# Patient Record
Sex: FEMALE | Race: WHITE | Employment: OTHER | URBAN - METROPOLITAN AREA
[De-identification: names, ages, dates, MRNs, and addresses within clinical notes are randomized per-mention and may not be internally consistent; named-entity substitution may affect disease eponyms.]

---

## 2017-01-03 ENCOUNTER — TELEPHONE (OUTPATIENT)
Dept: FAMILY MEDICINE CLINIC | Age: 57
End: 2017-01-03

## 2017-01-09 RX ORDER — CLONAZEPAM 0.5 MG/1
TABLET ORAL
Qty: 45 TABLET | Refills: 0 | Status: SHIPPED | OUTPATIENT
Start: 2017-01-09 | End: 2017-03-20 | Stop reason: SDUPTHER

## 2017-01-12 ENCOUNTER — TELEPHONE (OUTPATIENT)
Dept: FAMILY MEDICINE CLINIC | Age: 57
End: 2017-01-12

## 2017-01-19 RX ORDER — RANOLAZINE 500 MG/1
500 TABLET, EXTENDED RELEASE ORAL 2 TIMES DAILY
Qty: 180 TABLET | Refills: 0 | Status: SHIPPED | OUTPATIENT
Start: 2017-01-19 | End: 2017-05-16 | Stop reason: SDUPTHER

## 2017-02-02 ENCOUNTER — TELEPHONE (OUTPATIENT)
Dept: PULMONOLOGY | Age: 57
End: 2017-02-02

## 2017-02-23 ENCOUNTER — OFFICE VISIT (OUTPATIENT)
Dept: FAMILY MEDICINE CLINIC | Age: 57
End: 2017-02-23

## 2017-02-23 VITALS
RESPIRATION RATE: 16 BRPM | WEIGHT: 206 LBS | DIASTOLIC BLOOD PRESSURE: 70 MMHG | BODY MASS INDEX: 40.44 KG/M2 | HEART RATE: 63 BPM | SYSTOLIC BLOOD PRESSURE: 122 MMHG | OXYGEN SATURATION: 98 %

## 2017-02-23 DIAGNOSIS — R30.0 DYSURIA: Primary | ICD-10-CM

## 2017-02-23 DIAGNOSIS — F41.9 ANXIETY AND DEPRESSION: ICD-10-CM

## 2017-02-23 DIAGNOSIS — S93.105D: ICD-10-CM

## 2017-02-23 DIAGNOSIS — E03.9 HYPOTHYROIDISM, UNSPECIFIED TYPE: ICD-10-CM

## 2017-02-23 DIAGNOSIS — F32.A ANXIETY AND DEPRESSION: ICD-10-CM

## 2017-02-23 DIAGNOSIS — M51.36 DDD (DEGENERATIVE DISC DISEASE), LUMBAR: ICD-10-CM

## 2017-02-23 LAB
A/G RATIO: 1.4 (ref 1.1–2.2)
ALBUMIN SERPL-MCNC: 4.2 G/DL (ref 3.4–5)
ALP BLD-CCNC: 94 U/L (ref 40–129)
ALT SERPL-CCNC: 8 U/L (ref 10–40)
ANION GAP SERPL CALCULATED.3IONS-SCNC: 24 MMOL/L (ref 3–16)
AST SERPL-CCNC: 10 U/L (ref 15–37)
BILIRUB SERPL-MCNC: <0.2 MG/DL (ref 0–1)
BILIRUBIN, POC: NORMAL
BLOOD URINE, POC: NORMAL
BUN BLDV-MCNC: 40 MG/DL (ref 7–20)
CALCIUM SERPL-MCNC: 9.6 MG/DL (ref 8.3–10.6)
CHLORIDE BLD-SCNC: 98 MMOL/L (ref 99–110)
CLARITY, POC: NORMAL
CO2: 14 MMOL/L (ref 21–32)
COLOR, POC: YELLOW
CREAT SERPL-MCNC: 1.7 MG/DL (ref 0.6–1.1)
GFR AFRICAN AMERICAN: 38
GFR NON-AFRICAN AMERICAN: 31
GLOBULIN: 3.1 G/DL
GLUCOSE BLD-MCNC: 183 MG/DL (ref 70–99)
GLUCOSE URINE, POC: NORMAL
KETONES, POC: NORMAL
LEUKOCYTE EST, POC: NORMAL
NITRITE, POC: NORMAL
PH, POC: 6
POTASSIUM SERPL-SCNC: 5.6 MMOL/L (ref 3.5–5.1)
PROTEIN, POC: 100
SODIUM BLD-SCNC: 136 MMOL/L (ref 136–145)
SPECIFIC GRAVITY, POC: 1.02
TOTAL PROTEIN: 7.3 G/DL (ref 6.4–8.2)
TSH SERPL DL<=0.05 MIU/L-ACNC: 0.72 UIU/ML (ref 0.27–4.2)
UROBILINOGEN, POC: NORMAL

## 2017-02-23 PROCEDURE — 99214 OFFICE O/P EST MOD 30 MIN: CPT | Performed by: FAMILY MEDICINE

## 2017-02-23 PROCEDURE — 81002 URINALYSIS NONAUTO W/O SCOPE: CPT | Performed by: FAMILY MEDICINE

## 2017-02-23 RX ORDER — PHENAZOPYRIDINE HYDROCHLORIDE 100 MG/1
100 TABLET, FILM COATED ORAL 3 TIMES DAILY PRN
Qty: 20 TABLET | Refills: 0 | Status: SHIPPED | OUTPATIENT
Start: 2017-02-23 | End: 2018-03-01

## 2017-02-23 RX ORDER — PREGABALIN 150 MG/1
150 CAPSULE ORAL 2 TIMES DAILY
Qty: 60 CAPSULE | Refills: 3 | Status: SHIPPED | OUTPATIENT
Start: 2017-02-23 | End: 2017-03-23

## 2017-02-24 ENCOUNTER — TELEPHONE (OUTPATIENT)
Dept: FAMILY MEDICINE CLINIC | Age: 57
End: 2017-02-24

## 2017-02-25 LAB
ORGANISM: ABNORMAL
URINE CULTURE, ROUTINE: ABNORMAL

## 2017-03-13 RX ORDER — LINAGLIPTIN 5 MG/1
TABLET, FILM COATED ORAL
Qty: 30 TABLET | Refills: 0 | Status: SHIPPED | OUTPATIENT
Start: 2017-03-13 | End: 2017-03-23 | Stop reason: SDUPTHER

## 2017-03-15 RX ORDER — BUPROPION HYDROCHLORIDE 300 MG/1
TABLET ORAL
Qty: 30 TABLET | Refills: 5 | Status: SHIPPED | OUTPATIENT
Start: 2017-03-15 | End: 2017-08-21 | Stop reason: SDUPTHER

## 2017-03-20 RX ORDER — CLONAZEPAM 0.5 MG/1
TABLET ORAL
Qty: 45 TABLET | Refills: 0 | OUTPATIENT
Start: 2017-03-20 | End: 2017-04-21 | Stop reason: SDUPTHER

## 2017-03-23 ENCOUNTER — OFFICE VISIT (OUTPATIENT)
Dept: FAMILY MEDICINE CLINIC | Age: 57
End: 2017-03-23

## 2017-03-23 VITALS
WEIGHT: 207 LBS | DIASTOLIC BLOOD PRESSURE: 50 MMHG | HEART RATE: 77 BPM | BODY MASS INDEX: 40.64 KG/M2 | OXYGEN SATURATION: 98 % | HEIGHT: 60 IN | SYSTOLIC BLOOD PRESSURE: 120 MMHG

## 2017-03-23 DIAGNOSIS — R35.89 POLYURIA: ICD-10-CM

## 2017-03-23 DIAGNOSIS — R06.2 WHEEZE: ICD-10-CM

## 2017-03-23 DIAGNOSIS — J40 BRONCHITIS: ICD-10-CM

## 2017-03-23 DIAGNOSIS — J43.9 PULMONARY EMPHYSEMA, UNSPECIFIED EMPHYSEMA TYPE (HCC): ICD-10-CM

## 2017-03-23 DIAGNOSIS — R68.2 DRY MOUTH: Primary | ICD-10-CM

## 2017-03-23 DIAGNOSIS — G47.30 SLEEP APNEA, UNSPECIFIED TYPE: ICD-10-CM

## 2017-03-23 DIAGNOSIS — N39.0 URINARY TRACT INFECTION, SITE UNSPECIFIED: ICD-10-CM

## 2017-03-23 PROCEDURE — 99214 OFFICE O/P EST MOD 30 MIN: CPT | Performed by: FAMILY MEDICINE

## 2017-03-23 RX ORDER — PROMETHAZINE HYDROCHLORIDE AND CODEINE PHOSPHATE 6.25; 1 MG/5ML; MG/5ML
5 SYRUP ORAL EVERY 4 HOURS PRN
Qty: 120 ML | Refills: 0 | Status: SHIPPED | OUTPATIENT
Start: 2017-03-23 | End: 2017-03-30

## 2017-03-23 RX ORDER — GUAIFENESIN 600 MG/1
600 TABLET, EXTENDED RELEASE ORAL 2 TIMES DAILY PRN
Qty: 60 TABLET | Refills: 1 | Status: SHIPPED | OUTPATIENT
Start: 2017-03-23 | End: 2018-05-24 | Stop reason: SDUPTHER

## 2017-03-23 RX ORDER — AZITHROMYCIN 250 MG/1
TABLET, FILM COATED ORAL
Qty: 1 PACKET | Refills: 0 | Status: SHIPPED | OUTPATIENT
Start: 2017-03-23 | End: 2017-04-02

## 2017-03-23 RX ORDER — GABAPENTIN 300 MG/1
CAPSULE ORAL
Qty: 270 CAPSULE | Refills: 3 | Status: SHIPPED | OUTPATIENT
Start: 2017-03-23 | End: 2017-09-20 | Stop reason: SDUPTHER

## 2017-03-25 LAB — URINE CULTURE, ROUTINE: NORMAL

## 2017-04-03 ENCOUNTER — OFFICE VISIT (OUTPATIENT)
Dept: ENDOCRINOLOGY | Age: 57
End: 2017-04-03

## 2017-04-03 VITALS
OXYGEN SATURATION: 95 % | WEIGHT: 204.4 LBS | HEIGHT: 60 IN | SYSTOLIC BLOOD PRESSURE: 111 MMHG | BODY MASS INDEX: 40.13 KG/M2 | HEART RATE: 78 BPM | RESPIRATION RATE: 14 BRPM | DIASTOLIC BLOOD PRESSURE: 63 MMHG

## 2017-04-03 DIAGNOSIS — E78.5 HYPERLIPIDEMIA, UNSPECIFIED HYPERLIPIDEMIA TYPE: ICD-10-CM

## 2017-04-03 DIAGNOSIS — E03.9 HYPOTHYROIDISM, UNSPECIFIED TYPE: ICD-10-CM

## 2017-04-03 LAB — HBA1C MFR BLD: 11.2 %

## 2017-04-03 PROCEDURE — 99214 OFFICE O/P EST MOD 30 MIN: CPT | Performed by: INTERNAL MEDICINE

## 2017-04-03 PROCEDURE — 83036 HEMOGLOBIN GLYCOSYLATED A1C: CPT | Performed by: INTERNAL MEDICINE

## 2017-04-04 RX ORDER — PSYLLIUM HUSK 0.4 G
CAPSULE ORAL
Qty: 30 TABLET | Refills: 5 | Status: SHIPPED | OUTPATIENT
Start: 2017-04-04 | End: 2017-09-19

## 2017-04-21 RX ORDER — CLONAZEPAM 0.5 MG/1
TABLET ORAL
Qty: 45 TABLET | Refills: 0 | OUTPATIENT
Start: 2017-04-21 | End: 2017-05-15 | Stop reason: SDUPTHER

## 2017-05-09 ENCOUNTER — TELEPHONE (OUTPATIENT)
Dept: PULMONOLOGY | Age: 57
End: 2017-05-09

## 2017-05-09 RX ORDER — TIOTROPIUM BROMIDE INHALATION SPRAY 3.12 UG/1
SPRAY, METERED RESPIRATORY (INHALATION)
Qty: 1 INHALER | Refills: 4 | Status: SHIPPED | OUTPATIENT
Start: 2017-05-09 | End: 2018-01-24 | Stop reason: SDUPTHER

## 2017-05-16 RX ORDER — CLONAZEPAM 0.5 MG/1
TABLET ORAL
Qty: 45 TABLET | Refills: 0 | OUTPATIENT
Start: 2017-05-16 | End: 2017-06-01 | Stop reason: SDUPTHER

## 2017-05-16 RX ORDER — RANOLAZINE 500 MG/1
TABLET, FILM COATED, EXTENDED RELEASE ORAL
Qty: 60 TABLET | Refills: 5 | Status: SHIPPED | OUTPATIENT
Start: 2017-05-16 | End: 2017-10-21 | Stop reason: SDUPTHER

## 2017-05-24 ENCOUNTER — OFFICE VISIT (OUTPATIENT)
Dept: CARDIOLOGY CLINIC | Age: 57
End: 2017-05-24

## 2017-05-24 ENCOUNTER — HOSPITAL ENCOUNTER (OUTPATIENT)
Dept: PHYSICAL THERAPY | Age: 57
Discharge: HOME | End: 2017-05-31
Attending: PAIN MEDICINE | Admitting: PAIN MEDICINE

## 2017-05-24 VITALS
HEART RATE: 71 BPM | DIASTOLIC BLOOD PRESSURE: 46 MMHG | SYSTOLIC BLOOD PRESSURE: 108 MMHG | HEIGHT: 60 IN | BODY MASS INDEX: 40.44 KG/M2 | WEIGHT: 206 LBS | OXYGEN SATURATION: 96 %

## 2017-05-24 DIAGNOSIS — E78.5 HYPERLIPIDEMIA, UNSPECIFIED HYPERLIPIDEMIA TYPE: ICD-10-CM

## 2017-05-24 DIAGNOSIS — R09.89 CAROTID BRUIT, UNSPECIFIED LATERALITY: ICD-10-CM

## 2017-05-24 DIAGNOSIS — I10 ESSENTIAL HYPERTENSION: ICD-10-CM

## 2017-05-24 DIAGNOSIS — I20.1 CORONARY VASOSPASM (HCC): ICD-10-CM

## 2017-05-24 DIAGNOSIS — R06.02 SOB (SHORTNESS OF BREATH): ICD-10-CM

## 2017-05-24 DIAGNOSIS — I25.10 CORONARY ARTERY DISEASE INVOLVING NATIVE CORONARY ARTERY OF NATIVE HEART WITHOUT ANGINA PECTORIS: Primary | ICD-10-CM

## 2017-05-24 PROCEDURE — 99214 OFFICE O/P EST MOD 30 MIN: CPT | Performed by: INTERNAL MEDICINE

## 2017-06-01 ENCOUNTER — HOSPITAL ENCOUNTER (OUTPATIENT)
Dept: OTHER | Age: 57
Discharge: HOME | End: 2017-06-30
Attending: PAIN MEDICINE | Admitting: PAIN MEDICINE

## 2017-06-01 RX ORDER — CLONAZEPAM 0.5 MG/1
TABLET ORAL
Qty: 45 TABLET | Refills: 0 | Status: SHIPPED | OUTPATIENT
Start: 2017-06-01 | End: 2017-07-11 | Stop reason: SDUPTHER

## 2017-06-07 ENCOUNTER — HOSPITAL ENCOUNTER (OUTPATIENT)
Dept: OTHER | Age: 57
Discharge: HOME | End: 2017-06-07
Attending: PAIN MEDICINE | Admitting: PAIN MEDICINE

## 2017-06-07 DIAGNOSIS — R06.02 SOB (SHORTNESS OF BREATH): ICD-10-CM

## 2017-06-07 LAB
ANION GAP SERPL CALCULATED.3IONS-SCNC: 17 MMOL/L (ref 3–16)
BUN BLDV-MCNC: 19 MG/DL (ref 7–20)
CALCIUM SERPL-MCNC: 9.2 MG/DL (ref 8.3–10.6)
CHLORIDE BLD-SCNC: 95 MMOL/L (ref 99–110)
CHOLESTEROL, TOTAL: 125 MG/DL (ref 0–199)
CO2: 22 MMOL/L (ref 21–32)
CREAT SERPL-MCNC: 1 MG/DL (ref 0.6–1.1)
GFR AFRICAN AMERICAN: >60
GFR NON-AFRICAN AMERICAN: 57
GLUCOSE BLD-MCNC: 352 MG/DL (ref 70–99)
HDLC SERPL-MCNC: 54 MG/DL (ref 40–60)
LDL CHOLESTEROL CALCULATED: 35 MG/DL
POTASSIUM SERPL-SCNC: 4.5 MMOL/L (ref 3.5–5.1)
PRO-BNP: 479 PG/ML (ref 0–124)
SODIUM BLD-SCNC: 134 MMOL/L (ref 136–145)
TRIGL SERPL-MCNC: 179 MG/DL (ref 0–150)
VLDLC SERPL CALC-MCNC: 36 MG/DL

## 2017-06-08 ENCOUNTER — TELEPHONE (OUTPATIENT)
Dept: CARDIOLOGY CLINIC | Age: 57
End: 2017-06-08

## 2017-06-08 ENCOUNTER — TELEPHONE (OUTPATIENT)
Dept: FAMILY MEDICINE CLINIC | Age: 57
End: 2017-06-08

## 2017-06-08 DIAGNOSIS — R05.9 COUGH: Primary | ICD-10-CM

## 2017-06-08 RX ORDER — BENZONATATE 100 MG/1
CAPSULE ORAL
Qty: 60 CAPSULE | Refills: 0 | Status: SHIPPED | OUTPATIENT
Start: 2017-06-08 | End: 2017-11-08

## 2017-06-16 RX ORDER — FUROSEMIDE 20 MG/1
TABLET ORAL
Qty: 60 TABLET | Refills: 5 | Status: SHIPPED | OUTPATIENT
Start: 2017-06-16 | End: 2017-11-16 | Stop reason: SDUPTHER

## 2017-06-19 RX ORDER — CLONAZEPAM 0.5 MG/1
TABLET ORAL
Qty: 45 TABLET | Refills: 0 | OUTPATIENT
Start: 2017-06-19

## 2017-06-27 ENCOUNTER — HOSPITAL ENCOUNTER (OUTPATIENT)
Dept: CARDIOLOGY | Facility: CLINIC | Age: 57
Discharge: HOME | End: 2017-06-27
Attending: INTERNAL MEDICINE | Admitting: INTERNAL MEDICINE

## 2017-06-27 ENCOUNTER — TELEPHONE (OUTPATIENT)
Dept: CARDIOLOGY CLINIC | Age: 57
End: 2017-06-27

## 2017-06-27 DIAGNOSIS — R06.02 SOB (SHORTNESS OF BREATH): ICD-10-CM

## 2017-06-27 DIAGNOSIS — I10 ESSENTIAL HYPERTENSION: ICD-10-CM

## 2017-06-27 DIAGNOSIS — E78.5 HYPERLIPIDEMIA, UNSPECIFIED HYPERLIPIDEMIA TYPE: ICD-10-CM

## 2017-07-10 DIAGNOSIS — S33.6XXA SACROILIAC (LIGAMENT) SPRAIN, INITIAL ENCOUNTER: ICD-10-CM

## 2017-07-11 RX ORDER — ISOSORBIDE MONONITRATE 30 MG/1
TABLET, EXTENDED RELEASE ORAL
Qty: 30 TABLET | Refills: 5 | Status: SHIPPED | OUTPATIENT
Start: 2017-07-11 | End: 2017-12-14 | Stop reason: SDUPTHER

## 2017-07-11 RX ORDER — SERTRALINE HYDROCHLORIDE 100 MG/1
TABLET, FILM COATED ORAL
Qty: 30 TABLET | Refills: 5 | Status: SHIPPED | OUTPATIENT
Start: 2017-07-11 | End: 2017-12-14 | Stop reason: SDUPTHER

## 2017-07-11 RX ORDER — CLONAZEPAM 0.5 MG/1
TABLET ORAL
Qty: 45 TABLET | Refills: 0 | OUTPATIENT
Start: 2017-07-11 | End: 2017-08-30 | Stop reason: SDUPTHER

## 2017-07-11 RX ORDER — BUDESONIDE AND FORMOTEROL FUMARATE DIHYDRATE 160; 4.5 UG/1; UG/1
AEROSOL RESPIRATORY (INHALATION)
Qty: 1 INHALER | Refills: 5 | Status: SHIPPED | OUTPATIENT
Start: 2017-07-11 | End: 2017-12-14 | Stop reason: SDUPTHER

## 2017-07-11 RX ORDER — OXYBUTYNIN CHLORIDE 5 MG/1
TABLET ORAL
Qty: 60 TABLET | Refills: 5 | Status: SHIPPED | OUTPATIENT
Start: 2017-07-11 | End: 2017-12-14 | Stop reason: SDUPTHER

## 2017-07-11 RX ORDER — LISINOPRIL 20 MG/1
TABLET ORAL
Qty: 30 TABLET | Refills: 5 | Status: SHIPPED | OUTPATIENT
Start: 2017-07-11 | End: 2017-11-28 | Stop reason: ALTCHOICE

## 2017-07-11 RX ORDER — TIZANIDINE 4 MG/1
TABLET ORAL
Qty: 60 TABLET | Refills: 5 | Status: ON HOLD | OUTPATIENT
Start: 2017-07-11 | End: 2019-06-06 | Stop reason: HOSPADM

## 2017-07-11 RX ORDER — LEVOTHYROXINE SODIUM 0.05 MG/1
TABLET ORAL
Qty: 30 TABLET | Refills: 5 | Status: SHIPPED | OUTPATIENT
Start: 2017-07-11 | End: 2017-12-14 | Stop reason: SDUPTHER

## 2017-07-11 RX ORDER — CLOPIDOGREL BISULFATE 75 MG/1
TABLET ORAL
Qty: 30 TABLET | Refills: 5 | Status: SHIPPED | OUTPATIENT
Start: 2017-07-11 | End: 2017-12-14 | Stop reason: SDUPTHER

## 2017-07-11 RX ORDER — ATORVASTATIN CALCIUM 40 MG/1
TABLET, FILM COATED ORAL
Qty: 30 TABLET | Refills: 4 | Status: SHIPPED | OUTPATIENT
Start: 2017-07-11 | End: 2017-11-16 | Stop reason: SDUPTHER

## 2017-07-24 ENCOUNTER — OFFICE VISIT (OUTPATIENT)
Dept: ENDOCRINOLOGY | Age: 57
End: 2017-07-24

## 2017-07-24 VITALS
HEIGHT: 60 IN | SYSTOLIC BLOOD PRESSURE: 96 MMHG | BODY MASS INDEX: 39.03 KG/M2 | WEIGHT: 198.8 LBS | DIASTOLIC BLOOD PRESSURE: 47 MMHG | HEART RATE: 66 BPM | RESPIRATION RATE: 12 BRPM | OXYGEN SATURATION: 98 %

## 2017-07-24 DIAGNOSIS — E03.9 HYPOTHYROIDISM, UNSPECIFIED TYPE: ICD-10-CM

## 2017-07-24 DIAGNOSIS — E78.5 HYPERLIPIDEMIA, UNSPECIFIED HYPERLIPIDEMIA TYPE: ICD-10-CM

## 2017-07-24 LAB — HBA1C MFR BLD: 10.3 %

## 2017-07-24 PROCEDURE — 99214 OFFICE O/P EST MOD 30 MIN: CPT | Performed by: INTERNAL MEDICINE

## 2017-07-24 PROCEDURE — 83036 HEMOGLOBIN GLYCOSYLATED A1C: CPT | Performed by: INTERNAL MEDICINE

## 2017-07-25 ENCOUNTER — TELEPHONE (OUTPATIENT)
Dept: PULMONOLOGY | Age: 57
End: 2017-07-25

## 2017-08-01 RX ORDER — LANOLIN ALCOHOL/MO/W.PET/CERES
1000 CREAM (GRAM) TOPICAL DAILY
Qty: 30 TABLET | Refills: 3 | Status: SHIPPED | OUTPATIENT
Start: 2017-08-01 | End: 2019-06-17

## 2017-08-01 RX ORDER — FERROUS SULFATE 325(65) MG
325 TABLET ORAL
Qty: 30 TABLET | Refills: 3 | Status: SHIPPED | OUTPATIENT
Start: 2017-08-01 | End: 2017-11-15 | Stop reason: SDUPTHER

## 2017-08-01 RX ORDER — NITROGLYCERIN 0.4 MG/1
TABLET SUBLINGUAL
Qty: 25 TABLET | Refills: 3 | Status: SHIPPED | OUTPATIENT
Start: 2017-08-01 | End: 2017-11-28 | Stop reason: SDUPTHER

## 2017-08-02 ENCOUNTER — OFFICE VISIT (OUTPATIENT)
Dept: FAMILY MEDICINE CLINIC | Age: 57
End: 2017-08-02

## 2017-08-02 VITALS
DIASTOLIC BLOOD PRESSURE: 80 MMHG | SYSTOLIC BLOOD PRESSURE: 126 MMHG | WEIGHT: 206 LBS | HEART RATE: 80 BPM | RESPIRATION RATE: 16 BRPM | OXYGEN SATURATION: 98 % | BODY MASS INDEX: 40.23 KG/M2

## 2017-08-02 DIAGNOSIS — G47.33 OBSTRUCTIVE SLEEP APNEA SYNDROME: ICD-10-CM

## 2017-08-02 DIAGNOSIS — T78.40XS ALLERGY, SEQUELA: ICD-10-CM

## 2017-08-02 DIAGNOSIS — R05.9 COUGH: Primary | ICD-10-CM

## 2017-08-02 DIAGNOSIS — J43.9 PULMONARY EMPHYSEMA, UNSPECIFIED EMPHYSEMA TYPE (HCC): ICD-10-CM

## 2017-08-02 DIAGNOSIS — R09.82 PND (POST-NASAL DRIP): ICD-10-CM

## 2017-08-02 DIAGNOSIS — K21.9 GASTROESOPHAGEAL REFLUX DISEASE WITHOUT ESOPHAGITIS: ICD-10-CM

## 2017-08-02 PROCEDURE — 99214 OFFICE O/P EST MOD 30 MIN: CPT | Performed by: FAMILY MEDICINE

## 2017-08-02 RX ORDER — MONTELUKAST SODIUM 10 MG/1
10 TABLET ORAL DAILY
Qty: 30 TABLET | Refills: 3 | Status: SHIPPED | OUTPATIENT
Start: 2017-08-02 | End: 2019-01-01 | Stop reason: ALTCHOICE

## 2017-08-02 RX ORDER — ESOMEPRAZOLE MAGNESIUM 40 MG/1
40 CAPSULE, DELAYED RELEASE ORAL DAILY
Qty: 30 CAPSULE | Refills: 3 | Status: SHIPPED | OUTPATIENT
Start: 2017-08-02 | End: 2017-08-03 | Stop reason: CLARIF

## 2017-08-02 RX ORDER — FLUTICASONE PROPIONATE 50 MCG
2 SPRAY, SUSPENSION (ML) NASAL DAILY
Qty: 1 BOTTLE | Refills: 3 | Status: SHIPPED | OUTPATIENT
Start: 2017-08-02 | End: 2018-05-24 | Stop reason: SDUPTHER

## 2017-08-10 ENCOUNTER — HOSPITAL ENCOUNTER (OUTPATIENT)
Dept: CARDIOLOGY | Facility: CLINIC | Age: 57
Discharge: HOME | End: 2017-08-10
Attending: INTERNAL MEDICINE | Admitting: INTERNAL MEDICINE

## 2017-08-10 LAB
LV EF: 55 %
LVEF MODALITY: NORMAL

## 2017-08-11 ENCOUNTER — TELEPHONE (OUTPATIENT)
Dept: CARDIOLOGY CLINIC | Age: 57
End: 2017-08-11

## 2017-08-14 ENCOUNTER — TELEPHONE (OUTPATIENT)
Dept: CARDIOLOGY CLINIC | Age: 57
End: 2017-08-14

## 2017-08-21 ENCOUNTER — TELEPHONE (OUTPATIENT)
Dept: FAMILY MEDICINE CLINIC | Age: 57
End: 2017-08-21

## 2017-08-21 RX ORDER — BUPROPION HYDROCHLORIDE 300 MG/1
TABLET ORAL
Qty: 30 TABLET | Refills: 5 | Status: SHIPPED | OUTPATIENT
Start: 2017-08-21 | End: 2018-02-08 | Stop reason: SDUPTHER

## 2017-08-30 RX ORDER — CLONAZEPAM 0.5 MG/1
TABLET ORAL
Qty: 45 TABLET | Refills: 0 | Status: SHIPPED | OUTPATIENT
Start: 2017-08-30 | End: 2017-10-18 | Stop reason: SDUPTHER

## 2017-08-31 RX ORDER — INSULIN GLARGINE 100 [IU]/ML
INJECTION, SOLUTION SUBCUTANEOUS
Qty: 30 ML | Refills: 3 | Status: SHIPPED | OUTPATIENT
Start: 2017-08-31 | End: 2018-01-23 | Stop reason: SDUPTHER

## 2017-09-13 ENCOUNTER — OFFICE VISIT (OUTPATIENT)
Dept: FAMILY MEDICINE CLINIC | Age: 57
End: 2017-09-13

## 2017-09-13 VITALS
WEIGHT: 206 LBS | RESPIRATION RATE: 18 BRPM | OXYGEN SATURATION: 97 % | BODY MASS INDEX: 40.23 KG/M2 | DIASTOLIC BLOOD PRESSURE: 64 MMHG | TEMPERATURE: 98.3 F | HEART RATE: 75 BPM | SYSTOLIC BLOOD PRESSURE: 114 MMHG

## 2017-09-13 DIAGNOSIS — G47.33 OSA (OBSTRUCTIVE SLEEP APNEA): ICD-10-CM

## 2017-09-13 DIAGNOSIS — Z72.0 TOBACCO ABUSE: ICD-10-CM

## 2017-09-13 DIAGNOSIS — J43.9 PULMONARY EMPHYSEMA, UNSPECIFIED EMPHYSEMA TYPE (HCC): ICD-10-CM

## 2017-09-13 DIAGNOSIS — R05.8 PRODUCTIVE COUGH: Primary | ICD-10-CM

## 2017-09-13 DIAGNOSIS — Z23 NEEDS FLU SHOT: ICD-10-CM

## 2017-09-13 DIAGNOSIS — I10 BENIGN ESSENTIAL HTN: ICD-10-CM

## 2017-09-13 PROCEDURE — 90688 IIV4 VACCINE SPLT 0.5 ML IM: CPT | Performed by: FAMILY MEDICINE

## 2017-09-13 PROCEDURE — 90471 IMMUNIZATION ADMIN: CPT | Performed by: FAMILY MEDICINE

## 2017-09-13 PROCEDURE — 99213 OFFICE O/P EST LOW 20 MIN: CPT | Performed by: FAMILY MEDICINE

## 2017-09-13 RX ORDER — GUAIFENESIN 600 MG/1
1200 TABLET, EXTENDED RELEASE ORAL 2 TIMES DAILY
Qty: 120 TABLET | Refills: 2 | Status: SHIPPED | OUTPATIENT
Start: 2017-09-13 | End: 2017-11-28 | Stop reason: SDUPTHER

## 2017-09-13 RX ORDER — INSULIN GLARGINE 100 [IU]/ML
40 INJECTION, SOLUTION SUBCUTANEOUS
COMMUNITY
End: 2018-03-09 | Stop reason: CLARIF

## 2017-09-13 RX ORDER — GUAIFENESIN 600 MG/1
1200 TABLET, EXTENDED RELEASE ORAL 2 TIMES DAILY
Qty: 1200 TABLET | Refills: 2 | Status: SHIPPED | OUTPATIENT
Start: 2017-09-13 | End: 2017-09-13

## 2017-09-19 RX ORDER — MELATONIN
1 DAILY
Qty: 30 TABLET | Refills: 5 | Status: SHIPPED | OUTPATIENT
Start: 2017-09-19 | End: 2018-03-16 | Stop reason: SDUPTHER

## 2017-09-19 RX ORDER — GLUCOSAMINE HCL 500 MG
1000 TABLET ORAL DAILY
Qty: 30 TABLET | Refills: 0 | OUTPATIENT
Start: 2017-09-19

## 2017-09-20 RX ORDER — FLUOCINONIDE 1 MG/G
CREAM TOPICAL
Qty: 240 G | Refills: 0 | Status: SHIPPED | OUTPATIENT
Start: 2017-09-20 | End: 2019-01-01 | Stop reason: ALTCHOICE

## 2017-09-21 RX ORDER — GABAPENTIN 300 MG/1
CAPSULE ORAL
Qty: 270 CAPSULE | Refills: 0 | Status: SHIPPED | OUTPATIENT
Start: 2017-09-21 | End: 2017-10-30 | Stop reason: SDUPTHER

## 2017-10-10 ENCOUNTER — TELEPHONE (OUTPATIENT)
Dept: FAMILY MEDICINE CLINIC | Age: 57
End: 2017-10-10

## 2017-10-18 ENCOUNTER — TELEPHONE (OUTPATIENT)
Dept: PULMONOLOGY | Age: 57
End: 2017-10-18

## 2017-10-18 RX ORDER — CLONAZEPAM 0.5 MG/1
TABLET ORAL
Qty: 45 TABLET | Refills: 0 | OUTPATIENT
Start: 2017-10-18 | End: 2017-12-07 | Stop reason: SDUPTHER

## 2017-10-19 ENCOUNTER — TELEPHONE (OUTPATIENT)
Dept: FAMILY MEDICINE CLINIC | Age: 57
End: 2017-10-19

## 2017-10-19 NOTE — TELEPHONE ENCOUNTER
Pls tell her rx was called in (once it was), and pls lt her know I'll do a uds at her Nov appt.   Thx.

## 2017-10-23 RX ORDER — RANOLAZINE 500 MG/1
TABLET, FILM COATED, EXTENDED RELEASE ORAL
Qty: 60 TABLET | Refills: 1 | Status: SHIPPED | OUTPATIENT
Start: 2017-10-23 | End: 2017-12-15 | Stop reason: SDUPTHER

## 2017-10-30 RX ORDER — GABAPENTIN 300 MG/1
CAPSULE ORAL
Qty: 270 CAPSULE | Refills: 2 | Status: SHIPPED | OUTPATIENT
Start: 2017-10-30 | End: 2018-01-15 | Stop reason: SDUPTHER

## 2017-11-08 ENCOUNTER — OFFICE VISIT (OUTPATIENT)
Dept: FAMILY MEDICINE CLINIC | Age: 57
End: 2017-11-08

## 2017-11-08 VITALS
HEART RATE: 75 BPM | WEIGHT: 201 LBS | SYSTOLIC BLOOD PRESSURE: 100 MMHG | BODY MASS INDEX: 39.26 KG/M2 | DIASTOLIC BLOOD PRESSURE: 60 MMHG | OXYGEN SATURATION: 97 %

## 2017-11-08 DIAGNOSIS — J44.9 COPD, MODERATE (HCC): ICD-10-CM

## 2017-11-08 DIAGNOSIS — R15.9 ENCOPRESIS: ICD-10-CM

## 2017-11-08 DIAGNOSIS — R05.9 COUGH: ICD-10-CM

## 2017-11-08 DIAGNOSIS — A09 DIARRHEA OF INFECTIOUS ORIGIN: Primary | ICD-10-CM

## 2017-11-08 DIAGNOSIS — Z72.0 TOBACCO ABUSE: ICD-10-CM

## 2017-11-08 PROCEDURE — 3023F SPIROM DOC REV: CPT | Performed by: FAMILY MEDICINE

## 2017-11-08 PROCEDURE — G8926 SPIRO NO PERF OR DOC: HCPCS | Performed by: FAMILY MEDICINE

## 2017-11-08 PROCEDURE — 3017F COLORECTAL CA SCREEN DOC REV: CPT | Performed by: FAMILY MEDICINE

## 2017-11-08 PROCEDURE — 4004F PT TOBACCO SCREEN RCVD TLK: CPT | Performed by: FAMILY MEDICINE

## 2017-11-08 PROCEDURE — 99214 OFFICE O/P EST MOD 30 MIN: CPT | Performed by: FAMILY MEDICINE

## 2017-11-08 PROCEDURE — 3014F SCREEN MAMMO DOC REV: CPT | Performed by: FAMILY MEDICINE

## 2017-11-08 PROCEDURE — G8484 FLU IMMUNIZE NO ADMIN: HCPCS | Performed by: FAMILY MEDICINE

## 2017-11-08 PROCEDURE — G8417 CALC BMI ABV UP PARAM F/U: HCPCS | Performed by: FAMILY MEDICINE

## 2017-11-08 PROCEDURE — G8598 ASA/ANTIPLAT THER USED: HCPCS | Performed by: FAMILY MEDICINE

## 2017-11-08 PROCEDURE — G8427 DOCREV CUR MEDS BY ELIG CLIN: HCPCS | Performed by: FAMILY MEDICINE

## 2017-11-08 RX ORDER — BENZONATATE 100 MG/1
100-200 CAPSULE ORAL 3 TIMES DAILY PRN
Qty: 40 CAPSULE | Refills: 1 | Status: SHIPPED | OUTPATIENT
Start: 2017-11-08 | End: 2018-03-01

## 2017-11-08 RX ORDER — LOPERAMIDE HYDROCHLORIDE 2 MG/1
2 CAPSULE ORAL 4 TIMES DAILY PRN
Qty: 40 CAPSULE | Refills: 0 | Status: SHIPPED | OUTPATIENT
Start: 2017-11-08 | End: 2017-11-18

## 2017-11-11 ASSESSMENT — ENCOUNTER SYMPTOMS
SORE THROAT: 0
SHORTNESS OF BREATH: 1
COLOR CHANGE: 0
ABDOMINAL PAIN: 0
FACIAL SWELLING: 0
NAUSEA: 0
RHINORRHEA: 0
WHEEZING: 1
CHEST TIGHTNESS: 0
VOMITING: 0
DIARRHEA: 1
RECTAL PAIN: 0
COUGH: 1
BLOOD IN STOOL: 0

## 2017-11-13 ENCOUNTER — TELEPHONE (OUTPATIENT)
Dept: FAMILY MEDICINE CLINIC | Age: 57
End: 2017-11-13

## 2017-11-13 ENCOUNTER — HOSPITAL ENCOUNTER (OUTPATIENT)
Dept: OTHER | Age: 57
Discharge: HOME | End: 2017-11-13
Attending: FAMILY MEDICINE | Admitting: FAMILY MEDICINE

## 2017-11-13 DIAGNOSIS — R05.9 COUGH: ICD-10-CM

## 2017-11-13 DIAGNOSIS — R05.9 COUGH: Primary | ICD-10-CM

## 2017-11-13 RX ORDER — DEXTROMETHORPHAN HYDROBROMIDE AND PROMETHAZINE HYDROCHLORIDE 15; 6.25 MG/5ML; MG/5ML
SYRUP ORAL
Qty: 120 ML | Refills: 0 | Status: SHIPPED | OUTPATIENT
Start: 2017-11-13 | End: 2018-03-09 | Stop reason: CLARIF

## 2017-11-13 NOTE — TELEPHONE ENCOUNTER
Patient said the medicine she was put on is not helping . Still coughing and side hurts and goes around left side of back.  cb pt

## 2017-11-28 ENCOUNTER — OFFICE VISIT (OUTPATIENT)
Dept: CARDIOLOGY CLINIC | Age: 57
End: 2017-11-28

## 2017-11-28 VITALS
SYSTOLIC BLOOD PRESSURE: 108 MMHG | HEIGHT: 60 IN | HEART RATE: 75 BPM | OXYGEN SATURATION: 96 % | BODY MASS INDEX: 39.66 KG/M2 | WEIGHT: 202 LBS | DIASTOLIC BLOOD PRESSURE: 74 MMHG

## 2017-11-28 DIAGNOSIS — E78.2 MIXED HYPERLIPIDEMIA: ICD-10-CM

## 2017-11-28 DIAGNOSIS — I20.1 CORONARY VASOSPASM (HCC): ICD-10-CM

## 2017-11-28 DIAGNOSIS — I50.32 CHRONIC DIASTOLIC CONGESTIVE HEART FAILURE (HCC): ICD-10-CM

## 2017-11-28 DIAGNOSIS — I10 ESSENTIAL HYPERTENSION: ICD-10-CM

## 2017-11-28 DIAGNOSIS — I25.5 ISCHEMIC CARDIOMYOPATHY: ICD-10-CM

## 2017-11-28 DIAGNOSIS — I25.10 CORONARY ARTERY DISEASE INVOLVING NATIVE CORONARY ARTERY OF NATIVE HEART WITHOUT ANGINA PECTORIS: Primary | ICD-10-CM

## 2017-11-28 PROCEDURE — G8427 DOCREV CUR MEDS BY ELIG CLIN: HCPCS | Performed by: NURSE PRACTITIONER

## 2017-11-28 PROCEDURE — G8417 CALC BMI ABV UP PARAM F/U: HCPCS | Performed by: NURSE PRACTITIONER

## 2017-11-28 PROCEDURE — G8598 ASA/ANTIPLAT THER USED: HCPCS | Performed by: NURSE PRACTITIONER

## 2017-11-28 PROCEDURE — 99214 OFFICE O/P EST MOD 30 MIN: CPT | Performed by: NURSE PRACTITIONER

## 2017-11-28 PROCEDURE — 3017F COLORECTAL CA SCREEN DOC REV: CPT | Performed by: NURSE PRACTITIONER

## 2017-11-28 PROCEDURE — G8484 FLU IMMUNIZE NO ADMIN: HCPCS | Performed by: NURSE PRACTITIONER

## 2017-11-28 PROCEDURE — 3014F SCREEN MAMMO DOC REV: CPT | Performed by: NURSE PRACTITIONER

## 2017-11-28 PROCEDURE — 4004F PT TOBACCO SCREEN RCVD TLK: CPT | Performed by: NURSE PRACTITIONER

## 2017-11-28 RX ORDER — OXYCODONE HYDROCHLORIDE AND ACETAMINOPHEN 5; 325 MG/1; MG/1
1 TABLET ORAL EVERY 6 HOURS PRN
Status: ON HOLD | COMMUNITY
End: 2019-06-06 | Stop reason: SDUPTHER

## 2017-11-28 RX ORDER — NITROGLYCERIN 0.4 MG/1
TABLET SUBLINGUAL
Qty: 25 TABLET | Refills: 3 | Status: SHIPPED | OUTPATIENT
Start: 2017-11-28 | End: 2018-10-24 | Stop reason: SDUPTHER

## 2017-11-28 RX ORDER — LISINOPRIL 5 MG/1
5 TABLET ORAL DAILY
Qty: 30 TABLET | Refills: 3 | Status: SHIPPED | OUTPATIENT
Start: 2017-11-28 | End: 2018-03-16 | Stop reason: SDUPTHER

## 2017-11-28 NOTE — PATIENT INSTRUCTIONS
1. Restart the lisinopril at lower dose of 5 mg daily (for heart and kidney's)  2. No change in other heart medicines (metoprolol, aspirin, Plavix, atorvastatin)  3. Continue to follow up with Dr. Jennifer Valentino for the cough  4.  Follow up with Dr. Alma Valente in 6 months

## 2017-11-28 NOTE — PROGRESS NOTES
myocardial infarction) ([facility] [address] Thyroid disease; and Vascular complications of other MXAUAGQ(727.95). Surgical History:   has a past surgical history that includes Lithotripsy;  section; vascular surgery; Dilation and curettage of uterus; Appendectomy; Coronary angioplasty with stent (14); Diagnostic Cardiac Cath Lab Procedure; eye surgery (Left, 2015); and Cataract removal with implant (Right, 2015). Social History:   reports that she has been smoking Cigarettes. She has a 10.00 pack-year smoking history. She has quit using smokeless tobacco. She reports that she does not drink alcohol or use drugs. Family History:   Family History   Problem Relation Age of Onset    Other Mother       of unknown lung issue at 61    Diabetes Mother     Diabetes Brother     Heart Disease Maternal Grandfather     Cancer Maternal Grandmother     Asthma Grandchild        Home Medications:  Prior to Admission medications    Medication Sig Start Date End Date Taking? Authorizing Provider   oxyCODONE-acetaminophen (PERCOCET) 5-325 MG per tablet Take 1 tablet by mouth every 8 hours as needed for Pain . Yes Historical Provider, MD   furosemide (LASIX) 20 MG tablet Take 1 tablet by mouth twice daily. 17  Yes Cierra Flores MD   atorvastatin (LIPITOR) 40 MG tablet Take 1 tablet by mouth daily. 17  Yes Cierra Flores MD   ferrous sulfate 325 (65 Fe) MG EC tablet Take 1 tablet by mouth daily (with breakfast) 17  Yes Cierra Flores MD   promethazine-dextromethorphan (PROMETHAZINE-DM) 6.25-15 MG/5ML syrup 1-2 tsp po qid prn cough 17  Yes Cierra Flores MD   benzonatate (TESSALON PERLES) 100 MG capsule Take 1-2 capsules by mouth 3 times daily as needed for Cough 17  Yes Cierra Flores MD   gabapentin (NEURONTIN) 300 MG capsule Take 3 capsules by mouth 3 times daily.  10/30/17  Yes Cierra Flores MD   esomeprazole (NEXIUM) 20 MG delayed release capsule Take 1 capsule by mouth TIMES A DAY BEFORE MEALS 7/24/17  Yes Kathe Shen MD   tiZANidine (ZANAFLEX) 4 MG tablet TAKE ONE-HALF TO ONE THREE TIMES A DAY AS NEEDED FOR BACK PAIN 7/11/17  Yes Nancy Green MD   metoprolol tartrate (LOPRESSOR) 25 MG tablet TAKE ONE TABLET BY MOUTH TWICE A DAY 7/11/17  Yes Nancy Green MD   sertraline (ZOLOFT) 100 MG tablet TAKE ONE TABLET BY MOUTH DAILY 7/11/17  Yes Nancy Green MD   metFORMIN (GLUCOPHAGE) 1000 MG tablet TAKE ONE TABLET BY MOUTH TWICE A DAY WITH MEALS 7/11/17  Yes Nancy Green MD   clopidogrel (PLAVIX) 75 MG tablet TAKE ONE TABLET BY MOUTH DAILY 7/11/17  Yes Nancy Green MD   lisinopril (PRINIVIL;ZESTRIL) 20 MG tablet TAKE ONE TABLET BY MOUTH DAILY 7/11/17  Yes Nancy Green MD   SYMBICORT 160-4.5 MCG/ACT AERO INHALE 2 PUFFS BY MOUTH TWO TIMES A DAY 7/11/17  Yes Nancy Green MD   levothyroxine (SYNTHROID) 50 MCG tablet TAKE ONE TABLET BY MOUTH DAILY 7/11/17  Yes Nancy Green MD   isosorbide mononitrate (IMDUR) 30 MG extended release tablet TAKE ONE TABLET BY MOUTH DAILY 7/11/17  Yes Nancy Green MD   oxybutynin (DITROPAN) 5 MG tablet TAKE ONE TABLET BY MOUTH TWICE A DAY 7/11/17  Yes Nancy Green MD   brompheniramine-pseudoephedrine-DM (BROMFED DM) 30-2-10 MG/5ML syrup Take 5 mLs by mouth every 6 hours as needed for Congestion or Cough 6/15/17  Yes Brittany Albert PA-C   SPIRIVA RESPIMAT 2.5 MCG/ACT AERS inhaler INHALE 2 PUFFS BY MOUTH DAILY 5/9/17  Yes Dony De MD   guaiFENesin (MUCINEX) 600 MG extended release tablet Take 1 tablet by mouth 2 times daily as needed for Congestion 3/23/17  Yes Brenda Landrum MD   Artificial Saliva (BIOTENE MOISTURIZING MOUTH) SOLN USE 1 SPRAY DAILY AS NEEDED FOR DRY MOUTH 2/27/17  Yes Nancy Green MD   phenazopyridine (PYRIDIUM) 100 MG tablet Take 1 tablet by mouth 3 times daily as needed for Pain (bladder pain) 2/23/17  Yes Nancy Green MD   VENTOLIN  (90 BASE) MCG/ACT inhaler INHALE TWO PUFFS BY MOUTH EVERY 02/24/2017    CO2 22 06/07/2017    CO2 23 02/24/2017    CO2 20 02/24/2017    BUN 19 06/07/2017    BUN 32 02/24/2017    BUN 33 02/24/2017    CREATININE 1.0 06/07/2017    CREATININE 1.4 02/24/2017    CREATININE 1.5 02/24/2017     BNP:   Lab Results   Component Value Date    PROBNP 479 06/07/2017    PROBNP 641 07/28/2014        Cardiac Imaging:  Echo Aug 2017:    Normal left ventricle size, wall thickness and systolic function with an   estimated ejection fraction of 55%. No regional wall motion abnormalities   are seen.   Normal left ventricular diastolic filling pressure.   Mild mitral regurgitation. No stenosis. CARDIAC CATH: 06/04/15  LEFT HEART CATH  LM: normal, based on hemodynamics during FFR, + left main spasm  LAD: mild prox/mid disease <20%  Ramus: luminals  LCX: ostial 50% lesion with some ? Spasm. Mid LCx patent    RCA: luminals dominant     LVEDP:16  LVEF: 65%     FFR of ostial LCX-  1-->0.96 nonsignificant  + left main spasm on cathether and ?ostial LCx spasm  PLAN  1. Will start nitrates for spasm  2. Nonobstructive CAD with negative FFr of ostial LCx     ECHO: 2/26/2014  Limited 2-D echocardiogram due to suboptimal imaging and technical  Limitations. Overall left ventricular function appears grossly normal. Ejection   Fraction is visually estimated to be 55-60 %. Moderate concentric left ventricular hypertrophy is present. TDS secondary to habitus and pt supine due to cardiac cath.     02/28/2014  LEFT HEART ANGIOGRAPHY:  1. Left main coronary tapered distally to about 30% or 40%. The left main  trifurcated to an LAD, ramus, and a left circumflex. 2.  The LAD had minor luminal irregularities throughout its course up to  about 20%. The LAD ran in the interventricular groove to about the mid LAD  aspect, and then it became a very small vessel to the apex, but it did not  wrap. There were several small diagonal branches that were seen without  significant disease.   3.  The ramus ran down the daily for life. 3.  She will get Effient 60 mg p.o. x1 and will continue on 10 mg daily for a  minimum of 1 year. We will get an echocardiogram to evaluate any damage to  the lateral wall, as well as serial troponins to peak. She will need  aggressive _____ medical management, and further inpatient stay for her  coronary artery disease and STEMI. Assessment:    1. Coronary artery disease involving native coronary artery of native heart without angina pectoris    2. Coronary vasospasm (HCC)    3. Ischemic cardiomyopathy    4. Chronic diastolic congestive heart failure ([address]    5. Essential hypertension    6. Mixed hyperlipidemia          Plan:   1. Restart the lisinopril at lower dose of 5 mg daily (for heart and kidney's)  2. No change in other heart medicines (metoprolol, aspirin, Plavix, atorvastatin, Ranexa, Imdur)  3. Continue to follow up with Dr. Ellie Bro for the cough  4. Follow up with Dr. Yessy Gray in 6 months      I appreciate the opportunity of cooperating in the care of this individual.    Helen Cage CNP, 11/28/2017, 12:47 PM    QUALITY MEASURES  1. Tobacco Cessation Counseling: Yes  2. Retake of BP if >140/90:   NA  3. Documentation to PCP/referring for new patient:  Sent to PCP at close of office visit  4. CAD patient on anti-platelet: Yes  5. CAD patient on STATIN therapy:  Yes  6.  Patient with CHF and aFib on anticoagulation:  NA

## 2017-12-07 ENCOUNTER — OFFICE VISIT (OUTPATIENT)
Dept: FAMILY MEDICINE CLINIC | Age: 57
End: 2017-12-07

## 2017-12-07 VITALS
OXYGEN SATURATION: 98 % | DIASTOLIC BLOOD PRESSURE: 60 MMHG | TEMPERATURE: 99.3 F | HEART RATE: 75 BPM | BODY MASS INDEX: 39.65 KG/M2 | SYSTOLIC BLOOD PRESSURE: 100 MMHG | WEIGHT: 203 LBS

## 2017-12-07 DIAGNOSIS — K21.9 GASTROESOPHAGEAL REFLUX DISEASE WITHOUT ESOPHAGITIS: ICD-10-CM

## 2017-12-07 DIAGNOSIS — R05.9 COUGH: ICD-10-CM

## 2017-12-07 DIAGNOSIS — E11.39 DIABETIC EYES (HCC): ICD-10-CM

## 2017-12-07 DIAGNOSIS — G47.33 OBSTRUCTIVE SLEEP APNEA SYNDROME: ICD-10-CM

## 2017-12-07 DIAGNOSIS — H10.31 ACUTE BACTERIAL CONJUNCTIVITIS OF RIGHT EYE: Primary | ICD-10-CM

## 2017-12-07 DIAGNOSIS — J02.9 SORE THROAT: ICD-10-CM

## 2017-12-07 DIAGNOSIS — F41.9 ANXIETY: ICD-10-CM

## 2017-12-07 PROCEDURE — G8427 DOCREV CUR MEDS BY ELIG CLIN: HCPCS | Performed by: FAMILY MEDICINE

## 2017-12-07 PROCEDURE — 3014F SCREEN MAMMO DOC REV: CPT | Performed by: FAMILY MEDICINE

## 2017-12-07 PROCEDURE — G8598 ASA/ANTIPLAT THER USED: HCPCS | Performed by: FAMILY MEDICINE

## 2017-12-07 PROCEDURE — G8484 FLU IMMUNIZE NO ADMIN: HCPCS | Performed by: FAMILY MEDICINE

## 2017-12-07 PROCEDURE — G8417 CALC BMI ABV UP PARAM F/U: HCPCS | Performed by: FAMILY MEDICINE

## 2017-12-07 PROCEDURE — 3046F HEMOGLOBIN A1C LEVEL >9.0%: CPT | Performed by: FAMILY MEDICINE

## 2017-12-07 PROCEDURE — 99214 OFFICE O/P EST MOD 30 MIN: CPT | Performed by: FAMILY MEDICINE

## 2017-12-07 PROCEDURE — 4004F PT TOBACCO SCREEN RCVD TLK: CPT | Performed by: FAMILY MEDICINE

## 2017-12-07 PROCEDURE — 3017F COLORECTAL CA SCREEN DOC REV: CPT | Performed by: FAMILY MEDICINE

## 2017-12-07 RX ORDER — CLONAZEPAM 0.5 MG/1
TABLET ORAL
Qty: 45 TABLET | Refills: 0 | Status: SHIPPED | OUTPATIENT
Start: 2017-12-07 | End: 2018-01-12 | Stop reason: SDUPTHER

## 2017-12-07 RX ORDER — SULFACETAMIDE SODIUM 100 MG/ML
1 SOLUTION/ DROPS OPHTHALMIC 4 TIMES DAILY
Qty: 1 BOTTLE | Refills: 0 | Status: SHIPPED | OUTPATIENT
Start: 2017-12-07 | End: 2017-12-10

## 2017-12-07 NOTE — PROGRESS NOTES
Subjective:      Patient ID: Michael Allen is a 65 y.o. female. [facility] no long      Chief Complaint   Patient presents with    Cough     f/u    Has appt 12/13/17 appt with Dr Lacey Moe. Was seen in ED 12/3/17 for sore throat, worse with coughing. Has been coughing x mo's. Mucus pt coughs up is thick, stringy. Nostrils have been dry, crusty. Eyes water at times. Nose runs at times. Has not seen ENT but requests referral    Is on mucinex, elisabeth perles, flonase, singular, symbicort and fexofenadine. Has cpap, last used 2 yrs ago. Is planning to get it out, wash it and run distilled water through it. Has gerd, is on nexium. At times, still has flares and has to take TruQC, helpful. R eye red, runny x few days, awakens with crust around lashes. Granddau was exposed to pink eye. [facility] no longer takes [employer], need referral to other eye dr. Pt had eye exam at local optical office, was told she needs ophthal b/c of DM. Review of Systems   Constitutional: Positive for fatigue. Negative for activity change, appetite change, chills, diaphoresis and fever. HENT: Positive for congestion, postnasal drip, sinus pressure and sore throat. Negative for ear pain, facial swelling, rhinorrhea, sinus pain, trouble swallowing and voice change. Respiratory: Positive for cough and wheezing. Negative for choking and chest tightness. Cardiovascular: Negative for chest pain, palpitations and leg swelling. Gastrointestinal: Negative for abdominal pain. Objective:   Physical Exam  /60   Pulse 75   Temp 99.3 °F (37.4 °C) (Oral)   Wt 203 lb (92.1 kg)   LMP  (LMP Unknown)   SpO2 98%   BMI 39.65 kg/m²   HEENT nl except R sclera injected, PERRL, EOMI, Neck supple, no lad or tmg, no bruit  CV RRR, Lungs CTA  Abd soft, NT, no masses or hsm, BS nl  Ext with strong pedal pulses, no edema    Assessment:            Plan:      Delicia Laureano was seen today for cough.     Diagnoses and all orders for this visit:    Acute bacterial conjunctivitis of right eye  -     sulfacetamide (BLEPH-10) 10 % ophthalmic solution; Place 1 drop into the right eye 4 times daily for 3 days    Diabetic eyes ([address] uncontrolled DM  -     External Referral To Ophthalmology   Pt is encouraged to keep f/u with [facility] Abraham multifactorial  -     External Referral To ENT    Sore throat  -     External Referral To ENT    Gastroesophageal reflux disease without esophagitis  -     External Referral To ENT, possible contributer to cough    Obstructive sleep apnea syndrome   Resume routine cpap. Health benefits reviewed. Anxiety  -     clonazePAM (KLONOPIN) 0.5 MG tablet; TAKE ONE TABLET BY MOUTH THREE TIMES A DAY AS NEEDED FOR ANXIETY. Controlled Substances Monitoring:     Attestation: The Prescription Monitoring Report for this patient was reviewed today. Sara Garcia MD)  Documentation: Possible medication side effects, risk of tolerance and/or dependence, and alternative treatments discussed., No signs of potential drug abuse or diversion identified.  Sara Garcia MD)

## 2017-12-07 NOTE — PROGRESS NOTES
Subjective:      Patient ID: Gracie David is a 65 y.o. female.     HPI    Review of Systems    Objective:   Physical Exam    Assessment:            Plan:

## 2017-12-12 PROBLEM — F41.9 ANXIETY: Status: ACTIVE | Noted: 2017-12-12

## 2017-12-12 ASSESSMENT — ENCOUNTER SYMPTOMS
CHEST TIGHTNESS: 0
TROUBLE SWALLOWING: 0
RHINORRHEA: 0
SORE THROAT: 1
SINUS PRESSURE: 1
FACIAL SWELLING: 0
SINUS PAIN: 0
CHOKING: 0
VOICE CHANGE: 0
ABDOMINAL PAIN: 0
COUGH: 1
WHEEZING: 1

## 2017-12-15 RX ORDER — RANOLAZINE 500 MG/1
TABLET, FILM COATED, EXTENDED RELEASE ORAL
Qty: 180 TABLET | Refills: 3 | Status: SHIPPED | OUTPATIENT
Start: 2017-12-15 | End: 2018-12-12 | Stop reason: SDUPTHER

## 2017-12-15 RX ORDER — LIRAGLUTIDE 6 MG/ML
1.2 INJECTION SUBCUTANEOUS DAILY
Qty: 6 ML | Refills: 2 | Status: SHIPPED | OUTPATIENT
Start: 2017-12-15 | End: 2018-02-14 | Stop reason: SDUPTHER

## 2018-01-08 ENCOUNTER — TELEPHONE (OUTPATIENT)
Dept: FAMILY MEDICINE CLINIC | Age: 58
End: 2018-01-08

## 2018-01-08 DIAGNOSIS — K21.9 GASTROESOPHAGEAL REFLUX DISEASE WITHOUT ESOPHAGITIS: ICD-10-CM

## 2018-01-08 DIAGNOSIS — R09.89 CHRONIC SINUS COMPLAINTS: ICD-10-CM

## 2018-01-08 DIAGNOSIS — R05.3 CHRONIC COUGH: Primary | ICD-10-CM

## 2018-01-12 RX ORDER — CLONAZEPAM 0.5 MG/1
TABLET ORAL
Qty: 45 TABLET | Refills: 0 | OUTPATIENT
Start: 2018-01-12 | End: 2018-02-08 | Stop reason: SDUPTHER

## 2018-01-16 RX ORDER — GABAPENTIN 300 MG/1
CAPSULE ORAL
Qty: 270 CAPSULE | Refills: 4 | Status: SHIPPED | OUTPATIENT
Start: 2018-01-16 | End: 2018-06-12 | Stop reason: SDUPTHER

## 2018-01-23 RX ORDER — INSULIN LISPRO 100 [IU]/ML
INJECTION, SOLUTION INTRAVENOUS; SUBCUTANEOUS
Qty: 15 ML | Refills: 2 | Status: SHIPPED | OUTPATIENT
Start: 2018-01-23 | End: 2018-02-14 | Stop reason: SDUPTHER

## 2018-02-08 RX ORDER — BUPROPION HYDROCHLORIDE 300 MG/1
TABLET ORAL
Qty: 30 TABLET | Refills: 5 | Status: SHIPPED | OUTPATIENT
Start: 2018-02-08 | End: 2019-01-01 | Stop reason: ALTCHOICE

## 2018-02-09 RX ORDER — CLONAZEPAM 0.5 MG/1
TABLET ORAL
Qty: 45 TABLET | Refills: 0 | OUTPATIENT
Start: 2018-02-09 | End: 2018-03-26 | Stop reason: SDUPTHER

## 2018-02-16 RX ORDER — LIRAGLUTIDE 6 MG/ML
1.2 INJECTION SUBCUTANEOUS DAILY
Qty: 6 ML | Refills: 1 | Status: SHIPPED | OUTPATIENT
Start: 2018-02-16 | End: 2018-10-24 | Stop reason: SDUPTHER

## 2018-03-01 ENCOUNTER — OFFICE VISIT (OUTPATIENT)
Dept: FAMILY MEDICINE CLINIC | Age: 58
End: 2018-03-01

## 2018-03-01 VITALS
DIASTOLIC BLOOD PRESSURE: 80 MMHG | WEIGHT: 198 LBS | SYSTOLIC BLOOD PRESSURE: 126 MMHG | BODY MASS INDEX: 38.67 KG/M2 | OXYGEN SATURATION: 97 % | HEART RATE: 77 BPM

## 2018-03-01 DIAGNOSIS — J40 BRONCHITIS: Primary | ICD-10-CM

## 2018-03-01 DIAGNOSIS — J43.9 PULMONARY EMPHYSEMA, UNSPECIFIED EMPHYSEMA TYPE (HCC): ICD-10-CM

## 2018-03-01 PROCEDURE — G8598 ASA/ANTIPLAT THER USED: HCPCS | Performed by: FAMILY MEDICINE

## 2018-03-01 PROCEDURE — G8484 FLU IMMUNIZE NO ADMIN: HCPCS | Performed by: FAMILY MEDICINE

## 2018-03-01 PROCEDURE — 99213 OFFICE O/P EST LOW 20 MIN: CPT | Performed by: FAMILY MEDICINE

## 2018-03-01 PROCEDURE — 3017F COLORECTAL CA SCREEN DOC REV: CPT | Performed by: FAMILY MEDICINE

## 2018-03-01 PROCEDURE — G8926 SPIRO NO PERF OR DOC: HCPCS | Performed by: FAMILY MEDICINE

## 2018-03-01 PROCEDURE — 3023F SPIROM DOC REV: CPT | Performed by: FAMILY MEDICINE

## 2018-03-01 PROCEDURE — 3014F SCREEN MAMMO DOC REV: CPT | Performed by: FAMILY MEDICINE

## 2018-03-01 PROCEDURE — 4004F PT TOBACCO SCREEN RCVD TLK: CPT | Performed by: FAMILY MEDICINE

## 2018-03-01 PROCEDURE — G8427 DOCREV CUR MEDS BY ELIG CLIN: HCPCS | Performed by: FAMILY MEDICINE

## 2018-03-01 PROCEDURE — G8417 CALC BMI ABV UP PARAM F/U: HCPCS | Performed by: FAMILY MEDICINE

## 2018-03-01 RX ORDER — CEFDINIR 300 MG/1
300 CAPSULE ORAL 2 TIMES DAILY
Qty: 20 CAPSULE | Refills: 0 | Status: SHIPPED | OUTPATIENT
Start: 2018-03-01 | End: 2018-03-08

## 2018-03-01 RX ORDER — PROMETHAZINE HYDROCHLORIDE AND CODEINE PHOSPHATE 6.25; 1 MG/5ML; MG/5ML
SYRUP ORAL
Qty: 120 ML | Refills: 0 | Status: SHIPPED | OUTPATIENT
Start: 2018-03-01 | End: 2018-03-15

## 2018-03-01 RX ORDER — BUDESONIDE AND FORMOTEROL FUMARATE DIHYDRATE 160; 4.5 UG/1; UG/1
AEROSOL RESPIRATORY (INHALATION)
Qty: 1 INHALER | Refills: 5 | Status: SHIPPED | OUTPATIENT
Start: 2018-03-01 | End: 2018-05-17

## 2018-03-01 ASSESSMENT — ENCOUNTER SYMPTOMS
WHEEZING: 1
CHEST TIGHTNESS: 1
COUGH: 1
SINUS PRESSURE: 0
SORE THROAT: 0

## 2018-03-01 NOTE — PROGRESS NOTES
Patient: Nikolas Sandra is a 65 y.o. female who presents today with the following Chief Complaint(s):  Chief Complaint   Patient presents with    Cough         HPI: Has 4 day hx of cough, worse at hs. Feels sob durihg coughing attacks. No fever. Feels she may throw up after harsh cough. Has stress incont with hard coughing. Delsym not helpful to reduce cough. Is smoking 4-5 cig per day, attempting to quit. Is adherent with spiriva and prn albuteral mdi, 3-4 times per day when not sick, 6-8 times per day when sick with uri sx's. Has gotten away from symbicort, unsure why. Current Outpatient Prescriptions   Medication Sig Dispense Refill    budesonide-formoterol (SYMBICORT) 160-4.5 MCG/ACT AERO Inhale 2 puffs by mouth twice daily. 1 Inhaler 5    promethazine-codeine (PHENERGAN WITH CODEINE) 6.25-10 MG/5ML syrup 1-2 tsp po 1qid prn cough. 120 mL 0    cefdinir (OMNICEF) 300 MG capsule Take 1 capsule by mouth 2 times daily for 7 days 20 capsule 0    VICTOZA 18 MG/3ML SOPN SC injection Inject 1.2 mg into the skin daily 6 mL 1    insulin lispro (HUMALOG KWIKPEN) 100 UNIT/ML pen Inject 25 Units into the skin 3 times daily (before meals) 30 mL 1    clonazePAM (KLONOPIN) 0.5 MG tablet TAKE ONE TABLET BY MOUTH THREE TIMES A DAY AS NEEDED FOR ANXIETY 45 tablet 0    buPROPion (WELLBUTRIN XL) 300 MG extended release tablet TAKE ONE TABLET BY MOUTH EVERY MORNING 30 tablet 5    tiotropium (SPIRIVA RESPIMAT) 2.5 MCG/ACT AERS inhaler INHALE 2 PUFFS BY MOUTH DAILY 1 Inhaler 4    insulin glargine (BASAGLAR KWIKPEN) 100 UNIT/ML injection pen Inject 40 Units into the skin 2 times daily 30 mL 2    RANEXA 500 MG extended release tablet Take 1 tablet by mouth twice daily. 180 tablet 3    oxybutynin (DITROPAN) 5 MG tablet Take 1 tablet by mouth twice daily. 60 tablet 5    metoprolol tartrate (LOPRESSOR) 25 MG tablet Take 1 tablet by mouth twice daily.  60 tablet 5    isosorbide mononitrate (IMDUR) 30 MG extended release MCG tablet Take 1 tablet by mouth daily 30 tablet 3    linagliptin (TRADJENTA) 5 MG tablet TAKE ONE TABLET BY MOUTH DAILY 30 tablet 5    Insulin Pen Needle (B-D UF III MINI PEN NEEDLES) 31G X 5 MM MISC USE TO INJECT INSULIN and victoza 6 TIMES A  each 12    tiZANidine (ZANAFLEX) 4 MG tablet TAKE ONE-HALF TO ONE THREE TIMES A DAY AS NEEDED FOR BACK PAIN 60 tablet 5    guaiFENesin (MUCINEX) 600 MG extended release tablet Take 1 tablet by mouth 2 times daily as needed for Congestion 60 tablet 1    Artificial Saliva (BIOTENE MOISTURIZING MOUTH) SOLN USE 1 SPRAY DAILY AS NEEDED FOR DRY MOUTH 44.3 mL 4    VENTOLIN  (90 BASE) MCG/ACT inhaler INHALE TWO PUFFS BY MOUTH EVERY 6 HOURS AS NEEDED FOR WHEEZING 3 Inhaler 3    glucose blood VI test strips (FREESTYLE LITE) strip TEST THREE TIMES A  strip 4    Blood Glucose Monitoring Suppl ([employer] BLOOD GLUCOSE KIT) KIT Check glucose up to 3x daily 1 kit 0    FREESTYLE LANCETS MIS USE TO TEST BLOOD SUGAR THREE TIMES DAILY 300 each 3    fexofenadine (WAL-FEX ALLERGY) 180 MG tablet 1 po qd, to replace claritin/loratidine. 30 tablet 5    simethicone (MYLICON) 80 MG chewable tablet Take 1 tablet by mouth 4 times daily as needed for Flatulence 60 tablet 1    Alcohol Swabs PADS UAD to test blood sugar & inject insulin 100 each 5    aspirin (ASPIRIN CHILDRENS) 81 MG chewable tablet Take 1 tablet by mouth daily. 30 tablet 3    gabapentin (NEURONTIN) 300 MG capsule Take 3 capsules by mouth 3 times daily. 270 capsule 4     No current facility-administered medications for this visit. Patient's past medical history, surgical history, family history, medications,  and allergies  were all reviewed and updated as appropriate today. Review of Systems   Constitutional: Positive for fatigue. Negative for fever. HENT: Negative for ear pain, sinus pressure, sneezing and sore throat. Respiratory: Positive for cough, chest tightness and wheezing.

## 2018-03-07 ENCOUNTER — HOSPITAL ENCOUNTER (OUTPATIENT)
Dept: MRI IMAGING | Age: 58
Discharge: HOME | End: 2018-03-07

## 2018-03-07 DIAGNOSIS — M51.26 DISPLACEMENT OF LUMBAR INTERVERTEBRAL DISC WITHOUT MYELOPATHY: ICD-10-CM

## 2018-03-09 ENCOUNTER — OFFICE VISIT (OUTPATIENT)
Dept: ENDOCRINOLOGY | Age: 58
End: 2018-03-09

## 2018-03-09 VITALS
BODY MASS INDEX: 38.99 KG/M2 | SYSTOLIC BLOOD PRESSURE: 108 MMHG | WEIGHT: 198.6 LBS | RESPIRATION RATE: 14 BRPM | HEIGHT: 60 IN | HEART RATE: 78 BPM | DIASTOLIC BLOOD PRESSURE: 61 MMHG | OXYGEN SATURATION: 97 %

## 2018-03-09 DIAGNOSIS — E03.9 HYPOTHYROIDISM, UNSPECIFIED TYPE: ICD-10-CM

## 2018-03-09 DIAGNOSIS — E11.00 UNCONTROLLED TYPE 2 DIABETES MELLITUS WITH HYPEROSMOLARITY WITHOUT COMA, UNSPECIFIED LONG TERM INSULIN USE STATUS: Primary | ICD-10-CM

## 2018-03-09 LAB — HBA1C MFR BLD: 9.4 %

## 2018-03-09 PROCEDURE — 83036 HEMOGLOBIN GLYCOSYLATED A1C: CPT | Performed by: INTERNAL MEDICINE

## 2018-03-09 PROCEDURE — 3017F COLORECTAL CA SCREEN DOC REV: CPT | Performed by: INTERNAL MEDICINE

## 2018-03-09 PROCEDURE — 99214 OFFICE O/P EST MOD 30 MIN: CPT | Performed by: INTERNAL MEDICINE

## 2018-03-09 PROCEDURE — G8417 CALC BMI ABV UP PARAM F/U: HCPCS | Performed by: INTERNAL MEDICINE

## 2018-03-09 PROCEDURE — 4004F PT TOBACCO SCREEN RCVD TLK: CPT | Performed by: INTERNAL MEDICINE

## 2018-03-09 PROCEDURE — 3014F SCREEN MAMMO DOC REV: CPT | Performed by: INTERNAL MEDICINE

## 2018-03-09 PROCEDURE — G8427 DOCREV CUR MEDS BY ELIG CLIN: HCPCS | Performed by: INTERNAL MEDICINE

## 2018-03-09 PROCEDURE — G8598 ASA/ANTIPLAT THER USED: HCPCS | Performed by: INTERNAL MEDICINE

## 2018-03-09 PROCEDURE — 3046F HEMOGLOBIN A1C LEVEL >9.0%: CPT | Performed by: INTERNAL MEDICINE

## 2018-03-09 PROCEDURE — G8482 FLU IMMUNIZE ORDER/ADMIN: HCPCS | Performed by: INTERNAL MEDICINE

## 2018-03-09 NOTE — PROGRESS NOTES
pain, palpitations and orthopnea. Genitourinary: Negative for dysuria, urgency, frequency, hematuria and flank pain. Musculoskeletal: Positive for myalgias. Negative for back pain, joint pain, falls and neck pain. Skin: Negative for itching and rash. Neurological: Positive for headaches. Negative for dizziness, tingling, tremors, sensory change, speech change, focal weakness, seizures and loss of consciousness. Endo/Heme/Allergies: Negative for environmental allergies and polydipsia. Does not bruise/bleed easily. Psychiatric/Behavioral: Positive for depression. Negative for suicidal ideas, hallucinations, memory loss and substance abuse. The patient is not nervous/anxious and does not have insomnia. Physical Exam   Constitutional: She is oriented to person, place, and time. She appears well-developed and well-nourished. No distress. HENT:   Head: Normocephalic. Mouth/Throat: Oropharynx is clear and moist.   Eyes: EOM are normal. Right eye exhibits no discharge. Neck: No thyromegaly present. Cardiovascular: Normal rate and normal heart sounds. Pulmonary/Chest: Effort normal and breath sounds normal. No respiratory distress. She has no wheezes. Abdominal: Soft. Bowel sounds are normal. She exhibits no distension. There is no tenderness. Musculoskeletal: She exhibits no edema or tenderness. No ulcer on foot exam  No calus on foot exam   Neurological: She is alert and oriented to person, place, and time. Normal sensation to monofilament testing. Decreased vibratory sensations in both feet. Skin: Skin is warm and dry. No rash noted. She is not diaphoretic. Psychiatric: Her behavior is normal. Thought content normal.        Lab Results   Component Value Date    LABA1C 10.3 07/24/2017      Lab Results   Component Value Date    TSH 0.72 02/23/2017      Assessment/Plan       1.  Type 2 DM     Risa Mcknight is a 65 y.o. female has Type 2 DM with obesity and insulin

## 2018-03-16 RX ORDER — LISINOPRIL 5 MG/1
5 TABLET ORAL DAILY
Qty: 90 TABLET | Refills: 1 | Status: SHIPPED | OUTPATIENT
Start: 2018-03-16 | End: 2018-08-30 | Stop reason: SDUPTHER

## 2018-03-16 RX ORDER — LINAGLIPTIN 5 MG/1
TABLET, FILM COATED ORAL
Qty: 30 TABLET | Refills: 5 | Status: SHIPPED | OUTPATIENT
Start: 2018-03-16 | End: 2018-08-30 | Stop reason: SDUPTHER

## 2018-03-16 RX ORDER — MELATONIN
1 DAILY
Qty: 30 TABLET | Refills: 5 | Status: SHIPPED | OUTPATIENT
Start: 2018-03-16 | End: 2018-08-30 | Stop reason: SDUPTHER

## 2018-03-16 NOTE — TELEPHONE ENCOUNTER
Lab Results   Component Value Date     06/07/2017    K 4.5 06/07/2017    CL 95 06/07/2017    CO2 22 06/07/2017    BUN 19 06/07/2017    CREATININE 1.0 06/07/2017    GLUCOSE 352 06/07/2017    CALCIUM 9.2 06/07/2017

## 2018-03-26 RX ORDER — CLONAZEPAM 0.5 MG/1
TABLET ORAL
Qty: 45 TABLET | Refills: 0 | Status: SHIPPED | OUTPATIENT
Start: 2018-03-26 | End: 2018-04-23 | Stop reason: SDUPTHER

## 2018-03-27 RX ORDER — INSULIN GLARGINE 100 [IU]/ML
40 INJECTION, SOLUTION SUBCUTANEOUS 2 TIMES DAILY
Qty: 30 ML | Refills: 3 | Status: SHIPPED | OUTPATIENT
Start: 2018-03-27 | End: 2018-10-24 | Stop reason: SDUPTHER

## 2018-04-16 RX ORDER — LEVOTHYROXINE SODIUM 0.05 MG/1
TABLET ORAL
Qty: 30 TABLET | Refills: 5 | Status: SHIPPED | OUTPATIENT
Start: 2018-04-16 | End: 2018-09-29 | Stop reason: SDUPTHER

## 2018-04-24 RX ORDER — CLONAZEPAM 0.5 MG/1
TABLET ORAL
Qty: 45 TABLET | Refills: 0 | OUTPATIENT
Start: 2018-04-24 | End: 2018-05-23 | Stop reason: SDUPTHER

## 2018-05-15 RX ORDER — ATORVASTATIN CALCIUM 40 MG/1
TABLET, FILM COATED ORAL
Qty: 30 TABLET | Refills: 5 | Status: SHIPPED | OUTPATIENT
Start: 2018-05-15 | End: 2018-10-29 | Stop reason: SDUPTHER

## 2018-05-15 RX ORDER — LANOLIN ALCOHOL/MO/W.PET/CERES
325 CREAM (GRAM) TOPICAL
Qty: 30 TABLET | Refills: 5 | Status: SHIPPED | OUTPATIENT
Start: 2018-05-15 | End: 2018-10-29 | Stop reason: SDUPTHER

## 2018-05-15 RX ORDER — FUROSEMIDE 20 MG/1
TABLET ORAL
Qty: 60 TABLET | Refills: 5 | Status: SHIPPED | OUTPATIENT
Start: 2018-05-15 | End: 2018-10-29 | Stop reason: SDUPTHER

## 2018-05-17 RX ORDER — BUDESONIDE AND FORMOTEROL FUMARATE DIHYDRATE 160; 4.5 UG/1; UG/1
AEROSOL RESPIRATORY (INHALATION)
Qty: 1 INHALER | Refills: 5 | Status: SHIPPED | OUTPATIENT
Start: 2018-05-17 | End: 2018-10-29 | Stop reason: SDUPTHER

## 2018-05-23 RX ORDER — CLONAZEPAM 0.5 MG/1
TABLET ORAL
Qty: 45 TABLET | Refills: 0 | Status: SHIPPED | OUTPATIENT
Start: 2018-05-23 | End: 2018-06-20 | Stop reason: SDUPTHER

## 2018-05-24 ENCOUNTER — OFFICE VISIT (OUTPATIENT)
Dept: FAMILY MEDICINE CLINIC | Age: 58
End: 2018-05-24

## 2018-05-24 VITALS
SYSTOLIC BLOOD PRESSURE: 130 MMHG | OXYGEN SATURATION: 95 % | DIASTOLIC BLOOD PRESSURE: 80 MMHG | HEART RATE: 70 BPM | WEIGHT: 205 LBS | BODY MASS INDEX: 40.04 KG/M2

## 2018-05-24 DIAGNOSIS — I73.9 PVD (PERIPHERAL VASCULAR DISEASE) (HCC): ICD-10-CM

## 2018-05-24 DIAGNOSIS — M54.16 LUMBAR RADICULOPATHY: ICD-10-CM

## 2018-05-24 DIAGNOSIS — R09.82 PND (POST-NASAL DRIP): ICD-10-CM

## 2018-05-24 DIAGNOSIS — M79.605 PAIN OF LEFT LOWER EXTREMITY: Primary | ICD-10-CM

## 2018-05-24 DIAGNOSIS — T78.40XS ALLERGIC STATE, SEQUELA: ICD-10-CM

## 2018-05-24 PROCEDURE — 99213 OFFICE O/P EST LOW 20 MIN: CPT | Performed by: FAMILY MEDICINE

## 2018-05-24 PROCEDURE — 3017F COLORECTAL CA SCREEN DOC REV: CPT | Performed by: FAMILY MEDICINE

## 2018-05-24 PROCEDURE — G8598 ASA/ANTIPLAT THER USED: HCPCS | Performed by: FAMILY MEDICINE

## 2018-05-24 PROCEDURE — G8417 CALC BMI ABV UP PARAM F/U: HCPCS | Performed by: FAMILY MEDICINE

## 2018-05-24 PROCEDURE — G8427 DOCREV CUR MEDS BY ELIG CLIN: HCPCS | Performed by: FAMILY MEDICINE

## 2018-05-24 PROCEDURE — 4004F PT TOBACCO SCREEN RCVD TLK: CPT | Performed by: FAMILY MEDICINE

## 2018-05-24 RX ORDER — FLUTICASONE PROPIONATE 50 MCG
2 SPRAY, SUSPENSION (ML) NASAL DAILY
Qty: 1 BOTTLE | Refills: 5 | Status: SHIPPED | OUTPATIENT
Start: 2018-05-24 | End: 2018-12-13 | Stop reason: SDUPTHER

## 2018-05-24 RX ORDER — GUAIFENESIN 600 MG/1
600 TABLET, EXTENDED RELEASE ORAL 2 TIMES DAILY PRN
Qty: 60 TABLET | Refills: 5 | Status: ON HOLD | OUTPATIENT
Start: 2018-05-24 | End: 2019-06-06 | Stop reason: HOSPADM

## 2018-05-25 ASSESSMENT — ENCOUNTER SYMPTOMS
SHORTNESS OF BREATH: 0
ABDOMINAL PAIN: 0
BACK PAIN: 1
WHEEZING: 1
COLOR CHANGE: 0

## 2018-05-30 ENCOUNTER — OFFICE VISIT (OUTPATIENT)
Dept: CARDIOLOGY CLINIC | Age: 58
End: 2018-05-30

## 2018-05-30 VITALS
HEART RATE: 69 BPM | OXYGEN SATURATION: 95 % | HEIGHT: 60 IN | SYSTOLIC BLOOD PRESSURE: 130 MMHG | BODY MASS INDEX: 39.46 KG/M2 | DIASTOLIC BLOOD PRESSURE: 60 MMHG | WEIGHT: 201 LBS

## 2018-05-30 DIAGNOSIS — I73.9 CLAUDICATION (HCC): Primary | ICD-10-CM

## 2018-05-30 DIAGNOSIS — I20.1 CORONARY VASOSPASM (HCC): ICD-10-CM

## 2018-05-30 DIAGNOSIS — I50.32 CHRONIC DIASTOLIC CONGESTIVE HEART FAILURE (HCC): ICD-10-CM

## 2018-05-30 DIAGNOSIS — I10 ESSENTIAL HYPERTENSION: ICD-10-CM

## 2018-05-30 DIAGNOSIS — I25.10 CORONARY ARTERY DISEASE INVOLVING NATIVE CORONARY ARTERY OF NATIVE HEART WITHOUT ANGINA PECTORIS: ICD-10-CM

## 2018-05-30 DIAGNOSIS — E78.5 HYPERLIPIDEMIA, UNSPECIFIED HYPERLIPIDEMIA TYPE: ICD-10-CM

## 2018-05-30 PROCEDURE — G8417 CALC BMI ABV UP PARAM F/U: HCPCS | Performed by: INTERNAL MEDICINE

## 2018-05-30 PROCEDURE — 99214 OFFICE O/P EST MOD 30 MIN: CPT | Performed by: INTERNAL MEDICINE

## 2018-05-30 PROCEDURE — 3017F COLORECTAL CA SCREEN DOC REV: CPT | Performed by: INTERNAL MEDICINE

## 2018-05-30 PROCEDURE — G8427 DOCREV CUR MEDS BY ELIG CLIN: HCPCS | Performed by: INTERNAL MEDICINE

## 2018-05-30 PROCEDURE — 4004F PT TOBACCO SCREEN RCVD TLK: CPT | Performed by: INTERNAL MEDICINE

## 2018-05-30 PROCEDURE — G8598 ASA/ANTIPLAT THER USED: HCPCS | Performed by: INTERNAL MEDICINE

## 2018-06-11 ENCOUNTER — TELEPHONE (OUTPATIENT)
Dept: FAMILY MEDICINE CLINIC | Age: 58
End: 2018-06-11

## 2018-06-12 DIAGNOSIS — I21.3 ST ELEVATION MYOCARDIAL INFARCTION (STEMI), UNSPECIFIED ARTERY (HCC): ICD-10-CM

## 2018-06-12 DIAGNOSIS — I73.9 PVD (PERIPHERAL VASCULAR DISEASE) (HCC): Primary | ICD-10-CM

## 2018-06-12 RX ORDER — OXYBUTYNIN CHLORIDE 5 MG/1
TABLET ORAL
Qty: 60 TABLET | Refills: 5 | Status: SHIPPED | OUTPATIENT
Start: 2018-06-12 | End: 2018-11-28 | Stop reason: SDUPTHER

## 2018-06-12 RX ORDER — SERTRALINE HYDROCHLORIDE 100 MG/1
TABLET, FILM COATED ORAL
Qty: 30 TABLET | Refills: 5 | Status: ON HOLD | OUTPATIENT
Start: 2018-06-12 | End: 2019-06-03

## 2018-06-12 RX ORDER — CLOPIDOGREL BISULFATE 75 MG/1
TABLET ORAL
Qty: 30 TABLET | Refills: 5 | Status: SHIPPED | OUTPATIENT
Start: 2018-06-12 | End: 2018-11-28 | Stop reason: SDUPTHER

## 2018-06-12 RX ORDER — GABAPENTIN 300 MG/1
CAPSULE ORAL
Qty: 270 CAPSULE | Refills: 5 | Status: SHIPPED | OUTPATIENT
Start: 2018-06-12 | End: 2018-11-28 | Stop reason: SDUPTHER

## 2018-06-12 RX ORDER — ISOSORBIDE MONONITRATE 30 MG/1
TABLET, EXTENDED RELEASE ORAL
Qty: 30 TABLET | Refills: 5 | Status: SHIPPED | OUTPATIENT
Start: 2018-06-12 | End: 2018-11-28 | Stop reason: SDUPTHER

## 2018-06-14 RX ORDER — OMEPRAZOLE 40 MG/1
40 CAPSULE, DELAYED RELEASE ORAL DAILY
Qty: 30 CAPSULE | Refills: 5 | Status: SHIPPED | OUTPATIENT
Start: 2018-06-14 | End: 2019-01-01 | Stop reason: ALTCHOICE

## 2018-06-18 RX ORDER — TIOTROPIUM BROMIDE INHALATION SPRAY 3.12 UG/1
SPRAY, METERED RESPIRATORY (INHALATION)
Qty: 1 INHALER | Refills: 5 | Status: SHIPPED | OUTPATIENT
Start: 2018-06-18 | End: 2018-11-28 | Stop reason: SDUPTHER

## 2018-06-28 ENCOUNTER — TELEPHONE (OUTPATIENT)
Dept: CARDIOLOGY CLINIC | Age: 58
End: 2018-06-28

## 2018-06-28 ENCOUNTER — TELEPHONE (OUTPATIENT)
Dept: FAMILY MEDICINE CLINIC | Age: 58
End: 2018-06-28

## 2018-06-28 DIAGNOSIS — R05.9 COUGH: Primary | ICD-10-CM

## 2018-06-28 RX ORDER — DEXTROMETHORPHAN HYDROBROMIDE AND PROMETHAZINE HYDROCHLORIDE 15; 6.25 MG/5ML; MG/5ML
5 SYRUP ORAL 4 TIMES DAILY PRN
Qty: 240 ML | Refills: 0 | Status: SHIPPED | OUTPATIENT
Start: 2018-06-28 | End: 2018-07-05

## 2018-07-09 ENCOUNTER — HOSPITAL ENCOUNTER (OUTPATIENT)
Dept: MAMMOGRAPHY | Age: 58
Discharge: HOME | End: 2018-07-09
Attending: FAMILY MEDICINE | Admitting: FAMILY MEDICINE

## 2018-07-09 ENCOUNTER — HOSPITAL ENCOUNTER (OUTPATIENT)
Dept: VASCULAR LAB | Age: 58
Discharge: HOME | End: 2018-07-09
Attending: FAMILY MEDICINE | Admitting: FAMILY MEDICINE

## 2018-07-09 DIAGNOSIS — M51.26 OTHER INTERVERTEBRAL DISC DISPLACEMENT, LUMBAR REGION: ICD-10-CM

## 2018-07-09 DIAGNOSIS — Z12.31 VISIT FOR SCREENING MAMMOGRAM: ICD-10-CM

## 2018-07-11 ENCOUNTER — TELEPHONE (OUTPATIENT)
Dept: FAMILY MEDICINE CLINIC | Age: 58
End: 2018-07-11

## 2018-07-11 RX ORDER — SUCRALFATE 1 G/1
1 TABLET ORAL
Qty: 30 TABLET | Refills: 2 | Status: SHIPPED | OUTPATIENT
Start: 2018-07-11 | End: 2019-05-24

## 2018-07-11 NOTE — TELEPHONE ENCOUNTER
Pt is calling stating she is having epigastric burning pain x 1 month. Pt states sometimes it hurts so bad its taking her breathe. Pt is unable to eat due to pain. Pt has tried taking nexium no relief. Pt states it feels like a real bad case of indigestion 5 times worse. Pt states she has about a half pack of cigarettes and will be quitting cold turkey.

## 2018-07-24 ENCOUNTER — OFFICE VISIT (OUTPATIENT)
Dept: CARDIOLOGY CLINIC | Age: 58
End: 2018-07-24

## 2018-07-24 VITALS
DIASTOLIC BLOOD PRESSURE: 48 MMHG | WEIGHT: 195.2 LBS | HEIGHT: 60 IN | HEART RATE: 73 BPM | BODY MASS INDEX: 38.32 KG/M2 | SYSTOLIC BLOOD PRESSURE: 90 MMHG | OXYGEN SATURATION: 95 %

## 2018-07-24 DIAGNOSIS — E78.2 MIXED HYPERLIPIDEMIA: ICD-10-CM

## 2018-07-24 DIAGNOSIS — F41.9 ANXIETY: ICD-10-CM

## 2018-07-24 DIAGNOSIS — I50.32 CHRONIC DIASTOLIC CONGESTIVE HEART FAILURE (HCC): ICD-10-CM

## 2018-07-24 DIAGNOSIS — I10 ESSENTIAL HYPERTENSION: ICD-10-CM

## 2018-07-24 DIAGNOSIS — I73.9 PVD (PERIPHERAL VASCULAR DISEASE) (HCC): ICD-10-CM

## 2018-07-24 DIAGNOSIS — I20.1 CORONARY VASOSPASM (HCC): ICD-10-CM

## 2018-07-24 DIAGNOSIS — I25.5 ISCHEMIC CARDIOMYOPATHY: ICD-10-CM

## 2018-07-24 DIAGNOSIS — I25.10 CORONARY ARTERY DISEASE INVOLVING NATIVE CORONARY ARTERY OF NATIVE HEART WITHOUT ANGINA PECTORIS: Primary | ICD-10-CM

## 2018-07-24 PROCEDURE — G8427 DOCREV CUR MEDS BY ELIG CLIN: HCPCS | Performed by: NURSE PRACTITIONER

## 2018-07-24 PROCEDURE — G8417 CALC BMI ABV UP PARAM F/U: HCPCS | Performed by: NURSE PRACTITIONER

## 2018-07-24 PROCEDURE — 3017F COLORECTAL CA SCREEN DOC REV: CPT | Performed by: NURSE PRACTITIONER

## 2018-07-24 PROCEDURE — 4004F PT TOBACCO SCREEN RCVD TLK: CPT | Performed by: NURSE PRACTITIONER

## 2018-07-24 PROCEDURE — G8598 ASA/ANTIPLAT THER USED: HCPCS | Performed by: NURSE PRACTITIONER

## 2018-07-24 PROCEDURE — 99213 OFFICE O/P EST LOW 20 MIN: CPT | Performed by: NURSE PRACTITIONER

## 2018-07-24 RX ORDER — CLONAZEPAM 0.5 MG/1
TABLET ORAL
Qty: 45 TABLET | Refills: 0 | Status: SHIPPED | OUTPATIENT
Start: 2018-07-24 | End: 2018-08-23 | Stop reason: SDUPTHER

## 2018-07-24 NOTE — PROGRESS NOTES
[address]   Cardiac Evaluation    Primary Care Doctor:  Mynor Beavers MD    Chief Complaint   Patient presents with    Follow-Up from [facility]    Leg Pain     left leg        History of Present Illness:   I had the pleasure of seeing Marian Sage in follow up for CAD/ prior PCI to LCX, coronary vasospasm, ICM, dCHF, HTN, HLD as well as DM, JORDI (untreated), COPD, and smoker. She was seen in the ED on 18 with chest pain associated with shortness of breath, occurred at rest.  Cardiac troponin's were negative, ECG showed TWI in anterior leads. Her symptoms improved with nitroglycerin. Her symptoms were felt to be related to vasospasm and encouraged to use the sl ntg prn. Also of note her glucose was > 400, Na 131. She continues to have chest pain. She has needed to use the sl ntg once in past month for chest pain. She also states she has a lot of heart burn and indigestion. She denies lightheadedness. Marian Sage describes symptoms including chest pain, fatigue, left leg pain but denies dyspnea, palpitations, orthopnea, PND, early saiety, edema, syncope. NYHA:   III  ACC/ AHA Stage:    C    Past Medical History:   has a past medical history of Anemia; Arthritis; Asthma; Back pain, chronic; CAD (coronary artery disease); CHF (congestive heart failure) ([address] Chronic bronchitis ([address] COPD (chronic obstructive pulmonary disease) ([address] Depression; Diabetes mellitus ([address] DVT (deep venous thrombosis) ([address] Fibromyalgia; GERD (gastroesophageal reflux disease); Hx of blood clots; Hyperlipidemia; Hypertension; Lung disease; Neuropathy ([address] Other disorders of kidney and ureter; Pneumonia; PVD (peripheral vascular disease) ([address] Sleep apnea; STEMI (ST elevation myocardial infarction) ([address] Thyroid disease; and Vascular complications of other [id number](788.19). Surgical History:   has a past surgical history that includes Lithotripsy;   section; vascular surgery; Dilation mouth daily. 6/12/18  Yes Janette Wood MD   gabapentin (NEURONTIN) 300 MG capsule Take 3 capsules by mouth 3 times daily. 6/12/18 7/24/18 Yes Janette Wood MD   clopidogrel (PLAVIX) 75 MG tablet Take 1 tablet by mouth daily. 6/12/18  Yes Janette Wood MD   fluticasone (FLONASE) 50 MCG/ACT nasal spray 2 sprays by Nasal route daily 5/24/18 5/24/19 Yes Janette Wood MD   SYMBICORT 160-4.5 MCG/ACT AERO Inhale 2 puffs by mouth twice daily. 5/17/18  Yes Janette Wood MD   ferrous sulfate 325 (65 Fe) MG EC tablet Take 1 tablet by mouth daily (with breakfast) 5/15/18  Yes Brenda Landrum MD   atorvastatin (LIPITOR) 40 MG tablet Take 1 tablet by mouth daily. 5/15/18  Yes Janette Wood MD   furosemide (LASIX) 20 MG tablet Take 1 tablet by mouth twice daily. 5/15/18  Yes Janette Wood MD   levothyroxine (SYNTHROID) 50 MCG tablet Take 1 tablet by mouth daily. 4/16/18  Yes Janette Wood MD   glucose blood VI test strips (FREESTYLE LITE) strip TEST THREE TIMES A DAY 3/30/18  Yes Florencio Tello MD   Emil Capuchidyan KWIKPEN 100 UNIT/ML injection pen Inject 40 Units into the skin 2 times daily 3/27/18  Yes Florencio Tello MD   TRADJENTA 5 MG tablet TAKE ONE TABLET BY MOUTH DAILY 3/16/18  Yes Janette Wood MD   Cholecalciferol (VITAMIN D3) 1000 units TABS Take 1 tablet by mouth daily 3/16/18  Yes Janette Wood MD   lisinopril (PRINIVIL;ZESTRIL) 5 MG tablet Take 1 tablet by mouth daily 3/16/18  Yes MITCHELL Anderson - CNP   VICTOZA 18 MG/3ML SOPN SC injection Inject 1.2 mg into the skin daily 2/16/18  Yes Florencio Tello MD   insulin lispro (HUMALOG KWIKPEN) 100 UNIT/ML pen Inject 25 Units into the skin 3 times daily (before meals) 2/16/18  Yes Florencio Tello MD   buPROPion (WELLBUTRIN XL) 300 MG extended release tablet TAKE ONE TABLET BY MOUTH EVERY MORNING 2/8/18  Yes Janette Wood MD   RANEXA 500 MG extended release tablet Take 1 tablet by mouth twice daily.  12/15/17  Yes Mo Palacio MD ibuprofen (ADVIL;MOTRIN) 400 MG tablet Take 1 tablet by mouth every 6 hours as needed for Pain 12/3/17  Yes MITCHELL Randolph CNP   phenol 1.4 % AERS Take 1 spray by mouth every 2 hours as needed (for sore throat) 12/3/17  Yes MITCHELL Randolph CNP   oxyCODONE-acetaminophen (PERCOCET) 5-325 MG per tablet Take 1 tablet by mouth every 8 hours as needed for Pain . Yes Historical Provider, MD   nitroGLYCERIN (NITROSTAT) 0.4 MG SL tablet DISSOLVE ONE TABLET UNDER THE TONGUE AS NEEDED FOR CHEST PAIN EVERY 5 MINUTES UP TO 3 TIMES. IF NO RELIEF CALL 911. 11/28/17  Yes MITCHELL Schneider CNP   esomeprazole (NEXIUM) 20 MG delayed release capsule Take 1 capsule by mouth daily. 10/23/17  Yes Cathryn Matthews MD   Fluocinonide 0.1 % CREA Apply to affected area twice daily as needed for dry skin. 9/20/17  Yes Cathryn Matthews MD   montelukast (SINGULAIR) 10 MG tablet Take 1 tablet by mouth daily 8/2/17  Yes Cathryn Matthews MD   vitamin B-12 (CYANOCOBALAMIN) 1000 MCG tablet Take 1 tablet by mouth daily 8/1/17  Yes Cathryn Matthews MD   tiZANidine (ZANAFLEX) 4 MG tablet TAKE ONE-HALF TO ONE THREE TIMES A DAY AS NEEDED FOR BACK PAIN 7/11/17  Yes Cathryn Matthews MD   Artificial Saliva (BIOTENE MOISTURIZING MOUTH) SOLN USE 1 SPRAY DAILY AS NEEDED FOR DRY MOUTH 2/27/17  Yes Brenda Landrum MD   VENTOLIN  (90 BASE) MCG/ACT inhaler INHALE TWO PUFFS BY MOUTH EVERY 6 HOURS AS NEEDED FOR WHEEZING 11/9/16  Yes Cathryn Matthews MD   Blood Glucose Monitoring Suppl ([employer] BLOOD GLUCOSE KIT) KIT Check glucose up to 3x daily 7/19/16  Yes Brenda Landrum MD   FREESTYLE LANCETS MISC USE TO TEST BLOOD SUGAR THREE TIMES DAILY 7/19/16  Yes Cathryn Matthews MD   fexofenadine (WAL-FEX ALLERGY) 180 MG tablet 1 po qd, to replace claritin/loratidine.  2/8/16  Yes Cathryn Matthews MD   simethicone (MYLICON) 80 MG chewable tablet Take 1 tablet by mouth 4 times daily as needed for Flatulence 2/8/16  Yes Cathryn Matthews MD   Alcohol Swabs PADS without use of accessory muscles  · Resp Auscultation: Normal breath sounds without dullness  Cardiovascular:  · The apical impulses not displaced  · Heart tones are crisp and normal  · JVP 8  cm H2O  · Regular rate and rhythm, normal S1S2, no m/g/r/c  · Peripheral pulses are symmetrical and full  · There is no clubbing, cyanosis of the extremities. · No edema  · Pedal Pulses: 2+ and equal   Abdomen:  · No masses or tenderness  · Liver/Spleen: No Abnormalities Noted  Neurological/Psychiatric:  · Alert and oriented in all spheres  · Moves all extremities well  · Exhibits normal gait balance and coordination  · No abnormalities of mood, affect, memory, mentation, or behavior are noted    Lab Data:    CBC:   Lab Results   Component Value Date    WBC 12.9 06/28/2018    WBC 11.2 02/24/2017    WBC 11.0 10/11/2016    RBC 4.32 06/28/2018    RBC 3.97 02/24/2017    RBC 3.60 10/11/2016    HGB 13.4 06/28/2018    HGB 12.1 02/24/2017    HGB 10.7 10/11/2016    HCT 39.1 06/28/2018    HCT 35.5 02/24/2017    HCT 32.3 10/11/2016    MCV 90.5 06/28/2018    MCV 89.5 02/24/2017    MCV 89.5 10/11/2016    RDW 13.7 06/28/2018    RDW 14.5 02/24/2017    RDW 14.4 10/11/2016     06/28/2018     02/24/2017     10/11/2016     BMP:  Lab Results   Component Value Date     06/28/2018     06/07/2017     02/24/2017    K 4.4 06/28/2018    K 4.5 06/07/2017    K 5.1 02/24/2017    CL 92 06/28/2018    CL 95 06/07/2017     02/24/2017    CO2 24 06/28/2018    CO2 22 06/07/2017    CO2 23 02/24/2017    BUN 18 06/28/2018    BUN 19 06/07/2017    BUN 32 02/24/2017    CREATININE 1.1 06/28/2018    CREATININE 1.0 06/07/2017    CREATININE 1.4 02/24/2017     BNP:   Lab Results   Component Value Date    PROBNP 314 06/28/2018    PROBNP 479 06/07/2017    PROBNP 641 07/28/2014        Cardiac Imaging:  Echo Aug 2017:    Normal left ventricle size, wall thickness and systolic function with an estimated ejection fraction of 55%.  No Coronary vasospasm (HCC)    3. Ischemic cardiomyopathy    4. Chronic diastolic congestive heart failure ([facility]    5. Essential hypertension    6. Mixed hyperlipidemia    7. PVD (peripheral vascular disease) ([facility]          Plan:   1. No change in heart medicines  2. If you feel you have more chest pain that is not indigestion, call us and will increase either the Ranexa or Imdur  3. Follow up with Dr. Nuno Pollard in 3 months      I appreciate the opportunity of cooperating in the care of this individual.    Zahira Walker CNP, 7/24/2018, 2:30 PM    QUALITY MEASURES  1. Tobacco Cessation Counseling: Yes  2. Retake of BP if >140/90:   NA  3. Documentation to PCP/referring for new patient:  Sent to PCP at close of office visit  4. CAD patient on anti-platelet: Yes  5. CAD patient on STATIN therapy:  Yes  6.  Patient with CHF and aFib on anticoagulation:  NA

## 2018-07-24 NOTE — PATIENT INSTRUCTIONS
1. No change in heart medicines  2. If you feel you have more chest pain that is not indigestion, call us and will increase either the Ranexa or Imdur  3.  Follow up with Dr. Antony Naranjo in 3 months

## 2018-08-23 DIAGNOSIS — F41.9 ANXIETY: ICD-10-CM

## 2018-08-25 RX ORDER — CLONAZEPAM 0.5 MG/1
TABLET ORAL
Qty: 45 TABLET | Refills: 0 | Status: SHIPPED | OUTPATIENT
Start: 2018-08-25 | End: 2018-09-19 | Stop reason: SDUPTHER

## 2018-08-30 RX ORDER — LISINOPRIL 5 MG/1
5 TABLET ORAL DAILY
Qty: 90 TABLET | Refills: 3 | Status: SHIPPED | OUTPATIENT
Start: 2018-08-30 | End: 2019-01-01 | Stop reason: SDUPTHER

## 2018-08-30 RX ORDER — MELATONIN
1 DAILY
Qty: 30 TABLET | Refills: 5 | Status: SHIPPED | OUTPATIENT
Start: 2018-08-30

## 2018-08-30 RX ORDER — LINAGLIPTIN 5 MG/1
TABLET, FILM COATED ORAL
Qty: 30 TABLET | Refills: 5 | Status: SHIPPED | OUTPATIENT
Start: 2018-08-30 | End: 2019-03-25 | Stop reason: SDUPTHER

## 2018-09-19 ENCOUNTER — TELEPHONE (OUTPATIENT)
Dept: FAMILY MEDICINE CLINIC | Age: 58
End: 2018-09-19

## 2018-09-19 DIAGNOSIS — F41.9 ANXIETY: ICD-10-CM

## 2018-09-20 RX ORDER — CLONAZEPAM 0.5 MG/1
TABLET ORAL
Qty: 45 TABLET | Refills: 0 | Status: SHIPPED | OUTPATIENT
Start: 2018-09-20 | End: 2018-10-19 | Stop reason: SDUPTHER

## 2018-10-01 RX ORDER — LEVOTHYROXINE SODIUM 0.05 MG/1
TABLET ORAL
Qty: 30 TABLET | Refills: 5 | Status: SHIPPED | OUTPATIENT
Start: 2018-10-01 | End: 2019-03-28 | Stop reason: SDUPTHER

## 2018-10-08 ENCOUNTER — HOSPITAL ENCOUNTER (EMERGENCY)
Age: 58
Discharge: HOME | End: 2018-10-08
Attending: EMERGENCY MEDICINE
Payer: COMMERCIAL

## 2018-10-08 ENCOUNTER — APPOINTMENT (OUTPATIENT)
Dept: CT IMAGING | Age: 58
End: 2018-10-08
Payer: COMMERCIAL

## 2018-10-08 VITALS
HEIGHT: 60 IN | HEART RATE: 58 BPM | WEIGHT: 180 LBS | OXYGEN SATURATION: 95 % | TEMPERATURE: 97 F | RESPIRATION RATE: 16 BRPM | SYSTOLIC BLOOD PRESSURE: 109 MMHG | DIASTOLIC BLOOD PRESSURE: 57 MMHG | BODY MASS INDEX: 35.34 KG/M2

## 2018-10-08 DIAGNOSIS — R10.31 ABDOMINAL PAIN, RIGHT LOWER QUADRANT: ICD-10-CM

## 2018-10-08 DIAGNOSIS — N10 ACUTE PYELONEPHRITIS: Primary | ICD-10-CM

## 2018-10-08 DIAGNOSIS — N39.0 URINARY TRACT INFECTION WITHOUT HEMATURIA, SITE UNSPECIFIED: ICD-10-CM

## 2018-10-08 DIAGNOSIS — R91.1 PULMONARY NODULE: ICD-10-CM

## 2018-10-08 DIAGNOSIS — R10.9 RIGHT FLANK PAIN: ICD-10-CM

## 2018-10-08 LAB
A/G RATIO: 1.4 (ref 1.1–2.2)
ALBUMIN SERPL-MCNC: 4.2 G/DL (ref 3.4–5)
ALP BLD-CCNC: 84 U/L (ref 40–129)
ALT SERPL-CCNC: 8 U/L (ref 10–40)
ANION GAP SERPL CALCULATED.3IONS-SCNC: 13 MMOL/L (ref 3–16)
AST SERPL-CCNC: 9 U/L (ref 15–37)
BACTERIA: ABNORMAL /HPF
BASOPHILS ABSOLUTE: 0.1 K/UL (ref 0–0.2)
BASOPHILS RELATIVE PERCENT: 0.7 %
BILIRUB SERPL-MCNC: <0.2 MG/DL (ref 0–1)
BILIRUBIN URINE: NEGATIVE
BLOOD, URINE: ABNORMAL
BUN BLDV-MCNC: 16 MG/DL (ref 7–20)
CALCIUM SERPL-MCNC: 9.3 MG/DL (ref 8.3–10.6)
CHLORIDE BLD-SCNC: 94 MMOL/L (ref 99–110)
CLARITY: ABNORMAL
CO2: 22 MMOL/L (ref 21–32)
COLOR: ABNORMAL
CREAT SERPL-MCNC: 1 MG/DL (ref 0.6–1.1)
EOSINOPHILS ABSOLUTE: 0.3 K/UL (ref 0–0.6)
EOSINOPHILS RELATIVE PERCENT: 2.9 %
EPITHELIAL CELLS, UA: ABNORMAL /HPF
GFR AFRICAN AMERICAN: >60
GFR NON-AFRICAN AMERICAN: 57
GLOBULIN: 2.9 G/DL
GLUCOSE BLD-MCNC: 434 MG/DL (ref 70–99)
GLUCOSE URINE: >=1000 MG/DL
HCT VFR BLD CALC: 41.3 % (ref 36–48)
HEMOGLOBIN: 14.1 G/DL (ref 12–16)
KETONES, URINE: ABNORMAL MG/DL
LEUKOCYTE ESTERASE, URINE: ABNORMAL
LIPASE: 55 U/L (ref 13–60)
LYMPHOCYTES ABSOLUTE: 2.6 K/UL (ref 1–5.1)
LYMPHOCYTES RELATIVE PERCENT: 27.9 %
MCH RBC QN AUTO: 31.7 PG (ref 26–34)
MCHC RBC AUTO-ENTMCNC: 34 G/DL (ref 31–36)
MCV RBC AUTO: 93 FL (ref 80–100)
MICROSCOPIC EXAMINATION: YES
MONOCYTES ABSOLUTE: 0.5 K/UL (ref 0–1.3)
MONOCYTES RELATIVE PERCENT: 5.7 %
NEUTROPHILS ABSOLUTE: 5.8 K/UL (ref 1.7–7.7)
NEUTROPHILS RELATIVE PERCENT: 62.8 %
NITRITE, URINE: POSITIVE
PDW BLD-RTO: 14 % (ref 12.4–15.4)
PH UA: 5.5
PLATELET # BLD: 298 K/UL (ref 135–450)
PMV BLD AUTO: 11.1 FL (ref 5–10.5)
POTASSIUM REFLEX MAGNESIUM: 4.8 MMOL/L (ref 3.5–5.1)
PROTEIN UA: NEGATIVE MG/DL
RBC # BLD: 4.44 M/UL (ref 4–5.2)
RBC UA: ABNORMAL /HPF (ref 0–2)
SODIUM BLD-SCNC: 129 MMOL/L (ref 136–145)
SPECIFIC GRAVITY UA: 1.01
TOTAL PROTEIN: 7.1 G/DL (ref 6.4–8.2)
URINE REFLEX TO CULTURE: YES
URINE TYPE: ABNORMAL
UROBILINOGEN, URINE: 0.2 E.U./DL
WBC # BLD: 9.3 K/UL (ref 4–11)
WBC UA: ABNORMAL /HPF (ref 0–5)
YEAST: PRESENT /HPF

## 2018-10-08 PROCEDURE — 96375 TX/PRO/DX INJ NEW DRUG ADDON: CPT

## 2018-10-08 PROCEDURE — 96365 THER/PROPH/DIAG IV INF INIT: CPT

## 2018-10-08 PROCEDURE — 83690 ASSAY OF LIPASE: CPT

## 2018-10-08 PROCEDURE — 87077 CULTURE AEROBIC IDENTIFY: CPT

## 2018-10-08 PROCEDURE — 2580000003 HC RX 258: Performed by: EMERGENCY MEDICINE

## 2018-10-08 PROCEDURE — 87086 URINE CULTURE/COLONY COUNT: CPT

## 2018-10-08 PROCEDURE — 81001 URINALYSIS AUTO W/SCOPE: CPT

## 2018-10-08 PROCEDURE — 96361 HYDRATE IV INFUSION ADD-ON: CPT

## 2018-10-08 PROCEDURE — 80053 COMPREHEN METABOLIC PANEL: CPT

## 2018-10-08 PROCEDURE — 99284 EMERGENCY DEPT VISIT MOD MDM: CPT

## 2018-10-08 PROCEDURE — 85025 COMPLETE CBC W/AUTO DIFF WBC: CPT

## 2018-10-08 PROCEDURE — 74176 CT ABD & PELVIS W/O CONTRAST: CPT

## 2018-10-08 PROCEDURE — 6360000002 HC RX W HCPCS: Performed by: EMERGENCY MEDICINE

## 2018-10-08 PROCEDURE — 2500000003 HC RX 250 WO HCPCS: Performed by: EMERGENCY MEDICINE

## 2018-10-08 PROCEDURE — 87186 SC STD MICRODIL/AGAR DIL: CPT

## 2018-10-08 PROCEDURE — S0028 INJECTION, FAMOTIDINE, 20 MG: HCPCS | Performed by: EMERGENCY MEDICINE

## 2018-10-08 RX ORDER — KETOROLAC TROMETHAMINE 30 MG/ML
30 INJECTION, SOLUTION INTRAMUSCULAR; INTRAVENOUS ONCE
Status: COMPLETED | OUTPATIENT
Start: 2018-10-08 | End: 2018-10-08

## 2018-10-08 RX ORDER — 0.9 % SODIUM CHLORIDE 0.9 %
1000 INTRAVENOUS SOLUTION INTRAVENOUS ONCE
Status: COMPLETED | OUTPATIENT
Start: 2018-10-08 | End: 2018-10-08

## 2018-10-08 RX ORDER — ONDANSETRON 2 MG/ML
4 INJECTION INTRAMUSCULAR; INTRAVENOUS EVERY 30 MIN PRN
Status: DISCONTINUED | OUTPATIENT
Start: 2018-10-08 | End: 2018-10-08 | Stop reason: HOSPADM

## 2018-10-08 RX ORDER — CIPROFLOXACIN 500 MG/1
500 TABLET, FILM COATED ORAL 2 TIMES DAILY
Qty: 20 TABLET | Refills: 0 | Status: SHIPPED | OUTPATIENT
Start: 2018-10-08 | End: 2018-10-18

## 2018-10-08 RX ORDER — ONDANSETRON 4 MG/1
4 TABLET, FILM COATED ORAL EVERY 8 HOURS PRN
Qty: 12 TABLET | Refills: 0 | Status: SHIPPED | OUTPATIENT
Start: 2018-10-08 | End: 2019-05-24 | Stop reason: ALTCHOICE

## 2018-10-08 RX ORDER — MORPHINE SULFATE 4 MG/ML
4 INJECTION, SOLUTION INTRAMUSCULAR; INTRAVENOUS
Status: DISCONTINUED | OUTPATIENT
Start: 2018-10-08 | End: 2018-10-08 | Stop reason: HOSPADM

## 2018-10-08 RX ORDER — CIPROFLOXACIN 2 MG/ML
400 INJECTION, SOLUTION INTRAVENOUS ONCE
Status: COMPLETED | OUTPATIENT
Start: 2018-10-08 | End: 2018-10-08

## 2018-10-08 RX ORDER — KETOROLAC TROMETHAMINE 10 MG/1
10 TABLET, FILM COATED ORAL EVERY 6 HOURS PRN
Qty: 20 TABLET | Refills: 0 | Status: SHIPPED | OUTPATIENT
Start: 2018-10-08 | End: 2019-05-24 | Stop reason: ALTCHOICE

## 2018-10-08 RX ADMIN — MORPHINE SULFATE 4 MG: 4 INJECTION INTRAVENOUS at 14:52

## 2018-10-08 RX ADMIN — ONDANSETRON 4 MG: 2 INJECTION INTRAMUSCULAR; INTRAVENOUS at 13:52

## 2018-10-08 RX ADMIN — CIPROFLOXACIN 400 MG: 2 INJECTION, SOLUTION INTRAVENOUS at 16:21

## 2018-10-08 RX ADMIN — SODIUM CHLORIDE 1000 ML: 9 INJECTION, SOLUTION INTRAVENOUS at 13:51

## 2018-10-08 RX ADMIN — FAMOTIDINE 20 MG: 10 INJECTION, SOLUTION INTRAVENOUS at 13:52

## 2018-10-08 RX ADMIN — KETOROLAC TROMETHAMINE 30 MG: 30 INJECTION, SOLUTION INTRAMUSCULAR at 14:23

## 2018-10-08 ASSESSMENT — PAIN SCALES - GENERAL
PAINLEVEL_OUTOF10: 2
PAINLEVEL_OUTOF10: 10

## 2018-10-08 ASSESSMENT — PAIN DESCRIPTION - LOCATION
LOCATION: ABDOMEN
LOCATION: BACK

## 2018-10-08 ASSESSMENT — PAIN DESCRIPTION - PAIN TYPE
TYPE: ACUTE PAIN
TYPE: ACUTE PAIN

## 2018-10-08 NOTE — ED TRIAGE NOTES
Chief Complaint   Patient presents with    Flank Pain     Pt presents with c/o right flank pain that radiates to abdomen. Denies dysuria. +nausea. Denies vomiting.

## 2018-10-08 NOTE — ED PROVIDER NOTES
MG tablet TAKE ONE-HALF TO ONE THREE TIMES A DAY AS NEEDED FOR BACK PAIN 7/11/17   Josué Fernández MD   Artificial Saliva (BIOTENE MOISTURIZING MOUTH) SOLN USE 1 SPRAY DAILY AS NEEDED FOR DRY MOUTH 2/27/17   rBenda Landrum MD   VENTOLIN  (90 BASE) MCG/ACT inhaler INHALE TWO PUFFS BY MOUTH EVERY 6 HOURS AS NEEDED FOR WHEEZING 11/9/16   Josué Fernández MD   Blood Glucose Monitoring Suppl ([employer] BLOOD GLUCOSE KIT) KIT Check glucose up to 3x daily 7/19/16   Josué Fernández MD   FREESTYLE LANCETS MISC USE TO TEST BLOOD SUGAR THREE TIMES DAILY 7/19/16   Josué Fernández MD   fexofenadine (WAL-FEX ALLERGY) 180 MG tablet 1 po qd, to replace claritin/loratidine. 2/8/16   Josué Fernández MD   simethicone (MYLICON) 80 MG chewable tablet Take 1 tablet by mouth 4 times daily as needed for Flatulence 2/8/16   Josué Fernández MD   Alcohol Swabs PADS UAD to test blood sugar & inject insulin 4/13/15   Josué Fernández MD   aspirin (ASPIRIN CHILDRENS) 81 MG chewable tablet Take 1 tablet by mouth daily.  2/28/14   Lajuan Sicard, APRN - CNP       Allergies as of 10/08/2018 - Review Complete 10/08/2018   Allergen Reaction Noted    Januvia [sitagliptin] Other (See Comments) 02/26/2015    Iodine Rash        Past Medical History:   Diagnosis Date    Anemia 6/2/2015    Arthritis     Asthma     Back pain, chronic     DDD    CAD (coronary artery disease)     angiogram 2/26, CONNIE x1 to mid Cirflx    CHF (congestive heart failure) (HCC)     Chronic bronchitis (HCC)     COPD (chronic obstructive pulmonary disease) ([address]     Depression     Diabetes mellitus ([address]     DVT (deep venous thrombosis) (HCC)     Fibromyalgia     GERD (gastroesophageal reflux disease)     Hx of blood clots     Hyperlipidemia     Hypertension     Lung disease     Neuropathy     Other disorders of kidney and ureter     kidney stones    Pneumonia     PVD (peripheral vascular disease) ([address]     Sleep apnea 01/2015    Has CPAP, can't tolerate precautions were discussed and all questions were answered. Final Impression    1. Acute pyelonephritis    2. Right flank pain    3. Abdominal pain, right lower quadrant    4. Urinary tract infection without hematuria, site unspecified    5. Pulmonary nodule        Blood pressure 108/68, pulse 57, temperature 97 °F (36.1 °C), temperature source Oral, resp. rate 16, height 5' (1.524 m), weight 180 lb (81.6 kg), SpO2 95 %, not currently breastfeeding. Patient was given scripts for the following medications. I counseled patient how to take these medications. New Prescriptions    CIPROFLOXACIN (CIPRO) 500 MG TABLET    Take 1 tablet by mouth 2 times daily for 10 days    KETOROLAC (TORADOL) 10 MG TABLET    Take 1 tablet by mouth every 6 hours as needed for Pain    ONDANSETRON (ZOFRAN) 4 MG TABLET    Take 1 tablet by mouth every 8 hours as needed for Nausea or Vomiting       Disposition  Pt is in stable condition upon Discharge to home. The note was completed using Dragon voice recognition transcription. Every effort was made to ensure accuracy; however, inadvertent transcription errors may be present despite my best efforts to edit errors.     Alverto Camarena MD  [address] MD Jackelin  10/08/18 [id number]

## 2018-10-10 LAB
ORGANISM: ABNORMAL
URINE CULTURE, ROUTINE: ABNORMAL
URINE CULTURE, ROUTINE: ABNORMAL

## 2018-10-19 DIAGNOSIS — F41.9 ANXIETY: ICD-10-CM

## 2018-10-22 RX ORDER — CLONAZEPAM 0.5 MG/1
TABLET ORAL
Qty: 45 TABLET | Refills: 0 | Status: SHIPPED | OUTPATIENT
Start: 2018-10-22 | End: 2018-11-01 | Stop reason: SDUPTHER

## 2018-10-23 NOTE — PROGRESS NOTES
[address]  [address] [facility]   Cardiovascular Evaluation    PATIENT: Luzma Caldwell  DATE: 10/24/2018  MRN: [id number]  CSN: [id number]  : 1960      Primary Care Doctor: Bridgette Crowe MD  Reason for evaluation:   3 Month Follow-Up (no cardiac complaints)      Subjective:   History of present illness on initial date of evaluation:   Luzma Caldwell is a 63 y.o. patient who presents with hospital follow up for STEMI. She presented to the hospital with complaints of Cp. Started this am at 5:30 and awoke from sleep. Came in to ED and Cp improved with Morphine. Later Cp returned to 9/10 and repeat ECG obtained. I was consulted for CP and upon reviewing pt ECG and pt in ED, found to have ST elevation in posterior leads from earlier in day and with return of Cp concern for electrically silient STEMI. Brought to cath lab emergently. Hx of stent to left subclavian in . On 18 she is here for follow up. She has pain in her left leg that is worse when she walks. The pain starts in her thigh and goes into her calf. She states she is still smoking. she is tolerating her medications. Denies chest pain, SOB, or syncope. Today she reports feeling ok. She states she has CP, but it is rare. She states it happens when she is really pushing herself. She denies sob, palpitations, dizziness or syncope.              Patient Active Problem List   Diagnosis    HLD (hyperlipidemia)    DM (diabetes mellitus), type 2, uncontrolled ([address]    Chest pain    PVD (peripheral vascular disease) ([address]    HTN (hypertension)    Hypothyroid    GERD (gastroesophageal reflux disease)    STEMI (ST elevation myocardial infarction) ([facility]    Morbid obesity with BMI of 45.0-49.9, adult (HCC)    Localized osteoarthrosis, lower leg    Rotator cuff tendinitis    PFS (patellofemoral syndrome)    Sleep apnea    Disc displacement, lumbar    Neuropathy    Opiate analgesic contract exists    Neck pain    Anemia    TAKE ONE TABLET BY MOUTH THREE TIMES A DAY AS NEEDED FOR ANXIETY 45 tablet 0    ketorolac (TORADOL) 10 MG tablet Take 1 tablet by mouth every 6 hours as needed for Pain 20 tablet 0    ondansetron (ZOFRAN) 4 MG tablet Take 1 tablet by mouth every 8 hours as needed for Nausea or Vomiting 12 tablet 0    levothyroxine (SYNTHROID) 50 MCG tablet Take 1 tablet by mouth daily. 30 tablet 5    TRADJENTA 5 MG tablet TAKE ONE TABLET BY MOUTH DAILY 30 tablet 5    lisinopril (PRINIVIL;ZESTRIL) 5 MG tablet Take 1 tablet by mouth daily 90 tablet 3    Cholecalciferol (VITAMIN D3) 1000 units TABS Take 1 tablet by mouth daily 30 tablet 5    blood glucose test strips (FREESTYLE LITE) strip TEST THREE TIMES A  strip 2    sucralfate (CARAFATE) 1 GM tablet Take 1 tablet by mouth 3 times daily (before meals) 30 tablet 2    Insulin Pen Needle (B-D UF III MINI PEN NEEDLES) 31G X 5 MM MISC USE TO INJECT INSULIN and victoza 6 TIMES A  each 11    SPIRIVA RESPIMAT 2.5 MCG/ACT AERS inhaler INHALE 2 PUFFS BY MOUTH DAILY 1 Inhaler 5    omeprazole (PRILOSEC) 40 MG delayed release capsule Take 1 capsule by mouth daily 30 capsule 5    oxybutynin (DITROPAN) 5 MG tablet Take 1 tablet by mouth twice daily. 60 tablet 5    metoprolol tartrate (LOPRESSOR) 25 MG tablet Take 1 tablet by mouth twice daily. 60 tablet 5    metFORMIN (GLUCOPHAGE) 1000 MG tablet Take 1 tablet by mouth twice daily with meals. 60 tablet 5    isosorbide mononitrate (IMDUR) 30 MG extended release tablet Take 1 tablet by mouth daily. 30 tablet 5    sertraline (ZOLOFT) 100 MG tablet Take 1 tablet by mouth daily. 30 tablet 5    gabapentin (NEURONTIN) 300 MG capsule Take 3 capsules by mouth 3 times daily. 270 capsule 5    clopidogrel (PLAVIX) 75 MG tablet Take 1 tablet by mouth daily. 30 tablet 5    fluticasone (FLONASE) 50 MCG/ACT nasal spray 2 sprays by Nasal route daily 1 Bottle 5    SYMBICORT 160-4.5 MCG/ACT AERO Inhale 2 puffs by mouth twice daily.  1 Inhaler 5    ferrous sulfate 325 (65 Fe) MG EC tablet Take 1 tablet by mouth daily (with breakfast) 30 tablet 5    atorvastatin (LIPITOR) 40 MG tablet Take 1 tablet by mouth daily. 30 tablet 5    furosemide (LASIX) 20 MG tablet Take 1 tablet by mouth twice daily. 60 tablet 5    BASAGLAR KWIKPEN 100 UNIT/ML injection pen Inject 40 Units into the skin 2 times daily 30 mL 3    VICTOZA 18 MG/3ML SOPN SC injection Inject 1.2 mg into the skin daily 6 mL 1    insulin lispro (HUMALOG KWIKPEN) 100 UNIT/ML pen Inject 25 Units into the skin 3 times daily (before meals) 30 mL 1    buPROPion (WELLBUTRIN XL) 300 MG extended release tablet TAKE ONE TABLET BY MOUTH EVERY MORNING 30 tablet 5    RANEXA 500 MG extended release tablet Take 1 tablet by mouth twice daily. 180 tablet 3    ibuprofen (ADVIL;MOTRIN) 400 MG tablet Take 1 tablet by mouth every 6 hours as needed for Pain 20 tablet 0    phenol 1.4 % AERS Take 1 spray by mouth every 2 hours as needed (for sore throat) 1 Bottle 0    oxyCODONE-acetaminophen (PERCOCET) 5-325 MG per tablet Take 1 tablet by mouth every 8 hours as needed for Pain .  nitroGLYCERIN (NITROSTAT) 0.4 MG SL tablet DISSOLVE ONE TABLET UNDER THE TONGUE AS NEEDED FOR CHEST PAIN EVERY 5 MINUTES UP TO 3 TIMES. IF NO RELIEF CALL 911. 25 tablet 3    esomeprazole (NEXIUM) 20 MG delayed release capsule Take 1 capsule by mouth daily. 28 capsule 5    Fluocinonide 0.1 % CREA Apply to affected area twice daily as needed for dry skin.  240 g 0    montelukast (SINGULAIR) 10 MG tablet Take 1 tablet by mouth daily 30 tablet 3    vitamin B-12 (CYANOCOBALAMIN) 1000 MCG tablet Take 1 tablet by mouth daily 30 tablet 3    tiZANidine (ZANAFLEX) 4 MG tablet TAKE ONE-HALF TO ONE THREE TIMES A DAY AS NEEDED FOR BACK PAIN 60 tablet 5    Artificial Saliva (BIOTENE MOISTURIZING MOUTH) SOLN USE 1 SPRAY DAILY AS NEEDED FOR DRY MOUTH 44.3 mL 4    VENTOLIN  (90 BASE) MCG/ACT inhaler INHALE TWO PUFFS BY MOUTH RLE but LLE nonpalpipable. Skin: Skin color, texture, turgor normal, no rashes or lesions   Pysch: Normal mood and affect   Neurologic: Normal gross motor and sensory exam.         LABS   CBC:      Lab Results   Component Value Date    WBC 9.3 10/08/2018    RBC 4.44 10/08/2018    HGB 14.1 10/08/2018    HCT 41.3 10/08/2018    MCV 93.0 10/08/2018    RDW 14.0 10/08/2018     10/08/2018     CMP:  Lab Results   Component Value Date     10/08/2018    K 4.8 10/08/2018    CL 94 10/08/2018    CO2 22 10/08/2018    BUN 16 10/08/2018    CREATININE 1.0 10/08/2018    GFRAA >60 10/08/2018    GFRAA >60 2013    AGRATIO 1.4 10/08/2018    LABGLOM 57 10/08/2018    GLUCOSE 434 10/08/2018    PROT 7.1 10/08/2018    PROT 7.5 2013    CALCIUM 9.3 10/08/2018    BILITOT <0.2 10/08/2018    ALKPHOS 84 10/08/2018    AST 9 10/08/2018    ALT 8 10/08/2018     PT/INR:   No components found for: PTPATIENT,  PTINR  Liver:  No components found for: CHLPL  Lab Results   Component Value Date    ALT 8 (L) 10/08/2018    AST 9 (L) 10/08/2018    ALKPHOS 84 10/08/2018    BILITOT <0.2 10/08/2018     Lab Results   Component Value Date    LABA1C 9.4 2018     Lipids:         Lab Results   Component Value Date    TRIG 179 (H) 2017    TRIG 103 2015    TRIG 247 (H) 2014            Lab Results   Component Value Date    HDL 54 2017    HDL 46 2015    HDL 46 2014            Lab Results   Component Value Date    LDLCALC 35 2017    LDLCALC 42 2015    LDLCALC 42 2014            Lab Results   Component Value Date    LABVLDL 36 2017    LABVLDL 21 2015    LABVLDL 49 2014         CARDIAC DATA   EK2014 Normal sinus rhythmLow voltage QRSBorderline ECG    ECHO/MUGA: 2014  Limited 2-D echocardiogram due to suboptimal imaging and technical  Limitations.  Overall left ventricular function appears grossly normal. Ejection   Fraction is visually estimated to be 55-60 Coronary vasospasm: seen on cath 6/4/2015. Rare CP   - Cont Imdur, NTG sublingual prn breakthru CP    3. Ischemic cardiomyopathy: LVEF recovered to 55%, compensated    4 Chronic diastolic dysfunction:   - euvolemic and compensated     5. HTN- well controlled, LVH on echo    6. HLD: at goal LDL <70      7. Venous insuff: mild, controlled   - elevation and compression     8. Left carotid bruit- left vertebral 100%, R/L ICA <50%        Plan:  1. Due to polypharmacy,  May try to stop taking the Ranexa   - restart if CP returns  2. Follow up in 1 year      This note was scribed in the presence of Dr. Daren Anthony MD by Rebekah Valentin RN. It is a pleasure to assist in the care of [place]ats. Please call with any questions. All questions and concerns were addressed to the patient/family. Alternatives to my treatment were discussed. The note was completed using EMR. Every effort was made to ensure accuracy; however, inadvertent computerized transcription errors may be present.     Jess Hubbard MD, [facility]   [phone] [facility]  [phone] [address] [place] [address]  10/24/2018  12:03 PM

## 2018-10-24 ENCOUNTER — OFFICE VISIT (OUTPATIENT)
Dept: CARDIOLOGY CLINIC | Age: 58
End: 2018-10-24
Payer: COMMERCIAL

## 2018-10-24 ENCOUNTER — OFFICE VISIT (OUTPATIENT)
Dept: ENDOCRINOLOGY | Age: 58
End: 2018-10-24
Payer: COMMERCIAL

## 2018-10-24 VITALS
RESPIRATION RATE: 14 BRPM | OXYGEN SATURATION: 95 % | HEIGHT: 60 IN | WEIGHT: 183 LBS | SYSTOLIC BLOOD PRESSURE: 82 MMHG | DIASTOLIC BLOOD PRESSURE: 51 MMHG | BODY MASS INDEX: 35.93 KG/M2 | HEART RATE: 75 BPM

## 2018-10-24 VITALS
HEIGHT: 60 IN | HEART RATE: 77 BPM | OXYGEN SATURATION: 95 % | DIASTOLIC BLOOD PRESSURE: 60 MMHG | WEIGHT: 178 LBS | SYSTOLIC BLOOD PRESSURE: 94 MMHG | BODY MASS INDEX: 34.95 KG/M2

## 2018-10-24 DIAGNOSIS — E03.9 HYPOTHYROIDISM, UNSPECIFIED TYPE: ICD-10-CM

## 2018-10-24 DIAGNOSIS — E11.65 UNCONTROLLED TYPE 2 DIABETES MELLITUS WITH HYPERGLYCEMIA (HCC): Primary | ICD-10-CM

## 2018-10-24 DIAGNOSIS — I20.1 CORONARY VASOSPASM (HCC): ICD-10-CM

## 2018-10-24 DIAGNOSIS — I25.10 CORONARY ARTERY DISEASE INVOLVING NATIVE CORONARY ARTERY OF NATIVE HEART WITHOUT ANGINA PECTORIS: Primary | ICD-10-CM

## 2018-10-24 DIAGNOSIS — I25.5 ISCHEMIC CARDIOMYOPATHY: ICD-10-CM

## 2018-10-24 DIAGNOSIS — I10 ESSENTIAL HYPERTENSION: ICD-10-CM

## 2018-10-24 DIAGNOSIS — E78.5 HYPERLIPIDEMIA, UNSPECIFIED HYPERLIPIDEMIA TYPE: ICD-10-CM

## 2018-10-24 LAB — HBA1C MFR BLD: 13.6 %

## 2018-10-24 PROCEDURE — G8598 ASA/ANTIPLAT THER USED: HCPCS | Performed by: INTERNAL MEDICINE

## 2018-10-24 PROCEDURE — 99214 OFFICE O/P EST MOD 30 MIN: CPT | Performed by: INTERNAL MEDICINE

## 2018-10-24 PROCEDURE — 2022F DILAT RTA XM EVC RTNOPTHY: CPT | Performed by: INTERNAL MEDICINE

## 2018-10-24 PROCEDURE — G8484 FLU IMMUNIZE NO ADMIN: HCPCS | Performed by: INTERNAL MEDICINE

## 2018-10-24 PROCEDURE — 83036 HEMOGLOBIN GLYCOSYLATED A1C: CPT | Performed by: INTERNAL MEDICINE

## 2018-10-24 PROCEDURE — 99213 OFFICE O/P EST LOW 20 MIN: CPT | Performed by: INTERNAL MEDICINE

## 2018-10-24 PROCEDURE — 3046F HEMOGLOBIN A1C LEVEL >9.0%: CPT | Performed by: INTERNAL MEDICINE

## 2018-10-24 PROCEDURE — G8417 CALC BMI ABV UP PARAM F/U: HCPCS | Performed by: INTERNAL MEDICINE

## 2018-10-24 PROCEDURE — 4004F PT TOBACCO SCREEN RCVD TLK: CPT | Performed by: INTERNAL MEDICINE

## 2018-10-24 PROCEDURE — G8427 DOCREV CUR MEDS BY ELIG CLIN: HCPCS | Performed by: INTERNAL MEDICINE

## 2018-10-24 PROCEDURE — 3017F COLORECTAL CA SCREEN DOC REV: CPT | Performed by: INTERNAL MEDICINE

## 2018-10-24 RX ORDER — NITROGLYCERIN 0.4 MG/1
TABLET SUBLINGUAL
Qty: 25 TABLET | Refills: 3 | Status: SHIPPED | OUTPATIENT
Start: 2018-10-24 | End: 2020-01-01 | Stop reason: SDUPTHER

## 2018-10-24 NOTE — PATIENT INSTRUCTIONS
Lab Results   Component Value Date    LABA1C 13.6 10/24/2018     Lab Results   Component Value Date    .5 12/04/2015

## 2018-10-24 NOTE — LETTER
[address] [place] [address] Cardiology - [address] [place] [address]  Phone: [phone]  Fax: [phone]    Linette Ching MD        October 24, 2018     Rommel Teran Willis    Patient: Darek Lyle  MR Number: [id number]  YOB: 1960  Date of Visit: 10/24/2018    Dear Dr. Skinny Padron:    Thank you for allowing me to participate in the care of [facility]. Below are the relevant portions of my assessment and plan of care. Assessment and Plan   Darek Lyle is a 63 y.o. female who presents today for the following problems:          1. CAD: s/p STEMI (2/28/2014): No cp              - S/p PCi to LCx with [employer] stent.      2. Coronary vasospasm: seen on cath 6/4/2015. Rare CP              - Cont Imdur, NTG sublingual prn breakthru CP     3. Ischemic cardiomyopathy: LVEF recovered to 55%, compensated     4 Chronic diastolic dysfunction:              - euvolemic and compensated                5. HTN- well controlled, LVH on echo     6. HLD: at goal LDL <70                 7. Venous insuff: mild, controlled              - elevation and compression                8. Left carotid bruit- left vertebral 100%, R/L ICA <50%           Plan:  1. Due to polypharmacy,  May try to stop taking the Ranexa              - restart if CP returns  2. Follow up in 1 year        If you have questions, please do not hesitate to call me. I look forward to following Essence Garces along with you.     Sincerely,        Linette Ching MD

## 2018-10-24 NOTE — PROGRESS NOTES
tablet Take 1 tablet by mouth daily 30 tablet 3    tiZANidine (ZANAFLEX) 4 MG tablet TAKE ONE-HALF TO ONE THREE TIMES A DAY AS NEEDED FOR BACK PAIN 60 tablet 5    Artificial Saliva (BIOTENE MOISTURIZING MOUTH) SOLN USE 1 SPRAY DAILY AS NEEDED FOR DRY MOUTH 44.3 mL 4    VENTOLIN  (90 BASE) MCG/ACT inhaler INHALE TWO PUFFS BY MOUTH EVERY 6 HOURS AS NEEDED FOR WHEEZING 3 Inhaler 3    FREESTYLE LANCETS MISC USE TO TEST BLOOD SUGAR THREE TIMES DAILY 300 each 3    fexofenadine (WAL-FEX ALLERGY) 180 MG tablet 1 po qd, to replace claritin/loratidine. 30 tablet 5    simethicone (MYLICON) 80 MG chewable tablet Take 1 tablet by mouth 4 times daily as needed for Flatulence 60 tablet 1    Alcohol Swabs PADS UAD to test blood sugar & inject insulin 100 each 5    aspirin (ASPIRIN CHILDRENS) 81 MG chewable tablet Take 1 tablet by mouth daily. 30 tablet 3         Vitals:    10/24/18 1643   BP: (!) 82/51   Site: Left Upper Arm   Position: Sitting   Cuff Size: Large Adult   Pulse: 75   Resp: 14   SpO2: 95%   Weight: 183 lb (83 kg)   Height: 5' (1.524 m)     Body mass index is 35.74 kg/m².      Wt Readings from Last 3 Encounters:   10/24/18 183 lb (83 kg)   10/24/18 178 lb (80.7 kg)   10/08/18 180 lb (81.6 kg)     BP Readings from Last 3 Encounters:   10/24/18 (!) 82/51   10/24/18 94/60   10/08/18 (!) 109/57        Past Medical History:   Diagnosis Date    Anemia 6/2/2015    Arthritis     Asthma     Back pain, chronic     DDD    CAD (coronary artery disease)     angiogram 2/26, CONNIE x1 to mid Cirflx    CHF (congestive heart failure) (HCC)     Chronic bronchitis (HCC)     COPD (chronic obstructive pulmonary disease) (HCC)     Depression     Diabetes mellitus ([facility]     DVT (deep venous thrombosis) (HCC)     Fibromyalgia     GERD (gastroesophageal reflux disease)     Hx of blood clots     Hyperlipidemia     Hypertension     Lung disease     Neuropathy     Other disorders of kidney and ureter     kidney stones    Pneumonia     PVD [facility]peripheral vascular disease) ([facility]     Sleep apnea 2015    Has CPAP, can't tolerate    STEMI (ST elevation myocardial infarction) ([facility] 14    Dr. Yao Standing Thyroid disease     Vascular complications of other SRGEKKD(838.80)     leg     Past Surgical History:   Procedure Laterality Date    APPENDECTOMY      CATARACT REMOVAL WITH IMPLANT Right 2015     SECTION      CORONARY ANGIOPLASTY WITH STENT PLACEMENT  14    DIAGNOSTIC CARDIAC CATH LAB PROCEDURE      DILATION AND CURETTAGE OF UTERUS      EYE SURGERY Left 2015    Cataract    LITHOTRIPSY      kidney stones    VASCULAR SURGERY      thrombectomy     Family History   Problem Relation Age of Onset    Other Mother          of unknown lung issue at 61    Diabetes Mother     Diabetes Brother     Heart Disease Maternal Grandfather     Cancer Maternal Grandmother     Asthma Grandchild      History   Smoking Status    Current Every Day Smoker    Packs/day: 0.25    Years: 40.00    Types: Cigarettes   Smokeless Tobacco    Former User     Comment: .5 ppd      History   Alcohol Use No       HPI      Brianna Kolb is a 63 y.o. female who is here for a follow-up for management of DM. PCP  Gifty Pearce MD     Patient has a PMH of Type 2 DM, hypertension, hyperlipidemia, obesity , CAD, CHF, COPD, chronic back pain, PVD, DVT, depression, hypothyroidism. Diagnosed with Diabetes Mellitus type 2 > 10 yrs. Course has been variable . Microvascular complications: + non-prolifertive retinopathy (Last eye exam: ), No nephropathy . Has Peripheral neuropathy in both feet and hands. Home regimen:  Currently on metformin 1000 mg BID  Tradjenta 5 mg daily. Glargine 35-40 units BID  Humalog 20-25 units with meals + SSI  victoza 1.2 mg daily. Not testing her sugars. Diet: Eats 2-3 meals/day.  Small breakfast.   Nutrition education:yes  Exercise: No  Limitations to physical activity . Back pain. Hyperlipidemia: Currently is on lipitor 40 mg daily. Hypothyroidism : on levothyroxine 50 mcg daily. Review of Systems   Constitutional: Positive for malaise/fatigue. Negative for fever, chills, weight loss and diaphoresis. Eyes: Negative for blurred vision, double vision and photophobia. Respiratory: Negative for cough and hemoptysis. Cardiovascular: Negative for chest pain, palpitations and orthopnea. Genitourinary: Negative for dysuria, urgency, frequency, hematuria and flank pain. Musculoskeletal: Positive for myalgias. Negative for back pain, joint pain, falls and neck pain. Skin: Negative for itching and rash. Neurological: Positive for headaches. Negative for dizziness, tingling, tremors, sensory change, speech change, focal weakness, seizures and loss of consciousness. Endo/Heme/Allergies: Negative for environmental allergies and polydipsia. Does not bruise/bleed easily. Psychiatric/Behavioral: Positive for depression. Negative for suicidal ideas, hallucinations, memory loss and substance abuse. The patient is not nervous/anxious and does not have insomnia. Physical Exam   Constitutional: She is oriented to person, place, and time. She appears well-developed and well-nourished. No distress. HENT:   Head: Normocephalic. Mouth/Throat: Oropharynx is clear and moist.   Eyes: EOM are normal. Right eye exhibits no discharge. Neck: No thyromegaly present. Cardiovascular: Normal rate and normal heart sounds. Pulmonary/Chest: Effort normal and breath sounds normal. No respiratory distress. She has no wheezes. Abdominal: Soft. Bowel sounds are normal. She exhibits no distension. There is no tenderness. Musculoskeletal: She exhibits no edema or tenderness. No ulcer on foot exam  No calus on foot exam   Neurological: She is alert and oriented to person, place, and time. Normal sensation to monofilament testing.   Decreased vibratory sensations in both feet. Skin: Skin is warm and dry. No rash noted. She is not diaphoretic. Psychiatric: Her behavior is normal. Thought content normal.           Lab Results   Component Value Date    TSH 0.72 02/23/2017      Assessment/Plan       1. Type 2 DM     Darek Lyle is a 63 y.o. female has Type 2 DM with obesity and insulin resistance. Uncontrolled. Complicated by  neuropathy and  non-proliferative retinopathy    A1c 15.2 %----> 8.9 % ---> 10.4 %---> 11.2 % ---> 10.3 % ---> 9.4%---> 13.6 %    She admits to non-compliance with insulin and diet. Reports issues with her living situation as the reason for her non-compliance. She is drinking 2 cans of soda    -Continue metformin and tradjenta. -Increase Galrgine insulin 45 units BID  -Change humalog 25 units with meals + SSI  -Continue Victoza 1.2 mg daily. Advised follow-up with the ophthalmologist once year. Last urine microalbumin/cr ratio normal 2014. Discussed foot care. Pt on ASA. Smoker. Discussed smoking cessation. 2. Hypertension. BP low normal. As per cards    3. Hyperlipidemia. On statins. 4. CAD/CHF/HTN as per cards    5. COPD As per pulmonology. 6. Hypothyroidism. On levothyroxine. RTC 4 weeks.      Will see Liana Oneill CNP every 4 weeks till sugars are under control

## 2018-11-01 ENCOUNTER — OFFICE VISIT (OUTPATIENT)
Dept: FAMILY MEDICINE CLINIC | Age: 58
End: 2018-11-01
Payer: COMMERCIAL

## 2018-11-01 VITALS
WEIGHT: 186 LBS | HEIGHT: 60 IN | SYSTOLIC BLOOD PRESSURE: 136 MMHG | DIASTOLIC BLOOD PRESSURE: 66 MMHG | OXYGEN SATURATION: 98 % | BODY MASS INDEX: 36.52 KG/M2 | HEART RATE: 70 BPM | RESPIRATION RATE: 16 BRPM

## 2018-11-01 DIAGNOSIS — R82.998 DARK URINE: ICD-10-CM

## 2018-11-01 DIAGNOSIS — I25.2 HISTORY OF ST ELEVATION MYOCARDIAL INFARCTION (STEMI): ICD-10-CM

## 2018-11-01 DIAGNOSIS — E11.65 UNCONTROLLED TYPE 2 DIABETES MELLITUS WITH HYPERGLYCEMIA (HCC): ICD-10-CM

## 2018-11-01 DIAGNOSIS — Z23 NEEDS FLU SHOT: ICD-10-CM

## 2018-11-01 DIAGNOSIS — F41.9 ANXIETY: ICD-10-CM

## 2018-11-01 DIAGNOSIS — F32.A ANXIETY AND DEPRESSION: Primary | ICD-10-CM

## 2018-11-01 DIAGNOSIS — Z72.0 TOBACCO USE: ICD-10-CM

## 2018-11-01 DIAGNOSIS — F41.9 ANXIETY AND DEPRESSION: Primary | ICD-10-CM

## 2018-11-01 PROCEDURE — 3017F COLORECTAL CA SCREEN DOC REV: CPT | Performed by: FAMILY MEDICINE

## 2018-11-01 PROCEDURE — G8482 FLU IMMUNIZE ORDER/ADMIN: HCPCS | Performed by: FAMILY MEDICINE

## 2018-11-01 PROCEDURE — 3046F HEMOGLOBIN A1C LEVEL >9.0%: CPT | Performed by: FAMILY MEDICINE

## 2018-11-01 PROCEDURE — 90686 IIV4 VACC NO PRSV 0.5 ML IM: CPT | Performed by: FAMILY MEDICINE

## 2018-11-01 PROCEDURE — 2022F DILAT RTA XM EVC RTNOPTHY: CPT | Performed by: FAMILY MEDICINE

## 2018-11-01 PROCEDURE — G8427 DOCREV CUR MEDS BY ELIG CLIN: HCPCS | Performed by: FAMILY MEDICINE

## 2018-11-01 PROCEDURE — 90471 IMMUNIZATION ADMIN: CPT | Performed by: FAMILY MEDICINE

## 2018-11-01 PROCEDURE — G8598 ASA/ANTIPLAT THER USED: HCPCS | Performed by: FAMILY MEDICINE

## 2018-11-01 PROCEDURE — 99214 OFFICE O/P EST MOD 30 MIN: CPT | Performed by: FAMILY MEDICINE

## 2018-11-01 PROCEDURE — G8417 CALC BMI ABV UP PARAM F/U: HCPCS | Performed by: FAMILY MEDICINE

## 2018-11-01 PROCEDURE — 4004F PT TOBACCO SCREEN RCVD TLK: CPT | Performed by: FAMILY MEDICINE

## 2018-11-01 RX ORDER — SERTRALINE HYDROCHLORIDE 100 MG/1
100 TABLET, FILM COATED ORAL DAILY
Qty: 30 TABLET | Refills: 5 | Status: SHIPPED | OUTPATIENT
Start: 2018-11-01 | End: 2019-05-24 | Stop reason: SDUPTHER

## 2018-11-01 RX ORDER — CLONAZEPAM 0.5 MG/1
TABLET ORAL
Qty: 45 TABLET | Refills: 0 | Status: SHIPPED | OUTPATIENT
Start: 2018-11-01 | End: 2018-12-13 | Stop reason: SDUPTHER

## 2018-11-01 ASSESSMENT — ENCOUNTER SYMPTOMS
COUGH: 1
SHORTNESS OF BREATH: 1

## 2018-11-01 ASSESSMENT — PATIENT HEALTH QUESTIONNAIRE - PHQ9
2. FEELING DOWN, DEPRESSED OR HOPELESS: 0
SUM OF ALL RESPONSES TO PHQ9 QUESTIONS 1 & 2: 0
1. LITTLE INTEREST OR PLEASURE IN DOING THINGS: 0
SUM OF ALL RESPONSES TO PHQ QUESTIONS 1-9: 0
SUM OF ALL RESPONSES TO PHQ QUESTIONS 1-9: 0

## 2018-11-03 LAB — URINE CULTURE, ROUTINE: NORMAL

## 2018-11-28 RX ORDER — OXYBUTYNIN CHLORIDE 5 MG/1
TABLET ORAL
Qty: 60 TABLET | Refills: 4 | Status: SHIPPED | OUTPATIENT
Start: 2018-11-28 | End: 2018-12-13

## 2018-11-28 RX ORDER — GABAPENTIN 300 MG/1
CAPSULE ORAL
Qty: 270 CAPSULE | Refills: 4 | Status: SHIPPED | OUTPATIENT
Start: 2018-11-28 | End: 2019-04-29 | Stop reason: SDUPTHER

## 2018-12-13 ENCOUNTER — OFFICE VISIT (OUTPATIENT)
Dept: FAMILY MEDICINE CLINIC | Age: 58
End: 2018-12-13
Payer: COMMERCIAL

## 2018-12-13 VITALS
BODY MASS INDEX: 35.53 KG/M2 | HEART RATE: 73 BPM | HEIGHT: 60 IN | OXYGEN SATURATION: 97 % | RESPIRATION RATE: 16 BRPM | DIASTOLIC BLOOD PRESSURE: 62 MMHG | SYSTOLIC BLOOD PRESSURE: 128 MMHG | WEIGHT: 181 LBS

## 2018-12-13 DIAGNOSIS — F41.9 ANXIETY AND DEPRESSION: ICD-10-CM

## 2018-12-13 DIAGNOSIS — N89.8 VAGINA ITCHING: ICD-10-CM

## 2018-12-13 DIAGNOSIS — F32.A ANXIETY AND DEPRESSION: ICD-10-CM

## 2018-12-13 DIAGNOSIS — F41.9 ANXIETY: ICD-10-CM

## 2018-12-13 DIAGNOSIS — J44.1 COPD EXACERBATION (HCC): Primary | ICD-10-CM

## 2018-12-13 DIAGNOSIS — N39.3 STRESS INCONTINENCE: ICD-10-CM

## 2018-12-13 DIAGNOSIS — R09.82 PND (POST-NASAL DRIP): ICD-10-CM

## 2018-12-13 PROCEDURE — G8417 CALC BMI ABV UP PARAM F/U: HCPCS | Performed by: FAMILY MEDICINE

## 2018-12-13 PROCEDURE — 3017F COLORECTAL CA SCREEN DOC REV: CPT | Performed by: FAMILY MEDICINE

## 2018-12-13 PROCEDURE — 99214 OFFICE O/P EST MOD 30 MIN: CPT | Performed by: FAMILY MEDICINE

## 2018-12-13 PROCEDURE — G8926 SPIRO NO PERF OR DOC: HCPCS | Performed by: FAMILY MEDICINE

## 2018-12-13 PROCEDURE — G8482 FLU IMMUNIZE ORDER/ADMIN: HCPCS | Performed by: FAMILY MEDICINE

## 2018-12-13 PROCEDURE — 3023F SPIROM DOC REV: CPT | Performed by: FAMILY MEDICINE

## 2018-12-13 PROCEDURE — 96372 THER/PROPH/DIAG INJ SC/IM: CPT | Performed by: FAMILY MEDICINE

## 2018-12-13 PROCEDURE — G8598 ASA/ANTIPLAT THER USED: HCPCS | Performed by: FAMILY MEDICINE

## 2018-12-13 PROCEDURE — 4004F PT TOBACCO SCREEN RCVD TLK: CPT | Performed by: FAMILY MEDICINE

## 2018-12-13 PROCEDURE — G8427 DOCREV CUR MEDS BY ELIG CLIN: HCPCS | Performed by: FAMILY MEDICINE

## 2018-12-13 RX ORDER — CLONAZEPAM 0.5 MG/1
TABLET ORAL
Qty: 45 TABLET | Refills: 0 | Status: SHIPPED | OUTPATIENT
Start: 2018-12-20 | End: 2019-01-18 | Stop reason: SDUPTHER

## 2018-12-13 RX ORDER — METHYLPREDNISOLONE ACETATE 80 MG/ML
80 INJECTION, SUSPENSION INTRA-ARTICULAR; INTRALESIONAL; INTRAMUSCULAR; SOFT TISSUE ONCE
Status: COMPLETED | OUTPATIENT
Start: 2018-12-13 | End: 2018-12-13

## 2018-12-13 RX ORDER — ALBUTEROL SULFATE 2.5 MG/3ML
2.5 SOLUTION RESPIRATORY (INHALATION) EVERY 6 HOURS PRN
Qty: 120 EACH | Refills: 5 | Status: SHIPPED | OUTPATIENT
Start: 2018-12-13 | End: 2020-01-01 | Stop reason: SDUPTHER

## 2018-12-13 RX ORDER — FLUTICASONE PROPIONATE 50 MCG
2 SPRAY, SUSPENSION (ML) NASAL DAILY
Qty: 1 BOTTLE | Refills: 5 | Status: SHIPPED | OUTPATIENT
Start: 2018-12-13 | End: 2019-05-26 | Stop reason: SDUPTHER

## 2018-12-13 RX ORDER — OXYBUTYNIN CHLORIDE 5 MG/1
TABLET ORAL
Qty: 90 TABLET | Refills: 5 | Status: SHIPPED | OUTPATIENT
Start: 2018-12-13 | End: 2019-05-27 | Stop reason: SDUPTHER

## 2018-12-13 RX ORDER — FLUCONAZOLE 150 MG/1
150 TABLET ORAL ONCE
Qty: 1 TABLET | Refills: 0 | Status: SHIPPED | OUTPATIENT
Start: 2018-12-13 | End: 2018-12-13

## 2018-12-13 RX ORDER — RANOLAZINE 500 MG/1
TABLET, EXTENDED RELEASE ORAL
Qty: 180 TABLET | Refills: 3 | Status: SHIPPED | OUTPATIENT
Start: 2018-12-13 | End: 2019-01-01 | Stop reason: SDUPTHER

## 2018-12-13 RX ADMIN — METHYLPREDNISOLONE ACETATE 80 MG: 80 INJECTION, SUSPENSION INTRA-ARTICULAR; INTRALESIONAL; INTRAMUSCULAR; SOFT TISSUE at 16:03

## 2018-12-13 ASSESSMENT — ENCOUNTER SYMPTOMS
WHEEZING: 1
COUGH: 1
CHEST TIGHTNESS: 0
SHORTNESS OF BREATH: 0
RHINORRHEA: 0
SINUS PRESSURE: 0
SINUS PAIN: 0
SORE THROAT: 0

## 2018-12-13 NOTE — PROGRESS NOTES
by mouth daily. 30 tablet 4    metFORMIN (GLUCOPHAGE) 1000 MG tablet Take 1 tablet by mouth twice daily with meals. 60 tablet 4    metoprolol tartrate (LOPRESSOR) 25 MG tablet Take 1 tablet by mouth twice daily. 60 tablet 4    gabapentin (NEURONTIN) 300 MG capsule Take 3 capsules by mouth 3 times daily. 270 capsule 4    SPIRIVA RESPIMAT 2.5 MCG/ACT AERS inhaler INHALE 2 PUFFS BY MOUTH DAILY 1 Inhaler 4    sertraline (ZOLOFT) 100 MG tablet Take 1 tablet by mouth daily 30 tablet 5    furosemide (LASIX) 20 MG tablet Take 1 tablet by mouth twice daily. 60 tablet 4    ferrous sulfate 325 (65 Fe) MG EC tablet Take 1 tablet by mouth daily (with breakfast) 30 tablet 4    SYMBICORT 160-4.5 MCG/ACT AERO Inhale 2 puffs by mouth twice daily. 1 Inhaler 4    atorvastatin (LIPITOR) 40 MG tablet Take 1 tablet by mouth daily. 30 tablet 4    Insulin Pen Needle (B-D UF III MINI PEN NEEDLES) 31G X 5 MM MISC USE TO INJECT INSULIN and victoza 6 TIMES A  each 11    nitroGLYCERIN (NITROSTAT) 0.4 MG SL tablet DISSOLVE ONE TABLET UNDER THE TONGUE AS NEEDED FOR CHEST PAIN EVERY 5 MINUTES UP TO 3 TIMES. IF NO RELIEF CALL 911. 25 tablet 3    insulin glargine (BASAGLAR KWIKPEN) 100 UNIT/ML injection pen Inject 45 Units into the skin 2 times daily 30 mL 3    insulin lispro (HUMALOG KWIKPEN) 100 UNIT/ML pen Inject 25 Units into the skin 3 times daily (before meals) 30 mL 1    Liraglutide (VICTOZA) 18 MG/3ML SOPN SC injection Inject 1.2 mg into the skin daily 6 mL 1    ketorolac (TORADOL) 10 MG tablet Take 1 tablet by mouth every 6 hours as needed for Pain 20 tablet 0    ondansetron (ZOFRAN) 4 MG tablet Take 1 tablet by mouth every 8 hours as needed for Nausea or Vomiting 12 tablet 0    levothyroxine (SYNTHROID) 50 MCG tablet Take 1 tablet by mouth daily.  30 tablet 5    TRADJENTA 5 MG tablet TAKE ONE TABLET BY MOUTH DAILY 30 tablet 5    lisinopril (PRINIVIL;ZESTRIL) 5 MG tablet Take 1 tablet by mouth daily 90 tablet 3    Cholecalciferol (VITAMIN D3) 1000 units TABS Take 1 tablet by mouth daily 30 tablet 5    blood glucose test strips (FREESTYLE LITE) strip TEST THREE TIMES A  strip 2    sucralfate (CARAFATE) 1 GM tablet Take 1 tablet by mouth 3 times daily (before meals) 30 tablet 2    omeprazole (PRILOSEC) 40 MG delayed release capsule Take 1 capsule by mouth daily 30 capsule 5    sertraline (ZOLOFT) 100 MG tablet Take 1 tablet by mouth daily. 30 tablet 5    guaiFENesin (MUCINEX) 600 MG extended release tablet Take 1 tablet by mouth 2 times daily as needed for Congestion 60 tablet 5    buPROPion (WELLBUTRIN XL) 300 MG extended release tablet TAKE ONE TABLET BY MOUTH EVERY MORNING 30 tablet 5    ibuprofen (ADVIL;MOTRIN) 400 MG tablet Take 1 tablet by mouth every 6 hours as needed for Pain 20 tablet 0    phenol 1.4 % AERS Take 1 spray by mouth every 2 hours as needed (for sore throat) 1 Bottle 0    oxyCODONE-acetaminophen (PERCOCET) 5-325 MG per tablet Take 1 tablet by mouth every 8 hours as needed for Pain .  Fluocinonide 0.1 % CREA Apply to affected area twice daily as needed for dry skin. 240 g 0    montelukast (SINGULAIR) 10 MG tablet Take 1 tablet by mouth daily 30 tablet 3    vitamin B-12 (CYANOCOBALAMIN) 1000 MCG tablet Take 1 tablet by mouth daily 30 tablet 3    tiZANidine (ZANAFLEX) 4 MG tablet TAKE ONE-HALF TO ONE THREE TIMES A DAY AS NEEDED FOR BACK PAIN 60 tablet 5    Artificial Saliva (BIOTENE MOISTURIZING MOUTH) SOLN USE 1 SPRAY DAILY AS NEEDED FOR DRY MOUTH 44.3 mL 4    VENTOLIN  (90 BASE) MCG/ACT inhaler INHALE TWO PUFFS BY MOUTH EVERY 6 HOURS AS NEEDED FOR WHEEZING 3 Inhaler 3    FREESTYLE LANCETS MIS USE TO TEST BLOOD SUGAR THREE TIMES DAILY 300 each 3    fexofenadine (WAL-FEX ALLERGY) 180 MG tablet 1 po qd, to replace claritin/loratidine.  30 tablet 5    simethicone (MYLICON) 80 MG chewable tablet Take 1 tablet by mouth 4 times daily as needed for Flatulence 60 tablet 1   

## 2018-12-21 ENCOUNTER — APPOINTMENT (OUTPATIENT)
Dept: GENERAL RADIOLOGY | Age: 58
End: 2018-12-21
Payer: COMMERCIAL

## 2018-12-21 ENCOUNTER — HOSPITAL ENCOUNTER (EMERGENCY)
Age: 58
Discharge: HOME | End: 2018-12-21
Attending: EMERGENCY MEDICINE
Payer: COMMERCIAL

## 2018-12-21 VITALS
DIASTOLIC BLOOD PRESSURE: 63 MMHG | SYSTOLIC BLOOD PRESSURE: 102 MMHG | WEIGHT: 178 LBS | OXYGEN SATURATION: 97 % | BODY MASS INDEX: 34.95 KG/M2 | HEART RATE: 72 BPM | RESPIRATION RATE: 18 BRPM | TEMPERATURE: 98.2 F | HEIGHT: 60 IN

## 2018-12-21 DIAGNOSIS — M54.41 BILATERAL LOW BACK PAIN WITH BILATERAL SCIATICA, UNSPECIFIED CHRONICITY: ICD-10-CM

## 2018-12-21 DIAGNOSIS — M54.42 BILATERAL LOW BACK PAIN WITH BILATERAL SCIATICA, UNSPECIFIED CHRONICITY: ICD-10-CM

## 2018-12-21 DIAGNOSIS — G89.29 ACUTE EXACERBATION OF CHRONIC LOW BACK PAIN: Primary | ICD-10-CM

## 2018-12-21 DIAGNOSIS — M54.50 ACUTE EXACERBATION OF CHRONIC LOW BACK PAIN: Primary | ICD-10-CM

## 2018-12-21 PROCEDURE — 6370000000 HC RX 637 (ALT 250 FOR IP): Performed by: EMERGENCY MEDICINE

## 2018-12-21 PROCEDURE — 99283 EMERGENCY DEPT VISIT LOW MDM: CPT

## 2018-12-21 PROCEDURE — 96372 THER/PROPH/DIAG INJ SC/IM: CPT

## 2018-12-21 PROCEDURE — 72100 X-RAY EXAM L-S SPINE 2/3 VWS: CPT

## 2018-12-21 PROCEDURE — 6360000002 HC RX W HCPCS: Performed by: EMERGENCY MEDICINE

## 2018-12-21 RX ORDER — MORPHINE SULFATE 10 MG/ML
4 INJECTION, SOLUTION INTRAMUSCULAR; INTRAVENOUS ONCE
Status: COMPLETED | OUTPATIENT
Start: 2018-12-21 | End: 2018-12-21

## 2018-12-21 RX ORDER — METHOCARBAMOL 750 MG/1
750 TABLET, FILM COATED ORAL 3 TIMES DAILY
Qty: 15 TABLET | Refills: 0 | Status: SHIPPED | OUTPATIENT
Start: 2018-12-21 | End: 2018-12-26

## 2018-12-21 RX ORDER — METHOCARBAMOL 500 MG/1
750 TABLET, FILM COATED ORAL ONCE
Status: COMPLETED | OUTPATIENT
Start: 2018-12-21 | End: 2018-12-21

## 2018-12-21 RX ADMIN — METHOCARBAMOL TABLETS 750 MG: 500 TABLET, COATED ORAL at 19:06

## 2018-12-21 RX ADMIN — MORPHINE SULFATE 4 MG: 10 INJECTION, SOLUTION INTRAMUSCULAR; INTRAVENOUS at 15:48

## 2018-12-21 ASSESSMENT — PAIN SCALES - GENERAL: PAINLEVEL_OUTOF10: 10

## 2018-12-28 RX ORDER — INSULIN GLARGINE 100 [IU]/ML
45 INJECTION, SOLUTION SUBCUTANEOUS 2 TIMES DAILY
Qty: 30 ML | Refills: 2 | Status: SHIPPED | OUTPATIENT
Start: 2018-12-28 | End: 2019-03-28 | Stop reason: SDUPTHER

## 2019-01-01 ENCOUNTER — HOSPITAL ENCOUNTER (OUTPATIENT)
Dept: HYPERBARIC MEDICINE | Age: 59
Discharge: HOME | End: 2019-07-31
Payer: COMMERCIAL

## 2019-01-01 ENCOUNTER — HOSPITAL ENCOUNTER (OUTPATIENT)
Dept: WOUND CARE | Age: 59
Discharge: HOME | End: 2019-07-29
Payer: COMMERCIAL

## 2019-01-01 ENCOUNTER — HOSPITAL ENCOUNTER (OUTPATIENT)
Dept: HYPERBARIC MEDICINE | Age: 59
Discharge: HOME | End: 2019-07-24
Payer: COMMERCIAL

## 2019-01-01 ENCOUNTER — APPOINTMENT (OUTPATIENT)
Dept: MRI IMAGING | Age: 59
DRG: 305 | End: 2019-01-01
Payer: COMMERCIAL

## 2019-01-01 ENCOUNTER — HOSPITAL ENCOUNTER (EMERGENCY)
Age: 59
Discharge: HOME | End: 2019-08-03
Attending: EMERGENCY MEDICINE
Payer: COMMERCIAL

## 2019-01-01 ENCOUNTER — HOSPITAL ENCOUNTER (INPATIENT)
Age: 59
LOS: 9 days | Discharge: HOME HEALTH | DRG: 305 | End: 2019-08-22
Attending: EMERGENCY MEDICINE | Admitting: INTERNAL MEDICINE
Payer: COMMERCIAL

## 2019-01-01 ENCOUNTER — HOSPITAL ENCOUNTER (OUTPATIENT)
Dept: HYPERBARIC MEDICINE | Age: 59
Discharge: HOME | End: 2019-07-25
Payer: COMMERCIAL

## 2019-01-01 ENCOUNTER — TELEPHONE (OUTPATIENT)
Dept: FAMILY MEDICINE CLINIC | Age: 59
End: 2019-01-01

## 2019-01-01 ENCOUNTER — HOSPITAL ENCOUNTER (OUTPATIENT)
Dept: WOUND CARE | Age: 59
Discharge: HOME | End: 2019-08-12
Payer: COMMERCIAL

## 2019-01-01 ENCOUNTER — HOSPITAL ENCOUNTER (OUTPATIENT)
Dept: HYPERBARIC MEDICINE | Age: 59
Discharge: HOME | End: 2019-07-15
Payer: COMMERCIAL

## 2019-01-01 ENCOUNTER — ANESTHESIA (OUTPATIENT)
Dept: OPERATING ROOM | Age: 59
DRG: 305 | End: 2019-01-01
Payer: COMMERCIAL

## 2019-01-01 ENCOUNTER — HOSPITAL ENCOUNTER (OUTPATIENT)
Dept: WOUND CARE | Age: 59
Discharge: HOME | End: 2019-08-05
Payer: COMMERCIAL

## 2019-01-01 ENCOUNTER — HOSPITAL ENCOUNTER (OUTPATIENT)
Dept: HYPERBARIC MEDICINE | Age: 59
Discharge: HOME | End: 2019-07-29
Payer: COMMERCIAL

## 2019-01-01 ENCOUNTER — HOSPITAL ENCOUNTER (OUTPATIENT)
Dept: HYPERBARIC MEDICINE | Age: 59
Discharge: HOME | End: 2019-07-16
Payer: COMMERCIAL

## 2019-01-01 ENCOUNTER — OFFICE VISIT (OUTPATIENT)
Dept: FAMILY MEDICINE CLINIC | Age: 59
End: 2019-01-01
Payer: COMMERCIAL

## 2019-01-01 ENCOUNTER — ANESTHESIA EVENT (OUTPATIENT)
Dept: OPERATING ROOM | Age: 59
DRG: 305 | End: 2019-01-01
Payer: COMMERCIAL

## 2019-01-01 ENCOUNTER — APPOINTMENT (OUTPATIENT)
Dept: GENERAL RADIOLOGY | Age: 59
End: 2019-01-01
Payer: COMMERCIAL

## 2019-01-01 ENCOUNTER — HOSPITAL ENCOUNTER (OUTPATIENT)
Age: 59
Setting detail: OBSERVATION
Discharge: NURSING, REHAB OR LONG-TERM CARE | End: 2019-08-27
Attending: EMERGENCY MEDICINE | Admitting: INTERNAL MEDICINE
Payer: COMMERCIAL

## 2019-01-01 ENCOUNTER — HOSPITAL ENCOUNTER (OUTPATIENT)
Dept: WOUND CARE | Age: 59
Discharge: HOME | End: 2019-07-22
Payer: COMMERCIAL

## 2019-01-01 ENCOUNTER — APPOINTMENT (OUTPATIENT)
Dept: WOUND CARE | Age: 59
End: 2019-01-01
Payer: COMMERCIAL

## 2019-01-01 ENCOUNTER — HOSPITAL ENCOUNTER (OUTPATIENT)
Dept: HYPERBARIC MEDICINE | Age: 59
Discharge: HOME | End: 2019-08-05
Payer: COMMERCIAL

## 2019-01-01 ENCOUNTER — APPOINTMENT (OUTPATIENT)
Dept: GENERAL RADIOLOGY | Age: 59
DRG: 305 | End: 2019-01-01
Payer: COMMERCIAL

## 2019-01-01 ENCOUNTER — HOSPITAL ENCOUNTER (OUTPATIENT)
Dept: HYPERBARIC MEDICINE | Age: 59
Discharge: HOME | End: 2019-07-22
Payer: COMMERCIAL

## 2019-01-01 ENCOUNTER — HOSPITAL ENCOUNTER (OUTPATIENT)
Dept: WOUND CARE | Age: 59
Discharge: HOME | End: 2019-07-08
Payer: COMMERCIAL

## 2019-01-01 ENCOUNTER — APPOINTMENT (OUTPATIENT)
Dept: CARDIAC CATH/INVASIVE PROCEDURES | Age: 59
DRG: 305 | End: 2019-01-01
Payer: COMMERCIAL

## 2019-01-01 ENCOUNTER — APPOINTMENT (OUTPATIENT)
Dept: HYPERBARIC MEDICINE | Age: 59
End: 2019-01-01
Payer: COMMERCIAL

## 2019-01-01 ENCOUNTER — HOSPITAL ENCOUNTER (OUTPATIENT)
Dept: WOUND CARE | Age: 59
Discharge: HOME | End: 2019-07-15
Payer: COMMERCIAL

## 2019-01-01 ENCOUNTER — HOSPITAL ENCOUNTER (OUTPATIENT)
Dept: HYPERBARIC MEDICINE | Age: 59
Discharge: HOME | End: 2019-07-30
Payer: COMMERCIAL

## 2019-01-01 ENCOUNTER — HOSPITAL ENCOUNTER (OUTPATIENT)
Dept: HYPERBARIC MEDICINE | Age: 59
Discharge: HOME | End: 2019-07-19
Payer: COMMERCIAL

## 2019-01-01 VITALS
HEIGHT: 60 IN | DIASTOLIC BLOOD PRESSURE: 66 MMHG | SYSTOLIC BLOOD PRESSURE: 109 MMHG | TEMPERATURE: 99.4 F | BODY MASS INDEX: 34.16 KG/M2 | WEIGHT: 174 LBS | RESPIRATION RATE: 18 BRPM | HEART RATE: 75 BPM

## 2019-01-01 VITALS
SYSTOLIC BLOOD PRESSURE: 163 MMHG | DIASTOLIC BLOOD PRESSURE: 77 MMHG | RESPIRATION RATE: 18 BRPM | TEMPERATURE: 97.8 F | HEART RATE: 69 BPM

## 2019-01-01 VITALS
TEMPERATURE: 98.2 F | RESPIRATION RATE: 18 BRPM | DIASTOLIC BLOOD PRESSURE: 61 MMHG | HEART RATE: 70 BPM | OXYGEN SATURATION: 95 % | HEIGHT: 60 IN | BODY MASS INDEX: 38.35 KG/M2 | SYSTOLIC BLOOD PRESSURE: 104 MMHG | WEIGHT: 195.33 LBS

## 2019-01-01 VITALS
HEART RATE: 83 BPM | TEMPERATURE: 98.1 F | SYSTOLIC BLOOD PRESSURE: 98 MMHG | BODY MASS INDEX: 34.63 KG/M2 | RESPIRATION RATE: 16 BRPM | WEIGHT: 176.4 LBS | HEIGHT: 60 IN | DIASTOLIC BLOOD PRESSURE: 67 MMHG

## 2019-01-01 VITALS
TEMPERATURE: 98.1 F | SYSTOLIC BLOOD PRESSURE: 105 MMHG | HEART RATE: 65 BPM | RESPIRATION RATE: 16 BRPM | DIASTOLIC BLOOD PRESSURE: 64 MMHG

## 2019-01-01 VITALS
BODY MASS INDEX: 34.75 KG/M2 | WEIGHT: 177 LBS | SYSTOLIC BLOOD PRESSURE: 135 MMHG | TEMPERATURE: 97.5 F | HEART RATE: 55 BPM | HEIGHT: 60 IN | DIASTOLIC BLOOD PRESSURE: 85 MMHG | RESPIRATION RATE: 16 BRPM

## 2019-01-01 VITALS
BODY MASS INDEX: 34.75 KG/M2 | WEIGHT: 177 LBS | DIASTOLIC BLOOD PRESSURE: 80 MMHG | HEART RATE: 77 BPM | SYSTOLIC BLOOD PRESSURE: 128 MMHG | TEMPERATURE: 98.8 F | HEIGHT: 60 IN | RESPIRATION RATE: 16 BRPM

## 2019-01-01 VITALS
DIASTOLIC BLOOD PRESSURE: 80 MMHG | RESPIRATION RATE: 13 BRPM | OXYGEN SATURATION: 100 % | SYSTOLIC BLOOD PRESSURE: 112 MMHG

## 2019-01-01 VITALS
BODY MASS INDEX: 34.36 KG/M2 | HEIGHT: 60 IN | SYSTOLIC BLOOD PRESSURE: 135 MMHG | HEART RATE: 55 BPM | TEMPERATURE: 97.5 F | WEIGHT: 175 LBS | DIASTOLIC BLOOD PRESSURE: 85 MMHG | RESPIRATION RATE: 16 BRPM

## 2019-01-01 VITALS
BODY MASS INDEX: 38.48 KG/M2 | WEIGHT: 196 LBS | TEMPERATURE: 97.8 F | DIASTOLIC BLOOD PRESSURE: 68 MMHG | HEIGHT: 60 IN | RESPIRATION RATE: 16 BRPM | SYSTOLIC BLOOD PRESSURE: 145 MMHG | HEART RATE: 71 BPM | OXYGEN SATURATION: 98 %

## 2019-01-01 VITALS
DIASTOLIC BLOOD PRESSURE: 68 MMHG | TEMPERATURE: 97.9 F | HEART RATE: 62 BPM | RESPIRATION RATE: 16 BRPM | SYSTOLIC BLOOD PRESSURE: 135 MMHG

## 2019-01-01 VITALS
SYSTOLIC BLOOD PRESSURE: 129 MMHG | OXYGEN SATURATION: 97 % | RESPIRATION RATE: 16 BRPM | TEMPERATURE: 98.4 F | BODY MASS INDEX: 34.55 KG/M2 | HEIGHT: 60 IN | WEIGHT: 176 LBS | HEART RATE: 70 BPM | DIASTOLIC BLOOD PRESSURE: 63 MMHG

## 2019-01-01 VITALS
DIASTOLIC BLOOD PRESSURE: 86 MMHG | WEIGHT: 175 LBS | TEMPERATURE: 98.1 F | SYSTOLIC BLOOD PRESSURE: 119 MMHG | RESPIRATION RATE: 16 BRPM | HEART RATE: 67 BPM | BODY MASS INDEX: 34.36 KG/M2 | HEIGHT: 60 IN

## 2019-01-01 VITALS
WEIGHT: 176 LBS | HEIGHT: 60 IN | RESPIRATION RATE: 16 BRPM | TEMPERATURE: 97.7 F | DIASTOLIC BLOOD PRESSURE: 75 MMHG | HEART RATE: 64 BPM | SYSTOLIC BLOOD PRESSURE: 122 MMHG | BODY MASS INDEX: 34.55 KG/M2

## 2019-01-01 VITALS
RESPIRATION RATE: 16 BRPM | TEMPERATURE: 97.6 F | SYSTOLIC BLOOD PRESSURE: 165 MMHG | HEART RATE: 70 BPM | DIASTOLIC BLOOD PRESSURE: 82 MMHG

## 2019-01-01 VITALS
RESPIRATION RATE: 16 BRPM | HEIGHT: 60 IN | BODY MASS INDEX: 32.59 KG/M2 | WEIGHT: 166 LBS | DIASTOLIC BLOOD PRESSURE: 57 MMHG | SYSTOLIC BLOOD PRESSURE: 117 MMHG | HEART RATE: 78 BPM | TEMPERATURE: 98.6 F

## 2019-01-01 VITALS
BODY MASS INDEX: 34.79 KG/M2 | HEART RATE: 74 BPM | RESPIRATION RATE: 16 BRPM | DIASTOLIC BLOOD PRESSURE: 63 MMHG | TEMPERATURE: 98.4 F | SYSTOLIC BLOOD PRESSURE: 141 MMHG | HEIGHT: 60 IN | WEIGHT: 177.2 LBS

## 2019-01-01 VITALS
RESPIRATION RATE: 16 BRPM | BODY MASS INDEX: 33.89 KG/M2 | HEIGHT: 60 IN | TEMPERATURE: 98 F | HEART RATE: 74 BPM | SYSTOLIC BLOOD PRESSURE: 133 MMHG | WEIGHT: 172.6 LBS | DIASTOLIC BLOOD PRESSURE: 78 MMHG

## 2019-01-01 VITALS
DIASTOLIC BLOOD PRESSURE: 84 MMHG | HEART RATE: 60 BPM | SYSTOLIC BLOOD PRESSURE: 135 MMHG | RESPIRATION RATE: 16 BRPM | TEMPERATURE: 98.1 F

## 2019-01-01 VITALS
DIASTOLIC BLOOD PRESSURE: 83 MMHG | HEART RATE: 74 BPM | OXYGEN SATURATION: 95 % | BODY MASS INDEX: 34.75 KG/M2 | HEIGHT: 60 IN | SYSTOLIC BLOOD PRESSURE: 134 MMHG | DIASTOLIC BLOOD PRESSURE: 52 MMHG | SYSTOLIC BLOOD PRESSURE: 182 MMHG | HEART RATE: 74 BPM | TEMPERATURE: 97.9 F | RESPIRATION RATE: 16 BRPM | RESPIRATION RATE: 16 BRPM | WEIGHT: 177 LBS

## 2019-01-01 DIAGNOSIS — F41.9 ANXIETY: ICD-10-CM

## 2019-01-01 DIAGNOSIS — F41.9 ANXIETY AND DEPRESSION: ICD-10-CM

## 2019-01-01 DIAGNOSIS — M86.172 OTHER ACUTE OSTEOMYELITIS OF LEFT FOOT (HCC): Primary | ICD-10-CM

## 2019-01-01 DIAGNOSIS — G54.6 PHANTOM PAIN (HCC): ICD-10-CM

## 2019-01-01 DIAGNOSIS — N32.81 OAB (OVERACTIVE BLADDER): ICD-10-CM

## 2019-01-01 DIAGNOSIS — E11.621 DIABETIC ULCER OF OTHER PART OF LEFT FOOT ASSOCIATED WITH TYPE 2 DIABETES MELLITUS, WITH BONE INVOLVEMENT WITHOUT EVIDENCE OF NECROSIS (HCC): ICD-10-CM

## 2019-01-01 DIAGNOSIS — S98.112A AMPUTATED GREAT TOE OF LEFT FOOT (HCC): ICD-10-CM

## 2019-01-01 DIAGNOSIS — E16.2 HYPOGLYCEMIA: Primary | ICD-10-CM

## 2019-01-01 DIAGNOSIS — R09.82 PND (POST-NASAL DRIP): ICD-10-CM

## 2019-01-01 DIAGNOSIS — M79.672 LEFT FOOT PAIN: Primary | ICD-10-CM

## 2019-01-01 DIAGNOSIS — E11.65 UNCONTROLLED TYPE 2 DIABETES MELLITUS WITH HYPERGLYCEMIA (HCC): ICD-10-CM

## 2019-01-01 DIAGNOSIS — T86.821 FAILED SKIN GRAFT: ICD-10-CM

## 2019-01-01 DIAGNOSIS — E66.09 CLASS 1 OBESITY DUE TO EXCESS CALORIES WITH SERIOUS COMORBIDITY AND BODY MASS INDEX (BMI) OF 34.0 TO 34.9 IN ADULT: ICD-10-CM

## 2019-01-01 DIAGNOSIS — M86.9 OSTEOMYELITIS OF LEFT FOOT, UNSPECIFIED TYPE (HCC): ICD-10-CM

## 2019-01-01 DIAGNOSIS — E11.65 UNCONTROLLED TYPE 2 DIABETES MELLITUS WITH HYPERGLYCEMIA (HCC): Primary | ICD-10-CM

## 2019-01-01 DIAGNOSIS — F32.A ANXIETY AND DEPRESSION: ICD-10-CM

## 2019-01-01 DIAGNOSIS — G89.18 POSTOPERATIVE PAIN: Primary | ICD-10-CM

## 2019-01-01 DIAGNOSIS — Z72.0 TOBACCO USE: ICD-10-CM

## 2019-01-01 DIAGNOSIS — E11.51 TYPE 2 DIABETES MELLITUS WITH DIABETIC PERIPHERAL ANGIOPATHY WITHOUT GANGRENE, UNSPECIFIED WHETHER LONG TERM INSULIN USE (HCC): Primary | ICD-10-CM

## 2019-01-01 DIAGNOSIS — L97.526 DIABETIC ULCER OF OTHER PART OF LEFT FOOT ASSOCIATED WITH TYPE 2 DIABETES MELLITUS, WITH BONE INVOLVEMENT WITHOUT EVIDENCE OF NECROSIS (HCC): ICD-10-CM

## 2019-01-01 DIAGNOSIS — N39.3 STRESS INCONTINENCE: ICD-10-CM

## 2019-01-01 DIAGNOSIS — M79.7 FIBROMYALGIA: ICD-10-CM

## 2019-01-01 LAB
A/G RATIO: 1.1 (ref 1.1–2.2)
A/G RATIO: 1.1 (ref 1.1–2.2)
A/G RATIO: 1.2 (ref 1.1–2.2)
ALBUMIN SERPL-MCNC: 3.1 G/DL (ref 3.4–5)
ALBUMIN SERPL-MCNC: 3.6 G/DL (ref 3.4–5)
ALBUMIN SERPL-MCNC: 3.8 G/DL (ref 3.4–5)
ALBUMIN SERPL-MCNC: 4.1 G/DL (ref 3.4–5)
ALP BLD-CCNC: 65 U/L (ref 40–129)
ALP BLD-CCNC: 77 U/L (ref 40–129)
ALP BLD-CCNC: 88 U/L (ref 40–129)
ALT SERPL-CCNC: 11 U/L (ref 10–40)
ALT SERPL-CCNC: 8 U/L (ref 10–40)
ALT SERPL-CCNC: 8 U/L (ref 10–40)
ANAEROBIC CULTURE: ABNORMAL
ANION GAP SERPL CALCULATED.3IONS-SCNC: 10 MMOL/L (ref 3–16)
ANION GAP SERPL CALCULATED.3IONS-SCNC: 11 MMOL/L (ref 3–16)
ANION GAP SERPL CALCULATED.3IONS-SCNC: 11 MMOL/L (ref 3–16)
ANION GAP SERPL CALCULATED.3IONS-SCNC: 12 MMOL/L (ref 3–16)
ANION GAP SERPL CALCULATED.3IONS-SCNC: 12 MMOL/L (ref 3–16)
ANION GAP SERPL CALCULATED.3IONS-SCNC: 15 MMOL/L (ref 3–16)
ANION GAP SERPL CALCULATED.3IONS-SCNC: 8 MMOL/L (ref 3–16)
AST SERPL-CCNC: 6 U/L (ref 15–37)
AST SERPL-CCNC: 7 U/L (ref 15–37)
AST SERPL-CCNC: 8 U/L (ref 15–37)
BASOPHILS ABSOLUTE: 0.1 K/UL (ref 0–0.2)
BASOPHILS RELATIVE PERCENT: 0.8 %
BASOPHILS RELATIVE PERCENT: 0.9 %
BASOPHILS RELATIVE PERCENT: 1 %
BASOPHILS RELATIVE PERCENT: 1.1 %
BILIRUB SERPL-MCNC: 0.3 MG/DL (ref 0–1)
BLOOD CULTURE, ROUTINE: NORMAL
BLOOD CULTURE, ROUTINE: NORMAL
BUN BLDV-MCNC: 10 MG/DL (ref 7–20)
BUN BLDV-MCNC: 11 MG/DL (ref 7–20)
BUN BLDV-MCNC: 12 MG/DL (ref 7–20)
BUN BLDV-MCNC: 13 MG/DL (ref 7–20)
BUN BLDV-MCNC: 16 MG/DL (ref 7–20)
BUN BLDV-MCNC: 18 MG/DL (ref 7–20)
BUN BLDV-MCNC: 18 MG/DL (ref 7–20)
C-REACTIVE PROTEIN: 20.1 MG/L (ref 0–5.1)
C-REACTIVE PROTEIN: 4 MG/L (ref 0–5.1)
CALCIUM SERPL-MCNC: 8.5 MG/DL (ref 8.3–10.6)
CALCIUM SERPL-MCNC: 8.5 MG/DL (ref 8.3–10.6)
CALCIUM SERPL-MCNC: 9.1 MG/DL (ref 8.3–10.6)
CALCIUM SERPL-MCNC: 9.2 MG/DL (ref 8.3–10.6)
CALCIUM SERPL-MCNC: 9.4 MG/DL (ref 8.3–10.6)
CHLORIDE BLD-SCNC: 100 MMOL/L (ref 99–110)
CHLORIDE BLD-SCNC: 101 MMOL/L (ref 99–110)
CHLORIDE BLD-SCNC: 101 MMOL/L (ref 99–110)
CHLORIDE BLD-SCNC: 102 MMOL/L (ref 99–110)
CHLORIDE BLD-SCNC: 103 MMOL/L (ref 99–110)
CHLORIDE BLD-SCNC: 107 MMOL/L (ref 99–110)
CHLORIDE BLD-SCNC: 92 MMOL/L (ref 99–110)
CHOLESTEROL, TOTAL: 118 MG/DL (ref 0–199)
CO2: 22 MMOL/L (ref 21–32)
CO2: 24 MMOL/L (ref 21–32)
CO2: 25 MMOL/L (ref 21–32)
CO2: 26 MMOL/L (ref 21–32)
CO2: 26 MMOL/L (ref 21–32)
CREAT SERPL-MCNC: 0.8 MG/DL (ref 0.6–1.1)
CREAT SERPL-MCNC: 0.9 MG/DL (ref 0.6–1.1)
CREAT SERPL-MCNC: 1 MG/DL (ref 0.6–1.1)
CREAT SERPL-MCNC: 1 MG/DL (ref 0.6–1.1)
CREAT SERPL-MCNC: 1.1 MG/DL (ref 0.6–1.1)
CULTURE SURGICAL: ABNORMAL
CULTURE, BLOOD 2: NORMAL
CULTURE, BLOOD 2: NORMAL
EOSINOPHILS ABSOLUTE: 0.4 K/UL (ref 0–0.6)
EOSINOPHILS ABSOLUTE: 0.4 K/UL (ref 0–0.6)
EOSINOPHILS ABSOLUTE: 0.5 K/UL (ref 0–0.6)
EOSINOPHILS ABSOLUTE: 0.5 K/UL (ref 0–0.6)
EOSINOPHILS RELATIVE PERCENT: 3.2 %
EOSINOPHILS RELATIVE PERCENT: 3.9 %
EOSINOPHILS RELATIVE PERCENT: 4.6 %
EOSINOPHILS RELATIVE PERCENT: 6.1 %
GFR AFRICAN AMERICAN: >60
GFR NON-AFRICAN AMERICAN: 51
GFR NON-AFRICAN AMERICAN: 57
GFR NON-AFRICAN AMERICAN: 57
GFR NON-AFRICAN AMERICAN: >60
GLOBULIN: 2.8 G/DL
GLOBULIN: 3.1 G/DL
GLOBULIN: 3.6 G/DL
GLUCOSE BLD-MCNC: 101 MG/DL (ref 70–99)
GLUCOSE BLD-MCNC: 102 MG/DL (ref 70–99)
GLUCOSE BLD-MCNC: 105 MG/DL (ref 70–99)
GLUCOSE BLD-MCNC: 106 MG/DL (ref 70–99)
GLUCOSE BLD-MCNC: 108 MG/DL (ref 70–99)
GLUCOSE BLD-MCNC: 110 MG/DL (ref 70–99)
GLUCOSE BLD-MCNC: 114 MG/DL (ref 70–99)
GLUCOSE BLD-MCNC: 118 MG/DL (ref 70–99)
GLUCOSE BLD-MCNC: 123 MG/DL (ref 70–99)
GLUCOSE BLD-MCNC: 124 MG/DL (ref 70–99)
GLUCOSE BLD-MCNC: 124 MG/DL (ref 70–99)
GLUCOSE BLD-MCNC: 128 MG/DL (ref 70–99)
GLUCOSE BLD-MCNC: 129 MG/DL (ref 70–99)
GLUCOSE BLD-MCNC: 129 MG/DL (ref 70–99)
GLUCOSE BLD-MCNC: 130 MG/DL (ref 70–99)
GLUCOSE BLD-MCNC: 133 MG/DL (ref 70–99)
GLUCOSE BLD-MCNC: 135 MG/DL (ref 70–99)
GLUCOSE BLD-MCNC: 136 MG/DL (ref 70–99)
GLUCOSE BLD-MCNC: 137 MG/DL (ref 70–99)
GLUCOSE BLD-MCNC: 138 MG/DL (ref 70–99)
GLUCOSE BLD-MCNC: 138 MG/DL (ref 70–99)
GLUCOSE BLD-MCNC: 139 MG/DL (ref 70–99)
GLUCOSE BLD-MCNC: 140 MG/DL (ref 70–99)
GLUCOSE BLD-MCNC: 141 MG/DL (ref 70–99)
GLUCOSE BLD-MCNC: 144 MG/DL (ref 70–99)
GLUCOSE BLD-MCNC: 145 MG/DL (ref 70–99)
GLUCOSE BLD-MCNC: 150 MG/DL (ref 70–99)
GLUCOSE BLD-MCNC: 151 MG/DL (ref 70–99)
GLUCOSE BLD-MCNC: 167 MG/DL (ref 70–99)
GLUCOSE BLD-MCNC: 168 MG/DL (ref 70–99)
GLUCOSE BLD-MCNC: 171 MG/DL (ref 70–99)
GLUCOSE BLD-MCNC: 176 MG/DL (ref 70–99)
GLUCOSE BLD-MCNC: 179 MG/DL (ref 70–99)
GLUCOSE BLD-MCNC: 180 MG/DL (ref 70–99)
GLUCOSE BLD-MCNC: 182 MG/DL (ref 70–99)
GLUCOSE BLD-MCNC: 184 MG/DL (ref 70–99)
GLUCOSE BLD-MCNC: 187 MG/DL (ref 70–99)
GLUCOSE BLD-MCNC: 190 MG/DL (ref 70–99)
GLUCOSE BLD-MCNC: 194 MG/DL (ref 70–99)
GLUCOSE BLD-MCNC: 194 MG/DL (ref 70–99)
GLUCOSE BLD-MCNC: 197 MG/DL (ref 70–99)
GLUCOSE BLD-MCNC: 204 MG/DL (ref 70–99)
GLUCOSE BLD-MCNC: 205 MG/DL (ref 70–99)
GLUCOSE BLD-MCNC: 209 MG/DL (ref 70–99)
GLUCOSE BLD-MCNC: 210 MG/DL (ref 70–99)
GLUCOSE BLD-MCNC: 212 MG/DL (ref 70–99)
GLUCOSE BLD-MCNC: 217 MG/DL (ref 70–99)
GLUCOSE BLD-MCNC: 226 MG/DL (ref 70–99)
GLUCOSE BLD-MCNC: 233 MG/DL (ref 70–99)
GLUCOSE BLD-MCNC: 235 MG/DL (ref 70–99)
GLUCOSE BLD-MCNC: 252 MG/DL (ref 70–99)
GLUCOSE BLD-MCNC: 258 MG/DL (ref 70–99)
GLUCOSE BLD-MCNC: 264 MG/DL (ref 70–99)
GLUCOSE BLD-MCNC: 269 MG/DL (ref 70–99)
GLUCOSE BLD-MCNC: 274 MG/DL (ref 70–99)
GLUCOSE BLD-MCNC: 283 MG/DL (ref 70–99)
GLUCOSE BLD-MCNC: 287 MG/DL (ref 70–99)
GLUCOSE BLD-MCNC: 309 MG/DL (ref 70–99)
GLUCOSE BLD-MCNC: 317 MG/DL (ref 70–99)
GLUCOSE BLD-MCNC: 338 MG/DL (ref 70–99)
GLUCOSE BLD-MCNC: 345 MG/DL (ref 70–99)
GLUCOSE BLD-MCNC: 404 MG/DL (ref 70–99)
GLUCOSE BLD-MCNC: 42 MG/DL (ref 70–99)
GLUCOSE BLD-MCNC: 46 MG/DL (ref 70–99)
GLUCOSE BLD-MCNC: 48 MG/DL (ref 70–99)
GLUCOSE BLD-MCNC: 49 MG/DL (ref 70–99)
GLUCOSE BLD-MCNC: 49 MG/DL (ref 70–99)
GLUCOSE BLD-MCNC: 51 MG/DL (ref 70–99)
GLUCOSE BLD-MCNC: 56 MG/DL (ref 70–99)
GLUCOSE BLD-MCNC: 57 MG/DL (ref 70–99)
GLUCOSE BLD-MCNC: 59 MG/DL (ref 70–99)
GLUCOSE BLD-MCNC: 60 MG/DL (ref 70–99)
GLUCOSE BLD-MCNC: 62 MG/DL (ref 70–99)
GLUCOSE BLD-MCNC: 64 MG/DL (ref 70–99)
GLUCOSE BLD-MCNC: 65 MG/DL (ref 70–99)
GLUCOSE BLD-MCNC: 66 MG/DL (ref 70–99)
GLUCOSE BLD-MCNC: 67 MG/DL (ref 70–99)
GLUCOSE BLD-MCNC: 69 MG/DL (ref 70–99)
GLUCOSE BLD-MCNC: 71 MG/DL (ref 70–99)
GLUCOSE BLD-MCNC: 72 MG/DL (ref 70–99)
GLUCOSE BLD-MCNC: 75 MG/DL (ref 70–99)
GLUCOSE BLD-MCNC: 76 MG/DL (ref 70–99)
GLUCOSE BLD-MCNC: 77 MG/DL (ref 70–99)
GLUCOSE BLD-MCNC: 77 MG/DL (ref 70–99)
GLUCOSE BLD-MCNC: 79 MG/DL (ref 70–99)
GLUCOSE BLD-MCNC: 80 MG/DL (ref 70–99)
GLUCOSE BLD-MCNC: 82 MG/DL (ref 70–99)
GLUCOSE BLD-MCNC: 82 MG/DL (ref 70–99)
GLUCOSE BLD-MCNC: 83 MG/DL (ref 70–99)
GLUCOSE BLD-MCNC: 83 MG/DL (ref 70–99)
GLUCOSE BLD-MCNC: 85 MG/DL (ref 70–99)
GLUCOSE BLD-MCNC: 85 MG/DL (ref 70–99)
GLUCOSE BLD-MCNC: 86 MG/DL (ref 70–99)
GLUCOSE BLD-MCNC: 91 MG/DL (ref 70–99)
GLUCOSE BLD-MCNC: 92 MG/DL (ref 70–99)
GLUCOSE BLD-MCNC: 94 MG/DL (ref 70–99)
GLUCOSE BLD-MCNC: 95 MG/DL (ref 70–99)
GRAM STAIN RESULT: ABNORMAL
HCT VFR BLD CALC: 29.9 % (ref 36–48)
HCT VFR BLD CALC: 32.9 % (ref 36–48)
HCT VFR BLD CALC: 33.3 % (ref 36–48)
HCT VFR BLD CALC: 33.4 % (ref 36–48)
HCT VFR BLD CALC: 35.3 % (ref 36–48)
HCT VFR BLD CALC: 37.4 % (ref 36–48)
HDLC SERPL-MCNC: 59 MG/DL (ref 40–60)
HEMOGLOBIN: 10 G/DL (ref 12–16)
HEMOGLOBIN: 11 G/DL (ref 12–16)
HEMOGLOBIN: 11.2 G/DL (ref 12–16)
HEMOGLOBIN: 11.2 G/DL (ref 12–16)
HEMOGLOBIN: 11.4 G/DL (ref 12–16)
HEMOGLOBIN: 12.7 G/DL (ref 12–16)
INR BLD: 1.15 (ref 0.86–1.14)
LACTIC ACID: 0.6 MMOL/L (ref 0.4–2)
LDL CHOLESTEROL CALCULATED: 46 MG/DL
LYMPHOCYTES ABSOLUTE: 2.3 K/UL (ref 1–5.1)
LYMPHOCYTES ABSOLUTE: 3.4 K/UL (ref 1–5.1)
LYMPHOCYTES ABSOLUTE: 3.7 K/UL (ref 1–5.1)
LYMPHOCYTES ABSOLUTE: 4.4 K/UL (ref 1–5.1)
LYMPHOCYTES RELATIVE PERCENT: 27.9 %
LYMPHOCYTES RELATIVE PERCENT: 33.7 %
LYMPHOCYTES RELATIVE PERCENT: 34.5 %
LYMPHOCYTES RELATIVE PERCENT: 34.9 %
MCH RBC QN AUTO: 29.9 PG (ref 26–34)
MCH RBC QN AUTO: 30.2 PG (ref 26–34)
MCH RBC QN AUTO: 30.5 PG (ref 26–34)
MCHC RBC AUTO-ENTMCNC: 32.2 G/DL (ref 31–36)
MCHC RBC AUTO-ENTMCNC: 33.3 G/DL (ref 31–36)
MCHC RBC AUTO-ENTMCNC: 33.5 G/DL (ref 31–36)
MCHC RBC AUTO-ENTMCNC: 33.6 G/DL (ref 31–36)
MCHC RBC AUTO-ENTMCNC: 33.9 G/DL (ref 31–36)
MCV RBC AUTO: 90.1 FL (ref 80–100)
MCV RBC AUTO: 90.7 FL (ref 80–100)
MCV RBC AUTO: 90.8 FL (ref 80–100)
MCV RBC AUTO: 91 FL (ref 80–100)
MCV RBC AUTO: 93 FL (ref 80–100)
MONOCYTES ABSOLUTE: 0.5 K/UL (ref 0–1.3)
MONOCYTES ABSOLUTE: 0.6 K/UL (ref 0–1.3)
MONOCYTES ABSOLUTE: 0.7 K/UL (ref 0–1.3)
MONOCYTES ABSOLUTE: 0.9 K/UL (ref 0–1.3)
MONOCYTES RELATIVE PERCENT: 5.6 %
MONOCYTES RELATIVE PERCENT: 6.1 %
MONOCYTES RELATIVE PERCENT: 6.7 %
MONOCYTES RELATIVE PERCENT: 6.8 %
MRSA SCREEN RT-PCR: NORMAL
NEUTROPHILS ABSOLUTE: 4.9 K/UL (ref 1.7–7.7)
NEUTROPHILS ABSOLUTE: 5.2 K/UL (ref 1.7–7.7)
NEUTROPHILS ABSOLUTE: 6 K/UL (ref 1.7–7.7)
NEUTROPHILS ABSOLUTE: 6.8 K/UL (ref 1.7–7.7)
NEUTROPHILS RELATIVE PERCENT: 53.5 %
NEUTROPHILS RELATIVE PERCENT: 54 %
NEUTROPHILS RELATIVE PERCENT: 55.4 %
NEUTROPHILS RELATIVE PERCENT: 59.3 %
ORGANISM: ABNORMAL
PDW BLD-RTO: 14.5 % (ref 12.4–15.4)
PDW BLD-RTO: 14.7 % (ref 12.4–15.4)
PDW BLD-RTO: 15.3 % (ref 12.4–15.4)
PDW BLD-RTO: 15.4 % (ref 12.4–15.4)
PDW BLD-RTO: 15.6 % (ref 12.4–15.4)
PERFORMED ON: ABNORMAL
PERFORMED ON: NORMAL
PHOSPHORUS: 3.6 MG/DL (ref 2.5–4.9)
PLATELET # BLD: 241 K/UL (ref 135–450)
PLATELET # BLD: 266 K/UL (ref 135–450)
PLATELET # BLD: 291 K/UL (ref 135–450)
PLATELET # BLD: 293 K/UL (ref 135–450)
PLATELET # BLD: 386 K/UL (ref 135–450)
PMV BLD AUTO: 10 FL (ref 5–10.5)
PMV BLD AUTO: 10.2 FL (ref 5–10.5)
PMV BLD AUTO: 10.8 FL (ref 5–10.5)
PMV BLD AUTO: 10.9 FL (ref 5–10.5)
PMV BLD AUTO: 11 FL (ref 5–10.5)
POTASSIUM REFLEX MAGNESIUM: 4.1 MMOL/L (ref 3.5–5.1)
POTASSIUM REFLEX MAGNESIUM: 4.4 MMOL/L (ref 3.5–5.1)
POTASSIUM SERPL-SCNC: 3.6 MMOL/L (ref 3.5–5.1)
POTASSIUM SERPL-SCNC: 3.8 MMOL/L (ref 3.5–5.1)
POTASSIUM SERPL-SCNC: 3.9 MMOL/L (ref 3.5–5.1)
POTASSIUM SERPL-SCNC: 4.2 MMOL/L (ref 3.5–5.1)
POTASSIUM SERPL-SCNC: 4.3 MMOL/L (ref 3.5–5.1)
PROTHROMBIN TIME: 13.1 SEC (ref 9.8–13)
RBC # BLD: 3.63 M/UL (ref 4–5.2)
RBC # BLD: 3.67 M/UL (ref 4–5.2)
RBC # BLD: 3.67 M/UL (ref 4–5.2)
RBC # BLD: 3.8 M/UL (ref 4–5.2)
RBC # BLD: 4.15 M/UL (ref 4–5.2)
SEDIMENTATION RATE, ERYTHROCYTE: 44 MM/HR (ref 0–30)
SEDIMENTATION RATE, ERYTHROCYTE: 47 MM/HR (ref 0–30)
SODIUM BLD-SCNC: 132 MMOL/L (ref 136–145)
SODIUM BLD-SCNC: 135 MMOL/L (ref 136–145)
SODIUM BLD-SCNC: 137 MMOL/L (ref 136–145)
SODIUM BLD-SCNC: 137 MMOL/L (ref 136–145)
SODIUM BLD-SCNC: 138 MMOL/L (ref 136–145)
SODIUM BLD-SCNC: 139 MMOL/L (ref 136–145)
SODIUM BLD-SCNC: 140 MMOL/L (ref 136–145)
TOTAL PROTEIN: 5.9 G/DL (ref 6.4–8.2)
TOTAL PROTEIN: 6.9 G/DL (ref 6.4–8.2)
TOTAL PROTEIN: 7.7 G/DL (ref 6.4–8.2)
TRIGL SERPL-MCNC: 67 MG/DL (ref 0–150)
VANCOMYCIN TROUGH: 16.4 UG/ML (ref 10–20)
VLDLC SERPL CALC-MCNC: 13 MG/DL
WBC # BLD: 10.9 K/UL (ref 4–11)
WBC # BLD: 12.6 K/UL (ref 4–11)
WBC # BLD: 12.9 K/UL (ref 4–11)
WBC # BLD: 8.3 K/UL (ref 4–11)
WBC # BLD: 9.7 K/UL (ref 4–11)

## 2019-01-01 PROCEDURE — 96365 THER/PROPH/DIAG IV INF INIT: CPT

## 2019-01-01 PROCEDURE — 6370000000 HC RX 637 (ALT 250 FOR IP): Performed by: INTERNAL MEDICINE

## 2019-01-01 PROCEDURE — 96372 THER/PROPH/DIAG INJ SC/IM: CPT

## 2019-01-01 PROCEDURE — 99183 HYPERBARIC OXYGEN THERAPY: CPT | Performed by: INTERNAL MEDICINE

## 2019-01-01 PROCEDURE — 6360000002 HC RX W HCPCS: Performed by: PODIATRIST

## 2019-01-01 PROCEDURE — 99231 SBSQ HOSP IP/OBS SF/LOW 25: CPT | Performed by: INTERNAL MEDICINE

## 2019-01-01 PROCEDURE — 2500000003 HC RX 250 WO HCPCS: Performed by: SURGERY

## 2019-01-01 PROCEDURE — 2580000003 HC RX 258

## 2019-01-01 PROCEDURE — 87186 SC STD MICRODIL/AGAR DIL: CPT

## 2019-01-01 PROCEDURE — 97167 OT EVAL HIGH COMPLEX 60 MIN: CPT

## 2019-01-01 PROCEDURE — 97110 THERAPEUTIC EXERCISES: CPT

## 2019-01-01 PROCEDURE — 6370000000 HC RX 637 (ALT 250 FOR IP): Performed by: NURSE PRACTITIONER

## 2019-01-01 PROCEDURE — 2709999900 HC NON-CHARGEABLE SUPPLY: Performed by: PODIATRIST

## 2019-01-01 PROCEDURE — 6360000002 HC RX W HCPCS: Performed by: NURSE PRACTITIONER

## 2019-01-01 PROCEDURE — 2580000003 HC RX 258: Performed by: NURSE PRACTITIONER

## 2019-01-01 PROCEDURE — 80053 COMPREHEN METABOLIC PANEL: CPT

## 2019-01-01 PROCEDURE — 99255 IP/OBS CONSLTJ NEW/EST HI 80: CPT | Performed by: SURGERY

## 2019-01-01 PROCEDURE — 3700000000 HC ANESTHESIA ATTENDED CARE: Performed by: PODIATRIST

## 2019-01-01 PROCEDURE — 6360000002 HC RX W HCPCS: Performed by: INTERNAL MEDICINE

## 2019-01-01 PROCEDURE — 6370000000 HC RX 637 (ALT 250 FOR IP): Performed by: PODIATRIST

## 2019-01-01 PROCEDURE — 96376 TX/PRO/DX INJ SAME DRUG ADON: CPT

## 2019-01-01 PROCEDURE — 2580000003 HC RX 258: Performed by: PODIATRIST

## 2019-01-01 PROCEDURE — 99284 EMERGENCY DEPT VISIT MOD MDM: CPT

## 2019-01-01 PROCEDURE — 1200000000 HC SEMI PRIVATE

## 2019-01-01 PROCEDURE — 97535 SELF CARE MNGMENT TRAINING: CPT

## 2019-01-01 PROCEDURE — 99212 OFFICE O/P EST SF 10 MIN: CPT

## 2019-01-01 PROCEDURE — 85652 RBC SED RATE AUTOMATED: CPT

## 2019-01-01 PROCEDURE — 86140 C-REACTIVE PROTEIN: CPT

## 2019-01-01 PROCEDURE — 96375 TX/PRO/DX INJ NEW DRUG ADDON: CPT

## 2019-01-01 PROCEDURE — 80048 BASIC METABOLIC PNL TOTAL CA: CPT

## 2019-01-01 PROCEDURE — 36415 COLL VENOUS BLD VENIPUNCTURE: CPT

## 2019-01-01 PROCEDURE — G0277 HBOT, FULL BODY CHAMBER, 30M: HCPCS

## 2019-01-01 PROCEDURE — G8427 DOCREV CUR MEDS BY ELIG CLIN: HCPCS | Performed by: FAMILY MEDICINE

## 2019-01-01 PROCEDURE — 75625 CONTRAST EXAM ABDOMINL AORTA: CPT

## 2019-01-01 PROCEDURE — 85025 COMPLETE CBC W/AUTO DIFF WBC: CPT

## 2019-01-01 PROCEDURE — 99152 MOD SED SAME PHYS/QHP 5/>YRS: CPT | Performed by: SURGERY

## 2019-01-01 PROCEDURE — 37226 PR REVSC OPN/PRQ FEM/POP W/STNT/ANGIOP SM VSL: CPT | Performed by: SURGERY

## 2019-01-01 PROCEDURE — 94150 VITAL CAPACITY TEST: CPT

## 2019-01-01 PROCEDURE — 85018 HEMOGLOBIN: CPT

## 2019-01-01 PROCEDURE — 73718 MRI LOWER EXTREMITY W/O DYE: CPT

## 2019-01-01 PROCEDURE — 94669 MECHANICAL CHEST WALL OSCILL: CPT

## 2019-01-01 PROCEDURE — 2709999900 HC NON-CHARGEABLE SUPPLY

## 2019-01-01 PROCEDURE — 2700000000 HC OXYGEN THERAPY PER DAY

## 2019-01-01 PROCEDURE — 6360000002 HC RX W HCPCS: Performed by: EMERGENCY MEDICINE

## 2019-01-01 PROCEDURE — 7100000000 HC PACU RECOVERY - FIRST 15 MIN: Performed by: PODIATRIST

## 2019-01-01 PROCEDURE — 4004F PT TOBACCO SCREEN RCVD TLK: CPT | Performed by: FAMILY MEDICINE

## 2019-01-01 PROCEDURE — 97530 THERAPEUTIC ACTIVITIES: CPT

## 2019-01-01 PROCEDURE — 99183 HYPERBARIC OXYGEN THERAPY: CPT | Performed by: EMERGENCY MEDICINE

## 2019-01-01 PROCEDURE — 99244 OFF/OP CNSLTJ NEW/EST MOD 40: CPT | Performed by: INTERNAL MEDICINE

## 2019-01-01 PROCEDURE — 75716 ARTERY X-RAYS ARMS/LEGS: CPT

## 2019-01-01 PROCEDURE — 2580000003 HC RX 258: Performed by: INTERNAL MEDICINE

## 2019-01-01 PROCEDURE — 2580000003 HC RX 258: Performed by: EMERGENCY MEDICINE

## 2019-01-01 PROCEDURE — 2022F DILAT RTA XM EVC RTNOPTHY: CPT | Performed by: FAMILY MEDICINE

## 2019-01-01 PROCEDURE — 7100000001 HC PACU RECOVERY - ADDTL 15 MIN: Performed by: PODIATRIST

## 2019-01-01 PROCEDURE — 80069 RENAL FUNCTION PANEL: CPT

## 2019-01-01 PROCEDURE — 2500000003 HC RX 250 WO HCPCS: Performed by: PODIATRIST

## 2019-01-01 PROCEDURE — G0378 HOSPITAL OBSERVATION PER HR: HCPCS

## 2019-01-01 PROCEDURE — C1894 INTRO/SHEATH, NON-LASER: HCPCS

## 2019-01-01 PROCEDURE — 93925 LOWER EXTREMITY STUDY: CPT

## 2019-01-01 PROCEDURE — 94640 AIRWAY INHALATION TREATMENT: CPT

## 2019-01-01 PROCEDURE — 87070 CULTURE OTHR SPECIMN AEROBIC: CPT

## 2019-01-01 PROCEDURE — 97162 PT EVAL MOD COMPLEX 30 MIN: CPT

## 2019-01-01 PROCEDURE — 75625 CONTRAST EXAM ABDOMINL AORTA: CPT | Performed by: SURGERY

## 2019-01-01 PROCEDURE — 6360000002 HC RX W HCPCS: Performed by: SURGERY

## 2019-01-01 PROCEDURE — 99253 IP/OBS CNSLTJ NEW/EST LOW 45: CPT | Performed by: INTERNAL MEDICINE

## 2019-01-01 PROCEDURE — 85610 PROTHROMBIN TIME: CPT

## 2019-01-01 PROCEDURE — 6370000000 HC RX 637 (ALT 250 FOR IP): Performed by: EMERGENCY MEDICINE

## 2019-01-01 PROCEDURE — 37226 HC FEM POP TERR PLASTY AND STENT: CPT

## 2019-01-01 PROCEDURE — C1760 CLOSURE DEV, VASC: HCPCS

## 2019-01-01 PROCEDURE — 97116 GAIT TRAINING THERAPY: CPT

## 2019-01-01 PROCEDURE — 3046F HEMOGLOBIN A1C LEVEL >9.0%: CPT | Performed by: FAMILY MEDICINE

## 2019-01-01 PROCEDURE — 96374 THER/PROPH/DIAG INJ IV PUSH: CPT

## 2019-01-01 PROCEDURE — 99213 OFFICE O/P EST LOW 20 MIN: CPT

## 2019-01-01 PROCEDURE — 99283 EMERGENCY DEPT VISIT LOW MDM: CPT

## 2019-01-01 PROCEDURE — 2500000003 HC RX 250 WO HCPCS: Performed by: NURSE ANESTHETIST, CERTIFIED REGISTERED

## 2019-01-01 PROCEDURE — C1725 CATH, TRANSLUMIN NON-LASER: HCPCS

## 2019-01-01 PROCEDURE — 3017F COLORECTAL CA SCREEN DOC REV: CPT | Performed by: FAMILY MEDICINE

## 2019-01-01 PROCEDURE — 87075 CULTR BACTERIA EXCEPT BLOOD: CPT

## 2019-01-01 PROCEDURE — 6360000002 HC RX W HCPCS

## 2019-01-01 PROCEDURE — 87205 SMEAR GRAM STAIN: CPT

## 2019-01-01 PROCEDURE — B41G1ZZ FLUOROSCOPY OF LEFT LOWER EXTREMITY ARTERIES USING LOW OSMOLAR CONTRAST: ICD-10-PCS | Performed by: SURGERY

## 2019-01-01 PROCEDURE — 0Y6N0Z9 DETACHMENT AT LEFT FOOT, PARTIAL 1ST RAY, OPEN APPROACH: ICD-10-PCS | Performed by: PODIATRIST

## 2019-01-01 PROCEDURE — 99214 OFFICE O/P EST MOD 30 MIN: CPT | Performed by: FAMILY MEDICINE

## 2019-01-01 PROCEDURE — 75774 ARTERY X-RAY EACH VESSEL: CPT

## 2019-01-01 PROCEDURE — 73630 X-RAY EXAM OF FOOT: CPT

## 2019-01-01 PROCEDURE — 85014 HEMATOCRIT: CPT

## 2019-01-01 PROCEDURE — 2580000003 HC RX 258: Performed by: NURSE ANESTHETIST, CERTIFIED REGISTERED

## 2019-01-01 PROCEDURE — 6360000002 HC RX W HCPCS: Performed by: NURSE ANESTHETIST, CERTIFIED REGISTERED

## 2019-01-01 PROCEDURE — 99183 HYPERBARIC OXYGEN THERAPY: CPT | Performed by: NURSE PRACTITIONER

## 2019-01-01 PROCEDURE — 83605 ASSAY OF LACTIC ACID: CPT

## 2019-01-01 PROCEDURE — 0HXNXZZ TRANSFER LEFT FOOT SKIN, EXTERNAL APPROACH: ICD-10-PCS | Performed by: PODIATRIST

## 2019-01-01 PROCEDURE — G8417 CALC BMI ABV UP PARAM F/U: HCPCS | Performed by: FAMILY MEDICINE

## 2019-01-01 PROCEDURE — 85027 COMPLETE CBC AUTOMATED: CPT

## 2019-01-01 PROCEDURE — B4101ZZ FLUOROSCOPY OF ABDOMINAL AORTA USING LOW OSMOLAR CONTRAST: ICD-10-PCS | Performed by: SURGERY

## 2019-01-01 PROCEDURE — 1111F DSCHRG MED/CURRENT MED MERGE: CPT | Performed by: FAMILY MEDICINE

## 2019-01-01 PROCEDURE — 88311 DECALCIFY TISSUE: CPT

## 2019-01-01 PROCEDURE — G8598 ASA/ANTIPLAT THER USED: HCPCS | Performed by: FAMILY MEDICINE

## 2019-01-01 PROCEDURE — 88305 TISSUE EXAM BY PATHOLOGIST: CPT

## 2019-01-01 PROCEDURE — 96366 THER/PROPH/DIAG IV INF ADDON: CPT

## 2019-01-01 PROCEDURE — C1769 GUIDE WIRE: HCPCS

## 2019-01-01 PROCEDURE — 87077 CULTURE AEROBIC IDENTIFY: CPT

## 2019-01-01 PROCEDURE — 99153 MOD SED SAME PHYS/QHP EA: CPT

## 2019-01-01 PROCEDURE — 75710 ARTERY X-RAYS ARM/LEG: CPT | Performed by: SURGERY

## 2019-01-01 PROCEDURE — 3700000001 HC ADD 15 MINUTES (ANESTHESIA): Performed by: PODIATRIST

## 2019-01-01 PROCEDURE — 94761 N-INVAS EAR/PLS OXIMETRY MLT: CPT

## 2019-01-01 PROCEDURE — C1887 CATHETER, GUIDING: HCPCS

## 2019-01-01 PROCEDURE — 96367 TX/PROPH/DG ADDL SEQ IV INF: CPT

## 2019-01-01 PROCEDURE — 99231 SBSQ HOSP IP/OBS SF/LOW 25: CPT | Performed by: SURGERY

## 2019-01-01 PROCEDURE — 3600000014 HC SURGERY LEVEL 4 ADDTL 15MIN: Performed by: PODIATRIST

## 2019-01-01 PROCEDURE — 87641 MR-STAPH DNA AMP PROBE: CPT

## 2019-01-01 PROCEDURE — 3600000004 HC SURGERY LEVEL 4 BASE: Performed by: PODIATRIST

## 2019-01-01 PROCEDURE — 87040 BLOOD CULTURE FOR BACTERIA: CPT

## 2019-01-01 PROCEDURE — C1876 STENT, NON-COA/NON-COV W/DEL: HCPCS

## 2019-01-01 PROCEDURE — 97166 OT EVAL MOD COMPLEX 45 MIN: CPT

## 2019-01-01 PROCEDURE — 047L3DZ DILATION OF LEFT FEMORAL ARTERY WITH INTRALUMINAL DEVICE, PERCUTANEOUS APPROACH: ICD-10-PCS | Performed by: SURGERY

## 2019-01-01 PROCEDURE — 99152 MOD SED SAME PHYS/QHP 5/>YRS: CPT

## 2019-01-01 PROCEDURE — 80202 ASSAY OF VANCOMYCIN: CPT

## 2019-01-01 PROCEDURE — 80061 LIPID PANEL: CPT

## 2019-01-01 RX ORDER — SODIUM CHLORIDE 0.9 % (FLUSH) 0.9 %
10 SYRINGE (ML) INJECTION PRN
Status: DISCONTINUED | OUTPATIENT
Start: 2019-01-01 | End: 2019-01-01 | Stop reason: HOSPADM

## 2019-01-01 RX ORDER — OXYCODONE AND ACETAMINOPHEN 10; 325 MG/1; MG/1
1 TABLET ORAL EVERY 6 HOURS PRN
Qty: 12 TABLET | Refills: 0 | Status: SHIPPED | OUTPATIENT
Start: 2019-01-01 | End: 2019-01-01

## 2019-01-01 RX ORDER — SODIUM CHLORIDE 9 MG/ML
1000 INJECTION, SOLUTION INTRAVENOUS CONTINUOUS
Status: DISCONTINUED | OUTPATIENT
Start: 2019-01-01 | End: 2019-01-01 | Stop reason: HOSPADM

## 2019-01-01 RX ORDER — 0.9 % SODIUM CHLORIDE 0.9 %
500 INTRAVENOUS SOLUTION INTRAVENOUS ONCE
Status: COMPLETED | OUTPATIENT
Start: 2019-01-01 | End: 2019-01-01

## 2019-01-01 RX ORDER — METHYLPREDNISOLONE 4 MG/1
TABLET ORAL
Qty: 1 KIT | Refills: 0 | Status: SHIPPED | OUTPATIENT
Start: 2019-01-01 | End: 2019-01-01

## 2019-01-01 RX ORDER — OXYCODONE HYDROCHLORIDE AND ACETAMINOPHEN 5; 325 MG/1; MG/1
2 TABLET ORAL PRN
Status: DISCONTINUED | OUTPATIENT
Start: 2019-01-01 | End: 2019-01-01 | Stop reason: HOSPADM

## 2019-01-01 RX ORDER — SODIUM CHLORIDE 9 MG/ML
INJECTION, SOLUTION INTRAVENOUS CONTINUOUS PRN
Status: DISCONTINUED | OUTPATIENT
Start: 2019-01-01 | End: 2019-01-01 | Stop reason: SDUPTHER

## 2019-01-01 RX ORDER — SODIUM CHLORIDE 0.9 % (FLUSH) 0.9 %
10 SYRINGE (ML) INJECTION EVERY 12 HOURS SCHEDULED
Status: DISCONTINUED | OUTPATIENT
Start: 2019-01-01 | End: 2019-01-01 | Stop reason: HOSPADM

## 2019-01-01 RX ORDER — OXYCODONE AND ACETAMINOPHEN 10; 325 MG/1; MG/1
1 TABLET ORAL EVERY 4 HOURS PRN
Qty: 28 TABLET | Refills: 0 | Status: ON HOLD | OUTPATIENT
Start: 2019-01-01 | End: 2019-01-01 | Stop reason: HOSPADM

## 2019-01-01 RX ORDER — LEVOTHYROXINE SODIUM 0.05 MG/1
50 TABLET ORAL DAILY
Status: DISCONTINUED | OUTPATIENT
Start: 2019-01-01 | End: 2019-01-01 | Stop reason: HOSPADM

## 2019-01-01 RX ORDER — OXYBUTYNIN CHLORIDE 5 MG/1
TABLET ORAL
Qty: 90 TABLET | Refills: 3 | Status: SHIPPED | OUTPATIENT
Start: 2019-01-01 | End: 2020-01-01

## 2019-01-01 RX ORDER — SERTRALINE HYDROCHLORIDE 100 MG/1
100 TABLET, FILM COATED ORAL DAILY
Qty: 30 TABLET | Refills: 3 | Status: SHIPPED | OUTPATIENT
Start: 2019-01-01 | End: 2020-01-01

## 2019-01-01 RX ORDER — ATORVASTATIN CALCIUM 40 MG/1
TABLET, FILM COATED ORAL
Qty: 30 TABLET | Refills: 5 | Status: SHIPPED | OUTPATIENT
Start: 2019-01-01 | End: 2020-01-01

## 2019-01-01 RX ORDER — METHYLPREDNISOLONE SODIUM SUCCINATE 125 MG/2ML
125 INJECTION, POWDER, LYOPHILIZED, FOR SOLUTION INTRAMUSCULAR; INTRAVENOUS ONCE
Status: COMPLETED | OUTPATIENT
Start: 2019-01-01 | End: 2019-01-01

## 2019-01-01 RX ORDER — PANTOPRAZOLE SODIUM 40 MG/1
40 TABLET, DELAYED RELEASE ORAL
Status: DISCONTINUED | OUTPATIENT
Start: 2019-01-01 | End: 2019-01-01 | Stop reason: HOSPADM

## 2019-01-01 RX ORDER — DEXTROSE MONOHYDRATE 25 G/50ML
12.5 INJECTION, SOLUTION INTRAVENOUS PRN
Status: DISCONTINUED | OUTPATIENT
Start: 2019-01-01 | End: 2019-01-01 | Stop reason: HOSPADM

## 2019-01-01 RX ORDER — NICOTINE 21 MG/24HR
1 PATCH, TRANSDERMAL 24 HOURS TRANSDERMAL DAILY
Status: DISCONTINUED | OUTPATIENT
Start: 2019-01-01 | End: 2019-01-01 | Stop reason: HOSPADM

## 2019-01-01 RX ORDER — ALBUTEROL SULFATE 2.5 MG/3ML
2.5 SOLUTION RESPIRATORY (INHALATION) EVERY 6 HOURS PRN
Status: DISCONTINUED | OUTPATIENT
Start: 2019-01-01 | End: 2019-01-01

## 2019-01-01 RX ORDER — EPHEDRINE SULFATE 50 MG/ML
INJECTION, SOLUTION INTRAVENOUS PRN
Status: DISCONTINUED | OUTPATIENT
Start: 2019-01-01 | End: 2019-01-01 | Stop reason: SDUPTHER

## 2019-01-01 RX ORDER — MORPHINE SULFATE 4 MG/ML
4 INJECTION, SOLUTION INTRAMUSCULAR; INTRAVENOUS ONCE
Status: COMPLETED | OUTPATIENT
Start: 2019-01-01 | End: 2019-01-01

## 2019-01-01 RX ORDER — HYDROCODONE BITARTRATE AND ACETAMINOPHEN 7.5; 325 MG/1; MG/1
1 TABLET ORAL ONCE
Status: COMPLETED | OUTPATIENT
Start: 2019-01-01 | End: 2019-01-01

## 2019-01-01 RX ORDER — ONDANSETRON 2 MG/ML
4 INJECTION INTRAMUSCULAR; INTRAVENOUS EVERY 6 HOURS PRN
Status: DISCONTINUED | OUTPATIENT
Start: 2019-01-01 | End: 2019-01-01 | Stop reason: HOSPADM

## 2019-01-01 RX ORDER — RANOLAZINE 500 MG/1
TABLET, EXTENDED RELEASE ORAL
Qty: 180 TABLET | Refills: 1 | Status: SHIPPED | OUTPATIENT
Start: 2019-01-01 | End: 2020-01-01

## 2019-01-01 RX ORDER — LIDOCAINE 40 MG/G
CREAM TOPICAL ONCE
Status: DISCONTINUED | OUTPATIENT
Start: 2019-01-01 | End: 2019-01-01 | Stop reason: HOSPADM

## 2019-01-01 RX ORDER — HYDRALAZINE HYDROCHLORIDE 20 MG/ML
5 INJECTION INTRAMUSCULAR; INTRAVENOUS EVERY 10 MIN PRN
Status: DISCONTINUED | OUTPATIENT
Start: 2019-01-01 | End: 2019-01-01 | Stop reason: HOSPADM

## 2019-01-01 RX ORDER — PREGABALIN 150 MG/1
150 CAPSULE ORAL 2 TIMES DAILY
Qty: 60 CAPSULE | Refills: 3 | Status: SHIPPED | OUTPATIENT
Start: 2019-01-01 | End: 2019-01-01 | Stop reason: ALTCHOICE

## 2019-01-01 RX ORDER — OXYBUTYNIN CHLORIDE 5 MG/1
5 TABLET ORAL SEE ADMIN INSTRUCTIONS
Status: DISCONTINUED | OUTPATIENT
Start: 2019-01-01 | End: 2019-01-01 | Stop reason: DRUGHIGH

## 2019-01-01 RX ORDER — ALBUTEROL SULFATE 90 UG/1
2 AEROSOL, METERED RESPIRATORY (INHALATION) 2 TIMES DAILY
Status: DISCONTINUED | OUTPATIENT
Start: 2019-01-01 | End: 2019-01-01 | Stop reason: HOSPADM

## 2019-01-01 RX ORDER — FENTANYL CITRATE 50 UG/ML
INJECTION, SOLUTION INTRAMUSCULAR; INTRAVENOUS PRN
Status: DISCONTINUED | OUTPATIENT
Start: 2019-01-01 | End: 2019-01-01 | Stop reason: SDUPTHER

## 2019-01-01 RX ORDER — FLUTICASONE PROPIONATE 50 MCG
2 SPRAY, SUSPENSION (ML) NASAL DAILY
Qty: 1 BOTTLE | Refills: 3 | Status: SHIPPED | OUTPATIENT
Start: 2019-01-01 | End: 2020-01-01

## 2019-01-01 RX ORDER — SODIUM CHLORIDE 9 MG/ML
1000 INJECTION, SOLUTION INTRAVENOUS CONTINUOUS
Status: DISCONTINUED | OUTPATIENT
Start: 2019-01-01 | End: 2019-01-01

## 2019-01-01 RX ORDER — FUROSEMIDE 20 MG/1
TABLET ORAL
Qty: 60 TABLET | Refills: 5 | Status: SHIPPED | OUTPATIENT
Start: 2019-01-01 | End: 2020-01-01

## 2019-01-01 RX ORDER — OXYCODONE AND ACETAMINOPHEN 10; 325 MG/1; MG/1
1 TABLET ORAL EVERY 6 HOURS PRN
Status: DISCONTINUED | OUTPATIENT
Start: 2019-01-01 | End: 2019-01-01

## 2019-01-01 RX ORDER — LIDOCAINE HYDROCHLORIDE 20 MG/ML
INJECTION, SOLUTION EPIDURAL; INFILTRATION; INTRACAUDAL; PERINEURAL PRN
Status: DISCONTINUED | OUTPATIENT
Start: 2019-01-01 | End: 2019-01-01 | Stop reason: SDUPTHER

## 2019-01-01 RX ORDER — AMOXICILLIN AND CLAVULANATE POTASSIUM 875; 125 MG/1; MG/1
1 TABLET, FILM COATED ORAL EVERY 12 HOURS SCHEDULED
Status: DISCONTINUED | OUTPATIENT
Start: 2019-01-01 | End: 2019-01-01 | Stop reason: HOSPADM

## 2019-01-01 RX ORDER — BUPROPION HYDROCHLORIDE 150 MG/1
300 TABLET ORAL DAILY
Status: DISCONTINUED | OUTPATIENT
Start: 2019-01-01 | End: 2019-01-01 | Stop reason: HOSPADM

## 2019-01-01 RX ORDER — INSULIN GLARGINE 100 [IU]/ML
8 INJECTION, SOLUTION SUBCUTANEOUS NIGHTLY
Status: DISCONTINUED | OUTPATIENT
Start: 2019-01-01 | End: 2019-01-01 | Stop reason: HOSPADM

## 2019-01-01 RX ORDER — BUPROPION HYDROCHLORIDE 150 MG/1
150 TABLET ORAL DAILY
Status: DISCONTINUED | OUTPATIENT
Start: 2019-01-01 | End: 2019-01-01 | Stop reason: HOSPADM

## 2019-01-01 RX ORDER — ASPIRIN 81 MG/1
81 TABLET ORAL DAILY
Status: DISCONTINUED | OUTPATIENT
Start: 2019-01-01 | End: 2019-01-01 | Stop reason: HOSPADM

## 2019-01-01 RX ORDER — OXYBUTYNIN CHLORIDE 5 MG/1
5 TABLET ORAL EVERY MORNING
Status: DISCONTINUED | OUTPATIENT
Start: 2019-01-01 | End: 2019-01-01 | Stop reason: HOSPADM

## 2019-01-01 RX ORDER — INSULIN GLARGINE 100 [IU]/ML
40 INJECTION, SOLUTION SUBCUTANEOUS 2 TIMES DAILY
Status: DISCONTINUED | OUTPATIENT
Start: 2019-01-01 | End: 2019-01-01

## 2019-01-01 RX ORDER — MONTELUKAST SODIUM 10 MG/1
10 TABLET ORAL DAILY
Status: DISCONTINUED | OUTPATIENT
Start: 2019-01-01 | End: 2019-01-01 | Stop reason: HOSPADM

## 2019-01-01 RX ORDER — RANOLAZINE 500 MG/1
500 TABLET, EXTENDED RELEASE ORAL 2 TIMES DAILY
Status: DISCONTINUED | OUTPATIENT
Start: 2019-01-01 | End: 2019-01-01 | Stop reason: HOSPADM

## 2019-01-01 RX ORDER — NICOTINE POLACRILEX 4 MG
15 LOZENGE BUCCAL PRN
Status: DISCONTINUED | OUTPATIENT
Start: 2019-01-01 | End: 2019-01-01 | Stop reason: HOSPADM

## 2019-01-01 RX ORDER — OXYCODONE AND ACETAMINOPHEN 10; 325 MG/1; MG/1
1 TABLET ORAL EVERY 4 HOURS PRN
Status: DISCONTINUED | OUTPATIENT
Start: 2019-01-01 | End: 2019-01-01 | Stop reason: HOSPADM

## 2019-01-01 RX ORDER — OXYMETAZOLINE HYDROCHLORIDE 0.05 G/100ML
1 SPRAY NASAL 2 TIMES DAILY
Status: ON HOLD | COMMUNITY
End: 2019-01-01 | Stop reason: HOSPADM

## 2019-01-01 RX ORDER — PROMETHAZINE HYDROCHLORIDE 25 MG/1
25 TABLET ORAL
Status: COMPLETED | OUTPATIENT
Start: 2019-01-01 | End: 2019-01-01

## 2019-01-01 RX ORDER — DIPHENHYDRAMINE HYDROCHLORIDE 50 MG/ML
50 INJECTION INTRAMUSCULAR; INTRAVENOUS ONCE
Status: COMPLETED | OUTPATIENT
Start: 2019-01-01 | End: 2019-01-01

## 2019-01-01 RX ORDER — LIDOCAINE 40 MG/G
CREAM TOPICAL PRN
Status: DISCONTINUED | OUTPATIENT
Start: 2019-01-01 | End: 2019-01-01 | Stop reason: HOSPADM

## 2019-01-01 RX ORDER — HYDRALAZINE HYDROCHLORIDE 20 MG/ML
10 INJECTION INTRAMUSCULAR; INTRAVENOUS EVERY 8 HOURS PRN
Status: DISCONTINUED | OUTPATIENT
Start: 2019-01-01 | End: 2019-01-01 | Stop reason: HOSPADM

## 2019-01-01 RX ORDER — CHOLECALCIFEROL (VITAMIN D3) 125 MCG
3 CAPSULE ORAL NIGHTLY PRN
Status: COMPLETED | OUTPATIENT
Start: 2019-01-01 | End: 2019-01-01

## 2019-01-01 RX ORDER — CLOPIDOGREL BISULFATE 75 MG/1
75 TABLET ORAL DAILY
Status: DISCONTINUED | OUTPATIENT
Start: 2019-01-01 | End: 2019-01-01 | Stop reason: HOSPADM

## 2019-01-01 RX ORDER — DEXTROSE MONOHYDRATE 50 MG/ML
100 INJECTION, SOLUTION INTRAVENOUS PRN
Status: DISCONTINUED | OUTPATIENT
Start: 2019-01-01 | End: 2019-01-01 | Stop reason: HOSPADM

## 2019-01-01 RX ORDER — ALBUTEROL SULFATE 90 UG/1
2 AEROSOL, METERED RESPIRATORY (INHALATION) EVERY 6 HOURS PRN
Status: DISCONTINUED | OUTPATIENT
Start: 2019-01-01 | End: 2019-01-01

## 2019-01-01 RX ORDER — ONDANSETRON 2 MG/ML
4 INJECTION INTRAMUSCULAR; INTRAVENOUS EVERY 10 MIN PRN
Status: DISCONTINUED | OUTPATIENT
Start: 2019-01-01 | End: 2019-01-01 | Stop reason: HOSPADM

## 2019-01-01 RX ORDER — OXYBUTYNIN CHLORIDE 5 MG/1
10 TABLET ORAL NIGHTLY
Status: DISCONTINUED | OUTPATIENT
Start: 2019-01-01 | End: 2019-01-01 | Stop reason: HOSPADM

## 2019-01-01 RX ORDER — SIMETHICONE 80 MG
80 TABLET,CHEWABLE ORAL 4 TIMES DAILY PRN
Status: DISCONTINUED | OUTPATIENT
Start: 2019-01-01 | End: 2019-01-01 | Stop reason: HOSPADM

## 2019-01-01 RX ORDER — ASPIRIN 81 MG/1
81 TABLET ORAL DAILY
COMMUNITY

## 2019-01-01 RX ORDER — SODIUM CHLORIDE 0.9 % (FLUSH) 0.9 %
SYRINGE (ML) INJECTION
Status: COMPLETED
Start: 2019-01-01 | End: 2019-01-01

## 2019-01-01 RX ORDER — SODIUM CHLORIDE 9 MG/ML
INJECTION, SOLUTION INTRAVENOUS
Status: COMPLETED
Start: 2019-01-01 | End: 2019-01-01

## 2019-01-01 RX ORDER — KETOROLAC TROMETHAMINE 30 MG/ML
30 INJECTION, SOLUTION INTRAMUSCULAR; INTRAVENOUS EVERY 6 HOURS PRN
Status: DISPENSED | OUTPATIENT
Start: 2019-01-01 | End: 2019-01-01

## 2019-01-01 RX ORDER — FERROUS SULFATE 325(65) MG
325 TABLET ORAL
Status: DISCONTINUED | OUTPATIENT
Start: 2019-01-01 | End: 2019-01-01

## 2019-01-01 RX ORDER — ALBUTEROL SULFATE 2.5 MG/3ML
2.5 SOLUTION RESPIRATORY (INHALATION) EVERY 6 HOURS PRN
Status: DISCONTINUED | OUTPATIENT
Start: 2019-01-01 | End: 2019-01-01 | Stop reason: HOSPADM

## 2019-01-01 RX ORDER — GLYCOPYRROLATE 0.2 MG/ML
INJECTION INTRAMUSCULAR; INTRAVENOUS PRN
Status: DISCONTINUED | OUTPATIENT
Start: 2019-01-01 | End: 2019-01-01 | Stop reason: SDUPTHER

## 2019-01-01 RX ORDER — ALBUTEROL SULFATE 2.5 MG/3ML
2.5 SOLUTION RESPIRATORY (INHALATION) 2 TIMES DAILY
Status: DISCONTINUED | OUTPATIENT
Start: 2019-01-01 | End: 2019-01-01

## 2019-01-01 RX ORDER — CLONAZEPAM 0.5 MG/1
TABLET ORAL
Qty: 30 TABLET | Refills: 0 | Status: SHIPPED | OUTPATIENT
Start: 2019-01-01 | End: 2020-01-01

## 2019-01-01 RX ORDER — MEPERIDINE HYDROCHLORIDE 50 MG/ML
12.5 INJECTION INTRAMUSCULAR; INTRAVENOUS; SUBCUTANEOUS EVERY 5 MIN PRN
Status: DISCONTINUED | OUTPATIENT
Start: 2019-01-01 | End: 2019-01-01 | Stop reason: HOSPADM

## 2019-01-01 RX ORDER — KETOROLAC TROMETHAMINE 30 MG/ML
30 INJECTION, SOLUTION INTRAMUSCULAR; INTRAVENOUS ONCE
Status: COMPLETED | OUTPATIENT
Start: 2019-01-01 | End: 2019-01-01

## 2019-01-01 RX ORDER — LABETALOL HYDROCHLORIDE 5 MG/ML
5 INJECTION, SOLUTION INTRAVENOUS EVERY 10 MIN PRN
Status: DISCONTINUED | OUTPATIENT
Start: 2019-01-01 | End: 2019-01-01 | Stop reason: HOSPADM

## 2019-01-01 RX ORDER — CLONAZEPAM 0.5 MG/1
TABLET ORAL
Qty: 30 TABLET | Refills: 0 | Status: SHIPPED | OUTPATIENT
Start: 2019-01-01 | End: 2019-01-01 | Stop reason: ALTCHOICE

## 2019-01-01 RX ORDER — LEVOTHYROXINE SODIUM 0.05 MG/1
TABLET ORAL
Qty: 30 TABLET | Refills: 5 | Status: SHIPPED | OUTPATIENT
Start: 2019-01-01 | End: 2020-01-01

## 2019-01-01 RX ORDER — CLONAZEPAM 0.5 MG/1
0.5 TABLET ORAL 3 TIMES DAILY PRN
Status: DISCONTINUED | OUTPATIENT
Start: 2019-01-01 | End: 2019-01-01 | Stop reason: HOSPADM

## 2019-01-01 RX ORDER — ATORVASTATIN CALCIUM 10 MG/1
20 TABLET, FILM COATED ORAL NIGHTLY
Status: DISCONTINUED | OUTPATIENT
Start: 2019-01-01 | End: 2019-01-01 | Stop reason: HOSPADM

## 2019-01-01 RX ORDER — ISOSORBIDE MONONITRATE 30 MG/1
30 TABLET, EXTENDED RELEASE ORAL DAILY
Status: DISCONTINUED | OUTPATIENT
Start: 2019-01-01 | End: 2019-01-01 | Stop reason: HOSPADM

## 2019-01-01 RX ORDER — INSULIN GLARGINE 100 [IU]/ML
30 INJECTION, SOLUTION SUBCUTANEOUS 2 TIMES DAILY
Status: DISCONTINUED | OUTPATIENT
Start: 2019-01-01 | End: 2019-01-01

## 2019-01-01 RX ORDER — SERTRALINE HYDROCHLORIDE 100 MG/1
TABLET, FILM COATED ORAL
COMMUNITY
Start: 2019-01-01 | End: 2019-01-01

## 2019-01-01 RX ORDER — FLUTICASONE PROPIONATE 50 MCG
2 SPRAY, SUSPENSION (ML) NASAL DAILY
Status: DISCONTINUED | OUTPATIENT
Start: 2019-01-01 | End: 2019-01-01 | Stop reason: HOSPADM

## 2019-01-01 RX ORDER — OXYCODONE HYDROCHLORIDE AND ACETAMINOPHEN 5; 325 MG/1; MG/1
1 TABLET ORAL PRN
Status: DISCONTINUED | OUTPATIENT
Start: 2019-01-01 | End: 2019-01-01 | Stop reason: HOSPADM

## 2019-01-01 RX ORDER — GABAPENTIN 300 MG/1
CAPSULE ORAL
COMMUNITY
Start: 2019-05-28 | End: 2020-01-01 | Stop reason: SDUPTHER

## 2019-01-01 RX ORDER — KETOROLAC TROMETHAMINE 10 MG/1
10 TABLET, FILM COATED ORAL 3 TIMES DAILY
Qty: 12 TABLET | Refills: 0 | Status: ON HOLD | OUTPATIENT
Start: 2019-01-01 | End: 2019-01-01 | Stop reason: ALTCHOICE

## 2019-01-01 RX ORDER — INSULIN GLARGINE 100 [IU]/ML
8 INJECTION, SOLUTION SUBCUTANEOUS NIGHTLY
Qty: 1 VIAL | Refills: 3 | Status: ON HOLD | DISCHARGE
Start: 2019-01-01 | End: 2020-01-01 | Stop reason: HOSPADM

## 2019-01-01 RX ORDER — FUROSEMIDE 20 MG/1
20 TABLET ORAL DAILY
Status: DISCONTINUED | OUTPATIENT
Start: 2019-01-01 | End: 2019-01-01 | Stop reason: HOSPADM

## 2019-01-01 RX ORDER — CYCLOBENZAPRINE HCL 10 MG
10 TABLET ORAL EVERY 8 HOURS
COMMUNITY
End: 2019-01-01 | Stop reason: SINTOL

## 2019-01-01 RX ORDER — MORPHINE SULFATE 4 MG/ML
6 INJECTION, SOLUTION INTRAMUSCULAR; INTRAVENOUS ONCE
Status: COMPLETED | OUTPATIENT
Start: 2019-01-01 | End: 2019-01-01

## 2019-01-01 RX ORDER — CHOLECALCIFEROL (VITAMIN D3) 125 MCG
5 CAPSULE ORAL ONCE
Status: COMPLETED | OUTPATIENT
Start: 2019-01-01 | End: 2019-01-01

## 2019-01-01 RX ORDER — FERROUS SULFATE 325(65) MG
325 TABLET ORAL
Status: DISCONTINUED | OUTPATIENT
Start: 2019-01-01 | End: 2019-01-01 | Stop reason: HOSPADM

## 2019-01-01 RX ORDER — DEXTROSE MONOHYDRATE 25 G/50ML
INJECTION, SOLUTION INTRAVENOUS PRN
Status: DISCONTINUED | OUTPATIENT
Start: 2019-01-01 | End: 2019-01-01 | Stop reason: SDUPTHER

## 2019-01-01 RX ORDER — LISINOPRIL 5 MG/1
5 TABLET ORAL DAILY
Qty: 90 TABLET | Refills: 2 | Status: SHIPPED | OUTPATIENT
Start: 2019-01-01 | End: 2020-01-01

## 2019-01-01 RX ORDER — LISINOPRIL 5 MG/1
5 TABLET ORAL DAILY
Status: DISCONTINUED | OUTPATIENT
Start: 2019-01-01 | End: 2019-01-01 | Stop reason: HOSPADM

## 2019-01-01 RX ORDER — ALBUTEROL SULFATE 90 UG/1
2 AEROSOL, METERED RESPIRATORY (INHALATION) EVERY 4 HOURS PRN
Status: DISCONTINUED | OUTPATIENT
Start: 2019-01-01 | End: 2019-01-01 | Stop reason: HOSPADM

## 2019-01-01 RX ORDER — LANOLIN ALCOHOL/MO/W.PET/CERES
325 CREAM (GRAM) TOPICAL
Status: DISCONTINUED | OUTPATIENT
Start: 2019-01-01 | End: 2019-01-01 | Stop reason: CLARIF

## 2019-01-01 RX ORDER — OXYCODONE HYDROCHLORIDE AND ACETAMINOPHEN 5; 325 MG/1; MG/1
1-2 TABLET ORAL EVERY 4 HOURS PRN
Qty: 25 TABLET | Refills: 0 | Status: SHIPPED | OUTPATIENT
Start: 2019-01-01 | End: 2019-01-01

## 2019-01-01 RX ORDER — MORPHINE SULFATE 2 MG/ML
2 INJECTION, SOLUTION INTRAMUSCULAR; INTRAVENOUS
Status: DISCONTINUED | OUTPATIENT
Start: 2019-01-01 | End: 2019-01-01 | Stop reason: HOSPADM

## 2019-01-01 RX ORDER — INSULIN GLARGINE 100 [IU]/ML
45 INJECTION, SOLUTION SUBCUTANEOUS 2 TIMES DAILY
Status: DISCONTINUED | OUTPATIENT
Start: 2019-01-01 | End: 2019-01-01

## 2019-01-01 RX ORDER — MORPHINE SULFATE 4 MG/ML
4 INJECTION, SOLUTION INTRAMUSCULAR; INTRAVENOUS
Status: DISCONTINUED | OUTPATIENT
Start: 2019-01-01 | End: 2019-01-01 | Stop reason: HOSPADM

## 2019-01-01 RX ORDER — LANOLIN ALCOHOL/MO/W.PET/CERES
325 CREAM (GRAM) TOPICAL
Qty: 30 TABLET | Refills: 5 | Status: SHIPPED | OUTPATIENT
Start: 2019-01-01 | End: 2020-01-01

## 2019-01-01 RX ORDER — PREGABALIN 150 MG/1
150 CAPSULE ORAL 2 TIMES DAILY
Qty: 60 CAPSULE | Refills: 0 | OUTPATIENT
Start: 2019-01-01 | End: 2019-01-01

## 2019-01-01 RX ORDER — FUROSEMIDE 40 MG/1
20 TABLET ORAL 2 TIMES DAILY
Status: DISCONTINUED | OUTPATIENT
Start: 2019-01-01 | End: 2019-01-01 | Stop reason: HOSPADM

## 2019-01-01 RX ORDER — PREGABALIN 75 MG/1
150 CAPSULE ORAL 2 TIMES DAILY
Status: DISCONTINUED | OUTPATIENT
Start: 2019-01-01 | End: 2019-01-01 | Stop reason: HOSPADM

## 2019-01-01 RX ORDER — PROPOFOL 10 MG/ML
INJECTION, EMULSION INTRAVENOUS PRN
Status: DISCONTINUED | OUTPATIENT
Start: 2019-01-01 | End: 2019-01-01 | Stop reason: SDUPTHER

## 2019-01-01 RX ORDER — FUROSEMIDE 20 MG/1
20 TABLET ORAL 2 TIMES DAILY
Status: DISCONTINUED | OUTPATIENT
Start: 2019-01-01 | End: 2019-01-01

## 2019-01-01 RX ORDER — INSULIN GLARGINE 100 [IU]/ML
30 INJECTION, SOLUTION SUBCUTANEOUS 2 TIMES DAILY
Status: DISCONTINUED | OUTPATIENT
Start: 2019-01-01 | End: 2019-01-01 | Stop reason: HOSPADM

## 2019-01-01 RX ORDER — GABAPENTIN 300 MG/1
300 CAPSULE ORAL 3 TIMES DAILY
Status: DISCONTINUED | OUTPATIENT
Start: 2019-01-01 | End: 2019-01-01 | Stop reason: ALTCHOICE

## 2019-01-01 RX ORDER — ACETAMINOPHEN 325 MG/1
650 TABLET ORAL EVERY 4 HOURS PRN
Status: DISCONTINUED | OUTPATIENT
Start: 2019-01-01 | End: 2019-01-01 | Stop reason: HOSPADM

## 2019-01-01 RX ORDER — DEXAMETHASONE SODIUM PHOSPHATE 10 MG/ML
INJECTION INTRAMUSCULAR; INTRAVENOUS PRN
Status: DISCONTINUED | OUTPATIENT
Start: 2019-01-01 | End: 2019-01-01 | Stop reason: SDUPTHER

## 2019-01-01 RX ORDER — ATORVASTATIN CALCIUM 40 MG/1
40 TABLET, FILM COATED ORAL NIGHTLY
Status: DISCONTINUED | OUTPATIENT
Start: 2019-01-01 | End: 2019-01-01 | Stop reason: HOSPADM

## 2019-01-01 RX ORDER — ONDANSETRON 2 MG/ML
INJECTION INTRAMUSCULAR; INTRAVENOUS PRN
Status: DISCONTINUED | OUTPATIENT
Start: 2019-01-01 | End: 2019-01-01 | Stop reason: SDUPTHER

## 2019-01-01 RX ORDER — AMOXICILLIN AND CLAVULANATE POTASSIUM 875; 125 MG/1; MG/1
1 TABLET, FILM COATED ORAL EVERY 12 HOURS SCHEDULED
Qty: 12 TABLET | Refills: 0 | Status: ON HOLD | OUTPATIENT
Start: 2019-01-01 | End: 2019-01-01 | Stop reason: HOSPADM

## 2019-01-01 RX ADMIN — FERROUS SULFATE TAB 325 MG (65 MG ELEMENTAL FE) 325 MG: 325 (65 FE) TAB at 09:45

## 2019-01-01 RX ADMIN — KETOROLAC TROMETHAMINE 30 MG: 30 INJECTION, SOLUTION INTRAMUSCULAR at 23:17

## 2019-01-01 RX ADMIN — AMOXICILLIN AND CLAVULANATE POTASSIUM 1 TABLET: 875; 125 TABLET, FILM COATED ORAL at 08:31

## 2019-01-01 RX ADMIN — METOPROLOL TARTRATE 25 MG: 25 TABLET, FILM COATED ORAL at 21:41

## 2019-01-01 RX ADMIN — INSULIN GLARGINE 30 UNITS: 100 INJECTION, SOLUTION SUBCUTANEOUS at 08:27

## 2019-01-01 RX ADMIN — PREGABALIN 150 MG: 75 CAPSULE ORAL at 10:25

## 2019-01-01 RX ADMIN — ATORVASTATIN CALCIUM 40 MG: 40 TABLET, FILM COATED ORAL at 20:36

## 2019-01-01 RX ADMIN — FUROSEMIDE 20 MG: 40 TABLET ORAL at 09:16

## 2019-01-01 RX ADMIN — Medication 2 PUFF: at 21:06

## 2019-01-01 RX ADMIN — RANOLAZINE 500 MG: 500 TABLET, FILM COATED, EXTENDED RELEASE ORAL at 08:26

## 2019-01-01 RX ADMIN — INSULIN LISPRO 12 UNITS: 100 INJECTION, SOLUTION INTRAVENOUS; SUBCUTANEOUS at 09:50

## 2019-01-01 RX ADMIN — PIPERACILLIN AND TAZOBACTAM 3.38 G: 3; .375 INJECTION, POWDER, FOR SOLUTION INTRAVENOUS at 03:38

## 2019-01-01 RX ADMIN — PIPERACILLIN AND TAZOBACTAM 3.38 G: 3; .375 INJECTION, POWDER, FOR SOLUTION INTRAVENOUS at 17:25

## 2019-01-01 RX ADMIN — DEXTROSE 15 G: 15 GEL ORAL at 06:09

## 2019-01-01 RX ADMIN — PIPERACILLIN AND TAZOBACTAM 3.38 G: 3; .375 INJECTION, POWDER, FOR SOLUTION INTRAVENOUS at 02:09

## 2019-01-01 RX ADMIN — METOPROLOL TARTRATE 25 MG: 25 TABLET, FILM COATED ORAL at 08:40

## 2019-01-01 RX ADMIN — ALBUTEROL SULFATE 2.5 MG: 2.5 SOLUTION RESPIRATORY (INHALATION) at 08:55

## 2019-01-01 RX ADMIN — FERROUS SULFATE TAB 325 MG (65 MG ELEMENTAL FE) 325 MG: 325 (65 FE) TAB at 08:30

## 2019-01-01 RX ADMIN — METFORMIN HYDROCHLORIDE 1000 MG: 500 TABLET ORAL at 17:39

## 2019-01-01 RX ADMIN — PIPERACILLIN AND TAZOBACTAM 3.38 G: 3; .375 INJECTION, POWDER, FOR SOLUTION INTRAVENOUS at 11:29

## 2019-01-01 RX ADMIN — CLOPIDOGREL BISULFATE 75 MG: 75 TABLET ORAL at 09:45

## 2019-01-01 RX ADMIN — PIPERACILLIN AND TAZOBACTAM 3.38 G: 3; .375 INJECTION, POWDER, FOR SOLUTION INTRAVENOUS at 11:52

## 2019-01-01 RX ADMIN — DEXTROSE MONOHYDRATE 10 G: 25 INJECTION, SOLUTION INTRAVENOUS at 19:18

## 2019-01-01 RX ADMIN — ASPIRIN 81 MG: 81 TABLET, COATED ORAL at 09:52

## 2019-01-01 RX ADMIN — MORPHINE SULFATE 2 MG: 2 INJECTION, SOLUTION INTRAMUSCULAR; INTRAVENOUS at 23:29

## 2019-01-01 RX ADMIN — METOPROLOL TARTRATE 25 MG: 25 TABLET ORAL at 10:16

## 2019-01-01 RX ADMIN — LISINOPRIL 5 MG: 5 TABLET ORAL at 08:26

## 2019-01-01 RX ADMIN — CLOPIDOGREL BISULFATE 75 MG: 75 TABLET ORAL at 08:56

## 2019-01-01 RX ADMIN — CLOPIDOGREL BISULFATE 75 MG: 75 TABLET ORAL at 10:27

## 2019-01-01 RX ADMIN — ISOSORBIDE MONONITRATE 30 MG: 30 TABLET, EXTENDED RELEASE ORAL at 08:32

## 2019-01-01 RX ADMIN — MELATONIN TAB 5 MG 2.5 MG: 5 TAB at 02:21

## 2019-01-01 RX ADMIN — RANOLAZINE 500 MG: 500 TABLET, FILM COATED, EXTENDED RELEASE ORAL at 21:28

## 2019-01-01 RX ADMIN — FUROSEMIDE 20 MG: 40 TABLET ORAL at 17:07

## 2019-01-01 RX ADMIN — SODIUM CHLORIDE 1000 ML: 9 INJECTION, SOLUTION INTRAVENOUS at 14:20

## 2019-01-01 RX ADMIN — ISOSORBIDE MONONITRATE 30 MG: 30 TABLET, EXTENDED RELEASE ORAL at 10:16

## 2019-01-01 RX ADMIN — INSULIN LISPRO 2 UNITS: 100 INJECTION, SOLUTION INTRAVENOUS; SUBCUTANEOUS at 21:12

## 2019-01-01 RX ADMIN — OXYBUTYNIN CHLORIDE 5 MG: 5 TABLET ORAL at 08:31

## 2019-01-01 RX ADMIN — SODIUM CHLORIDE 1000 ML: 9 INJECTION, SOLUTION INTRAVENOUS at 00:52

## 2019-01-01 RX ADMIN — INSULIN LISPRO 3 UNITS: 100 INJECTION, SOLUTION INTRAVENOUS; SUBCUTANEOUS at 21:15

## 2019-01-01 RX ADMIN — RANOLAZINE 500 MG: 500 TABLET, FILM COATED, EXTENDED RELEASE ORAL at 10:16

## 2019-01-01 RX ADMIN — OXYCODONE HYDROCHLORIDE AND ACETAMINOPHEN 1 TABLET: 10; 325 TABLET ORAL at 22:14

## 2019-01-01 RX ADMIN — FUROSEMIDE 20 MG: 40 TABLET ORAL at 10:26

## 2019-01-01 RX ADMIN — PIPERACILLIN AND TAZOBACTAM 3.38 G: 3; .375 INJECTION, POWDER, FOR SOLUTION INTRAVENOUS at 14:20

## 2019-01-01 RX ADMIN — MORPHINE SULFATE 2 MG: 2 INJECTION, SOLUTION INTRAMUSCULAR; INTRAVENOUS at 18:11

## 2019-01-01 RX ADMIN — ALBUTEROL SULFATE 2.5 MG: 2.5 SOLUTION RESPIRATORY (INHALATION) at 20:45

## 2019-01-01 RX ADMIN — MORPHINE SULFATE 4 MG: 4 INJECTION, SOLUTION INTRAMUSCULAR; INTRAVENOUS at 23:47

## 2019-01-01 RX ADMIN — RANOLAZINE 500 MG: 500 TABLET, FILM COATED, EXTENDED RELEASE ORAL at 21:14

## 2019-01-01 RX ADMIN — LISINOPRIL 5 MG: 5 TABLET ORAL at 09:48

## 2019-01-01 RX ADMIN — MORPHINE SULFATE 2 MG: 2 INJECTION, SOLUTION INTRAMUSCULAR; INTRAVENOUS at 00:09

## 2019-01-01 RX ADMIN — LISINOPRIL 5 MG: 5 TABLET ORAL at 08:47

## 2019-01-01 RX ADMIN — FERROUS SULFATE TAB 325 MG (65 MG ELEMENTAL FE) 325 MG: 325 (65 FE) TAB at 09:48

## 2019-01-01 RX ADMIN — BUPROPION HYDROCHLORIDE 300 MG: 150 TABLET, FILM COATED, EXTENDED RELEASE ORAL at 09:45

## 2019-01-01 RX ADMIN — RANOLAZINE 500 MG: 500 TABLET, FILM COATED, EXTENDED RELEASE ORAL at 09:18

## 2019-01-01 RX ADMIN — DEXTROSE MONOHYDRATE 12.5 G: 500 INJECTION PARENTERAL at 08:17

## 2019-01-01 RX ADMIN — SODIUM CHLORIDE 250 ML: 9 INJECTION, SOLUTION INTRAVENOUS at 18:48

## 2019-01-01 RX ADMIN — INSULIN LISPRO 6 UNITS: 100 INJECTION, SOLUTION INTRAVENOUS; SUBCUTANEOUS at 16:45

## 2019-01-01 RX ADMIN — AMOXICILLIN AND CLAVULANATE POTASSIUM 1 TABLET: 875; 125 TABLET, FILM COATED ORAL at 21:00

## 2019-01-01 RX ADMIN — RANOLAZINE 500 MG: 500 TABLET, FILM COATED, EXTENDED RELEASE ORAL at 20:59

## 2019-01-01 RX ADMIN — CLOPIDOGREL BISULFATE 75 MG: 75 TABLET ORAL at 09:18

## 2019-01-01 RX ADMIN — Medication 10 ML: at 10:17

## 2019-01-01 RX ADMIN — PREGABALIN 150 MG: 75 CAPSULE ORAL at 21:00

## 2019-01-01 RX ADMIN — MORPHINE SULFATE 2 MG: 2 INJECTION, SOLUTION INTRAMUSCULAR; INTRAVENOUS at 04:34

## 2019-01-01 RX ADMIN — LISINOPRIL 5 MG: 5 TABLET ORAL at 09:45

## 2019-01-01 RX ADMIN — PREGABALIN 150 MG: 75 CAPSULE ORAL at 21:14

## 2019-01-01 RX ADMIN — LISINOPRIL 5 MG: 5 TABLET ORAL at 08:40

## 2019-01-01 RX ADMIN — MORPHINE SULFATE 2 MG: 2 INJECTION, SOLUTION INTRAMUSCULAR; INTRAVENOUS at 12:20

## 2019-01-01 RX ADMIN — RANOLAZINE 500 MG: 500 TABLET, FILM COATED, EXTENDED RELEASE ORAL at 20:36

## 2019-01-01 RX ADMIN — PREGABALIN 150 MG: 75 CAPSULE ORAL at 09:48

## 2019-01-01 RX ADMIN — MORPHINE SULFATE 2 MG: 2 INJECTION, SOLUTION INTRAMUSCULAR; INTRAVENOUS at 02:00

## 2019-01-01 RX ADMIN — PREGABALIN 150 MG: 75 CAPSULE ORAL at 20:08

## 2019-01-01 RX ADMIN — RANOLAZINE 500 MG: 500 TABLET, FILM COATED, EXTENDED RELEASE ORAL at 09:04

## 2019-01-01 RX ADMIN — FUROSEMIDE 20 MG: 20 TABLET ORAL at 08:27

## 2019-01-01 RX ADMIN — PREGABALIN 150 MG: 75 CAPSULE ORAL at 21:41

## 2019-01-01 RX ADMIN — COLLAGENASE SANTYL: 250 OINTMENT TOPICAL at 09:06

## 2019-01-01 RX ADMIN — Medication 10 ML: at 21:18

## 2019-01-01 RX ADMIN — ASPIRIN 81 MG: 81 TABLET, COATED ORAL at 10:15

## 2019-01-01 RX ADMIN — LISINOPRIL 5 MG: 5 TABLET ORAL at 10:26

## 2019-01-01 RX ADMIN — ALBUTEROL SULFATE 2.5 MG: 2.5 SOLUTION RESPIRATORY (INHALATION) at 21:04

## 2019-01-01 RX ADMIN — OXYCODONE HYDROCHLORIDE AND ACETAMINOPHEN 1 TABLET: 10; 325 TABLET ORAL at 13:42

## 2019-01-01 RX ADMIN — ASPIRIN 81 MG: 81 TABLET, COATED ORAL at 09:04

## 2019-01-01 RX ADMIN — FUROSEMIDE 20 MG: 40 TABLET ORAL at 08:26

## 2019-01-01 RX ADMIN — INSULIN LISPRO 9 UNITS: 100 INJECTION, SOLUTION INTRAVENOUS; SUBCUTANEOUS at 09:23

## 2019-01-01 RX ADMIN — BUPROPION HYDROCHLORIDE 300 MG: 150 TABLET, FILM COATED, EXTENDED RELEASE ORAL at 10:26

## 2019-01-01 RX ADMIN — SODIUM CHLORIDE 1000 ML: 9 INJECTION, SOLUTION INTRAVENOUS at 18:50

## 2019-01-01 RX ADMIN — HYDROCODONE BITARTRATE AND ACETAMINOPHEN 1 TABLET: 7.5; 325 TABLET ORAL at 05:05

## 2019-01-01 RX ADMIN — PIPERACILLIN AND TAZOBACTAM 3.38 G: 3; .375 INJECTION, POWDER, FOR SOLUTION INTRAVENOUS at 11:44

## 2019-01-01 RX ADMIN — SODIUM CHLORIDE: 9 INJECTION, SOLUTION INTRAVENOUS at 17:33

## 2019-01-01 RX ADMIN — ASPIRIN 81 MG: 81 TABLET, COATED ORAL at 08:32

## 2019-01-01 RX ADMIN — INSULIN LISPRO 6 UNITS: 100 INJECTION, SOLUTION INTRAVENOUS; SUBCUTANEOUS at 08:41

## 2019-01-01 RX ADMIN — PIPERACILLIN AND TAZOBACTAM 3.38 G: 3; .375 INJECTION, POWDER, FOR SOLUTION INTRAVENOUS at 02:43

## 2019-01-01 RX ADMIN — RANOLAZINE 500 MG: 500 TABLET, FILM COATED, EXTENDED RELEASE ORAL at 08:47

## 2019-01-01 RX ADMIN — METOPROLOL TARTRATE 25 MG: 25 TABLET, FILM COATED ORAL at 08:47

## 2019-01-01 RX ADMIN — MORPHINE SULFATE 2 MG: 2 INJECTION, SOLUTION INTRAMUSCULAR; INTRAVENOUS at 06:39

## 2019-01-01 RX ADMIN — PANTOPRAZOLE SODIUM 40 MG: 40 TABLET, DELAYED RELEASE ORAL at 08:05

## 2019-01-01 RX ADMIN — PIPERACILLIN AND TAZOBACTAM 3.38 G: 3; .375 INJECTION, POWDER, FOR SOLUTION INTRAVENOUS at 03:23

## 2019-01-01 RX ADMIN — Medication 2 PUFF: at 08:21

## 2019-01-01 RX ADMIN — DEXTROSE MONOHYDRATE 12.5 G: 25 INJECTION, SOLUTION INTRAVENOUS at 16:46

## 2019-01-01 RX ADMIN — PREGABALIN 150 MG: 75 CAPSULE ORAL at 20:31

## 2019-01-01 RX ADMIN — INSULIN GLARGINE 45 UNITS: 100 INJECTION, SOLUTION SUBCUTANEOUS at 09:22

## 2019-01-01 RX ADMIN — Medication 2 PUFF: at 07:47

## 2019-01-01 RX ADMIN — PIPERACILLIN AND TAZOBACTAM 3.38 G: 3; .375 INJECTION, POWDER, FOR SOLUTION INTRAVENOUS at 21:14

## 2019-01-01 RX ADMIN — ASPIRIN 81 MG: 81 TABLET, COATED ORAL at 10:26

## 2019-01-01 RX ADMIN — Medication 10 ML: at 09:05

## 2019-01-01 RX ADMIN — MORPHINE SULFATE 2 MG: 2 INJECTION, SOLUTION INTRAMUSCULAR; INTRAVENOUS at 00:16

## 2019-01-01 RX ADMIN — DEXTROSE MONOHYDRATE 12.5 G: 25 INJECTION, SOLUTION INTRAVENOUS at 09:18

## 2019-01-01 RX ADMIN — INSULIN GLARGINE 45 UNITS: 100 INJECTION, SOLUTION SUBCUTANEOUS at 08:48

## 2019-01-01 RX ADMIN — MORPHINE SULFATE 4 MG: 4 INJECTION, SOLUTION INTRAMUSCULAR; INTRAVENOUS at 03:37

## 2019-01-01 RX ADMIN — FUROSEMIDE 20 MG: 20 TABLET ORAL at 17:39

## 2019-01-01 RX ADMIN — SODIUM CHLORIDE 1000 ML: 9 INJECTION, SOLUTION INTRAVENOUS at 04:20

## 2019-01-01 RX ADMIN — Medication 10 ML: at 18:54

## 2019-01-01 RX ADMIN — FERROUS SULFATE TAB 325 MG (65 MG ELEMENTAL FE) 325 MG: 325 (65 FE) TAB at 08:47

## 2019-01-01 RX ADMIN — PREGABALIN 150 MG: 75 CAPSULE ORAL at 08:47

## 2019-01-01 RX ADMIN — METOPROLOL TARTRATE 25 MG: 25 TABLET, FILM COATED ORAL at 21:14

## 2019-01-01 RX ADMIN — LINAGLIPTIN 5 MG: 5 TABLET, FILM COATED ORAL at 10:16

## 2019-01-01 RX ADMIN — PIPERACILLIN AND TAZOBACTAM 3.38 G: 3; .375 INJECTION, POWDER, FOR SOLUTION INTRAVENOUS at 03:31

## 2019-01-01 RX ADMIN — FUROSEMIDE 20 MG: 20 TABLET ORAL at 17:20

## 2019-01-01 RX ADMIN — PREGABALIN 150 MG: 75 CAPSULE ORAL at 21:11

## 2019-01-01 RX ADMIN — MORPHINE SULFATE 2 MG: 2 INJECTION, SOLUTION INTRAMUSCULAR; INTRAVENOUS at 09:25

## 2019-01-01 RX ADMIN — Medication 2 PUFF: at 08:11

## 2019-01-01 RX ADMIN — PREGABALIN 150 MG: 75 CAPSULE ORAL at 08:56

## 2019-01-01 RX ADMIN — ALBUTEROL SULFATE 2.5 MG: 2.5 SOLUTION RESPIRATORY (INHALATION) at 08:34

## 2019-01-01 RX ADMIN — PREGABALIN 150 MG: 75 CAPSULE ORAL at 09:04

## 2019-01-01 RX ADMIN — BUPROPION HYDROCHLORIDE 300 MG: 150 TABLET, FILM COATED, EXTENDED RELEASE ORAL at 08:40

## 2019-01-01 RX ADMIN — ASPIRIN 81 MG: 81 TABLET, COATED ORAL at 21:28

## 2019-01-01 RX ADMIN — ONDANSETRON 4 MG: 2 INJECTION INTRAMUSCULAR; INTRAVENOUS at 21:18

## 2019-01-01 RX ADMIN — FUROSEMIDE 20 MG: 20 TABLET ORAL at 08:31

## 2019-01-01 RX ADMIN — SODIUM CHLORIDE 1000 ML: 9 INJECTION, SOLUTION INTRAVENOUS at 12:19

## 2019-01-01 RX ADMIN — Medication 2 PUFF: at 08:42

## 2019-01-01 RX ADMIN — MORPHINE SULFATE 2 MG: 2 INJECTION, SOLUTION INTRAMUSCULAR; INTRAVENOUS at 20:08

## 2019-01-01 RX ADMIN — MONTELUKAST SODIUM 10 MG: 10 TABLET ORAL at 09:45

## 2019-01-01 RX ADMIN — MONTELUKAST SODIUM 10 MG: 10 TABLET ORAL at 09:48

## 2019-01-01 RX ADMIN — KETOROLAC TROMETHAMINE 30 MG: 30 INJECTION, SOLUTION INTRAMUSCULAR at 09:50

## 2019-01-01 RX ADMIN — CLOPIDOGREL BISULFATE 75 MG: 75 TABLET ORAL at 09:48

## 2019-01-01 RX ADMIN — INSULIN GLARGINE 45 UNITS: 100 INJECTION, SOLUTION SUBCUTANEOUS at 21:12

## 2019-01-01 RX ADMIN — MONTELUKAST SODIUM 10 MG: 10 TABLET ORAL at 21:28

## 2019-01-01 RX ADMIN — Medication 2 PUFF: at 20:45

## 2019-01-01 RX ADMIN — METOPROLOL TARTRATE 25 MG: 25 TABLET, FILM COATED ORAL at 09:17

## 2019-01-01 RX ADMIN — METFORMIN HYDROCHLORIDE 1000 MG: 500 TABLET ORAL at 08:31

## 2019-01-01 RX ADMIN — SODIUM CHLORIDE 500 ML: 9 INJECTION, SOLUTION INTRAVENOUS at 16:56

## 2019-01-01 RX ADMIN — LISINOPRIL 5 MG: 5 TABLET ORAL at 08:31

## 2019-01-01 RX ADMIN — LIDOCAINE HYDROCHLORIDE 50 MG: 20 INJECTION, SOLUTION EPIDURAL; INFILTRATION; INTRACAUDAL; PERINEURAL at 18:38

## 2019-01-01 RX ADMIN — METOPROLOL TARTRATE 25 MG: 25 TABLET, FILM COATED ORAL at 09:04

## 2019-01-01 RX ADMIN — MORPHINE SULFATE 4 MG: 4 INJECTION, SOLUTION INTRAMUSCULAR; INTRAVENOUS at 14:28

## 2019-01-01 RX ADMIN — PROPOFOL 200 MG: 10 INJECTION, EMULSION INTRAVENOUS at 18:38

## 2019-01-01 RX ADMIN — BUPROPION HYDROCHLORIDE 300 MG: 150 TABLET, FILM COATED, EXTENDED RELEASE ORAL at 09:53

## 2019-01-01 RX ADMIN — METOPROLOL TARTRATE 25 MG: 25 TABLET, FILM COATED ORAL at 20:29

## 2019-01-01 RX ADMIN — INSULIN GLARGINE 30 UNITS: 100 INJECTION, SOLUTION SUBCUTANEOUS at 08:41

## 2019-01-01 RX ADMIN — PROMETHAZINE HYDROCHLORIDE 25 MG: 25 TABLET ORAL at 12:06

## 2019-01-01 RX ADMIN — INSULIN LISPRO 3 UNITS: 100 INJECTION, SOLUTION INTRAVENOUS; SUBCUTANEOUS at 17:37

## 2019-01-01 RX ADMIN — INSULIN LISPRO 5 UNITS: 100 INJECTION, SOLUTION INTRAVENOUS; SUBCUTANEOUS at 20:35

## 2019-01-01 RX ADMIN — OXYBUTYNIN CHLORIDE 10 MG: 5 TABLET ORAL at 21:28

## 2019-01-01 RX ADMIN — BUPROPION HYDROCHLORIDE 300 MG: 150 TABLET, FILM COATED, EXTENDED RELEASE ORAL at 09:18

## 2019-01-01 RX ADMIN — MORPHINE SULFATE 2 MG: 2 INJECTION, SOLUTION INTRAMUSCULAR; INTRAVENOUS at 02:25

## 2019-01-01 RX ADMIN — KETOROLAC TROMETHAMINE 30 MG: 30 INJECTION, SOLUTION INTRAMUSCULAR at 20:30

## 2019-01-01 RX ADMIN — MORPHINE SULFATE 4 MG: 4 INJECTION, SOLUTION INTRAMUSCULAR; INTRAVENOUS at 09:30

## 2019-01-01 RX ADMIN — BUPROPION HYDROCHLORIDE 150 MG: 150 TABLET, FILM COATED, EXTENDED RELEASE ORAL at 21:28

## 2019-01-01 RX ADMIN — Medication 10 ML: at 20:36

## 2019-01-01 RX ADMIN — ATORVASTATIN CALCIUM 20 MG: 10 TABLET, FILM COATED ORAL at 20:59

## 2019-01-01 RX ADMIN — MONTELUKAST SODIUM 10 MG: 10 TABLET ORAL at 09:15

## 2019-01-01 RX ADMIN — BUPROPION HYDROCHLORIDE 150 MG: 150 TABLET, FILM COATED, EXTENDED RELEASE ORAL at 08:31

## 2019-01-01 RX ADMIN — FUROSEMIDE 20 MG: 40 TABLET ORAL at 08:47

## 2019-01-01 RX ADMIN — OXYCODONE HYDROCHLORIDE AND ACETAMINOPHEN 1 TABLET: 10; 325 TABLET ORAL at 01:29

## 2019-01-01 RX ADMIN — ASPIRIN 81 MG: 81 TABLET, COATED ORAL at 08:56

## 2019-01-01 RX ADMIN — METOPROLOL TARTRATE 25 MG: 25 TABLET ORAL at 20:36

## 2019-01-01 RX ADMIN — VITAMIN D, TAB 1000IU (100/BT) 1000 UNITS: 25 TAB at 10:16

## 2019-01-01 RX ADMIN — SODIUM CHLORIDE 1000 ML: 9 INJECTION, SOLUTION INTRAVENOUS at 02:44

## 2019-01-01 RX ADMIN — INSULIN LISPRO 3 UNITS: 100 INJECTION, SOLUTION INTRAVENOUS; SUBCUTANEOUS at 11:39

## 2019-01-01 RX ADMIN — ISOSORBIDE MONONITRATE 30 MG: 30 TABLET, EXTENDED RELEASE ORAL at 21:28

## 2019-01-01 RX ADMIN — ATORVASTATIN CALCIUM 20 MG: 10 TABLET, FILM COATED ORAL at 20:07

## 2019-01-01 RX ADMIN — VITAMIN D, TAB 1000IU (100/BT) 1000 UNITS: 25 TAB at 21:28

## 2019-01-01 RX ADMIN — METOPROLOL TARTRATE 25 MG: 25 TABLET, FILM COATED ORAL at 10:26

## 2019-01-01 RX ADMIN — CLOPIDOGREL BISULFATE 75 MG: 75 TABLET ORAL at 10:16

## 2019-01-01 RX ADMIN — INSULIN GLARGINE 45 UNITS: 100 INJECTION, SOLUTION SUBCUTANEOUS at 20:35

## 2019-01-01 RX ADMIN — DEXTROSE MONOHYDRATE 100 ML/HR: 50 INJECTION, SOLUTION INTRAVENOUS at 07:46

## 2019-01-01 RX ADMIN — KETOROLAC TROMETHAMINE 30 MG: 30 INJECTION, SOLUTION INTRAMUSCULAR at 10:27

## 2019-01-01 RX ADMIN — PANTOPRAZOLE SODIUM 40 MG: 40 TABLET, DELAYED RELEASE ORAL at 08:26

## 2019-01-01 RX ADMIN — INSULIN GLARGINE 45 UNITS: 100 INJECTION, SOLUTION SUBCUTANEOUS at 20:31

## 2019-01-01 RX ADMIN — FUROSEMIDE 20 MG: 40 TABLET ORAL at 16:38

## 2019-01-01 RX ADMIN — RANOLAZINE 500 MG: 500 TABLET, FILM COATED, EXTENDED RELEASE ORAL at 08:40

## 2019-01-01 RX ADMIN — PIPERACILLIN AND TAZOBACTAM 3.38 G: 3; .375 INJECTION, POWDER, FOR SOLUTION INTRAVENOUS at 21:30

## 2019-01-01 RX ADMIN — INSULIN LISPRO 2 UNITS: 100 INJECTION, SOLUTION INTRAVENOUS; SUBCUTANEOUS at 17:22

## 2019-01-01 RX ADMIN — VITAMIN D, TAB 1000IU (100/BT) 1000 UNITS: 25 TAB at 08:27

## 2019-01-01 RX ADMIN — INSULIN LISPRO 12 UNITS: 100 INJECTION, SOLUTION INTRAVENOUS; SUBCUTANEOUS at 13:43

## 2019-01-01 RX ADMIN — CLOPIDOGREL BISULFATE 75 MG: 75 TABLET ORAL at 08:47

## 2019-01-01 RX ADMIN — OXYCODONE HYDROCHLORIDE AND ACETAMINOPHEN 1 TABLET: 10; 325 TABLET ORAL at 07:45

## 2019-01-01 RX ADMIN — METOPROLOL TARTRATE 25 MG: 25 TABLET ORAL at 08:26

## 2019-01-01 RX ADMIN — RANOLAZINE 500 MG: 500 TABLET, FILM COATED, EXTENDED RELEASE ORAL at 09:52

## 2019-01-01 RX ADMIN — OXYBUTYNIN CHLORIDE 10 MG: 5 TABLET ORAL at 20:36

## 2019-01-01 RX ADMIN — RANOLAZINE 500 MG: 500 TABLET, FILM COATED, EXTENDED RELEASE ORAL at 20:27

## 2019-01-01 RX ADMIN — LINAGLIPTIN 5 MG: 5 TABLET, FILM COATED ORAL at 08:32

## 2019-01-01 RX ADMIN — RANOLAZINE 500 MG: 500 TABLET, FILM COATED, EXTENDED RELEASE ORAL at 08:27

## 2019-01-01 RX ADMIN — MORPHINE SULFATE 2 MG: 2 INJECTION, SOLUTION INTRAMUSCULAR; INTRAVENOUS at 08:41

## 2019-01-01 RX ADMIN — FAMOTIDINE 40 MG: 10 INJECTION, SOLUTION INTRAVENOUS at 09:45

## 2019-01-01 RX ADMIN — FUROSEMIDE 20 MG: 40 TABLET ORAL at 08:40

## 2019-01-01 RX ADMIN — OXYCODONE HYDROCHLORIDE AND ACETAMINOPHEN 1 TABLET: 10; 325 TABLET ORAL at 03:05

## 2019-01-01 RX ADMIN — INSULIN GLARGINE 45 UNITS: 100 INJECTION, SOLUTION SUBCUTANEOUS at 21:42

## 2019-01-01 RX ADMIN — INSULIN GLARGINE 30 UNITS: 100 INJECTION, SOLUTION SUBCUTANEOUS at 20:58

## 2019-01-01 RX ADMIN — BUPROPION HYDROCHLORIDE 300 MG: 150 TABLET, FILM COATED, EXTENDED RELEASE ORAL at 08:27

## 2019-01-01 RX ADMIN — AMOXICILLIN AND CLAVULANATE POTASSIUM 1 TABLET: 875; 125 TABLET, FILM COATED ORAL at 10:15

## 2019-01-01 RX ADMIN — ASPIRIN 81 MG: 81 TABLET, COATED ORAL at 08:47

## 2019-01-01 RX ADMIN — ISOSORBIDE MONONITRATE 30 MG: 30 TABLET, EXTENDED RELEASE ORAL at 09:45

## 2019-01-01 RX ADMIN — MORPHINE SULFATE 2 MG: 2 INJECTION, SOLUTION INTRAMUSCULAR; INTRAVENOUS at 12:03

## 2019-01-01 RX ADMIN — FERROUS SULFATE TAB 325 MG (65 MG ELEMENTAL FE) 325 MG: 325 (65 FE) TAB at 09:03

## 2019-01-01 RX ADMIN — METOPROLOL TARTRATE 25 MG: 25 TABLET, FILM COATED ORAL at 21:32

## 2019-01-01 RX ADMIN — RANOLAZINE 500 MG: 500 TABLET, FILM COATED, EXTENDED RELEASE ORAL at 20:08

## 2019-01-01 RX ADMIN — PREGABALIN 150 MG: 75 CAPSULE ORAL at 09:53

## 2019-01-01 RX ADMIN — METOPROLOL TARTRATE 25 MG: 25 TABLET, FILM COATED ORAL at 20:16

## 2019-01-01 RX ADMIN — ASPIRIN 81 MG: 81 TABLET, COATED ORAL at 09:16

## 2019-01-01 RX ADMIN — FERROUS SULFATE TAB 325 MG (65 MG ELEMENTAL FE) 325 MG: 325 (65 FE) TAB at 10:27

## 2019-01-01 RX ADMIN — ALBUTEROL SULFATE 2.5 MG: 2.5 SOLUTION RESPIRATORY (INHALATION) at 22:53

## 2019-01-01 RX ADMIN — ATORVASTATIN CALCIUM 20 MG: 10 TABLET, FILM COATED ORAL at 21:41

## 2019-01-01 RX ADMIN — SODIUM CHLORIDE 1000 ML: 9 INJECTION, SOLUTION INTRAVENOUS at 03:21

## 2019-01-01 RX ADMIN — PREGABALIN 150 MG: 75 CAPSULE ORAL at 09:45

## 2019-01-01 RX ADMIN — CLOPIDOGREL BISULFATE 75 MG: 75 TABLET ORAL at 09:52

## 2019-01-01 RX ADMIN — AMOXICILLIN AND CLAVULANATE POTASSIUM 1 TABLET: 875; 125 TABLET, FILM COATED ORAL at 08:27

## 2019-01-01 RX ADMIN — INSULIN LISPRO 3 UNITS: 100 INJECTION, SOLUTION INTRAVENOUS; SUBCUTANEOUS at 16:38

## 2019-01-01 RX ADMIN — FUROSEMIDE 20 MG: 20 TABLET ORAL at 21:28

## 2019-01-01 RX ADMIN — RANOLAZINE 500 MG: 500 TABLET, FILM COATED, EXTENDED RELEASE ORAL at 08:31

## 2019-01-01 RX ADMIN — Medication 10 ML: at 08:03

## 2019-01-01 RX ADMIN — BUPROPION HYDROCHLORIDE 300 MG: 150 TABLET, FILM COATED, EXTENDED RELEASE ORAL at 08:56

## 2019-01-01 RX ADMIN — CLOPIDOGREL BISULFATE 75 MG: 75 TABLET ORAL at 08:31

## 2019-01-01 RX ADMIN — Medication 10 ML: at 08:28

## 2019-01-01 RX ADMIN — Medication 2 PUFF: at 11:08

## 2019-01-01 RX ADMIN — PIPERACILLIN AND TAZOBACTAM 3.38 G: 3; .375 INJECTION, POWDER, FOR SOLUTION INTRAVENOUS at 02:38

## 2019-01-01 RX ADMIN — INSULIN GLARGINE 45 UNITS: 100 INJECTION, SOLUTION SUBCUTANEOUS at 21:14

## 2019-01-01 RX ADMIN — INSULIN GLARGINE 45 UNITS: 100 INJECTION, SOLUTION SUBCUTANEOUS at 11:52

## 2019-01-01 RX ADMIN — OXYCODONE HYDROCHLORIDE AND ACETAMINOPHEN 1 TABLET: 10; 325 TABLET ORAL at 14:51

## 2019-01-01 RX ADMIN — VANCOMYCIN HYDROCHLORIDE 1000 MG: 10 INJECTION, POWDER, LYOPHILIZED, FOR SOLUTION INTRAVENOUS at 18:27

## 2019-01-01 RX ADMIN — ENOXAPARIN SODIUM 40 MG: 40 INJECTION SUBCUTANEOUS at 08:27

## 2019-01-01 RX ADMIN — ATORVASTATIN CALCIUM 20 MG: 10 TABLET, FILM COATED ORAL at 21:31

## 2019-01-01 RX ADMIN — ATORVASTATIN CALCIUM 20 MG: 10 TABLET, FILM COATED ORAL at 21:11

## 2019-01-01 RX ADMIN — BUPROPION HYDROCHLORIDE 150 MG: 150 TABLET, FILM COATED, EXTENDED RELEASE ORAL at 10:15

## 2019-01-01 RX ADMIN — LEVOTHYROXINE SODIUM 50 MCG: 50 TABLET ORAL at 10:16

## 2019-01-01 RX ADMIN — INSULIN GLARGINE 30 UNITS: 100 INJECTION, SOLUTION SUBCUTANEOUS at 10:20

## 2019-01-01 RX ADMIN — FUROSEMIDE 20 MG: 40 TABLET ORAL at 17:37

## 2019-01-01 RX ADMIN — OXYCODONE HYDROCHLORIDE AND ACETAMINOPHEN 1 TABLET: 10; 325 TABLET ORAL at 08:30

## 2019-01-01 RX ADMIN — FERROUS SULFATE TAB 325 MG (65 MG ELEMENTAL FE) 325 MG: 325 (65 FE) TAB at 07:45

## 2019-01-01 RX ADMIN — MORPHINE SULFATE 2 MG: 2 INJECTION, SOLUTION INTRAMUSCULAR; INTRAVENOUS at 23:27

## 2019-01-01 RX ADMIN — AMOXICILLIN AND CLAVULANATE POTASSIUM 1 TABLET: 875; 125 TABLET, FILM COATED ORAL at 08:56

## 2019-01-01 RX ADMIN — Medication 2 PUFF: at 20:25

## 2019-01-01 RX ADMIN — PIPERACILLIN AND TAZOBACTAM 3.38 G: 3; .375 INJECTION, POWDER, FOR SOLUTION INTRAVENOUS at 18:48

## 2019-01-01 RX ADMIN — BUPROPION HYDROCHLORIDE 150 MG: 150 TABLET, FILM COATED, EXTENDED RELEASE ORAL at 08:27

## 2019-01-01 RX ADMIN — METOPROLOL TARTRATE 25 MG: 25 TABLET, FILM COATED ORAL at 09:52

## 2019-01-01 RX ADMIN — INSULIN GLARGINE 45 UNITS: 100 INJECTION, SOLUTION SUBCUTANEOUS at 09:55

## 2019-01-01 RX ADMIN — INSULIN GLARGINE 30 UNITS: 100 INJECTION, SOLUTION SUBCUTANEOUS at 20:44

## 2019-01-01 RX ADMIN — ISOSORBIDE MONONITRATE 30 MG: 30 TABLET, EXTENDED RELEASE ORAL at 09:18

## 2019-01-01 RX ADMIN — Medication 10 ML: at 21:29

## 2019-01-01 RX ADMIN — RANOLAZINE 500 MG: 500 TABLET, FILM COATED, EXTENDED RELEASE ORAL at 21:42

## 2019-01-01 RX ADMIN — INSULIN LISPRO 3 UNITS: 100 INJECTION, SOLUTION INTRAVENOUS; SUBCUTANEOUS at 08:27

## 2019-01-01 RX ADMIN — LEVOTHYROXINE SODIUM 50 MCG: 50 TABLET ORAL at 05:40

## 2019-01-01 RX ADMIN — PREGABALIN 150 MG: 75 CAPSULE ORAL at 09:15

## 2019-01-01 RX ADMIN — COLLAGENASE SANTYL: 250 OINTMENT TOPICAL at 09:50

## 2019-01-01 RX ADMIN — MONTELUKAST SODIUM 10 MG: 10 TABLET ORAL at 09:05

## 2019-01-01 RX ADMIN — MORPHINE SULFATE 6 MG: 4 INJECTION, SOLUTION INTRAMUSCULAR; INTRAVENOUS at 02:25

## 2019-01-01 RX ADMIN — VANCOMYCIN HYDROCHLORIDE 1000 MG: 10 INJECTION, POWDER, LYOPHILIZED, FOR SOLUTION INTRAVENOUS at 04:27

## 2019-01-01 RX ADMIN — ISOSORBIDE MONONITRATE 30 MG: 30 TABLET, EXTENDED RELEASE ORAL at 09:04

## 2019-01-01 RX ADMIN — FERROUS SULFATE TAB 325 MG (65 MG ELEMENTAL FE) 325 MG: 325 (65 FE) TAB at 09:52

## 2019-01-01 RX ADMIN — DEXTROSE MONOHYDRATE 12.5 G: 25 INJECTION, SOLUTION INTRAVENOUS at 07:42

## 2019-01-01 RX ADMIN — MONTELUKAST SODIUM 10 MG: 10 TABLET ORAL at 08:39

## 2019-01-01 RX ADMIN — FUROSEMIDE 20 MG: 40 TABLET ORAL at 17:20

## 2019-01-01 RX ADMIN — OXYBUTYNIN CHLORIDE 5 MG: 5 TABLET ORAL at 08:26

## 2019-01-01 RX ADMIN — PREGABALIN 150 MG: 75 CAPSULE ORAL at 08:39

## 2019-01-01 RX ADMIN — PIPERACILLIN AND TAZOBACTAM 3.38 G: 3; .375 INJECTION, POWDER, FOR SOLUTION INTRAVENOUS at 18:11

## 2019-01-01 RX ADMIN — VANCOMYCIN HYDROCHLORIDE 1000 MG: 10 INJECTION, POWDER, LYOPHILIZED, FOR SOLUTION INTRAVENOUS at 15:49

## 2019-01-01 RX ADMIN — INSULIN LISPRO 3 UNITS: 100 INJECTION, SOLUTION INTRAVENOUS; SUBCUTANEOUS at 20:57

## 2019-01-01 RX ADMIN — OXYCODONE HYDROCHLORIDE AND ACETAMINOPHEN 1 TABLET: 10; 325 TABLET ORAL at 17:39

## 2019-01-01 RX ADMIN — OXYCODONE HYDROCHLORIDE AND ACETAMINOPHEN 1 TABLET: 10; 325 TABLET ORAL at 17:08

## 2019-01-01 RX ADMIN — ASPIRIN 81 MG: 81 TABLET, COATED ORAL at 09:48

## 2019-01-01 RX ADMIN — INSULIN GLARGINE 30 UNITS: 100 INJECTION, SOLUTION SUBCUTANEOUS at 09:03

## 2019-01-01 RX ADMIN — PIPERACILLIN AND TAZOBACTAM 3.38 G: 3; .375 INJECTION, POWDER, FOR SOLUTION INTRAVENOUS at 20:27

## 2019-01-01 RX ADMIN — FERROUS SULFATE TAB 325 MG (65 MG ELEMENTAL FE) 325 MG: 325 (65 FE) TAB at 09:18

## 2019-01-01 RX ADMIN — RANOLAZINE 500 MG: 500 TABLET, FILM COATED, EXTENDED RELEASE ORAL at 09:48

## 2019-01-01 RX ADMIN — CLOPIDOGREL BISULFATE 75 MG: 75 TABLET ORAL at 21:28

## 2019-01-01 RX ADMIN — FERROUS SULFATE TAB 325 MG (65 MG ELEMENTAL FE) 325 MG: 325 (65 FE) TAB at 08:39

## 2019-01-01 RX ADMIN — INSULIN LISPRO 18 UNITS: 100 INJECTION, SOLUTION INTRAVENOUS; SUBCUTANEOUS at 08:48

## 2019-01-01 RX ADMIN — INSULIN LISPRO 3 UNITS: 100 INJECTION, SOLUTION INTRAVENOUS; SUBCUTANEOUS at 12:07

## 2019-01-01 RX ADMIN — MONTELUKAST SODIUM 10 MG: 10 TABLET ORAL at 08:56

## 2019-01-01 RX ADMIN — COLLAGENASE SANTYL: 250 OINTMENT TOPICAL at 07:41

## 2019-01-01 RX ADMIN — ATORVASTATIN CALCIUM 20 MG: 10 TABLET, FILM COATED ORAL at 20:27

## 2019-01-01 RX ADMIN — FERROUS SULFATE TAB 325 MG (65 MG ELEMENTAL FE) 325 MG: 325 (65 FE) TAB at 08:26

## 2019-01-01 RX ADMIN — INSULIN GLARGINE 30 UNITS: 100 INJECTION, SOLUTION SUBCUTANEOUS at 21:09

## 2019-01-01 RX ADMIN — AMOXICILLIN AND CLAVULANATE POTASSIUM 1 TABLET: 875; 125 TABLET, FILM COATED ORAL at 20:36

## 2019-01-01 RX ADMIN — MORPHINE SULFATE 4 MG: 4 INJECTION, SOLUTION INTRAMUSCULAR; INTRAVENOUS at 12:19

## 2019-01-01 RX ADMIN — BUPROPION HYDROCHLORIDE 300 MG: 150 TABLET, FILM COATED, EXTENDED RELEASE ORAL at 09:48

## 2019-01-01 RX ADMIN — Medication 2 PUFF: at 20:42

## 2019-01-01 RX ADMIN — ISOSORBIDE MONONITRATE 30 MG: 30 TABLET, EXTENDED RELEASE ORAL at 08:47

## 2019-01-01 RX ADMIN — FENTANYL CITRATE 50 MCG: 50 INJECTION INTRAMUSCULAR; INTRAVENOUS at 18:38

## 2019-01-01 RX ADMIN — EPHEDRINE SULFATE 5 MG: 50 INJECTION INTRAMUSCULAR; INTRAVENOUS; SUBCUTANEOUS at 19:14

## 2019-01-01 RX ADMIN — MORPHINE SULFATE 2 MG: 2 INJECTION, SOLUTION INTRAMUSCULAR; INTRAVENOUS at 12:54

## 2019-01-01 RX ADMIN — INSULIN LISPRO 3 UNITS: 100 INJECTION, SOLUTION INTRAVENOUS; SUBCUTANEOUS at 09:06

## 2019-01-01 RX ADMIN — PREGABALIN 150 MG: 75 CAPSULE ORAL at 20:27

## 2019-01-01 RX ADMIN — SODIUM CHLORIDE 1000 ML: 9 INJECTION, SOLUTION INTRAVENOUS at 01:56

## 2019-01-01 RX ADMIN — PIPERACILLIN AND TAZOBACTAM 3.38 G: 3; .375 INJECTION, POWDER, FOR SOLUTION INTRAVENOUS at 21:56

## 2019-01-01 RX ADMIN — RANOLAZINE 500 MG: 500 TABLET, FILM COATED, EXTENDED RELEASE ORAL at 20:30

## 2019-01-01 RX ADMIN — METFORMIN HYDROCHLORIDE 1000 MG: 500 TABLET ORAL at 17:20

## 2019-01-01 RX ADMIN — VITAMIN D, TAB 1000IU (100/BT) 1000 UNITS: 25 TAB at 08:31

## 2019-01-01 RX ADMIN — RANOLAZINE 500 MG: 500 TABLET, FILM COATED, EXTENDED RELEASE ORAL at 21:11

## 2019-01-01 RX ADMIN — PIPERACILLIN SODIUM,TAZOBACTAM SODIUM 3.38 G: 3; .375 INJECTION, POWDER, FOR SOLUTION INTRAVENOUS at 03:21

## 2019-01-01 RX ADMIN — COLLAGENASE SANTYL: 250 OINTMENT TOPICAL at 16:34

## 2019-01-01 RX ADMIN — METOPROLOL TARTRATE 25 MG: 25 TABLET, FILM COATED ORAL at 08:27

## 2019-01-01 RX ADMIN — MORPHINE SULFATE 4 MG: 4 INJECTION, SOLUTION INTRAMUSCULAR; INTRAVENOUS at 11:57

## 2019-01-01 RX ADMIN — Medication 2 PUFF: at 08:34

## 2019-01-01 RX ADMIN — PREGABALIN 150 MG: 75 CAPSULE ORAL at 21:31

## 2019-01-01 RX ADMIN — INSULIN LISPRO 2 UNITS: 100 INJECTION, SOLUTION INTRAVENOUS; SUBCUTANEOUS at 08:29

## 2019-01-01 RX ADMIN — RANOLAZINE 500 MG: 500 TABLET, FILM COATED, EXTENDED RELEASE ORAL at 08:56

## 2019-01-01 RX ADMIN — MONTELUKAST SODIUM 10 MG: 10 TABLET ORAL at 10:26

## 2019-01-01 RX ADMIN — VANCOMYCIN HYDROCHLORIDE 1000 MG: 10 INJECTION, POWDER, LYOPHILIZED, FOR SOLUTION INTRAVENOUS at 03:36

## 2019-01-01 RX ADMIN — BUPROPION HYDROCHLORIDE 300 MG: 150 TABLET, FILM COATED, EXTENDED RELEASE ORAL at 09:04

## 2019-01-01 RX ADMIN — METOPROLOL TARTRATE 25 MG: 25 TABLET, FILM COATED ORAL at 21:04

## 2019-01-01 RX ADMIN — VANCOMYCIN HYDROCHLORIDE 1000 MG: 1 INJECTION, POWDER, LYOPHILIZED, FOR SOLUTION INTRAVENOUS at 17:54

## 2019-01-01 RX ADMIN — LISINOPRIL 5 MG: 5 TABLET ORAL at 09:52

## 2019-01-01 RX ADMIN — CLONAZEPAM 0.5 MG: 0.5 TABLET ORAL at 12:20

## 2019-01-01 RX ADMIN — ISOSORBIDE MONONITRATE 30 MG: 30 TABLET, EXTENDED RELEASE ORAL at 08:26

## 2019-01-01 RX ADMIN — LISINOPRIL 5 MG: 5 TABLET ORAL at 21:28

## 2019-01-01 RX ADMIN — LISINOPRIL 5 MG: 5 TABLET ORAL at 09:18

## 2019-01-01 RX ADMIN — ENOXAPARIN SODIUM 40 MG: 40 INJECTION SUBCUTANEOUS at 10:24

## 2019-01-01 RX ADMIN — METOPROLOL TARTRATE 25 MG: 25 TABLET ORAL at 21:28

## 2019-01-01 RX ADMIN — MELATONIN TAB 5 MG 5 MG: 5 TAB at 02:43

## 2019-01-01 RX ADMIN — FUROSEMIDE 20 MG: 40 TABLET ORAL at 07:45

## 2019-01-01 RX ADMIN — ISOSORBIDE MONONITRATE 30 MG: 30 TABLET, EXTENDED RELEASE ORAL at 09:52

## 2019-01-01 RX ADMIN — Medication 10 ML: at 09:15

## 2019-01-01 RX ADMIN — FUROSEMIDE 20 MG: 40 TABLET ORAL at 16:51

## 2019-01-01 RX ADMIN — COLLAGENASE SANTYL: 250 OINTMENT TOPICAL at 10:46

## 2019-01-01 RX ADMIN — PIPERACILLIN AND TAZOBACTAM 3.38 G: 3; .375 INJECTION, POWDER, FOR SOLUTION INTRAVENOUS at 11:39

## 2019-01-01 RX ADMIN — OXYBUTYNIN CHLORIDE 5 MG: 5 TABLET ORAL at 10:15

## 2019-01-01 RX ADMIN — DIPHENHYDRAMINE HYDROCHLORIDE 50 MG: 50 INJECTION, SOLUTION INTRAMUSCULAR; INTRAVENOUS at 09:45

## 2019-01-01 RX ADMIN — PIPERACILLIN AND TAZOBACTAM 3.38 G: 3; .375 INJECTION, POWDER, FOR SOLUTION INTRAVENOUS at 12:21

## 2019-01-01 RX ADMIN — FUROSEMIDE 20 MG: 20 TABLET ORAL at 10:16

## 2019-01-01 RX ADMIN — ASPIRIN 81 MG: 81 TABLET, COATED ORAL at 08:39

## 2019-01-01 RX ADMIN — FUROSEMIDE 20 MG: 40 TABLET ORAL at 09:53

## 2019-01-01 RX ADMIN — OXYCODONE HYDROCHLORIDE AND ACETAMINOPHEN 1 TABLET: 10; 325 TABLET ORAL at 09:51

## 2019-01-01 RX ADMIN — LISINOPRIL 5 MG: 5 TABLET ORAL at 10:16

## 2019-01-01 RX ADMIN — OXYCODONE HYDROCHLORIDE AND ACETAMINOPHEN 1 TABLET: 10; 325 TABLET ORAL at 13:36

## 2019-01-01 RX ADMIN — ATORVASTATIN CALCIUM 20 MG: 10 TABLET, FILM COATED ORAL at 20:29

## 2019-01-01 RX ADMIN — RANOLAZINE 500 MG: 500 TABLET, FILM COATED, EXTENDED RELEASE ORAL at 10:25

## 2019-01-01 RX ADMIN — CLOPIDOGREL BISULFATE 75 MG: 75 TABLET ORAL at 08:40

## 2019-01-01 RX ADMIN — ASPIRIN 81 MG: 81 TABLET, COATED ORAL at 08:27

## 2019-01-01 RX ADMIN — SODIUM CHLORIDE 1000 ML: 9 INJECTION, SOLUTION INTRAVENOUS at 05:06

## 2019-01-01 RX ADMIN — GLYCOPYRROLATE 0.2 MG: 0.2 INJECTION, SOLUTION INTRAMUSCULAR; INTRAVENOUS at 18:49

## 2019-01-01 RX ADMIN — MORPHINE SULFATE 4 MG: 4 INJECTION, SOLUTION INTRAMUSCULAR; INTRAVENOUS at 08:40

## 2019-01-01 RX ADMIN — CLOPIDOGREL BISULFATE 75 MG: 75 TABLET ORAL at 08:27

## 2019-01-01 RX ADMIN — PIPERACILLIN AND TAZOBACTAM 3.38 G: 3; .375 INJECTION, POWDER, FOR SOLUTION INTRAVENOUS at 02:34

## 2019-01-01 RX ADMIN — INSULIN LISPRO 5 UNITS: 100 INJECTION, SOLUTION INTRAVENOUS; SUBCUTANEOUS at 21:10

## 2019-01-01 RX ADMIN — ISOSORBIDE MONONITRATE 30 MG: 30 TABLET, EXTENDED RELEASE ORAL at 10:26

## 2019-01-01 RX ADMIN — PIPERACILLIN AND TAZOBACTAM 3.38 G: 3; .375 INJECTION, POWDER, FOR SOLUTION INTRAVENOUS at 04:44

## 2019-01-01 RX ADMIN — OXYCODONE HYDROCHLORIDE AND ACETAMINOPHEN 1 TABLET: 10; 325 TABLET ORAL at 20:36

## 2019-01-01 RX ADMIN — FUROSEMIDE 20 MG: 40 TABLET ORAL at 18:11

## 2019-01-01 RX ADMIN — Medication 2 PUFF: at 09:12

## 2019-01-01 RX ADMIN — METOPROLOL TARTRATE 25 MG: 25 TABLET, FILM COATED ORAL at 09:48

## 2019-01-01 RX ADMIN — OXYCODONE HYDROCHLORIDE AND ACETAMINOPHEN 1 TABLET: 10; 325 TABLET ORAL at 21:00

## 2019-01-01 RX ADMIN — PIPERACILLIN AND TAZOBACTAM 3.38 G: 3; .375 INJECTION, POWDER, FOR SOLUTION INTRAVENOUS at 11:57

## 2019-01-01 RX ADMIN — ATORVASTATIN CALCIUM 20 MG: 10 TABLET, FILM COATED ORAL at 21:04

## 2019-01-01 RX ADMIN — PREGABALIN 150 MG: 75 CAPSULE ORAL at 08:26

## 2019-01-01 RX ADMIN — SODIUM CHLORIDE 1000 ML: 9 INJECTION, SOLUTION INTRAVENOUS at 10:29

## 2019-01-01 RX ADMIN — KETOROLAC TROMETHAMINE 30 MG: 30 INJECTION, SOLUTION INTRAMUSCULAR at 20:51

## 2019-01-01 RX ADMIN — METOPROLOL TARTRATE 25 MG: 25 TABLET ORAL at 08:30

## 2019-01-01 RX ADMIN — RANOLAZINE 500 MG: 500 TABLET, FILM COATED, EXTENDED RELEASE ORAL at 21:31

## 2019-01-01 RX ADMIN — SODIUM CHLORIDE: 9 INJECTION, SOLUTION INTRAVENOUS at 19:43

## 2019-01-01 RX ADMIN — MONTELUKAST SODIUM 10 MG: 10 TABLET ORAL at 09:52

## 2019-01-01 RX ADMIN — PIPERACILLIN AND TAZOBACTAM 3.38 G: 3; .375 INJECTION, POWDER, FOR SOLUTION INTRAVENOUS at 18:07

## 2019-01-01 RX ADMIN — Medication 10 ML: at 08:17

## 2019-01-01 RX ADMIN — INSULIN GLARGINE 45 UNITS: 100 INJECTION, SOLUTION SUBCUTANEOUS at 10:45

## 2019-01-01 RX ADMIN — ATORVASTATIN CALCIUM 40 MG: 40 TABLET, FILM COATED ORAL at 21:28

## 2019-01-01 RX ADMIN — PANTOPRAZOLE SODIUM 40 MG: 40 TABLET, DELAYED RELEASE ORAL at 08:30

## 2019-01-01 RX ADMIN — FUROSEMIDE 20 MG: 40 TABLET ORAL at 09:48

## 2019-01-01 RX ADMIN — METHYLPREDNISOLONE SODIUM SUCCINATE 125 MG: 125 INJECTION, POWDER, FOR SOLUTION INTRAMUSCULAR; INTRAVENOUS at 09:45

## 2019-01-01 RX ADMIN — INSULIN LISPRO 6 UNITS: 100 INJECTION, SOLUTION INTRAVENOUS; SUBCUTANEOUS at 22:28

## 2019-01-01 RX ADMIN — METFORMIN HYDROCHLORIDE 1000 MG: 500 TABLET ORAL at 10:16

## 2019-01-01 RX ADMIN — ONDANSETRON 4 MG: 2 INJECTION INTRAMUSCULAR; INTRAVENOUS at 08:02

## 2019-01-01 RX ADMIN — ENOXAPARIN SODIUM 40 MG: 40 INJECTION SUBCUTANEOUS at 09:05

## 2019-01-01 RX ADMIN — OXYCODONE HYDROCHLORIDE AND ACETAMINOPHEN 1 TABLET: 10; 325 TABLET ORAL at 03:21

## 2019-01-01 RX ADMIN — ISOSORBIDE MONONITRATE 30 MG: 30 TABLET, EXTENDED RELEASE ORAL at 08:56

## 2019-01-01 RX ADMIN — PREGABALIN 150 MG: 75 CAPSULE ORAL at 21:04

## 2019-01-01 RX ADMIN — ATORVASTATIN CALCIUM 20 MG: 10 TABLET, FILM COATED ORAL at 21:14

## 2019-01-01 RX ADMIN — AMOXICILLIN AND CLAVULANATE POTASSIUM 1 TABLET: 875; 125 TABLET, FILM COATED ORAL at 21:28

## 2019-01-01 RX ADMIN — INSULIN GLARGINE 30 UNITS: 100 INJECTION, SOLUTION SUBCUTANEOUS at 08:38

## 2019-01-01 RX ADMIN — LEVOTHYROXINE SODIUM 50 MCG: 50 TABLET ORAL at 08:27

## 2019-01-01 RX ADMIN — DEXAMETHASONE SODIUM PHOSPHATE 4 MG: 10 INJECTION INTRAMUSCULAR; INTRAVENOUS at 19:06

## 2019-01-01 RX ADMIN — METHYLPREDNISOLONE SODIUM SUCCINATE 125 MG: 125 INJECTION, POWDER, FOR SOLUTION INTRAMUSCULAR; INTRAVENOUS at 20:51

## 2019-01-01 RX ADMIN — SODIUM CHLORIDE 1000 ML: 9 INJECTION, SOLUTION INTRAVENOUS at 21:20

## 2019-01-01 RX ADMIN — Medication 2 PUFF: at 20:11

## 2019-01-01 RX ADMIN — INSULIN LISPRO 6 UNITS: 100 INJECTION, SOLUTION INTRAVENOUS; SUBCUTANEOUS at 18:27

## 2019-01-01 RX ADMIN — INSULIN GLARGINE 45 UNITS: 100 INJECTION, SOLUTION SUBCUTANEOUS at 09:07

## 2019-01-01 RX ADMIN — FUROSEMIDE 20 MG: 40 TABLET ORAL at 18:27

## 2019-01-01 RX ADMIN — MORPHINE SULFATE 4 MG: 4 INJECTION, SOLUTION INTRAMUSCULAR; INTRAVENOUS at 15:48

## 2019-01-01 RX ADMIN — Medication 2 PUFF: at 08:55

## 2019-01-01 RX ADMIN — RANOLAZINE 500 MG: 500 TABLET, FILM COATED, EXTENDED RELEASE ORAL at 09:45

## 2019-01-01 RX ADMIN — INSULIN LISPRO 6 UNITS: 100 INJECTION, SOLUTION INTRAVENOUS; SUBCUTANEOUS at 12:24

## 2019-01-01 RX ADMIN — ONDANSETRON 4 MG: 2 INJECTION INTRAMUSCULAR; INTRAVENOUS at 19:06

## 2019-01-01 RX ADMIN — ISOSORBIDE MONONITRATE 30 MG: 30 TABLET, EXTENDED RELEASE ORAL at 08:40

## 2019-01-01 RX ADMIN — FUROSEMIDE 20 MG: 40 TABLET ORAL at 09:04

## 2019-01-01 RX ADMIN — ASPIRIN 81 MG: 81 TABLET, COATED ORAL at 09:45

## 2019-01-01 RX ADMIN — MONTELUKAST SODIUM 10 MG: 10 TABLET ORAL at 20:36

## 2019-01-01 RX ADMIN — Medication 10 ML: at 22:14

## 2019-01-01 RX ADMIN — RANOLAZINE 500 MG: 500 TABLET, FILM COATED, EXTENDED RELEASE ORAL at 21:04

## 2019-01-01 RX ADMIN — MORPHINE SULFATE 2 MG: 2 INJECTION, SOLUTION INTRAMUSCULAR; INTRAVENOUS at 06:20

## 2019-01-01 RX ADMIN — ONDANSETRON 4 MG: 2 INJECTION INTRAMUSCULAR; INTRAVENOUS at 18:54

## 2019-01-01 RX ADMIN — INSULIN LISPRO 2 UNITS: 100 INJECTION, SOLUTION INTRAVENOUS; SUBCUTANEOUS at 21:44

## 2019-01-01 RX ADMIN — SODIUM CHLORIDE 1000 ML: 9 INJECTION, SOLUTION INTRAVENOUS at 16:56

## 2019-01-01 RX ADMIN — Medication 2 PUFF: at 21:04

## 2019-01-01 RX ADMIN — VANCOMYCIN HYDROCHLORIDE 1000 MG: 1 INJECTION, POWDER, LYOPHILIZED, FOR SOLUTION INTRAVENOUS at 03:59

## 2019-01-01 RX ADMIN — PIPERACILLIN AND TAZOBACTAM 3.38 G: 3; .375 INJECTION, POWDER, FOR SOLUTION INTRAVENOUS at 10:46

## 2019-01-01 RX ADMIN — Medication 10 ML: at 08:33

## 2019-01-01 RX ADMIN — PANTOPRAZOLE SODIUM 40 MG: 40 TABLET, DELAYED RELEASE ORAL at 10:15

## 2019-01-01 RX ADMIN — FERROUS SULFATE TAB 325 MG (65 MG ELEMENTAL FE) 325 MG: 325 (65 FE) TAB at 10:15

## 2019-01-01 RX ADMIN — LISINOPRIL 5 MG: 5 TABLET ORAL at 08:27

## 2019-01-01 RX ADMIN — CLOPIDOGREL BISULFATE 75 MG: 75 TABLET ORAL at 09:03

## 2019-01-01 RX ADMIN — EPHEDRINE SULFATE 10 MG: 50 INJECTION INTRAMUSCULAR; INTRAVENOUS; SUBCUTANEOUS at 19:01

## 2019-01-01 RX ADMIN — MONTELUKAST SODIUM 10 MG: 10 TABLET ORAL at 08:26

## 2019-01-01 RX ADMIN — INSULIN GLARGINE 45 UNITS: 100 INJECTION, SOLUTION SUBCUTANEOUS at 22:28

## 2019-01-01 RX ADMIN — MONTELUKAST SODIUM 10 MG: 10 TABLET ORAL at 08:47

## 2019-01-01 RX ADMIN — ISOSORBIDE MONONITRATE 30 MG: 30 TABLET, EXTENDED RELEASE ORAL at 09:48

## 2019-01-01 RX ADMIN — LISINOPRIL 5 MG: 5 TABLET ORAL at 09:03

## 2019-01-01 RX ADMIN — BUPROPION HYDROCHLORIDE 300 MG: 150 TABLET, FILM COATED, EXTENDED RELEASE ORAL at 08:47

## 2019-01-01 ASSESSMENT — PAIN DESCRIPTION - FREQUENCY
FREQUENCY: CONTINUOUS
FREQUENCY: INTERMITTENT
FREQUENCY: INTERMITTENT
FREQUENCY: CONTINUOUS
FREQUENCY: INTERMITTENT
FREQUENCY: CONTINUOUS
FREQUENCY: INTERMITTENT
FREQUENCY: CONTINUOUS
FREQUENCY: CONTINUOUS
FREQUENCY: INTERMITTENT
FREQUENCY: CONTINUOUS
FREQUENCY: CONTINUOUS

## 2019-01-01 ASSESSMENT — PAIN DESCRIPTION - ORIENTATION
ORIENTATION: LEFT

## 2019-01-01 ASSESSMENT — PAIN DESCRIPTION - DESCRIPTORS
DESCRIPTORS: ACHING;STABBING
DESCRIPTORS: ACHING
DESCRIPTORS: CONSTANT;ACHING
DESCRIPTORS: ACHING
DESCRIPTORS: ACHING;STABBING;THROBBING
DESCRIPTORS: SHOOTING;BURNING;CONSTANT
DESCRIPTORS: ACHING;DISCOMFORT
DESCRIPTORS: ACHING
DESCRIPTORS: ACHING;BURNING;THROBBING;STABBING
DESCRIPTORS: BURNING;ACHING
DESCRIPTORS: ACHING;BURNING
DESCRIPTORS: BURNING;SHOOTING;CONSTANT
DESCRIPTORS: ACHING;STABBING;THROBBING
DESCRIPTORS: ACHING
DESCRIPTORS: BURNING
DESCRIPTORS: ACHING;BURNING
DESCRIPTORS: ACHING
DESCRIPTORS: CONSTANT;ACHING;THROBBING
DESCRIPTORS: BURNING;CONSTANT;THROBBING
DESCRIPTORS: ACHING
DESCRIPTORS: BURNING;THROBBING;STABBING

## 2019-01-01 ASSESSMENT — PAIN SCALES - GENERAL
PAINLEVEL_OUTOF10: 8
PAINLEVEL_OUTOF10: 8
PAINLEVEL_OUTOF10: 10
PAINLEVEL_OUTOF10: 10
PAINLEVEL_OUTOF10: 9
PAINLEVEL_OUTOF10: 0
PAINLEVEL_OUTOF10: 10
PAINLEVEL_OUTOF10: 5
PAINLEVEL_OUTOF10: 10
PAINLEVEL_OUTOF10: 0
PAINLEVEL_OUTOF10: 10
PAINLEVEL_OUTOF10: 0
PAINLEVEL_OUTOF10: 6
PAINLEVEL_OUTOF10: 7
PAINLEVEL_OUTOF10: 6
PAINLEVEL_OUTOF10: 7
PAINLEVEL_OUTOF10: 7
PAINLEVEL_OUTOF10: 9
PAINLEVEL_OUTOF10: 10
PAINLEVEL_OUTOF10: 10
PAINLEVEL_OUTOF10: 8
PAINLEVEL_OUTOF10: 0
PAINLEVEL_OUTOF10: 0
PAINLEVEL_OUTOF10: 9
PAINLEVEL_OUTOF10: 0
PAINLEVEL_OUTOF10: 8
PAINLEVEL_OUTOF10: 10
PAINLEVEL_OUTOF10: 8
PAINLEVEL_OUTOF10: 8
PAINLEVEL_OUTOF10: 10
PAINLEVEL_OUTOF10: 7
PAINLEVEL_OUTOF10: 9
PAINLEVEL_OUTOF10: 9
PAINLEVEL_OUTOF10: 10
PAINLEVEL_OUTOF10: 6
PAINLEVEL_OUTOF10: 9
PAINLEVEL_OUTOF10: 3
PAINLEVEL_OUTOF10: 10
PAINLEVEL_OUTOF10: 0
PAINLEVEL_OUTOF10: 10
PAINLEVEL_OUTOF10: 9
PAINLEVEL_OUTOF10: 10
PAINLEVEL_OUTOF10: 8
PAINLEVEL_OUTOF10: 10
PAINLEVEL_OUTOF10: 10
PAINLEVEL_OUTOF10: 4
PAINLEVEL_OUTOF10: 9
PAINLEVEL_OUTOF10: 10
PAINLEVEL_OUTOF10: 6
PAINLEVEL_OUTOF10: 0
PAINLEVEL_OUTOF10: 8
PAINLEVEL_OUTOF10: 10
PAINLEVEL_OUTOF10: 0
PAINLEVEL_OUTOF10: 7
PAINLEVEL_OUTOF10: 8
PAINLEVEL_OUTOF10: 8
PAINLEVEL_OUTOF10: 0
PAINLEVEL_OUTOF10: 9
PAINLEVEL_OUTOF10: 10
PAINLEVEL_OUTOF10: 10
PAINLEVEL_OUTOF10: 9
PAINLEVEL_OUTOF10: 2
PAINLEVEL_OUTOF10: 10
PAINLEVEL_OUTOF10: 10
PAINLEVEL_OUTOF10: 8
PAINLEVEL_OUTOF10: 5
PAINLEVEL_OUTOF10: 10
PAINLEVEL_OUTOF10: 9
PAINLEVEL_OUTOF10: 10
PAINLEVEL_OUTOF10: 9
PAINLEVEL_OUTOF10: 9
PAINLEVEL_OUTOF10: 10
PAINLEVEL_OUTOF10: 5
PAINLEVEL_OUTOF10: 10
PAINLEVEL_OUTOF10: 9
PAINLEVEL_OUTOF10: 10
PAINLEVEL_OUTOF10: 9
PAINLEVEL_OUTOF10: 7
PAINLEVEL_OUTOF10: 10
PAINLEVEL_OUTOF10: 10
PAINLEVEL_OUTOF10: 6
PAINLEVEL_OUTOF10: 10
PAINLEVEL_OUTOF10: 10
PAINLEVEL_OUTOF10: 8
PAINLEVEL_OUTOF10: 10
PAINLEVEL_OUTOF10: 9
PAINLEVEL_OUTOF10: 9
PAINLEVEL_OUTOF10: 8
PAINLEVEL_OUTOF10: 9
PAINLEVEL_OUTOF10: 8
PAINLEVEL_OUTOF10: 10
PAINLEVEL_OUTOF10: 6
PAINLEVEL_OUTOF10: 0
PAINLEVEL_OUTOF10: 7
PAINLEVEL_OUTOF10: 10
PAINLEVEL_OUTOF10: 8
PAINLEVEL_OUTOF10: 1
PAINLEVEL_OUTOF10: 10
PAINLEVEL_OUTOF10: 10
PAINLEVEL_OUTOF10: 0
PAINLEVEL_OUTOF10: 6
PAINLEVEL_OUTOF10: 7
PAINLEVEL_OUTOF10: 10
PAINLEVEL_OUTOF10: 0

## 2019-01-01 ASSESSMENT — PULMONARY FUNCTION TESTS
PIF_VALUE: 1
PIF_VALUE: 2
PIF_VALUE: 2
PIF_VALUE: 3
PIF_VALUE: 0
PIF_VALUE: 4
PIF_VALUE: 3
PIF_VALUE: 12
PIF_VALUE: 0
PIF_VALUE: 2
PIF_VALUE: 3
PIF_VALUE: 2
PIF_VALUE: 0
PIF_VALUE: 6
PIF_VALUE: 2
PIF_VALUE: 3
PIF_VALUE: 2
PIF_VALUE: 3
PIF_VALUE: 2
PIF_VALUE: 2
PIF_VALUE: 0
PIF_VALUE: 0
PIF_VALUE: 2
PIF_VALUE: 25
PIF_VALUE: 3
PIF_VALUE: 3
PIF_VALUE: 4
PIF_VALUE: 3
PIF_VALUE: 1
PIF_VALUE: 3
PIF_VALUE: 4
PIF_VALUE: 2
PIF_VALUE: 3
PIF_VALUE: 2
PIF_VALUE: 23
PIF_VALUE: 8
PIF_VALUE: 14
PIF_VALUE: 19
PIF_VALUE: 4
PIF_VALUE: 2
PIF_VALUE: 2
PIF_VALUE: 4
PIF_VALUE: 2
PIF_VALUE: 3
PIF_VALUE: 3
PIF_VALUE: 2
PIF_VALUE: 12
PIF_VALUE: 24
PIF_VALUE: 24
PIF_VALUE: 3
PIF_VALUE: 29
PIF_VALUE: 3
PIF_VALUE: 3
PIF_VALUE: 4
PIF_VALUE: 4
PIF_VALUE: 3
PIF_VALUE: 3
PIF_VALUE: 0
PIF_VALUE: 3
PIF_VALUE: 6
PIF_VALUE: 3
PIF_VALUE: 22
PIF_VALUE: 3
PIF_VALUE: 3
PIF_VALUE: 21

## 2019-01-01 ASSESSMENT — PAIN DESCRIPTION - LOCATION
LOCATION: FOOT
LOCATION: BACK;FOOT
LOCATION: FOOT
LOCATION: ANKLE
LOCATION: FOOT
LOCATION: TOE (COMMENT WHICH ONE)
LOCATION: ANKLE;FOOT
LOCATION: FOOT
LOCATION: TOE (COMMENT WHICH ONE)
LOCATION: FOOT
LOCATION: FOOT
LOCATION: FOOT;GENERALIZED
LOCATION: FOOT
LOCATION: TOE (COMMENT WHICH ONE)
LOCATION: FOOT

## 2019-01-01 ASSESSMENT — PAIN SCALES - WONG BAKER
WONGBAKER_NUMERICALRESPONSE: 4
WONGBAKER_NUMERICALRESPONSE: 4
WONGBAKER_NUMERICALRESPONSE: 0

## 2019-01-01 ASSESSMENT — PAIN DESCRIPTION - PAIN TYPE
TYPE: SURGICAL PAIN
TYPE: ACUTE PAIN
TYPE: SURGICAL PAIN
TYPE: SURGICAL PAIN
TYPE: ACUTE PAIN
TYPE: ACUTE PAIN;SURGICAL PAIN
TYPE: ACUTE PAIN
TYPE: ACUTE PAIN;CHRONIC PAIN
TYPE: ACUTE PAIN
TYPE: CHRONIC PAIN
TYPE: SURGICAL PAIN
TYPE: ACUTE PAIN;CHRONIC PAIN;SURGICAL PAIN
TYPE: SURGICAL PAIN;ACUTE PAIN
TYPE: SURGICAL PAIN
TYPE: SURGICAL PAIN
TYPE: CHRONIC PAIN
TYPE: ACUTE PAIN
TYPE: ACUTE PAIN;SURGICAL PAIN
TYPE: ACUTE PAIN
TYPE: SURGICAL PAIN;ACUTE PAIN
TYPE: CHRONIC PAIN
TYPE: ACUTE PAIN
TYPE: SURGICAL PAIN
TYPE: CHRONIC PAIN
TYPE: ACUTE PAIN
TYPE: CHRONIC PAIN
TYPE: SURGICAL PAIN
TYPE: SURGICAL PAIN
TYPE: ACUTE PAIN;SURGICAL PAIN
TYPE: SURGICAL PAIN
TYPE: SURGICAL PAIN;PHANTOM PAIN
TYPE: ACUTE PAIN;CHRONIC PAIN
TYPE: CHRONIC PAIN
TYPE: SURGICAL PAIN;PHANTOM PAIN
TYPE: CHRONIC PAIN
TYPE: ACUTE PAIN
TYPE: CHRONIC PAIN
TYPE: SURGICAL PAIN
TYPE: SURGICAL PAIN
TYPE: SURGICAL PAIN;ACUTE PAIN
TYPE: ACUTE PAIN
TYPE: SURGICAL PAIN

## 2019-01-01 ASSESSMENT — PAIN - FUNCTIONAL ASSESSMENT
PAIN_FUNCTIONAL_ASSESSMENT: PREVENTS OR INTERFERES SOME ACTIVE ACTIVITIES AND ADLS
PAIN_FUNCTIONAL_ASSESSMENT: PREVENTS OR INTERFERES WITH MANY ACTIVE NOT PASSIVE ACTIVITIES
PAIN_FUNCTIONAL_ASSESSMENT: PREVENTS OR INTERFERES SOME ACTIVE ACTIVITIES AND ADLS
PAIN_FUNCTIONAL_ASSESSMENT: PREVENTS OR INTERFERES WITH MANY ACTIVE NOT PASSIVE ACTIVITIES
PAIN_FUNCTIONAL_ASSESSMENT: PREVENTS OR INTERFERES WITH MANY ACTIVE NOT PASSIVE ACTIVITIES
PAIN_FUNCTIONAL_ASSESSMENT: PREVENTS OR INTERFERES SOME ACTIVE ACTIVITIES AND ADLS

## 2019-01-01 ASSESSMENT — PAIN DESCRIPTION - ONSET
ONSET: ON-GOING

## 2019-01-01 ASSESSMENT — PAIN DESCRIPTION - PROGRESSION
CLINICAL_PROGRESSION: NOT CHANGED
CLINICAL_PROGRESSION: GRADUALLY WORSENING
CLINICAL_PROGRESSION: NOT CHANGED
CLINICAL_PROGRESSION: GRADUALLY WORSENING
CLINICAL_PROGRESSION: NOT CHANGED
CLINICAL_PROGRESSION: GRADUALLY WORSENING
CLINICAL_PROGRESSION: NOT CHANGED
CLINICAL_PROGRESSION: GRADUALLY WORSENING
CLINICAL_PROGRESSION: GRADUALLY WORSENING
CLINICAL_PROGRESSION: NOT CHANGED
CLINICAL_PROGRESSION: GRADUALLY WORSENING
CLINICAL_PROGRESSION: GRADUALLY WORSENING
CLINICAL_PROGRESSION: NOT CHANGED
CLINICAL_PROGRESSION: GRADUALLY WORSENING
CLINICAL_PROGRESSION: NOT CHANGED

## 2019-01-01 ASSESSMENT — PAIN DESCRIPTION - DIRECTION
RADIATING_TOWARDS: DENIES

## 2019-01-02 ENCOUNTER — APPOINTMENT (OUTPATIENT)
Dept: CT IMAGING | Age: 59
End: 2019-01-02
Payer: COMMERCIAL

## 2019-01-02 ENCOUNTER — HOSPITAL ENCOUNTER (EMERGENCY)
Age: 59
Discharge: HOME | End: 2019-01-02
Attending: EMERGENCY MEDICINE
Payer: COMMERCIAL

## 2019-01-02 VITALS
BODY MASS INDEX: 34.95 KG/M2 | HEIGHT: 60 IN | OXYGEN SATURATION: 94 % | SYSTOLIC BLOOD PRESSURE: 93 MMHG | DIASTOLIC BLOOD PRESSURE: 54 MMHG | HEART RATE: 88 BPM | TEMPERATURE: 98.1 F | RESPIRATION RATE: 18 BRPM | WEIGHT: 178 LBS

## 2019-01-02 DIAGNOSIS — S32.010A CLOSED WEDGE COMPRESSION FRACTURE OF FIRST LUMBAR VERTEBRA, INITIAL ENCOUNTER: ICD-10-CM

## 2019-01-02 DIAGNOSIS — N39.0 URINARY TRACT INFECTION WITHOUT HEMATURIA, SITE UNSPECIFIED: Primary | ICD-10-CM

## 2019-01-02 LAB
A/G RATIO: 1.2 (ref 1.1–2.2)
ALBUMIN SERPL-MCNC: 4.2 G/DL (ref 3.4–5)
ALP BLD-CCNC: 90 U/L (ref 40–129)
ALT SERPL-CCNC: 9 U/L (ref 10–40)
ANION GAP SERPL CALCULATED.3IONS-SCNC: 13 MMOL/L (ref 3–16)
AST SERPL-CCNC: 9 U/L (ref 15–37)
BACTERIA: ABNORMAL /HPF
BASOPHILS ABSOLUTE: 0.1 K/UL (ref 0–0.2)
BASOPHILS RELATIVE PERCENT: 1 %
BILIRUB SERPL-MCNC: 0.3 MG/DL (ref 0–1)
BILIRUBIN URINE: NEGATIVE
BLOOD, URINE: NEGATIVE
BUN BLDV-MCNC: 25 MG/DL (ref 7–20)
CALCIUM SERPL-MCNC: 9.7 MG/DL (ref 8.3–10.6)
CHLORIDE BLD-SCNC: 92 MMOL/L (ref 99–110)
CLARITY: ABNORMAL
CO2: 22 MMOL/L (ref 21–32)
COLOR: YELLOW
CREAT SERPL-MCNC: 1.1 MG/DL (ref 0.6–1.1)
EOSINOPHILS ABSOLUTE: 0.3 K/UL (ref 0–0.6)
EOSINOPHILS RELATIVE PERCENT: 2.7 %
EPITHELIAL CELLS, UA: ABNORMAL /HPF
GFR AFRICAN AMERICAN: >60
GFR NON-AFRICAN AMERICAN: 51
GLOBULIN: 3.4 G/DL
GLUCOSE BLD-MCNC: 341 MG/DL (ref 70–99)
GLUCOSE URINE: 500 MG/DL
HCT VFR BLD CALC: 42.1 % (ref 36–48)
HEMOGLOBIN: 14.2 G/DL (ref 12–16)
KETONES, URINE: NEGATIVE MG/DL
LEUKOCYTE ESTERASE, URINE: ABNORMAL
LYMPHOCYTES ABSOLUTE: 3 K/UL (ref 1–5.1)
LYMPHOCYTES RELATIVE PERCENT: 29.2 %
MCH RBC QN AUTO: 31.9 PG (ref 26–34)
MCHC RBC AUTO-ENTMCNC: 33.8 G/DL (ref 31–36)
MCV RBC AUTO: 94.4 FL (ref 80–100)
MICROSCOPIC EXAMINATION: YES
MONOCYTES ABSOLUTE: 0.5 K/UL (ref 0–1.3)
MONOCYTES RELATIVE PERCENT: 4.8 %
MUCUS: ABNORMAL /LPF
NEUTROPHILS ABSOLUTE: 6.3 K/UL (ref 1.7–7.7)
NEUTROPHILS RELATIVE PERCENT: 62.3 %
NITRITE, URINE: NEGATIVE
PDW BLD-RTO: 13.7 % (ref 12.4–15.4)
PH UA: 5.5
PLATELET # BLD: 359 K/UL (ref 135–450)
PMV BLD AUTO: 10.6 FL (ref 5–10.5)
POTASSIUM SERPL-SCNC: 5.1 MMOL/L (ref 3.5–5.1)
PROTEIN UA: NEGATIVE MG/DL
RBC # BLD: 4.46 M/UL (ref 4–5.2)
RBC UA: ABNORMAL /HPF (ref 0–2)
SODIUM BLD-SCNC: 127 MMOL/L (ref 136–145)
SPECIFIC GRAVITY UA: 1.02
TOTAL PROTEIN: 7.6 G/DL (ref 6.4–8.2)
URINE REFLEX TO CULTURE: YES
URINE TYPE: ABNORMAL
UROBILINOGEN, URINE: 0.2 E.U./DL
WBC # BLD: 10.1 K/UL (ref 4–11)
WBC UA: ABNORMAL /HPF (ref 0–5)

## 2019-01-02 PROCEDURE — 6370000000 HC RX 637 (ALT 250 FOR IP): Performed by: EMERGENCY MEDICINE

## 2019-01-02 PROCEDURE — 80053 COMPREHEN METABOLIC PANEL: CPT

## 2019-01-02 PROCEDURE — 96374 THER/PROPH/DIAG INJ IV PUSH: CPT

## 2019-01-02 PROCEDURE — 6360000004 HC RX CONTRAST MEDICATION: Performed by: EMERGENCY MEDICINE

## 2019-01-02 PROCEDURE — 74177 CT ABD & PELVIS W/CONTRAST: CPT

## 2019-01-02 PROCEDURE — 87086 URINE CULTURE/COLONY COUNT: CPT

## 2019-01-02 PROCEDURE — 99284 EMERGENCY DEPT VISIT MOD MDM: CPT

## 2019-01-02 PROCEDURE — 6360000002 HC RX W HCPCS: Performed by: EMERGENCY MEDICINE

## 2019-01-02 PROCEDURE — 81001 URINALYSIS AUTO W/SCOPE: CPT

## 2019-01-02 PROCEDURE — 85025 COMPLETE CBC W/AUTO DIFF WBC: CPT

## 2019-01-02 RX ORDER — LEVOFLOXACIN 500 MG/1
500 TABLET, FILM COATED ORAL ONCE
Status: COMPLETED | OUTPATIENT
Start: 2019-01-02 | End: 2019-01-02

## 2019-01-02 RX ORDER — LEVOFLOXACIN 500 MG/1
500 TABLET, FILM COATED ORAL DAILY
Qty: 10 TABLET | Refills: 0 | Status: SHIPPED | OUTPATIENT
Start: 2019-01-02 | End: 2019-01-12

## 2019-01-02 RX ORDER — KETOROLAC TROMETHAMINE 30 MG/ML
30 INJECTION, SOLUTION INTRAMUSCULAR; INTRAVENOUS ONCE
Status: COMPLETED | OUTPATIENT
Start: 2019-01-02 | End: 2019-01-02

## 2019-01-02 RX ORDER — ORPHENADRINE CITRATE 30 MG/ML
60 INJECTION INTRAMUSCULAR; INTRAVENOUS EVERY 12 HOURS
Status: DISCONTINUED | OUTPATIENT
Start: 2019-01-02 | End: 2019-01-02

## 2019-01-02 RX ORDER — PREDNISONE 20 MG/1
60 TABLET ORAL ONCE
Status: COMPLETED | OUTPATIENT
Start: 2019-01-02 | End: 2019-01-02

## 2019-01-02 RX ORDER — CYCLOBENZAPRINE HCL 10 MG
10 TABLET ORAL 3 TIMES DAILY PRN
Qty: 30 TABLET | Refills: 0 | Status: SHIPPED | OUTPATIENT
Start: 2019-01-02 | End: 2019-01-12

## 2019-01-02 RX ORDER — ORPHENADRINE CITRATE 100 MG/1
100 TABLET, EXTENDED RELEASE ORAL ONCE
Status: COMPLETED | OUTPATIENT
Start: 2019-01-02 | End: 2019-01-02

## 2019-01-02 RX ORDER — PREDNISONE 20 MG/1
TABLET ORAL
Qty: 27 TABLET | Refills: 0 | Status: SHIPPED | OUTPATIENT
Start: 2019-01-02 | End: 2019-05-24 | Stop reason: ALTCHOICE

## 2019-01-02 RX ADMIN — ORPHENADRINE CITRATE 100 MG: 100 TABLET, EXTENDED RELEASE ORAL at 17:24

## 2019-01-02 RX ADMIN — PREDNISONE 60 MG: 20 TABLET ORAL at 17:20

## 2019-01-02 RX ADMIN — KETOROLAC TROMETHAMINE 30 MG: 30 INJECTION, SOLUTION INTRAMUSCULAR; INTRAVENOUS at 17:20

## 2019-01-02 RX ADMIN — IOPAMIDOL 75 ML: 755 INJECTION, SOLUTION INTRAVENOUS at 17:34

## 2019-01-02 RX ADMIN — LEVOFLOXACIN 500 MG: 500 TABLET, FILM COATED ORAL at 19:59

## 2019-01-02 ASSESSMENT — PAIN SCALES - GENERAL
PAINLEVEL_OUTOF10: 10

## 2019-01-02 ASSESSMENT — PAIN DESCRIPTION - ORIENTATION: ORIENTATION: LOWER

## 2019-01-02 ASSESSMENT — PAIN DESCRIPTION - FREQUENCY: FREQUENCY: CONTINUOUS

## 2019-01-02 ASSESSMENT — PAIN DESCRIPTION - LOCATION
LOCATION: BACK;ABDOMEN
LOCATION: ABDOMEN;BACK

## 2019-01-02 ASSESSMENT — PAIN DESCRIPTION - PAIN TYPE
TYPE: ACUTE PAIN
TYPE: ACUTE PAIN

## 2019-01-04 LAB — URINE CULTURE, ROUTINE: NORMAL

## 2019-01-18 DIAGNOSIS — F41.9 ANXIETY: ICD-10-CM

## 2019-01-21 RX ORDER — CLONAZEPAM 0.5 MG/1
TABLET ORAL
Qty: 30 TABLET | Refills: 0 | OUTPATIENT
Start: 2019-01-21 | End: 2019-03-06 | Stop reason: SDUPTHER

## 2019-02-07 ENCOUNTER — OFFICE VISIT (OUTPATIENT)
Dept: ORTHOPEDIC SURGERY | Age: 59
End: 2019-02-07
Payer: COMMERCIAL

## 2019-02-07 VITALS
WEIGHT: 177.91 LBS | SYSTOLIC BLOOD PRESSURE: 121 MMHG | HEART RATE: 72 BPM | HEIGHT: 60 IN | DIASTOLIC BLOOD PRESSURE: 62 MMHG | BODY MASS INDEX: 34.93 KG/M2

## 2019-02-07 DIAGNOSIS — M54.5 LOW BACK PAIN, UNSPECIFIED BACK PAIN LATERALITY, UNSPECIFIED CHRONICITY, WITH SCIATICA PRESENCE UNSPECIFIED: Primary | ICD-10-CM

## 2019-02-07 DIAGNOSIS — S32.010A CLOSED COMPRESSION FRACTURE OF FIRST LUMBAR VERTEBRA, INITIAL ENCOUNTER: ICD-10-CM

## 2019-02-07 PROCEDURE — G8482 FLU IMMUNIZE ORDER/ADMIN: HCPCS | Performed by: PHYSICIAN ASSISTANT

## 2019-02-07 PROCEDURE — G8598 ASA/ANTIPLAT THER USED: HCPCS | Performed by: PHYSICIAN ASSISTANT

## 2019-02-07 PROCEDURE — 3017F COLORECTAL CA SCREEN DOC REV: CPT | Performed by: PHYSICIAN ASSISTANT

## 2019-02-07 PROCEDURE — G8417 CALC BMI ABV UP PARAM F/U: HCPCS | Performed by: PHYSICIAN ASSISTANT

## 2019-02-07 PROCEDURE — 4004F PT TOBACCO SCREEN RCVD TLK: CPT | Performed by: PHYSICIAN ASSISTANT

## 2019-02-07 PROCEDURE — 99213 OFFICE O/P EST LOW 20 MIN: CPT | Performed by: PHYSICIAN ASSISTANT

## 2019-02-07 PROCEDURE — G8427 DOCREV CUR MEDS BY ELIG CLIN: HCPCS | Performed by: PHYSICIAN ASSISTANT

## 2019-02-18 ENCOUNTER — TELEPHONE (OUTPATIENT)
Dept: FAMILY MEDICINE CLINIC | Age: 59
End: 2019-02-18

## 2019-02-18 NOTE — TELEPHONE ENCOUNTER
LOV 12/13/2018    Last filled 1/21/19    OARRS 11/28/18    Future Appointments  Date Time Provider Roxanna Martinez   3/6/2019 9:00 AM MD KAHLIL Santos

## 2019-02-18 NOTE — TELEPHONE ENCOUNTER
Patient called and states that she noticed that her last prescription of Clonazepam she was prescribed #30 tablet instead of #45. Please advise.

## 2019-02-19 NOTE — TELEPHONE ENCOUNTER
Pls investigate. Was the request from pharm 1/18/19 preloaded with 30 pills? I didn't intentionally cut her down but I do plan to do so.

## 2019-02-21 NOTE — TELEPHONE ENCOUNTER
I'm showing that when we got the request it was the pharmacy who sent the request. Spoke to pharmacy and they said when pt was given Rx back in December/2018 that we had it written for #45 but for some reason they only gave her #30 and they do not have documentation as to why they only gave her #30.  Please review

## 2019-02-22 RX ORDER — HYDROXYZINE HYDROCHLORIDE 25 MG/1
25 TABLET, FILM COATED ORAL 2 TIMES DAILY
Qty: 60 TABLET | Refills: 5 | Status: SHIPPED | OUTPATIENT
Start: 2019-02-22 | End: 2019-03-24

## 2019-02-22 NOTE — TELEPHONE ENCOUNTER
Patient called to check on the status of her message. Patient was given Dr. Marisol Valverde message answer. Patient states that she takes the medications at least 5 to 6 hours apart. Patient wants to know if there is a different medication that Dr. Diana Boyce could prescribe in place of the Klonopin. Please advise.

## 2019-02-22 NOTE — TELEPHONE ENCOUNTER
Please tell her that mostl likely, although her rx was written for 45 pills, the pharmacy gave only 30 b/c there is data that shows that it is risky and unwise to combine benzodiazepines with pain meds. Both of these types of meds are sedating and both can cause respiratory suppression, which increases the risk of accidental OD. I know she hasn't had any problems in the past, but to keep her safe, I agree with lessening and eventually weaning off her klonopin. I'm willing to rx 30 per mo for 1 more mo, and then I'll decrease to 20 per month. If she needs rf, pls pend for me.  Thx.

## 2019-02-22 NOTE — TELEPHONE ENCOUNTER
I can offer her hydroxyzine 25 mg. This medicine is not controlled and it will not caus any interaction with her pain meds. If she'd like to try it, pls rx it 1 bid prn anxiety, to replace klonopin, 60+5.  Pls d/c klonopin from med list.

## 2019-03-06 ENCOUNTER — OFFICE VISIT (OUTPATIENT)
Dept: FAMILY MEDICINE CLINIC | Age: 59
End: 2019-03-06
Payer: COMMERCIAL

## 2019-03-06 VITALS
BODY MASS INDEX: 34.95 KG/M2 | SYSTOLIC BLOOD PRESSURE: 136 MMHG | DIASTOLIC BLOOD PRESSURE: 64 MMHG | WEIGHT: 178 LBS | RESPIRATION RATE: 16 BRPM | OXYGEN SATURATION: 98 % | HEART RATE: 74 BPM | HEIGHT: 60 IN

## 2019-03-06 DIAGNOSIS — J40 BRONCHITIS: ICD-10-CM

## 2019-03-06 DIAGNOSIS — B96.89 ACUTE BACTERIAL SINUSITIS: ICD-10-CM

## 2019-03-06 DIAGNOSIS — S32.010S CLOSED COMPRESSION FRACTURE OF FIRST LUMBAR VERTEBRA, SEQUELA: ICD-10-CM

## 2019-03-06 DIAGNOSIS — E11.65 UNCONTROLLED TYPE 2 DIABETES MELLITUS WITH HYPERGLYCEMIA (HCC): Primary | ICD-10-CM

## 2019-03-06 DIAGNOSIS — F41.9 ANXIETY: ICD-10-CM

## 2019-03-06 DIAGNOSIS — J01.90 ACUTE BACTERIAL SINUSITIS: ICD-10-CM

## 2019-03-06 PROCEDURE — 99213 OFFICE O/P EST LOW 20 MIN: CPT | Performed by: FAMILY MEDICINE

## 2019-03-06 PROCEDURE — 3046F HEMOGLOBIN A1C LEVEL >9.0%: CPT | Performed by: FAMILY MEDICINE

## 2019-03-06 PROCEDURE — 3017F COLORECTAL CA SCREEN DOC REV: CPT | Performed by: FAMILY MEDICINE

## 2019-03-06 PROCEDURE — 4004F PT TOBACCO SCREEN RCVD TLK: CPT | Performed by: FAMILY MEDICINE

## 2019-03-06 PROCEDURE — G8598 ASA/ANTIPLAT THER USED: HCPCS | Performed by: FAMILY MEDICINE

## 2019-03-06 PROCEDURE — G8427 DOCREV CUR MEDS BY ELIG CLIN: HCPCS | Performed by: FAMILY MEDICINE

## 2019-03-06 PROCEDURE — G8417 CALC BMI ABV UP PARAM F/U: HCPCS | Performed by: FAMILY MEDICINE

## 2019-03-06 PROCEDURE — 2022F DILAT RTA XM EVC RTNOPTHY: CPT | Performed by: FAMILY MEDICINE

## 2019-03-06 PROCEDURE — G8482 FLU IMMUNIZE ORDER/ADMIN: HCPCS | Performed by: FAMILY MEDICINE

## 2019-03-06 RX ORDER — CLONAZEPAM 0.5 MG/1
TABLET ORAL
Qty: 30 TABLET | Refills: 0 | Status: SHIPPED | OUTPATIENT
Start: 2019-03-06 | End: 2019-04-09 | Stop reason: SDUPTHER

## 2019-03-06 RX ORDER — AZITHROMYCIN 250 MG/1
250 TABLET, FILM COATED ORAL SEE ADMIN INSTRUCTIONS
Qty: 6 TABLET | Refills: 0 | Status: SHIPPED | OUTPATIENT
Start: 2019-03-06 | End: 2019-03-11

## 2019-03-06 ASSESSMENT — PATIENT HEALTH QUESTIONNAIRE - PHQ9
2. FEELING DOWN, DEPRESSED OR HOPELESS: 0
1. LITTLE INTEREST OR PLEASURE IN DOING THINGS: 0
SUM OF ALL RESPONSES TO PHQ QUESTIONS 1-9: 0
SUM OF ALL RESPONSES TO PHQ9 QUESTIONS 1 & 2: 0
SUM OF ALL RESPONSES TO PHQ QUESTIONS 1-9: 0

## 2019-03-06 ASSESSMENT — ENCOUNTER SYMPTOMS
SORE THROAT: 0
SINUS PAIN: 1
SHORTNESS OF BREATH: 0
CHEST TIGHTNESS: 0
WHEEZING: 0
COUGH: 1
SINUS PRESSURE: 1
RHINORRHEA: 0
FACIAL SWELLING: 1

## 2019-03-25 ENCOUNTER — TELEPHONE (OUTPATIENT)
Dept: ENDOCRINOLOGY | Age: 59
End: 2019-03-25

## 2019-03-25 ENCOUNTER — TELEPHONE (OUTPATIENT)
Dept: ORTHOPEDIC SURGERY | Age: 59
End: 2019-03-25

## 2019-03-28 RX ORDER — INSULIN GLARGINE 100 [IU]/ML
45 INJECTION, SOLUTION SUBCUTANEOUS 2 TIMES DAILY
Qty: 30 ML | Refills: 0 | Status: SHIPPED | OUTPATIENT
Start: 2019-03-28 | End: 2019-05-13 | Stop reason: SDUPTHER

## 2019-04-16 ENCOUNTER — OFFICE VISIT (OUTPATIENT)
Dept: ORTHOPEDIC SURGERY | Age: 59
End: 2019-04-16
Payer: COMMERCIAL

## 2019-04-16 VITALS
SYSTOLIC BLOOD PRESSURE: 124 MMHG | WEIGHT: 177.91 LBS | BODY MASS INDEX: 34.93 KG/M2 | HEART RATE: 76 BPM | DIASTOLIC BLOOD PRESSURE: 80 MMHG | HEIGHT: 60 IN

## 2019-04-16 DIAGNOSIS — S32.010A CLOSED COMPRESSION FRACTURE OF FIRST LUMBAR VERTEBRA, INITIAL ENCOUNTER: Primary | ICD-10-CM

## 2019-04-16 DIAGNOSIS — M47.816 LUMBAR SPONDYLOSIS: ICD-10-CM

## 2019-04-16 PROCEDURE — G8417 CALC BMI ABV UP PARAM F/U: HCPCS | Performed by: ORTHOPAEDIC SURGERY

## 2019-04-16 PROCEDURE — G8427 DOCREV CUR MEDS BY ELIG CLIN: HCPCS | Performed by: ORTHOPAEDIC SURGERY

## 2019-04-16 PROCEDURE — 4004F PT TOBACCO SCREEN RCVD TLK: CPT | Performed by: ORTHOPAEDIC SURGERY

## 2019-04-16 PROCEDURE — G8598 ASA/ANTIPLAT THER USED: HCPCS | Performed by: ORTHOPAEDIC SURGERY

## 2019-04-16 PROCEDURE — 99213 OFFICE O/P EST LOW 20 MIN: CPT | Performed by: ORTHOPAEDIC SURGERY

## 2019-04-16 PROCEDURE — 3017F COLORECTAL CA SCREEN DOC REV: CPT | Performed by: ORTHOPAEDIC SURGERY

## 2019-04-16 NOTE — PROGRESS NOTES
History of present illness:   Ms. Marisol Sales  is a pleasant 63 y.o. female returning to the office regarding her LBP. She states her pain began suddenly about 3.5 months ago after lifting TV stand. States she felt a \"pop\" in her back. Her pain has slightly decreased since then. She describes the pain as sharp that is worse with standing, walking, and laying down and better with leaning forward. She admits left hip pain but denies radiating leg pain. She admits to chronic numbness and tingling in her feet bilaterally due to her diabetic neuropathy. She denies weakness of her legs and denies bowel or bladder dysfunction. The pain significantly disrupts her sleep. She takes percocet, oral steroids, gabapentin, and NSAIDs. She currently sees Dr. Estee Arteaga for pain management. Current Medication:  Current Outpatient Medications   Medication Sig Dispense Refill    clonazePAM (KLONOPIN) 0.5 MG tablet TAKE ONE TABLET BY MOUTH THREE TIMES A DAY AS NEEDED FOR ANXIETY 30 tablet 0    levothyroxine (SYNTHROID) 50 MCG tablet Take 1 tablet by mouth daily. 30 tablet 3    ferrous sulfate 325 (65 Fe) MG EC tablet Take 1 tablet by mouth daily (with breakfast) 30 tablet 3    atorvastatin (LIPITOR) 40 MG tablet Take 1 tablet by mouth daily. 30 tablet 3    furosemide (LASIX) 20 MG tablet Take 1 tablet by mouth twice daily. 60 tablet 3    SYMBICORT 160-4.5 MCG/ACT AERO Inhale 2 puffs by mouth twice daily. 1 Inhaler 3    BASAGLAR KWIKPEN 100 UNIT/ML injection pen Inject 45 Units into the skin 2 times daily 30 mL 0    metFORMIN (GLUCOPHAGE) 1000 MG tablet Take 1 tablet by mouth twice daily with meals.  60 tablet 1    linagliptin (TRADJENTA) 5 MG tablet TAKE ONE TABLET BY MOUTH DAILY 30 tablet 5    predniSONE (DELTASONE) 20 MG tablet Take 3 tablets daily for 6 days, then 2 tablets daily for 3 days, then 1 tablets daily for 3 days 27 tablet 0    ranolazine (RANEXA) 500 MG extended release tablet Take 1 tablet by mouth twice daily. 180 tablet 3    albuterol (PROVENTIL) (2.5 MG/3ML) 0.083% nebulizer solution Take 3 mLs by nebulization every 6 hours as needed for Wheezing 120 each 5    fluticasone (FLONASE) 50 MCG/ACT nasal spray 2 sprays by Nasal route daily 1 Bottle 5    oxybutynin (DITROPAN) 5 MG tablet 1 po q am and 2 po qhs 90 tablet 5    clopidogrel (PLAVIX) 75 MG tablet Take 1 tablet by mouth daily. 30 tablet 4    isosorbide mononitrate (IMDUR) 30 MG extended release tablet Take 1 tablet by mouth daily. 30 tablet 4    metoprolol tartrate (LOPRESSOR) 25 MG tablet Take 1 tablet by mouth twice daily. 60 tablet 4    gabapentin (NEURONTIN) 300 MG capsule Take 3 capsules by mouth 3 times daily. 270 capsule 4    SPIRIVA RESPIMAT 2.5 MCG/ACT AERS inhaler INHALE 2 PUFFS BY MOUTH DAILY 1 Inhaler 4    sertraline (ZOLOFT) 100 MG tablet Take 1 tablet by mouth daily 30 tablet 5    Insulin Pen Needle (B-D UF III MINI PEN NEEDLES) 31G X 5 MM MISC USE TO INJECT INSULIN and victoza 6 TIMES A  each 11    nitroGLYCERIN (NITROSTAT) 0.4 MG SL tablet DISSOLVE ONE TABLET UNDER THE TONGUE AS NEEDED FOR CHEST PAIN EVERY 5 MINUTES UP TO 3 TIMES.  IF NO RELIEF CALL 911. 25 tablet 3    insulin lispro (HUMALOG KWIKPEN) 100 UNIT/ML pen Inject 25 Units into the skin 3 times daily (before meals) 30 mL 1    Liraglutide (VICTOZA) 18 MG/3ML SOPN SC injection Inject 1.2 mg into the skin daily 6 mL 1    ketorolac (TORADOL) 10 MG tablet Take 1 tablet by mouth every 6 hours as needed for Pain 20 tablet 0    ondansetron (ZOFRAN) 4 MG tablet Take 1 tablet by mouth every 8 hours as needed for Nausea or Vomiting 12 tablet 0    lisinopril (PRINIVIL;ZESTRIL) 5 MG tablet Take 1 tablet by mouth daily 90 tablet 3    Cholecalciferol (VITAMIN D3) 1000 units TABS Take 1 tablet by mouth daily 30 tablet 5    blood glucose test strips (FREESTYLE LITE) strip TEST THREE TIMES A  strip 2    sucralfate (CARAFATE) 1 GM tablet Take 1 tablet by mouth 3 times daily (before meals) 30 tablet 2    omeprazole (PRILOSEC) 40 MG delayed release capsule Take 1 capsule by mouth daily 30 capsule 5    sertraline (ZOLOFT) 100 MG tablet Take 1 tablet by mouth daily. 30 tablet 5    guaiFENesin (MUCINEX) 600 MG extended release tablet Take 1 tablet by mouth 2 times daily as needed for Congestion 60 tablet 5    buPROPion (WELLBUTRIN XL) 300 MG extended release tablet TAKE ONE TABLET BY MOUTH EVERY MORNING 30 tablet 5    ibuprofen (ADVIL;MOTRIN) 400 MG tablet Take 1 tablet by mouth every 6 hours as needed for Pain 20 tablet 0    phenol 1.4 % AERS Take 1 spray by mouth every 2 hours as needed (for sore throat) 1 Bottle 0    oxyCODONE-acetaminophen (PERCOCET) 5-325 MG per tablet Take 1 tablet by mouth every 8 hours as needed for Pain .  Fluocinonide 0.1 % CREA Apply to affected area twice daily as needed for dry skin. 240 g 0    montelukast (SINGULAIR) 10 MG tablet Take 1 tablet by mouth daily 30 tablet 3    vitamin B-12 (CYANOCOBALAMIN) 1000 MCG tablet Take 1 tablet by mouth daily 30 tablet 3    tiZANidine (ZANAFLEX) 4 MG tablet TAKE ONE-HALF TO ONE THREE TIMES A DAY AS NEEDED FOR BACK PAIN 60 tablet 5    Artificial Saliva (BIOTENE MOISTURIZING MOUTH) SOLN USE 1 SPRAY DAILY AS NEEDED FOR DRY MOUTH 44.3 mL 4    VENTOLIN  (90 BASE) MCG/ACT inhaler INHALE TWO PUFFS BY MOUTH EVERY 6 HOURS AS NEEDED FOR WHEEZING 3 Inhaler 3    FREESTYLE LANCETS MISC USE TO TEST BLOOD SUGAR THREE TIMES DAILY 300 each 3    fexofenadine (WAL-FEX ALLERGY) 180 MG tablet 1 po qd, to replace claritin/loratidine. 30 tablet 5    simethicone (MYLICON) 80 MG chewable tablet Take 1 tablet by mouth 4 times daily as needed for Flatulence 60 tablet 1    Alcohol Swabs PADS UAD to test blood sugar & inject insulin 100 each 5    aspirin (ASPIRIN CHILDRENS) 81 MG chewable tablet Take 1 tablet by mouth daily. 30 tablet 3     No current facility-administered medications for this visit.       Physical examination:  Ms. Prince Rizzo most recent vitals:  Vitals  BP: 124/80  Pulse: 76  Height: 5' (152.4 cm)  Weight: 177 lb 14.6 oz (80.7 kg)  Body mass index is 34.75 kg/m². General exam:  She is well-developed and well-nourished, is in obvious pain and alert and oriented to person, place, and time. She demonstrates appropriate mood and affect. Her skin is warm and dry. Her gait is normal and she walks heel to toe without limp or instability. Back:  She stands with slight lumbar flexion. Her lumbar flexion, extension and lateral bending are moderately reduced with pain. She has mild tenderness over her lumbar spine without obvious muscle spasm. The skin over her lumbar spine is normal without a surgical scar. Lower extremities:  She has 5/5 motor strength of bilateral lower extremities. She has a negative straight leg raise, bilaterally. Deep tendon reflexes at knees and achilles are 2+. Sensation is intact to light touch L3 to S1 bilaterally. She has no clonus. Hip range of motion painless. Imaging:  I reviewed AP and lateral xray images of her lumbar spine obtained in the office today. They note stable L1 compression fracture unchanged from prior imaging. Reviewed CT images of her lumbar spine from 1/2/19. They note L1 compression fracture with approximately 30% height loss. Assessment:  L1 compression fracture with 30% height loss  Lumbar spondylosis    Plan:  We discussed treatment options including observation, additional imaging, and physical therapy. She would like to try outpatient physical therapy will continue pain management with Dr. Nate Cruz. She may wean out of joellen orthosis and increase her activity over the next month. She will follow up PRN. Dictated by Juan Manuel Greer PA-C. Patient also seen and examined by Dr. Jannet Boateng.

## 2019-04-29 RX ORDER — GABAPENTIN 300 MG/1
CAPSULE ORAL
Qty: 270 CAPSULE | Refills: 1 | Status: SHIPPED | OUTPATIENT
Start: 2019-04-29 | End: 2019-05-27 | Stop reason: SDUPTHER

## 2019-05-05 ENCOUNTER — HOSPITAL ENCOUNTER (EMERGENCY)
Age: 59
Discharge: HOME | End: 2019-05-06
Payer: COMMERCIAL

## 2019-05-05 VITALS
SYSTOLIC BLOOD PRESSURE: 132 MMHG | OXYGEN SATURATION: 98 % | RESPIRATION RATE: 16 BRPM | HEART RATE: 74 BPM | WEIGHT: 177 LBS | DIASTOLIC BLOOD PRESSURE: 64 MMHG | HEIGHT: 60 IN | TEMPERATURE: 97.3 F | BODY MASS INDEX: 34.75 KG/M2

## 2019-05-05 PROCEDURE — 4500000023 HC ED LEVEL 3 PROCEDURE

## 2019-05-05 PROCEDURE — 99283 EMERGENCY DEPT VISIT LOW MDM: CPT

## 2019-05-05 ASSESSMENT — PAIN DESCRIPTION - ORIENTATION: ORIENTATION: LEFT

## 2019-05-05 ASSESSMENT — PAIN SCALES - GENERAL: PAINLEVEL_OUTOF10: 10

## 2019-05-05 ASSESSMENT — PAIN DESCRIPTION - LOCATION: LOCATION: FOOT

## 2019-05-06 ENCOUNTER — APPOINTMENT (OUTPATIENT)
Dept: GENERAL RADIOLOGY | Age: 59
End: 2019-05-06
Payer: COMMERCIAL

## 2019-05-06 PROCEDURE — 90715 TDAP VACCINE 7 YRS/> IM: CPT | Performed by: PHYSICIAN ASSISTANT

## 2019-05-06 PROCEDURE — 6360000002 HC RX W HCPCS: Performed by: PHYSICIAN ASSISTANT

## 2019-05-06 PROCEDURE — 6370000000 HC RX 637 (ALT 250 FOR IP): Performed by: PHYSICIAN ASSISTANT

## 2019-05-06 PROCEDURE — 73660 X-RAY EXAM OF TOE(S): CPT

## 2019-05-06 PROCEDURE — 90471 IMMUNIZATION ADMIN: CPT | Performed by: PHYSICIAN ASSISTANT

## 2019-05-06 RX ORDER — CEPHALEXIN 500 MG/1
1000 CAPSULE ORAL ONCE
Status: COMPLETED | OUTPATIENT
Start: 2019-05-06 | End: 2019-05-06

## 2019-05-06 RX ORDER — OXYCODONE HYDROCHLORIDE AND ACETAMINOPHEN 5; 325 MG/1; MG/1
2 TABLET ORAL ONCE
Status: COMPLETED | OUTPATIENT
Start: 2019-05-06 | End: 2019-05-06

## 2019-05-06 RX ORDER — CEPHALEXIN 500 MG/1
500 CAPSULE ORAL 4 TIMES DAILY
Qty: 28 CAPSULE | Refills: 0 | Status: SHIPPED | OUTPATIENT
Start: 2019-05-06 | End: 2019-05-13

## 2019-05-06 RX ADMIN — OXYCODONE HYDROCHLORIDE AND ACETAMINOPHEN 2 TABLET: 5; 325 TABLET ORAL at 02:11

## 2019-05-06 RX ADMIN — TETANUS TOXOID, REDUCED DIPHTHERIA TOXOID AND ACELLULAR PERTUSSIS VACCINE, ADSORBED 0.5 ML: 5; 2.5; 8; 8; 2.5 SUSPENSION INTRAMUSCULAR at 02:11

## 2019-05-06 RX ADMIN — CEPHALEXIN 1000 MG: 500 CAPSULE ORAL at 02:11

## 2019-05-06 ASSESSMENT — PAIN SCALES - GENERAL: PAINLEVEL_OUTOF10: 10

## 2019-05-06 NOTE — ED NOTES
Discharge instructions reviewed with Ms. John. She verbalized understanding. Copy of discharge instructions and prescriptions given. She was advised not to drive, drink ETOH, operate machinery, or supervise small children after receiving pain medications here in the ED or while taking pain medications at home. She verbalized understanding. Ms. Velva Shone was discharged to home in good condition per personal vehicle, friend/family driving. She exited the ED without difficulty.       Esther Bucio RN  05/06/19 [id number]

## 2019-05-06 NOTE — ED PROVIDER NOTES
Take 1 tablet by mouth twice daily. , Disp-60 tablet, R-3Normal      SYMBICORT 160-4.5 MCG/ACT AERO Inhale 2 puffs by mouth twice daily. , Disp-1 Inhaler, R-3Normal      BASAGLAR KWIKPEN 100 UNIT/ML injection pen Inject 45 Units into the skin 2 times daily, Disp-30 mL, R-0Normal      linagliptin (TRADJENTA) 5 MG tablet TAKE ONE TABLET BY MOUTH DAILY, Disp-30 tablet, R-5Normal      predniSONE (DELTASONE) 20 MG tablet Take 3 tablets daily for 6 days, then 2 tablets daily for 3 days, then 1 tablets daily for 3 days, Disp-27 tablet, R-0Print      ranolazine (RANEXA) 500 MG extended release tablet Take 1 tablet by mouth twice daily. , Disp-180 tablet, R-3Normal      albuterol (PROVENTIL) (2.5 MG/3ML) 0.083% nebulizer solution Take 3 mLs by nebulization every 6 hours as needed for Wheezing, Disp-120 each, R-5Normal      fluticasone (FLONASE) 50 MCG/ACT nasal spray 2 sprays by Nasal route daily, Disp-1 Bottle, R-5Normal      oxybutynin (DITROPAN) 5 MG tablet 1 po q am and 2 po qhs, Disp-90 tablet, R-5Normal      !! sertraline (ZOLOFT) 100 MG tablet Take 1 tablet by mouth daily, Disp-30 tablet, R-5Normal      Insulin Pen Needle (B-D UF III MINI PEN NEEDLES) 31G X 5 MM MISC Disp-200 each, R-11, NormalUSE TO INJECT INSULIN and victoza 6 TIMES A DAY      nitroGLYCERIN (NITROSTAT) 0.4 MG SL tablet DISSOLVE ONE TABLET UNDER THE TONGUE AS NEEDED FOR CHEST PAIN EVERY 5 MINUTES UP TO 3 TIMES.  IF NO RELIEF CALL 911., Disp-25 tablet, R-3Normal      insulin lispro (HUMALOG KWIKPEN) 100 UNIT/ML pen Inject 25 Units into the skin 3 times daily (before meals), Disp-30 mL, R-1Normal      Liraglutide (VICTOZA) 18 MG/3ML SOPN SC injection Inject 1.2 mg into the skin daily, Disp-6 mL, R-1Normal      ketorolac (TORADOL) 10 MG tablet Take 1 tablet by mouth every 6 hours as needed for Pain, Disp-20 tablet, R-0Print      ondansetron (ZOFRAN) 4 MG tablet Take 1 tablet by mouth every 8 hours as needed for Nausea or Vomiting, Disp-12 tablet, R-0Print lisinopril (PRINIVIL;ZESTRIL) 5 MG tablet Take 1 tablet by mouth daily, Disp-90 tablet, R-3Normal      Cholecalciferol (VITAMIN D3) 1000 units TABS Take 1 tablet by mouth daily, Disp-30 tablet, R-5Normal      blood glucose test strips (FREESTYLE LITE) strip Disp-100 strip, R-2, NormalTEST THREE TIMES A DAY      sucralfate (CARAFATE) 1 GM tablet Take 1 tablet by mouth 3 times daily (before meals), Disp-30 tablet, R-2Normal      omeprazole (PRILOSEC) 40 MG delayed release capsule Take 1 capsule by mouth daily, Disp-30 capsule, R-5Normal      !! sertraline (ZOLOFT) 100 MG tablet Take 1 tablet by mouth daily. , Disp-30 tablet, R-5Normal      guaiFENesin (MUCINEX) 600 MG extended release tablet Take 1 tablet by mouth 2 times daily as needed for Congestion, Disp-60 tablet, R-5Normal      buPROPion (WELLBUTRIN XL) 300 MG extended release tablet TAKE ONE TABLET BY MOUTH EVERY MORNING, Disp-30 tablet, R-5Normal      ibuprofen (ADVIL;MOTRIN) 400 MG tablet Take 1 tablet by mouth every 6 hours as needed for Pain, Disp-20 tablet, R-0Print      phenol 1.4 % AERS Take 1 spray by mouth every 2 hours as needed (for sore throat), Disp-1 Bottle, R-0Print      Fluocinonide 0.1 % CREA Apply to affected area twice daily as needed for dry skin., Disp-240 g, R-0Phone In      montelukast (SINGULAIR) 10 MG tablet Take 1 tablet by mouth daily, Disp-30 tablet, R-3Normal      vitamin B-12 (CYANOCOBALAMIN) 1000 MCG tablet Take 1 tablet by mouth daily, Disp-30 tablet, R-3Normal      tiZANidine (ZANAFLEX) 4 MG tablet TAKE ONE-HALF TO ONE THREE TIMES A DAY AS NEEDED FOR BACK PAIN, Disp-60 tablet, R-5Normal      Artificial Saliva (BIOTENE MOISTURIZING MOUTH) SOLN USE 1 SPRAY DAILY AS NEEDED FOR DRY MOUTH, Disp-44.3 mL, R-4Normal      VENTOLIN  (90 BASE) MCG/ACT inhaler INHALE TWO PUFFS BY MOUTH EVERY 6 HOURS AS NEEDED FOR WHEEZING, Disp-3 Inhaler, R-3      FREESTYLE LANCETS MISC Disp-300 each, R-3, NormalUSE TO TEST BLOOD SUGAR THREE TIMES DAILY      fexofenadine (WAL-FEX ALLERGY) 180 MG tablet 1 po qd, to replace claritin/loratidine. , Disp-30 tablet, R-5      simethicone (MYLICON) 80 MG chewable tablet Take 1 tablet by mouth 4 times daily as needed for Flatulence, Disp-60 tablet, R-1      Alcohol Swabs PADS Disp-100 each, R-5, NormalUAD to test blood sugar & inject insulin      aspirin (ASPIRIN CHILDRENS) 81 MG chewable tablet Take 1 tablet by mouth daily. , Disp-30 tablet, R-3       !! - Potential duplicate medications found. Please discuss with provider. Review of Systems:  Review of Systems  Positives and Pertinent negatives as per HPI. Except as noted above in the ROS, problem specific ROS was completed and is negative. Physical Exam:  Physical Exam   Constitutional: She is oriented to person, place, and time. She appears well-developed and well-nourished. HENT:   Head: Normocephalic and atraumatic. Right Ear: External ear normal.   Left Ear: External ear normal.   Eyes: Conjunctivae are normal. Right eye exhibits no discharge. Left eye exhibits no discharge. No scleral icterus. Neck: Normal range of motion. Cardiovascular: Normal rate, regular rhythm and normal heart sounds. Pulmonary/Chest: Effort normal and breath sounds normal.   Musculoskeletal: Normal range of motion. She exhibits tenderness. Initially patient has what appears to be lacerations on both the medial and lateral aspect of the left great toe. This would suggest potential injury to the eponychial area of the toe. She indicates pain is rather significant. She does have brisk nailbed refill. Neurological: She is alert and oriented to person, place, and time. Skin: Skin is warm and dry. No rash noted. Psychiatric: She has a normal mood and affect. Her behavior is normal. Judgment and thought content normal.   Nursing note and vitals reviewed.       MEDICAL DECISION MAKING    Vitals:    Vitals:    05/05/19 2348   BP: 132/64   Pulse: 74   Resp: 16   Temp: 97.3 °F (36.3 °C)   TempSrc: Oral   SpO2: 98%   Weight: 177 lb (80.3 kg)   Height: 5' (1.524 m)       LABS:Labs Reviewed - No data to display     Remainder of labs reviewed and werenegative at this time or not returned at the time of this note. RADIOLOGY:   Non-plain film images such as CT, Ultrasound and MRI are read by the radiologist. Mercy Tate PA-C have directly visualized the radiologic plain film image(s) with the below findings:    X-ray shows suspected transverse fracture through the base distal phalanx. Interpretation per the Radiologist below, if available at the time of thisnote:    XR TOE LEFT (MIN 2 VIEWS)   Final Result   Suspected transverse fracture through the base of the distal phalanx. [unfilled]     MEDICAL DECISION MAKING / ED COURSE:      PROCEDURES:   Procedures    I did perform digital block using 0.5% plain Marcaine and 1% plain lidocaine. Once anesthesia was noted was draped in some fashion closes with ChloraPrep and rinsed with saline. I did explore the area. She does have the 2 lacerations noted medial and lateral aspect. She does have disruption of the appendix region. I'm able to displace inferiorly the toe structure. I held in position and flushed with significant amounts of saline. I returned at the proper position. No bone fragments appreciated. I placed 2 stitches both on the lateral medial aspect. I did not place any suturing on the central area through the eponychial or nail plate area. Patient was given:  Medications   Tetanus-Diphth-Acell Pertussis (BOOSTRIX) injection 0.5 mL (0.5 mLs Intramuscular Given 5/6/19 0211)   cephALEXin (KEFLEX) capsule 1,000 mg (1,000 mg Oral Given 5/6/19 0211)   oxyCODONE-acetaminophen (PERCOCET) 5-325 MG per tablet 2 tablet (2 tablets Oral Given 5/6/19 0211)       Patient has what appears to be open displaced fracture of the distal phalanx left great toe with subsequent laceration.   I did repair Patricio Gongora PA-C  05/06/19 2011

## 2019-05-16 ENCOUNTER — OFFICE VISIT (OUTPATIENT)
Dept: ORTHOPEDIC SURGERY | Age: 59
End: 2019-05-16
Payer: COMMERCIAL

## 2019-05-16 VITALS — WEIGHT: 177.03 LBS | BODY MASS INDEX: 34.76 KG/M2 | HEIGHT: 60 IN

## 2019-05-16 DIAGNOSIS — S92.402A CLOSED FRACTURE OF PHALANX OF BOTH GREAT TOES, INITIAL ENCOUNTER: ICD-10-CM

## 2019-05-16 DIAGNOSIS — M79.672 FOOT PAIN, LEFT: Primary | ICD-10-CM

## 2019-05-16 DIAGNOSIS — S92.401A CLOSED FRACTURE OF PHALANX OF BOTH GREAT TOES, INITIAL ENCOUNTER: ICD-10-CM

## 2019-05-16 PROCEDURE — 4004F PT TOBACCO SCREEN RCVD TLK: CPT | Performed by: ORTHOPAEDIC SURGERY

## 2019-05-16 PROCEDURE — 99213 OFFICE O/P EST LOW 20 MIN: CPT | Performed by: ORTHOPAEDIC SURGERY

## 2019-05-16 PROCEDURE — G8417 CALC BMI ABV UP PARAM F/U: HCPCS | Performed by: ORTHOPAEDIC SURGERY

## 2019-05-16 PROCEDURE — 28490 TREAT BIG TOE FRACTURE: CPT | Performed by: ORTHOPAEDIC SURGERY

## 2019-05-16 PROCEDURE — G8598 ASA/ANTIPLAT THER USED: HCPCS | Performed by: ORTHOPAEDIC SURGERY

## 2019-05-16 PROCEDURE — G8427 DOCREV CUR MEDS BY ELIG CLIN: HCPCS | Performed by: ORTHOPAEDIC SURGERY

## 2019-05-16 PROCEDURE — 3017F COLORECTAL CA SCREEN DOC REV: CPT | Performed by: ORTHOPAEDIC SURGERY

## 2019-05-16 RX ORDER — CEPHALEXIN 500 MG/1
500 CAPSULE ORAL 4 TIMES DAILY
Qty: 40 CAPSULE | Refills: 0 | Status: ON HOLD | OUTPATIENT
Start: 2019-05-16 | End: 2019-06-06 | Stop reason: HOSPADM

## 2019-05-16 NOTE — PROGRESS NOTES
symmetric    Additional Comments:       Additional Examinations:         Right Lower Extremity: Examination of the right lower extremity does not show any tenderness, deformity or injury. Range of motion is unremarkable. There is no gross instability. There are no rashes, ulcerations or lesions. Strength and tone are normal.    Radiology:     X-rays obtained and reviewed in office:  Views 3  Location left foot  Impression shows question of a transverse fracture through the base of P1 great toe          Assessment : This patient has a left great toe fracture with nail plate injury    Impression:  Encounter Diagnosis   Name Primary?  Foot pain, left Yes       Office Procedures:  No orders of the defined types were placed in this encounter. Treatment Plan:  The etiology of toe fractures and it's appropriate treatment were discussed in great detail.   She is going to do warm soapy water soaks twice a day put Polysporin on use Keflex 500 by mouth 4 times a day and I discussed the side effects of this medicine she'll follow-up in 2 weeks for wound check repeat x-rays of the foot

## 2019-05-22 NOTE — TELEPHONE ENCOUNTER
I don't see that she has used this before. Pls ask her if she requested this nasal spray, and if so, what are her symptoms.  Thx.

## 2019-05-22 NOTE — TELEPHONE ENCOUNTER
LOV 3/6/2019    Future Appointments   Date Time Provider Roxanna Martinez   5/30/2019 10:00 AM Nilsa Perez MD [facility] [place]   6/5/2019 10:00 AM Maritza Miller MD [facility]     Please give rx to me once you've signed it.

## 2019-05-23 RX ORDER — AZELASTINE HYDROCHLORIDE 137 UG/1
2 SPRAY, METERED NASAL 2 TIMES DAILY
Qty: 1 BOTTLE | Refills: 3 | OUTPATIENT
Start: 2019-05-23 | End: 2019-01-01

## 2019-05-23 NOTE — TELEPHONE ENCOUNTER
Pt states she did request this and that she has congestion and a lot of nasal drainage. Please advise.

## 2019-05-23 NOTE — TELEPHONE ENCOUNTER
Pls let her know this rx is an antihistamine, so it helps a runny nose. If she has gotten away from daily flonase, pls have her resume it. OR, if she is using flonase qd, pls have her increase it to bid. Flonase has to be used daily for it to be effective, so if she is missing some days, pls have her use it routinely.

## 2019-05-23 NOTE — TELEPHONE ENCOUNTER
This was for the form you had given back to me yesterday and asked me to request it through her chart.

## 2019-05-24 ENCOUNTER — HOSPITAL ENCOUNTER (EMERGENCY)
Age: 59
Discharge: HOME | End: 2019-05-24
Attending: EMERGENCY MEDICINE
Payer: COMMERCIAL

## 2019-05-24 ENCOUNTER — APPOINTMENT (OUTPATIENT)
Dept: GENERAL RADIOLOGY | Age: 59
End: 2019-05-24
Payer: COMMERCIAL

## 2019-05-24 VITALS
BODY MASS INDEX: 34.75 KG/M2 | SYSTOLIC BLOOD PRESSURE: 102 MMHG | OXYGEN SATURATION: 97 % | DIASTOLIC BLOOD PRESSURE: 49 MMHG | TEMPERATURE: 97.7 F | WEIGHT: 177 LBS | RESPIRATION RATE: 18 BRPM | HEIGHT: 60 IN | HEART RATE: 66 BPM

## 2019-05-24 DIAGNOSIS — S91.112A LACERATION OF LEFT GREAT TOE WITHOUT FOREIGN BODY PRESENT OR DAMAGE TO NAIL, INITIAL ENCOUNTER: ICD-10-CM

## 2019-05-24 DIAGNOSIS — R73.9 ELEVATED BLOOD SUGAR: ICD-10-CM

## 2019-05-24 DIAGNOSIS — S99.922A TOE INJURY, LEFT, INITIAL ENCOUNTER: Primary | ICD-10-CM

## 2019-05-24 LAB
A/G RATIO: 1.4 (ref 1.1–2.2)
ALBUMIN SERPL-MCNC: 4.1 G/DL (ref 3.4–5)
ALP BLD-CCNC: 84 U/L (ref 40–129)
ALT SERPL-CCNC: 7 U/L (ref 10–40)
ANION GAP SERPL CALCULATED.3IONS-SCNC: 15 MMOL/L (ref 3–16)
AST SERPL-CCNC: 8 U/L (ref 15–37)
BASOPHILS ABSOLUTE: 0.1 K/UL (ref 0–0.2)
BASOPHILS RELATIVE PERCENT: 0.8 %
BETA-HYDROXYBUTYRATE: 0.3 MMOL/L (ref 0–0.27)
BILIRUB SERPL-MCNC: 0.3 MG/DL (ref 0–1)
BILIRUBIN URINE: NEGATIVE
BLOOD, URINE: NEGATIVE
BUN BLDV-MCNC: 16 MG/DL (ref 7–20)
CALCIUM SERPL-MCNC: 9.4 MG/DL (ref 8.3–10.6)
CHLORIDE BLD-SCNC: 91 MMOL/L (ref 99–110)
CLARITY: CLEAR
CO2: 20 MMOL/L (ref 21–32)
COLOR: YELLOW
CREAT SERPL-MCNC: 1.1 MG/DL (ref 0.6–1.1)
EOSINOPHILS ABSOLUTE: 0.3 K/UL (ref 0–0.6)
EOSINOPHILS RELATIVE PERCENT: 2.7 %
GFR AFRICAN AMERICAN: >60
GFR NON-AFRICAN AMERICAN: 51
GLOBULIN: 3 G/DL
GLUCOSE BLD-MCNC: 415 MG/DL (ref 70–99)
GLUCOSE BLD-MCNC: 532 MG/DL (ref 70–99)
GLUCOSE BLD-MCNC: 548 MG/DL (ref 70–99)
GLUCOSE URINE: >=1000 MG/DL
HCT VFR BLD CALC: 36.4 % (ref 36–48)
HEMOGLOBIN: 12.4 G/DL (ref 12–16)
KETONES, URINE: NEGATIVE MG/DL
LEUKOCYTE ESTERASE, URINE: NEGATIVE
LYMPHOCYTES ABSOLUTE: 3.3 K/UL (ref 1–5.1)
LYMPHOCYTES RELATIVE PERCENT: 31.2 %
MCH RBC QN AUTO: 31.8 PG (ref 26–34)
MCHC RBC AUTO-ENTMCNC: 34.2 G/DL (ref 31–36)
MCV RBC AUTO: 93 FL (ref 80–100)
MICROSCOPIC EXAMINATION: ABNORMAL
MONOCYTES ABSOLUTE: 0.6 K/UL (ref 0–1.3)
MONOCYTES RELATIVE PERCENT: 5.8 %
NEUTROPHILS ABSOLUTE: 6.2 K/UL (ref 1.7–7.7)
NEUTROPHILS RELATIVE PERCENT: 59.5 %
NITRITE, URINE: NEGATIVE
PDW BLD-RTO: 13.5 % (ref 12.4–15.4)
PERFORMED ON: ABNORMAL
PERFORMED ON: ABNORMAL
PH UA: 5.5 (ref 5–8)
PLATELET # BLD: 319 K/UL (ref 135–450)
PMV BLD AUTO: 9.8 FL (ref 5–10.5)
POTASSIUM SERPL-SCNC: 4.8 MMOL/L (ref 3.5–5.1)
PROTEIN UA: NEGATIVE MG/DL
RBC # BLD: 3.91 M/UL (ref 4–5.2)
SODIUM BLD-SCNC: 126 MMOL/L (ref 136–145)
SPECIFIC GRAVITY UA: <=1.005 (ref 1–1.03)
TOTAL PROTEIN: 7.1 G/DL (ref 6.4–8.2)
URINE REFLEX TO CULTURE: ABNORMAL
URINE TYPE: ABNORMAL
UROBILINOGEN, URINE: 0.2 E.U./DL
WBC # BLD: 10.5 K/UL (ref 4–11)

## 2019-05-24 PROCEDURE — 82010 KETONE BODYS QUAN: CPT

## 2019-05-24 PROCEDURE — 6370000000 HC RX 637 (ALT 250 FOR IP): Performed by: NURSE PRACTITIONER

## 2019-05-24 PROCEDURE — 96372 THER/PROPH/DIAG INJ SC/IM: CPT

## 2019-05-24 PROCEDURE — 80053 COMPREHEN METABOLIC PANEL: CPT

## 2019-05-24 PROCEDURE — 81003 URINALYSIS AUTO W/O SCOPE: CPT

## 2019-05-24 PROCEDURE — 99285 EMERGENCY DEPT VISIT HI MDM: CPT

## 2019-05-24 PROCEDURE — 85025 COMPLETE CBC W/AUTO DIFF WBC: CPT

## 2019-05-24 PROCEDURE — 73660 X-RAY EXAM OF TOE(S): CPT

## 2019-05-24 PROCEDURE — 99284 EMERGENCY DEPT VISIT MOD MDM: CPT

## 2019-05-24 RX ORDER — INSULIN GLARGINE 100 [IU]/ML
45 INJECTION, SOLUTION SUBCUTANEOUS ONCE
Status: COMPLETED | OUTPATIENT
Start: 2019-05-24 | End: 2019-05-24

## 2019-05-24 RX ADMIN — INSULIN LISPRO 25 UNITS: 100 INJECTION, SOLUTION INTRAVENOUS; SUBCUTANEOUS at 21:07

## 2019-05-24 RX ADMIN — INSULIN GLARGINE 45 UNITS: 100 INJECTION, SOLUTION SUBCUTANEOUS at 21:09

## 2019-05-24 ASSESSMENT — PAIN DESCRIPTION - LOCATION: LOCATION: FOOT

## 2019-05-24 ASSESSMENT — PAIN DESCRIPTION - ORIENTATION: ORIENTATION: LEFT

## 2019-05-24 ASSESSMENT — PAIN SCALES - GENERAL: PAINLEVEL_OUTOF10: 10

## 2019-05-24 NOTE — ED NOTES
Pt resting in bed, oxygen level dropped to 88%, oxygen per NC at 2L placed on pt.      Amandeep Anderson RN  05/24/19 1932

## 2019-05-24 NOTE — ED NOTES
Left foot nail bed was pulled away from the skin  Bleeding controlled      Louis Quezada RN  05/24/19 4352

## 2019-05-25 NOTE — ED PROVIDER NOTES
CHIEF COMPLAINT  Chief Complaint   Patient presents with    Toe Injury     Pt states that she was here about 2 weeks ago for a left foot great toe injury, today pt got toe caught up in some clothing and resplit the great toe open again.  Fatigue     While taking vital signs, pt b/p was 89/50, I checked pt b/p from prior visit and it was in 130's so asked pt if she was feeling alright, she stated no, she feels really tired. HISTORY OF PRESENT ILLNESS  Chanel Hoskins is a 63 y.o. female presents to the emergency room by private vehicle accompanied by family for evaluation of a left great toe reinjury just prior to arrival.  The patient for an initial injury 2 weeks ago requiring stitches and orthopedic follow-up. Today she got caught up in some clothing and resplinted great toe. Daughter reports that the toe has been healing slowly. Patient has severe pain post injury described as a throbbing sensation worse with weightbearing and manipulation improving at rest.  Basic wound care prior to arrival and medications were taken. She says she's been tired today and she did not have time to take her medications. She has not checked her sugar but states that it runs pretty high normally 250-300s. Is not feeling ill just states she is a little more tired than usual.  Denies fall, head injury, LOC, inability to ambulate, fever, chills, diaphoresis, syncope, headache, chest pain, difficulty breathing, shortness of breath, wheeze, difficulty handling secretions, nausea, vomiting, diarrhea, abdominal pain, constipation, dysuria, urgency, hematuria, and sick contact. No other complaints, modifying factors or associated symptoms. Nursing notes reviewed.      Past Medical History:   Diagnosis Date    Anemia 6/2/2015    Arthritis     Asthma     Back pain, chronic     DDD    CAD (coronary artery disease)     angiogram 2/26, CONNIE x1 to mid Cirflx    CHF (congestive heart failure) (HCC)     Chronic times daily 40 capsule 0    insulin glargine (BASAGLAR KWIKPEN) 100 UNIT/ML injection pen Inject 45 Units into the skin 2 times daily 30 mL 0    clonazePAM (KLONOPIN) 0.5 MG tablet TAKE ONE TABLET BY MOUTH THREE TIMES A DAY AS NEEDED FOR ANXIETY 30 tablet 0    isosorbide mononitrate (IMDUR) 30 MG extended release tablet Take 1 tablet by mouth daily. 30 tablet 5    SPIRIVA RESPIMAT 2.5 MCG/ACT AERS inhaler INHALE 2 PUFFS BY MOUTH DAILY 1 Inhaler 5    metFORMIN (GLUCOPHAGE) 1000 MG tablet Take 1 tablet by mouth twice daily with meals. 60 tablet 5    clopidogrel (PLAVIX) 75 MG tablet Take 1 tablet by mouth daily. 30 tablet 5    metoprolol tartrate (LOPRESSOR) 25 MG tablet Take 1 tablet by mouth twice daily. 60 tablet 5    gabapentin (NEURONTIN) 300 MG capsule Take 3 capsules by mouth 3 times daily. 270 capsule 1    levothyroxine (SYNTHROID) 50 MCG tablet Take 1 tablet by mouth daily. 30 tablet 3    ferrous sulfate 325 (65 Fe) MG EC tablet Take 1 tablet by mouth daily (with breakfast) 30 tablet 3    atorvastatin (LIPITOR) 40 MG tablet Take 1 tablet by mouth daily. 30 tablet 3    furosemide (LASIX) 20 MG tablet Take 1 tablet by mouth twice daily. 60 tablet 3    SYMBICORT 160-4.5 MCG/ACT AERO Inhale 2 puffs by mouth twice daily. 1 Inhaler 3    linagliptin (TRADJENTA) 5 MG tablet TAKE ONE TABLET BY MOUTH DAILY 30 tablet 5    ranolazine (RANEXA) 500 MG extended release tablet Take 1 tablet by mouth twice daily. 180 tablet 3    albuterol (PROVENTIL) (2.5 MG/3ML) 0.083% nebulizer solution Take 3 mLs by nebulization every 6 hours as needed for Wheezing 120 each 5    fluticasone (FLONASE) 50 MCG/ACT nasal spray 2 sprays by Nasal route daily 1 Bottle 5    oxybutynin (DITROPAN) 5 MG tablet 1 po q am and 2 po qhs 90 tablet 5    nitroGLYCERIN (NITROSTAT) 0.4 MG SL tablet DISSOLVE ONE TABLET UNDER THE TONGUE AS NEEDED FOR CHEST PAIN EVERY 5 MINUTES UP TO 3 TIMES.  IF NO RELIEF CALL 911. 25 tablet 3    insulin lispro (HUMALOG KWIKPEN) 100 UNIT/ML pen Inject 25 Units into the skin 3 times daily (before meals) 30 mL 1    Liraglutide (VICTOZA) 18 MG/3ML SOPN SC injection Inject 1.2 mg into the skin daily 6 mL 1    lisinopril (PRINIVIL;ZESTRIL) 5 MG tablet Take 1 tablet by mouth daily 90 tablet 3    Cholecalciferol (VITAMIN D3) 1000 units TABS Take 1 tablet by mouth daily 30 tablet 5    omeprazole (PRILOSEC) 40 MG delayed release capsule Take 1 capsule by mouth daily 30 capsule 5    sertraline (ZOLOFT) 100 MG tablet Take 1 tablet by mouth daily. 30 tablet 5    guaiFENesin (MUCINEX) 600 MG extended release tablet Take 1 tablet by mouth 2 times daily as needed for Congestion 60 tablet 5    buPROPion (WELLBUTRIN XL) 300 MG extended release tablet TAKE ONE TABLET BY MOUTH EVERY MORNING 30 tablet 5    ibuprofen (ADVIL;MOTRIN) 400 MG tablet Take 1 tablet by mouth every 6 hours as needed for Pain 20 tablet 0    phenol 1.4 % AERS Take 1 spray by mouth every 2 hours as needed (for sore throat) 1 Bottle 0    oxyCODONE-acetaminophen (PERCOCET) 5-325 MG per tablet Take 1 tablet by mouth every 8 hours as needed for Pain .  Fluocinonide 0.1 % CREA Apply to affected area twice daily as needed for dry skin. 240 g 0    montelukast (SINGULAIR) 10 MG tablet Take 1 tablet by mouth daily 30 tablet 3    vitamin B-12 (CYANOCOBALAMIN) 1000 MCG tablet Take 1 tablet by mouth daily 30 tablet 3    tiZANidine (ZANAFLEX) 4 MG tablet TAKE ONE-HALF TO ONE THREE TIMES A DAY AS NEEDED FOR BACK PAIN 60 tablet 5    Artificial Saliva (BIOTENE MOISTURIZING MOUTH) SOLN USE 1 SPRAY DAILY AS NEEDED FOR DRY MOUTH 44.3 mL 4    VENTOLIN  (90 BASE) MCG/ACT inhaler INHALE TWO PUFFS BY MOUTH EVERY 6 HOURS AS NEEDED FOR WHEEZING 3 Inhaler 3    fexofenadine (WAL-FEX ALLERGY) 180 MG tablet 1 po qd, to replace claritin/loratidine.  30 tablet 5    simethicone (MYLICON) 80 MG chewable tablet Take 1 tablet by mouth 4 times daily as needed for Flatulence 60 tablet 1    aspirin (ASPIRIN CHILDRENS) 81 MG chewable tablet Take 1 tablet by mouth daily. 30 tablet 3    Insulin Pen Needle (B-D UF III MINI PEN NEEDLES) 31G X 5 MM MISC USE TO INJECT INSULIN and victoza 6 TIMES A  each 11    blood glucose test strips (FREESTYLE LITE) strip TEST THREE TIMES A  strip 2    FREESTYLE LANCETS MISC USE TO TEST BLOOD SUGAR THREE TIMES DAILY 300 each 3    Alcohol Swabs PADS UAD to test blood sugar & inject insulin 100 each 5     Allergies   Allergen Reactions    Januvia [Sitagliptin] Other (See Comments)     Causes severe back pain and cramps    Iodine Rash       REVIEW OF SYSTEMS  10 systems reviewed, pertinent positives per HPI otherwise noted to be negative    PHYSICAL EXAM  BP (!) 102/49   Pulse 66   Temp 97.7 °F (36.5 °C) (Oral)   Resp 18   Ht 5' (1.524 m)   Wt 177 lb (80.3 kg)   LMP  (LMP Unknown)   SpO2 97%   BMI 34.57 kg/m²     Physical Exam   Constitutional: She is oriented to person, place, and time. She appears well-developed and well-nourished. She is active. Non-toxic appearance. No distress. HENT:   Head: Normocephalic and atraumatic. Eyes: Conjunctivae are normal. Right eye exhibits no discharge. Left eye exhibits no discharge. Neck: Normal range of motion. Neck supple. Cardiovascular: Normal rate, regular rhythm and normal heart sounds. No murmur heard. Pulmonary/Chest: Effort normal and breath sounds normal. No respiratory distress. She has no wheezes. Abdominal: Soft. Bowel sounds are normal.   Musculoskeletal:        Left foot: There is tenderness (generalized), swelling (mild lateral) and laceration (1 cm medial 1 cm lateral ). There is normal range of motion, no bony tenderness and normal capillary refill. Deformity: dehised wound medially without abscess or drainage. no s/s celllulitis         Feet:    Neurological: She is alert and oriented to person, place, and time. She has normal strength.  Coordination normal.   Skin: Skin is warm, dry and intact. No rash noted. She is not diaphoretic. Psychiatric: She has a normal mood and affect. Her speech is normal and behavior is normal. Judgment and thought content normal. Cognition and memory are normal.   Nursing note and vitals reviewed.       Labs:    Labs Reviewed   CBC WITH AUTO DIFFERENTIAL - Abnormal; Notable for the following components:       Result Value    RBC 3.91 (*)     All other components within normal limits    Narrative:     Performed at:  [facility] [address],  [place]   Phone [phone]   COMPREHENSIVE METABOLIC PANEL - Abnormal; Notable for the following components:    Sodium 126 (*)     Chloride 91 (*)     CO2 20 (*)     Glucose 548 (*)     GFR Non- 51 (*)     ALT 7 (*)     AST 8 (*)     All other components within normal limits    Narrative:     Performed at:  [facility] [address],  [place]   Phone [phone]   URINE RT REFLEX TO CULTURE - Abnormal; Notable for the following components:    Glucose, Ur >=1000 (*)     All other components within normal limits    Narrative:     Performed at:  [facility]) - [facility] [address],  [place], [place]   Phone [phone]   BETA-HYDROXYBUTYRATE - Abnormal; Notable for the following components:    Beta-Hydroxybutyrate 0.30 (*)     All other components within normal limits    Narrative:     Performed at:  [facility]) [facility],  [place], [place]   Phone [phone]   POCT GLUCOSE - Abnormal; Notable for the following components:    POC Glucose 532 (*)     All other components within normal limits    Narrative:     Performed at:  [facility],  [place]   Phone [phone]   POCT GLUCOSE - Abnormal; Notable for the following components: POC Glucose 415 (*)     All other components within normal limits    Narrative:     Performed at:  [facility] - [facility]  [address],  [place], [place]   Phone [phone]       All other labs were within normal range or not returned as of this dictation. EKG: All EKG's are interpreted by the Emergency Department Physician who either signs or Co-signs this chart in the absence of a cardiologist.Please see their note for interpretation of EKG. RADIOLOGY:   Non-plain film images such as CT, Ultrasound and MRI are read by the radiologist. Plain radiographic images are visualized and preliminarily interpreted by the  ED Provider with the below findings:    Interpretation per the Radiologistbelow, if available at the time of this note:    XR TOE LEFT (MIN 2 VIEWS)   Final Result   Healing fracture through the 1st distal phalanx. Xr Toe Left (min 2 Views)    Result Date: 5/24/2019  EXAMINATION: 3 XRAY VIEWS OF THE LEFT TOE 5/24/2019 7:09 pm COMPARISON: 05/06/2019 radiograph HISTORY: ORDERING SYSTEM PROVIDED HISTORY: Injury TECHNOLOGIST PROVIDED HISTORY: Reason for exam:->Injury Ordering Physician Provided Reason for Exam: Toe Injury (Pt states that she was here about 2 weeks ago for a left foot great toe injury, today pt got toe caught up in some clothing and resplit the great toe open again.) FINDINGS: Subtle lucency through the base of the 1st distal phalanx. This corresponds to suspected fracture described on the prior exam.  Some callus formation is noted. No evidence of displacement. No involvement of the interphalangeal joint. No other skeletal or soft tissue findings. Healing fracture through the 1st distal phalanx.            ED COURSE/ MDM    Patient was given the following medications:  Medications   insulin glargine (LANTUS) injection vial 45 Units (45 Units Subcutaneous Given 5/24/19 2109)   insulin lispro (HUMALOG) injection vial 25 Units (25 Units Subcutaneous Given 5/24/19 5732)            Patient seen and evaluated. Reviewed and summarized old medical records, labs ordered/reviewed, imaging ordered/reviewed, testing results reviewed with patient and/or family, procedure performed and discussed previous medical records or obtained history from someone other than the patient    After initial evaluation, differential diagnostic considerations included: abrasion/laceration, contusion, fracture, sprain/strain, dislocation, stroke, infection/sepsis, DKA    Laceration Repair Procedure Note    Indication: Laceration    Procedure: The patient was placed in the appropriate position and anesthesia around the laceration was obtained with a full digital block of the left first (great toe) using 1% Lidocaine without epinephrine. The area was then cleansed with Shur-Clens and draped in a sterile fashion. The laceration was closed with 5-0 Prolene using interrupted sutures. There were no additional lacerations requiring repair. The wound area was then dressed with a sterile dressing. Total repaired wound length: 2 cm. Other Items: Suture count: 3    The patient tolerated the procedure well. Complications: None        POCT blood sugar in triage 532. IV, Labs, Imaging, urinalysis ordered. XR of toe with healing fracture. Improving when compared. Review orthopedic office note and plan. Labs significant with baseline depressed GFR of 51, elevated glucose 548, Beta hydroxybutyrate of 0.3. Hyponatremia. However patient had normal ion gap and no ketones. Patient is not in DKA. Laceration repaired and elevated glucose treated. Stressed the importance of closely monitoring blood glucose and taking her home medications as prescribed. Stressed importance of PCP follow-up. Discussed signs and symptoms of infection, wound care, and appropriate follow up. Repeat BS improving 415.   Afebrile, non toxic inappearance, in no acute distress, pulse ox is 97% on room air and is not hypoxic. Shared medical decision was made between the patient myself and we agreed the patient could be discharged home with outpatient follow-up. Will be discharged to home in stable condition with outpatient follow up. Instructed patient and/or family to return to the emergency room for new or worsening symptoms and any concerns. Instructed to call referrals below to discuss ER visit and follow-up plan. I estimate there is LOW risk for COMPARTMENT SYNDROME, TENDON OR NEUROVASCULAR INJURY, OR FOREIGN BODY, thus I consider the discharge disposition reasonable. Also, there is no evidence or peritonitis, sepsis, or toxicity. The patient tolerated their visit well. The patient and / or the family were informed of the results of any tests, a time was given to answer questions, a plan was proposed and they agreed with plan. Patient was given scripts for the following medications. I counseled patient how to take these medications. Discharge Medication List as of 5/24/2019  9:07 PM          CLINICAL IMPRESSION  1. Toe injury, left, initial encounter    2. Elevated blood sugar    3. Laceration of left great toe without foreign body present or damage to nail, initial encounter        Blood pressure (!) 102/49, pulse 66, temperature 97.7 °F (36.5 °C), temperature source Oral, resp. rate 18, height 5' (1.524 m), weight 177 lb (80.3 kg), SpO2 97 %, not currently breastfeeding.     DISPOSITION  DISPOSITION Decision To Discharge 05/24/2019 09:05:26 PM      PATIENT REFERRED TO:  Scott Galvez MD  [address] [facility]  [phone]    Schedule an appointment as soon as possible for a visit   follow up    [facility] [address] [address]  Schedule an appointment as soon as possible for a visit   follow up    [place] [facility] [place] [facility]  [address] [facility]  [phone]  Go to   for concerns, worsening or emergent symptoms      DISCHARGE MEDICATIONS:  Discharge Medication List as of 5/24/2019  9:07 PM          DISCONTINUED MEDICATIONS:  Discharge Medication List as of 5/24/2019  9:07 PM      STOP taking these medications       sucralfate (CARAFATE) 1 GM tablet Comments:   Reason for Stopping:                        MITCHELL Boo CNP (electronically signed)    [address] [facility]    Please note that this chart was generated using Dragon dictation software. Although every effort was made to ensure the accuracy of this automated transcription, some errors intranscription may have occurred      Dr. Kaya Conde spent face to face time with patient and agrees with above dx and treatment.            IMTCHELL Boo CNP  05/31/19 0021

## 2019-05-25 NOTE — ED NOTES
Left big toe cleansed with sterile water and lobo-hex. Bulky dressing applied.       Keshia Reilly  05/24/19 9450

## 2019-05-25 NOTE — ED PROVIDER NOTES
I independently performed a history and physical on [address] [facility]. All diagnostic, treatment, and disposition decisions were made by myself in conjunction with the mid-level provider. Patient presents with bumping her toe today apparently she spent having a healing wound on her right great toe for some time. Then shows some tissues and the lateral medial great toe that somewhat dehisced. There is no sign of a abscess. He was elevated but she is not having any anion gap elevation. She'll be treated for hyperglycemia and discharged to follow-up with podiatry. For further details of [facility] emergency department encounter, please see DONNY documentation.         Manisha Carter MD  05/24/19 2056

## 2019-05-26 DIAGNOSIS — R09.82 PND (POST-NASAL DRIP): ICD-10-CM

## 2019-05-27 DIAGNOSIS — F32.A ANXIETY AND DEPRESSION: ICD-10-CM

## 2019-05-27 DIAGNOSIS — F41.9 ANXIETY AND DEPRESSION: ICD-10-CM

## 2019-05-27 DIAGNOSIS — N39.3 STRESS INCONTINENCE: ICD-10-CM

## 2019-05-28 DIAGNOSIS — E11.65 UNCONTROLLED TYPE 2 DIABETES MELLITUS WITH HYPERGLYCEMIA (HCC): Primary | ICD-10-CM

## 2019-05-28 RX ORDER — OXYBUTYNIN CHLORIDE 5 MG/1
TABLET ORAL
Qty: 90 TABLET | Refills: 4 | Status: SHIPPED | OUTPATIENT
Start: 2019-05-28 | End: 2019-01-01 | Stop reason: SDUPTHER

## 2019-05-28 RX ORDER — GABAPENTIN 300 MG/1
CAPSULE ORAL
Qty: 270 CAPSULE | Refills: 0 | Status: ON HOLD | OUTPATIENT
Start: 2019-05-28 | End: 2019-01-01 | Stop reason: HOSPADM

## 2019-05-28 RX ORDER — FLUTICASONE PROPIONATE 50 MCG
2 SPRAY, SUSPENSION (ML) NASAL DAILY
Qty: 16 G | Refills: 4 | Status: SHIPPED | OUTPATIENT
Start: 2019-05-28 | End: 2019-01-01 | Stop reason: SDUPTHER

## 2019-05-28 RX ORDER — SERTRALINE HYDROCHLORIDE 100 MG/1
100 TABLET, FILM COATED ORAL DAILY
Qty: 30 TABLET | Refills: 4 | Status: ON HOLD | OUTPATIENT
Start: 2019-05-28 | End: 2019-06-03

## 2019-06-02 ENCOUNTER — HOSPITAL ENCOUNTER (INPATIENT)
Age: 59
LOS: 3 days | Discharge: HOME | End: 2019-06-06
Attending: INTERNAL MEDICINE | Admitting: INTERNAL MEDICINE
Payer: COMMERCIAL

## 2019-06-02 DIAGNOSIS — G89.18 POSTOPERATIVE PAIN: ICD-10-CM

## 2019-06-02 DIAGNOSIS — L03.119 CELLULITIS OF FOOT: ICD-10-CM

## 2019-06-02 DIAGNOSIS — L08.9 LOCAL INFECTION OF SKIN AND SUBCUTANEOUS TISSUE: Primary | ICD-10-CM

## 2019-06-02 PROCEDURE — 99285 EMERGENCY DEPT VISIT HI MDM: CPT

## 2019-06-02 ASSESSMENT — PAIN SCALES - GENERAL: PAINLEVEL_OUTOF10: 10

## 2019-06-03 ENCOUNTER — APPOINTMENT (OUTPATIENT)
Dept: CT IMAGING | Age: 59
End: 2019-06-03
Payer: COMMERCIAL

## 2019-06-03 ENCOUNTER — ANESTHESIA EVENT (OUTPATIENT)
Dept: OPERATING ROOM | Age: 59
End: 2019-06-03
Payer: COMMERCIAL

## 2019-06-03 ENCOUNTER — ANESTHESIA (OUTPATIENT)
Dept: OPERATING ROOM | Age: 59
End: 2019-06-03
Payer: COMMERCIAL

## 2019-06-03 ENCOUNTER — APPOINTMENT (OUTPATIENT)
Dept: GENERAL RADIOLOGY | Age: 59
End: 2019-06-03
Payer: COMMERCIAL

## 2019-06-03 VITALS — SYSTOLIC BLOOD PRESSURE: 80 MMHG | OXYGEN SATURATION: 92 % | DIASTOLIC BLOOD PRESSURE: 39 MMHG

## 2019-06-03 PROBLEM — L08.9 TOE INFECTION: Status: ACTIVE | Noted: 2019-06-03

## 2019-06-03 PROBLEM — I20.1 CORONARY VASOSPASM (HCC): Status: RESOLVED | Noted: 2018-07-24 | Resolved: 2019-06-03

## 2019-06-03 LAB
A/G RATIO: 1.2 (ref 1.1–2.2)
ALBUMIN SERPL-MCNC: 3.8 G/DL (ref 3.4–5)
ALP BLD-CCNC: 83 U/L (ref 40–129)
ALT SERPL-CCNC: 6 U/L (ref 10–40)
ANION GAP SERPL CALCULATED.3IONS-SCNC: 14 MMOL/L (ref 3–16)
AST SERPL-CCNC: 7 U/L (ref 15–37)
BACTERIA: ABNORMAL /HPF
BASOPHILS ABSOLUTE: 0.2 K/UL (ref 0–0.2)
BASOPHILS RELATIVE PERCENT: 1.9 %
BILIRUB SERPL-MCNC: <0.2 MG/DL (ref 0–1)
BILIRUBIN URINE: NEGATIVE
BLOOD, URINE: NEGATIVE
BUN BLDV-MCNC: 16 MG/DL (ref 7–20)
CALCIUM SERPL-MCNC: 9.3 MG/DL (ref 8.3–10.6)
CHLORIDE BLD-SCNC: 94 MMOL/L (ref 99–110)
CLARITY: ABNORMAL
CO2: 23 MMOL/L (ref 21–32)
COLOR: YELLOW
CREAT SERPL-MCNC: 1.2 MG/DL (ref 0.6–1.1)
EOSINOPHILS ABSOLUTE: 0.3 K/UL (ref 0–0.6)
EOSINOPHILS RELATIVE PERCENT: 2.5 %
EPITHELIAL CELLS, UA: ABNORMAL /HPF
ESTIMATED AVERAGE GLUCOSE: 349.4 MG/DL
GFR AFRICAN AMERICAN: 56
GFR NON-AFRICAN AMERICAN: 46
GLOBULIN: 3.2 G/DL
GLUCOSE BLD-MCNC: 114 MG/DL (ref 70–99)
GLUCOSE BLD-MCNC: 259 MG/DL (ref 70–99)
GLUCOSE BLD-MCNC: 314 MG/DL (ref 70–99)
GLUCOSE BLD-MCNC: 451 MG/DL (ref 70–99)
GLUCOSE BLD-MCNC: 498 MG/DL (ref 70–99)
GLUCOSE URINE: >=1000 MG/DL
HBA1C MFR BLD: 13.8 %
HCT VFR BLD CALC: 36.9 % (ref 36–48)
HEMOGLOBIN: 12.5 G/DL (ref 12–16)
KETONES, URINE: NEGATIVE MG/DL
LACTIC ACID, SEPSIS: 2.9 MMOL/L (ref 0.4–1.9)
LACTIC ACID, SEPSIS: 3.5 MMOL/L (ref 0.4–1.9)
LACTIC ACID: 0.9 MMOL/L (ref 0.4–2)
LACTIC ACID: 1 MMOL/L (ref 0.4–2)
LACTIC ACID: 1.2 MMOL/L (ref 0.4–2)
LEUKOCYTE ESTERASE, URINE: ABNORMAL
LYMPHOCYTES ABSOLUTE: 3.2 K/UL (ref 1–5.1)
LYMPHOCYTES RELATIVE PERCENT: 30.6 %
MAGNESIUM: 1.7 MG/DL (ref 1.8–2.4)
MCH RBC QN AUTO: 32.2 PG (ref 26–34)
MCHC RBC AUTO-ENTMCNC: 33.9 G/DL (ref 31–36)
MCV RBC AUTO: 94.8 FL (ref 80–100)
MICROSCOPIC EXAMINATION: YES
MONOCYTES ABSOLUTE: 0.5 K/UL (ref 0–1.3)
MONOCYTES RELATIVE PERCENT: 5.2 %
NEUTROPHILS ABSOLUTE: 6.3 K/UL (ref 1.7–7.7)
NEUTROPHILS RELATIVE PERCENT: 59.8 %
NITRITE, URINE: NEGATIVE
PDW BLD-RTO: 13.7 % (ref 12.4–15.4)
PERFORMED ON: ABNORMAL
PH UA: 5.5 (ref 5–8)
PLATELET # BLD: 358 K/UL (ref 135–450)
PMV BLD AUTO: 10.6 FL (ref 5–10.5)
POTASSIUM SERPL-SCNC: 4.3 MMOL/L (ref 3.5–5.1)
PROTEIN UA: NEGATIVE MG/DL
RBC # BLD: 3.89 M/UL (ref 4–5.2)
RBC UA: ABNORMAL /HPF (ref 0–2)
SODIUM BLD-SCNC: 131 MMOL/L (ref 136–145)
SPECIFIC GRAVITY UA: <=1.005 (ref 1–1.03)
TOTAL PROTEIN: 7 G/DL (ref 6.4–8.2)
URINE REFLEX TO CULTURE: YES
URINE TYPE: ABNORMAL
UROBILINOGEN, URINE: 0.2 E.U./DL
WBC # BLD: 10.5 K/UL (ref 4–11)
WBC UA: ABNORMAL /HPF (ref 0–5)
YEAST: PRESENT /HPF

## 2019-06-03 PROCEDURE — 1200000000 HC SEMI PRIVATE

## 2019-06-03 PROCEDURE — 94761 N-INVAS EAR/PLS OXIMETRY MLT: CPT

## 2019-06-03 PROCEDURE — 73620 X-RAY EXAM OF FOOT: CPT

## 2019-06-03 PROCEDURE — 83605 ASSAY OF LACTIC ACID: CPT

## 2019-06-03 PROCEDURE — 2580000003 HC RX 258: Performed by: PODIATRIST

## 2019-06-03 PROCEDURE — 6370000000 HC RX 637 (ALT 250 FOR IP): Performed by: INTERNAL MEDICINE

## 2019-06-03 PROCEDURE — 94150 VITAL CAPACITY TEST: CPT

## 2019-06-03 PROCEDURE — 6360000002 HC RX W HCPCS: Performed by: EMERGENCY MEDICINE

## 2019-06-03 PROCEDURE — 2580000003 HC RX 258: Performed by: PHYSICIAN ASSISTANT

## 2019-06-03 PROCEDURE — 0HRNXK4 REPLACEMENT OF LEFT FOOT SKIN WITH NONAUTOLOGOUS TISSUE SUBSTITUTE, PARTIAL THICKNESS, EXTERNAL APPROACH: ICD-10-PCS | Performed by: PODIATRIST

## 2019-06-03 PROCEDURE — 6370000000 HC RX 637 (ALT 250 FOR IP): Performed by: PODIATRIST

## 2019-06-03 PROCEDURE — 6360000002 HC RX W HCPCS: Performed by: ANESTHESIOLOGY

## 2019-06-03 PROCEDURE — 2500000003 HC RX 250 WO HCPCS: Performed by: ANESTHESIOLOGY

## 2019-06-03 PROCEDURE — 81001 URINALYSIS AUTO W/SCOPE: CPT

## 2019-06-03 PROCEDURE — 87086 URINE CULTURE/COLONY COUNT: CPT

## 2019-06-03 PROCEDURE — 73700 CT LOWER EXTREMITY W/O DYE: CPT

## 2019-06-03 PROCEDURE — 96361 HYDRATE IV INFUSION ADD-ON: CPT

## 2019-06-03 PROCEDURE — 83036 HEMOGLOBIN GLYCOSYLATED A1C: CPT

## 2019-06-03 PROCEDURE — 96374 THER/PROPH/DIAG INJ IV PUSH: CPT

## 2019-06-03 PROCEDURE — 3700000001 HC ADD 15 MINUTES (ANESTHESIA): Performed by: PODIATRIST

## 2019-06-03 PROCEDURE — 6360000002 HC RX W HCPCS: Performed by: INTERNAL MEDICINE

## 2019-06-03 PROCEDURE — 2709999900 HC NON-CHARGEABLE SUPPLY: Performed by: PODIATRIST

## 2019-06-03 PROCEDURE — 99223 1ST HOSP IP/OBS HIGH 75: CPT | Performed by: INTERNAL MEDICINE

## 2019-06-03 PROCEDURE — 0Y6N0Z4 DETACHMENT AT LEFT FOOT, COMPLETE 1ST RAY, OPEN APPROACH: ICD-10-PCS | Performed by: PODIATRIST

## 2019-06-03 PROCEDURE — 6360000002 HC RX W HCPCS: Performed by: PODIATRIST

## 2019-06-03 PROCEDURE — 87040 BLOOD CULTURE FOR BACTERIA: CPT

## 2019-06-03 PROCEDURE — 88305 TISSUE EXAM BY PATHOLOGIST: CPT

## 2019-06-03 PROCEDURE — 7100000011 HC PHASE II RECOVERY - ADDTL 15 MIN: Performed by: PODIATRIST

## 2019-06-03 PROCEDURE — 3600000012 HC SURGERY LEVEL 2 ADDTL 15MIN: Performed by: PODIATRIST

## 2019-06-03 PROCEDURE — 80053 COMPREHEN METABOLIC PANEL: CPT

## 2019-06-03 PROCEDURE — 83735 ASSAY OF MAGNESIUM: CPT

## 2019-06-03 PROCEDURE — 2580000003 HC RX 258: Performed by: INTERNAL MEDICINE

## 2019-06-03 PROCEDURE — 3600000002 HC SURGERY LEVEL 2 BASE: Performed by: PODIATRIST

## 2019-06-03 PROCEDURE — 7100000010 HC PHASE II RECOVERY - FIRST 15 MIN: Performed by: PODIATRIST

## 2019-06-03 PROCEDURE — 2580000003 HC RX 258: Performed by: EMERGENCY MEDICINE

## 2019-06-03 PROCEDURE — 2580000003 HC RX 258: Performed by: ANESTHESIOLOGY

## 2019-06-03 PROCEDURE — 6360000002 HC RX W HCPCS: Performed by: PHYSICIAN ASSISTANT

## 2019-06-03 PROCEDURE — 3700000000 HC ANESTHESIA ATTENDED CARE: Performed by: PODIATRIST

## 2019-06-03 PROCEDURE — 85025 COMPLETE CBC W/AUTO DIFF WBC: CPT

## 2019-06-03 PROCEDURE — 2500000003 HC RX 250 WO HCPCS: Performed by: PODIATRIST

## 2019-06-03 PROCEDURE — 88311 DECALCIFY TISSUE: CPT

## 2019-06-03 PROCEDURE — 36415 COLL VENOUS BLD VENIPUNCTURE: CPT

## 2019-06-03 PROCEDURE — 94640 AIRWAY INHALATION TREATMENT: CPT

## 2019-06-03 DEVICE — PATCH AMNION 2 LAYR PROTCT 4 X 4CM STERISHIELD II: Type: IMPLANTABLE DEVICE | Site: FOOT | Status: FUNCTIONAL

## 2019-06-03 RX ORDER — OXYCODONE HYDROCHLORIDE AND ACETAMINOPHEN 5; 325 MG/1; MG/1
1 TABLET ORAL EVERY 4 HOURS PRN
Status: DISCONTINUED | OUTPATIENT
Start: 2019-06-03 | End: 2019-06-06 | Stop reason: HOSPADM

## 2019-06-03 RX ORDER — 0.9 % SODIUM CHLORIDE 0.9 %
1000 INTRAVENOUS SOLUTION INTRAVENOUS ONCE
Status: COMPLETED | OUTPATIENT
Start: 2019-06-03 | End: 2019-06-03

## 2019-06-03 RX ORDER — DEXTROSE MONOHYDRATE 50 MG/ML
100 INJECTION, SOLUTION INTRAVENOUS PRN
Status: DISCONTINUED | OUTPATIENT
Start: 2019-06-03 | End: 2019-06-03 | Stop reason: SDUPTHER

## 2019-06-03 RX ORDER — HYDROMORPHONE HCL 110MG/55ML
0.25 PATIENT CONTROLLED ANALGESIA SYRINGE INTRAVENOUS EVERY 5 MIN PRN
Status: DISCONTINUED | OUTPATIENT
Start: 2019-06-03 | End: 2019-06-03 | Stop reason: HOSPADM

## 2019-06-03 RX ORDER — ASPIRIN 81 MG/1
81 TABLET, CHEWABLE ORAL DAILY
Status: DISCONTINUED | OUTPATIENT
Start: 2019-06-03 | End: 2019-06-06 | Stop reason: HOSPADM

## 2019-06-03 RX ORDER — OXYCODONE HYDROCHLORIDE AND ACETAMINOPHEN 5; 325 MG/1; MG/1
1 TABLET ORAL PRN
Status: DISCONTINUED | OUTPATIENT
Start: 2019-06-03 | End: 2019-06-03 | Stop reason: HOSPADM

## 2019-06-03 RX ORDER — PROPOFOL 10 MG/ML
INJECTION, EMULSION INTRAVENOUS PRN
Status: DISCONTINUED | OUTPATIENT
Start: 2019-06-03 | End: 2019-06-03 | Stop reason: SDUPTHER

## 2019-06-03 RX ORDER — BUPIVACAINE HYDROCHLORIDE 5 MG/ML
INJECTION, SOLUTION PERINEURAL PRN
Status: DISCONTINUED | OUTPATIENT
Start: 2019-06-03 | End: 2019-06-03 | Stop reason: HOSPADM

## 2019-06-03 RX ORDER — RANOLAZINE 500 MG/1
500 TABLET, EXTENDED RELEASE ORAL 2 TIMES DAILY
Status: DISCONTINUED | OUTPATIENT
Start: 2019-06-03 | End: 2019-06-06 | Stop reason: HOSPADM

## 2019-06-03 RX ORDER — SIMETHICONE 80 MG
80 TABLET,CHEWABLE ORAL 4 TIMES DAILY PRN
Status: DISCONTINUED | OUTPATIENT
Start: 2019-06-03 | End: 2019-06-06 | Stop reason: HOSPADM

## 2019-06-03 RX ORDER — CHOLECALCIFEROL (VITAMIN D3) 125 MCG
1000 CAPSULE ORAL DAILY
Status: DISCONTINUED | OUTPATIENT
Start: 2019-06-03 | End: 2019-06-06 | Stop reason: HOSPADM

## 2019-06-03 RX ORDER — LEVOTHYROXINE SODIUM 0.05 MG/1
50 TABLET ORAL DAILY
Status: DISCONTINUED | OUTPATIENT
Start: 2019-06-03 | End: 2019-06-06 | Stop reason: HOSPADM

## 2019-06-03 RX ORDER — ATORVASTATIN CALCIUM 40 MG/1
40 TABLET, FILM COATED ORAL NIGHTLY
Status: DISCONTINUED | OUTPATIENT
Start: 2019-06-03 | End: 2019-06-06 | Stop reason: HOSPADM

## 2019-06-03 RX ORDER — ONDANSETRON 2 MG/ML
4 INJECTION INTRAMUSCULAR; INTRAVENOUS
Status: DISCONTINUED | OUTPATIENT
Start: 2019-06-03 | End: 2019-06-03 | Stop reason: HOSPADM

## 2019-06-03 RX ORDER — HYDRALAZINE HYDROCHLORIDE 20 MG/ML
5 INJECTION INTRAMUSCULAR; INTRAVENOUS EVERY 10 MIN PRN
Status: DISCONTINUED | OUTPATIENT
Start: 2019-06-03 | End: 2019-06-03 | Stop reason: HOSPADM

## 2019-06-03 RX ORDER — CLONAZEPAM 0.5 MG/1
0.5 TABLET ORAL 2 TIMES DAILY PRN
Status: DISCONTINUED | OUTPATIENT
Start: 2019-06-03 | End: 2019-06-06 | Stop reason: HOSPADM

## 2019-06-03 RX ORDER — FERROUS SULFATE 325(65) MG
325 TABLET ORAL
Status: DISCONTINUED | OUTPATIENT
Start: 2019-06-03 | End: 2019-06-06 | Stop reason: HOSPADM

## 2019-06-03 RX ORDER — MORPHINE SULFATE 4 MG/ML
4 INJECTION, SOLUTION INTRAMUSCULAR; INTRAVENOUS EVERY 30 MIN PRN
Status: DISCONTINUED | OUTPATIENT
Start: 2019-06-03 | End: 2019-06-03

## 2019-06-03 RX ORDER — OXYBUTYNIN CHLORIDE 5 MG/1
5 TABLET ORAL 2 TIMES DAILY
Status: DISCONTINUED | OUTPATIENT
Start: 2019-06-03 | End: 2019-06-06 | Stop reason: HOSPADM

## 2019-06-03 RX ORDER — ALBUTEROL SULFATE 2.5 MG/3ML
2.5 SOLUTION RESPIRATORY (INHALATION) EVERY 6 HOURS PRN
Status: DISCONTINUED | OUTPATIENT
Start: 2019-06-03 | End: 2019-06-06 | Stop reason: HOSPADM

## 2019-06-03 RX ORDER — 0.9 % SODIUM CHLORIDE 0.9 %
1000 INTRAVENOUS SOLUTION INTRAVENOUS ONCE
Status: DISCONTINUED | OUTPATIENT
Start: 2019-06-03 | End: 2019-06-03

## 2019-06-03 RX ORDER — NICOTINE 21 MG/24HR
1 PATCH, TRANSDERMAL 24 HOURS TRANSDERMAL DAILY
Status: DISCONTINUED | OUTPATIENT
Start: 2019-06-03 | End: 2019-06-06 | Stop reason: HOSPADM

## 2019-06-03 RX ORDER — SODIUM CHLORIDE 0.9 % (FLUSH) 0.9 %
10 SYRINGE (ML) INJECTION PRN
Status: DISCONTINUED | OUTPATIENT
Start: 2019-06-03 | End: 2019-06-06 | Stop reason: HOSPADM

## 2019-06-03 RX ORDER — LIDOCAINE HYDROCHLORIDE 20 MG/ML
INJECTION, SOLUTION INFILTRATION; PERINEURAL PRN
Status: DISCONTINUED | OUTPATIENT
Start: 2019-06-03 | End: 2019-06-03 | Stop reason: SDUPTHER

## 2019-06-03 RX ORDER — ALBUTEROL SULFATE 90 UG/1
2 AEROSOL, METERED RESPIRATORY (INHALATION) EVERY 4 HOURS PRN
Status: DISCONTINUED | OUTPATIENT
Start: 2019-06-03 | End: 2019-06-06 | Stop reason: HOSPADM

## 2019-06-03 RX ORDER — PANTOPRAZOLE SODIUM 40 MG/1
40 TABLET, DELAYED RELEASE ORAL
Status: DISCONTINUED | OUTPATIENT
Start: 2019-06-03 | End: 2019-06-06 | Stop reason: HOSPADM

## 2019-06-03 RX ORDER — SERTRALINE HYDROCHLORIDE 100 MG/1
100 TABLET, FILM COATED ORAL DAILY
Status: DISCONTINUED | OUTPATIENT
Start: 2019-06-03 | End: 2019-06-06 | Stop reason: HOSPADM

## 2019-06-03 RX ORDER — ISOSORBIDE MONONITRATE 30 MG/1
30 TABLET, EXTENDED RELEASE ORAL DAILY
Status: DISCONTINUED | OUTPATIENT
Start: 2019-06-04 | End: 2019-06-06 | Stop reason: HOSPADM

## 2019-06-03 RX ORDER — MORPHINE SULFATE 10 MG/ML
1 INJECTION, SOLUTION INTRAMUSCULAR; INTRAVENOUS EVERY 5 MIN PRN
Status: DISCONTINUED | OUTPATIENT
Start: 2019-06-03 | End: 2019-06-03 | Stop reason: HOSPADM

## 2019-06-03 RX ORDER — ONDANSETRON 2 MG/ML
4 INJECTION INTRAMUSCULAR; INTRAVENOUS ONCE
Status: DISCONTINUED | OUTPATIENT
Start: 2019-06-03 | End: 2019-06-03

## 2019-06-03 RX ORDER — LIDOCAINE HYDROCHLORIDE 10 MG/ML
INJECTION, SOLUTION EPIDURAL; INFILTRATION; INTRACAUDAL; PERINEURAL PRN
Status: DISCONTINUED | OUTPATIENT
Start: 2019-06-03 | End: 2019-06-03 | Stop reason: HOSPADM

## 2019-06-03 RX ORDER — DEXTROSE MONOHYDRATE 25 G/50ML
12.5 INJECTION, SOLUTION INTRAVENOUS PRN
Status: DISCONTINUED | OUTPATIENT
Start: 2019-06-03 | End: 2019-06-06 | Stop reason: HOSPADM

## 2019-06-03 RX ORDER — MEPERIDINE HYDROCHLORIDE 50 MG/ML
12.5 INJECTION INTRAMUSCULAR; INTRAVENOUS; SUBCUTANEOUS EVERY 5 MIN PRN
Status: DISCONTINUED | OUTPATIENT
Start: 2019-06-03 | End: 2019-06-03 | Stop reason: HOSPADM

## 2019-06-03 RX ORDER — DEXTROSE MONOHYDRATE 25 G/50ML
12.5 INJECTION, SOLUTION INTRAVENOUS PRN
Status: DISCONTINUED | OUTPATIENT
Start: 2019-06-03 | End: 2019-06-03 | Stop reason: SDUPTHER

## 2019-06-03 RX ORDER — SODIUM CHLORIDE 9 MG/ML
INJECTION, SOLUTION INTRAVENOUS CONTINUOUS
Status: DISCONTINUED | OUTPATIENT
Start: 2019-06-03 | End: 2019-06-05

## 2019-06-03 RX ORDER — DEXTROSE MONOHYDRATE 50 MG/ML
100 INJECTION, SOLUTION INTRAVENOUS PRN
Status: DISCONTINUED | OUTPATIENT
Start: 2019-06-03 | End: 2019-06-06 | Stop reason: HOSPADM

## 2019-06-03 RX ORDER — INSULIN GLARGINE 100 [IU]/ML
20 INJECTION, SOLUTION SUBCUTANEOUS 2 TIMES DAILY
Status: DISCONTINUED | OUTPATIENT
Start: 2019-06-03 | End: 2019-06-04

## 2019-06-03 RX ORDER — DIPHENHYDRAMINE HYDROCHLORIDE 50 MG/ML
12.5 INJECTION INTRAMUSCULAR; INTRAVENOUS
Status: DISCONTINUED | OUTPATIENT
Start: 2019-06-03 | End: 2019-06-03 | Stop reason: HOSPADM

## 2019-06-03 RX ORDER — LABETALOL HYDROCHLORIDE 5 MG/ML
5 INJECTION, SOLUTION INTRAVENOUS EVERY 10 MIN PRN
Status: DISCONTINUED | OUTPATIENT
Start: 2019-06-03 | End: 2019-06-03 | Stop reason: HOSPADM

## 2019-06-03 RX ORDER — CLONAZEPAM 0.5 MG/1
0.5 TABLET ORAL 3 TIMES DAILY
Status: DISCONTINUED | OUTPATIENT
Start: 2019-06-03 | End: 2019-06-03

## 2019-06-03 RX ORDER — NITROGLYCERIN 0.4 MG/1
0.4 TABLET SUBLINGUAL EVERY 5 MIN PRN
Status: DISCONTINUED | OUTPATIENT
Start: 2019-06-03 | End: 2019-06-06 | Stop reason: HOSPADM

## 2019-06-03 RX ORDER — HYDROMORPHONE HCL 110MG/55ML
1 PATIENT CONTROLLED ANALGESIA SYRINGE INTRAVENOUS
Status: DISCONTINUED | OUTPATIENT
Start: 2019-06-03 | End: 2019-06-05

## 2019-06-03 RX ORDER — SODIUM CHLORIDE 0.9 % (FLUSH) 0.9 %
10 SYRINGE (ML) INJECTION EVERY 12 HOURS SCHEDULED
Status: DISCONTINUED | OUTPATIENT
Start: 2019-06-03 | End: 2019-06-06 | Stop reason: HOSPADM

## 2019-06-03 RX ORDER — OXYCODONE HYDROCHLORIDE AND ACETAMINOPHEN 5; 325 MG/1; MG/1
2 TABLET ORAL PRN
Status: DISCONTINUED | OUTPATIENT
Start: 2019-06-03 | End: 2019-06-03 | Stop reason: HOSPADM

## 2019-06-03 RX ORDER — ISOSORBIDE MONONITRATE 30 MG/1
30 TABLET, EXTENDED RELEASE ORAL DAILY
Status: DISCONTINUED | OUTPATIENT
Start: 2019-06-03 | End: 2019-06-03

## 2019-06-03 RX ORDER — CLOPIDOGREL BISULFATE 75 MG/1
75 TABLET ORAL DAILY
Status: DISCONTINUED | OUTPATIENT
Start: 2019-06-03 | End: 2019-06-06 | Stop reason: HOSPADM

## 2019-06-03 RX ORDER — BUPROPION HYDROCHLORIDE 300 MG/1
300 TABLET ORAL DAILY
Status: DISCONTINUED | OUTPATIENT
Start: 2019-06-03 | End: 2019-06-06 | Stop reason: HOSPADM

## 2019-06-03 RX ORDER — MAGNESIUM HYDROXIDE 1200 MG/15ML
LIQUID ORAL CONTINUOUS PRN
Status: COMPLETED | OUTPATIENT
Start: 2019-06-03 | End: 2019-06-03

## 2019-06-03 RX ORDER — ONDANSETRON 2 MG/ML
4 INJECTION INTRAMUSCULAR; INTRAVENOUS EVERY 6 HOURS PRN
Status: DISCONTINUED | OUTPATIENT
Start: 2019-06-03 | End: 2019-06-06 | Stop reason: HOSPADM

## 2019-06-03 RX ORDER — OXYCODONE HYDROCHLORIDE AND ACETAMINOPHEN 5; 325 MG/1; MG/1
2 TABLET ORAL EVERY 4 HOURS PRN
Status: DISCONTINUED | OUTPATIENT
Start: 2019-06-03 | End: 2019-06-05

## 2019-06-03 RX ORDER — HYDROMORPHONE HCL 110MG/55ML
0.5 PATIENT CONTROLLED ANALGESIA SYRINGE INTRAVENOUS
Status: DISCONTINUED | OUTPATIENT
Start: 2019-06-03 | End: 2019-06-05

## 2019-06-03 RX ORDER — NICOTINE POLACRILEX 4 MG
15 LOZENGE BUCCAL PRN
Status: DISCONTINUED | OUTPATIENT
Start: 2019-06-03 | End: 2019-06-03 | Stop reason: SDUPTHER

## 2019-06-03 RX ORDER — HYDROMORPHONE HCL 110MG/55ML
0.5 PATIENT CONTROLLED ANALGESIA SYRINGE INTRAVENOUS EVERY 5 MIN PRN
Status: DISCONTINUED | OUTPATIENT
Start: 2019-06-03 | End: 2019-06-03 | Stop reason: HOSPADM

## 2019-06-03 RX ORDER — TIZANIDINE 4 MG/1
2 TABLET ORAL EVERY 6 HOURS PRN
Status: DISCONTINUED | OUTPATIENT
Start: 2019-06-03 | End: 2019-06-06 | Stop reason: HOSPADM

## 2019-06-03 RX ORDER — GABAPENTIN 300 MG/1
300 CAPSULE ORAL 3 TIMES DAILY
Status: DISCONTINUED | OUTPATIENT
Start: 2019-06-03 | End: 2019-06-06 | Stop reason: HOSPADM

## 2019-06-03 RX ORDER — MORPHINE SULFATE 10 MG/ML
2 INJECTION, SOLUTION INTRAMUSCULAR; INTRAVENOUS EVERY 5 MIN PRN
Status: DISCONTINUED | OUTPATIENT
Start: 2019-06-03 | End: 2019-06-03 | Stop reason: HOSPADM

## 2019-06-03 RX ORDER — PROMETHAZINE HYDROCHLORIDE 25 MG/ML
6.25 INJECTION, SOLUTION INTRAMUSCULAR; INTRAVENOUS
Status: DISCONTINUED | OUTPATIENT
Start: 2019-06-03 | End: 2019-06-03 | Stop reason: HOSPADM

## 2019-06-03 RX ORDER — NICOTINE POLACRILEX 4 MG
15 LOZENGE BUCCAL PRN
Status: DISCONTINUED | OUTPATIENT
Start: 2019-06-03 | End: 2019-06-06 | Stop reason: HOSPADM

## 2019-06-03 RX ORDER — SODIUM CHLORIDE 9 MG/ML
INJECTION, SOLUTION INTRAVENOUS CONTINUOUS PRN
Status: DISCONTINUED | OUTPATIENT
Start: 2019-06-03 | End: 2019-06-03 | Stop reason: SDUPTHER

## 2019-06-03 RX ORDER — ACETAMINOPHEN AND CODEINE PHOSPHATE 300; 30 MG/1; MG/1
1 TABLET ORAL EVERY 4 HOURS PRN
Status: DISCONTINUED | OUTPATIENT
Start: 2019-06-03 | End: 2019-06-05

## 2019-06-03 RX ADMIN — SODIUM CHLORIDE 1000 ML: 9 INJECTION, SOLUTION INTRAVENOUS at 01:39

## 2019-06-03 RX ADMIN — OXYBUTYNIN CHLORIDE 5 MG: 5 TABLET ORAL at 20:45

## 2019-06-03 RX ADMIN — GABAPENTIN 300 MG: 300 CAPSULE ORAL at 20:46

## 2019-06-03 RX ADMIN — PHENYLEPHRINE HYDROCHLORIDE 100 MCG: 10 INJECTION INTRAVENOUS at 17:42

## 2019-06-03 RX ADMIN — TIOTROPIUM BROMIDE 18 MCG: 18 CAPSULE ORAL; RESPIRATORY (INHALATION) at 06:54

## 2019-06-03 RX ADMIN — Medication 2 PUFF: at 06:54

## 2019-06-03 RX ADMIN — TIZANIDINE 2 MG: 4 TABLET ORAL at 20:40

## 2019-06-03 RX ADMIN — PIPERACILLIN SODIUM AND TAZOBACTAM SODIUM 3.38 G: 3; .375 INJECTION, POWDER, LYOPHILIZED, FOR SOLUTION INTRAVENOUS at 16:37

## 2019-06-03 RX ADMIN — METOPROLOL TARTRATE 25 MG: 25 TABLET ORAL at 07:52

## 2019-06-03 RX ADMIN — PHENYLEPHRINE HYDROCHLORIDE 100 MCG: 10 INJECTION INTRAVENOUS at 17:39

## 2019-06-03 RX ADMIN — Medication 2 PUFF: at 18:47

## 2019-06-03 RX ADMIN — VANCOMYCIN HYDROCHLORIDE 1250 MG: 5 INJECTION, POWDER, LYOPHILIZED, FOR SOLUTION INTRAVENOUS at 07:51

## 2019-06-03 RX ADMIN — HYDROMORPHONE HYDROCHLORIDE 1 MG: 2 INJECTION, SOLUTION INTRAMUSCULAR; INTRAVENOUS; SUBCUTANEOUS at 13:53

## 2019-06-03 RX ADMIN — HYDROMORPHONE HYDROCHLORIDE 1 MG: 2 INJECTION, SOLUTION INTRAMUSCULAR; INTRAVENOUS; SUBCUTANEOUS at 22:15

## 2019-06-03 RX ADMIN — HYDROMORPHONE HYDROCHLORIDE 1 MG: 2 INJECTION, SOLUTION INTRAMUSCULAR; INTRAVENOUS; SUBCUTANEOUS at 10:22

## 2019-06-03 RX ADMIN — SODIUM CHLORIDE: 900 INJECTION INTRAVENOUS at 17:27

## 2019-06-03 RX ADMIN — INSULIN LISPRO 6 UNITS: 100 INJECTION, SOLUTION INTRAVENOUS; SUBCUTANEOUS at 12:14

## 2019-06-03 RX ADMIN — MORPHINE SULFATE 4 MG: 4 INJECTION INTRAVENOUS at 02:10

## 2019-06-03 RX ADMIN — INSULIN LISPRO 4 UNITS: 100 INJECTION, SOLUTION INTRAVENOUS; SUBCUTANEOUS at 20:54

## 2019-06-03 RX ADMIN — SODIUM CHLORIDE: 9 INJECTION, SOLUTION INTRAVENOUS at 06:27

## 2019-06-03 RX ADMIN — SODIUM CHLORIDE: 9 INJECTION, SOLUTION INTRAVENOUS at 20:46

## 2019-06-03 RX ADMIN — PHENYLEPHRINE HYDROCHLORIDE 100 MCG: 10 INJECTION INTRAVENOUS at 17:44

## 2019-06-03 RX ADMIN — OXYCODONE HYDROCHLORIDE AND ACETAMINOPHEN 2 TABLET: 5; 325 TABLET ORAL at 23:31

## 2019-06-03 RX ADMIN — INSULIN GLARGINE 20 UNITS: 100 INJECTION, SOLUTION SUBCUTANEOUS at 07:52

## 2019-06-03 RX ADMIN — PROPOFOL 70 MG: 10 INJECTION, EMULSION INTRAVENOUS at 17:30

## 2019-06-03 RX ADMIN — LIDOCAINE HYDROCHLORIDE 3 ML: 20 INJECTION, SOLUTION INFILTRATION; PERINEURAL at 17:30

## 2019-06-03 RX ADMIN — DEXTROSE MONOHYDRATE 1 G: 5 INJECTION INTRAVENOUS at 02:59

## 2019-06-03 RX ADMIN — INSULIN GLARGINE 20 UNITS: 100 INJECTION, SOLUTION SUBCUTANEOUS at 20:54

## 2019-06-03 RX ADMIN — INSULIN LISPRO 12 UNITS: 100 INJECTION, SOLUTION INTRAVENOUS; SUBCUTANEOUS at 07:53

## 2019-06-03 RX ADMIN — PIPERACILLIN SODIUM AND TAZOBACTAM SODIUM 3.38 G: 3; .375 INJECTION, POWDER, LYOPHILIZED, FOR SOLUTION INTRAVENOUS at 07:52

## 2019-06-03 ASSESSMENT — PULMONARY FUNCTION TESTS
PIF_VALUE: 0
PIF_VALUE: 2
PIF_VALUE: 0

## 2019-06-03 ASSESSMENT — PAIN SCALES - GENERAL
PAINLEVEL_OUTOF10: 10
PAINLEVEL_OUTOF10: 0
PAINLEVEL_OUTOF10: 10
PAINLEVEL_OUTOF10: 10
PAINLEVEL_OUTOF10: 0
PAINLEVEL_OUTOF10: 10
PAINLEVEL_OUTOF10: 7

## 2019-06-03 ASSESSMENT — PAIN - FUNCTIONAL ASSESSMENT: PAIN_FUNCTIONAL_ASSESSMENT: ACTIVITIES ARE NOT PREVENTED

## 2019-06-03 ASSESSMENT — PAIN DESCRIPTION - ORIENTATION: ORIENTATION: LEFT

## 2019-06-03 ASSESSMENT — PAIN DESCRIPTION - PAIN TYPE: TYPE: ACUTE PAIN

## 2019-06-03 ASSESSMENT — PAIN DESCRIPTION - ONSET: ONSET: ON-GOING

## 2019-06-03 ASSESSMENT — PAIN DESCRIPTION - LOCATION
LOCATION: FOOT
LOCATION: FOOT
LOCATION: TOE (COMMENT WHICH ONE)
LOCATION: FOOT

## 2019-06-03 ASSESSMENT — PAIN DESCRIPTION - FREQUENCY: FREQUENCY: CONTINUOUS

## 2019-06-03 ASSESSMENT — PAIN DESCRIPTION - PROGRESSION: CLINICAL_PROGRESSION: NOT CHANGED

## 2019-06-03 NOTE — CONSULTS
Progress Note    Admit Date:  2019    Subjective:  63 y.o. female who is seen for evaluation of cellulitis and wound on the left foot. A few weeks ago sustained injury to the toe and nail became somewhat loose. Then reinjured the toe and had additional sutures applied. Was doing well until noticed increased redness and swelling in the toe. Developed pain in the leg as well. States sugar levels normally ran high.        Past Medical History:        Diagnosis Date    Anemia 2015    Arthritis     Asthma     Back pain, chronic     DDD    CAD (coronary artery disease)     angiogram , CONNIE x1 to mid Cirflx    CHF (congestive heart failure) (HCC)     Chronic bronchitis (HCC)     COPD (chronic obstructive pulmonary disease) (HCC)     Depression     Diabetes mellitus (HCC)     DVT (deep venous thrombosis) (HCC)     Fibromyalgia     GERD (gastroesophageal reflux disease)     Hx of blood clots     Hyperlipidemia     Hypertension     Lung disease     Neuropathy     Other disorders of kidney and ureter     kidney stones    Pneumonia     PVD [facility]peripheral vascular disease) ([facility] [address]     Sleep apnea 2015    Has CPAP, can't tolerate    STEMI (ST elevation myocardial infarction) ([facility] [address] 14    Dr. Roland Cheadle Thyroid disease     Urinary incontinence     Vascular complications of other UIFLBYN(363.11)     leg       Past Surgical History:        Procedure Laterality Date    APPENDECTOMY      CATARACT REMOVAL WITH IMPLANT Right 2015     SECTION      CORONARY ANGIOPLASTY WITH STENT PLACEMENT  14    DIAGNOSTIC CARDIAC CATH LAB PROCEDURE      DILATION AND CURETTAGE OF UTERUS      EYE SURGERY Left 2015    Cataract    LITHOTRIPSY      kidney stones    VASCULAR SURGERY      thrombectomy       Current Medications:    Current Facility-Administered Medications: albuterol (PROVENTIL) nebulizer solution 2.5 mg, 2.5 mg, Nebulization, Q6H PRN  BIOTENE MOISTURIZING MOUTH (MOUTHKOTE) Daily  nicotine (NICODERM CQ) 14 MG/24HR 1 patch, 1 patch, Transdermal, Daily  piperacillin-tazobactam (ZOSYN) 3.375 g in sodium chloride 0.9 % 100 mL IVPB extended infusion (mini-bag), 3.375 g, Intravenous, Q8H  vancomycin (VANCOCIN) 1,250 mg in dextrose 5 % 250 mL IVPB, 1,250 mg, Intravenous, Q24H  tiotropium (SPIRIVA) inhalation capsule 18 mcg, 18 mcg, Inhalation, Daily  glucose (GLUTOSE) 40 % oral gel 15 g, 15 g, Oral, PRN  dextrose 50 % solution 12.5 g, 12.5 g, Intravenous, PRN  glucagon (rDNA) injection 1 mg, 1 mg, Intramuscular, PRN  dextrose 5 % solution, 100 mL/hr, Intravenous, PRN  insulin lispro (HUMALOG) injection vial 0-12 Units, 0-12 Units, Subcutaneous, TID WC  insulin lispro (HUMALOG) injection vial 0-6 Units, 0-6 Units, Subcutaneous, Nightly  clonazePAM (KLONOPIN) tablet 0.5 mg, 0.5 mg, Oral, BID PRN  [START ON 2019] isosorbide mononitrate (IMDUR) extended release tablet 30 mg, 30 mg, Oral, Daily    Allergies:  Januvia [sitagliptin] and Iodine    Social History:    Social History     Tobacco Use    Smoking status: Current Every Day Smoker     Packs/day: 0.25     Years: 40.00     Pack years: 10.00     Types: Cigarettes    Smokeless tobacco: Former User    Tobacco comment: .5 ppd   Substance Use Topics    Alcohol use: No     Alcohol/week: 0.0 oz    Drug use: No       Family History:       Problem Relation Age of Onset    Other Mother          of unknown lung issue at 61    Diabetes Mother     Diabetes Brother     Heart Disease Maternal Grandfather     Cancer Maternal Grandmother     Asthma Grandchild        Review of Systems    CONSTITUTIONAL:  negative  EYES:  negative  HEENT:  negative  RESPIRATORY:  negative  CARDIOVASCULAR:  negative  GASTROINTESTINAL:  negative  GENITOURINARY:  negative  INTEGUMENT/BREAST:  positive for ulcer  MUSCULOSKELETAL:  positive for  pain  NEUROLOGICAL:  positive for numbness      Objective:   BP (!) 112/58   Pulse 75   Temp 97.9 °F (36.6 °C) (Oral) Resp 16   Ht 5' (1.524 m)   Wt 178 lb 9 oz (81 kg)   LMP  (LMP Unknown)   SpO2 91%   BMI 34.87 kg/m²     Data:  CBC:   Recent Labs     06/03/19  0035   WBC 10.5   HGB 12.5   HCT 36.9   MCV 94.8        BMP:   Recent Labs     06/03/19  0130   *   K 4.3   CL 94*   CO2 23   BUN 16   CREATININE 1.2*     LIVER PROFILE:   Recent Labs     06/03/19  0130   AST 7*   ALT 6*   BILITOT <0.2   ALKPHOS 83     PT/INR:   Recent Labs     06/03/19  0130   PROT 7.0     HgBA1c:  Lab Results   Component Value Date    LABA1C 13.8 06/03/2019       Cultures:     Imaging: CT left foot -   FINDINGS:  Left foot: An ulcer is present on the dorsal aspect of the 1st digit at the  nail bed with gas tracking into a fracture in the base of the distal phalanx  1st digit. No acute periostitis or bony destruction is present. No soft tissue abscess on the noncontrast CT. Remaining bones and joint  spaces in the foot are maintained. No additional fracture. Incidental  calcaneal spurs. Dorsal superficial soft tissue edema in the foot. No soft tissue gas. Muscle atrophy in the abductor digiti quinti muscle. Ring muscle ball  grossly maintained. No tendon tear by CT criteria. Left leg: The tibia and fibula are intact. No acute fracture. Motion  artifact is present at the level of the distal tibial plafond. The distal  tibia and fibula are intact. The remainder of the tibia and fibula are  intact. Muscle bulk of the leg is normal.  Circumferential soft tissue edema is  present especially in the distal leg. No discrete fluid collection. No  intramuscular edema. Impression:   1. Dorsal ulcer at the nail bed in the 1st digit tracking to a transverse  fracture in the base of the distal phalanx 1st digit. Intraosseous gas at  the site of the fracture. 2. Dorsal superficial soft tissue edema in the foot extending into the ankle  and distal leg. No fluid collection or abscess on the noncontrast CT.   3. No other acute abnormality in the left leg or left foot. Physical Exam:    General Appearance: alert and oriented to person, place and time, well developed and well- nourished, in no acute distress  Skin: warm and dry, no rash or erythema  Head: normocephalic and atraumatic  Eyes: pupils equal, round, and reactive to light, extraocular eye movements intact, conjunctivae normal  ENT: tympanic membrane, external ear and ear canal normal bilaterally, nose without deformity, nasal mucosa and turbinates normal without polyps  Neck: supple and non-tender without mass, no thyromegaly or thyroid nodules, no cervical lymphadenopathy  Pulmonary/Chest: clear to auscultation bilaterally- no wheezes, rales or rhonchi, normal air movement, no respiratory distress  Cardiovascular: normal rate, regular rhythm, normal S1 and S2, no murmurs, rubs, clicks, or gallops, distal pulses intact, no carotid bruits  Abdomen: soft, non-tender, non-distended, normal bowel sounds, no masses or organomegaly    DP/PT palpable bilateral   Left foot - Erythema and edema of the forefoot noted especially medial aspect. No increase in skin temperature noted. Suture noted on the medial aspect of the hallux nail plate towards the base. Nail plate loose and manipulation of the digit reveals gas bubbles from beneath the nail plate. Excessive motion noted just distal to the HIPJ consistent with the fracture site seen on CT. No crepitus noted with palpation. Edema of the digit noted to be more prominent dorsal aspect of the hallux just proximal to the nail fold. No purulence noted. Eschar and tissue necrosis noted on the lateral aspect of the digit. Purple discoloration of the digit noted. Muscle strength 5/5 bilateral LE.         Assessment:  Patient Active Problem List   Diagnosis Code    HLD (hyperlipidemia) E78.5    DM (diabetes mellitus), type 2, uncontrolled ([facility] [address] E11.65    PVD (peripheral vascular disease) (HCC) I73.9    HTN (hypertension) I10    Hypothyroid E03.9    GERD (gastroesophageal reflux disease) K21.9    STEMI (ST elevation myocardial infarction) ([facility]) I21.3    Morbid obesity with BMI of 45.0-49.9, adult ([facility]) E66.01, Z68.42    Localized osteoarthrosis, lower leg M17.10    Rotator cuff tendinitis M75.80    PFS (patellofemoral syndrome) M22.2X9    Sleep apnea G47.30    Disc displacement, lumbar M51.26    Neuropathy G62.9    Opiate analgesic contract exists Z02.89    Neck pain M54.2    Anemia D64.9    Insomnia G47.00    Pulmonary emphysema ([facility]) J43.9    Transient synovitis, left ankle and foot M67.372    Insomnia with sleep apnea G47.00, G47.30    JORDI on CPAP G47.33, Z99.89    Chronic low back pain M54.5, G89.29    COPD, moderate ([facility]) J44.9    Unstable angina ([facility]) I20.0    Anxiety F41.9    Chronic diastolic congestive heart failure ([facility]) I50.32    Coronary artery disease involving native coronary artery of native heart without angina pectoris I25.10    Ischemic cardiomyopathy I25.5    History of ST elevation myocardial infarction (STEMI) I25.2    Tobacco use Z72.0    Uncontrolled type 2 diabetes mellitus with hyperglycemia ([facility]) E11.65    Toe infection L08.9    Local infection of skin and subcutaneous tissue L08.9    Cellulitis of foot L03.119     Cellulitis left foot  diabetic foot ulcer left foot secondary to peripheral neuropathy   Osteomyelitis left foot  Gas gangrene left foot. Plan  Patient examined. Reviewed labs and imaging. Gas as seen on xray in location of the fracture plane which will result in marrow being exposed to bacteria creating osteomyelitis. Necrotic tissue noted on the lateral aspect of the digit. Has been NPO. Discussed xray and clinical findings with patient. Given clinical appearance and gas on CT I recommended surgical intervention at this time.  Will need minimum of hallux amputation and possible partial 1st metatarsal. Understands may be even may significant amputation depending on OR appearance. Discussed risks, complications, alternatives and benefits with patient. Understands chance of nonhealing wound, infection, need for further surgery, loss of limb or life. Plan for surgery later today. Needs to control glucose levels to prevent future complications. Continue IV antibiotics. Needs to stop smoking. Will follow. Thank you for allowing me to participate in the care of your patient.            Electronically signed by Viviana Hernandez DPM on 6/3/2019 at 12:57 PM.

## 2019-06-03 NOTE — ANESTHESIA PRE PROCEDURE
3/28/19  Yes Sara Fraga MD   atorvastatin (LIPITOR) 40 MG tablet Take 1 tablet by mouth daily. 3/28/19  Yes Sara Fraga MD   furosemide (LASIX) 20 MG tablet Take 1 tablet by mouth twice daily. 3/28/19  Yes Sara Fraga MD   SYMBICORT 160-4.5 MCG/ACT AERO Inhale 2 puffs by mouth twice daily. 3/28/19  Yes Sara Fraga MD   linagliptin (TRADJENTA) 5 MG tablet TAKE ONE TABLET BY MOUTH DAILY 3/25/19  Yes Leobardo Caceres MD   ranolazine (RANEXA) 500 MG extended release tablet Take 1 tablet by mouth twice daily.  12/13/18  Yes Nik Reis MD   albuterol (PROVENTIL) (2.5 MG/3ML) 0.083% nebulizer solution Take 3 mLs by nebulization every 6 hours as needed for Wheezing 12/13/18  Yes Sara Fraga MD   Insulin Pen Needle (B-D UF III MINI PEN NEEDLES) 31G X 5 MM MISC USE TO INJECT INSULIN and victoza 6 TIMES A DAY 10/29/18  Yes Leobardo Caceres MD   insulin lispro (HUMALOG KWIKPEN) 100 UNIT/ML pen Inject 25 Units into the skin 3 times daily (before meals) 10/24/18  Yes Leobardo Caceres MD   Liraglutide (VICTOZA) 18 MG/3ML SOPN SC injection Inject 1.2 mg into the skin daily 10/24/18  Yes Leobardo Caceres MD   lisinopril (PRINIVIL;ZESTRIL) 5 MG tablet Take 1 tablet by mouth daily 8/30/18  Yes MITCHELL Gonzalez - CNP   Cholecalciferol (VITAMIN D3) 1000 units TABS Take 1 tablet by mouth daily 8/30/18  Yes Sara Fraga MD   blood glucose test strips (FREESTYLE LITE) strip TEST THREE TIMES A DAY 8/30/18  Yes Leobardo Caceres MD   omeprazole (PRILOSEC) 40 MG delayed release capsule Take 1 capsule by mouth daily 6/14/18  Yes Sara Fraga MD   guaiFENesin (MUCINEX) 600 MG extended release tablet Take 1 tablet by mouth 2 times daily as needed for Congestion 5/24/18  Yes Sara Fraga MD   buPROPion (WELLBUTRIN XL) 300 MG extended release tablet TAKE ONE TABLET BY MOUTH EVERY MORNING 2/8/18  Yes Sara Fraga MD   oxyCODONE-acetaminophen (PERCOCET) 5-325 MG per tablet Take 1 tablet by mouth every 6 hours as needed for Pain. Yes Historical Provider, MD   montelukast (SINGULAIR) 10 MG tablet Take 1 tablet by mouth daily 8/2/17  Yes Meli Brown MD   vitamin B-12 (CYANOCOBALAMIN) 1000 MCG tablet Take 1 tablet by mouth daily 8/1/17  Yes Meli Brown MD   tiZANidine (ZANAFLEX) 4 MG tablet TAKE ONE-HALF TO ONE THREE TIMES A DAY AS NEEDED FOR BACK PAIN 7/11/17  Yes Meli Brown MD   VENTOLIN  (90 BASE) MCG/ACT inhaler INHALE TWO PUFFS BY MOUTH EVERY 6 HOURS AS NEEDED FOR WHEEZING 11/9/16  Yes Brenda Landrum MD   FREESTYLE LANCETS MISC USE TO TEST BLOOD SUGAR THREE TIMES DAILY 7/19/16  Yes Meli Brown MD   fexofenadine (WAL-FEX ALLERGY) 180 MG tablet 1 po qd, to replace claritin/loratidine. 2/8/16  Yes Meli Brown MD   Alcohol Swabs PADS UAD to test blood sugar & inject insulin 4/13/15  Yes Brenda Landrum MD   aspirin (ASPIRIN CHILDRENS) 81 MG chewable tablet Take 1 tablet by mouth daily. 2/28/14  Yes MITCHELL Mckeon CNP   nitroGLYCERIN (NITROSTAT) 0.4 MG SL tablet DISSOLVE ONE TABLET UNDER THE TONGUE AS NEEDED FOR CHEST PAIN EVERY 5 MINUTES UP TO 3 TIMES. IF NO RELIEF CALL 911. 10/24/18   Dante Rose MD   phenol 1.4 % AERS Take 1 spray by mouth every 2 hours as needed (for sore throat) 12/3/17   MITCHELL Ortiz CNP   Fluocinonide 0.1 % CREA Apply to affected area twice daily as needed for dry skin.  9/20/17   Meli Brown MD   Artificial Saliva (BIOTENE MOISTURIZING MOUTH) SOLN USE 1 SPRAY DAILY AS NEEDED FOR DRY MOUTH 2/27/17   Meli Brown MD   simethicone (MYLICON) 80 MG chewable tablet Take 1 tablet by mouth 4 times daily as needed for Flatulence 2/8/16   Meli Brown MD       Current medications:    Current Facility-Administered Medications   Medication Dose Route Frequency Provider Last Rate Last Dose    albuterol (PROVENTIL) nebulizer solution 2.5 mg  2.5 mg Nebulization Q6H PRN Armond Cristhian, DPM        BIOTENE MOISTURIZING MOUTH (MOUTHKOTE) SOLN   Oral Q8H PRN [facility] Quitter, DPM        aspirin chewable tablet 81 mg  81 mg Oral Daily Dulce Quitter, DPM        atorvastatin (LIPITOR) tablet 40 mg  40 mg Oral Nightly [facility] Quitter, DPM        buPROPion (WELLBUTRIN XL) extended release tablet 300 mg  300 mg Oral Daily [facility] Q[facility]ter, DPM        vitamin D (CHOLECALCIFEROL) tablet 1,000 Units  1,000 Units Oral Daily Dulcejo Quitter, DPM        clopidogrel (PLAVIX) tablet 75 mg  75 mg Oral Daily Dulcejo Quitter, DPM        ferrous sulfate tablet 325 mg  325 mg Oral Daily with breakfast [facility] Quit[facility], DPM        gabapentin (NEURONTIN) capsule 300 mg  300 mg Oral TID [facility] Q[facility], DPM        insulin glargine (LANTUS) injection vial 20 Units  20 Units Subcutaneous BID [facility] Quitter, DPM   20 Units at 06/03/19 0752    levothyroxine (SYNTHROID) tablet 50 mcg  50 mcg Oral Daily Dulcejo Quitter, DPM        metoprolol tartrate (LOPRESSOR) tablet 25 mg  25 mg Oral BID [facility] Q[facility], DPM   25 mg at 06/03/19 0752    nitroGLYCERIN (NITROSTAT) SL tablet 0.4 mg  0.4 mg Sublingual Q5 Min PRN [facility] Quitter, DPM        pantoprazole (PROTONIX) tablet 40 mg  40 mg Oral QAM AC [facility] Quitter, DPM        oxybutynin (DITROPAN) tablet 5 mg  5 mg Oral BID [facility] Q[facility], DPM        ranolazine (RANEXA) extended release tablet 500 mg  500 mg Oral BID [facility], DPM        sertraline (ZOLOFT) tablet 100 mg  100 mg Oral Daily [facility] Q[facility], DPM        simethicone (MYLICON) chewable tablet 80 mg  80 mg Oral 4x Daily PRN [facility] Q[facility], DPM        mometasone-formoterol (DULERA) 200-5 MCG/ACT inhaler 2 puff  2 puff Inhalation BID [facility] Quitter, DPM   2 puff at 06/03/19 0654    tiZANidine (ZANAFLEX) tablet 2 mg  2 mg Oral Q6H PRN [facility] Quitter, DPM        albuterol sulfate  (90 Base) MCG/ACT inhaler 2 puff  2 puff Inhalation Q4H PRN Dulce Dickey DPM        vitamin B-12 (CYANOCOBALAMIN) tablet 1,000 mcg  1,000 mcg Oral Daily Dulce Dickey DPM        HYDROmorphone (DILAUDID) injection 0.5 mg  0.5 mg Intravenous Q3H PRN Albino Harsh, DPM        Or    HYDROmorphone (DILAUDID) injection 1 mg  1 mg Intravenous Q3H PRN Albino Harsh, DPM   1 mg at 06/03/19 1353    0.9 % sodium chloride infusion   Intravenous Continuous Albino Harsh, DPM 75 mL/hr at 06/03/19 3731      sodium chloride flush 0.9 % injection 10 mL  10 mL Intravenous 2 times per day Albino Harsh, DPM        sodium chloride flush 0.9 % injection 10 mL  10 mL Intravenous PRN Albino Harsh, DPM        magnesium hydroxide (MILK OF MAGNESIA) 400 MG/5ML suspension 30 mL  30 mL Oral Daily PRN Albino Harsh, DPM        ondansetron (ZOFRAN) injection 4 mg  4 mg Intravenous Q6H PRN Albino Harsh, DPM        enoxaparin (LOVENOX) injection 40 mg  40 mg Subcutaneous Daily Albino Harsh, DPM        nicotine (NICODERM CQ) 14 MG/24HR 1 patch  1 patch Transdermal Daily Albino Harsh, DPM        piperacillin-tazobactam (ZOSYN) 3.375 g in sodium chloride 0.9 % 100 mL IVPB extended infusion (mini-bag)  3.375 g Intravenous Q8H Albino Harsh, DPM 25 mL/hr at 06/03/19 1637 3.375 g at 06/03/19 1637    vancomycin (VANCOCIN) 1,250 mg in dextrose 5 % 250 mL IVPB  1,250 mg Intravenous Q24H Albino Harsh, DPM   Stopped at 06/03/19 1357    tiotropium (SPIRIVA) inhalation capsule 18 mcg  18 mcg Inhalation Daily Albino Harsh, DPM   18 mcg at 06/03/19 0654    glucose (GLUTOSE) 40 % oral gel 15 g  15 g Oral PRN Albino Harsh, DPM        dextrose 50 % solution 12.5 g  12.5 g Intravenous PRN Labino Harsh, DPM        glucagon (rDNA) injection 1 mg  1 mg Intramuscular PRN Albino Harsh, DPM        dextrose 5 % solution  100 mL/hr Intravenous PRN Albino Harsh, DPM        insulin lispro (HUMALOG) injection vial 0-12 Units  0-12 Units Subcutaneous TID WC Albino Harsh, DPM   6 Units at 06/03/19 1214    insulin lispro (HUMALOG) injection vial 0-6 Units  0-6 Units Subcutaneous Nightly Albinoneel London DPM        clonazePAM (KLONOPIN) tablet 0.5 mg  0.5 mg Oral BID PRN Albino London DPM        [START ON 6/4/2019] isosorbide mononitrate (IMDUR) extended release tablet 30 mg  30 mg Oral Daily Terryl Snuffer, DPM        bupivacaine (MARCAINE) 0.5 % injection    PRN Terryl Snuffer, DPM   5 mL at 06/03/19 1737    lidocaine PF 1 % injection    PRN Terryl Snuffer, DPM   5 mL at 06/03/19 1738    sodium chloride 0.9 % irrigation    Continuous PRN Terryl Snuffer, DPM   3,000 mL at 06/03/19 1738    oxyCODONE-acetaminophen (PERCOCET) 5-325 MG per tablet 1 tablet  1 tablet Oral Q4H PRN Terryl Snuffer, DPM        Or    oxyCODONE-acetaminophen (PERCOCET) 5-325 MG per tablet 2 tablet  2 tablet Oral Q4H PRN Terryl Snuffer, DPM           Allergies:     Allergies   Allergen Reactions    Januvia [Sitagliptin] Other (See Comments)     Causes severe back pain and cramps    Iodine Rash       Problem List:    Patient Active Problem List   Diagnosis Code    HLD (hyperlipidemia) E78.5    DM (diabetes mellitus), type 2, uncontrolled ([facility] [address] E11.65    [facility] I73.9    HTN (hypertension) I10    Hypothyroid E03.9    GERD (gastroesophageal reflux disease) K21.9    STEMI (ST elevation myocardial infarction) ([facility] I21.3    Morbid obesity with BMI of 45.0-49.9, adult ([facility]) E66.01, Z68.42    Localized osteoarthrosis, lower leg M17.10    Rotator cuff tendinitis M75.80    PFS (patellofemoral syndrome) M22.2X9    Sleep apnea G47.30    Disc displacement, lumbar M51.26    Neuropathy G62.9    Opiate analgesic contract exists Z02.89    Neck pain M54.2    Anemia D64.9    Insomnia G47.00    Pulmonary emphysema ([facility]) J43.9    Transient synovitis, left ankle and foot M67.372    Insomnia with sleep apnea G47.00, G47.30    JORDI on CPAP G47.33, Z99.89    Chronic low back pain M54.5, G89.29    COPD, moderate ([facility]) J44.9    Unstable angina ([facility]) I20.0    Anxiety F41.9    Chronic diastolic congestive heart failure ([facility]) I50.32    Coronary artery disease involving native coronary artery of native heart without angina pectoris I25.10    Ischemic cardiomyopathy I25.5    History of ST elevation myocardial infarction (STEMI) I25.2    Tobacco use Z72.0    Uncontrolled type 2 diabetes mellitus with hyperglycemia (HCC) E11.65    Toe infection L08.9    Local infection of skin and subcutaneous tissue L08.9    Cellulitis of foot L03.119       Past Medical History:        Diagnosis Date    Anemia 2015    Arthritis     Asthma     Back pain, chronic     DDD    CAD (coronary artery disease)     angiogram , CONNIE x1 to mid Cirflx    CHF (congestive heart failure) (HCC)     Chronic bronchitis (HCC)     COPD (chronic obstructive pulmonary disease) (HCC)     Depression     Diabetes mellitus (HCC)     DVT (deep venous thrombosis) (HCC)     Fibromyalgia     GERD (gastroesophageal reflux disease)     Hx of blood clots     Hyperlipidemia     Hypertension     Lung disease     Neuropathy     Other disorders of kidney and ureter     kidney stones    Pneumonia     PVD (peripheral vascular disease) ([address]     Sleep apnea 2015    Has CPAP, can't tolerate    STEMI (ST elevation myocardial infarction) ([facility] [address] 14    Dr. Mane Loss Thyroid disease     Urinary incontinence     Vascular complications of other CGNOFSK(576.60)     leg       Past Surgical History:        Procedure Laterality Date    APPENDECTOMY      CATARACT REMOVAL WITH IMPLANT Right 2015     SECTION      CORONARY ANGIOPLASTY WITH STENT PLACEMENT  14    DIAGNOSTIC CARDIAC CATH LAB PROCEDURE      DILATION AND CURETTAGE OF UTERUS      EYE SURGERY Left 2015    Cataract    LITHOTRIPSY      kidney stones    OTHER SURGICAL HISTORY Left 2019    PARTIAL FOOT AMPUTATION WITH GRAFT APPLICATION (Left foot)    VASCULAR SURGERY      thrombectomy       Social History:    Social History     Tobacco Use    Smoking status: Current Every Day Smoker     Packs/day: 0.25     Years: 40.00     Pack years: 10.00     Types: Cigarettes    Smokeless tobacco: Former User    Tobacco comment: .5 ppd   Substance Use Topics    Alcohol use: No     Alcohol/week: 0.0 oz                                Ready to quit: Not Answered  Counseling given: Not Answered  Comment: .5 ppd      Vital Signs (Current):   Vitals:    06/03/19 0730 06/03/19 0750 06/03/19 1447 06/03/19 1754   BP: (!) 112/58  108/63 (!) 94/49   Pulse: 75  84 72   Resp: 16  17 14   Temp: 97.9 °F (36.6 °C)  97.9 °F (36.6 °C) 96.9 °F (36.1 °C)   TempSrc: Oral  Oral Temporal   SpO2: (!) 88% 91% 93% 92%   Weight:       Height:                                                  BP Readings from Last 3 Encounters:   06/03/19 (!) 94/49   06/03/19 (!) 80/39   05/24/19 (!) 102/49       NPO Status:                                                                                 BMI:   Wt Readings from Last 3 Encounters:   06/03/19 178 lb 9 oz (81 kg)   05/24/19 177 lb (80.3 kg)   05/16/19 177 lb 0.5 oz (80.3 kg)     Body mass index is 34.87 kg/m².     CBC:   Lab Results   Component Value Date    WBC 10.5 06/03/2019    RBC 3.89 06/03/2019    HGB 12.5 06/03/2019    HCT 36.9 06/03/2019    MCV 94.8 06/03/2019    RDW 13.7 06/03/2019     06/03/2019       CMP:   Lab Results   Component Value Date     06/03/2019    K 4.3 06/03/2019    K 4.8 10/08/2018    CL 94 06/03/2019    CO2 23 06/03/2019    BUN 16 06/03/2019    CREATININE 1.2 06/03/2019    GFRAA 56 06/03/2019    GFRAA >60 02/17/2013    AGRATIO 1.2 06/03/2019    LABGLOM 46 06/03/2019    GLUCOSE 498 06/03/2019    PROT 7.0 06/03/2019    PROT 7.5 02/17/2013    CALCIUM 9.3 06/03/2019    BILITOT <0.2 06/03/2019    ALKPHOS 83 06/03/2019    AST 7 06/03/2019    ALT 6 06/03/2019       POC Tests:   Recent Labs     06/03/19  1608   POCGLU 114*       Coags:   Lab Results   Component Value Date    PROTIME 10.7 10/11/2016    INR 0.94 10/11/2016    APTT 31.7 02/26/2014       HCG (If Applicable):   Lab Results   Component Value Date    PREGTESTUR Negative 06/03/2015        ABGs: No results found for: PHART, PO2ART, BTI7XHC, BKN5LEV, BEART, G7ORLAFM     Type & Screen (If Applicable):  No results found for: LABABO, LABRH    Anesthesia Evaluation   no history of anesthetic complications:   Airway: Mallampati: II  TM distance: <3 FB   Neck ROM: limited  Mouth opening: > = 3 FB Dental:    (+) edentulous      Pulmonary:   (+) pneumonia: resolved,  COPD:  sleep apnea:  asthma:                            Cardiovascular:    (+) hypertension:, past MI: > 6 months, CAD:, CHF (last echo with LVEF of 92%): diastolic,     (-)  angina                Neuro/Psych:   (+) neuromuscular disease (chronic pain):, psychiatric history:depression/anxiety             GI/Hepatic/Renal:   (+) GERD: well controlled,           Endo/Other:    (+) DiabetesType II DM, , hypothyroidism: arthritis: OA., .                 Abdominal:           Vascular:   + PVD, aortic or cerebral, . Pre-Operative Diagnosis: Toe infection [L08.9]    63 y.o.   BMI:  Body mass index is 34.87 kg/m².      Vitals:    06/03/19 0730 06/03/19 0750 06/03/19 1447 06/03/19 1754   BP: (!) 112/58  108/63 (!) 94/49   Pulse: 75  84 72   Resp: 16  17 14   Temp: 97.9 °F (36.6 °C)  97.9 °F (36.6 °C) 96.9 °F (36.1 °C)   TempSrc: Oral  Oral Temporal   SpO2: (!) 88% 91% 93% 92%   Weight:       Height:           Allergies   Allergen Reactions    Januvia [Sitagliptin] Other (See Comments)     Causes severe back pain and cramps    Iodine Rash       Social History     Tobacco Use    Smoking status: Current Every Day Smoker     Packs/day: 0.25     Years: 40.00     Pack years: 10.00     Types: Cigarettes    Smokeless tobacco: Former User    Tobacco comment: .5 ppd   Substance Use Topics    Alcohol use: No     Alcohol/week: 0.0 oz       LABS:    CBC  Lab Results   Component Value Date/Time    WBC 10.5 06/03/2019 12:35 AM    HGB 12.5 06/03/2019 12:35 AM    HCT 36.9 06/03/2019 12:35 AM     06/03/2019 12:35 AM     RENAL  Lab Results Component Value Date/Time     (L) 06/03/2019 01:30 AM    K 4.3 06/03/2019 01:30 AM    K 4.8 10/08/2018 01:10 PM    CL 94 (L) 06/03/2019 01:30 AM    CO2 23 06/03/2019 01:30 AM    BUN 16 06/03/2019 01:30 AM    CREATININE 1.2 (H) 06/03/2019 01:30 AM    GLUCOSE 498 (H) 06/03/2019 01:30 AM     COAGS  Lab Results   Component Value Date/Time    PROTIME 10.7 10/11/2016 07:11 AM    INR 0.94 10/11/2016 07:11 AM    APTT 31.7 02/26/2014 06:35 AM        Anesthesia Plan      MAC     ASA 3     (Risks, benefits and alternatives of MAC anesthesia discussed with pt. Questions answered. Willing to proceed.)  Induction: intravenous. Anesthetic plan and risks discussed with patient.                       Dionte Holder MD   6/3/2019

## 2019-06-03 NOTE — ED PROVIDER NOTES
black.  She notes pain that extends up to mid shaft of the leg. She denies any fevers, nausea, vomiting. She's not been able to put weight on it. Nursing Notes were all reviewed and agreed with or any disagreements were addressed  in the HPI. REVIEW OF SYSTEMS    (2-9 systems for level 4, 10 or more for level 5)     Review of Systems    Positives and Pertinent negatives as per HPI. Except as noted abovein the ROS, all other systems were reviewed and negative.        PAST MEDICAL HISTORY     Past Medical History:   Diagnosis Date    Anemia 2015    Arthritis     Asthma     Back pain, chronic     DDD    CAD (coronary artery disease)     angiogram , CONNIE x1 to mid Cirflx    CHF (congestive heart failure) (HCC)     Chronic bronchitis (HCC)     COPD (chronic obstructive pulmonary disease) ([address]     Depression     Diabetes mellitus ([address]     DVT (deep venous thrombosis) (HCC)     Fibromyalgia     GERD (gastroesophageal reflux disease)     Hx of blood clots     Hyperlipidemia     Hypertension     Lung disease     Neuropathy     Other disorders of kidney and ureter     kidney stones    Pneumonia     [facility]     Sleep apnea 2015    Has CPAP, can't tolerate    STEMI (ST elevation myocardial infarction) ([address] 14    Dr. Brenda Silveira Thyroid disease     Urinary incontinence     Vascular complications of other PCKIQFO(180.04)     leg         SURGICAL HISTORY     Past Surgical History:   Procedure Laterality Date    APPENDECTOMY      CATARACT REMOVAL WITH IMPLANT Right 2015     SECTION      CORONARY ANGIOPLASTY WITH STENT PLACEMENT  14    DIAGNOSTIC CARDIAC CATH LAB PROCEDURE      DILATION AND CURETTAGE OF UTERUS      EYE SURGERY Left 2015    Cataract    LITHOTRIPSY      kidney stones    VASCULAR SURGERY      thrombectomy         CURRENTMEDICATIONS       Previous Medications    ALBUTEROL (PROVENTIL) (2.5 MG/3ML) 0.083% NEBULIZER SOLUTION    Take 3 mLs by nebulization every 6 hours as needed for Wheezing    ALCOHOL SWABS PADS    UAD to test blood sugar & inject insulin    ARTIFICIAL SALIVA (BIOTENE MOISTURIZING MOUTH) SOLN    USE 1 SPRAY DAILY AS NEEDED FOR DRY MOUTH    ASPIRIN (ASPIRIN CHILDRENS) 81 MG CHEWABLE TABLET    Take 1 tablet by mouth daily. ATORVASTATIN (LIPITOR) 40 MG TABLET    Take 1 tablet by mouth daily. BLOOD GLUCOSE TEST STRIPS (FREESTYLE LITE) STRIP    TEST THREE TIMES A DAY    BUPROPION (WELLBUTRIN XL) 300 MG EXTENDED RELEASE TABLET    TAKE ONE TABLET BY MOUTH EVERY MORNING    CEPHALEXIN (KEFLEX) 500 MG CAPSULE    Take 1 capsule by mouth 4 times daily    CHOLECALCIFEROL (VITAMIN D3) 1000 UNITS TABS    Take 1 tablet by mouth daily    CLONAZEPAM (KLONOPIN) 0.5 MG TABLET    TAKE ONE TABLET BY MOUTH THREE TIMES A DAY AS NEEDED FOR ANXIETY    CLOPIDOGREL (PLAVIX) 75 MG TABLET    Take 1 tablet by mouth daily. FERROUS SULFATE 325 (65 FE) MG EC TABLET    Take 1 tablet by mouth daily (with breakfast)    FEXOFENADINE (WAL-FEX ALLERGY) 180 MG TABLET    1 po qd, to replace claritin/loratidine. FLUOCINONIDE 0.1 % CREA    Apply to affected area twice daily as needed for dry skin. FLUTICASONE (FLONASE) 50 MCG/ACT NASAL SPRAY    2 sprays by Nasal route daily    FREESTYLE LANCETS MISC    USE TO TEST BLOOD SUGAR THREE TIMES DAILY    FUROSEMIDE (LASIX) 20 MG TABLET    Take 1 tablet by mouth twice daily. GABAPENTIN (NEURONTIN) 300 MG CAPSULE    Take 3 capsules by mouth 3 times daily.     GUAIFENESIN (MUCINEX) 600 MG EXTENDED RELEASE TABLET    Take 1 tablet by mouth 2 times daily as needed for Congestion    IBUPROFEN (ADVIL;MOTRIN) 400 MG TABLET    Take 1 tablet by mouth every 6 hours as needed for Pain    INSULIN GLARGINE (BASAGLAR KWIKPEN) 100 UNIT/ML INJECTION PEN    Inject 45 Units into the skin 2 times daily    INSULIN LISPRO (HUMALOG KWIKPEN) 100 UNIT/ML PEN    Inject 25 Units into the skin 3 times daily (before meals)    INSULIN PEN NEEDLE (B-D UF III MINI PEN NEEDLES) 31G X 5 MM MISC    USE TO INJECT INSULIN and victoza 6 TIMES A DAY    ISOSORBIDE MONONITRATE (IMDUR) 30 MG EXTENDED RELEASE TABLET    Take 1 tablet by mouth daily. LEVOTHYROXINE (SYNTHROID) 50 MCG TABLET    Take 1 tablet by mouth daily. LINAGLIPTIN (TRADJENTA) 5 MG TABLET    TAKE ONE TABLET BY MOUTH DAILY    LIRAGLUTIDE (VICTOZA) 18 MG/3ML SOPN SC INJECTION    Inject 1.2 mg into the skin daily    LISINOPRIL (PRINIVIL;ZESTRIL) 5 MG TABLET    Take 1 tablet by mouth daily    METFORMIN (GLUCOPHAGE) 1000 MG TABLET    Take 1 tablet by mouth twice daily with meals. METOPROLOL TARTRATE (LOPRESSOR) 25 MG TABLET    Take 1 tablet by mouth twice daily. MONTELUKAST (SINGULAIR) 10 MG TABLET    Take 1 tablet by mouth daily    NITROGLYCERIN (NITROSTAT) 0.4 MG SL TABLET    DISSOLVE ONE TABLET UNDER THE TONGUE AS NEEDED FOR CHEST PAIN EVERY 5 MINUTES UP TO 3 TIMES. IF NO RELIEF CALL 911. OMEPRAZOLE (PRILOSEC) 40 MG DELAYED RELEASE CAPSULE    Take 1 capsule by mouth daily    OXYBUTYNIN (DITROPAN) 5 MG TABLET    1 po q am and 2 po qhs    OXYCODONE-ACETAMINOPHEN (PERCOCET) 5-325 MG PER TABLET    Take 1 tablet by mouth every 8 hours as needed for Pain . PHENOL 1.4 % AERS    Take 1 spray by mouth every 2 hours as needed (for sore throat)    RANOLAZINE (RANEXA) 500 MG EXTENDED RELEASE TABLET    Take 1 tablet by mouth twice daily. SERTRALINE (ZOLOFT) 100 MG TABLET    Take 1 tablet by mouth daily. SERTRALINE (ZOLOFT) 100 MG TABLET    Take 1 tablet by mouth daily    SIMETHICONE (MYLICON) 80 MG CHEWABLE TABLET    Take 1 tablet by mouth 4 times daily as needed for Flatulence    SPIRIVA RESPIMAT 2.5 MCG/ACT AERS INHALER    INHALE 2 PUFFS BY MOUTH DAILY    SYMBICORT 160-4.5 MCG/ACT AERO    Inhale 2 puffs by mouth twice daily.     TIZANIDINE (ZANAFLEX) 4 MG TABLET    TAKE ONE-HALF TO ONE THREE TIMES A DAY AS NEEDED FOR BACK PAIN    VENTOLIN  (90 BASE) MCG/ACT INHALER    INHALE TWO PUFFS BY MOUTH EVERY 6 HOURS AS NEEDED FOR WHEEZING    VITAMIN B-12 (CYANOCOBALAMIN) 1000 MCG TABLET    Take 1 tablet by mouth daily         ALLERGIES     Januvia [sitagliptin] and Iodine    FAMILYHISTORY       Family History   Problem Relation Age of Onset    Other Mother          of unknown lung issue at 61    Diabetes Mother     Diabetes Brother     Heart Disease Maternal Grandfather     Cancer Maternal Grandmother     Asthma Grandchild           SOCIAL HISTORY       Social History     Socioeconomic History    Marital status:      Spouse name: None    Number of children: None    Years of education: None    Highest education level: None   Occupational History    None   Social Needs    Financial resource strain: None    Food insecurity:     Worry: None     Inability: None    Transportation needs:     Medical: None     Non-medical: None   Tobacco Use    Smoking status: Current Every Day Smoker     Packs/day: 0.25     Years: 40.00     Pack years: 10.00     Types: Cigarettes    Smokeless tobacco: Former User    Tobacco comment: .5 ppd   Substance and Sexual Activity    Alcohol use: No     Alcohol/week: 0.0 oz    Drug use: No    Sexual activity: Not Currently   Lifestyle    Physical activity:     Days per week: None     Minutes per session: None    Stress: None   Relationships    Social connections:     Talks on phone: None     Gets together: None     Attends [employer] service: None     Active member of club or organization: None     Attends meetings of clubs or organizations: None     Relationship status: None    Intimate partner violence:     Fear of current or ex partner: None     Emotionally abused: None     Physically abused: None     Forced sexual activity: None   Other Topics Concern    None   Social History Narrative    None       SCREENINGS             PHYSICAL EXAM    (up to 7 for level 4, 8 or more for level 5)     ED Triage Vitals [19 6915]   BP Temp Temp Source Pulse Resp SpO2 Height Weight   (!) 141/69 97.2 °F (36.2 °C) Oral 69 18 98 % 5' (1.524 m) 177 lb (80.3 kg)       Physical Exam   Constitutional: She is oriented to person, place, and time. She appears well-developed and well-nourished. HENT:   Head: Normocephalic and atraumatic. Right Ear: External ear normal.   Left Ear: External ear normal.   Nose: Nose normal.   Eyes: Right eye exhibits no discharge. Left eye exhibits no discharge. Neck: Normal range of motion. Neck supple. Pulmonary/Chest: Effort normal. No respiratory distress. Musculoskeletal:   Left great toe with Prolene sutures in place to medial nailbed. There is obvious infection with erythema, purulence and detachment of the nail from the proximal nail fold. There is black necrosis to the lateral aspect of the toe with ulceration and skin breakdown. There is overlying redness and edema of the entire foot that extends to the ankle. Positive tenderness to palpation to tib-fib that extends to mid shaft. Patient able to dorsiflex and plantarflex however strength not tested secondary to pain. Neurological: She is alert and oriented to person, place, and time. Skin: Skin is warm and dry. She is not diaphoretic. Psychiatric: She has a normal mood and affect. Her behavior is normal.   Nursing note and vitals reviewed.       DIAGNOSTIC RESULTS   LABS:    Labs Reviewed   LACTATE, SEPSIS - Abnormal; Notable for the following components:       Result Value    Lactic Acid, Sepsis 3.5 (*)     All other components within normal limits    Narrative:     Performed at:  [facility] [address],  [place], [facility]   Phone [phone]   CBC WITH AUTO DIFFERENTIAL - Abnormal; Notable for the following components:    RBC 3.89 (*)     MPV 10.6 (*)     All other components within normal limits    Narrative:     Performed at:  [facility] [address] [facility],  [place], [facility]   Phone [phone]   CULTURE BLOOD #1   CULTURE BLOOD #2   LACTATE, SEPSIS   COMPREHENSIVE METABOLIC PANEL W/ REFLEX TO MG FOR LOW K   URINE RT REFLEX TO CULTURE   COMPREHENSIVE METABOLIC PANEL       All other labs were within normal range or not returned as of this dictation. EKG: All EKG's are interpreted by the Emergency Department Physician who either signs orCo-signs this chart in the absence of a cardiologist.  Please see their note for interpretation of EKG. RADIOLOGY:   Non-plain film images such as CT, Ultrasound and MRI are read by the radiologist. Plain radiographic images are visualized andpreliminarily interpreted by the  ED Provider with the below findings:        Interpretation perthe Radiologist below, if available at the time of this note:    CT TIBIA FIBULA LEFT WO CONTRAST   Preliminary Result   1. Dorsal ulcer at the nail bed in the 1st digit tracking to a transverse   fracture in the base of the distal phalanx 1st digit. Intraosseous gas at   the site of the fracture. 2. Dorsal superficial soft tissue edema in the foot extending into the ankle   and distal leg. No fluid collection or abscess on the noncontrast CT. 3. No other acute abnormality in the left leg or left foot. CT FOOT LEFT WO CONTRAST   Preliminary Result   1. Dorsal ulcer at the nail bed in the 1st digit tracking to a transverse   fracture in the base of the distal phalanx 1st digit. Intraosseous gas at   the site of the fracture. 2. Dorsal superficial soft tissue edema in the foot extending into the ankle   and distal leg. No fluid collection or abscess on the noncontrast CT. 3. No other acute abnormality in the left leg or left foot. No results found.        PROCEDURES   Unless otherwise noted below, none     Procedures    CRITICAL CARE TIME   N/A    CONSULTS:  None      EMERGENCY DEPARTMENT COURSE and DIFFERENTIALDIAGNOSIS/MDM:   Vitals:    Vitals:    06/02/19 2313 06/03/19 0136   BP: (!) 141/69 136/67   Pulse: 69 76   Resp: 18 16   Temp: 97.2 °F (36.2 °C)    TempSrc: Oral    SpO2: 98% 97%   Weight: 177 lb (80.3 kg)    Height: 5' (1.524 m)        Patient was given thefollowing medications:  Medications   0.9 % sodium chloride bolus (1,000 mLs Intravenous New Bag 6/3/19 0139)   morphine sulfate (PF) injection 4 mg (has no administration in time range)       28-year-old female, diabetic presents for evaluation of left toe injury and infection. On exam, there is high concern for necrotic wound and infection. Workup initiated, and morphine administered for analgesia. Patient signed out to attending who will continue workup and care for this patient. Patient in stable condition at sign out. Differential diagnoses include but are not limited to osteomyelitis, osteonecrosis, cellulitis, gangrenous wound, necrotizing fasciitis. FINAL IMPRESSION      1. Local infection of skin and subcutaneous tissue          DISPOSITION/PLAN   DISPOSITION        PATIENT REFERREDTO:  No follow-up provider specified.     DISCHARGE MEDICATIONS:  New Prescriptions    No medications on file       DISCONTINUED MEDICATIONS:  Discontinued Medications    No medications on file              (Please note that portions ofthis note were completed with a voice recognition program.  Efforts were made to edit the dictations but occasionally words are mis-transcribed.)    Charmaine Gates (electronically signed)           Charmaine Gates  06/03/19 3207

## 2019-06-03 NOTE — PLAN OF CARE
Problem: OXYGENATION/RESPIRATORY FUNCTION  Goal: Patient will maintain patent airway  Outcome: Ongoing     Problem: Infection:  Goal: Will remain free from infection  Description  Will remain free from infection  Outcome: Ongoing     Problem: Safety:  Goal: Free from accidental physical injury  Description  Free from accidental physical injury  Outcome: Ongoing     Problem: Pain:  Goal: Patient's pain/discomfort is manageable  Description  Patient's pain/discomfort is manageable  Outcome: Ongoing

## 2019-06-03 NOTE — FLOWSHEET NOTE
06/03/19 0730   Vital Signs   Temp 97.9 °F (36.6 °C)   Temp Source Oral   Pulse 75   Heart Rate Source Monitor   Resp 16   BP (!) 112/58   BP Location Right upper arm   BP Upper/Lower Upper   Patient Position Supine   Level of Consciousness 0   MEWS Score 1   Pain Assessment   Pain Assessment 0-10   Pain Level 10   Pain Location Toe (Comment which one)  (left great )   Pain Orientation Left   Pain Type Acute pain   Pain Frequency Continuous   Pain Onset On-going   Clinical Progression Not changed   Functional Pain Assessment Activities are not prevented   Non-Pharmaceutical Pain Intervention(s) Rest;Repositioned   Oxygen Therapy   SpO2 (!) 88 %   O2 Device None (Room air)   Patient Observation   Observations placed on 2L    AM assessment completed, see flow sheet. Pt is alert and oriented. Vital signs are WNL. Respirations are even & easy but did place pt on 2L of 02 since sp02 88% on RA. Pt with c/o pain t left great toe, extending up to mid shin. Leg painful to palpation with slightest touch. PRN pain med not due at this time, will administer once time. Pt denies needs at this time. SR up x 2, and bed in low position. Call light is within reach.

## 2019-06-03 NOTE — BRIEF OP NOTE
Brief Postoperative Note  ______________________________________________________________    Patient: Linnea Guidry  YOB: 1960  MRN: [id number]  Date of Procedure: 6/3/2019    Pre-Op Diagnosis: DIABETIC FOOT ULCER, OSTEOMYELITIS, GAS GANGRENE    Post-Op Diagnosis: Same       Procedure(s):  PARTIAL FOOT AMPUTATION WITH GRAFT APPLICATION    Anesthesia: Anesthesia type not filed in the log. Surgeon(s):  Valeria Hampton DPM    Assistant:     Estimated Blood Loss (mL): less than 50     Complications: None    Specimens:   ID Type Source Tests Collected by Time Destination   A :  Specimen Foot SURGICAL PATHOLOGY Valeria Hampton DPM 6/3/2019 1737        Implants:  Implant Name Type Inv.  Item Serial No.  Lot No. LRB No. Used   PATCH STERI SHIELD II DU LAY AMN 4X4CM Bone/Graft/Tissue/Human/Synth PATCH STERI SHIELD II DU LAY AMN 4X4CM  BONE BANK ALLOGRAFTS [de-identified] Left 1         Drains: * No LDAs found *    Findings: ulcer left hallux    Valeria Hampton DPM  Date: 6/3/2019  Time: 5:54 PM

## 2019-06-03 NOTE — PLAN OF CARE
62 yo F w/ hx of DM, chronic pain, CHF, tobacco abuse p/w toe wound/infection and cellulitis.   Wound has been managed by Dr. Helen Strange (ortho)    Toe wound w/ cellulites  Lactic acidosis  IDDM

## 2019-06-03 NOTE — PROGRESS NOTES
Will cancel ortho consult for left toe infection, this would be better addressed by podiatry as I do not treat diabetic foot ulcers.      Thank you,     Cathleen Virgen,   Sports Medicine & Arthroscopic Surgery  [address] [facility] partner of [facility] [place] ([facility])

## 2019-06-03 NOTE — ANESTHESIA POSTPROCEDURE EVALUATION
Department of Anesthesiology  Postprocedure Note    Patient: Timur Chen  MRN: [id number]  YOB: 1960  Date of evaluation: 6/3/2019  Time:  7:05 PM     Procedure Summary     Date:  06/03/19 Room / Location:  [address] [place] [address] / [facility]    Anesthesia Start:  4124 Anesthesia Stop:  5405    Procedure:  PARTIAL FOOT AMPUTATION WITH GRAFT APPLICATION (Left Foot) Diagnosis:  (DIABETIC FOOT ULCER, OSTEOMYELITIS, GAS GANGRENE)    Surgeon:  Jonathan Ray DPM Responsible Provider:  Christelle Jacinto MD    Anesthesia Type:  MAC ASA Status:  3          Anesthesia Type: No value filed. Lio Phase I:      Lio Phase II: Lio Score: 10    Last vitals: Reviewed and per EMR flowsheets. Anesthesia Post Evaluation    Patient location during evaluation: PACU  Patient participation: complete - patient participated  Level of consciousness: awake and alert  Airway patency: patent  Nausea & Vomiting: no nausea and no vomiting  Complications: no  Cardiovascular status: blood pressure returned to baseline  Respiratory status: acceptable  Hydration status: euvolemic  Comments: BP better after fluid bolus. No problems. VSS on transfer to floor.

## 2019-06-03 NOTE — H&P
Hospital Medicine History & Physical      PCP: Jamarcus Caceres MD    Date of Admission: 6/2/2019    Date of Service: Pt seen/examined on 6/3/2019     Chief Complaint:    Chief Complaint   Patient presents with    Toe Injury     pt. had laceration to L great toe x1 month ago. states she had sutures placed and had to follow up a 2nd time for repeat sutures. pt. thinks those sutures have been in place for 2 weeks. Pt. states she has had increasing drainage and redness extending up her L foot. Pt. was instructed to f/u w/podiatry and wound care and missed both appointments          History Of Present Illness: The patient is a 63 y.o. female with asthma, CAD, CHF, COPD, DM, GERD, HTN, HLD, JORDI, hypothyroidism who presented to [facility] ED with complaint of left great toe infection. Patient states that she initially injured her left great toe at the beginning of May. She got her toe caught on something and had a laceration over the top of the toe, under the toenail. She states she came to the ER and had stitches placed, and was sent home on Keflex. She reports taking the full course of the Abx and doing daily wound care, but on 5/24 she re-injured her toe. She reports that when she re-injured it, she had not followed up yet to have the initial sutures removed. When she came to the ER, she had the medial part of the initial wound open up and had 3 more sutures placed in the ER. She reports that she was discharged home and has since been noticing increased pain, swelling, redness and drainage from the wound. She states that she has not otherwise had any fevers, chills, CP, SOB, N/V.      Past Medical History:        Diagnosis Date    Anemia 6/2/2015    Arthritis     Asthma     Back pain, chronic     DDD    CAD (coronary artery disease)     angiogram 2/26, CONNIE x1 to mid Cirflx    CHF (congestive heart failure) (HCC)     Chronic bronchitis (HCC)     COPD (chronic obstructive pulmonary disease) (HCC)     Depression by mouth daily. 4/29/19  Yes Yadi Hernandez MD   SPIRIVA RESPIMAT 2.5 MCG/ACT AERS inhaler INHALE 2 PUFFS BY MOUTH DAILY 4/29/19  Yes Yadi Hernandez MD   metFORMIN (GLUCOPHAGE) 1000 MG tablet Take 1 tablet by mouth twice daily with meals. 4/29/19  Yes Yadi Hernandez MD   clopidogrel (PLAVIX) 75 MG tablet Take 1 tablet by mouth daily. 4/29/19  Yes Yadi Hernandez MD   metoprolol tartrate (LOPRESSOR) 25 MG tablet Take 1 tablet by mouth twice daily. 4/29/19  Yes Yadi Hernandez MD   levothyroxine (SYNTHROID) 50 MCG tablet Take 1 tablet by mouth daily. 3/28/19  Yes Yadi Hernandez MD   ferrous sulfate 325 (65 Fe) MG EC tablet Take 1 tablet by mouth daily (with breakfast) 3/28/19  Yes Brenda Landrum MD   atorvastatin (LIPITOR) 40 MG tablet Take 1 tablet by mouth daily. 3/28/19  Yes Yadi Hernandez MD   furosemide (LASIX) 20 MG tablet Take 1 tablet by mouth twice daily. 3/28/19  Yes Yadi Hernandez MD   SYMBICORT 160-4.5 MCG/ACT AERO Inhale 2 puffs by mouth twice daily. 3/28/19  Yes Yadi Hernandez MD   linagliptin (TRADJENTA) 5 MG tablet TAKE ONE TABLET BY MOUTH DAILY 3/25/19  Yes Charley Garcia MD   ranolazine (RANEXA) 500 MG extended release tablet Take 1 tablet by mouth twice daily.  12/13/18  Yes Dolores Bull MD   albuterol (PROVENTIL) (2.5 MG/3ML) 0.083% nebulizer solution Take 3 mLs by nebulization every 6 hours as needed for Wheezing 12/13/18  Yes Brenda Landrum MD   Insulin Pen Needle (B-D UF III MINI PEN NEEDLES) 31G X 5 MM MISC USE TO INJECT INSULIN and victoza 6 TIMES A DAY 10/29/18  Yes Charley Garcia MD   insulin lispro (HUMALOG KWIKPEN) 100 UNIT/ML pen Inject 25 Units into the skin 3 times daily (before meals) 10/24/18  Yes Charley Garcia MD   Liraglutide (VICTOZA) 18 MG/3ML SOPN SC injection Inject 1.2 mg into the skin daily 10/24/18  Yes Charley Garcia MD   lisinopril (PRINIVIL;ZESTRIL) 5 MG tablet Take 1 tablet by mouth daily 8/30/18  Yes MITCHELL Phillips - RHETT   Cholecalciferol (VITAMIN D3) 1000 units TABS Take 1 tablet by mouth daily 8/30/18  Yes Alexia Guerrero MD   blood glucose test strips (FREESTYLE LITE) strip TEST THREE TIMES A DAY 8/30/18  Yes Rhys Reyes MD   omeprazole (PRILOSEC) 40 MG delayed release capsule Take 1 capsule by mouth daily 6/14/18  Yes Alexia Guerrero MD   guaiFENesin (MUCINEX) 600 MG extended release tablet Take 1 tablet by mouth 2 times daily as needed for Congestion 5/24/18  Yes Alexia Guerrero MD   buPROPion (WELLBUTRIN XL) 300 MG extended release tablet TAKE ONE TABLET BY MOUTH EVERY MORNING 2/8/18  Yes Alexia Guerrero MD   oxyCODONE-acetaminophen (PERCOCET) 5-325 MG per tablet Take 1 tablet by mouth every 6 hours as needed for Pain. Yes Historical Provider, MD   montelukast (SINGULAIR) 10 MG tablet Take 1 tablet by mouth daily 8/2/17  Yes Alexia Guerrero MD   vitamin B-12 (CYANOCOBALAMIN) 1000 MCG tablet Take 1 tablet by mouth daily 8/1/17  Yes Alexia Guerrero MD   tiZANidine (ZANAFLEX) 4 MG tablet TAKE ONE-HALF TO ONE THREE TIMES A DAY AS NEEDED FOR BACK PAIN 7/11/17  Yes Alexia Guerrero MD   VENTOLIN  (90 BASE) MCG/ACT inhaler INHALE TWO PUFFS BY MOUTH EVERY 6 HOURS AS NEEDED FOR WHEEZING 11/9/16  Yes Brenda Landrum MD   FREESTYLE LANCETS MISC USE TO TEST BLOOD SUGAR THREE TIMES DAILY 7/19/16  Yes Alexia Guerrero MD   fexofenadine (WAL-FEX ALLERGY) 180 MG tablet 1 po qd, to replace claritin/loratidine. 2/8/16  Yes Alexia Guerrero MD   Alcohol Swabs PADS UAD to test blood sugar & inject insulin 4/13/15  Yes Brenda Landrum MD   aspirin (ASPIRIN CHILDRENS) 81 MG chewable tablet Take 1 tablet by mouth daily. 2/28/14  Yes Jasen Frias APRN - CNP   nitroGLYCERIN (NITROSTAT) 0.4 MG SL tablet DISSOLVE ONE TABLET UNDER THE TONGUE AS NEEDED FOR CHEST PAIN EVERY 5 MINUTES UP TO 3 TIMES.  IF NO RELIEF CALL 911. 10/24/18   Haily Padgett MD   phenol 1.4 % AERS Take 1 spray by mouth every 2 hours as needed (for sore throat) 12/3/17   Renita Jaylen Oquendo, APRN - CNP   Fluocinonide 0.1 % CREA Apply to affected area twice daily as needed for dry skin. 17   Sara Fraga MD   Artificial Saliva (BIOTENE MOISTURIZING MOUTH) SOLN USE 1 SPRAY DAILY AS NEEDED FOR DRY MOUTH 17   Sara Fraga MD   simethicone (MYLICON) 80 MG chewable tablet Take 1 tablet by mouth 4 times daily as needed for Flatulence 16   Sara Fraga MD       Allergies:  Januvia [sitagliptin] and Iodine    Social History:  The patient currently lives at home    TOBACCO:   reports that she has been smoking cigarettes. She has a 10.00 pack-year smoking history. She has quit using smokeless tobacco.  ETOH:   reports that she does not drink alcohol. Family History:   Positive as follows:        Problem Relation Age of Onset    Other Mother          of unknown lung issue at 61    Diabetes Mother     Diabetes Brother     Heart Disease Maternal Grandfather     Cancer Maternal Grandmother     Asthma Grandchild        REVIEW OF SYSTEMS:     Constitutional: Negative for fever   HENT: Negative for sore throat   Eyes: Negative for redness   Respiratory: Negative  for dyspnea, cough   Cardiovascular: Negative for chest pain   Gastrointestinal: Negative for vomiting, diarrhea   Genitourinary: Negative for hematuria   Musculoskeletal: + arthralgias   Skin: + laceration, + redness, +wound    Neurological: Negative for syncope   Hematological: Negative for adenopathy   Psychiatric/Behavorial: Negative for anxiety    PHYSICAL EXAM:    BP (!) 112/58   Pulse 75   Temp 97.9 °F (36.6 °C) (Oral)   Resp 16   Ht 5' (1.524 m)   Wt 178 lb 9 oz (81 kg)   LMP  (LMP Unknown)   SpO2 91%   BMI 34.87 kg/m²   Gen: No distress. Alert. Eyes: PERRL. No conjunctival injection. ENT: No discharge. Pharynx clear. Neck: No JVD. No Carotid Bruit. Trachea midline. Resp: No accessory muscle use. No crackles. No wheezes. No rhonchi. CV: Regular rate. Regular rhythm. No murmur. No rub.  No edema. Capillary Refill: Brisk,< 3 seconds   Peripheral Pulses: +1 palpable, equal bilaterally   GI: Non-tender. Non-distended. No masses. No organomegaly. Normal bowel sounds. No hernia. Skin: ulcer with necrotic tissue to the left great toe with edema and skin discoloration to the distal tip of the toe, erythema extending circumferentially to the left great toe and to the dorsal L foot with lymphangitic streaking up the medial L shin  M/S: TTP to the Left foot and L great toe with decreased ROM   Neuro: Awake. Grossly nonfocal    Psych: Oriented x 3. No anxiety or agitation. I Mario Paci have reviewed the chart on [facility]ora[facility] Long and personally interviewed and examined patient, reviewed the data (labs and imaging) and after discussion with my PA formulated the plan. Agree with note with the following edits. HPI:     I reviewed the patient's Past Medical History, Past Surgical History, Medications, and Allergies. 63 y.o. female with hx of HTN, IDDM, CAD presents with left great toe cellulitis since last 4 weeks , worsening despite oral abx and local wound care        General:  Middle aged female, Awake, alert and oriented.  Appears to be not in any distress  Mucous Membranes:  Pink , anicteric  Neck: No JVD, no carotid bruit, no thyromegaly  Chest:  Clear to auscultation bilaterally, no added sounds  Cardiovascular:  RRR S1S2 heard, no murmurs or gallops  Abdomen:  Soft, undistended, non tender, no organomegaly, BS present  Extremities: left great toe with severe cellulitis edema spreading along the dorsal aspect of left foot  Blackish discoloration along medial aspect of toe   Distal pulses well felt  Neurological : grossly normal                  CBC:   Recent Labs     06/03/19  0035   WBC 10.5   HGB 12.5   HCT 36.9   MCV 94.8        BMP:   Recent Labs     06/03/19  0130   *   K 4.3   CL 94*   CO2 23   BUN 16   CREATININE 1.2*     LIVER PROFILE:   Recent Labs 06/03/19  0130   AST 7*   ALT 6*   BILITOT <0.2   ALKPHOS 83     UA:  Recent Labs     06/03/19  0210   COLORU Yellow   PHUR 5.5   WBCUA 20-50*   RBCUA 0-2   YEAST Present*   BACTERIA Rare*   CLARITYU SL CLOUDY*   SPECGRAV <=1.005   LEUKOCYTESUR SMALL*   UROBILINOGEN 0.2   BILIRUBINUR Negative   BLOODU Negative   GLUCOSEU >=1000*     Lactic Acid, Sepsis 3. 5High       Lactic Acid, Sepsis 2.9High       Lactic Acid 0.9      Magnesium 1.70Low         CULTURES  Urine Cx: pending   Blood Cx: pending     EKG:    No EKG from this admission for review    RADIOLOGY  CT TIBIA FIBULA LEFT WO CONTRAST   Preliminary Result   1. Dorsal ulcer at the nail bed in the 1st digit tracking to a transverse   fracture in the base of the distal phalanx 1st digit. Intraosseous gas at   the site of the fracture. 2. Dorsal superficial soft tissue edema in the foot extending into the ankle   and distal leg. No fluid collection or abscess on the noncontrast CT. 3. No other acute abnormality in the left leg or left foot. CT FOOT LEFT WO CONTRAST   Preliminary Result   1. Dorsal ulcer at the nail bed in the 1st digit tracking to a transverse   fracture in the base of the distal phalanx 1st digit. Intraosseous gas at   the site of the fracture. 2. Dorsal superficial soft tissue edema in the foot extending into the ankle   and distal leg. No fluid collection or abscess on the noncontrast CT. 3. No other acute abnormality in the left leg or left foot.              Pertinent previous results reviewed   None    ASSESSMENT/PLAN:    L great toe infection/Left foot cellulitis   - Diabetic foot infection, Vanc/Zosyn   - ulceration with necrotic skin the left great toe with what appears to be a paronychia and felon with skin changes to the distal tip of the L great toe   - Podiatry and Ortho consult-- intraosseous gas with fracture on CT   - Continue to elevate the extremity   - PRN pain control   - blood glucose uncontrolled, continue to

## 2019-06-03 NOTE — PROGRESS NOTES
Admission assessment completed, see flow sheet. Pt is alert and oriented. Respirations are even & easy at rest, light bilateral expiratory wheezes noted. Left great toe noted to be red and swollen radiating back foot, black area on inner part of toe, sutures noted around nail bed area. Area appears fragile. No complaints voiced at this time. Pt denies needs at this time. Assisted pt up to bathroom, pt tolerated very well. SR up x 2, and bed in low position. Call light is within reach.

## 2019-06-03 NOTE — CONSULTS
Pharmacy Note  Vancomycin Consult    Cliff Escobar is a 63 y.o. female started on Vancomycin for gram positive coverage for cellulitis/toe infection; consult received from Dr. Ortiz Escobedo to manage therapy. Also receiving the following antibiotics: Zosyn.     Patient Active Problem List   Diagnosis    HLD (hyperlipidemia)    DM (diabetes mellitus), type 2, uncontrolled ([facility]    Chest pain    PVD (peripheral vascular disease) ([facility]    HTN (hypertension)    Hypothyroid    GERD (gastroesophageal reflux disease)    STEMI (ST elevation myocardial infarction) ([facility]    Morbid obesity with BMI of 45.0-49.9, adult (HCC)    Localized osteoarthrosis, lower leg    Rotator cuff tendinitis    PFS (patellofemoral syndrome)    Sleep apnea    Disc displacement, lumbar    Neuropathy    Opiate analgesic contract exists    Neck pain    Anemia    Insomnia    Pulmonary emphysema (HCC)    Transient synovitis, left ankle and foot    Insomnia with sleep apnea    JORDI on CPAP    Chronic low back pain    COPD, moderate (HCC)    Unstable angina ([facility])    Anxiety    SOB (shortness of breath)    Chronic diastolic congestive heart failure (HCC)    Coronary artery disease involving native coronary artery of native heart without angina pectoris    Coronary vasospasm (HCC)    Ischemic cardiomyopathy    History of ST elevation myocardial infarction (STEMI)    Tobacco use    Uncontrolled type 2 diabetes mellitus with hyperglycemia (HCC)    Toe infection       Allergies:  Januvia [sitagliptin] and Iodine     Temp max: 97.8    Recent Labs     06/03/19  0130   BUN 16       Recent Labs     06/03/19  0130   CREATININE 1.2*       Recent Labs     06/03/19  0035   WBC 10.5       No intake or output data in the 24 hours ending 06/03/19 0658    Culture Date      Source                       Results      Ht Readings from Last 1 Encounters:   06/03/19 5' (1.524 m)        Wt Readings from Last 1 Encounters:   06/03/19 178 lb 9 oz (81 kg)         Body mass index is 34.87 kg/m². Estimated Creatinine Clearance: 48 mL/min (A) (based on SCr of 1.2 mg/dL (H)). Goal Trough Level: 13-18 mcg/mL    Assessment/Plan:  Will initiate vancomycin 1250 mg IV every 24 hours. First dose to be given 6/3 @ 0800. A vancomycin trough has been ordered for 6/6 @ 0730 prior to the 4th dose. Thank you for the consult. Will continue to follow.

## 2019-06-03 NOTE — PROGRESS NOTES
Pt resting in bed at this time. Order for informed consent but pt still with some questions regarding surgery. Will wait to have pt sign consent until after speaking with Dr. Steffanie Hale again.

## 2019-06-03 NOTE — PROGRESS NOTES
RESPIRATORY THERAPY ASSESSMENT    Name:  Keyon Benjamin Record Number:  [id number]  Age: 63 y.o. Gender: female  : 1960  Today's Date:  6/3/2019  Room:  [facility]0202-01    Assessment     Is the patient being admitted for a COPD or Asthma exacerbation? No   (If yes the patient will be seen every 4 hours for the first 24 hours and then reassessed)    Patient Admission Diagnosis      Allergies  Allergies   Allergen Reactions    Januvia [Sitagliptin] Other (See Comments)     Causes severe back pain and cramps    Iodine Rash       Minimum Predicted Vital Capacity:     680          Actual Vital Capacity:      920              Pulmonary History:COPD and Asthma  Home Oxygen Therapy:  room air  Home Respiratory Therapy:Symbicort, Spiriva, and Alb prn   Current Respiratory Therapy:  Alb, Dulera, Spiriva  Treatment Type: MDI  Medications: Mometasone/Formoterol    Respiratory Severity Index(RSI)   Patients with orders for inhalation medications, oxygen, or any therapeutic treatment modality will be placed on Respiratory Protocol. They will be assessed with the first treatment and at least every 72 hours thereafter. The following severity scale will be used to determine frequency of treatment intervention.     Smoking History: Smoking History Less than 1ppd or less than 15 pack year = 1    Social History  Social History     Tobacco Use    Smoking status: Current Every Day Smoker     Packs/day: 0.25     Years: 40.00     Pack years: 10.00     Types: Cigarettes    Smokeless tobacco: Former User    Tobacco comment: .5 ppd   Substance Use Topics    Alcohol use: No     Alcohol/week: 0.0 oz    Drug use: No       Recent Surgical History: None = 0  Past Surgical History  Past Surgical History:   Procedure Laterality Date    APPENDECTOMY      CATARACT REMOVAL WITH IMPLANT Right 2015     SECTION      CORONARY ANGIOPLASTY WITH STENT PLACEMENT  14    DIAGNOSTIC CARDIAC CATH LAB PROCEDURE  DILATION AND CURETTAGE OF UTERUS      EYE SURGERY Left 5/12/2015    Cataract    LITHOTRIPSY      kidney stones    VASCULAR SURGERY      thrombectomy       Level of Consciousness: Alert, Oriented, and Cooperative = 0    Level of Activity: Walking unassisted = 0    Respiratory Pattern: Regular Pattern; RR 8-20 = 0    Breath Sounds: Clear = 0    Sputum   ,  , Sputum How Obtained: Spontaneous cough  Cough: Strong, spontaneous, non-productive = 0    Vital Signs   /63   Pulse 84   Temp 97.9 °F (36.6 °C) (Oral)   Resp 17   Ht 5' (1.524 m)   Wt 178 lb 9 oz (81 kg)   LMP  (LMP Unknown)   SpO2 93%   BMI 34.87 kg/m²   SPO2 (COPD values may differ): Greater than or equal to 92% on room air = 0    Peak Flow (asthma only): not applicable = 0    RSI: 5-6 = Q4hr PRN (every four hours as needed) for dyspnea        Plan       Goals: medication delivery    Patient/caregiver was educated on the proper method of use for Respiratory Care Devices:  Yes      Level of patient/caregiver understanding able to:   ? Verbalize understanding   ? Demonstrate understanding       ? Teach back        ? Needs reinforcement       ? No available caregiver               ? Other:     Response to education:  Excellent     Is patient being placed on Home Treatment Regimen? Yes     Does the patient have everything they need prior to discharge? NA     Comments: Chart and home meds reviewed    Plan of Care: Cont the patient on the current therapy    Electronically signed by Radha Jain RCP on 6/3/2019 at 4:00 PM    Respiratory Protocol Guidelines     1. Assessment and treatment by Respiratory Therapy will be initiated for medication and therapeutic interventions upon initiation of aerosolized medication. 2. Physician will be contacted for respiratory rate (RR) greater than 35 breaths per minute. Therapy will be held for heart rate (HR) greater than 140 beats per minute, pending direction from physician.   3. Bronchodilators will be of lung disease, is not using inhaled anti-inflammatory medication at home, and lacks wheezing by examination or by history for at least 24 hours, contact physician for possible discontinuation.

## 2019-06-03 NOTE — ED NOTES
Patient asked for IV to be moved from left hand. Attempted another IV site x3, unsuccessful. Patient informed that we are not going to be able to move IV at this time.  Expressed understanding     Sirisha Matthews RN  06/03/19 9160

## 2019-06-03 NOTE — ED PROVIDER NOTES
[address] ED  eMERGENCY dEPARTMENT eNCOUnter      Pt Name: Julio Augustine  MRN: [id number]  [place] 1960  Date of evaluation: 6/2/2019  Provider: Annmarie Pabon MD  PCP: MD Chary Cunningham       Chief Complaint   Patient presents with    Toe Injury     pt. had laceration to L great toe x1 month ago. states she had sutures placed and had to follow up a 2nd time for repeat sutures. pt. thinks those sutures have been in place for 2 weeks. Pt. states she has had increasing drainage and redness extending up her L foot. Pt. was instructed to f/u w/podiatry and wound care and missed both appointments        HISTORY OFPRESENT ILLNESS   (Location/Symptom, Timing/Onset, Context/Setting, Quality, Duration, Modifying Factors,Severity)  Note limiting factors. Julio Augustine is a 63 y.o. female  Percent with complaints of toe pain she had a laceration to the left great toe about one month ago states she had sutures placed and had follow-up for second time for repeat sutures sutures of been in for several weeks the patient has had increasing drainage and redness extending up her left foot she has missed several podiatry and wound care  Appointments she denies any fever at this time  She is a diabetic    Nursing Notes were all reviewed and agreed with or any disagreements were addressed  in the HPI. REVIEW OF SYSTEMS    (2-9 systems for level 4, 10 or more for level 5)     Review of Systems    Positives and Pertinent negatives as per HPI. Except as noted above in the ROS, all other systems were reviewed andnegative.        PASTMEDICAL HISTORY     Past Medical History:   Diagnosis Date    Anemia 6/2/2015    Arthritis     Asthma     Back pain, chronic     DDD    CAD (coronary artery disease)     angiogram 2/26, CONNIE x1 to mid Cirflx    CHF (congestive heart failure) (HCC)     Chronic bronchitis (HCC)     [facility] obstructive [facility] disease) [facility]     Depression     [facility]     DVT (deep venous thrombosis) ([facility]     Fibromyalgia     GERD (gastroesophageal reflux disease)     Hx of blood clots     Hyperlipidemia     Hypertension     Lung disease     Neuropathy     Other disorders of kidney and ureter     kidney stones    Pneumonia     PVD [facility]peripheral vascular disease) ([facility]     Sleep apnea 2015    Has CPAP, can't tolerate    STEMI (ST elevation myocardial infarction) ([facility] [address] [facility] 14    Dr. Bustamante Grifton Thyroid disease     Urinary incontinence     [facility]     [facility]         SURGICAL HISTORY       Past Surgical History:   Procedure Laterality Date    APPENDECTOMY      CATARACT REMOVAL WITH IMPLANT Right 2015     SECTION      CORONARY ANGIOPLASTY WITH STENT PLACEMENT  14    DIAGNOSTIC CARDIAC CATH LAB PROCEDURE      DILATION AND CURETTAGE OF UTERUS      EYE SURGERY Left 2015    Cataract    LITHOTRIPSY      kidney stones    VASCULAR SURGERY      thrombectomy         CURRENT MEDICATIONS       Previous Medications    ALBUTEROL (PROVENTIL) (2.5 MG/3ML) 0.083% NEBULIZER SOLUTION    Take 3 mLs by nebulization every 6 hours as needed for Wheezing    ALCOHOL SWABS PADS    UAD to test blood sugar & inject insulin    ARTIFICIAL SALIVA (BIOTENE MOISTURIZING MOUTH) SOLN    USE 1 SPRAY DAILY AS NEEDED FOR DRY MOUTH    ASPIRIN (ASPIRIN CHILDRENS) 81 MG CHEWABLE TABLET    Take 1 tablet by mouth daily. ATORVASTATIN (LIPITOR) 40 MG TABLET    Take 1 tablet by mouth daily.     BLOOD GLUCOSE TEST STRIPS (FREESTYLE LITE) STRIP    TEST THREE TIMES A DAY    BUPROPION (WELLBUTRIN XL) 300 MG EXTENDED RELEASE TABLET    TAKE ONE TABLET BY MOUTH EVERY MORNING    CEPHALEXIN (KEFLEX) 500 MG CAPSULE    Take 1 capsule by mouth 4 times daily    CHOLECALCIFEROL (VITAMIN D3) 1000 UNITS TABS    Take 1 tablet by mouth daily    CLONAZEPAM (KLONOPIN) 0.5 MG TABLET    TAKE ONE TABLET BY MOUTH THREE TIMES A DAY AS NEEDED FOR ANXIETY    CLOPIDOGREL (PLAVIX) 75 MG TABLET    Take 1 tablet by mouth daily. FERROUS SULFATE 325 (65 FE) MG EC TABLET    Take 1 tablet by mouth daily (with breakfast)    FEXOFENADINE (WAL-FEX ALLERGY) 180 MG TABLET    1 po qd, to replace claritin/loratidine. FLUOCINONIDE 0.1 % CREA    Apply to affected area twice daily as needed for dry skin. FLUTICASONE (FLONASE) 50 MCG/ACT NASAL SPRAY    2 sprays by Nasal route daily    FREESTYLE LANCETS MISC    USE TO TEST BLOOD SUGAR THREE TIMES DAILY    FUROSEMIDE (LASIX) 20 MG TABLET    Take 1 tablet by mouth twice daily. GABAPENTIN (NEURONTIN) 300 MG CAPSULE    Take 3 capsules by mouth 3 times daily. GUAIFENESIN (MUCINEX) 600 MG EXTENDED RELEASE TABLET    Take 1 tablet by mouth 2 times daily as needed for Congestion    IBUPROFEN (ADVIL;MOTRIN) 400 MG TABLET    Take 1 tablet by mouth every 6 hours as needed for Pain    INSULIN GLARGINE (BASAGLAR KWIKPEN) 100 UNIT/ML INJECTION PEN    Inject 45 Units into the skin 2 times daily    INSULIN LISPRO (HUMALOG KWIKPEN) 100 UNIT/ML PEN    Inject 25 Units into the skin 3 times daily (before meals)    INSULIN PEN NEEDLE (B-D UF III MINI PEN NEEDLES) 31G X 5 MM MISC    USE TO INJECT INSULIN and victoza 6 TIMES A DAY    ISOSORBIDE MONONITRATE (IMDUR) 30 MG EXTENDED RELEASE TABLET    Take 1 tablet by mouth daily. LEVOTHYROXINE (SYNTHROID) 50 MCG TABLET    Take 1 tablet by mouth daily. LINAGLIPTIN (TRADJENTA) 5 MG TABLET    TAKE ONE TABLET BY MOUTH DAILY    LIRAGLUTIDE (VICTOZA) 18 MG/3ML SOPN SC INJECTION    Inject 1.2 mg into the skin daily    LISINOPRIL (PRINIVIL;ZESTRIL) 5 MG TABLET    Take 1 tablet by mouth daily    METFORMIN (GLUCOPHAGE) 1000 MG TABLET    Take 1 tablet by mouth twice daily with meals. METOPROLOL TARTRATE (LOPRESSOR) 25 MG TABLET    Take 1 tablet by mouth twice daily.     MONTELUKAST (SINGULAIR) 10 MG TABLET    Take 1 tablet by mouth daily    NITROGLYCERIN (NITROSTAT) 0.4 MG SL TABLET    DISSOLVE ONE TABLET UNDER THE TONGUE AS NEEDED FOR CHEST PAIN EVERY 5 MINUTES UP TO 3 TIMES. IF NO RELIEF CALL 911. OMEPRAZOLE (PRILOSEC) 40 MG DELAYED RELEASE CAPSULE    Take 1 capsule by mouth daily    OXYBUTYNIN (DITROPAN) 5 MG TABLET    1 po q am and 2 po qhs    OXYCODONE-ACETAMINOPHEN (PERCOCET) 5-325 MG PER TABLET    Take 1 tablet by mouth every 8 hours as needed for Pain . PHENOL 1.4 % AERS    Take 1 spray by mouth every 2 hours as needed (for sore throat)    RANOLAZINE (RANEXA) 500 MG EXTENDED RELEASE TABLET    Take 1 tablet by mouth twice daily. SERTRALINE (ZOLOFT) 100 MG TABLET    Take 1 tablet by mouth daily. SERTRALINE (ZOLOFT) 100 MG TABLET    Take 1 tablet by mouth daily    SIMETHICONE (MYLICON) 80 MG CHEWABLE TABLET    Take 1 tablet by mouth 4 times daily as needed for Flatulence    SPIRIVA RESPIMAT 2.5 MCG/ACT AERS INHALER    INHALE 2 PUFFS BY MOUTH DAILY    SYMBICORT 160-4.5 MCG/ACT AERO    Inhale 2 puffs by mouth twice daily.     TIZANIDINE (ZANAFLEX) 4 MG TABLET    TAKE ONE-HALF TO ONE THREE TIMES A DAY AS NEEDED FOR BACK PAIN    VENTOLIN  (90 BASE) MCG/ACT INHALER    INHALE TWO PUFFS BY MOUTH EVERY 6 HOURS AS NEEDED FOR WHEEZING    VITAMIN B-12 (CYANOCOBALAMIN) 1000 MCG TABLET    Take 1 tablet by mouth daily       ALLERGIES     Januvia [sitagliptin] and Iodine    FAMILY HISTORY       Family History   Problem Relation Age of Onset    Other Mother          of unknown lung issue at 61    Diabetes Mother     Diabetes Brother     Heart Disease Maternal Grandfather     Cancer Maternal Grandmother     Asthma Grandchild           SOCIAL HISTORY       Social History     Socioeconomic History    Marital status:      Spouse name: None    Number of children: None    Years of education: None    Highest education level: None   Occupational History    None   Social Needs    Financial resource strain: None    Food insecurity:     Worry: None     Inability: None    Transportation needs:     Medical: None     Non-medical: None   Tobacco Use    Smoking status: Current Every Day Smoker     Packs/day: 0.25     Years: 40.00     Pack years: 10.00     Types: Cigarettes    Smokeless tobacco: Former User    Tobacco comment: .5 ppd   Substance and Sexual Activity    Alcohol use: No     Alcohol/week: 0.0 oz    Drug use: No    Sexual activity: Not Currently   Lifestyle    Physical activity:     Days per week: None     Minutes per session: None    Stress: None   Relationships    Social connections:     Talks on phone: None     Gets together: None     Attends Baptism service: None     Active member of club or organization: None     Attends meetings of clubs or organizations: None     Relationship status: None    Intimate partner violence:     Fear of current or ex partner: None     Emotionally abused: None     Physically abused: None     Forced sexual activity: None   Other Topics Concern    None   Social History Narrative    None       SCREENINGS      @FLOW([id number])@      PHYSICAL EXAM    (up to 7 for level 4, 8 or more for level 5)     ED Triage Vitals [06/02/19 2313]   BP Temp Temp Source Pulse Resp SpO2 Height Weight   (!) 141/69 97.2 °F (36.2 °C) Oral 69 18 98 % 5' (1.524 m) 177 lb (80.3 kg)       Physical Exam      General Appearance:  Alert, cooperative, no distress, appears stated age. Head:  Normocephalic, without obviousabnormality, atraumatic. Eyes:  conjunctiva/corneas clear, EOM's intact. Sclera anicteric. ENT: Mucous membranes moist.   Neck: Supple, symmetrical, trachea midline, no adenopathy. No jugular venous distention. Lungs:   Clear to auscultation bilaterally, respirationsunlabored. No rales, rhonchi or wheezes. Chest Wall:  No tenderness. Heart:  Regular rate and rhythm, S1 and S2 normal, no murmur, rub or gallop. Abdomen:   Soft, non-tender, bowel sounds active,   no masses, no organomegaly. Extremities:  The ggreat toe is erythematous discolored a small ulceration anteriorly there is no purulent material expressed the foot appears to be erythematous as well. Pulses: 2+ and symmetric   Skin: Turgor is normal, no rashes or lesions. Neurologic: Alert and oriented X 3. No focal findings.   Motor grossly normal.  Speech clear, no drift, CN III-XII grossly intact,        DIAGNOSTIC RESULTS   LABS:    Labs Reviewed   LACTATE, SEPSIS - Abnormal; Notable for the following components:       Result Value    Lactic Acid, Sepsis 3.5 (*)     All other components within normal limits    Narrative:     Performed at:  [facility] [employer],  [employer]   Phone [phone]   LACTATE, SEPSIS - Abnormal; Notable for the following components:    Lactic Acid, Sepsis 2.9 (*)     All other components within normal limits    Narrative:     Performed at:  [facility] [employer],  [facility]   Phone [phone]   CBC WITH AUTO DIFFERENTIAL - Abnormal; Notable for the following components:    RBC 3.89 (*)     MPV 10.6 (*)     All other components within normal limits    Narrative:     Performed at:  [facility] [employer],  [employer]   Phone [phone]   URINE RT REFLEX TO CULTURE - Abnormal; Notable for the following components:    Clarity, UA SL CLOUDY (*)     Glucose, Ur >=1000 (*)     Leukocyte Esterase, Urine SMALL (*)     All other components within normal limits    Narrative:     Performed at:  [facility] [employer],  [employer]   Phone [phone]   COMPREHENSIVE METABOLIC PANEL - Abnormal; Notable for the following components:    Sodium 131 (*)     Chloride 94 (*)     Glucose 498 (*)     CREATININE 1.2 (*)     GFR Non- 46 (*)     GFR  56 (*)     ALT 6 (*)     AST 7 (*)     All other components within normal limits    Narrative:     Performed at:  [facility] - [facility],  [place], [facility]   Phone [phone]   MAGNESIUM - Abnormal; Notable for the following components:    Magnesium 1.70 (*)     All other components within normal limits    Narrative:     Performed at:  [facility],  [place], [facility]   Phone [phone]   MICROSCOPIC URINALYSIS - Abnormal; Notable for the following components:    WBC, UA 20-50 (*)     Bacteria, UA Rare (*)     Yeast, UA Present (*)     All other components within normal limits    Narrative:     Performed at:  [facility]) - [facility],  [place], [facility]   Phone [phone]   CULTURE BLOOD #1   CULTURE BLOOD #2   URINE CULTURE       All other labs were within normal range or not returned as of this dictation. EKG: All EKG's are interpreted by the Emergency Department Physician who eithersigns or Co-signs this chart in the absence of a cardiologist.        RADIOLOGY:   Non-plain film images such as CT, Ultrasound and MRI are read by the radiologist. Plain radiographic images are visualized by myself. *    Interpretation per the Radiologist below, if available at the time of this note:    CT TIBIA FIBULA LEFT WO CONTRAST   Preliminary Result   1. Dorsal ulcer at the nail bed in the 1st digit tracking to a transverse   fracture in the base of the distal phalanx 1st digit. Intraosseous gas at   the site of the fracture. 2. Dorsal superficial soft tissue edema in the foot extending into the ankle   and distal leg. No fluid collection or abscess on the noncontrast CT. 3. No other acute abnormality in the left leg or left foot. CT FOOT LEFT WO CONTRAST   Preliminary Result   1. Dorsal ulcer at the nail bed in the 1st digit tracking to a transverse   fracture in the base of the distal phalanx 1st digit.   Intraosseous gas at   the

## 2019-06-03 NOTE — ED NOTES
Patient reports initial left great toe injury may5, reinjury approx 2 weeks later.       Keke Chiang RN  06/03/19 5830

## 2019-06-03 NOTE — CARE COORDINATION
Case Management Assessment  Initial Evaluation    Date/Time of Evaluation: 6/3/2019 1:57 PM  Assessment Completed by: Jameson Howard    Patient Name: Jillyn Goodell  YOB: 1960  Diagnosis: Toe infection [L08.9]  Date / Time: 6/2/2019 11:14 PM  Admission status/Date: Inpatient 6/3/2019  Chart Reviewed: Yes      Patient Interviewed: Yes   Family Interviewed:  No      Hospitalization in the last 30 days:  No    Contacts  :   Norah Carrillo  Relationship to Patient:  daughter  Phone Number:   [phone]  Alternate Contact:   Bernardo Love  Relationship to Patient:   daughter  Phone Number:    [phone]    Met with: patient    Current PCP: Christie Barrios MD    Financial  [employer]  Precert required for SNF : Y, N        3 night stay required - Y, N    ADLS  Support Systems: Children  Transportation: family/[employer]    Meal Preparation: self, dtr    Housing  Home Environment: lives with dtr in trailer  Steps: 3, no HR  Plans to Return to Present Housing: Yes  Other Identified Issues: daughter is starting a new job and is not available for 24/7 supervision or for assistance with transportation to medical follow up appointments. Home Care Information  Currently active with [address] : No     Passport/Waiver : No  :                      Phone Number:    Passport/Waiver Services: NA          Durable Medical Equipment   DME Provider: CASSIE  Equipment: NONE  Walker__Cane__RTS__ BSC__Shower Chair__  02__ HHN__ CPAP__  BiPap__  Hospital Bed__ W/C___ Other__________      Has Home O2 in place on admit:  No  Informed of need to bring portable home O2 tank on day of discharge for nursing to connect prior to leaving:   No  Verbalized agreement/Understanding:   No    Community Service Affiliation  Dialysis:  No    · Name:  · Location  · Dialysis Schedule:  · Phone:   · Fax:     Outpatient PT/OT: No    Cancer Center: No     CHF Clinic: No     Pulmonary Rehab: No  Pain Clinic: No  [facility]: No    Wound Clinic: No     Other:     DISCHARGE PLAN: Chart reviewed. Met with pt at bedside and explained the role of the CM. Plan: IPTA w/ADLS, daughter assists when she can with driving. She is not available to provide 24/7 supervision due to new work schedule. +[employer]. +CM following for possible home IVABTX/HC and DME needs. Explained Case Management role/services.

## 2019-06-04 LAB
ANION GAP SERPL CALCULATED.3IONS-SCNC: 11 MMOL/L (ref 3–16)
BASOPHILS ABSOLUTE: 0.1 K/UL (ref 0–0.2)
BASOPHILS RELATIVE PERCENT: 0.9 %
BUN BLDV-MCNC: 14 MG/DL (ref 7–20)
CALCIUM SERPL-MCNC: 9 MG/DL (ref 8.3–10.6)
CHLORIDE BLD-SCNC: 102 MMOL/L (ref 99–110)
CO2: 21 MMOL/L (ref 21–32)
CREAT SERPL-MCNC: 0.9 MG/DL (ref 0.6–1.1)
EOSINOPHILS ABSOLUTE: 0.3 K/UL (ref 0–0.6)
EOSINOPHILS RELATIVE PERCENT: 3.2 %
GFR AFRICAN AMERICAN: >60
GFR NON-AFRICAN AMERICAN: >60
GLUCOSE BLD-MCNC: 193 MG/DL (ref 70–99)
GLUCOSE BLD-MCNC: 204 MG/DL (ref 70–99)
GLUCOSE BLD-MCNC: 257 MG/DL (ref 70–99)
GLUCOSE BLD-MCNC: 294 MG/DL (ref 70–99)
GLUCOSE BLD-MCNC: 316 MG/DL (ref 70–99)
GLUCOSE BLD-MCNC: 321 MG/DL (ref 70–99)
HCT VFR BLD CALC: 32.5 % (ref 36–48)
HEMOGLOBIN: 11 G/DL (ref 12–16)
LYMPHOCYTES ABSOLUTE: 3.4 K/UL (ref 1–5.1)
LYMPHOCYTES RELATIVE PERCENT: 41.1 %
MCH RBC QN AUTO: 32.3 PG (ref 26–34)
MCHC RBC AUTO-ENTMCNC: 33.8 G/DL (ref 31–36)
MCV RBC AUTO: 95.7 FL (ref 80–100)
MONOCYTES ABSOLUTE: 0.5 K/UL (ref 0–1.3)
MONOCYTES RELATIVE PERCENT: 6.1 %
NEUTROPHILS ABSOLUTE: 4 K/UL (ref 1.7–7.7)
NEUTROPHILS RELATIVE PERCENT: 48.7 %
PDW BLD-RTO: 13.4 % (ref 12.4–15.4)
PERFORMED ON: ABNORMAL
PLATELET # BLD: 313 K/UL (ref 135–450)
PMV BLD AUTO: 9.9 FL (ref 5–10.5)
POTASSIUM SERPL-SCNC: 4.5 MMOL/L (ref 3.5–5.1)
RBC # BLD: 3.4 M/UL (ref 4–5.2)
SODIUM BLD-SCNC: 134 MMOL/L (ref 136–145)
URINE CULTURE, ROUTINE: NORMAL
WBC # BLD: 8.2 K/UL (ref 4–11)

## 2019-06-04 PROCEDURE — 99232 SBSQ HOSP IP/OBS MODERATE 35: CPT | Performed by: INTERNAL MEDICINE

## 2019-06-04 PROCEDURE — 6370000000 HC RX 637 (ALT 250 FOR IP): Performed by: PODIATRIST

## 2019-06-04 PROCEDURE — 2580000003 HC RX 258: Performed by: PODIATRIST

## 2019-06-04 PROCEDURE — 80048 BASIC METABOLIC PNL TOTAL CA: CPT

## 2019-06-04 PROCEDURE — 94640 AIRWAY INHALATION TREATMENT: CPT

## 2019-06-04 PROCEDURE — 36415 COLL VENOUS BLD VENIPUNCTURE: CPT

## 2019-06-04 PROCEDURE — 6360000002 HC RX W HCPCS: Performed by: PODIATRIST

## 2019-06-04 PROCEDURE — 6370000000 HC RX 637 (ALT 250 FOR IP): Performed by: HOSPITALIST

## 2019-06-04 PROCEDURE — 85025 COMPLETE CBC W/AUTO DIFF WBC: CPT

## 2019-06-04 PROCEDURE — 2700000000 HC OXYGEN THERAPY PER DAY

## 2019-06-04 PROCEDURE — 1200000000 HC SEMI PRIVATE

## 2019-06-04 PROCEDURE — 94761 N-INVAS EAR/PLS OXIMETRY MLT: CPT

## 2019-06-04 RX ORDER — INSULIN GLARGINE 100 [IU]/ML
28 INJECTION, SOLUTION SUBCUTANEOUS 2 TIMES DAILY
Status: DISCONTINUED | OUTPATIENT
Start: 2019-06-04 | End: 2019-06-06 | Stop reason: HOSPADM

## 2019-06-04 RX ORDER — FUROSEMIDE 20 MG/1
20 TABLET ORAL DAILY
Status: DISCONTINUED | OUTPATIENT
Start: 2019-06-05 | End: 2019-06-06 | Stop reason: HOSPADM

## 2019-06-04 RX ADMIN — METOPROLOL TARTRATE 25 MG: 25 TABLET ORAL at 08:09

## 2019-06-04 RX ADMIN — PANTOPRAZOLE SODIUM 40 MG: 40 TABLET, DELAYED RELEASE ORAL at 06:20

## 2019-06-04 RX ADMIN — TIZANIDINE 2 MG: 4 TABLET ORAL at 16:59

## 2019-06-04 RX ADMIN — PIPERACILLIN SODIUM AND TAZOBACTAM SODIUM 3.38 G: 3; .375 INJECTION, POWDER, LYOPHILIZED, FOR SOLUTION INTRAVENOUS at 16:36

## 2019-06-04 RX ADMIN — GABAPENTIN 300 MG: 300 CAPSULE ORAL at 13:47

## 2019-06-04 RX ADMIN — CLOPIDOGREL BISULFATE 75 MG: 75 TABLET, FILM COATED ORAL at 08:09

## 2019-06-04 RX ADMIN — PIPERACILLIN SODIUM AND TAZOBACTAM SODIUM 3.38 G: 3; .375 INJECTION, POWDER, LYOPHILIZED, FOR SOLUTION INTRAVENOUS at 08:09

## 2019-06-04 RX ADMIN — INSULIN GLARGINE 20 UNITS: 100 INJECTION, SOLUTION SUBCUTANEOUS at 10:30

## 2019-06-04 RX ADMIN — BUPROPION HYDROCHLORIDE 300 MG: 300 TABLET, FILM COATED, EXTENDED RELEASE ORAL at 08:08

## 2019-06-04 RX ADMIN — ONDANSETRON 4 MG: 2 INJECTION INTRAMUSCULAR; INTRAVENOUS at 20:42

## 2019-06-04 RX ADMIN — GABAPENTIN 300 MG: 300 CAPSULE ORAL at 20:20

## 2019-06-04 RX ADMIN — PIPERACILLIN SODIUM AND TAZOBACTAM SODIUM 3.38 G: 3; .375 INJECTION, POWDER, LYOPHILIZED, FOR SOLUTION INTRAVENOUS at 23:43

## 2019-06-04 RX ADMIN — ATORVASTATIN CALCIUM 40 MG: 40 TABLET, FILM COATED ORAL at 20:20

## 2019-06-04 RX ADMIN — OXYCODONE HYDROCHLORIDE AND ACETAMINOPHEN 2 TABLET: 5; 325 TABLET ORAL at 06:21

## 2019-06-04 RX ADMIN — LEVOTHYROXINE SODIUM 50 MCG: 50 TABLET ORAL at 06:20

## 2019-06-04 RX ADMIN — INSULIN LISPRO 6 UNITS: 100 INJECTION, SOLUTION INTRAVENOUS; SUBCUTANEOUS at 08:08

## 2019-06-04 RX ADMIN — INSULIN LISPRO 4 UNITS: 100 INJECTION, SOLUTION INTRAVENOUS; SUBCUTANEOUS at 16:59

## 2019-06-04 RX ADMIN — INSULIN GLARGINE 28 UNITS: 100 INJECTION, SOLUTION SUBCUTANEOUS at 20:21

## 2019-06-04 RX ADMIN — GABAPENTIN 300 MG: 300 CAPSULE ORAL at 08:08

## 2019-06-04 RX ADMIN — HYDROMORPHONE HYDROCHLORIDE 1 MG: 2 INJECTION, SOLUTION INTRAMUSCULAR; INTRAVENOUS; SUBCUTANEOUS at 08:19

## 2019-06-04 RX ADMIN — TIOTROPIUM BROMIDE 18 MCG: 18 CAPSULE ORAL; RESPIRATORY (INHALATION) at 06:42

## 2019-06-04 RX ADMIN — INSULIN LISPRO 1 UNITS: 100 INJECTION, SOLUTION INTRAVENOUS; SUBCUTANEOUS at 20:21

## 2019-06-04 RX ADMIN — HYDROMORPHONE HYDROCHLORIDE 1 MG: 2 INJECTION, SOLUTION INTRAMUSCULAR; INTRAVENOUS; SUBCUTANEOUS at 19:47

## 2019-06-04 RX ADMIN — VANCOMYCIN HYDROCHLORIDE 1250 MG: 5 INJECTION, POWDER, LYOPHILIZED, FOR SOLUTION INTRAVENOUS at 09:34

## 2019-06-04 RX ADMIN — SODIUM CHLORIDE: 9 INJECTION, SOLUTION INTRAVENOUS at 20:20

## 2019-06-04 RX ADMIN — Medication 2 PUFF: at 19:00

## 2019-06-04 RX ADMIN — FERROUS SULFATE TAB 325 MG (65 MG ELEMENTAL FE) 325 MG: 325 (65 FE) TAB at 08:08

## 2019-06-04 RX ADMIN — Medication 2 PUFF: at 06:42

## 2019-06-04 RX ADMIN — Medication 1000 MCG: at 08:09

## 2019-06-04 RX ADMIN — OXYBUTYNIN CHLORIDE 5 MG: 5 TABLET ORAL at 08:09

## 2019-06-04 RX ADMIN — TIZANIDINE 2 MG: 4 TABLET ORAL at 10:50

## 2019-06-04 RX ADMIN — OXYBUTYNIN CHLORIDE 5 MG: 5 TABLET ORAL at 20:20

## 2019-06-04 RX ADMIN — SERTRALINE 100 MG: 100 TABLET, FILM COATED ORAL at 08:08

## 2019-06-04 RX ADMIN — VITAMIN D, TAB 1000IU (100/BT) 1000 UNITS: 25 TAB at 08:09

## 2019-06-04 RX ADMIN — PIPERACILLIN SODIUM AND TAZOBACTAM SODIUM 3.38 G: 3; .375 INJECTION, POWDER, LYOPHILIZED, FOR SOLUTION INTRAVENOUS at 00:42

## 2019-06-04 RX ADMIN — HYDROMORPHONE HYDROCHLORIDE 1 MG: 2 INJECTION, SOLUTION INTRAMUSCULAR; INTRAVENOUS; SUBCUTANEOUS at 02:15

## 2019-06-04 RX ADMIN — OXYCODONE HYDROCHLORIDE AND ACETAMINOPHEN 2 TABLET: 5; 325 TABLET ORAL at 16:13

## 2019-06-04 RX ADMIN — ONDANSETRON 4 MG: 2 INJECTION INTRAMUSCULAR; INTRAVENOUS at 08:55

## 2019-06-04 RX ADMIN — OXYCODONE HYDROCHLORIDE AND ACETAMINOPHEN 2 TABLET: 5; 325 TABLET ORAL at 20:20

## 2019-06-04 RX ADMIN — ISOSORBIDE MONONITRATE 30 MG: 30 TABLET ORAL at 08:09

## 2019-06-04 RX ADMIN — TIZANIDINE 2 MG: 4 TABLET ORAL at 22:18

## 2019-06-04 RX ADMIN — RANOLAZINE 500 MG: 500 TABLET, FILM COATED, EXTENDED RELEASE ORAL at 20:20

## 2019-06-04 RX ADMIN — CLONAZEPAM 0.5 MG: 0.5 TABLET ORAL at 22:18

## 2019-06-04 RX ADMIN — HYDROMORPHONE HYDROCHLORIDE 1 MG: 2 INJECTION, SOLUTION INTRAMUSCULAR; INTRAVENOUS; SUBCUTANEOUS at 23:39

## 2019-06-04 RX ADMIN — ASPIRIN 81 MG 81 MG: 81 TABLET ORAL at 08:09

## 2019-06-04 RX ADMIN — METOPROLOL TARTRATE 25 MG: 25 TABLET ORAL at 20:20

## 2019-06-04 RX ADMIN — ACETAMINOPHEN AND CODEINE PHOSPHATE 1 TABLET: 300; 30 TABLET ORAL at 00:43

## 2019-06-04 RX ADMIN — OXYCODONE HYDROCHLORIDE AND ACETAMINOPHEN 2 TABLET: 5; 325 TABLET ORAL at 11:49

## 2019-06-04 RX ADMIN — RANOLAZINE 500 MG: 500 TABLET, FILM COATED, EXTENDED RELEASE ORAL at 08:08

## 2019-06-04 RX ADMIN — INSULIN LISPRO 6 UNITS: 100 INJECTION, SOLUTION INTRAVENOUS; SUBCUTANEOUS at 11:49

## 2019-06-04 RX ADMIN — Medication 10 ML: at 23:43

## 2019-06-04 ASSESSMENT — PAIN DESCRIPTION - FREQUENCY
FREQUENCY: CONTINUOUS
FREQUENCY: CONTINUOUS

## 2019-06-04 ASSESSMENT — PAIN - FUNCTIONAL ASSESSMENT
PAIN_FUNCTIONAL_ASSESSMENT: ACTIVITIES ARE NOT PREVENTED

## 2019-06-04 ASSESSMENT — PAIN DESCRIPTION - PAIN TYPE
TYPE: ACUTE PAIN;SURGICAL PAIN

## 2019-06-04 ASSESSMENT — PAIN DESCRIPTION - LOCATION
LOCATION: FOOT

## 2019-06-04 ASSESSMENT — PAIN SCALES - GENERAL
PAINLEVEL_OUTOF10: 9
PAINLEVEL_OUTOF10: 10
PAINLEVEL_OUTOF10: 9
PAINLEVEL_OUTOF10: 7
PAINLEVEL_OUTOF10: 10
PAINLEVEL_OUTOF10: 7
PAINLEVEL_OUTOF10: 10

## 2019-06-04 ASSESSMENT — PAIN DESCRIPTION - ORIENTATION
ORIENTATION: LEFT

## 2019-06-04 NOTE — PROGRESS NOTES
Pt in bed resting at this time, eyes closed, respirations easy even and unlabored. No signs of distress noted. Call light within reach. Will continue to monitor.

## 2019-06-04 NOTE — PROGRESS NOTES
IM Progress Note    Admit Date:  6/2/2019  1    Interval history:  S.p left hallux amputation      Subjective:  Ms. Dante Wilcox seen up in bed, feels very nauseated today . Could be from breakfast this am    Objective:   /60   Pulse 86   Temp 98.6 °F (37 °C) (Oral)   Resp 18   Ht 5' (1.524 m)   Wt 178 lb 9 oz (81 kg)   LMP  (LMP Unknown)   SpO2 93%   BMI 34.87 kg/m²       Intake/Output Summary (Last 24 hours) at 6/4/2019 0752  Last data filed at 6/3/2019 1830  Gross per 24 hour   Intake 355 ml   Output 10 ml   Net 345 ml       Physical Exam:      General: middle aged female,   Awake, alert and oriented. Appears to be not in any distress  Mucous Membranes:  Pink , anicteric  Neck: No JVD, no carotid bruit, no thyromegaly  Chest:  Clear to auscultation bilaterally, no added sounds  Cardiovascular:  RRR S1S2 heard, no murmurs or gallops  Abdomen:  Soft, undistended, non tender, no organomegaly, BS present  Extremities: left foot dressing dry   No edema or cyanosis.  Distal pulses well felt  Neurological : grossly normal        Medications:   Scheduled Medications:    aspirin  81 mg Oral Daily    atorvastatin  40 mg Oral Nightly    buPROPion  300 mg Oral Daily    vitamin D  1,000 Units Oral Daily    clopidogrel  75 mg Oral Daily    ferrous sulfate  325 mg Oral Daily with breakfast    gabapentin  300 mg Oral TID    insulin glargine  20 Units Subcutaneous BID    levothyroxine  50 mcg Oral Daily    metoprolol tartrate  25 mg Oral BID    pantoprazole  40 mg Oral QAM AC    oxybutynin  5 mg Oral BID    ranolazine  500 mg Oral BID    sertraline  100 mg Oral Daily    mometasone-formoterol  2 puff Inhalation BID    vitamin B-12  1,000 mcg Oral Daily    sodium chloride flush  10 mL Intravenous 2 times per day    enoxaparin  40 mg Subcutaneous Daily    nicotine  1 patch Transdermal Daily    piperacillin-tazobactam  3.375 g Intravenous Q8H    vancomycin  1,250 mg Intravenous Q24H    tiotropium  18 mcg Inhalation Daily    insulin lispro  0-12 Units Subcutaneous TID WC    insulin lispro  0-6 Units Subcutaneous Nightly    isosorbide mononitrate  30 mg Oral Daily     I   sodium chloride 75 mL/hr at 06/03/19 2046    dextrose       albuterol, BIOTENE MOISTURIZING MOUTH, nitroGLYCERIN, simethicone, tiZANidine, albuterol sulfate HFA, HYDROmorphone **OR** HYDROmorphone, sodium chloride flush, magnesium hydroxide, ondansetron, glucose, dextrose, glucagon (rDNA), dextrose, clonazePAM, oxyCODONE-acetaminophen **OR** oxyCODONE-acetaminophen, acetaminophen-codeine    Lab Data:  Recent Labs     06/03/19  0035 06/04/19  0621   WBC 10.5 8.2   HGB 12.5 11.0*   HCT 36.9 32.5*   MCV 94.8 95.7    313     Recent Labs     06/03/19  0130 06/04/19  0621   * 134*   K 4.3 4.5   CL 94* 102   CO2 23 21   BUN 16 14   CREATININE 1.2* 0.9     No results for input(s): CKTOTAL, CKMB, CKMBINDEX, TROPONINI in the last 72 hours. Coagulation:   Lab Results   Component Value Date    INR 0.94 10/11/2016    APTT 31.7 02/26/2014     Cardiac markers:   Lab Results   Component Value Date    TROPONINI <0.01 06/28/2018         Lab Results   Component Value Date    ALT 6 (L) 06/03/2019    AST 7 (L) 06/03/2019    ALKPHOS 83 06/03/2019    BILITOT <0.2 06/03/2019       Lab Results   Component Value Date    INR 0.94 10/11/2016    INR 1.04 06/01/2015    INR 0.91 05/26/2012    PROTIME 10.7 10/11/2016    PROTIME 11.3 06/01/2015    PROTIME 10.4 05/26/2012       Radiology    CULTURES  Urine Cx: pending   Blood Cx: pending      EKG:    No EKG from this admission for review     RADIOLOGY  CT TIBIA FIBULA LEFT WO CONTRAST   Preliminary Result   1. Dorsal ulcer at the nail bed in the 1st digit tracking to a transverse   fracture in the base of the distal phalanx 1st digit. Intraosseous gas at   the site of the fracture. 2. Dorsal superficial soft tissue edema in the foot extending into the ankle   and distal leg.   No fluid collection or abscess on the noncontrast CT. 3. No other acute abnormality in the left leg or left foot.           CT FOOT LEFT WO CONTRAST   Preliminary Result   1. Dorsal ulcer at the nail bed in the 1st digit tracking to a transverse   fracture in the base of the distal phalanx 1st digit. Intraosseous gas at   the site of the fracture. 2. Dorsal superficial soft tissue edema in the foot extending into the ankle   and distal leg. No fluid collection or abscess on the noncontrast CT. 3. No other acute abnormality in the left leg or left foot.                 Pertinent previous results reviewed   None     ASSESSMENT/PLAN:     L great toe infection/Left foot cellulitis / gas gangrene    - Diabetic foot infection, Vanc/Zosyn   - ulceration with necrotic skin the left great toe - intraosseous gas with fracture on CT   - s/p left hallux amputation by podiatry  - PRN pain control   - blood glucose uncontrolled, continue to try to manage hyperglycemia        Lactic acidosis   - likely 2/2 toe infection and high sugars   - IVF and trend LA--resolved      DM2  - uncontrolled, BG >400 on arrival- no DKA   - Continue Lantus and SSI - improving sugars     CAD  CHF  - hx of STEMI, 2/p PCI tp Lcx with CONNIE   - Continue home medications   - Ischemic cardiomyopathy with normal (recovered) EF, appears well compensated   - no acute ACS sx   - Continue Imdur and Ranexa   - Lasix held      COPD  - no AE   - Continue IBD      HTN  - Lisinopril held   - Continue BB  -   HLD   - Continue Statin      JORDI  - has CPAP at home but does not wear it      Hypothyroidism  - Continue synthroid  Hyponatremia      UTI?   Yeast   - Patient reports no Sx  - Urine Cx pending   - Already on Abx for toe infection   - Will d/c home with dose of diflucan to take after she has completed her Abx course      DVT Prophylaxis: Lovenox   Diet: diabetic  Code Status: Full Code           Shi Siddiqui MD 6/4/2019 7:52 AM

## 2019-06-04 NOTE — PROGRESS NOTES
Pt moaning in pain at this time. Pt given PRN dilaudid at this time for pain. Pt asked what she was getting this time, pt was told dilaudid. Pt rolled her eyes and stated \"Are you serious? \", laughing as if she were upset. Pt asked what she meant and pt declined to answer. Before leaving pt room pt stated angrily \"I was suppose to get a coffee hours ago with a power creamer and never got it\". Pt has stopped moaning right after getting her pain medication. Pt was given her coffee at this time and when back in room pt asked \"how do you get the damn captions off this tv\". Pt helped with her tv by Anastacia OLSEN at this time.

## 2019-06-04 NOTE — PROGRESS NOTES
Pt given tylenol 3 at this time, pt laughed at this writer and stated \"this is pointless\". Pt foot propped up on another pillow and ice applied. Will monitor.

## 2019-06-04 NOTE — PLAN OF CARE
Problem: Falls - Risk of:  Goal: Will remain free from falls  Description  Will remain free from falls  Outcome: Ongoing  Goal: Absence of physical injury  Description  Absence of physical injury  Outcome: Ongoing     Problem: OXYGENATION/RESPIRATORY FUNCTION  Goal: Patient will maintain patent airway  Outcome: Ongoing  Goal: Patient will achieve/maintain normal respiratory rate/effort  Description  Respiratory rate and effort will be within normal limits for the patient  Outcome: Ongoing     Problem: HEMODYNAMIC STATUS  Goal: Patient has stable vital signs and fluid balance  Outcome: Ongoing  Note:     Patient's EF (Ejection Fraction) is greater than 40%    Patient's weights and intake/output reviewed: Intake/Output Summary (Last 24 hours) at 6/4/2019 1356  Last data filed at 6/4/2019 0908  Gross per 24 hour   Intake 475 ml   Output 10 ml   Net 465 ml       Patient's current functional capacity:  Slight limitation of physical activity. Comfortable at rest. Ordinary physical activity results in fatigue, palpitation, dyspnea. Pt resting in bed at this time on 2 L O2. Pt denies shortness of breath. Pt with nonpitting lower extremity edema.      Patient and family's stated goal of care: reduce shortness of breath, increase activity tolerance, better understand heart failure and disease management, be more comfortable and reduce lower extremity edema prior to discharge        Patient has a past medical history of Anemia, Arthritis, Asthma, Back pain, chronic, CAD (coronary artery disease), CHF (congestive heart failure) ([address] Chronic bronchitis ([address] COPD (chronic obstructive pulmonary disease) ([address] Depression, Diabetes mellitus ([address] DVT (deep venous thrombosis) ([address] Fibromyalgia, GERD (gastroesophageal reflux disease), Hx of blood clots, Hyperlipidemia, Hypertension, Lung disease, Neuropathy, Other disorders of kidney and ureter, Pneumonia, PVD (peripheral vascular disease) ([address] Sleep apnea, STEMI (ST elevation myocardial infarction) ([facility] Thyroid disease, Urinary incontinence, and Vascular complications of other XABIQIT(624.94). Comorbidities reviewed and education provided.     >>For CHF and Comorbidity documentation on Education Time and Topics, please see Education Tab           Problem: FLUID AND ELECTROLYTE IMBALANCE  Goal: Fluid and electrolyte balance are achieved/maintained  Outcome: Ongoing     Problem: ACTIVITY INTOLERANCE/IMPAIRED MOBILITY  Goal: Mobility/activity is maintained at optimum level for patient  Outcome: Ongoing     Problem: Infection:  Goal: Will remain free from infection  Description  Will remain free from infection  Outcome: Ongoing     Problem: Safety:  Goal: Free from accidental physical injury  Description  Free from accidental physical injury  Outcome: Ongoing  Goal: Free from intentional harm  Description  Free from intentional harm  Outcome: Ongoing     Problem: Daily Care:  Goal: Daily care needs are met  Description  Daily care needs are met  Outcome: Ongoing     Problem: Pain:  Goal: Patient's pain/discomfort is manageable  Description  Patient's pain/discomfort is manageable  Outcome: Ongoing     Problem: Skin Integrity:  Goal: Skin integrity will stabilize  Description  Skin integrity will stabilize  Outcome: Ongoing     Problem: Discharge Planning:  Goal: Patients continuum of care needs are met  Description  Patients continuum of care needs are met  Outcome: Ongoing

## 2019-06-04 NOTE — PROGRESS NOTES
Spoke with MD regarding pt pain level. Pt has been given 1mg dilaudid and 10mg oxycodone given since 2215. Pt states her pain has not decreased at all, still 10/10. Pt still yelling out in pain. MD added tylenol #3. Updated pt, pt rolled her eye. Attempted to educate pt on pain receptors, no success. Pt updated when her pt can have her next dose of pain mediation.

## 2019-06-04 NOTE — PROGRESS NOTES
In to check on pt, pt stated she feels better. Pt also apologized for her behavior earlier. Pt told she was okay and this writer is only trying to assist her with her pain. Pt stated thank you, pt falling asleep at this time. Will monitor.

## 2019-06-04 NOTE — PROGRESS NOTES
Shift assessment completed. See flow sheet. Respiration easy, even and non labored. Vital signs WNL. Pt alert and oriented x 4. Side rails up x 2. IVF infusing without complications. Pt rates pain 9/10 in back and 10/10 in her foot. PRN flexeril given. This writer stepped out of room to get pt water for her pills, before back in room pt back to sleep. Pt easily aroused to take pills. Pt surgical drsg in place and CDI. Call light within reach. Will continue to monitor.

## 2019-06-05 LAB
ANION GAP SERPL CALCULATED.3IONS-SCNC: 11 MMOL/L (ref 3–16)
BUN BLDV-MCNC: 10 MG/DL (ref 7–20)
CALCIUM SERPL-MCNC: 8.7 MG/DL (ref 8.3–10.6)
CHLORIDE BLD-SCNC: 103 MMOL/L (ref 99–110)
CO2: 20 MMOL/L (ref 21–32)
CREAT SERPL-MCNC: 0.7 MG/DL (ref 0.6–1.1)
GFR AFRICAN AMERICAN: >60
GFR NON-AFRICAN AMERICAN: >60
GLUCOSE BLD-MCNC: 157 MG/DL (ref 70–99)
GLUCOSE BLD-MCNC: 244 MG/DL (ref 70–99)
GLUCOSE BLD-MCNC: 244 MG/DL (ref 70–99)
GLUCOSE BLD-MCNC: 257 MG/DL (ref 70–99)
GLUCOSE BLD-MCNC: 258 MG/DL (ref 70–99)
GLUCOSE BLD-MCNC: 291 MG/DL (ref 70–99)
PERFORMED ON: ABNORMAL
POTASSIUM SERPL-SCNC: 4.3 MMOL/L (ref 3.5–5.1)
SODIUM BLD-SCNC: 134 MMOL/L (ref 136–145)

## 2019-06-05 PROCEDURE — 6370000000 HC RX 637 (ALT 250 FOR IP): Performed by: PODIATRIST

## 2019-06-05 PROCEDURE — 6360000002 HC RX W HCPCS: Performed by: PODIATRIST

## 2019-06-05 PROCEDURE — 94761 N-INVAS EAR/PLS OXIMETRY MLT: CPT

## 2019-06-05 PROCEDURE — 6370000000 HC RX 637 (ALT 250 FOR IP): Performed by: INTERNAL MEDICINE

## 2019-06-05 PROCEDURE — 36415 COLL VENOUS BLD VENIPUNCTURE: CPT

## 2019-06-05 PROCEDURE — 2580000003 HC RX 258: Performed by: PODIATRIST

## 2019-06-05 PROCEDURE — 94640 AIRWAY INHALATION TREATMENT: CPT

## 2019-06-05 PROCEDURE — 99232 SBSQ HOSP IP/OBS MODERATE 35: CPT | Performed by: INTERNAL MEDICINE

## 2019-06-05 PROCEDURE — 1200000000 HC SEMI PRIVATE

## 2019-06-05 PROCEDURE — 80048 BASIC METABOLIC PNL TOTAL CA: CPT

## 2019-06-05 RX ORDER — POLYETHYLENE GLYCOL 3350 17 G/17G
17 POWDER, FOR SOLUTION ORAL DAILY
Status: DISCONTINUED | OUTPATIENT
Start: 2019-06-05 | End: 2019-06-06 | Stop reason: HOSPADM

## 2019-06-05 RX ORDER — SODIUM CHLORIDE 9 MG/ML
INJECTION, SOLUTION INTRAVENOUS
Status: DISPENSED
Start: 2019-06-05 | End: 2019-06-06

## 2019-06-05 RX ADMIN — OXYBUTYNIN CHLORIDE 5 MG: 5 TABLET ORAL at 21:22

## 2019-06-05 RX ADMIN — FERROUS SULFATE TAB 325 MG (65 MG ELEMENTAL FE) 325 MG: 325 (65 FE) TAB at 07:06

## 2019-06-05 RX ADMIN — LEVOTHYROXINE SODIUM 50 MCG: 50 TABLET ORAL at 05:32

## 2019-06-05 RX ADMIN — GABAPENTIN 300 MG: 300 CAPSULE ORAL at 21:22

## 2019-06-05 RX ADMIN — PIPERACILLIN SODIUM AND TAZOBACTAM SODIUM 3.38 G: 3; .375 INJECTION, POWDER, LYOPHILIZED, FOR SOLUTION INTRAVENOUS at 07:33

## 2019-06-05 RX ADMIN — OXYBUTYNIN CHLORIDE 5 MG: 5 TABLET ORAL at 07:05

## 2019-06-05 RX ADMIN — Medication 1000 MCG: at 07:05

## 2019-06-05 RX ADMIN — INSULIN LISPRO 3 UNITS: 100 INJECTION, SOLUTION INTRAVENOUS; SUBCUTANEOUS at 21:23

## 2019-06-05 RX ADMIN — METOPROLOL TARTRATE 25 MG: 25 TABLET ORAL at 21:22

## 2019-06-05 RX ADMIN — BUPROPION HYDROCHLORIDE 300 MG: 300 TABLET, FILM COATED, EXTENDED RELEASE ORAL at 07:24

## 2019-06-05 RX ADMIN — PANTOPRAZOLE SODIUM 40 MG: 40 TABLET, DELAYED RELEASE ORAL at 05:32

## 2019-06-05 RX ADMIN — RANOLAZINE 500 MG: 500 TABLET, FILM COATED, EXTENDED RELEASE ORAL at 07:05

## 2019-06-05 RX ADMIN — VANCOMYCIN HYDROCHLORIDE 1250 MG: 5 INJECTION, POWDER, LYOPHILIZED, FOR SOLUTION INTRAVENOUS at 07:30

## 2019-06-05 RX ADMIN — INSULIN LISPRO 4 UNITS: 100 INJECTION, SOLUTION INTRAVENOUS; SUBCUTANEOUS at 12:18

## 2019-06-05 RX ADMIN — Medication 2 PUFF: at 18:45

## 2019-06-05 RX ADMIN — ISOSORBIDE MONONITRATE 30 MG: 30 TABLET ORAL at 07:05

## 2019-06-05 RX ADMIN — SODIUM CHLORIDE: 9 INJECTION, SOLUTION INTRAVENOUS at 07:03

## 2019-06-05 RX ADMIN — Medication 2 PUFF: at 07:55

## 2019-06-05 RX ADMIN — METOPROLOL TARTRATE 25 MG: 25 TABLET ORAL at 07:05

## 2019-06-05 RX ADMIN — INSULIN LISPRO 4 UNITS: 100 INJECTION, SOLUTION INTRAVENOUS; SUBCUTANEOUS at 07:25

## 2019-06-05 RX ADMIN — GABAPENTIN 300 MG: 300 CAPSULE ORAL at 07:05

## 2019-06-05 RX ADMIN — OXYCODONE HYDROCHLORIDE AND ACETAMINOPHEN 2 TABLET: 5; 325 TABLET ORAL at 02:08

## 2019-06-05 RX ADMIN — GABAPENTIN 300 MG: 300 CAPSULE ORAL at 13:15

## 2019-06-05 RX ADMIN — PIPERACILLIN SODIUM AND TAZOBACTAM SODIUM 3.38 G: 3; .375 INJECTION, POWDER, LYOPHILIZED, FOR SOLUTION INTRAVENOUS at 16:00

## 2019-06-05 RX ADMIN — INSULIN GLARGINE 28 UNITS: 100 INJECTION, SOLUTION SUBCUTANEOUS at 07:25

## 2019-06-05 RX ADMIN — FUROSEMIDE 20 MG: 20 TABLET ORAL at 07:24

## 2019-06-05 RX ADMIN — CLOPIDOGREL BISULFATE 75 MG: 75 TABLET, FILM COATED ORAL at 07:24

## 2019-06-05 RX ADMIN — ASPIRIN 81 MG 81 MG: 81 TABLET ORAL at 07:05

## 2019-06-05 RX ADMIN — VITAMIN D, TAB 1000IU (100/BT) 1000 UNITS: 25 TAB at 07:05

## 2019-06-05 RX ADMIN — RANOLAZINE 500 MG: 500 TABLET, FILM COATED, EXTENDED RELEASE ORAL at 21:21

## 2019-06-05 RX ADMIN — Medication 10 ML: at 16:00

## 2019-06-05 RX ADMIN — ATORVASTATIN CALCIUM 40 MG: 40 TABLET, FILM COATED ORAL at 21:21

## 2019-06-05 RX ADMIN — ONDANSETRON 4 MG: 2 INJECTION INTRAMUSCULAR; INTRAVENOUS at 21:23

## 2019-06-05 RX ADMIN — INSULIN LISPRO 2 UNITS: 100 INJECTION, SOLUTION INTRAVENOUS; SUBCUTANEOUS at 16:49

## 2019-06-05 RX ADMIN — OXYCODONE HYDROCHLORIDE AND ACETAMINOPHEN 1 TABLET: 5; 325 TABLET ORAL at 16:04

## 2019-06-05 RX ADMIN — SERTRALINE 100 MG: 100 TABLET, FILM COATED ORAL at 07:05

## 2019-06-05 RX ADMIN — TIOTROPIUM BROMIDE 18 MCG: 18 CAPSULE ORAL; RESPIRATORY (INHALATION) at 07:55

## 2019-06-05 RX ADMIN — INSULIN GLARGINE 28 UNITS: 100 INJECTION, SOLUTION SUBCUTANEOUS at 21:22

## 2019-06-05 RX ADMIN — Medication 10 ML: at 07:04

## 2019-06-05 RX ADMIN — OXYCODONE HYDROCHLORIDE AND ACETAMINOPHEN 2 TABLET: 5; 325 TABLET ORAL at 07:03

## 2019-06-05 RX ADMIN — OXYCODONE HYDROCHLORIDE AND ACETAMINOPHEN 1 TABLET: 5; 325 TABLET ORAL at 21:26

## 2019-06-05 ASSESSMENT — PAIN DESCRIPTION - ONSET: ONSET: GRADUAL

## 2019-06-05 ASSESSMENT — PAIN DESCRIPTION - LOCATION
LOCATION: FOOT

## 2019-06-05 ASSESSMENT — PAIN DESCRIPTION - PAIN TYPE
TYPE: ACUTE PAIN

## 2019-06-05 ASSESSMENT — PAIN SCALES - GENERAL
PAINLEVEL_OUTOF10: 7
PAINLEVEL_OUTOF10: 9
PAINLEVEL_OUTOF10: 10
PAINLEVEL_OUTOF10: 9
PAINLEVEL_OUTOF10: 10
PAINLEVEL_OUTOF10: 6

## 2019-06-05 ASSESSMENT — PAIN - FUNCTIONAL ASSESSMENT: PAIN_FUNCTIONAL_ASSESSMENT: ACTIVITIES ARE NOT PREVENTED

## 2019-06-05 ASSESSMENT — PAIN DESCRIPTION - ORIENTATION
ORIENTATION: LEFT

## 2019-06-05 ASSESSMENT — PAIN DESCRIPTION - DESCRIPTORS
DESCRIPTORS: ACHING;SHARP
DESCRIPTORS: ACHING;SHARP

## 2019-06-05 ASSESSMENT — PAIN DESCRIPTION - FREQUENCY: FREQUENCY: INTERMITTENT

## 2019-06-05 NOTE — PROGRESS NOTES
Progress Note    Admit Date:  2019    Subjective:  63 y.o. female who is seen for evaluation of cellulitis left foot. S/P amputation left hallux 6/3/19. States feeling OK. Pain is somewhat controlled.        Past Medical History:        Diagnosis Date    Anemia 2015    Arthritis     Asthma     Back pain, chronic     DDD    CAD (coronary artery disease)     angiogram , CONNIE x1 to mid Cirflx    CHF (congestive heart failure) (HCC)     Chronic bronchitis (HCC)     COPD (chronic obstructive pulmonary disease) (HCC)     Depression     Diabetes mellitus (HCC)     DVT (deep venous thrombosis) (HCC)     Fibromyalgia     GERD (gastroesophageal reflux disease)     Hx of blood clots     Hyperlipidemia     Hypertension     Lung disease     Neuropathy     Other disorders of kidney and ureter     kidney stones    Pneumonia     PVD [facility]) [facility]     Sleep apnea 2015    Has CPAP, can't tolerate    STEMI (ST elevation myocardial infarction) ([facility] 14    Dr. Simone Hooper Thyroid disease     Urinary incontinence     Vascular complications of other ROJQDYD(260.78)     leg       Past Surgical History:        Procedure Laterality Date    APPENDECTOMY      CATARACT REMOVAL WITH IMPLANT Right 2015     SECTION      CORONARY ANGIOPLASTY WITH STENT PLACEMENT  14    DIAGNOSTIC CARDIAC CATH LAB PROCEDURE      DILATION AND CURETTAGE OF UTERUS      EYE SURGERY Left 2015    Cataract    FOOT AMPUTATION Left 6/3/2019    PARTIAL FOOT AMPUTATION WITH GRAFT APPLICATION performed by Vesta Nesbitt DPM at [facility] LITHOTRIPSY      kidney stones    OTHER SURGICAL HISTORY Left 2019    PARTIAL FOOT AMPUTATION WITH GRAFT APPLICATION (Left foot)    VASCULAR SURGERY      thrombectomy       Current Medications:    Current Facility-Administered Medications: furosemide (LASIX) tablet 20 mg, 20 mg, Oral, Daily  insulin glargine (LANTUS) injection vial 28 Units, 28 Units, Subcutaneous, BID  albuterol (PROVENTIL) nebulizer solution 2.5 mg, 2.5 mg, Nebulization, Q6H PRN  BIOTENE MOISTURIZING MOUTH (MOUTHKOTE) SOLN, , Oral, Q8H PRN  aspirin chewable tablet 81 mg, 81 mg, Oral, Daily  atorvastatin (LIPITOR) tablet 40 mg, 40 mg, Oral, Nightly  buPROPion (WELLBUTRIN XL) extended release tablet 300 mg, 300 mg, Oral, Daily  vitamin D (CHOLECALCIFEROL) tablet 1,000 Units, 1,000 Units, Oral, Daily  clopidogrel (PLAVIX) tablet 75 mg, 75 mg, Oral, Daily  ferrous sulfate tablet 325 mg, 325 mg, Oral, Daily with breakfast  gabapentin (NEURONTIN) capsule 300 mg, 300 mg, Oral, TID  levothyroxine (SYNTHROID) tablet 50 mcg, 50 mcg, Oral, Daily  metoprolol tartrate (LOPRESSOR) tablet 25 mg, 25 mg, Oral, BID  nitroGLYCERIN (NITROSTAT) SL tablet 0.4 mg, 0.4 mg, Sublingual, Q5 Min PRN  pantoprazole (PROTONIX) tablet 40 mg, 40 mg, Oral, QAM AC  oxybutynin (DITROPAN) tablet 5 mg, 5 mg, Oral, BID  ranolazine (RANEXA) extended release tablet 500 mg, 500 mg, Oral, BID  sertraline (ZOLOFT) tablet 100 mg, 100 mg, Oral, Daily  simethicone (MYLICON) chewable tablet 80 mg, 80 mg, Oral, 4x Daily PRN  mometasone-formoterol (DULERA) 200-5 MCG/ACT inhaler 2 puff, 2 puff, Inhalation, BID  tiZANidine (ZANAFLEX) tablet 2 mg, 2 mg, Oral, Q6H PRN  albuterol sulfate  (90 Base) MCG/ACT inhaler 2 puff, 2 puff, Inhalation, Q4H PRN  vitamin B-12 (CYANOCOBALAMIN) tablet 1,000 mcg, 1,000 mcg, Oral, Daily  HYDROmorphone (DILAUDID) injection 0.5 mg, 0.5 mg, Intravenous, Q3H PRN **OR** HYDROmorphone (DILAUDID) injection 1 mg, 1 mg, Intravenous, Q3H PRN  0.9 % sodium chloride infusion, , Intravenous, Continuous  sodium chloride flush 0.9 % injection 10 mL, 10 mL, Intravenous, 2 times per day  sodium chloride flush 0.9 % injection 10 mL, 10 mL, Intravenous, PRN  magnesium hydroxide (MILK OF MAGNESIA) 400 MG/5ML suspension 30 mL, 30 mL, Oral, Daily PRN  ondansetron (ZOFRAN) injection 4 mg, 4 mg, Intravenous, Q6H PRN  enoxaparin (LOVENOX) injection 40 mg, 40 mg, Subcutaneous, Daily  nicotine (NICODERM CQ) 14 MG/24HR 1 patch, 1 patch, Transdermal, Daily  piperacillin-tazobactam (ZOSYN) 3.375 g in sodium chloride 0.9 % 100 mL IVPB extended infusion (mini-bag), 3.375 g, Intravenous, Q8H  vancomycin (VANCOCIN) 1,250 mg in dextrose 5 % 250 mL IVPB, 1,250 mg, Intravenous, Q24H  tiotropium (SPIRIVA) inhalation capsule 18 mcg, 18 mcg, Inhalation, Daily  glucose (GLUTOSE) 40 % oral gel 15 g, 15 g, Oral, PRN  dextrose 50 % solution 12.5 g, 12.5 g, Intravenous, PRN  glucagon (rDNA) injection 1 mg, 1 mg, Intramuscular, PRN  dextrose 5 % solution, 100 mL/hr, Intravenous, PRN  insulin lispro (HUMALOG) injection vial 0-12 Units, 0-12 Units, Subcutaneous, TID WC  insulin lispro (HUMALOG) injection vial 0-6 Units, 0-6 Units, Subcutaneous, Nightly  clonazePAM (KLONOPIN) tablet 0.5 mg, 0.5 mg, Oral, BID PRN  isosorbide mononitrate (IMDUR) extended release tablet 30 mg, 30 mg, Oral, Daily  oxyCODONE-acetaminophen (PERCOCET) 5-325 MG per tablet 1 tablet, 1 tablet, Oral, Q4H PRN **OR** oxyCODONE-acetaminophen (PERCOCET) 5-325 MG per tablet 2 tablet, 2 tablet, Oral, Q4H PRN  acetaminophen-codeine (TYLENOL #3) 300-30 MG per tablet 1 tablet, 1 tablet, Oral, Q4H PRN    Allergies:  Januvia [sitagliptin] and Iodine    Social History:    Social History     Tobacco Use    Smoking status: Current Every Day Smoker     Packs/day: 0.25     Years: 40.00     Pack years: 10.00     Types: Cigarettes    Smokeless tobacco: Former User    Tobacco comment: .5 ppd   Substance Use Topics    Alcohol use: No     Alcohol/week: 0.0 oz    Drug use: No       Family History:       Problem Relation Age of Onset    Other Mother          of unknown lung issue at 61    Diabetes Mother     Diabetes Brother     Heart Disease Maternal Grandfather     Cancer Maternal Grandmother     Asthma Grandchild        Review of Systems    CONSTITUTIONAL:  negative  EYES: negative  HEENT:  negative  RESPIRATORY:  negative  CARDIOVASCULAR:  negative  GASTROINTESTINAL:  negative  GENITOURINARY:  negative  INTEGUMENT/BREAST:  positive for ulcer  MUSCULOSKELETAL:  positive for  pain  NEUROLOGICAL:  positive for numbness      Objective:   /68   Pulse 61   Temp 97.6 °F (36.4 °C) (Oral)   Resp 18   Ht 5' (1.524 m)   Wt 180 lb 5 oz (81.8 kg)   LMP  (LMP Unknown)   SpO2 94%   BMI 35.21 kg/m²     Data:  CBC:   Recent Labs     06/03/19  0035 06/04/19 0621   WBC 10.5 8.2   HGB 12.5 11.0*   HCT 36.9 32.5*   MCV 94.8 95.7    313     BMP:   Recent Labs     06/03/19  0130 06/04/19  0621 06/05/19  0525   * 134* 134*   K 4.3 4.5 4.3   CL 94* 102 103   CO2 23 21 20*   BUN 16 14 10   CREATININE 1.2* 0.9 0.7     LIVER PROFILE:   Recent Labs     06/03/19  0130   AST 7*   ALT 6*   BILITOT <0.2   ALKPHOS 83     PT/INR:   Recent Labs     06/03/19  0130   PROT 7.0     HgBA1c:  Lab Results   Component Value Date    LABA1C 13.8 06/03/2019       Cultures: Blood x2 - NGSF    Imaging: xray left foot - postop changes noted.        Physical Exam:    General Appearance: alert and oriented to person, place and time, well developed and well- nourished, in no acute distress  Skin: warm and dry, no rash or erythema  Head: normocephalic and atraumatic  Eyes: pupils equal, round, and reactive to light, extraocular eye movements intact, conjunctivae normal  ENT: tympanic membrane, external ear and ear canal normal bilaterally, nose without deformity, nasal mucosa and turbinates normal without polyps  Neck: supple and non-tender without mass, no thyromegaly or thyroid nodules, no cervical lymphadenopathy  Pulmonary/Chest: clear to auscultation bilaterally- no wheezes, rales or rhonchi, normal air movement, no respiratory distress  Cardiovascular: normal rate, regular rhythm, normal S1 and S2, no murmurs, rubs, clicks, or gallops, distal pulses intact, no carotid bruits  Abdomen: soft, non-tender, non-distended, normal bowel sounds, no masses or organomegaly    Dressing left foot - Clean, dry and intact. Left 1st ray - Mild erythema and edema noted. No increase in skin temperature noted. No increase in skin temperature noted. No malodor noted. No abscess noted. Sutures intact with minimal drainage noted. No purulence or malodor noted. Left hallux amputation noted.          Assessment:  Patient Active Problem List   Diagnosis Code    HLD (hyperlipidemia) E78.5    DM (diabetes mellitus), type 2, uncontrolled ([facility] E11.65    PVD (peripheral vascular disease) ([facility] I73.9    HTN (hypertension) I10    Hypothyroid E03.9    GERD (gastroesophageal reflux disease) K21.9    STEMI (ST elevation myocardial infarction) ([facility] I21.3    Morbid obesity with BMI of 45.0-49.9, adult ([facility]) E66.01, Z68.42    Localized osteoarthrosis, lower leg M17.10    Rotator cuff tendinitis M75.80    PFS (patellofemoral syndrome) M22.2X9    Sleep apnea G47.30    Disc displacement, lumbar M51.26    Neuropathy G62.9    Opiate analgesic contract exists Z02.89    Neck pain M54.2    Anemia D64.9    Insomnia G47.00    Pulmonary emphysema ([facility]) J43.9    Transient synovitis, left ankle and foot M67.372    Insomnia with sleep apnea G47.00, G47.30    JORDI on CPAP G47.33, Z99.89    Chronic low back pain M54.5, G89.29    COPD, moderate ([facility]) J44.9    Unstable angina ([facility]) I20.0    Anxiety F41.9    Chronic diastolic congestive heart failure ([facility]) I50.32    Coronary artery disease involving native coronary artery of native heart without angina pectoris I25.10    Ischemic cardiomyopathy I25.5    History of ST elevation myocardial infarction (STEMI) I25.2    Tobacco use Z72.0    Uncontrolled type 2 diabetes mellitus with hyperglycemia ([facility]) E11.65    Toe infection L08.9    Local infection of skin and subcutaneous tissue L08.9    Cellulitis of foot L03.119     Cellulitis left foot - improving  diabetic foot ulcer left foot

## 2019-06-05 NOTE — FLOWSHEET NOTE
Met with Pt per referral from volunteer. Listened and offered emotional support. Pt stated she is coping okay. \"There's no point in getting depressed. \" Hoping to get home soon and to remain healthy.  acknowledged and affirmed Pt's emotions. Prayed with Pt for continued recovery and peace, per her request.    [id number] [facility] Associate       06/05/19 1520   Encounter Summary   Services provided to: Patient   Referral/Consult From:   (volunteer)   Continue Visiting   (6/5 initial, sppt and prayer)   Complexity of Encounter Moderate   Length of Encounter 15 minutes   Spiritual/Evangelical   Type Spiritual support   Assessment Calm; Approachable;Coping   Intervention Active listening;Explored feelings, thoughts, concerns;Prayer;Discussed illness/injury and it's impact   Outcome Comfort;Expressed gratitude;Engaged in conversation;Expressed feelings/needs/concerns

## 2019-06-05 NOTE — PROGRESS NOTES
Bedside report and pt care transferred to [facility]. Polo Colon RN

## 2019-06-05 NOTE — PROGRESS NOTES
Patient alert and oriented. Side rails up x 2, bed in lowest position, wheels locked, bed alarm on, call light and bed side table in reach. Patient complained of pain in left foot. PRN pain medication given per order. No swelling or drainage noted to left foot.

## 2019-06-05 NOTE — PROGRESS NOTES
IM Progress Note    Admit Date:  6/2/2019  2    Interval history:  S.p left hallux amputation      Subjective:  Ms. Jonel Goldmann seen up in bed, nausea resolved  Pain controlled  Sugars improving    Objective:   /68   Pulse 61   Temp 97.6 °F (36.4 °C) (Oral)   Resp 18   Ht 5' (1.524 m)   Wt 180 lb 5 oz (81.8 kg)   LMP  (LMP Unknown)   SpO2 94%   BMI 35.21 kg/m²       Intake/Output Summary (Last 24 hours) at 6/5/2019 0739  Last data filed at 6/5/2019 0241  Gross per 24 hour   Intake 3520 ml   Output 50 ml   Net 3470 ml       Physical Exam:      General: middle aged female,   Awake, alert and oriented. Appears to be not in any distress  Mucous Membranes:  Pink , anicteric  Neck: No JVD, no carotid bruit, no thyromegaly  Chest:  Clear to auscultation bilaterally, no added sounds  Cardiovascular:  RRR S1S2 heard, no murmurs or gallops  Abdomen:  Soft, undistended, non tender, no organomegaly, BS present  Extremities: left foot dressing dry   No edema or cyanosis.  Distal pulses well felt  Neurological : grossly normal        Medications:   Scheduled Medications:    furosemide  20 mg Oral Daily    insulin glargine  28 Units Subcutaneous BID    aspirin  81 mg Oral Daily    atorvastatin  40 mg Oral Nightly    buPROPion  300 mg Oral Daily    vitamin D  1,000 Units Oral Daily    clopidogrel  75 mg Oral Daily    ferrous sulfate  325 mg Oral Daily with breakfast    gabapentin  300 mg Oral TID    levothyroxine  50 mcg Oral Daily    metoprolol tartrate  25 mg Oral BID    pantoprazole  40 mg Oral QAM AC    oxybutynin  5 mg Oral BID    ranolazine  500 mg Oral BID    sertraline  100 mg Oral Daily    mometasone-formoterol  2 puff Inhalation BID    vitamin B-12  1,000 mcg Oral Daily    sodium chloride flush  10 mL Intravenous 2 times per day    enoxaparin  40 mg Subcutaneous Daily    nicotine  1 patch Transdermal Daily    piperacillin-tazobactam  3.375 g Intravenous Q8H    vancomycin  1,250 mg Intravenous Q24H    tiotropium  18 mcg Inhalation Daily    insulin lispro  0-12 Units Subcutaneous TID WC    insulin lispro  0-6 Units Subcutaneous Nightly    isosorbide mononitrate  30 mg Oral Daily     I   sodium chloride 75 mL/hr at 06/05/19 0703    dextrose       albuterol, BIOTENE MOISTURIZING MOUTH, nitroGLYCERIN, simethicone, tiZANidine, albuterol sulfate HFA, HYDROmorphone **OR** HYDROmorphone, sodium chloride flush, magnesium hydroxide, ondansetron, glucose, dextrose, glucagon (rDNA), dextrose, clonazePAM, oxyCODONE-acetaminophen **OR** oxyCODONE-acetaminophen, acetaminophen-codeine    Lab Data:  Recent Labs     06/03/19  0035 06/04/19  0621   WBC 10.5 8.2   HGB 12.5 11.0*   HCT 36.9 32.5*   MCV 94.8 95.7    313     Recent Labs     06/03/19  0130 06/04/19  0621 06/05/19  0525   * 134* 134*   K 4.3 4.5 4.3   CL 94* 102 103   CO2 23 21 20*   BUN 16 14 10   CREATININE 1.2* 0.9 0.7     No results for input(s): CKTOTAL, CKMB, CKMBINDEX, TROPONINI in the last 72 hours. Coagulation:   Lab Results   Component Value Date    INR 0.94 10/11/2016    APTT 31.7 02/26/2014     Cardiac markers:   Lab Results   Component Value Date    TROPONINI <0.01 06/28/2018         Lab Results   Component Value Date    ALT 6 (L) 06/03/2019    AST 7 (L) 06/03/2019    ALKPHOS 83 06/03/2019    BILITOT <0.2 06/03/2019       Lab Results   Component Value Date    INR 0.94 10/11/2016    INR 1.04 06/01/2015    INR 0.91 05/26/2012    PROTIME 10.7 10/11/2016    PROTIME 11.3 06/01/2015    PROTIME 10.4 05/26/2012       Radiology    CULTURES  Urine Cx: pending   Blood Cx: pending      EKG:    No EKG from this admission for review     RADIOLOGY  CT TIBIA FIBULA LEFT WO CONTRAST   Preliminary Result   1. Dorsal ulcer at the nail bed in the 1st digit tracking to a transverse   fracture in the base of the distal phalanx 1st digit. Intraosseous gas at   the site of the fracture.    2. Dorsal superficial soft tissue edema in the foot home in am           Brooklyn Falk MD 6/5/2019 7:39 AM

## 2019-06-05 NOTE — CARE COORDINATION
INTERDISCIPLINARY PLAN OF CARE CONFERENCE    Date/Time: 6/5/2019 1:34 PM  Completed by: Jason Hancock Case Management      Patient Name:  Robyn Hernandez  YOB: 1960  Admitting Diagnosis: Toe infection [L08.9]     Admit Date/Time:  6/2/2019 11:14 PM    Chart reviewed. Interdisciplinary team met to discuss patient progress and discharge plans. Disciplines included Case Management, Nursing, and Dietitian. Current Status: Inpatient 6/3/2019    PT/OT recommendation:    Anticipated Discharge Date: TBD  Expected D/C Disposition:  Home  Confirmed plan with patient and/or family Yes  Discharge Plan Comments: Chart reviewed. Met with pt at bedside and explained the role of the CM. CM following for possible home IV abtx. Will likely DC on orals. IPTA. CM will continue to follow.      Home O2 in place on admit: No  Pt informed of need to bring portable home O2 tank on day of discharge for nursing to connect prior to leaving:  No  Verbalized agreement/Understanding:  No

## 2019-06-05 NOTE — PROGRESS NOTES
Pt lying in bed finishing supper, pt denies needs at this time, bed alarm on for safety, call light within reach. Manda Prince RN\

## 2019-06-05 NOTE — PROGRESS NOTES
Bedside report and pt care transferred to [facility]. Nguyen Cadet RN

## 2019-06-05 NOTE — OP NOTE
[address]                 [address] [facility]                                OPERATIVE REPORT    PATIENT NAME: Deena Andujar                    :        1960  MED REC NO:   [id number]                          ROOM:       0202  ACCOUNT NO:   [de-identified]                           ADMIT DATE: 2019  PROVIDER:     Melvin Fritz DPM    DATE OF PROCEDURE:  2019    PREOPERATIVE DIAGNOSES:  1. Diabetic foot ulceration, left foot. 2.  Osteomyelitis, left foot. 3.  Gas gangrene, left foot. 4.  Diabetes mellitus, uncontrolled. POSTOPERATIVE DIAGNOSES:  1. Diabetic foot ulceration, left foot. 2.  Osteomyelitis, left foot. 3.  Gas gangrene, left foot. 4.  Diabetes mellitus, uncontrolled. NAME OF PROCEDURE:  Partial left foot amputation. SURGEON:  Melvin Fritz DPM    ANESTHESIA:  Local with MAC. HEMOSTASIS:  Ankle tourniquet 250 mmHg. ESTIMATED BLOOD LOSS:  Less than 50 mL. REPORT OF OPERATION:  The patient was brought in the operating room and  placed on the operating table in a supine position. Under mild IV  sedation, the left first ray was anesthetized with 10 mL of 1:1 mixture  of 1% lidocaine plain with 0.5% Marcaine plain. The foot was then  scrubbed, prepped, and draped in the usual sterile manner. Esmarch was  then utilized to exsanguinate the left foot. Ankle tourniquet was then  inflated to 250 mmHg. Attention was then directed to the left hallux where the lysing portion  of the toenail was noted at the base of the nail plate itself. Upon a  slight pressure to plantar flex this area, it was noted that the  majority of the entire nail plate was loose. This also exposed the  obvious fracture across the entire base of the proximal phalanx. Plantar flexion of the digit did reveal this entire area as well as a  portion of the hallux interphalangeal joint.   There was extensive amount  of necrotic tissue noted surrounding this area as well. Given this  clinical finding, I would like to proceed with a complete hallux  amputation at this time. At this time, elliptical incision was then  made just distal to the base of the digit. Dissection was carried down  in order to expose the extensor and flexor tendons. These were then  transected. A small incision was then made overlying the proximal  aspect of the digit to expose the first metatarsophalangeal joint. Soft  tissue was then dissected free of this area. The hallux was then passed  off the operative field in toto. There was no further necrotic tissue  noted in the surgical site itself. Surgical site was then copiously  irrigated with sterile saline via the pulse lavage. All bleeders were  cauterized as necessary. At this time, a Steri-Shield 4 x 4 graft was  then trimmed and inserted into the wound bed in toto. None was wasted. This was done in order to stimulate wound healing in a high-risk  diabetic patient. Skin edges were then reapproximated with 3-0 nylon in  a simple and horizontal mattress suture technique. Surgical site was  then covered with Xeroform gauze, fluffs, Kerlix, ABD, and Coban. Tourniquet was then deflated. The patient tolerated the procedure and anesthesia well and transferred  to the recovery room with vital signs stable and vascular status intact  as evidenced by prompt hyperemic response to the remaining digits of the  left foot. Following a brief period of postoperative monitoring, the  patient will be transferred back to the floor under care of medical  service.         BEKA WRIGHT DPM    D: 06/04/2019 16:03:29       T: 06/04/2019 21:15:03     [email or web address]  Job#: [id number]     Doc#: [id number]    CC:

## 2019-06-05 NOTE — PLAN OF CARE
Problem: Falls - Risk of:  Goal: Will remain free from falls  Description  Will remain free from falls  6/4/2019 2326 by Nigel Ramsay RN  Outcome: Ongoing  6/4/2019 1355 by Nigel Ramsay RN  Outcome: Ongoing  Goal: Absence of physical injury  Description  Absence of physical injury  6/4/2019 2326 by Nigel Ramsay RN  Outcome: Ongoing  6/4/2019 1355 by Nigel Ramsay RN  Outcome: Ongoing     Problem: OXYGENATION/RESPIRATORY FUNCTION  Goal: Patient will maintain patent airway  6/4/2019 2326 by Nigel Ramsay RN  Outcome: Ongoing  6/4/2019 1355 by Nigel Ramsay RN  Outcome: Ongoing  Goal: Patient will achieve/maintain normal respiratory rate/effort  Description  Respiratory rate and effort will be within normal limits for the patient  6/4/2019 2326 by Nigel Ramsay RN  Outcome: Ongoing  6/4/2019 1355 by Nigel Ramsay RN  Outcome: Ongoing     Problem: HEMODYNAMIC STATUS  Goal: Patient has stable vital signs and fluid balance  6/4/2019 2326 by Nigel Ramsay RN  Outcome: Ongoing  Note:     Patient's EF (Ejection Fraction) is greater than 40%    Patient's weights and intake/output reviewed: Intake/Output Summary (Last 24 hours) at 6/4/2019 2326  Last data filed at 6/4/2019 2020  Gross per 24 hour   Intake 3520 ml   Output --   Net 3520 ml       Patient's current functional capacity:  Slight limitation of physical activity. Comfortable at rest. Ordinary physical activity results in fatigue, palpitation, dyspnea. Pt resting in bed at this time on room air. Pt denies shortness of breath. Pt with nonpitting lower extremity edema.      Patient and family's stated goal of care: reduce shortness of breath, increase activity tolerance, better understand heart failure and disease management, be more comfortable and reduce lower extremity edema prior to discharge        Patient has a past medical history of Anemia, Arthritis, Asthma, Back pain, chronic, CAD (coronary artery disease), CHF (congestive heart failure) ([address] Chronic bronchitis ([address] COPD (chronic obstructive pulmonary disease) ([address] Depression, Diabetes mellitus ([address] DVT (deep venous thrombosis) ([address] Fibromyalgia, GERD (gastroesophageal reflux disease), Hx of blood clots, Hyperlipidemia, Hypertension, Lung disease, Neuropathy, Other disorders of kidney and ureter, Pneumonia, PVD (peripheral vascular disease) ([address] Sleep apnea, STEMI (ST elevation myocardial infarction) ([address] Thyroid disease, Urinary incontinence, and Vascular complications of other ZJIPJGO(359.22). Comorbidities reviewed and education provided. >>For CHF and Comorbidity documentation on Education Time and Topics, please see Education Tab        6/4/2019 1355 by Krista Lyles RN  Outcome: Ongoing  Note:     Patient's EF (Ejection Fraction) is greater than 40%    Patient's weights and intake/output reviewed: Intake/Output Summary (Last 24 hours) at 6/4/2019 1356  Last data filed at 6/4/2019 0908  Gross per 24 hour   Intake 475 ml   Output 10 ml   Net 465 ml       Patient's current functional capacity:  Slight limitation of physical activity. Comfortable at rest. Ordinary physical activity results in fatigue, palpitation, dyspnea. Pt resting in bed at this time on 2 L O2. Pt denies shortness of breath. Pt with nonpitting lower extremity edema.      Patient and family's stated goal of care: reduce shortness of breath, increase activity tolerance, better understand heart failure and disease management, be more comfortable and reduce lower extremity edema prior to discharge        Patient has a past medical history of Anemia, Arthritis, Asthma, Back pain, chronic, CAD (coronary artery disease), CHF (congestive heart failure) ([address] Chronic bronchitis ([address] COPD (chronic obstructive pulmonary disease) ([address] Depression, Diabetes mellitus ([address] DVT (deep venous thrombosis) ([address] Fibromyalgia, GERD (gastroesophageal reflux disease), Hx of blood clots, Hyperlipidemia, Hypertension, Lung disease, Neuropathy, Other disorders of kidney and ureter, Pneumonia, PVD (peripheral vascular disease) ([facility] Sleep apnea, STEMI (ST elevation myocardial infarction) ([facility] Thyroid disease, Urinary incontinence, and Vascular complications of other QSVXHFB(130.39). Comorbidities reviewed and education provided.     >>For CHF and Comorbidity documentation on Education Time and Topics, please see Education Tab         Problem: FLUID AND ELECTROLYTE IMBALANCE  Goal: Fluid and electrolyte balance are achieved/maintained  6/4/2019 2326 by Forest Cantrell RN  Outcome: Ongoing  6/4/2019 1355 by Forest Cantrell RN  Outcome: Ongoing     Problem: ACTIVITY INTOLERANCE/IMPAIRED MOBILITY  Goal: Mobility/activity is maintained at optimum level for patient  6/4/2019 2326 by Forest Cantrell RN  Outcome: Ongoing  6/4/2019 1355 by Forest Cantrell RN  Outcome: Ongoing     Problem: Infection:  Goal: Will remain free from infection  Description  Will remain free from infection  6/4/2019 2326 by Forest Cantrell RN  Outcome: Ongoing  6/4/2019 1355 by Forest Cantrell RN  Outcome: Ongoing     Problem: Safety:  Goal: Free from accidental physical injury  Description  Free from accidental physical injury  6/4/2019 2326 by Forest Cantrell RN  Outcome: Ongoing  6/4/2019 1355 by Forest Cantrell RN  Outcome: Ongoing  Goal: Free from intentional harm  Description  Free from intentional harm  6/4/2019 2326 by Forest Cantrell RN  Outcome: Ongoing  6/4/2019 1355 by Forest Cantrell RN  Outcome: Ongoing     Problem: Daily Care:  Goal: Daily care needs are met  Description  Daily care needs are met  6/4/2019 2326 by Forest Cantrell RN  Outcome: Ongoing  6/4/2019 1355 by Forest Cantrell RN  Outcome: Ongoing     Problem: Pain:  Goal: Patient's pain/discomfort is manageable  Description  Patient's pain/discomfort is manageable  6/4/2019 2326 by Forest Cantrell RN  Outcome: Ongoing  6/4/2019 1355 by Forest Cantrell RN  Outcome: Ongoing Problem: Skin Integrity:  Goal: Skin integrity will stabilize  Description  Skin integrity will stabilize  6/4/2019 2326 by Forest Cantrell RN  Outcome: Ongoing  6/4/2019 1355 by Forest Cantrell RN  Outcome: Ongoing     Problem: Discharge Planning:  Goal: Patients continuum of care needs are met  Description  Patients continuum of care needs are met  6/4/2019 2326 by Forest Cantrell RN  Outcome: Ongoing  6/4/2019 1355 by Forest Cantrell RN  Outcome: Ongoing

## 2019-06-05 NOTE — PLAN OF CARE
Problem: Falls - Risk of:  Goal: Will remain free from falls  Description  Will remain free from falls  6/5/2019 1013 by Winifred Montgomery RN  Outcome: Ongoing   Patient will use call light in advance of needs and wait for staff prior to getting out of bed. Problem: OXYGENATION/RESPIRATORY FUNCTION  Goal: Patient will maintain patent airway  6/5/2019 1013 by Winifred Montgomery RN  Outcome: Ongoing     Problem: Infection:  Goal: Will remain free from infection  Description  Will remain free from infection  6/5/2019 1013 by Winifred Montgomery RN  Outcome: Ongoing     Problem: Safety:  Goal: Free from accidental physical injury  Description  Free from accidental physical injury  6/5/2019 1013 by Winifred Montgomery RN  Outcome: Ongoing   Patient will use call light in advance of needs and wait for staff prior to getting out of bed. Problem: Pain:  Goal: Pain level will decrease  Description  Pain level will decrease  Outcome: Ongoing   Patient will be monitored for pain each shift. Problem: Skin Integrity:  Goal: Skin integrity will stabilize  Description  Skin integrity will stabilize  6/5/2019 1013 by Winifred Montgomery RN  Outcome: Ongoing   Patient will be encouraged to change positions every 2 hours and assisted when needed. Problem: Risk for Impaired Skin Integrity  Goal: Tissue integrity - skin and mucous membranes  Description  Structural intactness and normal physiological function of skin and  mucous membranes. Outcome: Ongoing   Patient will be encouraged to change positions every 2 hours and assisted when needed.

## 2019-06-05 NOTE — PROGRESS NOTES
Pt awakened with pain rated 10/10. Pt medicated with Dilaudid 1 mg IVP for sharp pain in her left foot. Pt states pain relief was attained with pain down to a 7/10.

## 2019-06-06 VITALS
OXYGEN SATURATION: 96 % | BODY MASS INDEX: 35.76 KG/M2 | SYSTOLIC BLOOD PRESSURE: 156 MMHG | DIASTOLIC BLOOD PRESSURE: 69 MMHG | RESPIRATION RATE: 16 BRPM | TEMPERATURE: 97.6 F | WEIGHT: 182.13 LBS | HEART RATE: 60 BPM | HEIGHT: 60 IN

## 2019-06-06 LAB
ANION GAP SERPL CALCULATED.3IONS-SCNC: 11 MMOL/L (ref 3–16)
BUN BLDV-MCNC: 11 MG/DL (ref 7–20)
CALCIUM SERPL-MCNC: 8.9 MG/DL (ref 8.3–10.6)
CHLORIDE BLD-SCNC: 101 MMOL/L (ref 99–110)
CO2: 21 MMOL/L (ref 21–32)
CREAT SERPL-MCNC: 0.8 MG/DL (ref 0.6–1.1)
GFR AFRICAN AMERICAN: >60
GFR NON-AFRICAN AMERICAN: >60
GLUCOSE BLD-MCNC: 123 MG/DL (ref 70–99)
GLUCOSE BLD-MCNC: 228 MG/DL (ref 70–99)
GLUCOSE BLD-MCNC: 231 MG/DL (ref 70–99)
GLUCOSE BLD-MCNC: 254 MG/DL (ref 70–99)
GLUCOSE BLD-MCNC: 259 MG/DL (ref 70–99)
PERFORMED ON: ABNORMAL
POTASSIUM SERPL-SCNC: 4.2 MMOL/L (ref 3.5–5.1)
SODIUM BLD-SCNC: 133 MMOL/L (ref 136–145)
VANCOMYCIN TROUGH: 7.1 UG/ML (ref 10–20)

## 2019-06-06 PROCEDURE — 97161 PT EVAL LOW COMPLEX 20 MIN: CPT

## 2019-06-06 PROCEDURE — 97116 GAIT TRAINING THERAPY: CPT

## 2019-06-06 PROCEDURE — 94640 AIRWAY INHALATION TREATMENT: CPT

## 2019-06-06 PROCEDURE — 36415 COLL VENOUS BLD VENIPUNCTURE: CPT

## 2019-06-06 PROCEDURE — 6370000000 HC RX 637 (ALT 250 FOR IP): Performed by: INTERNAL MEDICINE

## 2019-06-06 PROCEDURE — 6360000002 HC RX W HCPCS: Performed by: PODIATRIST

## 2019-06-06 PROCEDURE — 80202 ASSAY OF VANCOMYCIN: CPT

## 2019-06-06 PROCEDURE — 94761 N-INVAS EAR/PLS OXIMETRY MLT: CPT

## 2019-06-06 PROCEDURE — 2580000003 HC RX 258: Performed by: PODIATRIST

## 2019-06-06 PROCEDURE — 99238 HOSP IP/OBS DSCHRG MGMT 30/<: CPT | Performed by: INTERNAL MEDICINE

## 2019-06-06 PROCEDURE — 6370000000 HC RX 637 (ALT 250 FOR IP): Performed by: PODIATRIST

## 2019-06-06 PROCEDURE — 80048 BASIC METABOLIC PNL TOTAL CA: CPT

## 2019-06-06 RX ORDER — FLUCONAZOLE 150 MG/1
150 TABLET ORAL ONCE
Qty: 1 TABLET | Refills: 0 | Status: SHIPPED | OUTPATIENT
Start: 2019-06-06 | End: 2019-06-06

## 2019-06-06 RX ORDER — OXYCODONE HYDROCHLORIDE AND ACETAMINOPHEN 5; 325 MG/1; MG/1
1 TABLET ORAL EVERY 6 HOURS PRN
Qty: 10 TABLET | Refills: 0 | Status: SHIPPED | OUTPATIENT
Start: 2019-06-06 | End: 2019-06-09

## 2019-06-06 RX ORDER — SODIUM CHLORIDE 9 MG/ML
INJECTION, SOLUTION INTRAVENOUS
Status: DISCONTINUED
Start: 2019-06-06 | End: 2019-06-06 | Stop reason: HOSPADM

## 2019-06-06 RX ORDER — AMOXICILLIN AND CLAVULANATE POTASSIUM 875; 125 MG/1; MG/1
1 TABLET, FILM COATED ORAL 2 TIMES DAILY
Qty: 20 TABLET | Refills: 0 | Status: SHIPPED | OUTPATIENT
Start: 2019-06-06 | End: 2019-06-17

## 2019-06-06 RX ADMIN — OXYCODONE HYDROCHLORIDE AND ACETAMINOPHEN 1 TABLET: 5; 325 TABLET ORAL at 08:27

## 2019-06-06 RX ADMIN — PIPERACILLIN SODIUM AND TAZOBACTAM SODIUM 3.38 G: 3; .375 INJECTION, POWDER, LYOPHILIZED, FOR SOLUTION INTRAVENOUS at 08:29

## 2019-06-06 RX ADMIN — TIOTROPIUM BROMIDE 18 MCG: 18 CAPSULE ORAL; RESPIRATORY (INHALATION) at 07:31

## 2019-06-06 RX ADMIN — OXYBUTYNIN CHLORIDE 5 MG: 5 TABLET ORAL at 08:27

## 2019-06-06 RX ADMIN — METOPROLOL TARTRATE 25 MG: 25 TABLET ORAL at 08:27

## 2019-06-06 RX ADMIN — GABAPENTIN 300 MG: 300 CAPSULE ORAL at 08:28

## 2019-06-06 RX ADMIN — Medication 2 PUFF: at 07:31

## 2019-06-06 RX ADMIN — VANCOMYCIN HYDROCHLORIDE 1250 MG: 5 INJECTION, POWDER, LYOPHILIZED, FOR SOLUTION INTRAVENOUS at 08:28

## 2019-06-06 RX ADMIN — PIPERACILLIN SODIUM AND TAZOBACTAM SODIUM 3.38 G: 3; .375 INJECTION, POWDER, LYOPHILIZED, FOR SOLUTION INTRAVENOUS at 01:50

## 2019-06-06 RX ADMIN — INSULIN GLARGINE 28 UNITS: 100 INJECTION, SOLUTION SUBCUTANEOUS at 08:29

## 2019-06-06 RX ADMIN — LEVOTHYROXINE SODIUM 50 MCG: 50 TABLET ORAL at 06:35

## 2019-06-06 RX ADMIN — OXYCODONE HYDROCHLORIDE AND ACETAMINOPHEN 1 TABLET: 5; 325 TABLET ORAL at 01:47

## 2019-06-06 RX ADMIN — PANTOPRAZOLE SODIUM 40 MG: 40 TABLET, DELAYED RELEASE ORAL at 06:35

## 2019-06-06 RX ADMIN — ONDANSETRON 4 MG: 2 INJECTION INTRAMUSCULAR; INTRAVENOUS at 08:47

## 2019-06-06 RX ADMIN — ASPIRIN 81 MG 81 MG: 81 TABLET ORAL at 08:28

## 2019-06-06 RX ADMIN — FERROUS SULFATE TAB 325 MG (65 MG ELEMENTAL FE) 325 MG: 325 (65 FE) TAB at 08:28

## 2019-06-06 RX ADMIN — INSULIN LISPRO 4 UNITS: 100 INJECTION, SOLUTION INTRAVENOUS; SUBCUTANEOUS at 11:31

## 2019-06-06 RX ADMIN — Medication 2 PUFF: at 07:34

## 2019-06-06 RX ADMIN — Medication 1000 MCG: at 08:27

## 2019-06-06 RX ADMIN — SERTRALINE 100 MG: 100 TABLET, FILM COATED ORAL at 08:28

## 2019-06-06 RX ADMIN — ISOSORBIDE MONONITRATE 30 MG: 30 TABLET ORAL at 08:28

## 2019-06-06 RX ADMIN — VITAMIN D, TAB 1000IU (100/BT) 1000 UNITS: 25 TAB at 08:27

## 2019-06-06 RX ADMIN — FUROSEMIDE 20 MG: 20 TABLET ORAL at 08:28

## 2019-06-06 RX ADMIN — INSULIN LISPRO 6 UNITS: 100 INJECTION, SOLUTION INTRAVENOUS; SUBCUTANEOUS at 08:29

## 2019-06-06 RX ADMIN — BUPROPION HYDROCHLORIDE 300 MG: 300 TABLET, FILM COATED, EXTENDED RELEASE ORAL at 11:30

## 2019-06-06 RX ADMIN — GABAPENTIN 300 MG: 300 CAPSULE ORAL at 15:50

## 2019-06-06 RX ADMIN — Medication 10 ML: at 08:29

## 2019-06-06 RX ADMIN — RANOLAZINE 500 MG: 500 TABLET, FILM COATED, EXTENDED RELEASE ORAL at 08:28

## 2019-06-06 RX ADMIN — CLOPIDOGREL BISULFATE 75 MG: 75 TABLET, FILM COATED ORAL at 08:27

## 2019-06-06 ASSESSMENT — PAIN SCALES - GENERAL
PAINLEVEL_OUTOF10: 7
PAINLEVEL_OUTOF10: 6
PAINLEVEL_OUTOF10: 8

## 2019-06-06 ASSESSMENT — PAIN DESCRIPTION - ORIENTATION: ORIENTATION: LEFT

## 2019-06-06 ASSESSMENT — PAIN DESCRIPTION - LOCATION: LOCATION: FOOT

## 2019-06-06 ASSESSMENT — PAIN DESCRIPTION - DESCRIPTORS: DESCRIPTORS: ACHING

## 2019-06-06 ASSESSMENT — PAIN DESCRIPTION - PAIN TYPE: TYPE: ACUTE PAIN

## 2019-06-06 ASSESSMENT — PAIN - FUNCTIONAL ASSESSMENT: PAIN_FUNCTIONAL_ASSESSMENT: ACTIVITIES ARE NOT PREVENTED

## 2019-06-06 NOTE — CARE COORDINATION
DISCHARGE ORDER  Date/Time 2019 2:31 PM  Completed by: Regina Tracey Case Management    Patient Name: Issa Bernal    : 1960  Admitting Diagnosis: Toe infection [L08.9]  Admit Date/Time: 2019 11:14 PM    Noted discharge order. Confirmed discharge plan with patient / family (pt): Yes   Discharge Plan: Reviewed chart. Role of discharge planner explained and patient verbalized understanding. Discharge order is noted. Pt is being d/c'd to home. RX for [employer] walker provided. Pt will purchase or rent it from [facility]. Denies HC needs today. Pt's O2 sats are 96% on RA. [facility] All discharge needs and concerns addressed.

## 2019-06-06 NOTE — PROGRESS NOTES
Reviewed d/c instructions with pt, verbalized understanding and questions answered. Removed IV tolerated well. Script given to pt for knee scooter. Pt will call out when family arrives to  .

## 2019-06-06 NOTE — DISCHARGE SUMMARY
the Abx and doing daily wound care, but on 5/24 she re-injured her toe. She reports that when she re-injured it, she had not followed up yet to have the initial sutures removed. When she came to the ER, she had the medial part of the initial wound open up and had 3 more sutures placed in the ER. She reports that she was discharged home and has since been noticing increased pain, swelling, redness and drainage from the wound. She states that she has not otherwise had any fevers, chills, CP, SOB, N/V. Physical Exam at Discharge: .  General: middle aged female,   Awake, alert and oriented. Appears to be not in any distress  Mucous Membranes:  Pink , anicteric  Neck: No JVD, no carotid bruit, no thyromegaly  Chest:  Clear to auscultation bilaterally, no added sounds  Cardiovascular:  RRR S1S2 heard, no murmurs or gallops  Abdomen:  Soft, undistended, non tender, no organomegaly, BS present  Extremities: left foot dressing dry   No edema or cyanosis. Distal pulses well felt  Neurological : grossly normal        Radiology (Please Review Full Report for Details)       RADIOLOGY  CT TIBIA FIBULA LEFT WO CONTRAST   Preliminary Result   1. Dorsal ulcer at the nail bed in the 1st digit tracking to a transverse   fracture in the base of the distal phalanx 1st digit.  Intraosseous gas at   the site of the fracture. 2. Dorsal superficial soft tissue edema in the foot extending into the ankle   and distal leg.  No fluid collection or abscess on the noncontrast CT. 3. No other acute abnormality in the left leg or left foot.           CT FOOT LEFT WO CONTRAST   Preliminary Result   1. Dorsal ulcer at the nail bed in the 1st digit tracking to a transverse   fracture in the base of the distal phalanx 1st digit.  Intraosseous gas at   the site of the fracture. 2. Dorsal superficial soft tissue edema in the foot extending into the ankle   and distal leg.  No fluid collection or abscess on the noncontrast CT.    3. No other acute abnormality in the left leg or left foot.                     Consults:    1. podiatry      Recent Labs     06/04/19  0621   WBC 8.2   HGB 11.0*   HCT 32.5*   MCV 95.7          Recent Labs     06/04/19  0621 06/05/19  0525 06/06/19  0739   * 134* 133*   K 4.5 4.3 4.2    103 101   CO2 21 20* 21   BUN 14 10 11   CREATININE 0.9 0.7 0.8       CBC:  Lab Results   Component Value Date    WBC 8.2 06/04/2019    HGB 11.0 06/04/2019    HCT 32.5 06/04/2019    MCV 95.7 06/04/2019     06/04/2019    NEUTOPHILPCT 48.7 06/04/2019    LYMPHOPCT 41.1 06/04/2019    MONOPCT 6.1 06/04/2019    EOSPCT 1.6 04/25/2011    BASOPCT 0.9 06/04/2019    NEUTROABS 4.0 06/04/2019    LYMPHSABS 3.4 06/04/2019    MONOSABS 0.5 06/04/2019    EOSABS 0.3 06/04/2019    BASOSABS 0.1 06/04/2019     BNP:   Lab Results   Component Value Date     06/06/2019    K 4.2 06/06/2019    K 4.8 10/08/2018    CO2 21 06/06/2019    BUN 11 06/06/2019    CREATININE 0.8 06/06/2019    CALCIUM 8.9 06/06/2019                Medication List      START taking these medications    amoxicillin-clavulanate 875-125 MG per tablet  Commonly known as:  AUGMENTIN  Take 1 tablet by mouth 2 times daily for 10 days  Notes to patient:  Augmentin  Use: treat bacterial infections  Side effects:rash; hives; itching; shortness of breath; wheezing; cough        CONTINUE taking these medications    Alcohol Swabs Pads  UAD to test blood sugar & inject insulin     aspirin 81 MG chewable tablet  Commonly known as:  ASPIRIN CHILDRENS     atorvastatin 40 MG tablet  Commonly known as:  LIPITOR  Take 1 tablet by mouth daily.      blood glucose test strips strip  Commonly known as:  FREESTYLE LITE  TEST THREE TIMES A DAY     buPROPion 300 MG extended release tablet  Commonly known as:  WELLBUTRIN XL  TAKE ONE TABLET BY MOUTH EVERY MORNING     clonazePAM 0.5 MG tablet  Commonly known as:  KLONOPIN  TAKE ONE TABLET BY MOUTH THREE TIMES A DAY AS NEEDED FOR ANXIETY clopidogrel 75 MG tablet  Commonly known as:  PLAVIX  Take 1 tablet by mouth daily. ferrous sulfate 325 (65 Fe) MG EC tablet  Take 1 tablet by mouth daily (with breakfast)     fexofenadine 180 MG tablet  Commonly known as:  WAL-FEX ALLERGY  1 po qd, to replace claritin/loratidine. Fluocinonide 0.1 % Crea  Apply to affected area twice daily as needed for dry skin. fluticasone 50 MCG/ACT nasal spray  Commonly known as:  FLONASE  2 sprays by Nasal route daily     FREESTYLE LANCETS Mis  USE TO TEST BLOOD SUGAR THREE TIMES DAILY     furosemide 20 MG tablet  Commonly known as:  LASIX  Take 1 tablet by mouth twice daily. gabapentin 300 MG capsule  Commonly known as:  NEURONTIN  Take 3 capsules by mouth 3 times daily. insulin glargine 100 UNIT/ML injection pen  Commonly known as:  BASAGLAR KWIKPEN  Inject 45 Units into the skin 2 times daily     insulin lispro 100 UNIT/ML pen  Commonly known as:  HUMALOG KWIKPEN  Inject 25 Units into the skin 3 times daily (before meals)     Insulin Pen Needle 31G X 5 MM Misc  Commonly known as:  B-D UF III MINI PEN NEEDLES  USE TO INJECT INSULIN and victoza 6 TIMES A DAY     isosorbide mononitrate 30 MG extended release tablet  Commonly known as:  IMDUR  Take 1 tablet by mouth daily. levothyroxine 50 MCG tablet  Commonly known as:  SYNTHROID  Take 1 tablet by mouth daily. linagliptin 5 MG tablet  Commonly known as:  TRADJENTA  TAKE ONE TABLET BY MOUTH DAILY     lisinopril 5 MG tablet  Commonly known as:  PRINIVIL;ZESTRIL  Take 1 tablet by mouth daily     metFORMIN 1000 MG tablet  Commonly known as:  GLUCOPHAGE  Take 1 tablet by mouth twice daily with meals. metoprolol tartrate 25 MG tablet  Commonly known as:  LOPRESSOR  Take 1 tablet by mouth twice daily.      montelukast 10 MG tablet  Commonly known as:  SINGULAIR  Take 1 tablet by mouth daily     nitroGLYCERIN 0.4 MG SL tablet  Commonly known as:  NITROSTAT  DISSOLVE ONE TABLET UNDER THE TONGUE AS NEEDED FOR CHEST PAIN EVERY 5 MINUTES UP TO 3 TIMES. IF NO RELIEF CALL 911.     omeprazole 40 MG delayed release capsule  Commonly known as:  PRILOSEC  Take 1 capsule by mouth daily     oxybutynin 5 MG tablet  Commonly known as:  DITROPAN  1 po q am and 2 po qhs     oxyCODONE-acetaminophen 5-325 MG per tablet  Commonly known as:  PERCOCET  Take 1 tablet by mouth every 6 hours as needed for Pain for up to 3 days. ranolazine 500 MG extended release tablet  Commonly known as:  RANEXA  Take 1 tablet by mouth twice daily. simethicone 80 MG chewable tablet  Commonly known as:  MYLICON  Take 1 tablet by mouth 4 times daily as needed for Flatulence     SPIRIVA RESPIMAT 2.5 MCG/ACT Aers inhaler  Generic drug:  tiotropium  INHALE 2 PUFFS BY MOUTH DAILY     SYMBICORT 160-4.5 MCG/ACT Aero  Generic drug:  budesonide-formoterol  Inhale 2 puffs by mouth twice daily. * VENTOLIN  (90 Base) MCG/ACT inhaler  Generic drug:  albuterol sulfate HFA  INHALE TWO PUFFS BY MOUTH EVERY 6 HOURS AS NEEDED FOR WHEEZING     * albuterol (2.5 MG/3ML) 0.083% nebulizer solution  Commonly known as:  PROVENTIL  Take 3 mLs by nebulization every 6 hours as needed for Wheezing     vitamin B-12 1000 MCG tablet  Commonly known as:  CYANOCOBALAMIN  Take 1 tablet by mouth daily     vitamin D3 1000 units Tabs  Take 1 tablet by mouth daily         * This list has 2 medication(s) that are the same as other medications prescribed for you. Read the directions carefully, and ask your doctor or other care provider to review them with you.             STOP taking these medications    BIOTENE MOISTURIZING MOUTH Soln     cephALEXin 500 MG capsule  Commonly known as:  KEFLEX     guaiFENesin 600 MG extended release tablet  Commonly known as:  MUCINEX     Liraglutide 18 MG/3ML Sopn SC injection  Commonly known as:  VICTOZA     phenol 1.4 % Aers     sertraline 100 MG tablet  Commonly known as:  ZOLOFT     tiZANidine 4 MG tablet  Commonly known as: Romulo De Los Santos           Where to Get Your Medications      You can get these medications from any pharmacy    Bring a paper prescription for each of these medications  · amoxicillin-clavulanate 875-125 MG per tablet  · oxyCODONE-acetaminophen 5-325 MG per tablet           Discharge Condition/Location: stable/home     Follow Up:    PCP in 1 weeks      Time Spent on discharge is more than 28 minutes in the examination, evaluation, counseling and review of medications and discharge plan.       Ambika De Los Santos MD 6/6/2019 3:11 PM

## 2019-06-06 NOTE — PROGRESS NOTES
Progress Note    Admit Date:  2019    Subjective:  63 y.o. female who is seen for evaluation of cellulitis left foot. S/P amputation left hallux 6/3/19. States feeling OK. Pain is better.        Past Medical History:        Diagnosis Date    Anemia 2015    Arthritis     Asthma     Back pain, chronic     DDD    CAD (coronary artery disease)     angiogram , CONNIE x1 to mid Cirflx    CHF (congestive heart failure) (HCC)     Chronic bronchitis (HCC)     COPD (chronic obstructive pulmonary disease) (HCC)     Depression     Diabetes mellitus (HCC)     DVT (deep venous thrombosis) (HCC)     Fibromyalgia     GERD (gastroesophageal reflux disease)     Hx of blood clots     Hyperlipidemia     Hypertension     Lung disease     Neuropathy     Other disorders of kidney and ureter     kidney stones    Pneumonia     [facility]) [facility]     Sleep apnea 2015    Has CPAP, can't tolerate    STEMI (ST elevation myocardial infarction) ([facility] 14    Dr. Harvey Sic Thyroid disease     Urinary incontinence     Vascular complications of other [facility](452.23)     leg       Past Surgical History:        Procedure Laterality Date    APPENDECTOMY      CATARACT REMOVAL WITH IMPLANT Right 2015     SECTION      CORONARY ANGIOPLASTY WITH STENT PLACEMENT  14    DIAGNOSTIC CARDIAC CATH LAB PROCEDURE      DILATION AND CURETTAGE OF UTERUS      EYE SURGERY Left 2015    Cataract    FOOT AMPUTATION Left 6/3/2019    PARTIAL FOOT AMPUTATION WITH GRAFT APPLICATION performed by Leticia Drew DPM at [address] LITHOTRIPSY      kidney stones    OTHER SURGICAL HISTORY Left 2019    PARTIAL FOOT AMPUTATION WITH GRAFT APPLICATION (Left foot)    VASCULAR SURGERY      thrombectomy       Current Medications:    Current Facility-Administered Medications: sodium chloride 0.9 % infusion, , ,   vancomycin (VANCOCIN) 1,250 mg in dextrose 5 % 250 mL IVPB, 1,250 mg, Intravenous, Q12H  polyethylene glycol (GLYCOLAX) packet 17 g, 17 g, Oral, Daily  furosemide (LASIX) tablet 20 mg, 20 mg, Oral, Daily  insulin glargine (LANTUS) injection vial 28 Units, 28 Units, Subcutaneous, BID  albuterol (PROVENTIL) nebulizer solution 2.5 mg, 2.5 mg, Nebulization, Q6H PRN  BIOTENE MOISTURIZING MOUTH (MOUTHKOTE) SOLN, , Oral, Q8H PRN  aspirin chewable tablet 81 mg, 81 mg, Oral, Daily  atorvastatin (LIPITOR) tablet 40 mg, 40 mg, Oral, Nightly  buPROPion (WELLBUTRIN XL) extended release tablet 300 mg, 300 mg, Oral, Daily  vitamin D (CHOLECALCIFEROL) tablet 1,000 Units, 1,000 Units, Oral, Daily  clopidogrel (PLAVIX) tablet 75 mg, 75 mg, Oral, Daily  ferrous sulfate tablet 325 mg, 325 mg, Oral, Daily with breakfast  gabapentin (NEURONTIN) capsule 300 mg, 300 mg, Oral, TID  levothyroxine (SYNTHROID) tablet 50 mcg, 50 mcg, Oral, Daily  metoprolol tartrate (LOPRESSOR) tablet 25 mg, 25 mg, Oral, BID  nitroGLYCERIN (NITROSTAT) SL tablet 0.4 mg, 0.4 mg, Sublingual, Q5 Min PRN  pantoprazole (PROTONIX) tablet 40 mg, 40 mg, Oral, QAM AC  oxybutynin (DITROPAN) tablet 5 mg, 5 mg, Oral, BID  ranolazine (RANEXA) extended release tablet 500 mg, 500 mg, Oral, BID  sertraline (ZOLOFT) tablet 100 mg, 100 mg, Oral, Daily  simethicone (MYLICON) chewable tablet 80 mg, 80 mg, Oral, 4x Daily PRN  mometasone-formoterol (DULERA) 200-5 MCG/ACT inhaler 2 puff, 2 puff, Inhalation, BID  tiZANidine (ZANAFLEX) tablet 2 mg, 2 mg, Oral, Q6H PRN  albuterol sulfate  (90 Base) MCG/ACT inhaler 2 puff, 2 puff, Inhalation, Q4H PRN  vitamin B-12 (CYANOCOBALAMIN) tablet 1,000 mcg, 1,000 mcg, Oral, Daily  sodium chloride flush 0.9 % injection 10 mL, 10 mL, Intravenous, 2 times per day  sodium chloride flush 0.9 % injection 10 mL, 10 mL, Intravenous, PRN  magnesium hydroxide (MILK OF MAGNESIA) 400 MG/5ML suspension 30 mL, 30 mL, Oral, Daily PRN  ondansetron (ZOFRAN) injection 4 mg, 4 mg, Intravenous, Q6H PRN  enoxaparin (LOVENOX) injection 40 mg, 40 mg, Subcutaneous, Daily  nicotine (NICODERM CQ) 14 MG/24HR 1 patch, 1 patch, Transdermal, Daily  piperacillin-tazobactam (ZOSYN) 3.375 g in sodium chloride 0.9 % 100 mL IVPB extended infusion (mini-bag), 3.375 g, Intravenous, Q8H  tiotropium (SPIRIVA) inhalation capsule 18 mcg, 18 mcg, Inhalation, Daily  glucose (GLUTOSE) 40 % oral gel 15 g, 15 g, Oral, PRN  dextrose 50 % solution 12.5 g, 12.5 g, Intravenous, PRN  glucagon (rDNA) injection 1 mg, 1 mg, Intramuscular, PRN  dextrose 5 % solution, 100 mL/hr, Intravenous, PRN  insulin lispro (HUMALOG) injection vial 0-12 Units, 0-12 Units, Subcutaneous, TID WC  insulin lispro (HUMALOG) injection vial 0-6 Units, 0-6 Units, Subcutaneous, Nightly  clonazePAM (KLONOPIN) tablet 0.5 mg, 0.5 mg, Oral, BID PRN  isosorbide mononitrate (IMDUR) extended release tablet 30 mg, 30 mg, Oral, Daily  oxyCODONE-acetaminophen (PERCOCET) 5-325 MG per tablet 1 tablet, 1 tablet, Oral, Q4H PRN **OR** [DISCONTINUED] oxyCODONE-acetaminophen (PERCOCET) 5-325 MG per tablet 2 tablet, 2 tablet, Oral, Q4H PRN    Allergies:  Januvia [sitagliptin] and Iodine    Social History:    Social History     Tobacco Use    Smoking status: Current Every Day Smoker     Packs/day: 0.25     Years: 40.00     Pack years: 10.00     Types: Cigarettes    Smokeless tobacco: Former User    Tobacco comment: .5 ppd   Substance Use Topics    Alcohol use: No     Alcohol/week: 0.0 oz    Drug use: No       Family History:       Problem Relation Age of Onset    Other Mother          of unknown lung issue at 61    Diabetes Mother     Diabetes Brother     Heart Disease Maternal Grandfather     Cancer Maternal Grandmother     Asthma Grandchild        Review of Systems    CONSTITUTIONAL:  negative  EYES:  negative  HEENT:  negative  RESPIRATORY:  negative  CARDIOVASCULAR:  negative  GASTROINTESTINAL:  negative  GENITOURINARY:  negative  INTEGUMENT/BREAST:  positive for ulcer  MUSCULOSKELETAL: positive for  pain  NEUROLOGICAL:  positive for numbness      Objective:   BP (!) 156/69   Pulse 60   Temp 97.6 °F (36.4 °C) (Oral)   Resp 16   Ht 5' (1.524 m)   Wt 182 lb 2 oz (82.6 kg)   LMP  (LMP Unknown)   SpO2 96%   BMI 35.57 kg/m²     Data:  CBC:   Recent Labs     06/04/19  0621   WBC 8.2   HGB 11.0*   HCT 32.5*   MCV 95.7        BMP:   Recent Labs     06/04/19  0621 06/05/19  0525 06/06/19  0739   * 134* 133*   K 4.5 4.3 4.2    103 101   CO2 21 20* 21   BUN 14 10 11   CREATININE 0.9 0.7 0.8     LIVER PROFILE:   No results for input(s): AST, ALT, LIPASE, BILIDIR, BILITOT, ALKPHOS in the last 72 hours. Invalid input(s): AMYLASE,  ALB  PT/INR:   No results for input(s): PROT, INR in the last 72 hours. HgBA1c:  Lab Results   Component Value Date    LABA1C 13.8 06/03/2019       Cultures: Blood x2 - NGSF    Imaging: xray left foot - postop changes noted. Physical Exam:    General Appearance: alert and oriented to person, place and time, well developed and well- nourished, in no acute distress  Skin: warm and dry, no rash or erythema  Head: normocephalic and atraumatic  Eyes: pupils equal, round, and reactive to light, extraocular eye movements intact, conjunctivae normal  ENT: tympanic membrane, external ear and ear canal normal bilaterally, nose without deformity, nasal mucosa and turbinates normal without polyps  Neck: supple and non-tender without mass, no thyromegaly or thyroid nodules, no cervical lymphadenopathy  Pulmonary/Chest: clear to auscultation bilaterally- no wheezes, rales or rhonchi, normal air movement, no respiratory distress  Cardiovascular: normal rate, regular rhythm, normal S1 and S2, no murmurs, rubs, clicks, or gallops, distal pulses intact, no carotid bruits  Abdomen: soft, non-tender, non-distended, normal bowel sounds, no masses or organomegaly    Dressing left foot - Clean, dry and intact. Decreased erythema at amputation site noted.    No proximal erythema or edema noted. No malodor noted. Assessment:  Patient Active Problem List   Diagnosis Code    HLD (hyperlipidemia) E78.5    DM (diabetes mellitus), type 2, uncontrolled ([facility] [address] E11.65    [facility]VD [facility]peripheral [facility]) [facility] I73.9    HTN (hypertension) I10    Hypothyroid E03.9    GERD (gastroesophageal reflux disease) K21.9    STEMI (ST elevation myocardial infarction) ([facility] I21.3    Morbid obesity with BMI of 45.0-49.9, adult ([facility]) E66.01, Z68.42    Localized osteoarthrosis, lower leg M17.10    Rotator cuff tendinitis M75.80    PFS (patellofemoral syndrome) M22.2X9    Sleep apnea G47.30    Disc displacement, lumbar M51.26    Neuropathy G62.9    Opiate analgesic contract exists Z02.89    Neck pain M54.2    Anemia D64.9    Insomnia G47.00    Pulmonary emphysema ([facility]) J43.9    Transient synovitis, left ankle and foot M67.372    Insomnia with sleep apnea G47.00, G47.30    JORDI on CPAP G47.33, Z99.89    Chronic low back pain M54.5, G89.29    COPD, moderate ([facility]) J44.9    Unstable angina ([facility]) I20.0    Anxiety F41.9    Chronic diastolic congestive heart failure ([facility]) I50.32    Coronary artery disease involving native coronary artery of native heart without angina pectoris I25.10    Ischemic cardiomyopathy I25.5    History of ST elevation myocardial infarction (STEMI) I25.2    Tobacco use Z72.0    Uncontrolled type 2 diabetes mellitus with hyperglycemia ([facility]) E11.65    Toe infection L08.9    Local infection of skin and subcutaneous tissue L08.9    Cellulitis of foot L03.119     Cellulitis left foot - improving  diabetic foot ulcer left foot secondary to peripheral neuropathy   Osteomyelitis / Gas gangrene left foot - S/P hallux amputation 6/3/19    Plan  Patient examined. Reviewed labs and imaging. OK to D/C home on oral antibiotics from Podiatry standpoint. Elevation of legs to decrease edema and pain.    Patient requested rolling knee walker to keep pressure of the foot while ambulating. Rx written. Follow up in my office next week.          Electronically signed by Armond Morrow DPM on 6/6/2019 at 1:41 PM.

## 2019-06-06 NOTE — PROGRESS NOTES
Inpatient Physical Therapy Evaluation and Treatment    Unit: [address]  Date:  2019  Patient Name:    Norlin Apgar  Admitting diagnosis:  Toe infection [L08.9]  Admit Date:  2019  Precautions/Restrictions/WB Status/ Lines/ Wounds/ Oxygen: IV, Weight bearing to heel only (LLE)    Treatment Time:  4962-9072  Treatment Number:  1   Timed Code Treatment Minutes: 16 minutes  Total Treatment Minutes:  26  minutes    Patient Goals for Therapy: \" to go home \"          Discharge Recommendations: Home with PRN assist  DME needs for discharge: RW       Therapy recommendations for staff:   Assist of 1 to manage IV pole with use of rolling walker (RW) for all ambulation within halls. WBing to heel only on L. Home Health S4 Level Recommendation:  NA  AM-PAC Mobility Score    AM-PAC Inpatient Mobility Raw Score : 23       Preadmission Environment    Pt. Lives With Family and  Assist Available  (daughter & granddaughter)   Home environment:  mobile home/trailer  Steps to enter first floor: 3 Steps to enter (1 step + ramp + 2 more steps)   Steps to second floor: N/A  Bathroom: Bath Tub Shower and Standard height toilet  Equipment owned: [employer] and Rollator     Preadmission Status:  Pt. Able to drive: No  Pt Fully independent with ADLs: Yes, does her own laundry   Pt. Required assistance from family for: Cleaning and Cooking  Pt. Fully independent for transfers and gait and walked with Rollator PRN (when she has to walk a long distance)   History of falls No    Pain   Yes  Location: L foot  Ratin /10  Pain Medicine Status: Received pain med prior to tx    Cognition    A&O x4   Able to follow 2 step commands    Subjective  Patient lying supine in bed with no family present  Pt agreeable to this PT eval & tx. Upper Extremity ROM/Strength  [facility], pt able to perform all bed mobility, transfers, and gait without ROM/strength limitation.      Lower Extremity ROM / Strength    AROM WFL: Yes (L foot/ankle not assessed) Strength Assessment (measured on a 0-5 scale):  R LE   Quad   5/5   Ant Tib  5/5   Hamstring 5/5   Iliopsoas 5/5  L LE  Quad   5/5   Ant Tib  Not tested   Hamstring 5/5   Iliopsoas 5/5    Lower Extremity Sensation    WNL    Lower Extremity Proprioception:   WNL    Coordination and Tone  WNL    Balance  Static Sitting:  Good    Tolerance: WNL  Dynamic Sitting:  Good   Static Standing: Good    Tolerance: WFL  Dynamic Standing: Good     Bed Mobility   Supine to Sit:   Independent  Sit to Supine:  Not Tested  Rolling:   Independent  Scooting at EOB: Independent  Scooting to [facility]:  Not Tested    Transfer Training     Sit to stand:   Independent  Stand to sit: Independent  Bed to Chair:  Not Tested with use of N/A    Gait gait completed as indicated below  Distance:  45 ft  Deviations (firm surface/linoleum): decreased marlene, step-to pattern and decreased stance time on LLE   Assistive Device Used:  rolling walker (RW)  Level of Assist: Independent  Comment: PT managing IV pole only; pt maintaining heel WBing on L without issue, post-op shoe donned    Stair Training stairs completed as indicated below  Pt ascended/descended 1 stair with SBA with No Rail, and use of rolling walker (RW). Pattern: non-reciprocal pattern    Activity Tolerance   Pt completed therapy session with Pain  SpO2:   HR:  BP:     Positioning Needs   Pt reclined in chair, call light and needs in reach, LLE elevated with pillow. Exercises Initiated    N/A    Other  None. Patient/Family Education   Pt educated on role of inpatient PT, POC, importance of continued activity, DC recommendations, safety awareness, transfer techniques and calling for assist with mobility. Assessment  Pt seen for Physical Therapy evaluation in acute care setting. Pt presents functioning near her baseline, but now requiring a RW to assist with un-weighting L forefoot. Pt managed this well, completing most mobility independently.  Pt did required up to SBA to navigate 1 stair. At this time, pt has no additional acute PT needs and is recommended to have PRN assist at home and use of a RW. Plan    DC from acute PT.      Conor Durham, PT, DPT #[id number]

## 2019-06-06 NOTE — FLOWSHEET NOTE
06/05/19 1915   Vital Signs   Temp 98.5 °F (36.9 °C)   Temp Source Oral   Pulse 73   Heart Rate Source Monitor   Resp 16   /60   BP Location Right lower arm   BP Upper/Lower Lower   Patient Position Semi fowlers   Level of Consciousness 0   MEWS Score 1   Oxygen Therapy   SpO2 91 %   O2 Device None (Room air)     Shift assessment complete. See flow sheet. Pt alert and oriented x 4. C/O  L foot pain 10/10. Medicated with percocet 1 tab po. All other scheduled medication given. L foot drsg D/I. All lines patent and intact. No other concerns voiced. No signs of distress noted. Call light within reach. Bed in lowest position. Will continue to monitor.

## 2019-06-06 NOTE — PROGRESS NOTES
Pharmacy Vancomycin Consult     Vancomycin Day: 4  Current Dosinmg q24h    Temp max:      Recent Labs     19  0525 19  0739   BUN 10 11       Recent Labs     19  0525 19  0739   CREATININE 0.7 0.8       Recent Labs     19  0621   WBC 8.2         Intake/Output Summary (Last 24 hours) at 2019 7453  Last data filed at 2019 1728  Gross per 24 hour   Intake 720 ml   Output --   Net 720 ml       Culture Date      Source                       Results  NGTD    Ht Readings from Last 1 Encounters:   19 5' (1.524 m)        Wt Readings from Last 1 Encounters:   19 182 lb 2 oz (82.6 kg)         Body mass index is 35.57 kg/m². Estimated Creatinine Clearance: 73 mL/min (based on SCr of 0.8 mg/dL).     Trough: 7.1 mcg/ml    Assessment/Plan:  Will increase vanc interval to q12h, recheck trough   Jorge Hinton Pharm D 20198:40 AM  .

## 2019-06-06 NOTE — PLAN OF CARE
Problem: Falls - Risk of:  Goal: Will remain free from falls  Description  Will remain free from falls  Outcome: Ongoing  Goal: Absence of physical injury  Description  Absence of physical injury  Outcome: Ongoing     Problem: OXYGENATION/RESPIRATORY FUNCTION  Goal: Patient will maintain patent airway  Outcome: Ongoing  Goal: Patient will achieve/maintain normal respiratory rate/effort  Description  Respiratory rate and effort will be within normal limits for the patient  Outcome: Ongoing     Problem: HEMODYNAMIC STATUS  Goal: Patient has stable vital signs and fluid balance  Outcome: Ongoing     Problem: FLUID AND ELECTROLYTE IMBALANCE  Goal: Fluid and electrolyte balance are achieved/maintained  Outcome: Ongoing     Problem: ACTIVITY INTOLERANCE/IMPAIRED MOBILITY  Goal: Mobility/activity is maintained at optimum level for patient  Outcome: Ongoing     Problem: Infection:  Goal: Will remain free from infection  Description  Will remain free from infection  Outcome: Ongoing     Problem: Safety:  Goal: Free from accidental physical injury  Description  Free from accidental physical injury  Outcome: Ongoing  Goal: Free from intentional harm  Description  Free from intentional harm  Outcome: Ongoing     Problem: Daily Care:  Goal: Daily care needs are met  Description  Daily care needs are met  Outcome: Ongoing     Problem: Pain:  Description  Pain management should include both nonpharmacologic and pharmacologic interventions.   Goal: Patient's pain/discomfort is manageable  Description  Patient's pain/discomfort is manageable  Outcome: Ongoing  Goal: Pain level will decrease  Description  Pain level will decrease  Outcome: Ongoing  Goal: Control of acute pain  Description  Control of acute pain  Outcome: Ongoing  Goal: Control of chronic pain  Description  Control of chronic pain  Outcome: Ongoing     Problem: Skin Integrity:  Goal: Skin integrity will stabilize  Description  Skin integrity will stabilize  Outcome: Ongoing     Problem: Discharge Planning:  Goal: Patients continuum of care needs are met  Description  Patients continuum of care needs are met  Outcome: Ongoing     Problem: Risk for Impaired Skin Integrity  Goal: Tissue integrity - skin and mucous membranes  Description  Structural intactness and normal physiological function of skin and  mucous membranes. Outcome: Ongoing     Patient's EF (Ejection Fraction) is greater than 40%    Patient has a past medical history of Anemia, Arthritis, Asthma, Back pain, chronic, CAD (coronary artery disease), CHF (congestive heart failure) ([address] Chronic bronchitis ([address] COPD (chronic obstructive pulmonary disease) ([address] Depression, Diabetes mellitus ([address] DVT (deep venous thrombosis) ([address] Fibromyalgia, GERD (gastroesophageal reflux disease), Hx of blood clots, Hyperlipidemia, Hypertension, Lung disease, Neuropathy, Other disorders of kidney and ureter, Pneumonia, PVD (peripheral vascular disease) ([address] Sleep apnea, STEMI (ST elevation myocardial infarction) ([facility] Thyroid disease, Urinary incontinence, and Vascular complications of other DBSWGQX(935.91). Comorbidities reviewed and education provided. Patient and family's stated goal of care: reduce shortness of breath prior to discharge    Patient's current functional capacity:  Slight limitation of physical activity. Comfortable at rest. Ordinary physical activity results in fatigue, palpitation, dyspnea. Pt resting in bed at this time on room air. Pt denies shortness of breath. Pt without lower extremity edema.  Patient's weights and intake/output reviewed:    Patient Vitals for the past 96 hrs (Last 3 readings):   Weight   06/06/19 0401 182 lb 2 oz (82.6 kg)   06/05/19 0241 180 lb 5 oz (81.8 kg)   06/03/19 0325 178 lb 9 oz (81 kg)       Intake/Output Summary (Last 24 hours) at 6/6/2019 0442  Last data filed at 6/5/2019 1728  Gross per 24 hour   Intake 720 ml   Output --   Net 720 ml       Patient provided with education on CHF signs/symptoms, causes, discharge medications, daily weights, low sodium diet, activity, and follow-up. Notified patient to call the doctor post discharge if patient experiences shortness of breath, chest pain, swelling, cough, or weight gain of three pounds in a day/five pounds in a week. Also notified patient to call the doctor with dizziness, increased fatigue, decreased or difficulty urinating. Pt verbalized understanding. No additional questions at this time.     Education Time: 30 Minutes

## 2019-06-07 ENCOUNTER — TELEPHONE (OUTPATIENT)
Dept: FAMILY MEDICINE CLINIC | Age: 59
End: 2019-06-07

## 2019-06-08 LAB
BLOOD CULTURE, ROUTINE: NORMAL
CULTURE, BLOOD 2: NORMAL

## 2019-06-12 NOTE — TELEPHONE ENCOUNTER
[facility] Transitions Initial Follow Up Call    Outreach made within 2 business days of discharge: Yes    Patient: Jaylon Mckay Patient : 1960   MRN: [id number]  Reason for Admission: There are no discharge diagnoses documented for the most recent discharge. Discharge Date: 19       Spoke with: 2nd attempt.  LM on VM for patient to return my call    Discharge department/facility: [facility]        Scheduled appointment with PCP within 7-14 days    Follow Up  Future Appointments   Date Time Provider Roxanna Martinez   2019 12:30 PM SCHEDULE, [address] [facility]

## 2019-06-13 DIAGNOSIS — F41.9 ANXIETY: ICD-10-CM

## 2019-06-14 RX ORDER — CLONAZEPAM 0.5 MG/1
TABLET ORAL
Qty: 30 TABLET | Refills: 0 | Status: SHIPPED | OUTPATIENT
Start: 2019-06-13 | End: 2019-01-01 | Stop reason: SDUPTHER

## 2019-06-17 ENCOUNTER — HOSPITAL ENCOUNTER (OUTPATIENT)
Dept: WOUND CARE | Age: 59
Discharge: HOME | End: 2019-06-17
Payer: COMMERCIAL

## 2019-06-17 VITALS
DIASTOLIC BLOOD PRESSURE: 69 MMHG | HEART RATE: 76 BPM | TEMPERATURE: 99.1 F | RESPIRATION RATE: 16 BRPM | SYSTOLIC BLOOD PRESSURE: 141 MMHG | HEIGHT: 60 IN | BODY MASS INDEX: 34.28 KG/M2 | WEIGHT: 174.6 LBS

## 2019-06-17 PROCEDURE — 99212 OFFICE O/P EST SF 10 MIN: CPT

## 2019-06-17 RX ORDER — LIDOCAINE 40 MG/G
CREAM TOPICAL ONCE
Status: DISCONTINUED | OUTPATIENT
Start: 2019-06-17 | End: 2019-06-18 | Stop reason: HOSPADM

## 2019-06-17 ASSESSMENT — PAIN DESCRIPTION - ONSET: ONSET: GRADUAL

## 2019-06-17 ASSESSMENT — PAIN DESCRIPTION - DESCRIPTORS: DESCRIPTORS: ACHING

## 2019-06-17 ASSESSMENT — PAIN DESCRIPTION - PAIN TYPE: TYPE: SURGICAL PAIN

## 2019-06-17 ASSESSMENT — PAIN DESCRIPTION - FREQUENCY: FREQUENCY: INTERMITTENT

## 2019-06-17 ASSESSMENT — PAIN SCALES - GENERAL: PAINLEVEL_OUTOF10: 9

## 2019-06-17 ASSESSMENT — PAIN DESCRIPTION - LOCATION: LOCATION: FOOT

## 2019-06-17 ASSESSMENT — PAIN DESCRIPTION - ORIENTATION: ORIENTATION: LEFT

## 2019-06-17 NOTE — PROGRESS NOTES
[facility] Progress Note      Reji Jones     : 1960    DATE OF VISIT:  2019    Subjective:     Reji Jones is a 63 y.o. female who has a chief complaint of a diabetic ulcer located on the left foot. States doing well since surgery. Ms. Holger Moreno has a past medical history of Anemia, Arthritis, Asthma, Back pain, chronic, CAD (coronary artery disease), CHF (congestive heart failure) ([address] Chronic bronchitis ([address] COPD (chronic obstructive pulmonary disease) ([address] Depression, Diabetes mellitus ([address] DVT (deep venous thrombosis) ([address] Fibromyalgia, GERD (gastroesophageal reflux disease), Hx of blood clots, Hyperlipidemia, Hypertension, Lung disease, Neuropathy, Other disorders of kidney and ureter, Pneumonia, PVD (peripheral vascular disease) ([address] Sleep apnea, STEMI (ST elevation myocardial infarction) ([address] Thyroid disease, Urinary incontinence, and Vascular complications of other [id number](842.99). She has a past surgical history that includes Lithotripsy;  section; vascular surgery; Dilation and curettage of uterus; Appendectomy; Coronary angioplasty with stent (14); Diagnostic Cardiac Cath Lab Procedure; eye surgery (Left, 2015); Cataract removal with implant (Right, 2015); other surgical history (Left, 2019); and Foot Amputation (Left, 6/3/2019). Her family history includes Asthma in her grandchild; Cancer in her maternal grandmother; Diabetes in her brother and mother; Heart Disease in her maternal grandfather; Other in her mother. Ms. Holger Moreno reports that she has been smoking cigarettes. She has a 10.00 pack-year smoking history. She has quit using smokeless tobacco. She reports that she does not drink alcohol or use drugs.     Her current medication list consists of Alcohol Swabs, FREESTYLE LANCETS, Fluocinonide, Insulin Pen Needle, albuterol, albuterol sulfate HFA, amoxicillin-clavulanate, aspirin, atorvastatin, blood glucose test strips, buPROPion, budesonide-formoterol, clonazePAM, clopidogrel, ferrous sulfate, fexofenadine, fluticasone, furosemide, gabapentin, insulin glargine, insulin lispro, isosorbide mononitrate, levothyroxine, linagliptin, lisinopril, metFORMIN, metoprolol tartrate, montelukast, nitroGLYCERIN, omeprazole, oxybutynin, ranolazine, simethicone, tiotropium, and vitamin D3. Allergies: Januvia [sitagliptin] and Iodine    Pertinent items from the review of systems are discussed in the HPI; the remainder of the ROS was reviewed and is negative. Objective:     BP (!) 141/69   Pulse 76   Temp 99.1 °F (37.3 °C) (Oral)   Resp 16   Ht 5' (1.524 m)   Wt 174 lb 9.6 oz (79.2 kg)   LMP  (LMP Unknown)   BMI 34.10 kg/m²   General Appearance: alert and oriented to person, place and time, well developed and well- nourished, in no acute distress  Skin: warm and dry, no rash or erythema  Head: normocephalic and atraumatic  Eyes: pupils equal, round, and reactive to light, extraocular eye movements intact, conjunctivae normal  ENT: tympanic membrane, external ear and ear canal normal bilaterally, nose without deformity, nasal mucosa and turbinates normal without polyps  Neck: supple and non-tender without mass, no thyromegaly or thyroid nodules, no cervical lymphadenopathy  Pulmonary/Chest: clear to auscultation bilaterally- no wheezes, rales or rhonchi, normal air movement, no respiratory distress  Cardiovascular: normal rate, regular rhythm, normal S1 and S2, no murmurs, rubs, clicks, or gallops, distal pulses intact, no carotid bruits  Abdomen: soft, non-tender, non-distended, normal bowel sounds, no masses or organomegaly. Dorsalis pedis pulse left palpable  Posterior tibial pulse left palpable  Dorsalis pedis pulse right palpable  Posterior tibial pulse right palpable  Protective sensation absent bilateral LE.        Ulcer on the left hallux amputation site with no fibrotic tissue,

## 2019-06-17 NOTE — PLAN OF CARE
[facility] Summary    1. General --    Active wound etiologies:  Diabetic foot ulcer     PCP and pertinent consultants: Shaila Merlos MD            Other plans for overall health:      [address]: [address]: N/A    Most recent [facility] lab results:    Lab Results   Component Value Date    LABA1C 13.8 06/03/2019     Lab Results   Component Value Date    LABALBU 3.8 06/03/2019      Lab Results   Component Value Date    CREATININE 0.8 06/06/2019     Lab Results   Component Value Date    HGB 11.0 (L) 06/04/2019        2. Circulatory status, if lower extremity ulcer --    Most recent ANG    6/17/18  Right ANG: 1.01 mmHg    Left ANG: 0.71 mmHg    Most recent arterial imaging:  N/a    Most recent arterial Rx:  N/a    Current management of edema: N/a    Most recent venous imaging:  N/a    3. Debridement --    Debridement methods, past or present:     Pertinent surgical history for wound(s):  Amputation 6/3/19    4. Infection --     Recent culture results:   6/3/19  Surgical pathology    Recent imaging: Xray 6/3/19    Recent antibiotic Rx: Augmentin 6/6/19     5. Topical therapies --    Previous dressings on current wound(s):      Current dressings being used:  Betadine and dry dressing    6. Offloading --    Pressure ulcer offloading:     Neuropathic ulcer offloading: Post op shoe    7. Adjunctive therapies --     Date       Wnd # Location  CTP Rx HBOT  NPWT    6/3/19   Marilia marquez                       Pt to the [facility] for initial appointment. Sutures intact. Pt to continue oral antibiotics and weekly dressing. Pt to follow up in the [facility] in 1 week with Dr Owen Fontanez. Discharge instructions reviewed with patient, all questions answered, copy given to patient. Dressings were applied to all wounds per M.D. Instructions at this visit.

## 2019-06-20 ENCOUNTER — TELEPHONE (OUTPATIENT)
Dept: CARDIOLOGY CLINIC | Age: 59
End: 2019-06-20

## 2019-06-20 NOTE — TELEPHONE ENCOUNTER
Called patient. She states that both lower legs and feet are swollen. States since beginning of June. (of note:  Had big toe on Left foot amputated 6/3/19). States she is more SOB, especially when laying down. States the swelling will go down over night, but comes right back when she gets up. Sometimes they swell to twice the size. States they feel \"tight\". No chest pain, just feels congested. She states her weight has not fluctuated or changed.

## 2019-06-20 NOTE — TELEPHONE ENCOUNTER
Let pt know that does sound like venous disease. I would keep legs elevated as much as possible and also try to wear compression stockings as possible.

## 2019-06-29 ENCOUNTER — HOSPITAL ENCOUNTER (EMERGENCY)
Age: 59
Discharge: HOME | End: 2019-06-29
Payer: COMMERCIAL

## 2019-06-29 ENCOUNTER — APPOINTMENT (OUTPATIENT)
Dept: GENERAL RADIOLOGY | Age: 59
End: 2019-06-29
Payer: COMMERCIAL

## 2019-06-29 VITALS
DIASTOLIC BLOOD PRESSURE: 61 MMHG | HEIGHT: 60 IN | WEIGHT: 174 LBS | OXYGEN SATURATION: 98 % | TEMPERATURE: 99.2 F | HEART RATE: 81 BPM | RESPIRATION RATE: 16 BRPM | BODY MASS INDEX: 34.16 KG/M2 | SYSTOLIC BLOOD PRESSURE: 115 MMHG

## 2019-06-29 DIAGNOSIS — R07.81 RIB PAIN ON RIGHT SIDE: Primary | ICD-10-CM

## 2019-06-29 DIAGNOSIS — T87.89 DELAYED SURGICAL WOUND HEALING OF TOE AMPUTATION STUMP (HCC): ICD-10-CM

## 2019-06-29 LAB
A/G RATIO: 1.3 (ref 1.1–2.2)
ALBUMIN SERPL-MCNC: 3.9 G/DL (ref 3.4–5)
ALP BLD-CCNC: 90 U/L (ref 40–129)
ALT SERPL-CCNC: 7 U/L (ref 10–40)
ANION GAP SERPL CALCULATED.3IONS-SCNC: 13 MMOL/L (ref 3–16)
AST SERPL-CCNC: 8 U/L (ref 15–37)
BASOPHILS ABSOLUTE: 0.1 K/UL (ref 0–0.2)
BASOPHILS RELATIVE PERCENT: 1.3 %
BILIRUB SERPL-MCNC: 0.4 MG/DL (ref 0–1)
BUN BLDV-MCNC: 7 MG/DL (ref 7–20)
CALCIUM SERPL-MCNC: 9.2 MG/DL (ref 8.3–10.6)
CHLORIDE BLD-SCNC: 94 MMOL/L (ref 99–110)
CO2: 22 MMOL/L (ref 21–32)
CREAT SERPL-MCNC: 0.7 MG/DL (ref 0.6–1.1)
EOSINOPHILS ABSOLUTE: 0.3 K/UL (ref 0–0.6)
EOSINOPHILS RELATIVE PERCENT: 3.8 %
GFR AFRICAN AMERICAN: >60
GFR NON-AFRICAN AMERICAN: >60
GLOBULIN: 3 G/DL
GLUCOSE BLD-MCNC: 389 MG/DL (ref 70–99)
HCT VFR BLD CALC: 35.7 % (ref 36–48)
HEMOGLOBIN: 11.8 G/DL (ref 12–16)
LYMPHOCYTES ABSOLUTE: 2.6 K/UL (ref 1–5.1)
LYMPHOCYTES RELATIVE PERCENT: 29.6 %
MCH RBC QN AUTO: 30.8 PG (ref 26–34)
MCHC RBC AUTO-ENTMCNC: 33 G/DL (ref 31–36)
MCV RBC AUTO: 93.4 FL (ref 80–100)
MONOCYTES ABSOLUTE: 0.6 K/UL (ref 0–1.3)
MONOCYTES RELATIVE PERCENT: 7.1 %
NEUTROPHILS ABSOLUTE: 5.1 K/UL (ref 1.7–7.7)
NEUTROPHILS RELATIVE PERCENT: 58.2 %
PDW BLD-RTO: 14.1 % (ref 12.4–15.4)
PLATELET # BLD: 386 K/UL (ref 135–450)
PMV BLD AUTO: 10.4 FL (ref 5–10.5)
POTASSIUM REFLEX MAGNESIUM: 4.3 MMOL/L (ref 3.5–5.1)
RBC # BLD: 3.82 M/UL (ref 4–5.2)
SODIUM BLD-SCNC: 129 MMOL/L (ref 136–145)
TOTAL PROTEIN: 6.9 G/DL (ref 6.4–8.2)
WBC # BLD: 8.8 K/UL (ref 4–11)

## 2019-06-29 PROCEDURE — 80053 COMPREHEN METABOLIC PANEL: CPT

## 2019-06-29 PROCEDURE — 73630 X-RAY EXAM OF FOOT: CPT

## 2019-06-29 PROCEDURE — 71046 X-RAY EXAM CHEST 2 VIEWS: CPT

## 2019-06-29 PROCEDURE — 99283 EMERGENCY DEPT VISIT LOW MDM: CPT

## 2019-06-29 PROCEDURE — 85025 COMPLETE CBC W/AUTO DIFF WBC: CPT

## 2019-06-29 PROCEDURE — 87040 BLOOD CULTURE FOR BACTERIA: CPT

## 2019-06-29 ASSESSMENT — PAIN DESCRIPTION - ORIENTATION: ORIENTATION: LEFT

## 2019-06-29 ASSESSMENT — PAIN DESCRIPTION - LOCATION: LOCATION: TOE (COMMENT WHICH ONE)

## 2019-06-29 ASSESSMENT — PAIN SCALES - GENERAL: PAINLEVEL_OUTOF10: 10

## 2019-06-29 NOTE — ED NOTES
Discharge instructions reviewed with patient. Patient verbalized understanding of all.       Betty Beth RN  06/29/19 5817

## 2019-06-29 NOTE — ED PROVIDER NOTES
dictation. EKG: All EKG's are interpreted by the Emergency Department Physician in the absence of a cardiologist.  Please see their note for interpretation of EKG. RADIOLOGY:   Non-plain film images such as CT, Ultrasound and MRI are read by the radiologist. Plain radiographic images are visualized andpreliminarily interpreted by the  ED Provider with the below findings:        Interpretation perthe Radiologist below, if available at the time of this note:    XR FOOT LEFT (MIN 3 VIEWS)   Final Result   No radiographic evidence of osteomyelitis. XR CHEST STANDARD (2 VW)   Final Result   No acute process. Xr Chest Standard (2 Vw)    Result Date: 6/29/2019  EXAMINATION: TWO XRAY VIEWS OF THE CHEST 6/29/2019 11:44 am COMPARISON: 06/28/2018. HISTORY: ORDERING SYSTEM PROVIDED HISTORY: Chest Discomfort TECHNOLOGIST PROVIDED HISTORY: Reason for exam:->Chest Discomfort Ordering Physician Provided Reason for Exam: Chest Discomfort on lower rt flank Acuity: Unknown Type of Exam: Unknown FINDINGS: The lungs are without acute focal process. There is no effusion or pneumothorax. The cardiomediastinal silhouette is stable. The osseous structures are stable. No acute process. PROCEDURES   Unless otherwise noted below, none     Procedures    CRITICAL CARE TIME   N/A    CONSULTS:  IP CONSULT TO PODIATRY      EMERGENCY DEPARTMENT COURSE and DIFFERENTIAL DIAGNOSIS/MDM:   Vitals:    Vitals:    06/29/19 1113 06/29/19 1335 06/29/19 1336   BP: 138/66  115/61   Pulse: 81     Resp: 16     Temp: 99.2 °F (37.3 °C)     SpO2: 96% 98%    Weight: 174 lb (78.9 kg)     Height: 5' (1.524 m)         Patient was given thefollowing medications:  Medications - No data to display    No evidence of rib fracture, pneumothorax or hemothorax. Reevaluation is reassuring, patient did state that she felt a little better knowing that her rib was not broken.   Also there is no evidence of significant infection in her toe, I

## 2019-07-01 ENCOUNTER — HOSPITAL ENCOUNTER (OUTPATIENT)
Dept: WOUND CARE | Age: 59
Discharge: HOME | End: 2019-07-01
Payer: COMMERCIAL

## 2019-07-01 VITALS
TEMPERATURE: 98.7 F | HEART RATE: 81 BPM | DIASTOLIC BLOOD PRESSURE: 80 MMHG | WEIGHT: 174 LBS | HEIGHT: 60 IN | SYSTOLIC BLOOD PRESSURE: 152 MMHG | BODY MASS INDEX: 34.16 KG/M2 | RESPIRATION RATE: 16 BRPM

## 2019-07-01 PROCEDURE — 11042 DBRDMT SUBQ TIS 1ST 20SQCM/<: CPT

## 2019-07-01 ASSESSMENT — PAIN DESCRIPTION - PAIN TYPE: TYPE: SURGICAL PAIN

## 2019-07-01 ASSESSMENT — PAIN DESCRIPTION - DESCRIPTORS: DESCRIPTORS: ACHING

## 2019-07-01 ASSESSMENT — PAIN DESCRIPTION - FREQUENCY: FREQUENCY: INTERMITTENT

## 2019-07-01 ASSESSMENT — PAIN - FUNCTIONAL ASSESSMENT: PAIN_FUNCTIONAL_ASSESSMENT: PREVENTS OR INTERFERES SOME ACTIVE ACTIVITIES AND ADLS

## 2019-07-01 ASSESSMENT — PAIN SCALES - GENERAL
PAINLEVEL_OUTOF10: 10
PAINLEVEL_OUTOF10: 10

## 2019-07-01 ASSESSMENT — PAIN DESCRIPTION - LOCATION: LOCATION: ANKLE

## 2019-07-01 ASSESSMENT — PAIN DESCRIPTION - ORIENTATION: ORIENTATION: LEFT

## 2019-07-01 ASSESSMENT — PAIN DESCRIPTION - ONSET: ONSET: ON-GOING

## 2019-07-01 ASSESSMENT — PAIN DESCRIPTION - PROGRESSION: CLINICAL_PROGRESSION: GRADUALLY WORSENING

## 2019-07-01 NOTE — PROGRESS NOTES
Amputation (Left, 6/3/2019). Her family history includes Asthma in her grandchild; Cancer in her maternal grandmother; Diabetes in her brother and mother; Heart Disease in her maternal grandfather; Other in her mother. Ms. Scotty Camara reports that she has been smoking cigarettes. She has a 10.00 pack-year smoking history. She has quit using smokeless tobacco. She reports that she does not drink alcohol or use drugs. Her current medication list consists of Alcohol Swabs, FREESTYLE LANCETS, Fluocinonide, Insulin Pen Needle, Liraglutide, albuterol, albuterol sulfate HFA, aspirin, atorvastatin, blood glucose test strips, buPROPion, budesonide-formoterol, clonazePAM, clopidogrel, collagenase, ferrous sulfate, fexofenadine, fluticasone, furosemide, gabapentin, insulin glargine, insulin lispro, isosorbide mononitrate, levothyroxine, linagliptin, lisinopril, metFORMIN, metoprolol tartrate, montelukast, nitroGLYCERIN, omeprazole, oxybutynin, ranolazine, simethicone, and vitamin D3. Allergies: Januvia [sitagliptin] and Iodine    Pertinent items from the review of systems are discussed in the HPI; the remainder of the ROS was reviewed and is negative.        Objective:     BP (!) 152/80   Pulse 81   Temp 98.7 °F (37.1 °C) (Oral)   Resp 16   Ht 5' (1.524 m)   Wt 174 lb (78.9 kg)   LMP  (LMP Unknown)   BMI 33.98 kg/m²   General Appearance: alert and oriented to person, place and time, well developed and well- nourished, in no acute distress  Skin: warm and dry, no rash or erythema  Head: normocephalic and atraumatic  Eyes: pupils equal, round, and reactive to light, extraocular eye movements intact, conjunctivae normal  ENT: tympanic membrane, external ear and ear canal normal bilaterally, nose without deformity, nasal mucosa and turbinates normal without polyps  Neck: supple and non-tender without mass, no thyromegaly or thyroid nodules, no cervical lymphadenopathy  Pulmonary/Chest: clear to auscultation Sleep apnea G47.30    Disc displacement, lumbar M51.26    Neuropathy G62.9    Opiate analgesic contract exists Z02.89    Neck pain M54.2    Anemia D64.9    Insomnia G47.00    Pulmonary emphysema ([facility]) J43.9    Transient synovitis, left ankle and foot M67.372    Insomnia with sleep apnea G47.00, G47.30    JORDI on CPAP G47.33, Z99.89    Chronic low back pain M54.5, G89.29    COPD, moderate ([facility]) J44.9    Unstable angina ([facility]) I20.0    Anxiety F41.9    Chronic diastolic congestive heart failure ([facility]) I50.32    Coronary artery disease involving native coronary artery of native heart without angina pectoris I25.10    Ischemic cardiomyopathy I25.5    History of ST elevation myocardial infarction (STEMI) I25.2    Tobacco use Z72.0    Uncontrolled type 2 diabetes mellitus with hyperglycemia ([facility]) E11.65    Toe infection L08.9    Local infection of skin and subcutaneous tissue L08.9    Cellulitis of foot L03.119       Assessment of today's active condition(s): diabetic foot ulcer left - S/P left hallux amputation, diabetes mellitus uncontrolled. Factors contributing to occurrence and/or persistence of the chronic ulcer include diabetes, poor glucose control and smoking. Sharp debridement is indicated today, based upon the exam findings in the ulcer(s) above. Procedure note:     Consent obtained. Time out taken. Anesthetic  Anesthetic:  (post debridement)     After application of topical 4% lidocaine plain to the ulcer, the ulcer was debrided of all fibrotic, necrotic, hyperkeratotic tissue, nonviable and viable tissue through the subcutaneous tissue. This excised skin and subcutaneous tissue with a scalpel. Total Surface Area Debrided:  4 sq cm. The ulcer(s) were then irrigated with normal saline solution. The procedure was completed with a small amount of bleeding, and hemostasis was by pressure. The patient tolerated the procedure well, with no significant complications.  The patient's level of pain during and after the procedure was monitored, and is noted in the wound documentation flowsheet. Discharge plan:     Treatment in the wound care center today: Wound 06/17/19 #1, left great toe amp site, DFU crystal 3, onset 6/6/2019-Dressing/Treatment: Other (comment)(Triad, 4x4's, Kerlix, Spandagrip). Home treatment: good handwashing before and after any dressing changes. Cleanse ulcer with saline or soap & water before dressing change. May use Vaseline (petrolatum), Aquaphor, Aveeno, CeraVe, Cetaphil, Eucerin, Lubriderm, etc for dry skin. Dressing type for home: Santyl and dry dressing daily. Written discharge instructions given to patient. Offload ulcer(s) as directed. Elevate leg(s) as directed. Follow up in 1 week. Needs to control glucose levels to prevent future complications. Needs to stop smoking to prevent future complications. Continue postop shoe. Patient upset that the ER did not admit her. Upset that the ER did not fix her leg swelling. Advised patient that her labs and foot did not reveal anything that required an admission. Advised patient again that the ER and I have recommended she see her PCP to discuss fluid retention in more detail. She was upset that I did not get her admitted when she was in ER. Again advised her that she did not meet admission qualifications. Advised her that all the treatments we would recommend for her at that time could be performed at home. Advised her that elevation of the legs and compression of the legs (Spandigrip) will aid in controlling her swelling. She asked what is venous disease. I explained the etiology and treatment options. Explained that there is no easy fix for venous disease. Again advised her that from our standpoint would recommend elevation of the legs and compression treatment. Advised that in the future she could pursue this in more detail however at this time needs to fix her foot.    During the

## 2019-07-01 NOTE — PROGRESS NOTES
28w6d    Component      Latest Ref Rng & Units 9/25/2018   Glucose 3 Hour OB 3HR GTT      65 - 139 mg/dL 129   Glucose 2 Hour OB 3HR GTT      65 - 154 mg/dL 154   Glucose 1 Hour OB 3HR GTT      65 - 179 mg/dL 173   Glucose Fasting OB 3HR GTT      65 - 99 mg/dL 77     Today's visit was performed with the assistance of  Services.   Name of : April (ID [id number])   Service used: Phone : Third Party     PC with Cara, informed that she did pass the 3 hr gtt, however was very close to failing so recommended avoiding any high sugar or high carbohydrate foods.      She states understanding and no questions at this time.                     Light compression spandagrip applied to RLE

## 2019-07-09 PROBLEM — T86.821 FAILED SKIN GRAFT: Status: ACTIVE | Noted: 2019-01-01

## 2019-07-09 PROBLEM — N32.81 OAB (OVERACTIVE BLADDER): Status: ACTIVE | Noted: 2019-01-01

## 2019-07-09 PROBLEM — E11.621 DIABETIC ULCER OF LEFT FOOT ASSOCIATED WITH TYPE 2 DIABETES MELLITUS, WITH BONE INVOLVEMENT WITHOUT EVIDENCE OF NECROSIS (HCC): Status: ACTIVE | Noted: 2019-01-01

## 2019-07-09 PROBLEM — I25.2 HISTORY OF ST ELEVATION MYOCARDIAL INFARCTION (STEMI): Status: RESOLVED | Noted: 2018-11-01 | Resolved: 2019-01-01

## 2019-07-09 PROBLEM — F41.9 ANXIETY: Status: RESOLVED | Noted: 2017-12-12 | Resolved: 2019-01-01

## 2019-07-09 PROBLEM — L08.9 TOE INFECTION: Status: RESOLVED | Noted: 2019-06-03 | Resolved: 2019-01-01

## 2019-07-09 PROBLEM — J30.9 ALLERGIC RHINITIS: Status: ACTIVE | Noted: 2019-01-01

## 2019-07-09 PROBLEM — E66.09 CLASS 1 OBESITY DUE TO EXCESS CALORIES WITH SERIOUS COMORBIDITY AND BODY MASS INDEX (BMI) OF 34.0 TO 34.9 IN ADULT: Status: ACTIVE | Noted: 2019-01-01

## 2019-07-09 PROBLEM — L97.526 DIABETIC ULCER OF LEFT FOOT ASSOCIATED WITH TYPE 2 DIABETES MELLITUS, WITH BONE INVOLVEMENT WITHOUT EVIDENCE OF NECROSIS (HCC): Status: ACTIVE | Noted: 2019-01-01

## 2019-07-09 PROBLEM — I25.5 ISCHEMIC CARDIOMYOPATHY: Status: RESOLVED | Noted: 2018-07-24 | Resolved: 2019-01-01

## 2019-07-09 PROBLEM — E11.65 UNCONTROLLED TYPE 2 DIABETES MELLITUS WITH HYPERGLYCEMIA (HCC): Status: RESOLVED | Noted: 2018-11-01 | Resolved: 2019-01-01

## 2019-07-09 NOTE — CONSULTS
prescribed for home, and Dr. Freya Lazo asked if I could see Mrs. Lopez to discuss HBO therapy. No current F/C/D, no pus or malodor, mild redness of the foot. No side effects from recent IV-to-oral Abx. She tells me that she's working more on her blood glucoses (in terms of medication adherence and dietary changes), and glucoses are down to the mid-200s. She also tells me that she's working more on trying to quit smoking (down to 1/4 pack of cigarettes per day now). Additional ulcer(s) noted? no.      Ms. Sharon Helton has a past medical history of Acute osteomyelitis of left hallux ([address] Anemia, Asthma, Cellulitis of foot, CHF (congestive heart failure) ([address] Claudication of left upper extremity due to atherosclerosis [facility]), Diabetic ulcer of left hallux, with necrosis of bone ([address] DVT (deep venous thrombosis) ([address] Kidney stones, Morbid obesity with BMI of 45.0-49.9, adult ([address] Neuroma of hand, Open fracture of left hallux, Pneumonia, Rotator cuff tendinitis, STEMI (ST elevation myocardial infarction) ([address] and Urinary incontinence. She has a past surgical history that includes Lithotripsy;  section; vascular surgery (Left, 2005); Dilation and curettage of uterus; Appendectomy; Coronary angioplasty with stent (14); Cataract removal with implant (Bilateral, 2015); Foot Amputation (Left, 6/3/2019); and vascular surgery (Left, 10/2007). Her family history includes Asthma in her grandchild; Cancer in her maternal grandmother; Diabetes in her brother and mother; Heart Disease in her maternal grandfather; Other in her mother. Ms. Sharon Helton reports that she has been smoking cigarettes. She has a 10.00 pack-year smoking history. She has quit using smokeless tobacco. She reports that she does not drink alcohol or use drugs.     Her current medication list consists of Fluocinonide, Liraglutide, albuterol, albuterol sulfate HFA, aspirin, atorvastatin, buPROPion, budesonide-formoterol, clonazePAM, clopidogrel, collagenase, ferrous sulfate, fexofenadine, fluticasone, furosemide, gabapentin, insulin glargine, insulin lispro, isosorbide mononitrate, levothyroxine, linagliptin, lisinopril, metFORMIN, metoprolol tartrate, montelukast, nitroGLYCERIN, omeprazole, oxybutynin, ranolazine, simethicone, and vitamin D3. Allergies: Januvia [sitagliptin] and Iodine    Pertinent items from the review of systems are discussed in the HPI; the remainder of the ROS was reviewed and is negative. Objective:     Vitals:    07/08/19 1547   BP: (!) 141/63   Pulse: 74   Resp: 16   Temp: 98.4 °F (36.9 °C)   TempSrc: Oral   Weight: 177 lb 3.2 oz (80.4 kg)   Height: 5' (1.524 m)     ANG from 3 weeks ago -- right 1.01, left 1.00 (arm systolics 206 & 919, right ankle 138 & 140, left ankle 68 & 98). Constitutional:  well-developed, well-nourished, overweight, chronically ill appearing, fatigued, NAD  Psychiatric:  oriented to person, place and time; mood and affect appropriate for the situation   Eyes:  pupils equal, round and reactive to light; sclerae anicteric, conjunctivae not pale  ENT: no thrush or oral ulcers; TMs visible, no effusion or erythema  Lungs: clear to auscultation B/L  Cardiovascular:  Left pedal pulses Dopplerable, foot warm, sluggish cap refill; mild left lower extremity edema  Lymphatic:  no inguinal or popliteal adenopathy, no angitis or current cellulitis  Neuro: no allodynia; loss of pedal protective sensation  Musculoskeletal:  no clubbing, cyanosis or petechiae; RLE and LLE with no gross effusions, joint misalignment or acute arthritis  Mary-ulcer skin: indurated, pink, gurmeet, warm and hyperkeratotic. Ulcer(s): centrally exposed metatarsal head, but still smooth, shiny, slightly off-white; periphery of the wound nearly all pale yellow fibronecrosis, some fibrin, biofilm, serous exudate. Photos also saved in electronic chart.     Today's Wound Measurements:  Wound 06/17/19 #1, left great toe

## 2019-07-09 NOTE — PROGRESS NOTES
albuterol, albuterol sulfate HFA, aspirin, atorvastatin, blood glucose test strips, buPROPion, budesonide-formoterol, clonazePAM, clopidogrel, collagenase, ferrous sulfate, fexofenadine, fluticasone, furosemide, gabapentin, insulin glargine, insulin lispro, isosorbide mononitrate, levothyroxine, linagliptin, lisinopril, metFORMIN, metoprolol tartrate, montelukast, nitroGLYCERIN, omeprazole, oxybutynin, ranolazine, simethicone, and vitamin D3. Allergies: Januvia [sitagliptin] and Iodine    Pertinent items from the review of systems are discussed in the HPI; the remainder of the ROS was reviewed and is negative. Objective:     BP (!) 141/63   Pulse 74   Temp 98.4 °F (36.9 °C) (Oral)   Resp 16   Ht 5' (1.524 m)   Wt 177 lb 3.2 oz (80.4 kg)   LMP  (LMP Unknown)   BMI 34.61 kg/m²   General Appearance: alert and oriented to person, place and time, well developed and well- nourished, in no acute distress  Skin: warm and dry, no rash or erythema  Head: normocephalic and atraumatic  Eyes: pupils equal, round, and reactive to light, extraocular eye movements intact, conjunctivae normal  ENT: tympanic membrane, external ear and ear canal normal bilaterally, nose without deformity, nasal mucosa and turbinates normal without polyps  Neck: supple and non-tender without mass, no thyromegaly or thyroid nodules, no cervical lymphadenopathy  Pulmonary/Chest: clear to auscultation bilaterally- no wheezes, rales or rhonchi, normal air movement, no respiratory distress  Cardiovascular: normal rate, regular rhythm, normal S1 and S2, no murmurs, rubs, clicks, or gallops, distal pulses intact, no carotid bruits  Abdomen: soft, non-tender, non-distended, normal bowel sounds, no masses or organomegaly. Dorsalis pedis pulse left palpable  Posterior tibial pulse left palpable  Dorsalis pedis pulse right palpable  Posterior tibial pulse right palpable  Protective sensation absent bilateral LE.        Ulcer on the left hallux amputation site with mild to moderate fibrotic tissue, minimal granulation tissue, mild serous drainage, no hyperkeratotic rim, no undermining, no tunneling, no purulence, no malodor, no eschar, minimal periwound maceration, mild periwound erythema, mild edema, no crepitus, no increase in skin temperature, ulcer probes to bone of the 1st MH which is easily visible in the wound bed. Bone appears healthy at this time. Today's ulcer measurements are in the wound documentation flowsheet.      Wound measurements:  Wound 06/17/19 #1, left great toe amp site, DFU crystal 3, onset 6/6/2019-Wound Length (cm): 3.2 cm    Wound 06/17/19 #1, left great toe amp site, DFU crystal 3, onset 6/6/2019-Wound Width (cm): 1.7 cm    Wound 06/17/19 #1, left great toe amp site, DFU crystal 3, onset 6/6/2019-Wound Depth (cm): 0.7 cm    LABS  Lab Results   Component Value Date    LABA1C 13.8 06/03/2019         Assessment:     Patient Active Problem List   Diagnosis Code    HLD (hyperlipidemia) E78.5    DM (diabetes mellitus), type 2, uncontrolled ([facility] E11.65    PVD [facility]peripheral vascular disease) [facility] I73.9    HTN (hypertension) I10    Hypothyroid E03.9    GERD (gastroesophageal reflux disease) K21.9    STEMI (ST elevation myocardial infarction) ([facility] I21.3    Morbid obesity with BMI of 45.0-49.9, adult ([facility]) E66.01, Z68.42    Localized osteoarthrosis, lower leg M17.10    Rotator cuff tendinitis M75.80    PFS (patellofemoral syndrome) M22.2X9    Sleep apnea G47.30    Disc displacement, lumbar M51.26    Neuropathy G62.9    Opiate analgesic contract exists Z02.89    Neck pain M54.2    Anemia D64.9    Insomnia G47.00    Pulmonary emphysema (HCC) J43.9    Transient synovitis, left ankle and foot M67.372    Insomnia with sleep apnea G47.00, G47.30    JORDI on CPAP G47.33, Z99.89    Chronic low back pain M54.5, G89.29    COPD, moderate ([facility]) J44.9    Unstable angina ([facility]) I20.0    Anxiety F41.9    Chronic diastolic congestive heart failure (HCC) I50.32    Coronary artery disease involving native coronary artery of native heart without angina pectoris I25.10    Ischemic cardiomyopathy I25.5    History of ST elevation myocardial infarction (STEMI) I25.2    Tobacco use Z72.0    Uncontrolled type 2 diabetes mellitus with hyperglycemia (HCC) E11.65    Toe infection L08.9    Local infection of skin and subcutaneous tissue L08.9    Cellulitis of foot L03.119       Assessment of today's active condition(s): diabetic foot ulcer left - S/P left hallux amputation Michelle Kins III now with flap failure), diabetes mellitus uncontrolled. Factors contributing to occurrence and/or persistence of the chronic ulcer include diabetes, poor glucose control and smoking. Sharp debridement is not indicated today, based upon the exam findings in the ulcer(s) above. Discharge plan:     Treatment in the wound care center today: Wound 06/17/19 #1, left great toe amp site, DFU crystal 3, onset 6/6/2019-Dressing/Treatment: Other (comment)(triad,dry dressing ). Home treatment: good handwashing before and after any dressing changes. Cleanse ulcer with saline or soap & water before dressing change. May use Vaseline (petrolatum), Aquaphor, Aveeno, CeraVe, Cetaphil, Eucerin, Lubriderm, etc for dry skin. Dressing type for home: Santyl and dry dressing daily. Written discharge instructions given to patient. Offload ulcer(s) as directed. Elevate leg(s) as directed. Follow up in 1 week. Patient to follow up with Dr. Adair Severin for reevaluation. Has not seen him in years. ANG 0.71 on the left. Needs to control glucose levels to prevent future complications. Needs to stop smoking to prevent future complications. Continue postop shoe. Dr. Sid Fernandes discussed HBO with patient. Await approval from [place].              Electronically signed by Sena Lira DPM on 7/8/2019 at 10:51 PM.

## 2019-07-15 NOTE — PLAN OF CARE
[facility] Summary     1. General --     Active wound etiologies:         Diabetic foot ulcer     PCP and pertinent consultants:          Pedro Montague MD         Other plans for overall health:            Heal surgical incision     [address]:            N/A     Wound Care Supplies:  Curt     Most recent [facility] lab results:               Lab Results   Component Value Date     LABA1C 13.8 2019                Lab Results   Component Value Date     LABALBU 3.9 2019                Lab Results   Component Value Date     CREATININE 0.7 2019                Lab Results   Component Value Date     HGB 11.8 (L) 2019         2. Circulatory status, if lower extremity ulcer --     Most recent ANG   18       Right ANG: 1.01 mmHg     Left ANG: 0.71 mmHg     Most recent arterial imaging:  N/a     Most recent arterial Rx:          N/a     Current management of edema:        Spandagrips       Most recent venous imaging:    N/a     3.         Debridement --     Debridement methods, past or present:  in [facility] 19     Pertinent surgical history for wound(s):  Amputation 6/3/19     4. Infection --      Recent culture results:            Blood Cultures 19     Recent imaging:          Xray of left foot 19     Recent antibiotic Rx:               Augmentin 19     5. Topical therapies --     Previous dressings on current wound(s):       Betadine and dry dressing     Current dressings being used:           Santyl     6. Offloading --     Pressure ulcer offloading:            Neuropathic ulcer offloadin. Adjunctive therapies --      Date           Wnd #        Location                      CTP Rx           HBOT              NPWT     6/3/19     Marilia marquez                                      Pt to the [facility] for follow up appointment. Pt oriented for HBOT. Pt waiting on call from Dr JULIEN [employer] office.

## 2019-07-15 NOTE — PROGRESS NOTES
 OAB (overactive bladder) N32.81    Allergic rhinitis J30.9    Chronic bronchitis (HCC) J42    Anxiety and depression F41.9, F32.9    Fibromyalgia M79.7       Assessment of today's active condition(s): diabetic foot ulcer left - S/P left hallux amputation Michelle Kins III now with flap failure), diabetes mellitus uncontrolled. Factors contributing to occurrence and/or persistence of the chronic ulcer include diabetes, poor glucose control and smoking. Sharp debridement is not indicated today, based upon the exam findings in the ulcer(s) above. Discharge plan:     Treatment in the wound care center today: Wound 06/17/19 #1, left great toe amp site, DFU crystal 3, onset 6/6/2019-Dressing/Treatment: Other (comment)(Triad, 4x4's, Rolled Osito). Home treatment: good handwashing before and after any dressing changes. Cleanse ulcer with saline or soap & water before dressing change. May use Vaseline (petrolatum), Aquaphor, Aveeno, CeraVe, Cetaphil, Eucerin, Lubriderm, etc for dry skin. Dressing type for home: Santyl and dry dressing daily. Written discharge instructions given to patient. Offload ulcer(s) as directed. Elevate leg(s) as directed. Follow up in 1 week. Patient to follow up with Dr. Adair Severin for reevaluation. Has not seen him in years. ANG 0.71 on the left. Awaiting appointment time for them. Understands potential need for future surgical intervention for the foot. Needs to start HBO. Needs to control glucose levels to prevent future complications. Needs to stop smoking to prevent future complications. Continue postop shoe. Did her orientation for HBO today but refused to dive due to pain in her foot and leg. Explained to her that the pain can be related to multiple factors including but not limited to diabetic peripheral neuropathy, PAD, surgery. I did give her Rx for percocet 5mg #28 one to two tabs PO q4-6 hours PRN pain.  Patient to notify her pain management in

## 2019-07-15 NOTE — PROGRESS NOTES
mouth twice daily. 1 Inhaler 3    linagliptin (TRADJENTA) 5 MG tablet TAKE ONE TABLET BY MOUTH DAILY 30 tablet 5    ranolazine (RANEXA) 500 MG extended release tablet Take 1 tablet by mouth twice daily. 180 tablet 3    albuterol (PROVENTIL) (2.5 MG/3ML) 0.083% nebulizer solution Take 3 mLs by nebulization every 6 hours as needed for Wheezing 120 each 5    nitroGLYCERIN (NITROSTAT) 0.4 MG SL tablet DISSOLVE ONE TABLET UNDER THE TONGUE AS NEEDED FOR CHEST PAIN EVERY 5 MINUTES UP TO 3 TIMES. IF NO RELIEF CALL 911. 25 tablet 3    insulin lispro (HUMALOG KWIKPEN) 100 UNIT/ML pen Inject 25 Units into the skin 3 times daily (before meals) (Patient taking differently: Inject into the skin 3 times daily (before meals) Indications: sliding scale ) 30 mL 1    lisinopril (PRINIVIL;ZESTRIL) 5 MG tablet Take 1 tablet by mouth daily 90 tablet 3    Cholecalciferol (VITAMIN D3) 1000 units TABS Take 1 tablet by mouth daily 30 tablet 5    omeprazole (PRILOSEC) 40 MG delayed release capsule Take 1 capsule by mouth daily 30 capsule 5    buPROPion (WELLBUTRIN XL) 300 MG extended release tablet TAKE ONE TABLET BY MOUTH EVERY MORNING 30 tablet 5    Fluocinonide 0.1 % CREA Apply to affected area twice daily as needed for dry skin. 240 g 0    montelukast (SINGULAIR) 10 MG tablet Take 1 tablet by mouth daily 30 tablet 3    VENTOLIN  (90 BASE) MCG/ACT inhaler INHALE TWO PUFFS BY MOUTH EVERY 6 HOURS AS NEEDED FOR WHEEZING 3 Inhaler 3    fexofenadine (WAL-FEX ALLERGY) 180 MG tablet 1 po qd, to replace claritin/loratidine. 30 tablet 5    simethicone (MYLICON) 80 MG chewable tablet Take 1 tablet by mouth 4 times daily as needed for Flatulence 60 tablet 1    aspirin (ASPIRIN CHILDRENS) 81 MG chewable tablet Take 1 tablet by mouth daily. 30 tablet 3     No current facility-administered medications on file prior to encounter.         LABS  HgBA1c:    Lab Results   Component Value Date    LABA1C 13.8 06/03/2019            Please

## 2019-07-15 NOTE — PLAN OF CARE
has a negative effect on treatment and may diminish the benefits you may receive. Smoking within 2 hours prior to your treatment poses additional risk and should be avoided completely. Drinking alcohol or using illicit drugs may also have a negative effect on your treatment and should be avoided. Drinking carbonated beverages such as soda pop within 1 hour of your treatment could cause pains in the stomach during the treatment. Caffeinated beverages or foods containing caffeine should be avoided before your treatment. Please let us know if we can assist you with seeking help to stop smoking, drinking or using recreational drugs. PROHIBITED ITEMS INFORMATION REVIEWED: yes   No wigs, hair spray, hair oils, hair ornaments, creams, lotions, make-up, after shave, perfumes, colognes, chap stick, lip balms, mustache waxes, petroleum products (e.g. Vaseline), baby oil, deodorant or ointments  No nail polish    No synthetic clothing  No nylon hose, underwear, panty hose or bra  No food, gum or candy  No jewelry  No smoking materials such as lighter, cigarettes or matches  No reading material  No hearing aids, non-fixed dentures  No Hard (non-gas permeable) contact lenses  Most dressings are approved to wear into the hyperbaric chamber. Please advise the hyperbaric staff of any new dressings applied to your wound to ensure the new dressings are compatible with hyperbaric oxygen treatments. No alcohol preps  No heat packs  No street clothes or shoes  No cell phones or other electronics  If it was not a part of you when you were born into this world, if it was not provided by the chamber , then it cannot go into the chamber with you.         Electronically signed by Romy Woodward RN on 7/15/2019 at 9:59 AM

## 2019-07-17 NOTE — PROGRESS NOTES
Adjunctive Rx, objective weekly progress and goals of Rx will periodically be updated, on Mondays. Plan     1. Hyperbaric Oxygen - Frantz Lopez tolerated the treatment well today without complications. Continue HBO treatment as outlined above. 2. Other - plan an in-chamber TCOM once she has her vascular evaluation and presumably angiography (she meets with Dr. Arlys Goodpasture next week). I was present on these premises and immediately available to furnish assistance & direction throughout the procedure.      -- Electronically signed by Lucinda Multani MD on 7/16/2019 at 9:19 PM

## 2019-07-17 NOTE — PROGRESS NOTES
1.2 mg into the skin daily             lisinopril (PRINIVIL;ZESTRIL) 5 MG tablet  Take 1 tablet by mouth daily             metFORMIN (GLUCOPHAGE) 1000 MG tablet  Take 1 tablet by mouth twice daily with meals. metoprolol tartrate (LOPRESSOR) 25 MG tablet  Take 1 tablet by mouth twice daily. montelukast (SINGULAIR) 10 MG tablet  Take 1 tablet by mouth daily             nitroGLYCERIN (NITROSTAT) 0.4 MG SL tablet  DISSOLVE ONE TABLET UNDER THE TONGUE AS NEEDED FOR CHEST PAIN EVERY 5 MINUTES UP TO 3 TIMES. IF NO RELIEF CALL 911.             omeprazole (PRILOSEC) 40 MG delayed release capsule  Take 1 capsule by mouth daily             oxybutynin (DITROPAN) 5 MG tablet  1 po q am and 2 po qhs             oxyCODONE-acetaminophen (PERCOCET) 5-325 MG per tablet  Take 1-2 tablets by mouth every 4 hours as needed for Pain for up to 7 days. oxymetazoline (AFRIN) 0.05 % nasal spray  1 spray by Nasal route 2 times daily             pregabalin (LYRICA) 150 MG capsule  Take 1 capsule by mouth 2 times daily for 30 days. . To replace gabapentin             ranolazine (RANEXA) 500 MG extended release tablet  Take 1 tablet by mouth twice daily. simethicone (MYLICON) 80 MG chewable tablet  Take 1 tablet by mouth 4 times daily as needed for Flatulence             SYMBICORT 160-4.5 MCG/ACT AERO  Inhale 2 puffs by mouth twice daily. VENTOLIN  (90 BASE) MCG/ACT inhaler  INHALE TWO PUFFS BY MOUTH EVERY 6 HOURS AS NEEDED FOR WHEEZING                   Medications marked \"taking\" at this time  Outpatient Medications Marked as Taking for the 7/17/19 encounter (Office Visit) with Hattie Dong MD   Medication Sig Dispense Refill    pregabalin (LYRICA) 150 MG capsule Take 1 capsule by mouth 2 times daily for 30 days.  . To replace gabapentin 60 capsule 3    aspirin 81 MG EC tablet Take 81 mg by mouth daily      oxymetazoline (AFRIN) 0.05 % nasal spray 1 spray by Nasal route 2 BP Readings from Last 3 Encounters:   07/19/19 (!) 143/85   07/17/19 (!) 134/52   07/16/19 105/64            Assessment/Plan:  1. Uncontrolled type 2 diabetes mellitus with hyperglycemia (HCC)    - pregabalin (LYRICA) 150 MG capsule; Take 1 capsule by mouth 2 times daily for 30 days. . To replace gabapentin  Dispense: 60 capsule; Refill: 3  - Appt with Dr Brigette Tinoco is a must. Pt agrees to resume victoza qd and basaglar 35 u qhs until appt. 2. Amputated great toe of left foot (HCC)    - pregabalin (LYRICA) 150 MG capsule; Take 1 capsule by mouth 2 times daily for 30 days. . To replace gabapentin  Dispense: 60 capsule; Refill: 3  - [place] DISCHARGE MEDS RECONCILED W/ CURRENT OUTPATIENT MED LIST    3. Phantom pain (HCC)    - pregabalin (LYRICA) 150 MG capsule; Take 1 capsule by mouth 2 times daily for 30 days. . To replace gabapentin  Dispense: 60 capsule; Refill: 3    4.  Tobacco use     - reviewed PVD, poor healing of surgical wound, cessation encouraged        Medical Decision Making: moderate complexity

## 2019-07-19 NOTE — PLAN OF CARE
Patient seen for HBOT treatment  2 / 30  today. Patient assessment WNL - AVSS, TEED 1(R) and 0(L)-Lungs CTA last cigarette @ 800 -strongly encouraged pt to quit - reports smoking about 6 cigarettes daily. FSBS 190. Increased pain 10/10- pt given spandigrip F to wear on BLE after discussing with Dr. Tyesha Pickard - pt reports pain decreased with legs elevated . Cleared for HBOT. Patient was compressed in HBO chamber to 2.5 CARLOS at 1.0 psi/min. X 90 min with 2 five min air breaks @30 min intervals. Patient is tolerating treatment well and is currently resting comfortably and watching television. HBO RN at chamber side, will continue to monitor.

## 2019-07-19 NOTE — PLAN OF CARE
Pt tolerated HBO well - decreased pain post treatment- slept very soundly in chamber during treatment - somewhat difficult to arouse at times . Pt reported catching up on sleep. Post HBO FSBS 62- asymptomatic - OJ and p-nut buttter crackers given to pt - repeat FSBS 65- still asymptomatic- notified Dr. Deisi Henson - pt OK to be dc'd today-pt has arranged transportation - pt given another OJ prior to leaving [address] [place] [address],[address].

## 2019-07-20 PROBLEM — G54.6 PHANTOM PAIN (HCC): Status: ACTIVE | Noted: 2019-01-01

## 2019-07-21 NOTE — PROGRESS NOTES
Ulcer: Not examined today in HBO, if applicable. Recent Labs     07/19/19  1038 07/19/19  1323 07/19/19  1351   POCGLU 190* 58* 72*       Assessment     Sumeet Serrano is a 63 y.o. female who presented to the [facility] today for hyperbaric oxygen treatment #2 of 30 for the diagnosis as stated above. Treatment given at 2.5 CARLOS for 90 minutes, with two 5-minute air breaks. Patient Active Problem List   Diagnosis Code    HLD (hyperlipidemia) E78.5    Uncontrolled type 2 diabetes mellitus with diabetic polyneuropathy, with long-term current use of insulin ([facility]) E11.42, Z79.4, E11.65    Type 2 diabetes mellitus with diabetic peripheral angiopathy without gangrene [facility] E11.51    HTN (hypertension) I10    Hypothyroid E03.9    Localized osteoarthrosis, lower leg M17.10    PFS (patellofemoral syndrome) M22.2X9    Disc displacement, lumbar M51.26    Pulmonary emphysema (HCC) J43.9    JORDI on CPAP G47.33, Z99.89    Chronic low back pain M54.5, G89.29    Coronary artery disease involving native coronary artery of native heart without angina pectoris I25.10    Tobacco use Z72.0    Astigmatism H52.209    Mild nonproliferative diabetic retinopathy (HCC) E11.3299    Reflux esophagitis K21.0    Class 1 obesity due to excess calories with serious comorbidity and body mass index (BMI) of 34.0 to 34.9 in adult E66.09, Z68.34    Diabetic ulcer of left foot associated with type 2 diabetes mellitus, with bone involvement without evidence of necrosis (HCC) E11.621, L97.526    Failed soft tissue flap after left hallux amputation T86.821    OAB (overactive bladder) N32.81    Allergic rhinitis J30.9    Chronic bronchitis (HCC) J42    Anxiety and depression F41.9, F32.9    Fibromyalgia M79.7    Phantom pain (HCC) G54.6       In my clinical judgement, ongoing HBO therapy is necessary at this time, given a threat to patient function, limb or life from the current condition.

## 2019-07-22 NOTE — PLAN OF CARE
[facility] Summary     1. General --     Active wound etiologies:         Diabetic foot ulcer     PCP and pertinent consultants:          Nabila Bob MD         Other plans for overall health:            Heal surgical incision     [address]:            N/A     Wound Care Supplies:  Curt     Most recent [facility] lab results:               Lab Results   Component Value Date     LABA1C 13.8 2019                Lab Results   Component Value Date     LABALBU 3.9 2019                Lab Results   Component Value Date     CREATININE 0.7 2019                Lab Results   Component Value Date     HGB 11.8 (L) 2019         2. Circulatory status, if lower extremity ulcer --     Most recent ANG   18       Right ANG: 1.01 mmHg     Left ANG: 0.71 mmHg     Most recent arterial imaging:  N/a     Most recent arterial Rx:          N/a     Current management of edema:        Spandagrips       Most recent venous imaging:    N/a     3.         Debridement --     Debridement methods, past or present:  in [facility] 19     Pertinent surgical history for wound(s):  Amputation 6/3/19     4. Infection --      Recent culture results:            Blood Cultures 19     Recent imaging:          Xray of left foot 19     Recent antibiotic Rx:               Augmentin 19     5. Topical therapies --     Previous dressings on current wound(s):       Betadine and dry dressing     Current dressings being used:           Santyl     6. Offloading --     Pressure ulcer offloading:            Neuropathic ulcer offloadin. Adjunctive therapies --      Date           Wnd #        Location                      CTP Rx           HBOT              NPWT     6/3/19     Marilia marquez                                      Pt to the [facility] for follow up appointment. Pt to continue with HBOT.   Pt has appointment with Dr Damir Sheehan office

## 2019-07-24 NOTE — PROGRESS NOTES
Code    HLD (hyperlipidemia) E78.5    Uncontrolled type 2 diabetes mellitus with diabetic polyneuropathy, with long-term current use of insulin (HCC) E11.42, Z79.4, E11.65    Type 2 diabetes mellitus with diabetic peripheral angiopathy without gangrene ([facility] E11.51    HTN (hypertension) I10    Hypothyroid E03.9    Localized osteoarthrosis, lower leg M17.10    PFS (patellofemoral syndrome) M22.2X9    Disc displacement, lumbar M51.26    Pulmonary emphysema (HCC) J43.9    JORDI on CPAP G47.33, Z99.89    Chronic low back pain M54.5, G89.29    Coronary artery disease involving native coronary artery of native heart without angina pectoris I25.10    Tobacco use Z72.0    Astigmatism H52.209    Mild nonproliferative diabetic retinopathy (HCC) E11.3299    Reflux esophagitis K21.0    Class 1 obesity due to excess calories with serious comorbidity and body mass index (BMI) of 34.0 to 34.9 in adult E66.09, Z68.34    Diabetic ulcer of left foot associated with type 2 diabetes mellitus, with bone involvement without evidence of necrosis (HCC) E11.621, L97.526    Failed soft tissue flap after left hallux amputation T86.821    OAB (overactive bladder) N32.81    Allergic rhinitis J30.9    Chronic bronchitis (HCC) J42    Anxiety and depression F41.9, F32.9    Fibromyalgia M79.7    Phantom pain (HCC) G54.6       In my clinical judgement, ongoing HBO therapy is necessary at this time, given a threat to patient function, limb or life from the current condition. Adjunctive Rx, objective weekly progress and goals of Rx will periodically be updated, on Mondays. Plan     1. Hyperbaric Oxygen - Rocio Lopez tolerated the treatment well today without complications. Continue HBO treatment as outlined above. 2. Other -     I was present on these premises and immediately available to furnish assistance & direction throughout the procedure.      -- Electronically signed by Jhonatan Ellsworth MD on 7/24/2019 at 12:54 PM

## 2019-07-24 NOTE — PLAN OF CARE
Patient seen for HBOT treatment 4 / 30    today. Patient was compressed in HBO chamber to 2.5 CARLOS at 1.5 psi/min. Patient is tolerating treatment well and is currently resting comfortably and watching television. HBO RN at chamber side, will continue to monitor.

## 2019-07-29 NOTE — PLAN OF CARE
[facility] Summary     1. General --     Active wound etiologies:         Diabetic foot ulcer     PCP and pertinent consultants:          Myron Medley MD         Other plans for overall health:            Heal surgical incision     [address]:            N/A     Wound Care Supplies:  Curt     Most recent [facility] lab results:               Lab Results   Component Value Date     LABA1C 13.8 2019                Lab Results   Component Value Date     LABALBU 3.9 2019                Lab Results   Component Value Date     CREATININE 0.7 2019                Lab Results   Component Value Date     HGB 11.8 (L) 2019         2. Circulatory status, if lower extremity ulcer --     Most recent ANG   18       Right ANG: 1.01 mmHg     Left ANG: 0.71 mmHg     Most recent arterial imaging:  N/a     Most recent arterial Rx:          N/a     Current management of edema:        Spandagrips       Most recent venous imaging:    N/a     3.         Debridement --     Debridement methods, past or present:  in [facility] 19     Pertinent surgical history for wound(s):  Amputation 6/3/19     4. Infection --      Recent culture results:            Blood Cultures 19     Recent imaging:          Xray of left foot 19     Recent antibiotic Rx:               Augmentin 19     5. Topical therapies --     Previous dressings on current wound(s):       Betadine and dry dressing     Current dressings being used:           Santyl     6. Offloading --     Pressure ulcer offloading:            Neuropathic ulcer offloadin. Adjunctive therapies --      Date           Wnd #        Location                      CTP Rx           HBOT              NPWT     6/3/19     Marilia marquez                                      Pt to the [facility] for follow up appointment. Pt to continue with HBOT. Pt missed appointment at Dr [employer] office.

## 2019-07-30 NOTE — PROGRESS NOTES
Center today for hyperbaric oxygen treatment #6 of 30 for the diagnosis as stated above. Treatment given at 2.5 CARLOS for 90 minutes, with two 5-minute air breaks. Patient Active Problem List   Diagnosis Code    HLD (hyperlipidemia) E78.5    Uncontrolled type 2 diabetes mellitus with diabetic polyneuropathy, with long-term current use of insulin (HCC) E11.42, Z79.4, E11.65    Type 2 diabetes mellitus with diabetic peripheral angiopathy without gangrene ([facility] E11.51    HTN (hypertension) I10    Hypothyroid E03.9    Localized osteoarthrosis, lower leg M17.10    PFS (patellofemoral syndrome) M22.2X9    Disc displacement, lumbar M51.26    Pulmonary emphysema ([facility]) J43.9    JORDI on CPAP G47.33, Z99.89    Chronic low back pain M54.5, G89.29    Coronary artery disease involving native coronary artery of native heart without angina pectoris I25.10    Tobacco use Z72.0    Astigmatism H52.209    Mild nonproliferative diabetic retinopathy (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) J42    Anxiety and depression F41.9, F32.9    Fibromyalgia M79.7    Phantom pain ([facility]) G54.6       In my clinical judgement, ongoing HBO therapy is necessary at this time, given a threat to patient function, limb or life from the current condition.     Unfortunately, her progress with some basic elements of care, and her wound healing progress, have not been good at this time:    - She missed her vascular surgery appointment last Tuesday, can not recall if she didn't feel well or simply woke up late,

## 2019-07-31 NOTE — PROGRESS NOTES
[address]  Hyperbaric Oxygen    Moncho Fitch     : 1960    DATE OF VISIT:  2019    Subjective     Moncho Fitch is a 63 y.o. female who  has a past medical history of Acute osteomyelitis of left hallux ([facility] (2019), Anemia (2015), Asthma, Cellulitis of foot (2019), CHF (congestive heart failure) ([facility] Claudication of left upper extremity due to atherosclerosis ([facility] (10/2007), Diabetic ulcer of left hallux, with necrosis of bone [facility] (2019), DVT (deep venous thrombosis) ([facility] Kidney stones, Morbid obesity with BMI of 45.0-49.9, adult ([facility] (2014), Neuroma of hand (2013), Open fracture of left hallux (2019), Pneumonia, Rotator cuff tendinitis (2014), STEMI (ST elevation myocardial infarction) ([facility] (14), and Urinary incontinence. She presents to the [facility] today for hyperbaric oxygen treatment of her Thuy Mar 3 DFU and failed soft tissue flap, the former of which is refractory to standard therapy for 30 days. Patient denies fever. Patient denies nausea, vomiting or diarrhea; no ear troubles and no other new complaints; no fears or anxiety regarding treatment today. Objective     Vitals:    19 1030 19 1245   BP: (!) 143/71 (!) 182/83  Comment: Pt asymptomatic,  notified. Pt did not take prescribed BP medication today. Pulse: 81 74   Resp: 16 16   Temp: 98.4 °F (36.9 °C) 97.9 °F (36.6 °C)   TempSrc: Oral Oral       General:  Alert, cooperative, no distress. Ears: External otic canals are within acceptable limits. Teed grade 0 on the right and 0 on the left pre-treatment. Teed grade 0 on the right and 0 on the left post-treatment. Lungs:  Clear to auscultation bilaterally. Ulcer: Not examined today in HBO, if applicable.       Recent Labs     19  1106 19  1326 19  1009 19  1246   POCGLU 145* 106* 258* 180*       Assessment     Ron Wagoner

## 2019-07-31 NOTE — PROGRESS NOTES
[address]  Hyperbaric Oxygen    Usha Candelario     : 1960    DATE OF VISIT:  2019    Lizeth Candelario is a 63 y.o. female who  has a past medical history of Acute osteomyelitis of left hallux ([facility] (2019), Anemia (2015), Asthma, Cellulitis of foot (2019), CHF (congestive heart failure) ([facility] Claudication of left upper extremity due to atherosclerosis ([facility] (10/2007), Diabetic ulcer of left hallux, with [facility] bone [facility] (2019), DVT (deep venous thrombosis) [facility] Kidney stones, Morbid obesity with BMI of 45.0-49.9, adult ([facility] (2014), Neuroma of hand (2013), Open fracture of left hallux (2019), Pneumonia, Rotator cuff tendinitis (2014), STEMI (ST elevation myocardial infarction) ([facility] (14), and Urinary incontinence. She presents to the [facility] today for hyperbaric oxygen treatment of her diabetic foot ulcer  , which is refractory to standard therapy for 30 days. Patient denies fever. Patient denies nausea, vomiting or diarrhea; no ear troubles and no other new complaints; no fears or anxiety regarding treatment today. Objective     There were no vitals filed for this visit. General:  Alert, cooperative, no distress. Ears: External otic canals are within acceptable limits. Teed grade 0 on the right and 0 on the left pre-treatment. Teed grade 0 on the right and 0 on the left post-treatment. Lungs:  Clear to auscultation bilaterally. Ulcer: Not examined today in HBO, if applicable. Recent Labs     19  1106 19  1326 19  1009 19  1246   POCGLU 145* 106* 258* 180*       Assessment     Radha Sommers is a 63 y.o. female who presented to the [facility] today for hyperbaric oxygen treatment #8 of 30 for the diagnosis as stated above.  Treatment given at 2.5 CARLOS for 90 minutes, with two 5-minute air

## 2019-08-03 NOTE — ED NOTES
Patient reports left great toe amputation Janna 3 2019. Reports 'unbearable' pain since, 10/10. Patient also reports rash present.       Antonella Miguel RN  08/03/19 1925

## 2019-08-04 NOTE — ED PROVIDER NOTES
Forced sexual activity: Not on file   Other Topics Concern    Not on file   Social History Narrative    Not on file     No current facility-administered medications for this encounter. Current Outpatient Medications   Medication Sig Dispense Refill    ketorolac (TORADOL) 10 MG tablet Take 1 tablet by mouth 3 times daily 12 tablet 0    methylPREDNISolone (MEDROL, DENTON,) 4 MG tablet By mouth. 1 kit 0    blood glucose test strips (FREESTYLE LITE) strip TEST THREE TIMES A  strip 2    lisinopril (PRINIVIL;ZESTRIL) 5 MG tablet Take 1 tablet by mouth daily 90 tablet 2    SYMBICORT 160-4.5 MCG/ACT AERO Inhale 2 puffs by mouth twice daily. 1 Inhaler 3    atorvastatin (LIPITOR) 40 MG tablet Take 1 tablet by mouth daily. 30 tablet 5    furosemide (LASIX) 20 MG tablet Take 1 tablet by mouth twice daily. 60 tablet 5    ferrous sulfate 325 (65 Fe) MG EC tablet Take 1 tablet by mouth daily (with breakfast) 30 tablet 5    levothyroxine (SYNTHROID) 50 MCG tablet Take 1 tablet by mouth daily. 30 tablet 5    cyclobenzaprine (FLEXERIL) 10 MG tablet Take 10 mg by mouth every 8 hours      clonazePAM (KLONOPIN) 0.5 MG tablet 1 po tid prn anxiety 30 tablet 0    pregabalin (LYRICA) 150 MG capsule Take 1 capsule by mouth 2 times daily for 30 days. . To replace gabapentin 60 capsule 3    aspirin 81 MG EC tablet Take 81 mg by mouth daily      oxymetazoline (AFRIN) 0.05 % nasal spray 1 spray by Nasal route 2 times daily      Liraglutide (VICTOZA) 18 MG/3ML SOPN SC injection Inject 1.2 mg into the skin daily      fluticasone (FLONASE) 50 MCG/ACT nasal spray 2 sprays by Nasal route daily 16 g 4    oxybutynin (DITROPAN) 5 MG tablet 1 po q am and 2 po qhs 90 tablet 4    gabapentin (NEURONTIN) 300 MG capsule Take 3 capsules by mouth 3 times daily. (Patient taking differently: 300 mg 3 times daily.  Take 3 capsules by mouth 3 times daily.) 270 capsule 0    insulin glargine (BASAGLAR KWIKPEN) 100 UNIT/ML injection pen

## 2019-08-05 PROBLEM — T86.821 FAILED SKIN GRAFT: Status: RESOLVED | Noted: 2019-01-01 | Resolved: 2019-01-01

## 2019-08-05 NOTE — PLAN OF CARE
[address] Summary     1. General --     Active wound etiologies:         Diabetic foot ulcer     PCP and pertinent consultants:          Francois Puente MD         Other plans for overall health:            Heal surgical incision     [address]:            N/A     Wound Care Supplies:  Curt     Most recent [facility] lab results:               Lab Results   Component Value Date     LABA1C 13.8 2019                Lab Results   Component Value Date     LABALBU 3.9 2019                Lab Results   Component Value Date     CREATININE 0.7 2019                Lab Results   Component Value Date     HGB 11.8 (L) 2019         2. Circulatory status, if lower extremity ulcer --     Most recent ANG   18       Right ANG: 1.01 mmHg     Left ANG: 0.71 mmHg     Most recent arterial imaging:  N/a     Most recent arterial Rx:          N/a     Current management of edema:        Spandagrips       Most recent venous imaging:    N/a     3.         Debridement --     Debridement methods, past or present:  in [facility] 19     Pertinent surgical history for wound(s):  Amputation 6/3/19     4. Infection --      Recent culture results:            Blood Cultures 19     Recent imaging:          Xray of left foot 19     Recent antibiotic Rx:               Augmentin 19     5. Topical therapies --     Previous dressings on current wound(s):       Betadine and dry dressing     Current dressings being used:           Santyl     6. Offloading --     Pressure ulcer offloading:            Neuropathic ulcer offloadin. Adjunctive therapies --      Date           Wnd #        Location                      CTP Rx           HBOT              NPWT     6/3/19     Marilia marquez                                      Pt to the [facility] for follow up appointment.   Pt talked with patient about HBOT and pt is going to stop HBOT at this

## 2019-08-11 NOTE — PROGRESS NOTES
maceration, mild periwound erythema, mild edema, no crepitus, no increase in skin temperature, ulcer probes to bone of the 1st MH which is easily visible in the wound bed. Bone appears healthy at this time. Today's ulcer measurements are in the wound documentation flowsheet.      Wound measurements:  Wound 06/17/19 #1, left great toe amp site, DFU crystal 3, onset 6/6/2019-Wound Length (cm): 3.3 cm    Wound 06/17/19 #1, left great toe amp site, DFU crystal 3, onset 6/6/2019-Wound Width (cm): 1.9 cm    Wound 06/17/19 #1, left great toe amp site, DFU crystal 3, onset 6/6/2019-Wound Depth (cm): 0.5 cm    LABS  Lab Results   Component Value Date    LABA1C 13.8 06/03/2019         Assessment:     Patient Active Problem List   Diagnosis Code    HLD (hyperlipidemia) E78.5    Uncontrolled type 2 diabetes mellitus with diabetic polyneuropathy, with long-term current use of insulin ([facility]) E11.42, Z79.4, E11.65    Type 2 diabetes mellitus with diabetic peripheral angiopathy without gangrene ([facility] E11.51    HTN (hypertension) I10    Hypothyroid E03.9    Localized osteoarthrosis, lower leg M17.10    PFS (patellofemoral syndrome) M22.2X9    Disc displacement, lumbar M51.26    Pulmonary emphysema ([facility]) J43.9    JORDI on CPAP G47.33, Z99.89    Chronic low back pain M54.5, G89.29    Coronary artery disease involving native coronary artery of native heart without angina pectoris I25.10    Tobacco use Z72.0    Astigmatism H52.209    Mild nonproliferative diabetic retinopathy ([facility] E11.3299    Reflux esophagitis K21.0    Class 1 obesity due to excess calories with serious comorbidity and body mass index (BMI) of 34.0 to 34.9 in adult E66.09, Z68.34    Diabetic ulcer of left foot associated with type 2 diabetes mellitus, with bone involvement without evidence of necrosis ([facility]) E11.621, L97.526    OAB (overactive bladder) N32.81    Allergic rhinitis J30.9    Chronic bronchitis ([facility]) J42    Anxiety and depression F41.9, F32.9    Fibromyalgia M79.7    Phantom pain (HCC) G54.6       Assessment of today's active condition(s): diabetic foot ulcer left - S/P left hallux amputation Brynda Friday III now with flap failure), diabetes mellitus uncontrolled. Factors contributing to occurrence and/or persistence of the chronic ulcer include diabetes, poor glucose control and smoking. Sharp debridement is not indicated today, based upon the exam findings in the ulcer(s) above. Discharge plan:     Treatment in the wound care center today: Wound 06/17/19 #1, left great toe amp site, DFU crystal 3, onset 6/6/2019-Dressing/Treatment: Other (comment)(triad,dry dressing ). Home treatment: good handwashing before and after any dressing changes. Cleanse ulcer with saline or soap & water before dressing change. May use Vaseline (petrolatum), Aquaphor, Aveeno, CeraVe, Cetaphil, Eucerin, Lubriderm, etc for dry skin. Dressing type for home: Santyl and dry dressing daily. Written discharge instructions given to patient. Offload ulcer(s) as directed. Elevate leg(s) as directed. Follow up in 1 week. Follow up with Dr. Adrianne Sood. Needs to control glucose levels to prevent future complications. Needs to stop smoking to prevent future complications. Continue postop shoe. Dr. Deisi Henson stopped her HBO. Discussed potential need for further surgical intervention if wound does not continue to improve or signs of osteomyelitis are found. Seen in conjunction with Dr. Deisi Henson.            Electronically signed by Saulo Tsang DPM on 8/11/2019 at 1:29 PM.

## 2019-08-12 NOTE — PLAN OF CARE
[facility] Summary     1. General --     Active wound etiologies:         Diabetic foot ulcer     PCP and pertinent consultants:          Ryan Gonzalez MD         Other plans for overall health:            Heal surgical incision     [address]:            N/A     Wound Care Supplies:  Curt     Most recent [address] lab results:               Lab Results   Component Value Date     LABA1C 13.8 2019                Lab Results   Component Value Date     LABALBU 3.9 2019                Lab Results   Component Value Date     CREATININE 0.7 2019                Lab Results   Component Value Date     HGB 11.8 (L) 2019         2. Circulatory status, if lower extremity ulcer --     Most recent ANG   18       Right ANG: 1.01 mmHg     Left ANG: 0.71 mmHg     Most recent arterial imaging:  N/a     Most recent arterial Rx:          N/a     Current management of edema:        Spandagrips       Most recent venous imaging:    N/a     3.         Debridement --     Debridement methods, past or present:  in [facility] 19     Pertinent surgical history for wound(s):  Amputation 6/3/19     4. Infection --      Recent culture results:            Blood Cultures 19     Recent imaging:          Xray of left foot 19     Recent antibiotic Rx:               Augmentin 19     5. Topical therapies --     Previous dressings on current wound(s):       Betadine and dry dressing     Current dressings being used:           Santyl     6. Offloading --     Pressure ulcer offloading:            Neuropathic ulcer offloadin. Adjunctive therapies --      Date           Wnd #        Location                      CTP Rx           HBOT              NPWT     6/3/19     Marilia marquez                                      Pt to the [facility] for follow up appointment. Wound measuring smaller.    Pt has appointment in Dr [facility] office on  8/15/19 @ 9:00 am.  Pt aware of date and time of surgery. Patient to apply santyl and dry dressing to wound. Pt c/o increased pain in ankle and lower leg. Dr Jairo Marks discussed with patient the importance of seeing vascular. Pt to follow up in the [facility] in 1 week. Discharge instructions reviewed with patient, all questions answered, copy given to patient. Dressings were applied to all wounds per M.D. Instructions at this visit.

## 2019-08-12 NOTE — TELEPHONE ENCOUNTER
Patient is requesting a refill on her Freestyle test strips. Please send to [facility] [place]. [employer].

## 2019-08-13 PROBLEM — M86.9 OSTEOMYELITIS (HCC): Status: ACTIVE | Noted: 2019-01-01

## 2019-08-13 NOTE — PROGRESS NOTES
Nutrition Assessment (Low Risk)    Type and Reason for Visit: Positive Nutrition Screen, Initial(wounds )    Nutrition Recommendations: Carb controlled diet. Encourage compliance      Nutrition Assessment:  Pt admitted with DM ulcer to great toe. Hx of uncontrolled DM2. Last Hgb A1c 13.8%. Discussed carb controlled diet. Pt reports good knowledge of this. RD emphasized benefits of dietary adherence and glucose control to promote wound healing. Pt verbalized understanding. Declined any further diet education. She is currently ordered on carb controlled diet and reporting no concerns. Malnutrition Assessment:  · Malnutrition Status: No malnutrition    Nutrition Risk Level   Risk Level: Low    Nutrition Diagnosis:   · Problem: Altered nutrition-related lab values  · Etiology: Endocrine dysfunction    Signs and symptoms: Lab values(A1c 13.8% )    Nutrition Intervention:  Food and/or Delivery: Continue current diet  Nutrition Education/Counseling/Coordination of Care:  Education Initiated      Electronically signed by Haseeb Corona.  Zander Samano RD, LD on 8/13/19 at 1:23 PM    Contact Number: [phone]

## 2019-08-13 NOTE — H&P
Hospital Medicine History & Physical      PCP: Myron Medley MD    Date of Admission: 8/13/2019    Date of Service: Pt seen/examined on 8/13/2019 and Admitted to Inpatient with expected LOS greater than two midnights due to medical therapy. Chief Complaint:  Left toe pain    History Of Present Illness:      63 y.o. female, with PMH of diabetic ulcers, DM, obesity, HTN, HLD, tobacco use and COPD, who presented to [facility] with left toe pain. History obtained from the patient and review of EMR. The patient stated she is a diabetic and has been dealing with diabetic foot ulcers for \"quite some time\". She stated on June 3rd she had her left great toe amputated by Dr. MARIO Adame from podiatry. The patient stated she has been following up with wound care, but over the last few weeks she has had \"excruciating\" pain in her left toe/foot. The patient stated today the pain was to the point it was unbearable so she went to the ED for further evaluation. In the ED an xray was done of her left foot that revealed possible osteomyelitis of left toe. She was started on vancomycin and piperacillin and will be admitted for further evaluation. The patient denied any other associated symptoms as well as any aggravating and/or alleviating factors. At the time of this assessment, the patient was resting comfortably in bed. He currently denies any chest pain, back pain, abdominal pain, shortness of breath, numbness, tingling, N/V/C/D, fever and/or chills.      Past Medical History:          Diagnosis Date    Acute osteomyelitis of left hallux ([facility] 06/03/2019    Anemia 6/2/2015    Asthma     Cellulitis of foot 06/2019    CHF (congestive heart failure) ([facility])     Claudication of left upper extremity due to atherosclerosis ([facility] 10/2007    Diabetic ulcer of left hallux, with necrosis of bone ([facility] 06/03/2019    DVT (deep venous thrombosis) ([facility])     Kidney stones     Morbid obesity with BMI of 45.0-49.9, adult ([facility] noted in the HPI. All other systems reviewed and negative. PHYSICAL EXAM PERFORMED:    BP (!) 141/68   Pulse 77   Temp 98.7 °F (37.1 °C) (Oral)   Resp 18   Ht 5' (1.524 m)   Wt 166 lb (75.3 kg)   LMP  (LMP Unknown)   SpO2 93%   BMI 32.42 kg/m²     General appearance:  Pleasant, obese female in no apparent distress, appears stated age and cooperative. HEENT: . Pupils equal, round, and reactive to light. Extra ocular muscles intact. Conjunctivae/corneas clear. Neck: Supple, with full range of motion. No jugular venous distention. Trachea midline. Respiratory:  Normal respiratory effort. Clear to auscultation, bilaterally without Rales/Wheezes/Rhonchi. Cardiovascular:  Regular rate and rhythm with normal S1/S2 without murmurs, rubs or gallops. Abdomen: Soft, obese, round, non-tender, non-distended with normal bowel sounds. Musculoskeletal:  No clubbing, cyanosis or edema bilaterally. Full range of motion without deformity. Skin: Skin color, texture, turgor normal.  Left great toe with bandage  Neurologic:  Neurovascularly intact.  Cranial nerves: II-XII intact, grossly non-focal.  Psychiatric:  Alert and oriented, thought content appropriate, normal insight  Capillary Refill: Brisk,< 3 seconds   Peripheral Pulses: +2 palpable, equal bilaterally     Labs:     Recent Labs     08/13/19 0213   WBC 12.6*   HGB 12.7   HCT 37.4        Recent Labs     08/13/19 0213   *   K 4.4   CL 92*   CO2 25   BUN 18   CREATININE 0.9   CALCIUM 9.4     Recent Labs     08/13/19 0213   AST 6*   ALT 8*   BILITOT 0.3   ALKPHOS 88     Urinalysis:      Lab Results   Component Value Date    NITRU Negative 06/03/2019    WBCUA 20-50 06/03/2019    BACTERIA Rare 06/03/2019    RBCUA 0-2 06/03/2019    BLOODU Negative 06/03/2019    SPECGRAV <=1.005 06/03/2019    GLUCOSEU >=1000 06/03/2019    GLUCOSEU 100 05/26/2012     Radiology:     CXR: I have reviewed the CXR with the following interpretation: N/A  EKG:  I have

## 2019-08-13 NOTE — DISCHARGE INSTR - COC
IM (Fluzone 3 yrs and older or Afluria 5 yrs and older) 09/13/2017    Influenza, Lena Hurst, 3 yrs and older, IM, PF (Fluzone 3 yrs and older or Afluria 5 yrs and older) 11/01/2018    Influenza, Triv, 3 Years and older, IM (Afluria (5 yrs and older) 11/20/2012    Pneumococcal Polysaccharide (Vjtrvopha30) 11/11/2006, 11/28/2009, 11/10/2014    Rabies 12/04/2015, 12/08/2015, 12/11/2015, 12/18/2015, 01/04/2016    Rabies Immune Globulin 12/04/2015    Tdap (Boostrix, Adacel) 07/24/2012, 05/06/2019    Zoster Live (Zostavax) 06/20/2014       Active Problems:  Patient Active Problem List   Diagnosis Code    HLD (hyperlipidemia) E78.5    Uncontrolled type 2 diabetes mellitus with diabetic polyneuropathy, with long-term current use of insulin ([facility]) E11.42, Z79.4, E11.65    Type 2 diabetes mellitus with diabetic peripheral angiopathy without gangrene ([facility] E11.51    HTN (hypertension) I10    Hypothyroid E03.9    Localized osteoarthrosis, lower leg M17.10    PFS (patellofemoral syndrome) M22.2X9    Disc displacement, lumbar M51.26    Pulmonary emphysema (HCC) J43.9    JORDI on CPAP G47.33, Z99.89    Chronic low back pain M54.5, G89.29    Coronary artery disease involving native coronary artery of native heart without angina pectoris I25.10    Tobacco use Z72.0    Astigmatism H52.209    Mild nonproliferative diabetic retinopathy (HCC) E11.3299    Reflux esophagitis K21.0    Class 1 obesity due to excess calories with serious comorbidity and body mass index (BMI) of 34.0 to 34.9 in adult E66.09, Z68.34    Diabetic ulcer of left foot associated with type 2 diabetes mellitus, with bone involvement without evidence of necrosis ([facility]) E11.621, L97.526    OAB (overactive bladder) N32.81    Allergic rhinitis J30.9    Chronic bronchitis (HCC) J42    Anxiety and depression F41.9, F32.9    Fibromyalgia M79.7    Phantom pain ([facility]) G54.6    Osteomyelitis (HCC) M86.9       Isolation/Infection:   Isolation          No 3:10 PM   Odor None 2019  3:10 PM   Margins Attached edges 7/15/2019 10:50 AM   Mary-wound Assessment JYOTSNA 2019  8:45 AM   Number of days: 56        Elimination:  Continence:   · Bowel:  {YES / ZN:30897}  · Bladder: {YES / NS:78008}  Urinary Catheter: {Urinary Catheter:255303697}   Colostomy/Ileostomy/Ileal Conduit: {YES / GS:34361}       Date of Last BM: unknown    No intake or output data in the 24 hours ending 19 1027  No intake/output data recorded.     Safety Concerns:     [employer] Safety Concerns:629862506}    Impairments/Disabilities:      [employer] Impairments/Disabilities:666147928}    Nutrition Therapy:  Current Nutrition Therapy:   [employer] Diet List:840747055}    Routes of Feeding: {CHP DME Other Feedings:278299199}  Liquids: {Slp liquid thickness:14110}  Daily Fluid Restriction: {CHP DME Yes amt example:902775473}  Last Modified Barium Swallow with Video (Video Swallowing Test): {Done Not Done ZMNC:854846700}    Treatments at the Time of Hospital Discharge:   Respiratory Treatments: ***  Oxygen Therapy:  {Therapy; copd oxygen:43906}  Ventilator:    {MH CC Vent RKCM:222247850}    Rehab Therapies: {THERAPEUTIC INTERVENTION:3232592633}  Weight Bearing Status/Restrictions: [employer]  Weight Bearin}  Other Medical Equipment (for information only, NOT a DME order):  {EQUIPMENT:498528739}  Other Treatments: ***    Patient's personal belongings (please select all that are sent with patient):  {CHP DME Belongings:000874514}    RN SIGNATURE:  Electronically signed by Ash Flores RN on 19 at 3:45 PM    CASE MANAGEMENT/SOCIAL WORK SECTION    Inpatient Status Date: ***    Readmission Risk Assessment Score:  Readmission Risk              Risk of Unplanned Readmission:        23           Discharging to Facility/ Agency   · Name: [facility]   · Address:  · Phone: [phone]  · Fax: [phone]    Dialysis Facility (if applicable)   · Name:  · Address:  · Dialysis Schedule:  · Phone:  · Fax:    / signature: Electronically signed by Khang Overton RN on 8/23/19 at 10:32 AM    PHYSICIAN SECTION    Prognosis: Good    Condition at Discharge: Stable    Rehab Potential (if transferring to Rehab): Good    Recommended Follow-up, Labs or Other Treatments After Discharge:    Home Care  PT/OT  Wound Care  Follow-up with Podiatry         Physician Certification: I certify the above information and transfer of Rian Lara  is necessary for the continuing treatment of the diagnosis listed and that she requires [place] for greater 30 days.      Update Admission H&P: No change in H&P    PHYSICIAN SIGNATURE:  Electronically signed by Evelyn Moreno MD on 8/22/19 at 12:42 PM

## 2019-08-13 NOTE — ED PROVIDER NOTES
WITH IMPLANT Bilateral 2015     SECTION      CORONARY ANGIOPLASTY WITH STENT PLACEMENT  14    DILATION AND CURETTAGE OF UTERUS      FOOT AMPUTATION Left 6/3/2019    LEFT HALLUX AMPUTATION WITH GRAFT APPLICATION performed by Katy Renteria DPM at [address] LITHOTRIPSY      VASCULAR SURGERY Left 2005    atherectomy of SFA and popliteal artery    VASCULAR SURGERY Left 10/2007    subclavian angiopalsty and stent     Family History   Problem Relation Age of Onset    Other Mother          of unknown lung issue at 61    Diabetes Mother     Diabetes Brother     Heart Disease Maternal Grandfather     Cancer Maternal Grandmother     Asthma Grandchild      Social History     Socioeconomic History    Marital status:      Spouse name: Not on file    Number of children: Not on file    Years of education: Not on file    Highest education level: Not on file   Occupational History    Not on file   Social Needs    Financial resource strain: Not on file    Food insecurity:     Worry: Not on file     Inability: Not on file    Transportation needs:     Medical: Not on file     Non-medical: Not on file   Tobacco Use    Smoking status: Current Every Day Smoker     Packs/day: 0.25     Years: 40.00     Pack years: 10.00     Types: Cigarettes    Smokeless tobacco: Former User    Tobacco comment: .5 ppd   Substance and Sexual Activity    Alcohol use: No     Alcohol/week: 0.0 standard drinks    Drug use: No    Sexual activity: Not Currently   Lifestyle    Physical activity:     Days per week: Not on file     Minutes per session: Not on file    Stress: Not on file   Relationships    Social connections:     Talks on phone: Not on file     Gets together: Not on file     Attends Scientologist service: Not on file     Active member of club or organization: Not on file     Attends meetings of clubs or organizations: Not on file     Relationship status: Not on file    Intimate partner spray 2 sprays by Nasal route daily 16 g 4    oxybutynin (DITROPAN) 5 MG tablet 1 po q am and 2 po qhs 90 tablet 4    gabapentin (NEURONTIN) 300 MG capsule Take 3 capsules by mouth 3 times daily. (Patient taking differently: 300 mg 3 times daily. Take 3 capsules by mouth 3 times daily.) 270 capsule 0    insulin glargine (BASAGLAR KWIKPEN) 100 UNIT/ML injection pen Inject 45 Units into the skin 2 times daily (Patient taking differently: Inject into the skin 2 times daily Indications: sliding scale ) 30 mL 0    isosorbide mononitrate (IMDUR) 30 MG extended release tablet Take 1 tablet by mouth daily. 30 tablet 5    metFORMIN (GLUCOPHAGE) 1000 MG tablet Take 1 tablet by mouth twice daily with meals. 60 tablet 5    clopidogrel (PLAVIX) 75 MG tablet Take 1 tablet by mouth daily. 30 tablet 5    metoprolol tartrate (LOPRESSOR) 25 MG tablet Take 1 tablet by mouth twice daily. 60 tablet 5    linagliptin (TRADJENTA) 5 MG tablet TAKE ONE TABLET BY MOUTH DAILY 30 tablet 5    ranolazine (RANEXA) 500 MG extended release tablet Take 1 tablet by mouth twice daily. 180 tablet 3    albuterol (PROVENTIL) (2.5 MG/3ML) 0.083% nebulizer solution Take 3 mLs by nebulization every 6 hours as needed for Wheezing 120 each 5    nitroGLYCERIN (NITROSTAT) 0.4 MG SL tablet DISSOLVE ONE TABLET UNDER THE TONGUE AS NEEDED FOR CHEST PAIN EVERY 5 MINUTES UP TO 3 TIMES.  IF NO RELIEF CALL 911. 25 tablet 3    insulin lispro (HUMALOG KWIKPEN) 100 UNIT/ML pen Inject 25 Units into the skin 3 times daily (before meals) (Patient taking differently: Inject into the skin 3 times daily (before meals) Indications: sliding scale ) 30 mL 1    Cholecalciferol (VITAMIN D3) 1000 units TABS Take 1 tablet by mouth daily 30 tablet 5    omeprazole (PRILOSEC) 40 MG delayed release capsule Take 1 capsule by mouth daily 30 capsule 5    buPROPion (WELLBUTRIN XL) 300 MG extended release tablet TAKE ONE TABLET BY MOUTH EVERY MORNING 30 tablet 5    Fluocinonide 0.1 % CREA Apply to affected area twice daily as needed for dry skin. 240 g 0    montelukast (SINGULAIR) 10 MG tablet Take 1 tablet by mouth daily 30 tablet 3    VENTOLIN  (90 BASE) MCG/ACT inhaler INHALE TWO PUFFS BY MOUTH EVERY 6 HOURS AS NEEDED FOR WHEEZING 3 Inhaler 3    fexofenadine (WAL-FEX ALLERGY) 180 MG tablet 1 po qd, to replace claritin/loratidine. 30 tablet 5    simethicone (MYLICON) 80 MG chewable tablet Take 1 tablet by mouth 4 times daily as needed for Flatulence 60 tablet 1     Allergies   Allergen Reactions    Flexeril [Cyclobenzaprine] Itching    Januvia [Sitagliptin] Other (See Comments)     Causes severe back pain and cramps    Iodine Rash       REVIEW OF SYSTEMS  10 systems reviewed, pertinent positives per HPI otherwise noted to be negative. PHYSICAL EXAM  BP (!) 141/68   Pulse 77   Temp 98.7 °F (37.1 °C) (Oral)   Resp 18   Ht 5' (1.524 m)   Wt 166 lb (75.3 kg)   LMP  (LMP Unknown)   SpO2 93%   BMI 32.42 kg/m²   GENERAL APPEARANCE: Awake and alert. Cooperative. Mild distress secondary to pain, nontoxic. HEAD: Normocephalic. Atraumatic. EYES: PERRL. EOM's grossly intact. ENT: Mucous membranes are moist.   NECK: Supple. HEART: RRR. CHEST/LUNGS: Chest atraumatic, nontender, respirations unlabored. CTAB. Good air exchange. Speaking comfortably in full sentences. BACK: No midline spinal tenderness or step-off. ABDOMEN: Soft. Non-distended. Non-tender. No guarding or rebound. Normal bowel sounds. EXTREMITIES: No peripheral edema. Left hallux amputation with noted surrounding erythema without any active bleeding or drainage at the amputation site. There does appear to be some wound care cream over the amputation site. RECTAL/: Deferred  SKIN: Warm and dry. No acute rashes. NEUROLOGICAL: Alert and oriented. CN 2-12 intact, No gross facial drooping. Strength 5/5, sensation intact. LABS  I have reviewed all labs for this visit.    Results for orders placed

## 2019-08-13 NOTE — ED NOTES
Bedside report given to MALGORZATA Polk. Pt transferred to the floor via wheelchair by RN in stable condition. Pt transferred with all belongings.       Luisa Sellers RN  08/13/19 0198

## 2019-08-13 NOTE — CONSULTS
Podiatry Consult Note    History of Present Illness: The patient is pleasant 63year old woman with uncontrolled DM2, neuropathy, PVD and recent history of non-healing left hallux amputation site. She had a left hallux amputation on 2019 with Dr. Mikael Ortega. Since then, her surgical site has opened and there is now bone exposed. She reports she was supposed to see Dr. Lola Schlatter with Vascular Surgery tomorrow. However, her pain became so severe, she came to ED and was admitted. At the time of encounter, she denied n/v/f/c and SOB.      Past Medical History:        Diagnosis Date    Acute osteomyelitis of left hallux ([address] 2019    Anemia 2015    Asthma     Cellulitis of foot 2019    [facility]est[facility])     Claudication of left upper extremity due to atherosclerosis ([address] 10/2007    Diabetic ulcer of left hallux, with necrosis of bone ([address] 2019    DVT (deep venous thrombosis) ([address]     Kidney stones     Morbid obesity with BMI of 45.0-49.9, adult ([address] 2014    Neuroma of hand 2013    Open fracture of left hallux 2019    Pneumonia     Rotator cuff tendinitis 2014    STEMI (ST elevation myocardial infarction) ([facility] 14    Dr. Danna Jean Urinary incontinence        Past Surgical History:        Procedure Laterality Date    APPENDECTOMY      CATARACT REMOVAL WITH IMPLANT Bilateral 2015     SECTION      CORONARY ANGIOPLASTY WITH STENT PLACEMENT  14    DILATION AND CURETTAGE OF UTERUS      FOOT AMPUTATION Left 6/3/2019    LEFT HALLUX AMPUTATION WITH GRAFT APPLICATION performed by Mikael Ortega DPM at [address] [place] [address] VASCULAR SURGERY Left 2005    atherectomy of SFA and popliteal artery    VASCULAR SURGERY Left 10/2007    subclavian angiopalsty and stent       Current Medications:    Current Facility-Administered Medications: HYDROcodone-acetaminophen (NORCO) 7.5-325 MG per tablet 1 tablet, 1 Flexeril [cyclobenzaprine]; Januvia [sitagliptin]; and Iodine    Social History:    Social History     Socioeconomic History    Marital status:      Spouse name: Not on file    Number of children: Not on file    Years of education: Not on file    Highest education level: Not on file   Occupational History    Not on file   Social Needs    Financial resource strain: Not on file    Food insecurity:     Worry: Not on file     Inability: Not on file    Transportation needs:     Medical: Not on file     Non-medical: Not on file   Tobacco Use    Smoking status: Current Every Day Smoker     Packs/day: 0.25     Years: 40.00     Pack years: 10.00     Types: Cigarettes    Smokeless tobacco: Former User    Tobacco comment: .5 ppd   Substance and Sexual Activity    Alcohol use: No     Alcohol/week: 0.0 standard drinks    Drug use: No    Sexual activity: Not Currently   Lifestyle    Physical activity:     Days per week: Not on file     Minutes per session: Not on file    Stress: Not on file   Relationships    Social connections:     Talks on phone: Not on file     Gets together: Not on file     Attends Yazdanism service: Not on file     Active member of club or organization: Not on file     Attends meetings of clubs or organizations: Not on file     Relationship status: Not on file    Intimate partner violence:     Fear of current or ex partner: Not on file     Emotionally abused: Not on file     Physically abused: Not on file     Forced sexual activity: Not on file   Other Topics Concern    Not on file   Social History Narrative    Not on file       Family History:   Breast Cancer-related family history is not on file.     Review of Systems:    CONSTITUTIONAL:  negative  HEENT:  negative  RESPIRATORY:  negative  CARDIOVASCULAR:  negative  GASTROINTESTINAL:  negative  GENITOURINARY:  negative  MUSCULOSKELETAL:  negative except for HPI    Physical Exam:    Vitals:    BP (!) 94/51   Pulse 72   Temp 97.4

## 2019-08-14 NOTE — PROGRESS NOTES
heads.  This could represent reactive marrow edema, however early   osteomyelitis is not excluded. XR FOOT LEFT (MIN 3 VIEWS)   Final Result   Possible osteomyelitis of the 1st metatarsal.      MRI advised for further evaluation. VL DUP LOWER EXTREMITY ARTERIES BILATERAL    (Results Pending)           Assessment/Plan:    Active Hospital Problems    Diagnosis    Osteomyelitis ([facility] [M86.9]    Diabetic ulcer of left foot associated with type 2 diabetes mellitus, with bone involvement without evidence of necrosis ([facility] [S17.960, L97.526]    Coronary artery disease involving native coronary artery of native heart without angina pectoris [I25.10]     PLAN:    Left foot osteomyelitis  Diagnosed with MRI  Continue IV antibiotics  Infectious diseases will be consulted  Vascular surgery also consulted  Patient is likely to need amputation. Level to be determined.     Dyslipidemia  Continue statin     Hypertension  Controlled BP, continue home management     Diabetes mellitus type 2 with hyperglycemia  Continue Lantus and sliding scale insulin  We will increase Lantus dose because of poor control. Coronary artery disease  Continue aspirin, statin, nitrate and beta-blocker  No chest pain noted. Asymptomatic      Discussed with patient, questions answered      DVT Prophylaxis: Lovenox  Diet: DIET CARB CONTROL; Carb Control: 4 carb choices (60 gms)/meal  Code Status: Full Code    PT/OT Eval Status:  Will be requested after surgery    Dispo -inpatient    Jona Medina MD

## 2019-08-14 NOTE — PROGRESS NOTES
Podiatry Progress Note    Subjective:   Patient seen at bedside this PM.  Reports continued rest pain at the left lower extremity. She does not have pain at the left foot wound itself. She has no other pedal complaints. She denies n/v/f/c and SOB. Objective:   Vitals:  BP (!) 130/58   Pulse 76   Temp 97.9 °F (36.6 °C) (Oral)   Resp 16   Ht 5' (1.524 m)   Wt 168 lb 3.2 oz (76.3 kg)   LMP  (LMP Unknown)   SpO2 95%   BMI 32.85 kg/m²   Integument:  Full thickness ulcer with exposed first metatarsal head at the left foot. There is slight leslee-wound erythema. Slight purulence. No proximal red streaking. Appearance of wound is similar to yesterday. Neurologic:  Protective sensation is grossly diminished to light touch at the level of the toes, bilateral.  Vascular:  DP and PT pulses are non-palpable, bilateral.  DP and PT pulses are audible on doppler. CFT is brisk to all toes. No significant edema appreciated. Musculoskeletal:   She has a left hallux amputation.      Labs:   CBC with Differential:    Lab Results   Component Value Date    WBC 12.6 08/13/2019    RBC 4.15 08/13/2019    HGB 12.7 08/13/2019    HCT 37.4 08/13/2019     08/13/2019    MCV 90.1 08/13/2019    MCH 30.5 08/13/2019    MCHC 33.9 08/13/2019    RDW 14.5 08/13/2019    SEGSPCT 60.9 02/17/2013    BANDSPCT 2.0 05/24/2012    LYMPHOPCT 34.5 08/13/2019    MONOPCT 6.8 08/13/2019    EOSPCT 1.6 04/25/2011    BASOPCT 0.8 08/13/2019    MONOSABS 0.9 08/13/2019    LYMPHSABS 4.4 08/13/2019    EOSABS 0.5 08/13/2019    BASOSABS 0.1 08/13/2019    DIFFTYPE Auto 02/17/2013     CMP:    Lab Results   Component Value Date     08/13/2019    K 4.4 08/13/2019    CL 92 08/13/2019    CO2 25 08/13/2019    BUN 18 08/13/2019    CREATININE 0.9 08/13/2019    GFRAA >60 08/13/2019    GFRAA >60 02/17/2013    AGRATIO 1.1 08/13/2019    LABGLOM >60 08/13/2019    GLUCOSE 345 08/13/2019    PROT 7.7 08/13/2019    PROT 7.5 02/17/2013    LABALBU 4.1 08/13/2019

## 2019-08-15 NOTE — PROGRESS NOTES
Patient is awake, alert and oriented x4. Assessment is complete, see flow sheet. Bed in lowest position, wheels locked, call light is within reach.  Patient denies any further needs at the moment, continuing to monitor, Carlos Alberto Garcia RN

## 2019-08-15 NOTE — CONSULTS
release tablet 30 mg, 30 mg, Oral, Daily  lisinopril (PRINIVIL;ZESTRIL) tablet 5 mg, 5 mg, Oral, Daily  metoprolol tartrate (LOPRESSOR) tablet 25 mg, 25 mg, Oral, BID  pregabalin (LYRICA) capsule 150 mg, 150 mg, Oral, BID  ranolazine (RANEXA) extended release tablet 500 mg, 500 mg, Oral, BID  mometasone-formoterol (DULERA) 200-5 MCG/ACT inhaler 2 puff, 2 puff, Inhalation, BID  albuterol sulfate  (90 Base) MCG/ACT inhaler 2 puff, 2 puff, Inhalation, Q6H PRN  montelukast (SINGULAIR) tablet 10 mg, 10 mg, Oral, Daily  piperacillin-tazobactam (ZOSYN) 3.375 g in dextrose 5 % 50 mL IVPB extended infusion (mini-bag), 3.375 g, Intravenous, Q8H  vancomycin 1000 mg IVPB in 250 mL D5W addavial, 1,000 mg, Intravenous, Q12H  albuterol (PROVENTIL) nebulizer solution 2.5 mg, 2.5 mg, Nebulization, BID  morphine (PF) injection 2 mg, 2 mg, Intravenous, Q2H PRN **OR** morphine injection 4 mg, 4 mg, Intravenous, Q2H PRN      Allergies   Allergen Reactions    Flexeril [Cyclobenzaprine] Itching    Januvia [Sitagliptin] Other (See Comments)     Causes severe back pain and cramps    Iodine Rash        REVIEW OF SYSTEMS:    CONSTITUTIONAL:  She denies recent F/C/NS  EYES:  negative for blurred vision, eye discharge, acute visual disturbance and icterus  HEENT:  negative for acute hearing loss, tinnitus, ear drainage, sinus pressure, nasal congestion, epistaxis and snoring  RESPIRATORY:  No cough, shortness of breath, hemoptysis  CARDIOVASCULAR:  negative for chest pain, palpitations, exertional chest pressure/discomfort, edema, syncope  GASTROINTESTINAL:  negative for nausea, vomiting, diarrhea, constipation, blood in stool and abdominal pain  GENITOURINARY:  negative for frequency, dysuria, urinary incontinence, decreased urine volume, and hematuria  HEMATOLOGIC/LYMPHATIC:  negative for easy bruising, bleeding and lymphadenopathy  ALLERGIC/IMMUNOLOGIC:  negative for recurrent infections, angioedema, anaphylaxis and drug (hypertension)    Hypothyroid    Localized osteoarthrosis, lower leg    PFS (patellofemoral syndrome)    Disc displacement, lumbar    Pulmonary emphysema (HCC)    JORDI on CPAP    Chronic low back pain    Coronary artery disease involving native coronary artery of native heart without angina pectoris    Tobacco use    Astigmatism    Mild nonproliferative diabetic retinopathy (HCC)    Reflux esophagitis    Class 1 obesity due to excess calories with serious comorbidity and body mass index (BMI) of 34.0 to 34.9 in adult    Diabetic ulcer of left foot associated with type 2 diabetes mellitus, with bone involvement without evidence of necrosis (HCC)    OAB (overactive bladder)    Allergic rhinitis    Chronic bronchitis ([address]    Anxiety and depression    Fibromyalgia    Phantom pain ([address]    Osteomyelitis ([address]       Otilia Deutsch is a 57yoF who is evaluated for the following:    Multiple medical problems, including uncontrolled DM, smoking, PAD    Recent admission with L hallux infection and OM  S/p hallux amp 6/3/19 thought to be definitive. No cultures obtained  Non-healing wound   Admitted with worsening foot pain without signs of systemic toxicity   Xray with evidence of OM in the L 1st MT     No abx allergies      Recs:  Continue Zosyn  I think we can stop the vanc. The foot is not cellulitic and the combo vanc-Zosyn seems to be particularly nephrotoxic. Check MRSA screen. If that is positive, will consider resuming MRSA coverage  Vascular evaluation underway  Encouraged improved glycemic control and smoking cessation     We will follow       José Manuel Sung M.D. Thank you for the opportunity to participate in the care of your patient.     Please do not hesitate to contact me:   [phone] office  [phone] mobile

## 2019-08-15 NOTE — CONSULTS
as needed for dry skin. 9/20/17   Ema Burris MD   montelukast (SINGULAIR) 10 MG tablet Take 1 tablet by mouth daily 8/2/17   Ema Burris MD   VENTOLIN  (90 BASE) MCG/ACT inhaler INHALE TWO PUFFS BY MOUTH EVERY 6 HOURS AS NEEDED FOR WHEEZING 11/9/16   Ema Burris MD   fexofenadine (WAL-FEX ALLERGY) 180 MG tablet 1 po qd, to replace claritin/loratidine.  2/8/16   Ema Burris MD   simethicone (MYLICON) 80 MG chewable tablet Take 1 tablet by mouth 4 times daily as needed for Flatulence 2/8/16   Ema Burris MD        Facility Administered Medications:    enoxaparin  40 mg Subcutaneous Daily    HYDROcodone-acetaminophen  1 tablet Oral Once    insulin lispro  0-18 Units Subcutaneous TID WC    insulin lispro  0-9 Units Subcutaneous Nightly    aspirin  81 mg Oral Daily    atorvastatin  20 mg Oral Nightly    buPROPion  300 mg Oral Daily    clopidogrel  75 mg Oral Daily    ferrous sulfate  325 mg Oral Daily with breakfast    furosemide  20 mg Oral BID    insulin glargine  45 Units Subcutaneous BID    isosorbide mononitrate  30 mg Oral Daily    lisinopril  5 mg Oral Daily    metoprolol tartrate  25 mg Oral BID    pregabalin  150 mg Oral BID    ranolazine  500 mg Oral BID    mometasone-formoterol  2 puff Inhalation BID    montelukast  10 mg Oral Daily    piperacillin-tazobactam  3.375 g Intravenous Q8H    vancomycin  1,000 mg Intravenous Q12H    albuterol  2.5 mg Nebulization BID       Allergies:  Flexeril [cyclobenzaprine]; Januvia [sitagliptin]; and Iodine     Social History:      Social History     Socioeconomic History    Marital status:      Spouse name: Not on file    Number of children: Not on file    Years of education: Not on file    Highest education level: Not on file   Occupational History    Not on file   Social Needs    Financial resource strain: Not on file    Food insecurity:     Worry: Not on file     Inability: Not on file    Transportation needs:

## 2019-08-16 NOTE — PROGRESS NOTES
Vascular Surgery Progress Note      No new issues overnight. Reviewed arterial duplex. Will need LLE arteriogram, likely for Monday with Dr. Cammy Oviedo. Dr. Carl Henderson covering over weekend for any urgent issues.

## 2019-08-16 NOTE — PROGRESS NOTES
Monday followed by 1st MT resection       Discussed with patient/family, all questions answered    I'll see her again Monday.   Please call with questions       Ramsey Jean Baptiste MD  Phone: [phone]   Fax : [phone]

## 2019-08-16 NOTE — PROGRESS NOTES
metatarsal head, neck and sesamoids  - Hx of left hallux amputation   - Reviewed patient's imaging and arterial studies. She will need partial first metatarsal amputation once vascular intervention completed. - Continue Zosyn   2) Ulcer to bone, left foot - continue santly to wound daily and dsd. 3) DM2 with ulceration, uncontrolled  4) PVD   Will defer any surgical amputation until after vascular intervention that looks like it will be performed Monday - will need partial 1st ray amputation in future.     5) Tobacco use - reviewed need for smoking cessation.

## 2019-08-16 NOTE — PROGRESS NOTES
152/95   Pulse 70   Temp 97.5 °F (36.4 °C) (Oral)   Resp 16   Ht 5' (1.524 m)   Wt 168 lb 3.2 oz (76.3 kg)   LMP  (LMP Unknown)   SpO2 95%   BMI 32.85 kg/m²     General appearance: No apparent distress, appears stated age and cooperative. HEENT: Pupils equal, round, and reactive to light. Conjunctivae/corneas clear. Neck: Supple, with full range of motion. No jugular venous distention. Trachea midline. Respiratory:  Normal respiratory effort. Clear to auscultation, bilaterally without Rales/Wheezes/Rhonchi. Cardiovascular: Regular rate and rhythm with normal S1/S2 without murmurs, rubs or gallops. Abdomen: Soft, non-tender, non-distended with normal bowel sounds. Musculoskeletal: No clubbing, cyanosis or edema bilaterally. Left foot with dressing  Skin: Skin color, texture, turgor normal.  No rashes or lesions. Neurologic:  Neurovascularly intact without any focal sensory/motor deficits. Cranial nerves: II-XII intact, grossly non-focal.  Psychiatric: Alert and oriented, thought content appropriate, normal insight    I examined the patient today (08/16/19). Physical exam is same as yesterday (8/15)      Labs:   Recent Labs     08/15/19  0702   WBC 9.7   HGB 11.0*   HCT 32.9*        Recent Labs     08/15/19  0702   *   K 4.3      CO2 24   BUN 18   CREATININE 1.0   CALCIUM 8.5     No results for input(s): AST, ALT, BILIDIR, BILITOT, ALKPHOS in the last 72 hours. No results for input(s): INR in the last 72 hours. No results for input(s): Cristhian Levee in the last 72 hours.     Urinalysis:      Lab Results   Component Value Date    NITRU Negative 06/03/2019    WBCUA 20-50 06/03/2019    BACTERIA Rare 06/03/2019    RBCUA 0-2 06/03/2019    BLOODU Negative 06/03/2019    SPECGRAV <=1.005 06/03/2019    GLUCOSEU >=1000 06/03/2019    GLUCOSEU 100 05/26/2012       Radiology:  VL DUP LOWER EXTREMITY ARTERIES BILATERAL   Final Result      MRI FOOT LEFT WO CONTRAST   Final Result   Findings

## 2019-08-17 NOTE — PROGRESS NOTES
Patient's EF (Ejection Fraction) is greater than 40%    Patient's intake/output reviewed and daily weights and strict I&O implemented. Intake/Output Summary (Last 24 hours) at 8/17/2019 1106  Last data filed at 8/17/2019 0930  Gross per 24 hour   Intake 7134.45 ml   Output 0 ml   Net 7134.45 ml     Pt sitting in bed at this time on room air. Pt with complaints of mild shortness of breath with exertion which is \"her normal\". Pt trace lower extremity edema. Patient and/or Family's stated Goal of Care this Admission: reduce shortness of breath, increase activity tolerance, better understand heart failure and disease management, be more comfortable and reduce lower extremity edema prior to discharge      Comorbidities Reviewed Yes  Patient has a past medical history of Acute osteomyelitis of left hallux ([address] Anemia, Asthma, Cellulitis of foot, CHF (congestive heart failure) ([address] Claudication of left upper extremity due to atherosclerosis [facility]), Diabetic ulcer of left hallux, with necrosis of bone ([address] DVT (deep venous thrombosis) ([address] Kidney stones, Morbid obesity with BMI of 45.0-49.9, adult ([address] Neuroma of hand, Open fracture of left hallux, Pneumonia, Rotator cuff tendinitis, STEMI (ST elevation myocardial infarction) ([address] and Urinary incontinence.          >>For CHF and Comorbidity documentation on Education Time and Topics, please see Education Tab

## 2019-08-17 NOTE — PROGRESS NOTES
Hospitalist Progress Note      PCP: Faith Malave MD    Date of Admission: 8/13/2019    Chief Complaint: Foot pain and drainage    Hospital Course: Admitted with left foot pain following recent treatment for diabetic foot ulcer. IV antibiotics are controlling the infection. MRI of the foot shows osteomyelitis. Podiatry has evaluated. Patient is believed to need at least a partial amputation. Vascular surgery consulted. Planning lower extremity angiogram for 8/19/2019. No new issues. Subjective: Foot pain controlled. No chest pain, no shortness of breath, no nausea, no vomiting.   Similar past couple days       Medications:  Reviewed    Infusion Medications    sodium chloride Stopped (08/17/19 0845)    dextrose       Scheduled Medications    nicotine  1 patch Transdermal Daily    collagenase   Topical Daily    albuterol sulfate HFA  2 puff Inhalation BID    enoxaparin  40 mg Subcutaneous Daily    HYDROcodone-acetaminophen  1 tablet Oral Once    insulin lispro  0-18 Units Subcutaneous TID WC    insulin lispro  0-9 Units Subcutaneous Nightly    aspirin  81 mg Oral Daily    atorvastatin  20 mg Oral Nightly    buPROPion  300 mg Oral Daily    clopidogrel  75 mg Oral Daily    ferrous sulfate  325 mg Oral Daily with breakfast    furosemide  20 mg Oral BID    insulin glargine  45 Units Subcutaneous BID    isosorbide mononitrate  30 mg Oral Daily    lisinopril  5 mg Oral Daily    metoprolol tartrate  25 mg Oral BID    pregabalin  150 mg Oral BID    ranolazine  500 mg Oral BID    mometasone-formoterol  2 puff Inhalation BID    montelukast  10 mg Oral Daily    piperacillin-tazobactam  3.375 g Intravenous Q8H     PRN Meds: albuterol, glucose, dextrose, glucagon (rDNA), dextrose, clonazePAM, morphine **OR** morphine      Intake/Output Summary (Last 24 hours) at 8/17/2019 1221  Last data filed at 8/17/2019 0930  Gross per 24 hour   Intake 7134.45 ml   Output 0 ml   Net 7134.45 ml BILATERAL   Final Result      MRI FOOT LEFT WO CONTRAST   Final Result   Findings compatible with acute osteomyelitis of the 1st metatarsal head and   neck as well as the medial and lateral sesamoids. No well-organized fluid   collection is seen. Nonspecific mild marrow edema at the base of the 2nd and 3rd metatarsal   heads. This could represent reactive marrow edema, however early   osteomyelitis is not excluded. XR FOOT LEFT (MIN 3 VIEWS)   Final Result   Possible osteomyelitis of the 1st metatarsal.      MRI advised for further evaluation. Assessment/Plan:    Active Hospital Problems    Diagnosis    Osteomyelitis ([facility] [M86.9]    Diabetic ulcer of left foot associated with type 2 diabetes mellitus, with bone involvement without evidence of necrosis ([address] [E62.873, L97.526]    Coronary artery disease involving native coronary artery of native heart without angina pectoris [I25.10]     PLAN:    Left foot osteomyelitis  Diagnosed with MRI  Continue IV antibiotics  Infectious diseases consulted  LLE angio on 8/19/2019  Agrees with the plan as above     Dyslipidemia  Continue statin at home dose     Hypertension  Controlled BP, continue home management     Diabetes mellitus type 2 with hyperglycemia  Continue Lantus and sliding scale insulin  Blood sugar has stabilized at acceptable levels    Coronary artery disease  Continue aspirin, statin, nitrate and beta-blocker  No chest pain noted. Asymptomatic      Discussed with patient, questions answered      DVT Prophylaxis: Lovenox  Diet: DIET CARB CONTROL; Carb Control: 4 carb choices (60 gms)/meal  Diet NPO, After Midnight  Code Status: Full Code    PT/OT Eval Status: Will be requested after surgery    Dispo -inpatient, unclear length of stay, will depend on surgical plans.  In-house at least until 8/19, likely 8/20    Jaelyn Bridges MD

## 2019-08-18 NOTE — PROGRESS NOTES
Department of Podiatric Surgery  Dr. Mary Kate Robles Progress Note      SUBJECTIVE  Pt seen bedside and states her pain is 9/10 but the entire visit she has not stopped playing on her phone. Patient denies any fever chills nausea or vomiting. Patient states \"her vascular test better be in morning\". OBJECTIVE    Physical    VITALS:  BP (!) 157/86   Pulse 71   Temp 98 °F (36.7 °C)   Resp 16   Ht 5' (1.524 m)   Wt 191 lb 1.6 oz (86.7 kg)   LMP  (LMP Unknown)   SpO2 96%   BMI 37.32 kg/m²   There is thick yellow purulent drainage noted from previous hallux amputation site. Exposed 1st met head centrally. Minimal erythema surrounding wound. No extending lymphangitis or adenitis noted.    Data    CBC with Differential:    Lab Results   Component Value Date    WBC 9.7 08/15/2019    RBC 3.63 08/15/2019    HGB 11.0 08/15/2019    HCT 32.9 08/15/2019     08/15/2019    MCV 90.8 08/15/2019    MCH 30.2 08/15/2019    MCHC 33.3 08/15/2019    RDW 14.7 08/15/2019    SEGSPCT 60.9 02/17/2013    BANDSPCT 2.0 05/24/2012    LYMPHOPCT 34.9 08/15/2019    MONOPCT 6.1 08/15/2019    EOSPCT 1.6 04/25/2011    BASOPCT 0.9 08/15/2019    MONOSABS 0.6 08/15/2019    LYMPHSABS 3.4 08/15/2019    EOSABS 0.4 08/15/2019    BASOSABS 0.1 08/15/2019    DIFFTYPE Auto 02/17/2013     CMP:    Lab Results   Component Value Date     08/17/2019    K 4.2 08/17/2019    K 4.4 08/13/2019     08/17/2019    CO2 22 08/17/2019    BUN 16 08/17/2019    CREATININE 1.1 08/17/2019    GFRAA >60 08/17/2019    GFRAA >60 02/17/2013    AGRATIO 1.1 08/13/2019    LABGLOM 51 08/17/2019    GLUCOSE 129 08/17/2019    PROT 7.7 08/13/2019    PROT 7.5 02/17/2013    LABALBU 4.1 08/13/2019    CALCIUM 9.1 08/17/2019    BILITOT 0.3 08/13/2019    ALKPHOS 88 08/13/2019    AST 6 08/13/2019    ALT 8 08/13/2019       Radiology Review:  Xray Possible osteomyelitis of the 1st metatarsal.  Findings compatible with acute osteomyelitis of the 1st metatarsal head and   neck as MCG/ACT inhaler 2 puff, 2 puff, Inhalation, BID  montelukast (SINGULAIR) tablet 10 mg, 10 mg, Oral, Daily  piperacillin-tazobactam (ZOSYN) 3.375 g in dextrose 5 % 50 mL IVPB extended infusion (mini-bag), 3.375 g, Intravenous, Q8H  morphine (PF) injection 2 mg, 2 mg, Intravenous, Q2H PRN **OR** morphine injection 4 mg, 4 mg, Intravenous, Q2H PRN    ASSESSMENT AND PLAN    The patient is a pleasant 62 year old woman with:   1) Osteomyelitis, left 1st metatarsal head, neck and sesamoids  - Hx of left hallux amputation   - Reviewed patient's imaging and arterial studies.   She will need partial first metatarsal amputation n future. - Continue Zosyn   2) Ulcer to bone, left foot - continue santly to wound daily and dsd. 3) DM2 with ulceration, uncontrolled  4) PVD- having angio tomorrow with Maulik Morlaes.  Pending results will complete 1st ray amp in 1-2 days  following   5) Tobacco use - reviewed need for smoking cessation.

## 2019-08-19 NOTE — PROGRESS NOTES
08/17/2019       Hepatic Function Panel:   Lab Results   Component Value Date    ALKPHOS 88 08/13/2019    ALT 8 08/13/2019    AST 6 08/13/2019    PROT 7.7 08/13/2019    PROT 7.5 02/17/2013    BILITOT 0.3 08/13/2019    LABALBU 4.1 08/13/2019       Cultures:   6/3       BC x2 neg               UC neg   6/29     BC x2 neg  8/15 MRSA screen neg        Radiology Review:  All pertinent images / reports were reviewed as a part of this visit. Three Views, Left Foot 8/13/2019:    Possible osteomyelitis of the 1st metatarsal.       MRI advised for further evaluation.          MRI, Left Foot 8/13/2019:   Findings compatible with acute osteomyelitis of the 1st metatarsal head and   neck as well as the medial and lateral sesamoids.  No well-organized fluid   collection is seen.       Nonspecific mild marrow edema at the base of the 2nd and 3rd metatarsal   heads.  This could represent reactive marrow edema, however early   osteomyelitis is not excluded.      Vascular 7/9/2018: Impressions   Right Impression   1. The right ankle/brachial index is 1.22.   2. Pulse volume recording at the ankle is within normal limits. 3. Continuous wave Doppler of the posterior tibial artery demonstrates normal   multiphasic flow and the dorsalis pedis artery demonstrates abnormal   monophasic flow. 4. PPG's of the toes indicate decreased amplitude involving the fifth digit. The rest of the PPG's of the toes 1-4 indicate good pulsatility and amplitude. 5. The toe/brachial index was performed and is normal measuring 0.86 (normal   value >0.64). Left Impression   1. The left ankle/brachial index is 1.17.   2. Pulse volume recording at the ankle is abnormal.   3. Continuous wave Doppler of both the dorsalis pedis and posterior tibial   arteries demonstrate abnormal monophasic flow. 4. PPG's of the toes indicate decreased amplitude involving all the digits of   the left foot.    5. The toe/brachial index was performed and is abnormal measuring 0.33 (normal   range >0.64).      Assessment:     Patient Active Problem List    Diagnosis Date Noted    PVD (peripheral vascular disease) ([address]     Osteomyelitis ([address] 08/13/2019    Phantom pain ([address] 07/20/2019    Class 1 obesity due to excess calories with serious comorbidity and body mass index (BMI) of 34.0 to 34.9 in adult 07/09/2019    Diabetic ulcer of left foot associated with type 2 diabetes mellitus, with bone involvement without evidence of necrosis ([address] 07/09/2019    OAB (overactive bladder) 07/09/2019    Allergic rhinitis 07/09/2019    Chronic bronchitis ([address]     Anxiety and depression     Fibromyalgia     Tobacco use 11/01/2018    Coronary artery disease involving native coronary artery of native heart without angina pectoris 07/24/2018    Chronic low back pain 02/04/2016    JORDI on CPAP 10/29/2015    Disc displacement, lumbar 01/29/2015    Localized osteoarthrosis, lower leg 11/12/2014    PFS (patellofemoral syndrome) 11/12/2014    Type 2 diabetes mellitus with diabetic peripheral angiopathy without gangrene ([address] 02/26/2014    HTN (hypertension) 02/26/2014    Hypothyroid 02/26/2014    HLD (hyperlipidemia) 02/20/2014    Uncontrolled type 2 diabetes mellitus with diabetic polyneuropathy, with long-term current use of insulin ([address] 02/20/2014    Pulmonary emphysema ([address] 09/19/2013    Reflux esophagitis 08/18/2011    Mild nonproliferative diabetic retinopathy ([address] 12/14/2009    Astigmatism 12/08/2008     Multiple medical problems, including uncontrolled DM, smoking, PAD     Recent admission with L hallux infection and OM  S/p hallux amp 6/3/19 thought to be definitive. No cultures obtained    Non-healing wound in the context of PAD   Admitted with worsening foot pain without signs of systemic toxicity   Xray with evidence of OM in the L 1st MT   S/p revasc procedure LLE 8/19/19.   Awaiting Podiatry plan      No abx allergies      Plan:   Continue

## 2019-08-19 NOTE — PROGRESS NOTES
Blood glucose level 42 this am, given D50 push & hung D5 drip per MAR, glucose increased to 79, rechecked & glucose level 51, gave D50 push again, level increased to 95. MD aware. Will continue to monitor.

## 2019-08-19 NOTE — PROGRESS NOTES
color  flow. There is moderate to large plaque seen throughout the rest of lower  extremity.     MRSA screen - negative   Current Inpatient Medications    Current Facility-Administered Medications: insulin glargine (LANTUS) injection vial 30 Units, 30 Units, Subcutaneous, BID  0.9 % sodium chloride infusion, 1,000 mL, Intravenous, Continuous  nicotine (NICODERM CQ) 14 MG/24HR 1 patch, 1 patch, Transdermal, Daily  collagenase ointment, , Topical, Daily  albuterol (PROVENTIL) nebulizer solution 2.5 mg, 2.5 mg, Nebulization, Q6H PRN  albuterol sulfate  (90 Base) MCG/ACT inhaler 2 puff, 2 puff, Inhalation, BID  enoxaparin (LOVENOX) injection 40 mg, 40 mg, Subcutaneous, Daily  insulin lispro (HUMALOG) injection vial 0-18 Units, 0-18 Units, Subcutaneous, TID WC  insulin lispro (HUMALOG) injection vial 0-9 Units, 0-9 Units, Subcutaneous, Nightly  glucose (GLUTOSE) 40 % oral gel 15 g, 15 g, Oral, PRN  dextrose 50 % IV solution, 12.5 g, Intravenous, PRN  glucagon (rDNA) injection 1 mg, 1 mg, Intramuscular, PRN  dextrose 5 % solution, 100 mL/hr, Intravenous, PRN  aspirin EC tablet 81 mg, 81 mg, Oral, Daily  atorvastatin (LIPITOR) tablet 20 mg, 20 mg, Oral, Nightly  buPROPion (WELLBUTRIN XL) extended release tablet 300 mg, 300 mg, Oral, Daily  clonazePAM (KLONOPIN) tablet 0.5 mg, 0.5 mg, Oral, TID PRN  clopidogrel (PLAVIX) tablet 75 mg, 75 mg, Oral, Daily  ferrous sulfate tablet 325 mg, 325 mg, Oral, Daily with breakfast  furosemide (LASIX) tablet 20 mg, 20 mg, Oral, BID  isosorbide mononitrate (IMDUR) extended release tablet 30 mg, 30 mg, Oral, Daily  lisinopril (PRINIVIL;ZESTRIL) tablet 5 mg, 5 mg, Oral, Daily  metoprolol tartrate (LOPRESSOR) tablet 25 mg, 25 mg, Oral, BID  pregabalin (LYRICA) capsule 150 mg, 150 mg, Oral, BID  ranolazine (RANEXA) extended release tablet 500 mg, 500 mg, Oral, BID  mometasone-formoterol (DULERA) 200-5 MCG/ACT inhaler 2 puff, 2 puff, Inhalation, BID  montelukast (SINGULAIR) tablet 10 mg,

## 2019-08-19 NOTE — PROGRESS NOTES
Hospitalist Progress Note      PCP: Avani Poe MD    Date of Admission: 8/13/2019    Chief Complaint: Foot pain and drainage    Hospital Course: Admitted with left foot pain following recent treatment for diabetic foot ulcer. IV antibiotics are controlling the infection. MRI of the foot shows osteomyelitis. Podiatry has evaluated. Patient is believed to need at least a partial amputation. Vascular surgery consulted. Planning lower extremity angiogram for 8/19/2019. No new issues from yesterday. Vitals are stable. Blood sugar fluctuates between low 100 and mid 200. Subjective: Hypoglycemia this AM to 42. s/p Angiogram with PCI to left SFA. Foot pain controlled. Good appetite.       Medications:  Reviewed    Infusion Medications    sodium chloride 1,000 mL (08/18/19 7113)    dextrose 100 mL/hr (08/19/19 5958)     Scheduled Medications    insulin glargine  40 Units Subcutaneous BID    nicotine  1 patch Transdermal Daily    collagenase   Topical Daily    albuterol sulfate HFA  2 puff Inhalation BID    enoxaparin  40 mg Subcutaneous Daily    insulin lispro  0-18 Units Subcutaneous TID WC    insulin lispro  0-9 Units Subcutaneous Nightly    aspirin  81 mg Oral Daily    atorvastatin  20 mg Oral Nightly    buPROPion  300 mg Oral Daily    clopidogrel  75 mg Oral Daily    ferrous sulfate  325 mg Oral Daily with breakfast    furosemide  20 mg Oral BID    isosorbide mononitrate  30 mg Oral Daily    lisinopril  5 mg Oral Daily    metoprolol tartrate  25 mg Oral BID    pregabalin  150 mg Oral BID    ranolazine  500 mg Oral BID    mometasone-formoterol  2 puff Inhalation BID    montelukast  10 mg Oral Daily    piperacillin-tazobactam  3.375 g Intravenous Q8H     PRN Meds: albuterol, glucose, dextrose, glucagon (rDNA), dextrose, clonazePAM, morphine **OR** morphine      Intake/Output Summary (Last 24 hours) at 8/19/2019 1705  Last data filed at 8/19/2019 1541  Gross per 24 hour   Intake 1522 ml   Output --   Net 1522 ml       Physical Exam Performed:    BP (!) 147/77   Pulse 65   Temp 98.4 °F (36.9 °C) (Oral)   Resp 18   Ht 5' (1.524 m)   Wt 194 lb 0.1 oz (88 kg)   LMP  (LMP Unknown)   SpO2 95%   BMI 37.89 kg/m²     General appearance: No apparent distress, appears stated age and cooperative. HEENT: Pupils equal, round, and reactive to light. Conjunctivae/corneas clear. Neck: Supple, with full range of motion. No jugular venous distention. Trachea midline. Respiratory:  Normal respiratory effort. Clear to auscultation, bilaterally without Rales/Wheezes/Rhonchi. Cardiovascular: Regular rate and rhythm with normal S1/S2 without murmurs, rubs or gallops. Abdomen: Soft, non-tender, non-distended with normal bowel sounds. Musculoskeletal: No clubbing, cyanosis or edema bilaterally. Left foot with dressing  Skin: Skin color, texture, turgor normal.  No rashes or lesions. Neurologic:  Neurovascularly intact without any focal sensory/motor deficits. Cranial nerves: II-XII intact, grossly non-focal.  Psychiatric: Alert and oriented, thought content appropriate, normal insight    Labs:   No results for input(s): WBC, HGB, HCT, PLT in the last 72 hours. Recent Labs     08/17/19  1010      K 4.2      CO2 22   BUN 16   CREATININE 1.1   CALCIUM 9.1     No results for input(s): AST, ALT, BILIDIR, BILITOT, ALKPHOS in the last 72 hours. No results for input(s): INR in the last 72 hours. No results for input(s): Geni Daria in the last 72 hours.     Urinalysis:      Lab Results   Component Value Date    NITRU Negative 06/03/2019    WBCUA 20-50 06/03/2019    BACTERIA Rare 06/03/2019    RBCUA 0-2 06/03/2019    BLOODU Negative 06/03/2019    SPECGRAV <=1.005 06/03/2019    GLUCOSEU >=1000 06/03/2019    GLUCOSEU 100 05/26/2012       Radiology:  VL DUP LOWER EXTREMITY ARTERIES BILATERAL   Final Result      MRI FOOT LEFT WO CONTRAST   Final Result   Findings compatible with acute osteomyelitis of the 1st metatarsal head and   neck as well as the medial and lateral sesamoids. No well-organized fluid   collection is seen. Nonspecific mild marrow edema at the base of the 2nd and 3rd metatarsal   heads. This could represent reactive marrow edema, however early   osteomyelitis is not excluded. XR FOOT LEFT (MIN 3 VIEWS)   Final Result   Possible osteomyelitis of the 1st metatarsal.      MRI advised for further evaluation. Assessment/Plan:    Active Hospital Problems    Diagnosis    PVD (peripheral vascular disease) ([address] [I73.9]    Chronic osteomyelitis of left foot ([address] [M86.672]    Osteomyelitis ([address] [M86.9]    Diabetic ulcer of left foot associated with type 2 diabetes mellitus, with bone involvement without evidence of necrosis ([address] [V97.517, L97.526]    Coronary artery disease involving native coronary artery of native heart without angina pectoris [I25.10]     PLAN:    Left foot osteomyelitis  Diagnosed with MRI  Continue IV antibiotics  Infectious diseases consulted  LLE angio with PCI to SFA  Plan for partial amputation on Tuesday PM per Podiatry. Dyslipidemia  Continue statin at home dose     Hypertension  Controlled BP, continue home management. No changes indicated     Diabetes mellitus type 2 with hyperglycemia  Episode of hypoglycemia this AM; reduce Insulin regimen and monitor. Coronary artery disease  Continue aspirin, statin, nitrate and beta-blocker  No chest pain noted. Asymptomatic      DVT Prophylaxis: Lovenox  Diet: DIET CARB CONTROL; Carb Control: 4 carb choices (60 gms)/meal  Code Status: Full Code    PT/OT Eval Status: Will be requested after surgery    Dispo -inpatient, unclear length of stay, will depend on surgical course.      Ernesto Ponce MD

## 2019-08-20 NOTE — CARE COORDINATION
[facility]    Received referral for homecare services. Will follow patient for homecare services at discharge. Should Pt DC over weekend, fax face sheet, order, GISSEL, and H&P to [facility].    Electronically signed by Chestine Cogan, LPN on 5/87/07 at 1:10 PM          [address]  [place] [address] [address]au [address] Transition Nurse  [phone]

## 2019-08-20 NOTE — PROGRESS NOTES
Writer called Dr. Hero Buitrago regarding blood sugar, repeat blood sugar 82. Per Dr. Hero Buitrago ok, will check another blood sugar when pt is down for procedure.   Haseeb Pace  8/20/2019

## 2019-08-20 NOTE — H&P
I have reviewed the patient's H&P. I agree with the findings. Will move forward with planned procedure.     Haseeb Garsia DPM  Office: [phone]  Mobile: [phone]

## 2019-08-20 NOTE — PROGRESS NOTES
Hospitalist Progress Note      PCP: Poli Tuttle MD    Date of Admission: 8/13/2019    Chief Complaint: Foot pain and drainage    Hospital Course: Admitted with left foot pain following recent treatment for diabetic foot ulcer. IV antibiotics are controlling the infection. MRI of the foot shows osteomyelitis. Podiatry has evaluated. Patient is believed to need at least a partial amputation. Vascular surgery consulted. Planning lower extremity angiogram for 8/19/2019. No new issues from yesterday. Vitals are stable. Blood sugar fluctuates between low 100 and mid 200. Subjective: BS stable today. Remains NPO for surgery this evening. Foot pain is controlled.         Medications:  Reviewed    Infusion Medications    sodium chloride 1,000 mL (08/20/19 0244)    dextrose 100 mL/hr (08/19/19 5827)     Scheduled Medications    insulin glargine  30 Units Subcutaneous BID    nicotine  1 patch Transdermal Daily    collagenase   Topical Daily    albuterol sulfate HFA  2 puff Inhalation BID    enoxaparin  40 mg Subcutaneous Daily    insulin lispro  0-18 Units Subcutaneous TID WC    insulin lispro  0-9 Units Subcutaneous Nightly    aspirin  81 mg Oral Daily    atorvastatin  20 mg Oral Nightly    buPROPion  300 mg Oral Daily    clopidogrel  75 mg Oral Daily    ferrous sulfate  325 mg Oral Daily with breakfast    furosemide  20 mg Oral BID    isosorbide mononitrate  30 mg Oral Daily    lisinopril  5 mg Oral Daily    metoprolol tartrate  25 mg Oral BID    pregabalin  150 mg Oral BID    ranolazine  500 mg Oral BID    mometasone-formoterol  2 puff Inhalation BID    montelukast  10 mg Oral Daily    piperacillin-tazobactam  3.375 g Intravenous Q8H     PRN Meds: albuterol, glucose, dextrose, glucagon (rDNA), dextrose, clonazePAM, morphine **OR** morphine      Intake/Output Summary (Last 24 hours) at 8/20/2019 1431  Last data filed at 8/20/2019 1007  Gross per 24 hour   Intake 2213 ml   Output 2900 ml   Net -687 ml       Physical Exam Performed:    BP (!) 141/84   Pulse 64   Temp 98.1 °F (36.7 °C) (Oral)   Resp 18   Ht 5' (1.524 m)   Wt 191 lb 1.6 oz (86.7 kg)   LMP  (LMP Unknown)   SpO2 97%   BMI 37.32 kg/m²     General appearance: No apparent distress, appears stated age and cooperative. HEENT: Pupils equal, round, and reactive to light. Conjunctivae/corneas clear. Neck: Supple, with full range of motion. No jugular venous distention. Trachea midline. Respiratory:  Normal respiratory effort. Clear to auscultation, bilaterally without Rales/Wheezes/Rhonchi. Cardiovascular: Regular rate and rhythm with normal S1/S2 without murmurs, rubs or gallops. Abdomen: Soft, non-tender, non-distended with normal bowel sounds. Musculoskeletal: No clubbing, cyanosis or edema bilaterally. Left foot with dressing c/d/i. Skin: Skin color, texture, turgor normal.  No rashes or lesions. Neurologic:  Neurovascularly intact without any focal sensory/motor deficits. Cranial nerves: II-XII intact, grossly non-focal.  Psychiatric: Alert and oriented, thought content appropriate, normal insight    Labs:   Recent Labs     08/20/19  1402   WBC 12.9*   HGB 11.4*   HCT 35.3*        No results for input(s): NA, K, CL, CO2, BUN, CREATININE, CALCIUM, PHOS in the last 72 hours. Invalid input(s): MAGNES  No results for input(s): AST, ALT, BILIDIR, BILITOT, ALKPHOS in the last 72 hours. No results for input(s): INR in the last 72 hours. No results for input(s): Fatemeh Callander in the last 72 hours.     Urinalysis:      Lab Results   Component Value Date    NITRU Negative 06/03/2019    WBCUA 20-50 06/03/2019    BACTERIA Rare 06/03/2019    RBCUA 0-2 06/03/2019    BLOODU Negative 06/03/2019    SPECGRAV <=1.005 06/03/2019    GLUCOSEU >=1000 06/03/2019    GLUCOSEU 100 05/26/2012       Radiology:  VL DUP LOWER EXTREMITY ARTERIES BILATERAL   Final Result      MRI FOOT LEFT WO CONTRAST   Final Result   Findings

## 2019-08-20 NOTE — OP NOTE
introducer sheath was placed. The Bentson wire was advanced  through the sheath into the abdominal aorta and a modified hook catheter  was advanced over the wire. Abdominal aortogram was then performed. The catheter was pulled on just above the aortic bifurcation and  bilateral iliofemoral angiograms were performed. Catheter was then used  to access the left common iliac artery. The wire was advanced onto the  iliac bifurcation. I could not advance the wire into the external iliac  artery using this catheter. Therefore, it was exchanged for a rim  catheter and then this was used to access the external iliac artery and  the wire and catheter were advanced onto the common femoral level. Left  lower extremity angiograms were then performed. The catheter was  removed over the wire, and the 5-Cambodian sheath was removed from the  right femoral artery and replaced with a 45-cm long 6-Cambodian destination  sheath. The patient was given 5000 units of intravenous heparin. Using  an angled glide wire and angled glide catheter, I was able to advance  these past the focal stenosis and occlusions in the left superficial  femoral artery. With the catheter in the distal superficial femoral  artery, repeat angiogram was performed confirming intraluminal  placement. The catheter was then removed over the wire. Angioplasty  was then performed in segmental area of focal occlusions and stenosis  using a 5 mm x 60 mm balloon. A balloon expandable stent measuring 5 mm  in diameter x 80 mm in length was then deployed across this region. Repeat angiograms were performed showing brisk flow across the stented  region with preservation of all outflow vessels. The catheter and wire  were removed. The long sheath was removed over a Bentson wire and  replaced with a short 6-Cambodian introducer sheath.   Retrograde femoral  angiogram was then performed, and a 6-Cambodian Mynx closure device was  then placed, deployed in standard fashion achieving excellent  hemostasis. The patient was transferred to the recovery area following  this procedure. ANGIOGRAPHIC FINDINGS:  The abdominal aorta demonstrates patent celiac  superior mesenteric and bilateral renal arteries. The left bilateral  common internal and external iliac arteries are patent although the left  common iliac artery is slightly diseased and is without focal stenosis  albeit somewhat diffusely smaller than the right. The left common  femoral and profunda are patent. The superficial femoral artery is  diseased, but patent until the mid superficial femoral area where there  are several sequential high-grade stenosis and focal occlusions. The  distal superficial femoral and popliteal vessel are patent. There is  three-vessel runoff below the knee. Post angioplasty and stenting, the  stenosis and occlusions are resolved with a widely patent stent with  brisk flow across this region in preserved three-vessel runoff.         Kadeem Arnold MD    D: 08/19/2019 15:54:38       T: 08/19/2019 15:57:51     [email or web address]  Job#: [id number]     Doc#: [id number]    CC:

## 2019-08-21 NOTE — OP NOTE
[facility]                 [facility]                                 OPERATIVE REPORT    PATIENT NAME: Jules Anna                    :        1960  MED REC NO:   [id number]                          ROOM:       [id number]  ACCOUNT NO:   [de-identified]                           ADMIT DATE: 2019  PROVIDER:     Michael Lennon DPM    DATE OF PROCEDURE:  2019    This is an inpatient operative note at [facility].    SURGEON:  Michael Lennon DPM    ASSISTANT:  .    PREOPERATIVE DIAGNOSES:  1.  Osteomyelitis, left foot. 2.  Ulceration to bone, left foot. 3.  Diabetes type 2 with ulceration. 4.  Diabetes type 2 with peripheral vascular disease. POSTOPERATIVE DIAGNOSES:  1.  Osteomyelitis, left foot. 2.  Ulceration to bone, left foot. 3.  Diabetes type 2 with ulceration. 4.  Diabetes type 2 with peripheral vascular disease. OPERATION PERFORMED:  1. Partial first ray amputation of left foot. 2.  Advancement flap closure, left foot. ANESTHESIA:  MAC and local, local was 10 mL of 1:1, 0.5% Marcaine plain  and 2.0% lidocaine plain. HEMOSTASIS:  Surgical dissection. ESTIMATED BLOOD LOSS:  Less than 50 mL. MATERIALS:  3-0 Vicryl and 3-0 nylon. PATHOLOGY:  1. Bone from the first metatarsal head was sent for culture. 2.  The amputated first metatarsal and sesamoids were sent as a  permanent specimen. 3.  A clearance fragment from the first metatarsal was sent permanent as  well. INJECTABLES:  None. COMPLICATIONS:  None.     INDICATIONS FOR THE PROCEDURE:  The patient had signs and symptoms both  clinically and radiographically consistent with the above-mentioned  preoperative diagnoses after vascular workup in the hospital including  angiogram.  Decision was made to bring the patient to the operating room  for surgical intervention because she had a failed left hallux  amputation site with the exposed first metatarsal head and open wound. Prior to the scheduling of surgery, potential risks, benefits, and  complications were discussed with the patient. All of her questions  were answered and a written consent form was signed now for the  procedures. OPERATIVE PROCEDURE:  Procedure #1, partial first ray amputation of left  foot. The patient was brought from the preoperative area and placed on  the operating room table in the supine position. A local anesthetic  block consisting of 10 mL of 1:1 0.5% Marcaine plain and 2.0% lidocaine  plain was injected proximal to the surgical site. The left lower  extremity was scrubbed, prepped, and draped in the usual sterile  fashion. A #15 blade was used to make an incision extending from the  patient's full-thickness ulceration. The patient's full-thickness  ulceration was approximately 2.5 cm x 3.5 cm with exposed first  metatarsal head. Again, using that #15 blade, I extended the ulceration  proximally along the first metatarsal.  The incision was full-thickness  in nature down to bone. I then used a #15 blade and pickup to reflect  the soft tissue attachments to the first metatarsal.  I carried the  incision almost to the most proximal aspect of that first metatarsal.   Once I exposed the first metatarsal, it was clear that the first  metatarsal head was unhealthy in appearance. It was soft. It was grey. I used a bone saw to perform an osteotomy near the proximal first  metatarsal.  I then removed the distal first metatarsal.  Some of the  first metatarsal head, I sent for culture. The remaining part of the  first metatarsal, I sent to Pathology. I then used my bone saw to make  another cut in the remaining first metatarsal to basically produce a  clearance fragment, which was sent to Pathology. I used a #15 blade and  pickup to remove the patient's sesamoids.   Once all that was removed, I  used the blade and pickup to remove any other necrotic type tissue. I  believe all infected bone was removed. The remaining first metatarsal  was hard, white and healthy in appearance. I irrigated the site with copious amounts of sterile saline and directed  the attention to procedure #2, which was advancement flap closure, left  foot. There was redundant skin due to the removal of the first  metatarsal as well as a large soft tissue deficit from the ulceration  previously noted. I used a #15 blade and curved [place] scissor to fashion  a dorsal and plantar flap that will allow for wound closure. There were  dog ears that I needed to repair and I did. I irrigated the site with  copious amounts of sterile saline. I closed the deep tissue with 3-0  Vicryl and then I closed the skin with 3-0 nylon. I placed a sterile  bandage of Xeroform, 4 x 4s, ABD, Kerlix, and Ace wrap. The patient  tolerated the procedure and the anesthesia well. She was transported  from the operating room to the PACU with vital signs stable and vascular  status intact to all remaining aspects of the left foot. Following a  period of postoperative monitoring, she will be discharged back to her  room on the floor with written and oral instructions from myself. I  will see her on the floor tomorrow. After she works with PT and OT, she  will be okay for discharge from my standpoint.         Tamika Lara DPM    D: 08/20/2019 19:33:01       T: 08/21/2019 0:07:05     [email or web address]  Job#: [id number]     Doc#: [id number]    CC:

## 2019-08-21 NOTE — PROGRESS NOTES
Infectious Disease Follow up Notes    CC :  DFI L foot      Antibiotics:   Zosyn 3.375 q8    Admit Date:   8/13/2019  Hospital Day: 9    Subjective:   She remains afebrile. She says that Percpcet 10/325 did not impact her pain. She is getting frequent doses of morphine    Objective:     Patient Vitals for the past 8 hrs:   BP Temp Temp src Pulse Resp SpO2   08/21/19 1215 114/68 97.6 °F (36.4 °C) Oral 72 16 94 %   08/21/19 0837 (!) 173/85 97.8 °F (36.6 °C) Oral 69 16 95 %       EXAM:  General:  Sleeping soundly on my entry. Easily roused. Appears comfortable. Morbidly obese  MMM, no thrush      ABD:  Soft, +BS, NT  EXT:  Trace LE edema  L foot surgically dressed   Trace LLE edema.    LINE:  IV site ok          Scheduled Meds:   insulin glargine  30 Units Subcutaneous BID    nicotine  1 patch Transdermal Daily    albuterol sulfate HFA  2 puff Inhalation BID    enoxaparin  40 mg Subcutaneous Daily    insulin lispro  0-18 Units Subcutaneous TID WC    insulin lispro  0-9 Units Subcutaneous Nightly    aspirin  81 mg Oral Daily    atorvastatin  20 mg Oral Nightly    buPROPion  300 mg Oral Daily    clopidogrel  75 mg Oral Daily    ferrous sulfate  325 mg Oral Daily with breakfast    furosemide  20 mg Oral BID    isosorbide mononitrate  30 mg Oral Daily    lisinopril  5 mg Oral Daily    metoprolol tartrate  25 mg Oral BID    pregabalin  150 mg Oral BID    ranolazine  500 mg Oral BID    mometasone-formoterol  2 puff Inhalation BID    montelukast  10 mg Oral Daily    piperacillin-tazobactam  3.375 g Intravenous Q8H       Continuous Infusions:   sodium chloride 1,000 mL (08/21/19 0156)    dextrose 100 mL/hr (08/19/19 0746)          Data Review:    Lab Results   Component Value Date    WBC 12.9 (H) 08/20/2019    HGB 11.4 (L) 08/20/2019    HCT 35.3 (L) 08/20/2019    MCV 93.0 08/20/2019     08/20/2019     Lab Results the toes indicate decreased amplitude involving all the digits of   the left foot. 5. The toe/brachial index was performed and is abnormal measuring 0.33 (normal   range >0.64).      Assessment:     Patient Active Problem List    Diagnosis Date Noted    PVD (peripheral vascular disease) ([address]     Chronic osteomyelitis of left foot ([address]     Osteomyelitis ([address] 08/13/2019    Phantom pain ([address] 07/20/2019    Class 1 obesity due to excess calories with serious comorbidity and body mass index (BMI) of 34.0 to 34.9 in adult 07/09/2019    Diabetic ulcer of left foot associated with type 2 diabetes mellitus, with bone involvement without evidence of necrosis ([address] 07/09/2019    OAB (overactive bladder) 07/09/2019    Allergic rhinitis 07/09/2019    Chronic bronchitis ([address]     Anxiety and depression     Fibromyalgia     Tobacco use 11/01/2018    Coronary artery disease involving native coronary artery of native heart without angina pectoris 07/24/2018    Chronic low back pain 02/04/2016    JORDI on CPAP 10/29/2015    Disc displacement, lumbar 01/29/2015    Localized osteoarthrosis, lower leg 11/12/2014    PFS (patellofemoral syndrome) 11/12/2014    Type 2 diabetes mellitus with diabetic peripheral angiopathy without gangrene ([address] 02/26/2014    HTN (hypertension) 02/26/2014    Hypothyroid 02/26/2014    HLD (hyperlipidemia) 02/20/2014    Uncontrolled type 2 diabetes mellitus with diabetic polyneuropathy, with long-term current use of insulin ([address] 02/20/2014    Pulmonary emphysema ([address] 09/19/2013    Reflux esophagitis 08/18/2011    Mild nonproliferative diabetic retinopathy ([address] 12/14/2009    Astigmatism 12/08/2008     Multiple medical problems, including uncontrolled DM, smoking, PAD     Recent admission with L hallux infection and OM  S/p hallux amp 6/3/19 thought to be definitive.   No cultures obtained    Non-healing wound in the context of PAD   Admitted with worsening foot pain without signs of systemic toxicity   Xray with evidence of OM in the L 1st MT   S/p revasc procedure LLE 8/19/19. S/p partial 1st ray amp 8/20/19 thought to be definitive. Surgical culture pending, GPC on GS     No abx allergies      Plan:   Clearance bone fragment sent for path, I will follow-up on that. Surgery thought to be definitive.   Stop Zosyn, give 7 days more Augmentin   OK for DC from ID standpoint       Yolie Saba MD  Phone: [phone]   Fax : [phone]

## 2019-08-21 NOTE — PLAN OF CARE
Increase patient's level of function to baseline.
Increase patients ADLs/functional status to baseline.
Problem: Falls - Risk of:  Goal: Absence of physical injury  Description  Absence of physical injury  8/14/2019 1936 by Eder Balbuena RN  Outcome: Met This Shift  8/14/2019 1932 by Eder Balbuena RN  Outcome: Met This Shift     Problem: Falls - Risk of:  Goal: Will remain free from falls  Description  Will remain free from falls  8/15/2019 0525 by Fariba Ponce RN  Outcome: Ongoing  8/14/2019 1936 by Eder Balbuena RN  Outcome: Met This Shift  8/14/2019 1932 by Eder Balbuena RN  Outcome: Met This Shift     Problem: Pain:  Goal: Pain level will decrease  Description  Pain level will decrease  8/15/2019 0525 by Fariba Ponce RN  Outcome: Ongoing  8/14/2019 1936 by Eder Balbuena RN  Outcome: Met This Shift  8/14/2019 1932 by Eder Balbuena RN  Outcome: Met This Shift     Problem: Serum Glucose Level - Abnormal:  Goal: Ability to maintain appropriate glucose levels will improve  Description  Ability to maintain appropriate glucose levels will improve  Outcome: Ongoing     Problem: HEMODYNAMIC STATUS  Goal: Patient has stable vital signs and fluid balance  Outcome: Ongoing     Problem: FLUID AND ELECTROLYTE IMBALANCE  Goal: Fluid and electrolyte balance are achieved/maintained  Outcome: Ongoing     Problem: ACTIVITY INTOLERANCE/IMPAIRED MOBILITY  Goal: Mobility/activity is maintained at optimum level for patient  Outcome: Ongoing
Problem: Falls - Risk of:  Goal: Will remain free from falls  Description  Will remain free from falls  Outcome: Ongoing  Goal: Absence of physical injury  Description  Absence of physical injury  Outcome: Ongoing
Problem: Pain:  Goal: Control of acute pain  Description  Control of acute pain  Outcome: Ongoing     Problem: ACTIVITY INTOLERANCE/IMPAIRED MOBILITY  Goal: Mobility/activity is maintained at optimum level for patient  Outcome: Ongoing
Pt remains free from fall and injury. Non-skid slippers on. Fall risk band on wrist. Pt refusing bed alarm but states \"she will call out\". Instructed to call for assistance. Call light in reach, will monitor. Pt satisfied with current pain medication regimen. Patient's EF (Ejection Fraction) is greater than 40% as of 2017. Patient's weights and intake/output reviewed:    Patient's Last Weight: 88.6 kg obtained by bed scale. Patient's weight was 86.7 kg on 8/20/19; however, she was weighed on a standing scale. Increased weight may be due to different scales. Intake/Output Summary (Last 24 hours) at 8/21/2019 1042  Last data filed at 8/21/2019 1014  Gross per 24 hour   Intake 3220 ml   Output 1475 ml   Net 1745 ml         Patient stated Daily Functional Goal: Breath easier    Ongoing Functional Capacity Assessment:  Patient unable to ambulate d/t amputation on left foot. Pt resting in bed at this time on room air. Pt with complaints of shortness of breath. Pt with pitting lower extremity edema. Patient and/or Family's stated Goal of Care this Admission: increase activity tolerance and be more comfortable prior to discharge      Comorbidities Reviewed No  Patient has a past medical history of Acute osteomyelitis of left hallux ([address] Anemia, Asthma, Cellulitis of foot, CHF (congestive heart failure) ([address] Claudication of left upper extremity due to atherosclerosis [facility]), Diabetic ulcer of left hallux, with necrosis of bone ([address] DVT (deep venous thrombosis) ([address] Kidney stones, Morbid obesity with BMI of 45.0-49.9, adult ([address] Neuroma of hand, Open fracture of left hallux, Pneumonia, Rotator cuff tendinitis, STEMI (ST elevation myocardial infarction) ([address] and Urinary incontinence.          >>For CHF and Comorbidity documentation on Education Time and Topics, please see Education Tab
Taught upon pain scale 1-10, taught upon pain management regimen, and pain medication side effects.
hallux, with necrosis of bone ([address] DVT (deep venous thrombosis) ([address] Kidney stones, Morbid obesity with BMI of 45.0-49.9, adult ([address] Neuroma of hand, Open fracture of left hallux, Pneumonia, Rotator cuff tendinitis, STEMI (ST elevation myocardial infarction) ([address] and Urinary incontinence.          >>For CHF and Comorbidity documentation on Education Time and Topics, please see Education Tab

## 2019-08-21 NOTE — PROGRESS NOTES
Patient assessment complete and charted. VSS. AOx4. Patient currently in pain. PRN pain medication given. Bed locked and in lowest position. Non-skid socks in place. Call light within reach. Patient states no further needs at this time. Will continue to monitor.

## 2019-08-21 NOTE — FLOWSHEET NOTE
hematoma   Hemostasis Intervention Closure device  (minx)   Dressing Applied Transparent occlusive dressing   Skin Color/Condition   Skin Color/Condition (WDL) WDL   Skin Integrity   Skin Integrity (WDL) X   Skin Integrity Bruising  (scattered )   Musculoskeletal   Musculoskeletal (WDL) X   LL Extremity Limited movement;Surgery   Musculoskeletal Details   L Foot Limited movement;Surgery   L Toes Limited movement   Genitourinary   Genitourinary (WDL) WDL   Flank Tenderness No   Suprapubic Tenderness No   Dysuria No   Urine Assessment   Incontinence No   Urine Color JYOTSNA   Urine Appearance JYOTSNA   Urine Odor JYOTSNA   Anus/Rectum   Anus/Rectum (WDL) WDL   Wound 06/17/19 #1, left great toe amp site, DFU crystal 3, onset 6/6/2019   Date First Assessed/Time First Assessed: 06/17/19 1325   Primary Wound Type: Diabetic Ulcer  Wound Description (Comments): #1, left great toe amp site, DFU crystal 3, onset 6/6/2019   Wound Assessment JYOTSNA   Mary-wound Assessment JYOTSNA   Wound 08/15/19 Foot Left;Dorsal   Date First Assessed/Time First Assessed: 08/15/19 1346   Location: Foot  Wound Location Orientation: Left;Dorsal   Wound Assessment JYOTSNA   Mary-wound Assessment JYOTSNA   Incision 08/20/19 Foot Left   Date First Assessed/Time First Assessed: 08/20/19 1928   Location: Foot  Wound Location Orientation: Left   Dressing Status Clean;Dry; Intact  (circ checks good,drsg dry)   Psychosocial   Psychosocial (WDL) WDL   Patient Behaviors Appropriate for age

## 2019-08-21 NOTE — PROGRESS NOTES
Post Cath Lab follow up completed by Cath Lab staff. Access site right Femoral Artery site assessed and WNL. Post Cath restrictions reviewed, no further questions.  Anticipate surgery later today (8/20/2019)

## 2019-08-21 NOTE — PROGRESS NOTES
further \"I am not going to walk anymore until I can put weight on my foot. \"  Stairs/Curb  Stairs?: No(patient declined to attempt)     Balance  Posture: Fair  Sitting - Static: Good  Sitting - Dynamic: Good  Standing - Static: Poor;+  Standing - Dynamic: Poor  Exercises  Hip Flexion: 1 x 5  Knee Long Arc Quad: 1 x 5  Ankle Pumps: 1 x 5     Plan   Plan  Times per week: 3-5 times a week  Times per day: Daily  Plan weeks: 1 week  Specific instructions for Next Treatment: progress mobility as tolerated  Current Treatment Recommendations: Strengthening, Transfer Training, Endurance Training, Balance Training, Manual Therapy - Joint Manipulation, Stair training, Functional Mobility Training, Positioning, Safety Education & Training, Home Exercise Program, Gait Training, Pain Management, Equipment Evaluation, Education, & procurement, ROM, ADL/Self-care Training, Patient/Caregiver Education & Training  Safety Devices  Type of devices: All fall risk precautions in place, Gait belt, Chair alarm in place, Left in chair, Call light within reach, Nurse notified  [address] without Stair Climbing Raw Score : 15 (08/21/19 0933)  AM-PAC Inpatient without Stair Climbing T-Scale Score : 43.03 (08/21/19 0933)  Mobility Inpatient CMS 0-100% Score: 47.43 (08/21/19 0933)  Mobility Inpatient without Stair CMS G-Code Modifier : CK (08/21/19 0933)       Goals  Short term goals  Time Frame for Short term goals: 1 week 8/28/18  Short term goal 1: Sit <> stand with independence   Short term goal 2: Bed <> chair transfer with independence  Short term goal 3: Ascend 3 steps with scooting technique and NWB LLE with independence. Short term goal 4: Patient will perform her exercise program with independence. Short term goal 5: Ambulate 50 feet with RW and modified independence. Patient Goals   Patient goals : \"I am going home. \"        Therapy Time   Individual Concurrent Group Co-treatment   Time In [id number]

## 2019-08-22 NOTE — CARE COORDINATION
CASE MANAGEMENT DISCHARGE SUMMARY      Discharge to: home with [facility], patient continues to refuse SNF      New Durable Medical Equipment ordered/agency: Patient desires a knee roller. [facility] does not cover knee rollers. Placed call to [employer] in [place] and they have knee rollers that she can rent for 65 dollars a month. Patient notified. She states she will have a family member go pick one up at discharge. Transportation: private     Notified: patient aware of discharge plan    Facility/Agency: GISSEL/AVS faxed to [facility]    RN: Tanner Leroy aware of discharge plan    Phone number for report to facility: Pipo Ventura with [facility] aware of discharge order. Note: Discharging nurse to complete GISSEL, reconcile AVS, and place final copy with patient's discharge packet. l.

## 2019-08-22 NOTE — FLOWSHEET NOTE
08/22/19 0750   Assessment   Charting Type Shift assessment   Neurological   Neuro (WDL) WDL   Level of Consciousness 0   Zalma Coma Scale   Eye Opening 4   Best Verbal Response 5   Best Motor Response 6   Lety Coma Scale Score 15   HEENT   HEENT (WDL) X   Teeth Edentulous   Respiratory   Respiratory (WDL) WDL   Respiratory Pattern Regular   Respiratory Depth Normal   Respiratory Quality/Effort Unlabored   Chest Assessment Chest expansion symmetrical   L Breath Sounds Clear   R Breath Sounds Clear   Breath Sounds   Right Upper Lobe Clear   Right Middle Lobe Clear   Right Lower Lobe Clear   Left Upper Lobe Clear   Left Lower Lobe Clear   Cough/Sputum   Sputum How Obtained Spontaneous cough   Cough Non-productive   Sputum Amount None   Sputum Color None   Tenacity None   Cardiac   Cardiac (WDL) WDL   Cardiac Monitor   Telemetry Monitor On No   Gastrointestinal   Abdominal (WDL) X   RUQ Bowel Sounds Active   LUQ Bowel Sounds Active   RLQ Bowel Sounds Active   LLQ Bowel Sounds Active   Abdomen Inspection Distended   Tenderness No guarding   Peripheral Vascular   Peripheral Vascular (WDL) WDL   Edema None   RLE Edema Trace   LLE Edema +1   Sensation RLE Decreased   Sensation LLE Decreased;Pain   RLE Neurovascular Assessment   Capillary Refill Less than/equal to 3 seconds   Color Appropriate for ethnicity   Temperature Warm   R Pedal Pulse +1   LLE Neurovascular Assessment   Capillary Refill Less than/equal to 3 seconds   Color Appropriate for ethnicity   Temperature Warm   L Post Tibial Pulse JYOTSNA   L Pedal Pulse JYOTSNA   Puncture Site Assessment 1   Location Femoral - left   Site Assessment No redness, drainage, swelling or hematoma   Dressing Applied Transparent occlusive dressing   Skin Color/Condition   Skin Color/Condition (WDL) WDL   Skin Integrity   Skin Integrity (WDL) X   Skin Integrity Bruising   Location scattered   Musculoskeletal   Musculoskeletal (WDL) X   LL Extremity Limited movement;Surgery Musculoskeletal Details   L Foot Limited movement;Surgery   L Toes Limited movement   Genitourinary   Genitourinary (WDL) WDL   Flank Tenderness No   Suprapubic Tenderness No   Dysuria No   Urine Assessment   Incontinence No   Urine Color JYOTSNA   Urine Appearance JYOTSNA   Urine Odor JYOTSNA   Anus/Rectum   Anus/Rectum (WDL) WDL   Wound 06/17/19 #1, left great toe amp site, DFU crystal 3, onset 6/6/2019   Date First Assessed/Time First Assessed: 06/17/19 1325   Primary Wound Type: Diabetic Ulcer  Wound Description (Comments): #1, left great toe amp site, DFU crystal 3, onset 6/6/2019   Dressing Status Clean;Dry; Intact   Dressing/Treatment Other (comment)  (unknown - podiatry dressed wound)   Wound Assessment JYOTSNA   Mary-wound Assessment JYOTSNA   Wound 08/15/19 Foot Left;Dorsal   Date First Assessed/Time First Assessed: 08/15/19 1346   Location: Foot  Wound Location Orientation: Left;Dorsal   Dressing Status Clean;Dry; Intact   Dressing/Treatment Other (comment)  (unknown - podiatry dressed wound)   Wound Assessment JYOTSNA   Mary-wound Assessment JYOTSNA   Incision 08/20/19 Foot Left   Date First Assessed/Time First Assessed: 08/20/19 1928   Location: Foot  Wound Location Orientation: Left   Wound Assessment JYOTSNA   Mary-wound Assessment JYOTSNA   Drainage Amount JYOTSNA   Dressing Status Clean;Dry; Intact  (circ checks good,drsg dry)   Psychosocial   Psychosocial (WDL) WDL   Patient Behaviors Appropriate for age

## 2019-08-22 NOTE — PROGRESS NOTES
Pt called out and stated that she feels her blood sugar is low. BG was 52. Gave pt OJ and pt ate dinner tray. Rechecked and BG was 66. Pt now drinking OJ and eating crackers with peanut butter. Will recheck in 15 minutes.

## 2019-08-22 NOTE — DISCHARGE SUMMARY
Hospital Medicine Discharge Summary    Patient: Tyrell Ash     Age: 63 y.o. Gender: female  : 1960   MRN: [id number]  Code Status: Full     Admit Date: 2019   Discharge Date: 2019    Disposition:  Home     Condition at Discharge: Stable    Primary Care Provider: Faith Malave MD    Admitting Physician: Olamide Miller MD  Discharge Physician: Sandra Sage MD       Discharge Diagnoses: Active Hospital Problems    Diagnosis    PVD (peripheral vascular disease) ([facility] [I73.9]    Chronic osteomyelitis of left foot ([facility] [M86.672]    Osteomyelitis ([facility] [M86.9]    Diabetic ulcer of left foot associated with type 2 diabetes mellitus, with bone involvement without evidence of necrosis ([facility] [Z46.038, L97.526]    Coronary artery disease involving native coronary artery of native heart without angina pectoris [I25.10]       Hospital Course:   Admitted with left foot pain following recent treatment for diabetic foot ulcer. MRI of the foot showed osteomyelitis. Assessment/Plan:    Left foot osteomyelitis  Diagnosed with MRI  Continue Abx  LLE angio with PCI to SFA  s/p partial amputation  per Podiatry. PT/OT     Dyslipidemia  Continue statin at home dose     Hypertension  Controlled BP, continue home management. No changes indicated     Diabetes mellitus type 2 with hyperglycemia  Episode of hypoglycemia on , resolved. Continue reduced Insulin regimen. Coronary artery disease  Continue aspirin, statin, nitrate and beta-blocker  No chest pain noted. Asymptomatic    Dispo: Patient with limited mobility due to NWB LLE. Discussed home with [facility] vs SNF. She was adamant about DC home with [facility] and refused SNF.       Exam:   /61   Pulse 70   Temp 98.2 °F (36.8 °C) (Oral)   Resp 18   Ht 5' (1.524 m)   Wt 195 lb 5.2 oz (88.6 kg)   LMP  (LMP Unknown)   SpO2 95%   BMI 38.15 kg/m²   General appearance: No apparent distress, appears stated age and Recently diagnosed osteomyelitis Reason for Exam: Foot Pain ( left big toe amputated in june, admitted to hospital for infection, released yesterday, continues to have pain) Acuity: Acute Type of Exam: Initial Osteomyelitis FINDINGS: The bones appear demineralized and show degenerative changes. Plantar calcaneal spur is noted. There has been interval resection of the 1st metatarsal at its base. Arterial vascular calcifications are seen within the soft tissues. Postsurgical changes of the left foot. Xr Foot Left (min 3 Views)    Result Date: 8/13/2019  EXAMINATION: THREE XRAY VIEWS OF THE LEFT FOOT 8/13/2019 2:14 am COMPARISON: None. HISTORY: ORDERING SYSTEM PROVIDED HISTORY: Left hallux amputation, pain, concern osteo TECHNOLOGIST PROVIDED HISTORY: Reason for exam:->Left hallux amputation, pain, concern osteo Reason for Exam: Left toe amputation in june, pain has suddenly increased over the last couple weeks and it radiates up into the leg; No injury Acuity: Acute Type of Exam: Initial FINDINGS: The patient is status post a transarticular amputation of the 1st digit. There are possible erosions of the metatarsal head, best appreciated on the oblique view, worrisome for osteomyelitis. A plantar calcaneal spur is noted. There is no fracture or dislocation. Possible osteomyelitis of the 1st metatarsal. MRI advised for further evaluation. Mri Foot Left Wo Contrast    Result Date: 8/13/2019  EXAMINATION: MRI OF THE LEFT FOOT WITHOUT CONTRAST, 8/13/2019 7:56 am TECHNIQUE: Multiplanar multisequence MRI of the left foot was performed without the administration of intravenous contrast. COMPARISON: Radiographs 08/13/2019 HISTORY: ORDERING SYSTEM PROVIDED HISTORY: osteomyelitis FINDINGS: Prior amputation of the 1st digit phalanges. There is a soft tissue defect at the dorsal aspect of the foot at the level of the 1st metatarsal head.  There is T1 bone marrow replacement in the 1st metatarsal head and neck as

## 2019-08-23 PROBLEM — M86.179 ACUTE OSTEOMYELITIS OF FOOT (HCC): Status: ACTIVE | Noted: 2019-01-01

## 2019-08-23 NOTE — ED PROVIDER NOTES
on file    Stress: Not on file   Relationships    Social connections:     Talks on phone: Not on file     Gets together: Not on file     Attends [employer] service: Not on file     Active member of club or organization: Not on file     Attends meetings of clubs or organizations: Not on file     Relationship status: Not on file    Intimate partner violence:     Fear of current or ex partner: Not on file     Emotionally abused: Not on file     Physically abused: Not on file     Forced sexual activity: Not on file   Other Topics Concern    Not on file   Social History Narrative    Not on file     Current Facility-Administered Medications   Medication Dose Route Frequency Provider Last Rate Last Dose    vancomycin 1000 mg IVPB in 250 mL D5W addavial  1,000 mg Intravenous Once Windell Yoo,  mL/hr at 08/23/19 1754 1,000 mg at 08/23/19 1754     Allergies   Allergen Reactions    Flexeril [Cyclobenzaprine] Itching    Januvia [Sitagliptin] Other (See Comments)     Causes severe back pain and cramps    Iodine Rash       REVIEW OF SYSTEMS  10 systems reviewed, pertinent positives per HPI otherwise noted to be negative. PHYSICAL EXAM  BP (!) 131/52   Pulse 77   Temp 98.2 °F (36.8 °C) (Oral)   Resp 22   Wt 196 lb (88.9 kg)   LMP  (LMP Unknown)   SpO2 98%   BMI 38.28 kg/m²   GENERAL APPEARANCE: Awake and alert. Cooperative  HEAD: Normocephalic. Atraumatic. EYES: PERRL. EOM's grossly intact. ENT: Mucous membranes are moist.   NECK: Supple. Non-tender  HEART: RRR. LUNGS: Respirations unlabored. CTAB. Good air exchange  ABDOMEN: Soft. Non-distended. Non-tender. No masses. No organomegaly. No guarding or rebound. BACK:  No midline Tenderness. EXTREMITIES: Moves all extremities equally. All extremities neurovascularly intact. Mild left foot tenderness to palpation. Foot is wrapped with a dressing  NEUROLOGICAL: Alert and oriented. CN's 2-12 intact. No gross facial drooping.  Strength 5/5, sensation intact. PSYCHIATRIC: Normal mood and affect. LABS  I have reviewed all labs for this visit.    Results for orders placed or performed during the hospital encounter of 08/23/19   CBC Auto Differential   Result Value Ref Range    WBC 10.9 4.0 - 11.0 K/uL    RBC 3.67 (L) 4.00 - 5.20 M/uL    Hemoglobin 11.2 (L) 12.0 - 16.0 g/dL    Hematocrit 33.4 (L) 36.0 - 48.0 %    MCV 91.0 80.0 - 100.0 fL    MCH 30.5 26.0 - 34.0 pg    MCHC 33.5 31.0 - 36.0 g/dL    RDW 15.3 12.4 - 15.4 %    Platelets 679 252 - 199 K/uL    MPV 10.9 (H) 5.0 - 10.5 fL    Neutrophils % 55.4 %    Lymphocytes % 33.7 %    Monocytes % 6.7 %    Eosinophils % 3.2 %    Basophils % 1.0 %    Neutrophils Absolute 6.0 1.7 - 7.7 K/uL    Lymphocytes Absolute 3.7 1.0 - 5.1 K/uL    Monocytes Absolute 0.7 0.0 - 1.3 K/uL    Eosinophils Absolute 0.4 0.0 - 0.6 K/uL    Basophils Absolute 0.1 0.0 - 0.2 K/uL   Comprehensive Metabolic Panel   Result Value Ref Range    Sodium 139 136 - 145 mmol/L    Potassium 3.9 3.5 - 5.1 mmol/L    Chloride 102 99 - 110 mmol/L    CO2 26 21 - 32 mmol/L    Anion Gap 11 3 - 16    Glucose 49 (LL) 70 - 99 mg/dL    BUN 11 7 - 20 mg/dL    CREATININE 0.9 0.6 - 1.1 mg/dL    GFR Non-African American >60 >60    GFR African American >60 >60    Calcium 9.2 8.3 - 10.6 mg/dL    Total Protein 6.9 6.4 - 8.2 g/dL    Alb 3.8 3.4 - 5.0 g/dL    Albumin/Globulin Ratio 1.2 1.1 - 2.2    Total Bilirubin 0.3 0.0 - 1.0 mg/dL    Alkaline Phosphatase 77 40 - 129 U/L    ALT 11 10 - 40 U/L    AST 8 (L) 15 - 37 U/L    Globulin 3.1 g/dL   Protime-INR   Result Value Ref Range    Protime 13.1 (H) 9.8 - 13.0 sec    INR 1.15 (H) 0.86 - 1.14   Lactic Acid, Plasma   Result Value Ref Range    Lactic Acid 0.6 0.4 - 2.0 mmol/L   Sedimentation Rate   Result Value Ref Range    Sed Rate 47 (H) 0 - 30 mm/Hr   POCT Glucose   Result Value Ref Range    POC Glucose 64 (L) 70 - 99 mg/dl    Performed on ACCU-CHEK    POCT Glucose   Result Value Ref Range    POC Glucose 137 (H) 70 - 99 mg/dl Performed on [facility]            RADIOLOGY    Xr Foot Left (min 3 Views)    Result Date: 8/23/2019  EXAMINATION: THREE XRAY VIEWS OF THE LEFT FOOT 8/23/2019 5:15 pm COMPARISON: 08/13/2019 HISTORY: ORDERING SYSTEM PROVIDED HISTORY: Worsening pain. Recently diagnosed osteomyelitis TECHNOLOGIST PROVIDED HISTORY: Reason for exam:->Worsening pain. Recently diagnosed osteomyelitis Reason for Exam: Foot Pain ( left big toe amputated in june, admitted to hospital for infection, released yesterday, continues to have pain) Acuity: Acute Type of Exam: Initial Osteomyelitis FINDINGS: The bones appear demineralized and show degenerative changes. Plantar calcaneal spur is noted. There has been interval resection of the 1st metatarsal at its base. Arterial vascular calcifications are seen within the soft tissues. Postsurgical changes of the left foot. Xr Foot Left (min 3 Views)    Result Date: 8/13/2019  EXAMINATION: THREE XRAY VIEWS OF THE LEFT FOOT 8/13/2019 2:14 am COMPARISON: None. HISTORY: ORDERING SYSTEM PROVIDED HISTORY: Left hallux amputation, pain, concern osteo TECHNOLOGIST PROVIDED HISTORY: Reason for exam:->Left hallux amputation, pain, concern osteo Reason for Exam: Left toe amputation in june, pain has suddenly increased over the last couple weeks and it radiates up into the leg; No injury Acuity: Acute Type of Exam: Initial FINDINGS: The patient is status post a transarticular amputation of the 1st digit. There are possible erosions of the metatarsal head, best appreciated on the oblique view, worrisome for osteomyelitis. A plantar calcaneal spur is noted. There is no fracture or dislocation. Possible osteomyelitis of the 1st metatarsal. MRI advised for further evaluation.      Mri Foot Left Wo Contrast    Result Date: 8/13/2019  EXAMINATION: MRI OF THE LEFT FOOT WITHOUT CONTRAST, 8/13/2019 7:56 am TECHNIQUE: Multiplanar multisequence MRI of the left foot was performed without the administration VL LOWER EXTREMITY  ARTERIES DUPLEX BILATERAL. Vascular Sonographer Report  Indications for Study:Ulcer/gangrene. Impressions Right Impression 1. The right ankle/brachial index was not available due to noncompressible tibial vessels. 2. There are elevated velocities seen involving the proximal common femoral and proximal superficial femoral arteries. 3. There is multiphasic flow throughout the lower extremity. 4. There is moderate calcific plaque seen throughout the lower extremity. Left Impression 1. The left ankle/brachial index is 0.72. 2. The mid superficial femoral artery demonstrated Doppler silence. Flow is reconstituted at the distal superficial femoral artery via collateral flow. 3. There is abnormal monophasic flow throughout the lower extremity. 4. The mid superficial femoral artery demonstrated large plaque with no color flow. There is moderate to large plaque seen throughout the rest of lower extremity. Conclusions   Summary   1. The right ankle/brachial index was non-diagnostic due to noncompressible  tibial vessels. 2. There is evidence of a 30-49% stenosis involving the right proximal  common femoral artery and the proximal superficial femoral artery. 3. The left ankle/brachial index indicates moderately-severe arterial  insufficiency at rest involving the left lower extremity. There is evidence  of a 30-49% stenosis involving the common femoral and deep femoral arteries. There is occlusive arterial disease involving the mid superficial femoral  artery with flow returning distally via collaterals. Signature   ------------------------------------------------------------------  Electronically signed by Oscar Beltre MD (Interpreting  physician) on 08/15/2019 at 07:10 PM  ------------------------------------------------------------------  Allergies   - Other allergy. Patient Status:Routine. Study Joan Murray 46 - Vascular Lab. Technical Quality:Adequate visualization.  Risk

## 2019-08-23 NOTE — TELEPHONE ENCOUNTER
Call from [address] [place], [place] [address]Jean Baptiste that patient was admitted to [facility]. [employer] did not do a start of care with patient.     FYI
within normal limits

## 2019-08-23 NOTE — H&P
other systems reviewed and negative. PHYSICAL EXAM PERFORMED:    BP (!) 131/52   Pulse 77   Temp 98.2 °F (36.8 °C) (Oral)   Resp 22   Wt 196 lb (88.9 kg)   LMP  (LMP Unknown)   SpO2 98%   BMI 38.28 kg/m²     General appearance:  No apparent distress, appears stated age and cooperative. HEENT:  Normal cephalic, atraumatic without obvious deformity. Pupils equal, round, and reactive to light. Extra ocular muscles intact. Conjunctivae/corneas clear. Neck: Supple, with full range of motion. No jugular venous distention. Trachea midline. Respiratory:  Normal respiratory effort. Clear to auscultation, bilaterally without Rales/Wheezes/Rhonchi. Cardiovascular:  Regular rate and rhythm with normal S1/S2 without murmurs, rubs or gallops. Abdomen: Soft, non-tender, non-distended with normal bowel sounds. Musculoskeletal:  No clubbing, cyanosis or edema bilaterally. Postop left foot. Skin: Skin color, texture, turgor normal.  No rashes or lesions. Neurologic:  Neurovascularly intact without any focal sensory/motor deficits. Cranial nerves: II-XII intact, grossly non-focal.  Psychiatric:  Alert and oriented, thought content appropriate, normal insight  Capillary Refill: Brisk,< 3 seconds   Peripheral Pulses: +2 palpable, equal bilaterally       Labs:     Recent Labs     08/23/19  1646   WBC 10.9   HGB 11.2*   HCT 33.4*        Recent Labs     08/21/19  1048 08/23/19  1646    139   K 3.8 3.9    102   CO2 26 26   BUN 13 11   CREATININE 0.9 0.9   CALCIUM 9.1 9.2   PHOS 3.6  --      Recent Labs     08/23/19  1646   AST 8*   ALT 11   BILITOT 0.3   ALKPHOS 77     Recent Labs     08/23/19  1646   INR 1.15*     No results for input(s): [place] in the last 72 hours.     Urinalysis:      Lab Results   Component Value Date    NITRU Negative 06/03/2019    WBCUA 20-50 06/03/2019    BACTERIA Rare 06/03/2019    RBCUA 0-2 06/03/2019    BLOODU Negative 06/03/2019    SPECGRAV <=1.005 06/03/2019

## 2019-08-24 NOTE — CARE COORDINATION
CASE MANAGEMENT INITIAL ASSESSMENT      Reviewed chart and met with patient today, re: triggered by SNF recs, recent admission   Explained Case Management role/services. yes    Family present: no  Primary contact information: Edgar Raza [phone]    Admit date/status: obs  Diagnosis: Acute osteomyelitis     Insurance: Vikas 28 required for SNF - Y       3 night stay required -  N    Living arrangements, Adls, care needs, prior to admission: lives with daughter in a trailer that has 2 steps. PTA was independent but could only stand limited amount of time. Transportation: [facility]    [address] [employer] [place] at home: Walker_x_Cane__RTS__ BSC__Shower Chair__  02__ HHN__ CPAP__  BiPap__  Hospital Bed__ W/C___ Other__________    Services in the home and/or outpatient, prior to admission: op wound care clinic at [facility]. [facility] was going to start seeing her but she came back to hospital before they came to assess her. Dialysis Facility (if applicable)   · Name:  · Address:  · Dialysis Schedule:  · Phone:  · Fax:    PT/OT recs: SNF    Hospital Exemption Notification (HEN): needed for SNF    Barriers to discharge: none    Plan/comments: patient found she was unable to care for herself alone at home and feels she would benifit from SNF stay. Patient would like referral to [facility] where her daughter works. Referral made to [place] via [employer] and spoke with HERIBERTO who said they will start cert on Monday morning.      ECOC on chart for MD signature yes

## 2019-08-24 NOTE — PROGRESS NOTES
flush  10 mL Intravenous 2 times per day    enoxaparin  40 mg Subcutaneous Daily    nicotine  1 patch Transdermal Daily    insulin lispro  0-12 Units Subcutaneous TID WC    insulin lispro  0-6 Units Subcutaneous Nightly    ferrous sulfate  325 mg Oral Daily with breakfast    oxybutynin  5 mg Oral QAM    oxybutynin  10 mg Oral Nightly     PRN Meds: albuterol, oxyCODONE-acetaminophen, simethicone, albuterol sulfate HFA, sodium chloride flush, magnesium hydroxide, ondansetron, glucose, dextrose, glucagon (rDNA), dextrose, acetaminophen    No intake or output data in the 24 hours ending 08/24/19 0908    Physical Exam Performed:    /72   Pulse 87   Temp 98 °F (36.7 °C) (Oral)   Resp 20   Ht 5' (1.524 m)   Wt 196 lb (88.9 kg)   LMP  (LMP Unknown)   SpO2 92%   BMI 38.28 kg/m²     General appearance: No apparent distress, appears stated age and cooperative. HEENT: Pupils equal, round, and reactive to light. Conjunctivae/corneas clear. Neck: Supple, with full range of motion. No jugular venous distention. Trachea midline. Respiratory:  Normal respiratory effort. Clear to auscultation, bilaterally without Rales/Wheezes/Rhonchi. Cardiovascular: Regular rate and rhythm with normal S1/S2 without murmurs, rubs or gallops. Abdomen: Soft, non-tender, non-distended with normal bowel sounds. Musculoskeletal: No clubbing, cyanosis or edema bilaterally. Skin: Skin color, texture, turgor normal.  No rashes or lesions. Surgical incision over the left foot that is dressed  Neurologic:  Neurovascularly intact without any focal sensory/motor deficits.  Cranial nerves: II-XII intact, grossly non-focal.  Psychiatric: Alert and oriented, thought content appropriate, normal insight  Capillary Refill: Brisk,< 3 seconds   Peripheral Pulses: +2 palpable, equal bilaterally       Labs:   Recent Labs     08/23/19  1646 08/24/19  0701   WBC 10.9  --    HGB 11.2* 10.0*   HCT 33.4* 29.9*     --      Recent Labs 08/21/19  1048 08/23/19  1646 08/24/19  0701    139 140   K 3.8 3.9 4.1    102 107   CO2 26 26 25   BUN 13 11 12   CREATININE 0.9 0.9 1.0   CALCIUM 9.1 9.2 8.5   PHOS 3.6  --   --      Recent Labs     08/23/19  1646 08/24/19  0701   AST 8* 7*   ALT 11 8*   BILITOT 0.3 0.3   ALKPHOS 77 65     Recent Labs     08/23/19  1646   INR 1.15*     No results for input(s): [place] in the last 72 hours. Urinalysis:      Lab Results   Component Value Date    NITRU Negative 06/03/2019    WBCUA 20-50 06/03/2019    BACTERIA Rare 06/03/2019    RBCUA 0-2 06/03/2019    BLOODU Negative 06/03/2019    SPECGRAV <=1.005 06/03/2019    GLUCOSEU >=1000 06/03/2019    GLUCOSEU 100 05/26/2012       Radiology:  XR FOOT LEFT (MIN 3 VIEWS)   Final Result   Postsurgical changes of the left foot. Assessment/Plan:    Active Hospital Problems    Diagnosis    Acute osteomyelitis of foot ([facility] [address] [M86.179]     Left foot wound, postop-status post left hallux amputation, partial first ray amputation ,angioplasty left lower extremity  There are no signs of worsening of the wound. Plain x-ray only shows postsurgical changes as expected. Continue with antibiotics that were ordered prior to admission. No need for IV antibiotics at this time. Wound care as previously ordered, notify podiatry        Foot pain  Patient requests for increase in narcotic doses were noted from previous admission. .        Diabetes mellitus type II  Continue long-acting insulin as well as sliding scale insulin. Came in hypoglycemic, may be an indication that she is not capable of taking care of herself at home. Currently blood sugar stable        Tobacco abuse  Smoking cessation advice was given, once more     Dyslipidemia  Continue home dose of Lipitor     Hypertension  Blood pressure is controlled. Monitor vitals.   No changes in management        Hypothyroidism  Continue home dose of Synthroid with no changes.        Discussed with patient, questions answered       DVT Prophylaxis: Lovenox  Diet: DIET CARB CONTROL; Carb Control: 4 carb choices (60 gms)/meal  Code Status: Full Code    PT/OT Eval Status: Ordered    Dispo -awaiting for skilled nursing facility    Bruna Pérez MD

## 2019-08-24 NOTE — PROGRESS NOTES
pain  Activity Tolerance: Patient had difficulty to perform ambulation with standard walker and could not hop to clear the RLE. Patient attempted to weight bear on the LLE but was eductaed on the precautions again and ambulation was stopped. Patient was able to tolerate therapeutic exercises and transfer training without any adverse effects. Patient Diagnosis(es): The primary encounter diagnosis was Hypoglycemia. A diagnosis of Osteomyelitis of left foot, unspecified type [facility]) was also pertinent to this visit. has a past medical history of Acute osteomyelitis of left hallux ([facility] [address] Anemia, Asthma, Cellulitis of foot, CHF (congestive heart failure) ([facility] [address] Claudication of left upper extremity due to atherosclerosis [facility]), Diabetic ulcer of left hallux, with necrosis of bone ([facility] [address] DVT (deep venous thrombosis) ([facility] [address] Kidney stones, Morbid obesity with BMI of 45.0-49.9, adult ([facility] [address] Neuroma of hand, Open fracture of left hallux, Pneumonia, Rotator cuff tendinitis, STEMI (ST elevation myocardial infarction) ([facility] [address] and Urinary incontinence. has a past surgical history that includes Lithotripsy;  section; vascular surgery (Left, 2005); Dilation and curettage of uterus; Appendectomy; Coronary angioplasty with stent (14); Cataract removal with implant (Bilateral, 2015); Foot Amputation (Left, 6/3/2019); vascular surgery (Left, 10/2007); and Foot Amputation (Left, 2019).     Restrictions  Restrictions/Precautions  Restrictions/Precautions: General Precautions, Fall Risk, Weight Bearing  Lower Extremity Weight Bearing Restrictions  Left Lower Extremity Weight Bearing: Non Weight Bearing  Position Activity Restriction  Other position/activity restrictions: High fall risk per nursing assessment, up with assist     Vision/Hearing  Vision: Impaired  Vision Exceptions: Wears glasses at all times  Hearing: Within functional limits       Subjective  General  Chart Reviewed: Yes  Patient assessed for rehabilitation services?: Yes  Response To Previous Treatment: Not applicable  Family / Caregiver Present: No  Referring Practitioner: Kady Dixon MD  Referral Date : 08/23/19  Diagnosis: Acute osteomylitis of the the L foot (L Hallus amputation)  Follows Commands: Within Functional Limits  Pain Screening  Patient Currently in Pain: Yes  Pain Assessment  Pain Assessment: 0-10  Pain Level: 8  Patient's Stated Pain Goal: No pain  Pain Type: Surgical pain;Acute pain  Pain Location: Foot  Pain Orientation: Left  Pain Descriptors: Aching;Burning  Pain Frequency: Continuous  Pain Onset: On-going  Clinical Progression: Not changed  Functional Pain Assessment: Prevents or interferes with many active not passive activities  Non-Pharmaceutical Pain Intervention(s): Therapeutic presence;Repositioned  Vital Signs  Pulse: 66  Heart Rate Source: Monitor  BP: 121/71  BP Location: Right upper arm  BP Upper/Lower: Upper  Patient Position: Up in chair  Patient Currently in Pain: Yes  Oxygen Therapy  SpO2: 97 %  Pulse Oximeter Device Mode: Intermittent  Pulse Oximeter Device Location: Right  O2 Device: None (Room air)     Orientation  Orientation  Overall Orientation Status: Within Normal Limits     Social/Functional History  Social/Functional History  Lives With: Family(daughter & grandkids)  Type of Home: Mobile home  Home Layout: One level  Home Access: Stairs to enter without rails  Entrance Stairs - Number of Steps: 2  Bathroom Shower/Tub: Tub/Shower unit  Home Equipment: 4 wheeled walker, [employer] Help From: (older grandkind, and daughter.)  ADL Assistance: Independent  Transfer Assistance: Needs assistance  Occupation: On disability  Additional Comments: pt reports unable to access bathroom at home; per chart unable to manage at home     Cognition   Cognition  Overall Cognitive Status: Exceptions  Arousal/Alertness: Appropriate responses to stimuli  Following Commands:  Follows one step commands with increased time  Memory: Decreased recall of precautions;Decreased short term memory;Decreased recall of recent events  Safety Judgement: Decreased awareness of need for assistance;Decreased awareness of need for safety  Insights: Decreased awareness of deficits  Initiation: Requires cues for some    Objective   Strength RLE  Strength RLE: WFL  Comment: Grossly RLE 4/5  Strength LLE  Strength LLE: Exception  Comment: Grossly 3+/5  Tone RLE  RLE Tone: Normotonic  Motor Control  Gross Motor?: WNL  Sensation  Overall Sensation Status: WFL  Bed mobility  Bridging: Modified independent   Rolling to Left: Modified independent  Rolling to Right: Modified independent  Supine to Sit: Modified independent  Sit to Supine: Modified independent  Scooting: Modified independent  Transfers  Sit to Stand: Stand by assistance  Stand to sit: Stand by assistance  Bed to Chair: Contact guard assistance(with standard walker)  Stand Pivot Transfers: Stand by assistance  Ambulation  Ambulation?: Yes  Ambulation 1  Surface: level tile  Device: Standard Walker  Assistance: Minimal assistance  Quality of Gait: Patient was unable to hop on the RLE and needed Min A to perform step to pattern with NWB precaution on the LLE. Gait Deviations: Slow Milli; Increased MYA; Decreased step length;Decreased step height;Shuffles;Staggers  Distance: 1 feet  Stairs/Curb  Stairs?: No(Patient is unsafe to attempt the stair assessment at this time.)     Balance  Sitting - Static: Good  Sitting - Dynamic: Good  Standing - Static: Fair;-  Standing - Dynamic: Fair;-  Exercises  Straight Leg Raise: BLE 10 reps  Gluteal Sets: BLE 10 reps  Hip Flexion: BLE 10 reps  Knee Long Arc Quad: BLE 10 reps  Ankle Pumps: BLE 30 reps  Comments: Triceps push ups from chair using arm rests and also while standing with standard walker for 10 reps x 2 sets.      Plan   Plan  Times per week: 5-7x/week  Current Treatment Recommendations: Strengthening, Functional Mobility Training,

## 2019-08-24 NOTE — DISCHARGE INSTR - COC
PM   Drainage Amount Scant 8/15/2019  1:42 PM   Drainage Description Serosanguinous 8/15/2019  1:42 PM   Odor None 8/15/2019  1:42 PM   Margins Attached edges; Defined edges 8/15/2019  1:42 PM   Exposed structure Bone 8/15/2019  1:42 PM   Mary-wound Assessment JYOTSNA 8/22/2019  4:40 PM   Red%Wound Bed 20 8/15/2019  1:42 PM   Yellow%Wound Bed 80 8/15/2019  1:42 PM   Culture Taken No 8/15/2019  1:42 PM   Number of days: 67       Wound 08/15/19 Foot Left;Dorsal (Active)   Wound Image   8/15/2019  1:42 PM   Wound Other 8/23/2019 11:31 PM   Dressing Status Clean;Dry; Intact 8/24/2019  8:15 AM   Dressing Changed Changed/New 8/18/2019  9:47 AM   Dressing/Treatment Ace Wrap 8/24/2019  8:15 AM   Wound Cleansed Rinsed/Irrigated with saline 8/18/2019  9:47 AM   Dressing Change Due 08/17/19 8/16/2019  6:15 PM   Wound Length (cm) 0.5 cm 8/15/2019  1:42 PM   Wound Width (cm) 0.4 cm 8/15/2019  1:42 PM   Wound Depth (cm) 0.1 cm 8/15/2019  1:42 PM   Wound Surface Area (cm^2) 0.2 cm^2 8/15/2019  1:42 PM   Wound Volume (cm^3) 0.02 cm^3 8/15/2019  1:42 PM   Distance Tunneling (cm) 0 cm 8/15/2019  1:42 PM   Tunneling Position ___ O'Clock 0 8/15/2019  1:42 PM   Undermining Starts ___ O'Clock 0 8/15/2019  1:42 PM   Undermining Ends___ O'Clock 0 8/15/2019  1:42 PM   Undermining Maxium Distance (cm) 0 8/15/2019  1:42 PM   Wound Assessment JYOTSNA 8/23/2019 11:31 PM   Drainage Amount None 8/23/2019 11:31 PM   Drainage Description Serosanguinous 8/16/2019  6:15 PM   Odor None 8/23/2019 11:31 PM   Margins Attached edges; Defined edges 8/16/2019  6:15 PM   Mary-wound Assessment JYOTSNA 8/23/2019 11:31 PM   Yellow%Wound Bed 100 8/15/2019  1:42 PM   Culture Taken No 8/16/2019  6:15 PM   Number of days: 8        Elimination:  Continence:   · Bowel: Yes  · Bladder: Yes  Urinary Catheter: None   Colostomy/Ileostomy/Ileal Conduit: No       Date of Last BM: ***  No intake or output data in the 24 hours ending 08/24/19 1007  No intake/output data recorded.     Safety

## 2019-08-25 NOTE — PROGRESS NOTES
Hospitalist Progress Note      PCP: Garret Ontiveros MD    Date of Admission: 8/23/2019    Chief Complaint: Foot pain    Hospital Course:   63 y.o. female who presented to [facility] with recurrent left foot pain. Previously admitted with left hallux infection and osteomyelitis. Had hallux amputation. Postoperatively, developed first metatarsal osteomyelitis. Was seen by vascular surgery. Had revascularization procedure on the left lower extremity on 8/19/19, subsequently had partial first ray amputation, on 8/20/2019. Clearance cultures of the surgical borders were sent, and are pending at the time of this report. Surgery is thought to be definitive. Patient was discharged with 7 days of oral Augmentin. Skilled nursing facility was recommended but patient declined. Comes back today with worsening foot pain and concerned about worsening infection.   Evaluated in the emergency room and being admitted for therapy evaluation and an eventual skilled nursing facility transfer    Subjective:   Pain is controlled, no chest pain, fever, shortness of breath no change in mental status            Medications:  Reviewed    Infusion Medications    dextrose       Scheduled Medications    amoxicillin-clavulanate  1 tablet Oral 2 times per day    aspirin  81 mg Oral Daily    atorvastatin  40 mg Oral Nightly    buPROPion  150 mg Oral Daily    vitamin D  1,000 Units Oral Daily    clopidogrel  75 mg Oral Daily    fluticasone  2 spray Nasal Daily    furosemide  20 mg Oral BID    insulin glargine  30 Units Subcutaneous BID    isosorbide mononitrate  30 mg Oral Daily    levothyroxine  50 mcg Oral Daily    linagliptin  5 mg Oral Daily    lisinopril  5 mg Oral Daily    metFORMIN  1,000 mg Oral BID WC    metoprolol tartrate  25 mg Oral BID    montelukast  10 mg Oral Daily    pantoprazole  40 mg Oral QAM AC    ranolazine  500 mg Oral BID    mometasone-formoterol  2 puff Inhalation BID    sodium chloride flush  10 mL Intravenous 2 times per day    enoxaparin  40 mg Subcutaneous Daily    nicotine  1 patch Transdermal Daily    insulin lispro  0-12 Units Subcutaneous TID WC    insulin lispro  0-6 Units Subcutaneous Nightly    ferrous sulfate  325 mg Oral Daily with breakfast    oxybutynin  5 mg Oral QAM    oxybutynin  10 mg Oral Nightly     PRN Meds: albuterol, oxyCODONE-acetaminophen, simethicone, albuterol sulfate HFA, sodium chloride flush, magnesium hydroxide, ondansetron, glucose, dextrose, glucagon (rDNA), dextrose, acetaminophen      Intake/Output Summary (Last 24 hours) at 8/25/2019 0740  Last data filed at 8/25/2019 0305  Gross per 24 hour   Intake 960 ml   Output --   Net 960 ml       Physical Exam Performed:    BP (!) 158/74   Pulse 76   Temp 98.2 °F (36.8 °C) (Oral)   Resp 16   Ht 5' (1.524 m)   Wt 196 lb (88.9 kg)   LMP  (LMP Unknown)   SpO2 96%   BMI 38.28 kg/m²     General appearance: No apparent distress, appears stated age and cooperative. HEENT: Pupils equal, round, and reactive to light. Conjunctivae/corneas clear. Neck: Supple, with full range of motion. No jugular venous distention. Trachea midline. Respiratory:  Normal respiratory effort. Clear to auscultation, bilaterally without Rales/Wheezes/Rhonchi. Cardiovascular: Regular rate and rhythm with normal S1/S2 without murmurs, rubs or gallops. Abdomen: Soft, non-tender, non-distended with normal bowel sounds. Musculoskeletal: No clubbing, cyanosis or edema bilaterally. Skin: Skin color, texture, turgor normal.  No rashes or lesions. Surgical incision over the left foot that is dressed  Neurologic:  Neurovascularly intact without any focal sensory/motor deficits.  Cranial nerves: II-XII intact, grossly non-focal.  Psychiatric: Alert and oriented, thought content appropriate, normal insight  Capillary Refill: Brisk,< 3 seconds   Peripheral Pulses: +2 palpable, equal bilaterally       Labs:   Recent Labs     08/23/19  7626 08/24/19  0701   WBC 10.9  --    HGB 11.2* 10.0*   HCT 33.4* 29.9*     --      Recent Labs     08/23/19  1646 08/24/19  0701    140   K 3.9 4.1    107   CO2 26 25   BUN 11 12   CREATININE 0.9 1.0   CALCIUM 9.2 8.5     Recent Labs     08/23/19  1646 08/24/19  0701   AST 8* 7*   ALT 11 8*   BILITOT 0.3 0.3   ALKPHOS 77 65     Recent Labs     08/23/19  1646   INR 1.15*     No results for input(s): Eddie Limber in the last 72 hours. Urinalysis:      Lab Results   Component Value Date    NITRU Negative 06/03/2019    WBCUA 20-50 06/03/2019    BACTERIA Rare 06/03/2019    RBCUA 0-2 06/03/2019    BLOODU Negative 06/03/2019    SPECGRAV <=1.005 06/03/2019    GLUCOSEU >=1000 06/03/2019    GLUCOSEU 100 05/26/2012       Radiology:  XR FOOT LEFT (MIN 3 VIEWS)   Final Result   Postsurgical changes of the left foot. Assessment/Plan:    Active Hospital Problems    Diagnosis    [facility] [M86.179]     Left foot wound, postop-status post left hallux amputation, partial first ray amputation ,angioplasty left lower extremity  There are no signs of worsening of the wound. Plain x-ray only shows postsurgical changes as expected. Continue with antibiotics that were ordered prior to admission. No need for IV antibiotics at this time. Wound care as previously ordered, notify podiatry        Foot pain  Patient requests for increase in narcotic doses were noted from previous admission. .        Diabetes mellitus type II  Continue long-acting insulin as well as sliding scale insulin. Came in hypoglycemic, may be an indication that she is not capable of taking care of herself at home. Currently blood sugar stable        Tobacco abuse  Smoking cessation advice was given, once more     Dyslipidemia  Continue home dose of Lipitor     Hypertension  Blood pressure is controlled. Monitor vitals.   No changes in management        Hypothyroidism  Continue home dose of Synthroid

## 2019-08-25 NOTE — CONSULTS
PRN  amoxicillin-clavulanate (AUGMENTIN) 875-125 MG per tablet 1 tablet, 1 tablet, Oral, 2 times per day  aspirin EC tablet 81 mg, 81 mg, Oral, Daily  atorvastatin (LIPITOR) tablet 40 mg, 40 mg, Oral, Nightly  buPROPion (WELLBUTRIN XL) extended release tablet 150 mg, 150 mg, Oral, Daily  vitamin D (CHOLECALCIFEROL) tablet 1,000 Units, 1,000 Units, Oral, Daily  clopidogrel (PLAVIX) tablet 75 mg, 75 mg, Oral, Daily  fluticasone (FLONASE) 50 MCG/ACT nasal spray 2 spray, 2 spray, Nasal, Daily  furosemide (LASIX) tablet 20 mg, 20 mg, Oral, BID  insulin glargine (LANTUS) injection vial 30 Units, 30 Units, Subcutaneous, BID  isosorbide mononitrate (IMDUR) extended release tablet 30 mg, 30 mg, Oral, Daily  levothyroxine (SYNTHROID) tablet 50 mcg, 50 mcg, Oral, Daily  linagliptin (TRADJENTA) tablet 5 mg, 5 mg, Oral, Daily  lisinopril (PRINIVIL;ZESTRIL) tablet 5 mg, 5 mg, Oral, Daily  metFORMIN (GLUCOPHAGE) tablet 1,000 mg, 1,000 mg, Oral, BID WC  metoprolol tartrate (LOPRESSOR) tablet 25 mg, 25 mg, Oral, BID  montelukast (SINGULAIR) tablet 10 mg, 10 mg, Oral, Daily  pantoprazole (PROTONIX) tablet 40 mg, 40 mg, Oral, QAM AC  oxyCODONE-acetaminophen (PERCOCET)  MG per tablet 1 tablet, 1 tablet, Oral, Q4H PRN  ranolazine (RANEXA) extended release tablet 500 mg, 500 mg, Oral, BID  simethicone (MYLICON) chewable tablet 80 mg, 80 mg, Oral, 4x Daily PRN  mometasone-formoterol (DULERA) 200-5 MCG/ACT inhaler 2 puff, 2 puff, Inhalation, BID  albuterol sulfate  (90 Base) MCG/ACT inhaler 2 puff, 2 puff, Inhalation, Q4H PRN  sodium chloride flush 0.9 % injection 10 mL, 10 mL, Intravenous, 2 times per day  sodium chloride flush 0.9 % injection 10 mL, 10 mL, Intravenous, PRN  magnesium hydroxide (MILK OF MAGNESIA) 400 MG/5ML suspension 30 mL, 30 mL, Oral, Daily PRN  ondansetron (ZOFRAN) injection 4 mg, 4 mg, Intravenous, Q6H PRN  enoxaparin (LOVENOX) injection 40 mg, 40 mg, Subcutaneous, Daily  glucose (GLUTOSE) 40 % oral gel

## 2019-08-26 NOTE — PROGRESS NOTES
Physical Therapy    Physical therapy attempted to treat this patient three times today. However the patient refused to participate each time. Patient said that she was nauseous and fatigued. Patient said that she would be willing to participate tomorrow. PT will re-attempt to treat this patient tomorrow.      Barbaraann Mortimer PT DPT

## 2019-08-26 NOTE — PROGRESS NOTES
Nutrition Assessment    Type and Reason for Visit: Initial, Positive Nutrition Screen    Nutrition Recommendations:   1. Continue CC 4 carb svg/meal diet  2. RD encouraged adequate sources of protein at meal time  3. Will continue to monitor pt nutritional status, intake, weights, bowel habits, and need for further nutritional intervention    Nutrition Assessment: Patient is nutritionally compromised AEB acute ostemyelitis, s/p partial first ray amputation requiring increased nutrient needs and poor PO intake r/t N/V x1 day. RD attempted to discuss with patient BG control in relation to wound prevention and healing but patient started to have emesis during nutrition visit. Will follow-up regarding diabeitc diet education and wound prevention pending appropriateness for diet education. Pt reports being nauseous x2-3 days since admission. PMHx significant for DM, neuropathy, and PVD. A1c is 13.8 as of this past June. Pt denies ONS at this time, RD encouraged adequate PO intake at meals. Will continue to monitor nutrition status and need for further nutrition intervention. Malnutrition Assessment:  · Malnutrition Status: No malnutrition  · Context: Acute illness or injury  · Findings of the 6 clinical characteristics of malnutrition (Minimum of 2 out of 6 clinical characteristics is required to make the diagnosis of moderate or severe Protein Calorie Malnutrition based on AND/ASPEN Guidelines):  1. Energy Intake-Unable to assess(intake declined today, 50%< prior to today), Unable to assess    2. Weight Loss-No significant weight loss, in 6 months  3. Fat Loss-No significant subcutaneous fat loss,    4. Muscle Loss-No significant muscle mass loss,    5. Fluid Accumulation-Mild fluid accumulation, Extremities  6.  Strength-Not measured    Nutrition Risk Level:  Moderate    Nutrient Needs:  · Estimated Daily Total Kcal: 0586-4215(40-04)  · Estimated Daily Protein (g): 60-69(1.3-1.5)  · Estimated Daily Total

## 2019-08-27 NOTE — DISCHARGE SUMMARY
Hospital Medicine Discharge Summary    Patient ID: Ryann Nixon      Patient's PCP: Papi Bland MD    Admit Date: 8/23/2019     Discharge Date: 8/27/2019      Admitting Physician: Amauri Preciado MD     Discharge Physician: Chadwick Swenson MD     Discharge Diagnoses: Active Hospital Problems    Diagnosis    Acute osteomyelitis of foot ([facility] [M86.179]       The patient was seen and examined on day of discharge and this discharge summary is in conjunction with any daily progress note from day of discharge. Hospital Course:   Left foot wound, postop-status post left hallux amputation, partial first ray amputation ,angioplasty left lower extremity  There are no signs of worsening of the wound. Plain x-ray only shows postsurgical changes as expected. Podiatry input appreciated. No need for  antibiotics DC. Wound care per podiatry        Nausea vomiting and not feeling well-resolved       .   Diabetes mellitus type II  Started on lower dose of Lantus because of episode of hypoglycemia  Monitor with sliding scale insulin        Tobacco abuse  Smoking cessation advice was given, once more     Dyslipidemia  Continue home dose of Lipitor     Hypertension  Blood pressure is controlled.  Monitor vitals.  No changes in management        Hypothyroidism  Continue home dose of Synthroid with no changes.        On Lasix? Echocardiogram done in 2017 no evidence of heart failure ejection fraction preserved and no diastolic dysfunction-possibly secondary edema currently no obvious edema and patient does not enough overly-we change the Lasix to 20 daily and monitor closely                Physical Exam Performed:     BP (!) 145/68   Pulse 71   Temp 97.8 °F (36.6 °C)   Resp 16   Ht 5' (1.524 m)   Wt 196 lb (88.9 kg)   LMP  (LMP Unknown)   SpO2 98%   BMI 38.28 kg/m²       General appearance:  Not comfortable . C/o nausea , appears stated age and cooperative.   HEENT: Pupils equal, round, and reactive

## 2019-08-27 NOTE — PROGRESS NOTES
1614)  Mobility Inpatient CMS 0-100% Score: 46.58 (08/27/19 1614)  Mobility Inpatient CMS G-Code Modifier : CK (08/27/19 1614)          Goals  Short term goals  Time Frame for Short term goals: 08/27/2019  Short term goal 1: Patient will be able to perform bed to chair transfers using LRAD with LLE precautions with supervision. Short term goal 2: Patient will be able to walk for 10 feet with NWB on the LLE using LRAD on level surface with SBA. Short term goal 3: Patient will be able to negotiate 1 step with LRAD with CGA using no hand rails. Short term goal 4: Patient will be able to tolerate 15 reps of LEs strengthening exercises as part of HEP by 8/27. Long term goals  Time Frame for Long term goals : 08/30/2019  Long term goal 1: Patient will be able to perform bed to chair transfers using LRAD with LLE precautions with modified independence. Long term goal 2: Patient will be able to walk for 40 feet with NWB on the LLE using LRAD on level surface with modified independence. Long term goal 3: Patient will be able to negotiate 3 step with LRAD with SBA using no hand rails. Patient Goals   Patient goals : \"I want to walk again\"    Plan    Plan  Times per week: 5-7x/week  Current Treatment Recommendations: Strengthening, Functional Mobility Training, Neuromuscular Re-education, Home Exercise Program, Equipment Evaluation, Education, & procurement, Transfer Training, Gait Training, Safety Education & Training, Balance Training, Endurance Training, Stair training, Patient/Caregiver Education & Training, Positioning  Safety Devices  Type of devices:  All fall risk precautions in place, Call light within reach, Patient at risk for falls, Chair alarm in place, Nurse notified, Left in chair  Restraints  Initially in place: No     Therapy Time   Individual Concurrent Group Co-treatment   Time In 1542         Time Out 1605         Minutes 23         Timed Code Treatment Minutes: [address]

## 2019-08-27 NOTE — PROGRESS NOTES
patch Transdermal Daily    insulin lispro  0-12 Units Subcutaneous TID WC    insulin lispro  0-6 Units Subcutaneous Nightly    oxybutynin  5 mg Oral QAM    oxybutynin  10 mg Oral Nightly     PRN Meds: hydrALAZINE, mometasone-formoterol, albuterol, oxyCODONE-acetaminophen, simethicone, albuterol sulfate HFA, sodium chloride flush, magnesium hydroxide, ondansetron, glucose, dextrose, glucagon (rDNA), dextrose, acetaminophen      Intake/Output Summary (Last 24 hours) at 8/27/2019 0758  Last data filed at 8/26/2019 2056  Gross per 24 hour   Intake 180 ml   Output 700 ml   Net -520 ml       Physical Exam Performed:    BP (!) 178/82   Pulse 89   Temp 97.6 °F (36.4 °C) (Oral)   Resp 20   Ht 5' (1.524 m)   Wt 196 lb (88.9 kg)   LMP  (LMP Unknown)   SpO2 97%   BMI 38.28 kg/m²     General appearance:  Not comfortable . C/o nausea , appears stated age and cooperative. HEENT: Pupils equal, round, and reactive to light. Conjunctivae/corneas clear. Neck: Supple, with full range of motion. No jugular venous distention. Trachea midline. Respiratory:  Normal respiratory effort. Clear to auscultation, bilaterally without Rales/Wheezes/Rhonchi. Cardiovascular: Regular rate and rhythm with normal S1/S2 without murmurs, rubs or gallops. Abdomen: Soft, non-tender, non-distended with normal bowel sounds. Musculoskeletal: No clubbing, cyanosis or edema bilaterally. Skin: Skin color, texture, turgor normal.  No rashes or lesions. Surgical incision over the left foot that is dressed   Neurologic:  Neurovascularly intact without any focal sensory/motor deficits.  Cranial nerves: II-XII intact, grossly non-focal.  Psychiatric: Alert and oriented, thought content appropriate, normal insight  Capillary Refill: +   Peripheral Pulses: +       Labs:   Recent Labs     08/26/19  1031   WBC 8.3   HGB 11.2*   HCT 33.3*        Recent Labs     08/26/19  1031      K 3.6      CO2 25   BUN 10   CREATININE 0.8   CALCIUM 9.1     No results for input(s): AST, ALT, BILIDIR, BILITOT, ALKPHOS in the last 72 hours. No results for input(s): INR in the last 72 hours. No results for input(s): [place]  in the last 72 hours. Urinalysis:      Lab Results   Component Value Date    NITRU Negative 06/03/2019    WBCUA 20-50 06/03/2019    BACTERIA Rare 06/03/2019    RBCUA 0-2 06/03/2019    BLOODU Negative 06/03/2019    SPECGRAV <=1.005 06/03/2019    GLUCOSEU >=1000 06/03/2019    GLUCOSEU 100 05/26/2012       Radiology:  XR FOOT LEFT (MIN 3 VIEWS)   Final Result   Postsurgical changes of the left foot. Assessment/Plan:    Active Hospital Problems    Diagnosis    Acute osteomyelitis of foot ([facility] [M86.179]     Left foot wound, postop-status post left hallux amputation, partial first ray amputation ,angioplasty left lower extremity  There are no signs of worsening of the wound. Plain x-ray only shows postsurgical changes as expected. Continue with antibiotics that were ordered prior to admission. No need for IV antibiotics at this time. Wound care as previously ordered, notify podiatry        Nausea vomiting and not feeling well-change Protonix to IV, IV fluids, Zofran and Phenergan as needed, labs to rule out renal failure as well as electrolyte imbalance      .        Diabetes mellitus type II  Continue long-acting insulin as well as sliding scale insulin. Came in hypoglycemic, may be an indication that she is not capable of taking care of herself at home. Glycemia again this morning most probably secondary to poor intake  -DC long acting insulin and monitor blood sugar with sliding scale insulin  -IV dextrose given and currently blood sugar is in mid 100s        Tobacco abuse  Smoking cessation advice was given, once more     Dyslipidemia  Continue home dose of Lipitor     Hypertension  Blood pressure is controlled. Monitor vitals.   No changes in management        Hypothyroidism  Continue home dose of

## 2019-08-27 NOTE — PLAN OF CARE
Pt educated about using 0-10 scale for pain assessment and encouraged to call staff for any unrelieved pain following interventions. Pt also educated about non-pharmaceutical pain interventions, such as ice and repositioning. Pt responded to education well and verbalized understanding. Will continue to monitor.

## 2019-10-01 NOTE — TELEPHONE ENCOUNTER
Call from Ayaka Turner from [employer], caller is requesting verbal orders for home care. Plan of care is PT 2 x per week for four weeks. Patient declined OT. Med rec's showed some in accuracies. Please call for order and to go over the meds.   Call Ayaka Turner @ [phone]

## 2019-10-02 NOTE — TELEPHONE ENCOUNTER
OK for hhc. Our med list is from 8/27 when pt left hospital, but meds could have changed in ECF. Pls let me know what the inaccuracies are.

## 2020-01-01 ENCOUNTER — TELEPHONE (OUTPATIENT)
Dept: FAMILY MEDICINE CLINIC | Age: 60
End: 2020-01-01

## 2020-01-01 ENCOUNTER — APPOINTMENT (OUTPATIENT)
Dept: GENERAL RADIOLOGY | Age: 60
DRG: 192 | End: 2020-01-01
Payer: COMMERCIAL

## 2020-01-01 ENCOUNTER — APPOINTMENT (OUTPATIENT)
Dept: GENERAL RADIOLOGY | Age: 60
DRG: 194 | End: 2020-01-01
Payer: COMMERCIAL

## 2020-01-01 ENCOUNTER — APPOINTMENT (OUTPATIENT)
Dept: ULTRASOUND IMAGING | Age: 60
DRG: 192 | End: 2020-01-01
Payer: COMMERCIAL

## 2020-01-01 ENCOUNTER — TELEPHONE (OUTPATIENT)
Dept: PULMONOLOGY | Age: 60
End: 2020-01-01

## 2020-01-01 ENCOUNTER — CARE COORDINATION (OUTPATIENT)
Dept: CASE MANAGEMENT | Age: 60
End: 2020-01-01

## 2020-01-01 ENCOUNTER — APPOINTMENT (OUTPATIENT)
Dept: INTERVENTIONAL RADIOLOGY/VASCULAR | Age: 60
DRG: 194 | End: 2020-01-01
Payer: COMMERCIAL

## 2020-01-01 ENCOUNTER — CARE COORDINATION (OUTPATIENT)
Dept: FAMILY MEDICINE CLINIC | Age: 60
End: 2020-01-01

## 2020-01-01 ENCOUNTER — APPOINTMENT (OUTPATIENT)
Dept: CT IMAGING | Age: 60
DRG: 192 | End: 2020-01-01
Payer: COMMERCIAL

## 2020-01-01 ENCOUNTER — APPOINTMENT (OUTPATIENT)
Dept: CT IMAGING | Age: 60
DRG: 194 | End: 2020-01-01
Payer: COMMERCIAL

## 2020-01-01 ENCOUNTER — HOSPITAL ENCOUNTER (INPATIENT)
Age: 60
LOS: 3 days | Discharge: HOME | DRG: 194 | End: 2020-02-15
Attending: EMERGENCY MEDICINE | Admitting: INTERNAL MEDICINE
Payer: COMMERCIAL

## 2020-01-01 ENCOUNTER — TELEPHONE (OUTPATIENT)
Dept: CARDIOLOGY CLINIC | Age: 60
End: 2020-01-01

## 2020-01-01 ENCOUNTER — APPOINTMENT (OUTPATIENT)
Dept: MRI IMAGING | Age: 60
DRG: 194 | End: 2020-01-01
Payer: COMMERCIAL

## 2020-01-01 ENCOUNTER — APPOINTMENT (OUTPATIENT)
Dept: NUCLEAR MEDICINE | Age: 60
DRG: 194 | End: 2020-01-01
Payer: COMMERCIAL

## 2020-01-01 ENCOUNTER — OFFICE VISIT (OUTPATIENT)
Dept: CARDIOLOGY CLINIC | Age: 60
End: 2020-01-01
Payer: COMMERCIAL

## 2020-01-01 ENCOUNTER — TELEPHONE (OUTPATIENT)
Dept: ADMINISTRATIVE | Age: 60
End: 2020-01-01

## 2020-01-01 ENCOUNTER — HOSPITAL ENCOUNTER (INPATIENT)
Age: 60
LOS: 18 days | Discharge: HOME | DRG: 192 | End: 2020-06-17
Attending: EMERGENCY MEDICINE | Admitting: INTERNAL MEDICINE
Payer: COMMERCIAL

## 2020-01-01 ENCOUNTER — TELEPHONE (OUTPATIENT)
Dept: CARDIOLOGY | Age: 60
End: 2020-01-01

## 2020-01-01 ENCOUNTER — HOSPITAL ENCOUNTER (INPATIENT)
Age: 60
LOS: 14 days | DRG: 194 | End: 2020-07-03
Attending: STUDENT IN AN ORGANIZED HEALTH CARE EDUCATION/TRAINING PROGRAM | Admitting: INTERNAL MEDICINE
Payer: COMMERCIAL

## 2020-01-01 ENCOUNTER — OFFICE VISIT (OUTPATIENT)
Dept: FAMILY MEDICINE CLINIC | Age: 60
End: 2020-01-01
Payer: COMMERCIAL

## 2020-01-01 ENCOUNTER — APPOINTMENT (OUTPATIENT)
Dept: NUCLEAR MEDICINE | Age: 60
DRG: 192 | End: 2020-01-01
Payer: COMMERCIAL

## 2020-01-01 ENCOUNTER — APPOINTMENT (OUTPATIENT)
Dept: ULTRASOUND IMAGING | Age: 60
DRG: 194 | End: 2020-01-01
Payer: COMMERCIAL

## 2020-01-01 ENCOUNTER — FOLLOWUP TELEPHONE ENCOUNTER (OUTPATIENT)
Dept: TELEMETRY | Age: 60
End: 2020-01-01

## 2020-01-01 ENCOUNTER — APPOINTMENT (OUTPATIENT)
Dept: CARDIAC CATH/INVASIVE PROCEDURES | Age: 60
DRG: 192 | End: 2020-01-01
Payer: COMMERCIAL

## 2020-01-01 ENCOUNTER — HOSPITAL ENCOUNTER (INPATIENT)
Age: 60
LOS: 5 days | Discharge: HOME HEALTH | DRG: 139 | End: 2020-05-18
Attending: EMERGENCY MEDICINE | Admitting: INTERNAL MEDICINE
Payer: COMMERCIAL

## 2020-01-01 ENCOUNTER — APPOINTMENT (OUTPATIENT)
Dept: GENERAL RADIOLOGY | Age: 60
DRG: 139 | End: 2020-01-01
Payer: COMMERCIAL

## 2020-01-01 VITALS
HEART RATE: 60 BPM | HEIGHT: 60 IN | TEMPERATURE: 97.9 F | DIASTOLIC BLOOD PRESSURE: 60 MMHG | SYSTOLIC BLOOD PRESSURE: 123 MMHG | WEIGHT: 187.1 LBS | BODY MASS INDEX: 36.73 KG/M2 | RESPIRATION RATE: 18 BRPM | OXYGEN SATURATION: 95 %

## 2020-01-01 VITALS
OXYGEN SATURATION: 96 % | WEIGHT: 183.9 LBS | HEART RATE: 78 BPM | DIASTOLIC BLOOD PRESSURE: 78 MMHG | RESPIRATION RATE: 16 BRPM | SYSTOLIC BLOOD PRESSURE: 116 MMHG | HEIGHT: 62 IN | TEMPERATURE: 98.4 F | BODY MASS INDEX: 33.84 KG/M2

## 2020-01-01 VITALS
SYSTOLIC BLOOD PRESSURE: 134 MMHG | BODY MASS INDEX: 36.22 KG/M2 | HEIGHT: 60 IN | WEIGHT: 184.5 LBS | DIASTOLIC BLOOD PRESSURE: 68 MMHG | HEART RATE: 62 BPM | OXYGEN SATURATION: 98 %

## 2020-01-01 VITALS
BODY MASS INDEX: 36.61 KG/M2 | RESPIRATION RATE: 18 BRPM | HEIGHT: 60 IN | HEART RATE: 79 BPM | TEMPERATURE: 98.2 F | OXYGEN SATURATION: 94 % | WEIGHT: 186.5 LBS | SYSTOLIC BLOOD PRESSURE: 109 MMHG | DIASTOLIC BLOOD PRESSURE: 71 MMHG

## 2020-01-01 VITALS
SYSTOLIC BLOOD PRESSURE: 167 MMHG | WEIGHT: 176.8 LBS | HEIGHT: 60 IN | DIASTOLIC BLOOD PRESSURE: 102 MMHG | RESPIRATION RATE: 26 BRPM | BODY MASS INDEX: 34.71 KG/M2 | HEART RATE: 78 BPM | TEMPERATURE: 100.9 F | OXYGEN SATURATION: 68 %

## 2020-01-01 VITALS
BODY MASS INDEX: 35.47 KG/M2 | SYSTOLIC BLOOD PRESSURE: 98 MMHG | WEIGHT: 181.6 LBS | OXYGEN SATURATION: 93 % | HEART RATE: 94 BPM | DIASTOLIC BLOOD PRESSURE: 50 MMHG

## 2020-01-01 DIAGNOSIS — J44.9 CHRONIC OBSTRUCTIVE PULMONARY DISEASE, UNSPECIFIED COPD TYPE (HCC): Primary | ICD-10-CM

## 2020-01-01 LAB
A/G RATIO: 1.3 (ref 1.1–2.2)
A/G RATIO: 1.3 (ref 1.1–2.2)
A/G RATIO: 1.4 (ref 1.1–2.2)
A/G RATIO: 1.4 (ref 1.1–2.2)
ALBUMIN SERPL-MCNC: 3 G/DL (ref 3.4–5)
ALBUMIN SERPL-MCNC: 3.2 G/DL (ref 3.4–5)
ALBUMIN SERPL-MCNC: 3.9 G/DL (ref 3.4–5)
ALBUMIN SERPL-MCNC: 4 G/DL (ref 3.4–5)
ALBUMIN SERPL-MCNC: 4 G/DL (ref 3.4–5)
ALBUMIN SERPL-MCNC: 4.2 G/DL (ref 3.4–5)
ALP BLD-CCNC: 138 U/L (ref 40–129)
ALP BLD-CCNC: 178 U/L (ref 40–129)
ALP BLD-CCNC: 68 U/L (ref 40–129)
ALP BLD-CCNC: 87 U/L (ref 40–129)
ALP BLD-CCNC: 93 U/L (ref 40–129)
ALT SERPL-CCNC: 12 U/L (ref 10–40)
ALT SERPL-CCNC: 15 U/L (ref 10–40)
ALT SERPL-CCNC: 16 U/L (ref 10–40)
ALT SERPL-CCNC: 30 U/L (ref 10–40)
ALT SERPL-CCNC: 644 U/L (ref 10–40)
ANION GAP SERPL CALCULATED.3IONS-SCNC: 10 MMOL/L (ref 3–16)
ANION GAP SERPL CALCULATED.3IONS-SCNC: 11 MMOL/L (ref 3–16)
ANION GAP SERPL CALCULATED.3IONS-SCNC: 12 MMOL/L (ref 3–16)
ANION GAP SERPL CALCULATED.3IONS-SCNC: 13 MMOL/L (ref 3–16)
ANION GAP SERPL CALCULATED.3IONS-SCNC: 14 MMOL/L (ref 3–16)
ANION GAP SERPL CALCULATED.3IONS-SCNC: 15 MMOL/L (ref 3–16)
ANION GAP SERPL CALCULATED.3IONS-SCNC: 16 MMOL/L (ref 3–16)
ANION GAP SERPL CALCULATED.3IONS-SCNC: 17 MMOL/L (ref 3–16)
ANION GAP SERPL CALCULATED.3IONS-SCNC: 18 MMOL/L (ref 3–16)
ANION GAP SERPL CALCULATED.3IONS-SCNC: 18 MMOL/L (ref 3–16)
ANION GAP SERPL CALCULATED.3IONS-SCNC: 19 MMOL/L (ref 3–16)
ANION GAP SERPL CALCULATED.3IONS-SCNC: 8 MMOL/L (ref 3–16)
ANION GAP SERPL CALCULATED.3IONS-SCNC: 9 MMOL/L (ref 3–16)
ANION GAP SERPL CALCULATED.3IONS-SCNC: 9 MMOL/L (ref 3–16)
ANISOCYTOSIS: ABNORMAL
ANTI-NUCLEAR ANTIBODY (ANA): NEGATIVE
APPEARANCE BAL (LAVAGE): ABNORMAL
APPEARANCE FLUID: CLEAR
APPEARANCE FLUID: NORMAL
APTT: 38.8 SEC (ref 24.2–36.2)
AST SERPL-CCNC: 12 U/L (ref 15–37)
AST SERPL-CCNC: 15 U/L (ref 15–37)
AST SERPL-CCNC: 1500 U/L (ref 15–37)
AST SERPL-CCNC: 22 U/L (ref 15–37)
AST SERPL-CCNC: 27 U/L (ref 15–37)
ATYPICAL LYMPHOCYTE RELATIVE PERCENT: 2 % (ref 0–6)
BACTERIA: ABNORMAL /HPF
BANDED NEUTROPHILS RELATIVE PERCENT: 2 % (ref 0–7)
BANDED NEUTROPHILS RELATIVE PERCENT: 20 % (ref 0–7)
BASE EXCESS ARTERIAL: -3.1 MMOL/L (ref -3–3)
BASE EXCESS ARTERIAL: -5.9 MMOL/L (ref -3–3)
BASE EXCESS ARTERIAL: 0.9 MMOL/L (ref -3–3)
BASE EXCESS ARTERIAL: 3.1 MMOL/L (ref -3–3)
BASE EXCESS VENOUS: -1.3 MMOL/L (ref -3–3)
BASE EXCESS VENOUS: -5.3 MMOL/L (ref -3–3)
BASE EXCESS VENOUS: -7 MMOL/L (ref -3–3)
BASOPHILS ABSOLUTE: 0 K/UL (ref 0–0.2)
BASOPHILS ABSOLUTE: 0 K/UL (ref 0–0.2)
BASOPHILS ABSOLUTE: 0.1 K/UL (ref 0–0.2)
BASOPHILS ABSOLUTE: 0.2 K/UL (ref 0–0.2)
BASOPHILS ABSOLUTE: 0.3 K/UL (ref 0–0.2)
BASOPHILS ABSOLUTE: 0.4 K/UL (ref 0–0.2)
BASOPHILS RELATIVE PERCENT: 0 %
BASOPHILS RELATIVE PERCENT: 0.8 %
BASOPHILS RELATIVE PERCENT: 0.9 %
BASOPHILS RELATIVE PERCENT: 1 %
BASOPHILS RELATIVE PERCENT: 1.1 %
BASOPHILS RELATIVE PERCENT: 1.2 %
BASOPHILS RELATIVE PERCENT: 1.3 %
BASOPHILS RELATIVE PERCENT: 1.3 %
BASOPHILS RELATIVE PERCENT: 1.4 %
BASOPHILS RELATIVE PERCENT: 1.5 %
BASOPHILS RELATIVE PERCENT: 1.9 %
BASOPHILS RELATIVE PERCENT: 2.5 %
BASOPHILS RELATIVE PERCENT: 2.9 %
BETA-HYDROXYBUTYRATE: 0.2 MMOL/L (ref 0–0.27)
BILIRUB SERPL-MCNC: 0.3 MG/DL (ref 0–1)
BILIRUB SERPL-MCNC: 0.4 MG/DL (ref 0–1)
BILIRUB SERPL-MCNC: 0.5 MG/DL (ref 0–1)
BILIRUB SERPL-MCNC: <0.2 MG/DL (ref 0–1)
BILIRUB SERPL-MCNC: <0.2 MG/DL (ref 0–1)
BILIRUBIN DIRECT: 0.3 MG/DL (ref 0–0.3)
BILIRUBIN URINE: NEGATIVE
BILIRUBIN, INDIRECT: 0.2 MG/DL (ref 0–1)
BLOOD CULTURE, ROUTINE: NORMAL
BLOOD, URINE: NEGATIVE
BODY FLUID CULTURE, STERILE: NORMAL
BODY FLUID CULTURE, STERILE: NORMAL
BUN BLDV-MCNC: 17 MG/DL (ref 7–20)
BUN BLDV-MCNC: 18 MG/DL (ref 7–20)
BUN BLDV-MCNC: 19 MG/DL (ref 7–20)
BUN BLDV-MCNC: 22 MG/DL (ref 7–20)
BUN BLDV-MCNC: 23 MG/DL (ref 7–20)
BUN BLDV-MCNC: 23 MG/DL (ref 7–20)
BUN BLDV-MCNC: 24 MG/DL (ref 7–20)
BUN BLDV-MCNC: 25 MG/DL (ref 7–20)
BUN BLDV-MCNC: 25 MG/DL (ref 7–20)
BUN BLDV-MCNC: 26 MG/DL (ref 7–20)
BUN BLDV-MCNC: 27 MG/DL (ref 7–20)
BUN BLDV-MCNC: 28 MG/DL (ref 7–20)
BUN BLDV-MCNC: 28 MG/DL (ref 7–20)
BUN BLDV-MCNC: 29 MG/DL (ref 7–20)
BUN BLDV-MCNC: 30 MG/DL (ref 7–20)
BUN BLDV-MCNC: 31 MG/DL (ref 7–20)
BUN BLDV-MCNC: 32 MG/DL (ref 7–20)
BUN BLDV-MCNC: 33 MG/DL (ref 7–20)
BUN BLDV-MCNC: 36 MG/DL (ref 7–20)
BUN BLDV-MCNC: 37 MG/DL (ref 7–20)
BUN BLDV-MCNC: 37 MG/DL (ref 7–20)
BUN BLDV-MCNC: 38 MG/DL (ref 7–20)
BUN BLDV-MCNC: 38 MG/DL (ref 7–20)
BUN BLDV-MCNC: 40 MG/DL (ref 7–20)
BUN BLDV-MCNC: 41 MG/DL (ref 7–20)
BUN BLDV-MCNC: 41 MG/DL (ref 7–20)
BUN BLDV-MCNC: 46 MG/DL (ref 7–20)
BUN BLDV-MCNC: 47 MG/DL (ref 7–20)
C-REACTIVE PROTEIN: 3.6 MG/L (ref 0–5.1)
CALCIUM SERPL-MCNC: 8.5 MG/DL (ref 8.3–10.6)
CALCIUM SERPL-MCNC: 8.8 MG/DL (ref 8.3–10.6)
CALCIUM SERPL-MCNC: 8.8 MG/DL (ref 8.3–10.6)
CALCIUM SERPL-MCNC: 8.9 MG/DL (ref 8.3–10.6)
CALCIUM SERPL-MCNC: 9 MG/DL (ref 8.3–10.6)
CALCIUM SERPL-MCNC: 9.1 MG/DL (ref 8.3–10.6)
CALCIUM SERPL-MCNC: 9.2 MG/DL (ref 8.3–10.6)
CALCIUM SERPL-MCNC: 9.3 MG/DL (ref 8.3–10.6)
CALCIUM SERPL-MCNC: 9.4 MG/DL (ref 8.3–10.6)
CALCIUM SERPL-MCNC: 9.5 MG/DL (ref 8.3–10.6)
CALCIUM SERPL-MCNC: 9.6 MG/DL (ref 8.3–10.6)
CALCIUM SERPL-MCNC: 9.7 MG/DL (ref 8.3–10.6)
CALCIUM SERPL-MCNC: 9.7 MG/DL (ref 8.3–10.6)
CALCIUM SERPL-MCNC: 9.8 MG/DL (ref 8.3–10.6)
CARBOXYHEMOGLOBIN ARTERIAL: 0.3 % (ref 0–1.5)
CARBOXYHEMOGLOBIN ARTERIAL: 0.3 % (ref 0–1.5)
CARBOXYHEMOGLOBIN ARTERIAL: 0.4 % (ref 0–1.5)
CARBOXYHEMOGLOBIN ARTERIAL: 0.4 % (ref 0–1.5)
CARBOXYHEMOGLOBIN: 0 % (ref 0–1.5)
CARBOXYHEMOGLOBIN: 0.2 % (ref 0–1.5)
CARBOXYHEMOGLOBIN: 1.4 % (ref 0–1.5)
CELL COUNT FLUID TYPE: NORMAL
CELL COUNT FLUID TYPE: NORMAL
CHLORIDE BLD-SCNC: 100 MMOL/L (ref 99–110)
CHLORIDE BLD-SCNC: 101 MMOL/L (ref 99–110)
CHLORIDE BLD-SCNC: 102 MMOL/L (ref 99–110)
CHLORIDE BLD-SCNC: 103 MMOL/L (ref 99–110)
CHLORIDE BLD-SCNC: 93 MMOL/L (ref 99–110)
CHLORIDE BLD-SCNC: 94 MMOL/L (ref 99–110)
CHLORIDE BLD-SCNC: 95 MMOL/L (ref 99–110)
CHLORIDE BLD-SCNC: 96 MMOL/L (ref 99–110)
CHLORIDE BLD-SCNC: 97 MMOL/L (ref 99–110)
CHLORIDE BLD-SCNC: 98 MMOL/L (ref 99–110)
CHLORIDE BLD-SCNC: 99 MMOL/L (ref 99–110)
CLARITY: CLEAR
CLOT EVALUATION BAL: ABNORMAL
CLOT EVALUATION: NORMAL
CLOT EVALUATION: NORMAL
CO2: 19 MMOL/L (ref 21–32)
CO2: 19 MMOL/L (ref 21–32)
CO2: 20 MMOL/L (ref 21–32)
CO2: 21 MMOL/L (ref 21–32)
CO2: 22 MMOL/L (ref 21–32)
CO2: 22 MMOL/L (ref 21–32)
CO2: 23 MMOL/L (ref 21–32)
CO2: 24 MMOL/L (ref 21–32)
CO2: 25 MMOL/L (ref 21–32)
CO2: 26 MMOL/L (ref 21–32)
CO2: 27 MMOL/L (ref 21–32)
CO2: 28 MMOL/L (ref 21–32)
CO2: 29 MMOL/L (ref 21–32)
CO2: 30 MMOL/L (ref 21–32)
CO2: 30 MMOL/L (ref 21–32)
CO2: 31 MMOL/L (ref 21–32)
COLOR FLUID: YELLOW
COLOR FLUID: YELLOW
COLOR LAVAGE: ABNORMAL
COLOR: YELLOW
CREAT SERPL-MCNC: 0.8 MG/DL (ref 0.6–1.1)
CREAT SERPL-MCNC: 0.8 MG/DL (ref 0.6–1.1)
CREAT SERPL-MCNC: 0.9 MG/DL (ref 0.6–1.1)
CREAT SERPL-MCNC: 1 MG/DL (ref 0.6–1.1)
CREAT SERPL-MCNC: 1.1 MG/DL (ref 0.6–1.1)
CREAT SERPL-MCNC: 1.2 MG/DL (ref 0.6–1.1)
CREAT SERPL-MCNC: 1.3 MG/DL (ref 0.6–1.1)
CREAT SERPL-MCNC: 1.4 MG/DL (ref 0.6–1.1)
CREAT SERPL-MCNC: 1.4 MG/DL (ref 0.6–1.1)
CREAT SERPL-MCNC: 1.7 MG/DL (ref 0.6–1.1)
CULTURE, BLOOD 2: NORMAL
CULTURE, RESPIRATORY: NORMAL
CULTURE, RESPIRATORY: NORMAL
DOHLE BODIES: PRESENT
EKG ATRIAL RATE: 60 BPM
EKG ATRIAL RATE: 73 BPM
EKG ATRIAL RATE: 77 BPM
EKG ATRIAL RATE: 89 BPM
EKG DIAGNOSIS: NORMAL
EKG P AXIS: 34 DEGREES
EKG P AXIS: 38 DEGREES
EKG P AXIS: 45 DEGREES
EKG P AXIS: 70 DEGREES
EKG P-R INTERVAL: 108 MS
EKG P-R INTERVAL: 126 MS
EKG P-R INTERVAL: 138 MS
EKG P-R INTERVAL: 152 MS
EKG Q-T INTERVAL: 356 MS
EKG Q-T INTERVAL: 396 MS
EKG Q-T INTERVAL: 404 MS
EKG Q-T INTERVAL: 454 MS
EKG QRS DURATION: 72 MS
EKG QRS DURATION: 74 MS
EKG QRS DURATION: 76 MS
EKG QRS DURATION: 76 MS
EKG QTC CALCULATION (BAZETT): 433 MS
EKG QTC CALCULATION (BAZETT): 436 MS
EKG QTC CALCULATION (BAZETT): 454 MS
EKG QTC CALCULATION (BAZETT): 457 MS
EKG R AXIS: 11 DEGREES
EKG R AXIS: 30 DEGREES
EKG R AXIS: 34 DEGREES
EKG R AXIS: 44 DEGREES
EKG T AXIS: 34 DEGREES
EKG T AXIS: 47 DEGREES
EKG T AXIS: 67 DEGREES
EKG T AXIS: 77 DEGREES
EKG VENTRICULAR RATE: 60 BPM
EKG VENTRICULAR RATE: 73 BPM
EKG VENTRICULAR RATE: 77 BPM
EKG VENTRICULAR RATE: 89 BPM
EOSINOPHILS ABSOLUTE: 0 K/UL (ref 0–0.6)
EOSINOPHILS ABSOLUTE: 0 K/UL (ref 0–0.6)
EOSINOPHILS ABSOLUTE: 0.1 K/UL (ref 0–0.6)
EOSINOPHILS ABSOLUTE: 0.2 K/UL (ref 0–0.6)
EOSINOPHILS ABSOLUTE: 0.3 K/UL (ref 0–0.6)
EOSINOPHILS ABSOLUTE: 0.4 K/UL (ref 0–0.6)
EOSINOPHILS RELATIVE PERCENT: 0.4 %
EOSINOPHILS RELATIVE PERCENT: 0.4 %
EOSINOPHILS RELATIVE PERCENT: 0.6 %
EOSINOPHILS RELATIVE PERCENT: 0.9 %
EOSINOPHILS RELATIVE PERCENT: 1 %
EOSINOPHILS RELATIVE PERCENT: 1 %
EOSINOPHILS RELATIVE PERCENT: 1.3 %
EOSINOPHILS RELATIVE PERCENT: 1.9 %
EOSINOPHILS RELATIVE PERCENT: 2 %
EOSINOPHILS RELATIVE PERCENT: 2.1 %
EOSINOPHILS RELATIVE PERCENT: 2.1 %
EOSINOPHILS RELATIVE PERCENT: 2.5 %
EOSINOPHILS RELATIVE PERCENT: 2.5 %
EOSINOPHILS RELATIVE PERCENT: 3.2 %
EOSINOPHILS RELATIVE PERCENT: 3.3 %
EOSINOPHILS RELATIVE PERCENT: 3.4 %
EOSINOPHILS RELATIVE PERCENT: 3.9 %
EPITHELIAL CELLS, UA: ABNORMAL /HPF (ref 0–5)
ESTIMATED AVERAGE GLUCOSE: 208.7 MG/DL
ESTIMATED AVERAGE GLUCOSE: 248.9 MG/DL
FLUID PATH CONSULT: YES
FLUID PATH CONSULT: YES
FLUID TYPE: NORMAL
FLUID TYPE: NORMAL
GFR AFRICAN AMERICAN: 37
GFR AFRICAN AMERICAN: 47
GFR AFRICAN AMERICAN: 47
GFR AFRICAN AMERICAN: 51
GFR AFRICAN AMERICAN: 56
GFR AFRICAN AMERICAN: >60
GFR NON-AFRICAN AMERICAN: 31
GFR NON-AFRICAN AMERICAN: 38
GFR NON-AFRICAN AMERICAN: 38
GFR NON-AFRICAN AMERICAN: 42
GFR NON-AFRICAN AMERICAN: 46
GFR NON-AFRICAN AMERICAN: 51
GFR NON-AFRICAN AMERICAN: 57
GFR NON-AFRICAN AMERICAN: >60
GLOBULIN: 2.8 G/DL
GLOBULIN: 3 G/DL
GLOBULIN: 3 G/DL
GLOBULIN: 3.1 G/DL
GLUCOSE BLD-MCNC: 100 MG/DL (ref 70–99)
GLUCOSE BLD-MCNC: 102 MG/DL (ref 70–99)
GLUCOSE BLD-MCNC: 103 MG/DL (ref 70–99)
GLUCOSE BLD-MCNC: 105 MG/DL (ref 70–99)
GLUCOSE BLD-MCNC: 106 MG/DL (ref 70–99)
GLUCOSE BLD-MCNC: 106 MG/DL (ref 70–99)
GLUCOSE BLD-MCNC: 107 MG/DL (ref 70–99)
GLUCOSE BLD-MCNC: 111 MG/DL (ref 70–99)
GLUCOSE BLD-MCNC: 111 MG/DL (ref 70–99)
GLUCOSE BLD-MCNC: 112 MG/DL (ref 70–99)
GLUCOSE BLD-MCNC: 113 MG/DL (ref 70–99)
GLUCOSE BLD-MCNC: 114 MG/DL (ref 70–99)
GLUCOSE BLD-MCNC: 114 MG/DL (ref 70–99)
GLUCOSE BLD-MCNC: 115 MG/DL (ref 70–99)
GLUCOSE BLD-MCNC: 117 MG/DL (ref 70–99)
GLUCOSE BLD-MCNC: 117 MG/DL (ref 70–99)
GLUCOSE BLD-MCNC: 120 MG/DL (ref 70–99)
GLUCOSE BLD-MCNC: 120 MG/DL (ref 70–99)
GLUCOSE BLD-MCNC: 121 MG/DL (ref 70–99)
GLUCOSE BLD-MCNC: 122 MG/DL (ref 70–99)
GLUCOSE BLD-MCNC: 122 MG/DL (ref 70–99)
GLUCOSE BLD-MCNC: 123 MG/DL (ref 70–99)
GLUCOSE BLD-MCNC: 123 MG/DL (ref 70–99)
GLUCOSE BLD-MCNC: 124 MG/DL (ref 70–99)
GLUCOSE BLD-MCNC: 126 MG/DL (ref 70–99)
GLUCOSE BLD-MCNC: 127 MG/DL (ref 70–99)
GLUCOSE BLD-MCNC: 127 MG/DL (ref 70–99)
GLUCOSE BLD-MCNC: 129 MG/DL (ref 70–99)
GLUCOSE BLD-MCNC: 132 MG/DL (ref 70–99)
GLUCOSE BLD-MCNC: 132 MG/DL (ref 70–99)
GLUCOSE BLD-MCNC: 133 MG/DL (ref 70–99)
GLUCOSE BLD-MCNC: 133 MG/DL (ref 70–99)
GLUCOSE BLD-MCNC: 137 MG/DL (ref 70–99)
GLUCOSE BLD-MCNC: 137 MG/DL (ref 70–99)
GLUCOSE BLD-MCNC: 138 MG/DL (ref 70–99)
GLUCOSE BLD-MCNC: 140 MG/DL (ref 70–99)
GLUCOSE BLD-MCNC: 141 MG/DL (ref 70–99)
GLUCOSE BLD-MCNC: 141 MG/DL (ref 70–99)
GLUCOSE BLD-MCNC: 142 MG/DL (ref 70–99)
GLUCOSE BLD-MCNC: 142 MG/DL (ref 70–99)
GLUCOSE BLD-MCNC: 143 MG/DL (ref 70–99)
GLUCOSE BLD-MCNC: 143 MG/DL (ref 70–99)
GLUCOSE BLD-MCNC: 144 MG/DL (ref 70–99)
GLUCOSE BLD-MCNC: 147 MG/DL (ref 70–99)
GLUCOSE BLD-MCNC: 148 MG/DL (ref 70–99)
GLUCOSE BLD-MCNC: 149 MG/DL (ref 70–99)
GLUCOSE BLD-MCNC: 149 MG/DL (ref 70–99)
GLUCOSE BLD-MCNC: 154 MG/DL (ref 70–99)
GLUCOSE BLD-MCNC: 154 MG/DL (ref 70–99)
GLUCOSE BLD-MCNC: 157 MG/DL (ref 70–99)
GLUCOSE BLD-MCNC: 159 MG/DL (ref 70–99)
GLUCOSE BLD-MCNC: 160 MG/DL (ref 70–99)
GLUCOSE BLD-MCNC: 162 MG/DL (ref 70–99)
GLUCOSE BLD-MCNC: 164 MG/DL (ref 70–99)
GLUCOSE BLD-MCNC: 165 MG/DL (ref 70–99)
GLUCOSE BLD-MCNC: 165 MG/DL (ref 70–99)
GLUCOSE BLD-MCNC: 167 MG/DL (ref 70–99)
GLUCOSE BLD-MCNC: 167 MG/DL (ref 70–99)
GLUCOSE BLD-MCNC: 168 MG/DL (ref 70–99)
GLUCOSE BLD-MCNC: 170 MG/DL (ref 70–99)
GLUCOSE BLD-MCNC: 171 MG/DL (ref 70–99)
GLUCOSE BLD-MCNC: 172 MG/DL (ref 70–99)
GLUCOSE BLD-MCNC: 172 MG/DL (ref 70–99)
GLUCOSE BLD-MCNC: 173 MG/DL (ref 70–99)
GLUCOSE BLD-MCNC: 176 MG/DL (ref 70–99)
GLUCOSE BLD-MCNC: 179 MG/DL (ref 70–99)
GLUCOSE BLD-MCNC: 181 MG/DL (ref 70–99)
GLUCOSE BLD-MCNC: 186 MG/DL (ref 70–99)
GLUCOSE BLD-MCNC: 188 MG/DL (ref 70–99)
GLUCOSE BLD-MCNC: 189 MG/DL (ref 70–99)
GLUCOSE BLD-MCNC: 190 MG/DL (ref 70–99)
GLUCOSE BLD-MCNC: 190 MG/DL (ref 70–99)
GLUCOSE BLD-MCNC: 192 MG/DL (ref 70–99)
GLUCOSE BLD-MCNC: 193 MG/DL (ref 70–99)
GLUCOSE BLD-MCNC: 193 MG/DL (ref 70–99)
GLUCOSE BLD-MCNC: 194 MG/DL (ref 70–99)
GLUCOSE BLD-MCNC: 197 MG/DL (ref 70–99)
GLUCOSE BLD-MCNC: 198 MG/DL (ref 70–99)
GLUCOSE BLD-MCNC: 199 MG/DL (ref 70–99)
GLUCOSE BLD-MCNC: 200 MG/DL (ref 70–99)
GLUCOSE BLD-MCNC: 204 MG/DL (ref 70–99)
GLUCOSE BLD-MCNC: 205 MG/DL (ref 70–99)
GLUCOSE BLD-MCNC: 208 MG/DL (ref 70–99)
GLUCOSE BLD-MCNC: 209 MG/DL (ref 70–99)
GLUCOSE BLD-MCNC: 210 MG/DL (ref 70–99)
GLUCOSE BLD-MCNC: 211 MG/DL (ref 70–99)
GLUCOSE BLD-MCNC: 212 MG/DL (ref 70–99)
GLUCOSE BLD-MCNC: 214 MG/DL (ref 70–99)
GLUCOSE BLD-MCNC: 217 MG/DL (ref 70–99)
GLUCOSE BLD-MCNC: 218 MG/DL (ref 70–99)
GLUCOSE BLD-MCNC: 219 MG/DL (ref 70–99)
GLUCOSE BLD-MCNC: 220 MG/DL (ref 70–99)
GLUCOSE BLD-MCNC: 222 MG/DL (ref 70–99)
GLUCOSE BLD-MCNC: 226 MG/DL (ref 70–99)
GLUCOSE BLD-MCNC: 226 MG/DL (ref 70–99)
GLUCOSE BLD-MCNC: 229 MG/DL (ref 70–99)
GLUCOSE BLD-MCNC: 229 MG/DL (ref 70–99)
GLUCOSE BLD-MCNC: 232 MG/DL (ref 70–99)
GLUCOSE BLD-MCNC: 234 MG/DL (ref 70–99)
GLUCOSE BLD-MCNC: 234 MG/DL (ref 70–99)
GLUCOSE BLD-MCNC: 238 MG/DL (ref 70–99)
GLUCOSE BLD-MCNC: 239 MG/DL (ref 70–99)
GLUCOSE BLD-MCNC: 239 MG/DL (ref 70–99)
GLUCOSE BLD-MCNC: 240 MG/DL (ref 70–99)
GLUCOSE BLD-MCNC: 241 MG/DL (ref 70–99)
GLUCOSE BLD-MCNC: 241 MG/DL (ref 70–99)
GLUCOSE BLD-MCNC: 242 MG/DL (ref 70–99)
GLUCOSE BLD-MCNC: 243 MG/DL (ref 70–99)
GLUCOSE BLD-MCNC: 243 MG/DL (ref 70–99)
GLUCOSE BLD-MCNC: 245 MG/DL (ref 70–99)
GLUCOSE BLD-MCNC: 246 MG/DL (ref 70–99)
GLUCOSE BLD-MCNC: 247 MG/DL (ref 70–99)
GLUCOSE BLD-MCNC: 251 MG/DL (ref 70–99)
GLUCOSE BLD-MCNC: 253 MG/DL (ref 70–99)
GLUCOSE BLD-MCNC: 253 MG/DL (ref 70–99)
GLUCOSE BLD-MCNC: 256 MG/DL (ref 70–99)
GLUCOSE BLD-MCNC: 256 MG/DL (ref 70–99)
GLUCOSE BLD-MCNC: 258 MG/DL (ref 70–99)
GLUCOSE BLD-MCNC: 262 MG/DL (ref 70–99)
GLUCOSE BLD-MCNC: 264 MG/DL (ref 70–99)
GLUCOSE BLD-MCNC: 264 MG/DL (ref 70–99)
GLUCOSE BLD-MCNC: 265 MG/DL (ref 70–99)
GLUCOSE BLD-MCNC: 271 MG/DL (ref 70–99)
GLUCOSE BLD-MCNC: 272 MG/DL (ref 70–99)
GLUCOSE BLD-MCNC: 272 MG/DL (ref 70–99)
GLUCOSE BLD-MCNC: 277 MG/DL (ref 70–99)
GLUCOSE BLD-MCNC: 280 MG/DL (ref 70–99)
GLUCOSE BLD-MCNC: 292 MG/DL (ref 70–99)
GLUCOSE BLD-MCNC: 292 MG/DL (ref 70–99)
GLUCOSE BLD-MCNC: 293 MG/DL (ref 70–99)
GLUCOSE BLD-MCNC: 296 MG/DL (ref 70–99)
GLUCOSE BLD-MCNC: 303 MG/DL (ref 70–99)
GLUCOSE BLD-MCNC: 303 MG/DL (ref 70–99)
GLUCOSE BLD-MCNC: 309 MG/DL (ref 70–99)
GLUCOSE BLD-MCNC: 313 MG/DL (ref 70–99)
GLUCOSE BLD-MCNC: 316 MG/DL (ref 70–99)
GLUCOSE BLD-MCNC: 318 MG/DL (ref 70–99)
GLUCOSE BLD-MCNC: 322 MG/DL (ref 70–99)
GLUCOSE BLD-MCNC: 323 MG/DL (ref 70–99)
GLUCOSE BLD-MCNC: 327 MG/DL (ref 70–99)
GLUCOSE BLD-MCNC: 328 MG/DL (ref 70–99)
GLUCOSE BLD-MCNC: 330 MG/DL (ref 70–99)
GLUCOSE BLD-MCNC: 335 MG/DL (ref 70–99)
GLUCOSE BLD-MCNC: 342 MG/DL (ref 70–99)
GLUCOSE BLD-MCNC: 343 MG/DL (ref 70–99)
GLUCOSE BLD-MCNC: 344 MG/DL (ref 70–99)
GLUCOSE BLD-MCNC: 345 MG/DL (ref 70–99)
GLUCOSE BLD-MCNC: 358 MG/DL (ref 70–99)
GLUCOSE BLD-MCNC: 367 MG/DL (ref 70–99)
GLUCOSE BLD-MCNC: 373 MG/DL (ref 70–99)
GLUCOSE BLD-MCNC: 377 MG/DL (ref 70–99)
GLUCOSE BLD-MCNC: 377 MG/DL (ref 70–99)
GLUCOSE BLD-MCNC: 382 MG/DL (ref 70–99)
GLUCOSE BLD-MCNC: 415 MG/DL (ref 70–99)
GLUCOSE BLD-MCNC: 418 MG/DL (ref 70–99)
GLUCOSE BLD-MCNC: 420 MG/DL (ref 70–99)
GLUCOSE BLD-MCNC: 422 MG/DL (ref 70–99)
GLUCOSE BLD-MCNC: 458 MG/DL (ref 70–99)
GLUCOSE BLD-MCNC: 459 MG/DL (ref 70–99)
GLUCOSE BLD-MCNC: 479 MG/DL (ref 70–99)
GLUCOSE BLD-MCNC: 502 MG/DL (ref 70–99)
GLUCOSE BLD-MCNC: 511 MG/DL (ref 70–99)
GLUCOSE BLD-MCNC: 52 MG/DL (ref 70–99)
GLUCOSE BLD-MCNC: 533 MG/DL (ref 70–99)
GLUCOSE BLD-MCNC: 56 MG/DL (ref 70–99)
GLUCOSE BLD-MCNC: 567 MG/DL (ref 70–99)
GLUCOSE BLD-MCNC: 60 MG/DL (ref 70–99)
GLUCOSE BLD-MCNC: 65 MG/DL (ref 70–99)
GLUCOSE BLD-MCNC: 67 MG/DL (ref 70–99)
GLUCOSE BLD-MCNC: 68 MG/DL (ref 70–99)
GLUCOSE BLD-MCNC: 70 MG/DL (ref 70–99)
GLUCOSE BLD-MCNC: 72 MG/DL (ref 70–99)
GLUCOSE BLD-MCNC: 73 MG/DL (ref 70–99)
GLUCOSE BLD-MCNC: 74 MG/DL (ref 70–99)
GLUCOSE BLD-MCNC: 75 MG/DL (ref 70–99)
GLUCOSE BLD-MCNC: 75 MG/DL (ref 70–99)
GLUCOSE BLD-MCNC: 79 MG/DL (ref 70–99)
GLUCOSE BLD-MCNC: 80 MG/DL (ref 70–99)
GLUCOSE BLD-MCNC: 80 MG/DL (ref 70–99)
GLUCOSE BLD-MCNC: 82 MG/DL (ref 70–99)
GLUCOSE BLD-MCNC: 82 MG/DL (ref 70–99)
GLUCOSE BLD-MCNC: 83 MG/DL (ref 70–99)
GLUCOSE BLD-MCNC: 84 MG/DL (ref 70–99)
GLUCOSE BLD-MCNC: 88 MG/DL (ref 70–99)
GLUCOSE BLD-MCNC: 89 MG/DL (ref 70–99)
GLUCOSE BLD-MCNC: 90 MG/DL (ref 70–99)
GLUCOSE BLD-MCNC: 91 MG/DL (ref 70–99)
GLUCOSE BLD-MCNC: 91 MG/DL (ref 70–99)
GLUCOSE BLD-MCNC: 92 MG/DL (ref 70–99)
GLUCOSE BLD-MCNC: 92 MG/DL (ref 70–99)
GLUCOSE BLD-MCNC: 93 MG/DL (ref 70–99)
GLUCOSE BLD-MCNC: 93 MG/DL (ref 70–99)
GLUCOSE BLD-MCNC: 94 MG/DL (ref 70–99)
GLUCOSE BLD-MCNC: 94 MG/DL (ref 70–99)
GLUCOSE BLD-MCNC: 95 MG/DL (ref 70–99)
GLUCOSE BLD-MCNC: 96 MG/DL (ref 70–99)
GLUCOSE BLD-MCNC: 97 MG/DL (ref 70–99)
GLUCOSE BLD-MCNC: 97 MG/DL (ref 70–99)
GLUCOSE BLD-MCNC: 98 MG/DL (ref 70–99)
GLUCOSE BLD-MCNC: 99 MG/DL (ref 70–99)
GLUCOSE URINE: 500 MG/DL
GLUCOSE URINE: >=1000 MG/DL
GLUCOSE URINE: NEGATIVE MG/DL
GLUCOSE, FLUID: 225 MG/DL
GRAM STAIN RESULT: NORMAL
HBA1C MFR BLD: 10.3 %
HBA1C MFR BLD: 8.9 %
HCO3 ARTERIAL: 19 MMOL/L (ref 21–29)
HCO3 ARTERIAL: 21 MMOL/L (ref 21–29)
HCO3 ARTERIAL: 27.1 MMOL/L (ref 21–29)
HCO3 ARTERIAL: 28.1 MMOL/L (ref 21–29)
HCO3 VENOUS: 22.3 MMOL/L (ref 23–29)
HCO3 VENOUS: 23.1 MMOL/L (ref 23–29)
HCO3 VENOUS: 23.8 MMOL/L (ref 23–29)
HCT VFR BLD CALC: 30.5 % (ref 36–48)
HCT VFR BLD CALC: 30.6 % (ref 36–48)
HCT VFR BLD CALC: 30.9 % (ref 36–48)
HCT VFR BLD CALC: 31 % (ref 36–48)
HCT VFR BLD CALC: 31.7 % (ref 36–48)
HCT VFR BLD CALC: 32.5 % (ref 36–48)
HCT VFR BLD CALC: 32.6 % (ref 36–48)
HCT VFR BLD CALC: 32.7 % (ref 36–48)
HCT VFR BLD CALC: 32.7 % (ref 36–48)
HCT VFR BLD CALC: 33.1 % (ref 36–48)
HCT VFR BLD CALC: 33.2 % (ref 36–48)
HCT VFR BLD CALC: 33.2 % (ref 36–48)
HCT VFR BLD CALC: 33.8 % (ref 36–48)
HCT VFR BLD CALC: 33.9 % (ref 36–48)
HCT VFR BLD CALC: 34.1 % (ref 36–48)
HCT VFR BLD CALC: 34.2 % (ref 36–48)
HCT VFR BLD CALC: 34.5 % (ref 36–48)
HCT VFR BLD CALC: 34.8 % (ref 36–48)
HCT VFR BLD CALC: 34.9 % (ref 36–48)
HCT VFR BLD CALC: 35.1 % (ref 36–48)
HCT VFR BLD CALC: 35.1 % (ref 36–48)
HCT VFR BLD CALC: 35.4 % (ref 36–48)
HCT VFR BLD CALC: 35.7 % (ref 36–48)
HCT VFR BLD CALC: 35.9 % (ref 36–48)
HCT VFR BLD CALC: 36.4 % (ref 36–48)
HCT VFR BLD CALC: 36.6 % (ref 36–48)
HCT VFR BLD CALC: 37.4 % (ref 36–48)
HEMOGLOBIN, ART, EXTENDED: 11.1 G/DL (ref 12–16)
HEMOGLOBIN, ART, EXTENDED: 11.6 G/DL (ref 12–16)
HEMOGLOBIN, ART, EXTENDED: 11.9 G/DL (ref 12–16)
HEMOGLOBIN, ART, EXTENDED: 12.1 G/DL (ref 12–16)
HEMOGLOBIN: 10.1 G/DL (ref 12–16)
HEMOGLOBIN: 10.4 G/DL (ref 12–16)
HEMOGLOBIN: 10.4 G/DL (ref 12–16)
HEMOGLOBIN: 10.6 G/DL (ref 12–16)
HEMOGLOBIN: 10.6 G/DL (ref 12–16)
HEMOGLOBIN: 10.8 G/DL (ref 12–16)
HEMOGLOBIN: 10.9 G/DL (ref 12–16)
HEMOGLOBIN: 11 G/DL (ref 12–16)
HEMOGLOBIN: 11.1 G/DL (ref 12–16)
HEMOGLOBIN: 11.1 G/DL (ref 12–16)
HEMOGLOBIN: 11.3 G/DL (ref 12–16)
HEMOGLOBIN: 11.4 G/DL (ref 12–16)
HEMOGLOBIN: 11.5 G/DL (ref 12–16)
HEMOGLOBIN: 11.6 G/DL (ref 12–16)
HEMOGLOBIN: 11.7 G/DL (ref 12–16)
HEMOGLOBIN: 11.9 G/DL (ref 12–16)
HEMOGLOBIN: 11.9 G/DL (ref 12–16)
HEMOGLOBIN: 12.2 G/DL (ref 12–16)
HEMOGLOBIN: 9.7 G/DL (ref 12–16)
HEMOGLOBIN: 9.9 G/DL (ref 12–16)
INR BLD: 1.05 (ref 0.86–1.14)
INR BLD: 1.14 (ref 0.86–1.14)
INR BLD: 1.2 (ref 0.86–1.14)
IRON SATURATION: 15 % (ref 15–50)
IRON: 39 UG/DL (ref 37–145)
KETONES, URINE: NEGATIVE MG/DL
L. PNEUMOPHILA SEROGP 1 UR AG: NORMAL
LACTATE DEHYDROGENASE: 1069 U/L (ref 100–190)
LACTIC ACID: 6.1 MMOL/L (ref 0.4–2)
LACTIC ACID: 7.5 MMOL/L (ref 0.4–2)
LD, FLUID: 417 U/L
LD, FLUID: 686 U/L
LEUKOCYTE ESTERASE, URINE: NEGATIVE
LIPASE: 19 U/L (ref 13–60)
LV EF: 58 %
LV EF: 58 %
LVEF MODALITY: NORMAL
LVEF MODALITY: NORMAL
LYMPHOCYTES ABSOLUTE: 1.2 K/UL (ref 1–5.1)
LYMPHOCYTES ABSOLUTE: 1.3 K/UL (ref 1–5.1)
LYMPHOCYTES ABSOLUTE: 1.6 K/UL (ref 1–5.1)
LYMPHOCYTES ABSOLUTE: 1.6 K/UL (ref 1–5.1)
LYMPHOCYTES ABSOLUTE: 1.8 K/UL (ref 1–5.1)
LYMPHOCYTES ABSOLUTE: 1.8 K/UL (ref 1–5.1)
LYMPHOCYTES ABSOLUTE: 1.9 K/UL (ref 1–5.1)
LYMPHOCYTES ABSOLUTE: 2 K/UL (ref 1–5.1)
LYMPHOCYTES ABSOLUTE: 2.2 K/UL (ref 1–5.1)
LYMPHOCYTES ABSOLUTE: 2.2 K/UL (ref 1–5.1)
LYMPHOCYTES ABSOLUTE: 2.3 K/UL (ref 1–5.1)
LYMPHOCYTES ABSOLUTE: 2.4 K/UL (ref 1–5.1)
LYMPHOCYTES ABSOLUTE: 2.4 K/UL (ref 1–5.1)
LYMPHOCYTES ABSOLUTE: 2.8 K/UL (ref 1–5.1)
LYMPHOCYTES ABSOLUTE: 2.8 K/UL (ref 1–5.1)
LYMPHOCYTES ABSOLUTE: 2.9 K/UL (ref 1–5.1)
LYMPHOCYTES ABSOLUTE: 3 K/UL (ref 1–5.1)
LYMPHOCYTES RELATIVE PERCENT: 10 %
LYMPHOCYTES RELATIVE PERCENT: 11.3 %
LYMPHOCYTES RELATIVE PERCENT: 12.6 %
LYMPHOCYTES RELATIVE PERCENT: 13.1 %
LYMPHOCYTES RELATIVE PERCENT: 14.1 %
LYMPHOCYTES RELATIVE PERCENT: 16.3 %
LYMPHOCYTES RELATIVE PERCENT: 17 %
LYMPHOCYTES RELATIVE PERCENT: 17.6 %
LYMPHOCYTES RELATIVE PERCENT: 18.2 %
LYMPHOCYTES RELATIVE PERCENT: 19.4 %
LYMPHOCYTES RELATIVE PERCENT: 19.5 %
LYMPHOCYTES RELATIVE PERCENT: 21 %
LYMPHOCYTES RELATIVE PERCENT: 22.5 %
LYMPHOCYTES RELATIVE PERCENT: 22.6 %
LYMPHOCYTES RELATIVE PERCENT: 23.4 %
LYMPHOCYTES RELATIVE PERCENT: 27.5 %
LYMPHOCYTES RELATIVE PERCENT: 32.1 %
LYMPHOCYTES RELATIVE PERCENT: 49 %
LYMPHOCYTES RELATIVE PERCENT: 7 %
LYMPHOCYTES, BAL: 13 % (ref 5–10)
LYMPHOCYTES, BODY FLUID: 41 %
LYMPHOCYTES, BODY FLUID: 68 %
MACROPHAGE FLUID: 16 %
MACROPHAGE FLUID: 57 %
MACROPHAGES, BAL: 14 % (ref 90–95)
MAGNESIUM: 1.8 MG/DL (ref 1.8–2.4)
MAGNESIUM: 1.8 MG/DL (ref 1.8–2.4)
MAGNESIUM: 2 MG/DL (ref 1.8–2.4)
MAGNESIUM: 2.1 MG/DL (ref 1.8–2.4)
MAGNESIUM: 2.1 MG/DL (ref 1.8–2.4)
MAGNESIUM: 2.2 MG/DL (ref 1.8–2.4)
MAGNESIUM: 2.2 MG/DL (ref 1.8–2.4)
MAGNESIUM: 2.3 MG/DL (ref 1.8–2.4)
MCH RBC QN AUTO: 30.2 PG (ref 26–34)
MCH RBC QN AUTO: 30.3 PG (ref 26–34)
MCH RBC QN AUTO: 30.4 PG (ref 26–34)
MCH RBC QN AUTO: 30.5 PG (ref 26–34)
MCH RBC QN AUTO: 30.6 PG (ref 26–34)
MCH RBC QN AUTO: 30.8 PG (ref 26–34)
MCH RBC QN AUTO: 30.9 PG (ref 26–34)
MCH RBC QN AUTO: 31 PG (ref 26–34)
MCH RBC QN AUTO: 31.1 PG (ref 26–34)
MCH RBC QN AUTO: 31.1 PG (ref 26–34)
MCH RBC QN AUTO: 31.2 PG (ref 26–34)
MCH RBC QN AUTO: 31.2 PG (ref 26–34)
MCH RBC QN AUTO: 31.3 PG (ref 26–34)
MCH RBC QN AUTO: 31.3 PG (ref 26–34)
MCH RBC QN AUTO: 31.4 PG (ref 26–34)
MCH RBC QN AUTO: 31.5 PG (ref 26–34)
MCH RBC QN AUTO: 31.6 PG (ref 26–34)
MCH RBC QN AUTO: 32.3 PG (ref 26–34)
MCH RBC QN AUTO: 32.4 PG (ref 26–34)
MCHC RBC AUTO-ENTMCNC: 31.6 G/DL (ref 31–36)
MCHC RBC AUTO-ENTMCNC: 31.7 G/DL (ref 31–36)
MCHC RBC AUTO-ENTMCNC: 31.8 G/DL (ref 31–36)
MCHC RBC AUTO-ENTMCNC: 32 G/DL (ref 31–36)
MCHC RBC AUTO-ENTMCNC: 32.2 G/DL (ref 31–36)
MCHC RBC AUTO-ENTMCNC: 32.2 G/DL (ref 31–36)
MCHC RBC AUTO-ENTMCNC: 32.4 G/DL (ref 31–36)
MCHC RBC AUTO-ENTMCNC: 32.5 G/DL (ref 31–36)
MCHC RBC AUTO-ENTMCNC: 32.7 G/DL (ref 31–36)
MCHC RBC AUTO-ENTMCNC: 32.8 G/DL (ref 31–36)
MCHC RBC AUTO-ENTMCNC: 33 G/DL (ref 31–36)
MCHC RBC AUTO-ENTMCNC: 33.1 G/DL (ref 31–36)
MCHC RBC AUTO-ENTMCNC: 33.1 G/DL (ref 31–36)
MCHC RBC AUTO-ENTMCNC: 33.3 G/DL (ref 31–36)
MCHC RBC AUTO-ENTMCNC: 33.4 G/DL (ref 31–36)
MCHC RBC AUTO-ENTMCNC: 33.5 G/DL (ref 31–36)
MCHC RBC AUTO-ENTMCNC: 33.6 G/DL (ref 31–36)
MCHC RBC AUTO-ENTMCNC: 33.8 G/DL (ref 31–36)
MCHC RBC AUTO-ENTMCNC: 33.8 G/DL (ref 31–36)
MCV RBC AUTO: 93 FL (ref 80–100)
MCV RBC AUTO: 93.1 FL (ref 80–100)
MCV RBC AUTO: 93.2 FL (ref 80–100)
MCV RBC AUTO: 93.3 FL (ref 80–100)
MCV RBC AUTO: 93.4 FL (ref 80–100)
MCV RBC AUTO: 93.4 FL (ref 80–100)
MCV RBC AUTO: 93.5 FL (ref 80–100)
MCV RBC AUTO: 93.9 FL (ref 80–100)
MCV RBC AUTO: 94 FL (ref 80–100)
MCV RBC AUTO: 94.1 FL (ref 80–100)
MCV RBC AUTO: 94.2 FL (ref 80–100)
MCV RBC AUTO: 94.2 FL (ref 80–100)
MCV RBC AUTO: 94.6 FL (ref 80–100)
MCV RBC AUTO: 94.6 FL (ref 80–100)
MCV RBC AUTO: 94.8 FL (ref 80–100)
MCV RBC AUTO: 94.9 FL (ref 80–100)
MCV RBC AUTO: 94.9 FL (ref 80–100)
MCV RBC AUTO: 95.1 FL (ref 80–100)
MCV RBC AUTO: 95.3 FL (ref 80–100)
MCV RBC AUTO: 95.6 FL (ref 80–100)
MCV RBC AUTO: 95.7 FL (ref 80–100)
MCV RBC AUTO: 95.8 FL (ref 80–100)
MCV RBC AUTO: 96.4 FL (ref 80–100)
MCV RBC AUTO: 96.8 FL (ref 80–100)
MCV RBC AUTO: 97.4 FL (ref 80–100)
MCV RBC AUTO: 97.6 FL (ref 80–100)
MCV RBC AUTO: 97.9 FL (ref 80–100)
METAMYELOCYTES RELATIVE PERCENT: 2 %
METAMYELOCYTES RELATIVE PERCENT: 4 %
METHEMOGLOBIN ARTERIAL: 0.3 %
METHEMOGLOBIN VENOUS: 0 %
METHEMOGLOBIN VENOUS: 0.3 %
METHEMOGLOBIN VENOUS: 0.3 %
MICROSCOPIC EXAMINATION: ABNORMAL
MICROSCOPIC EXAMINATION: NORMAL
MICROSCOPIC EXAMINATION: YES
MONOCYTES ABSOLUTE: 0.1 K/UL (ref 0–1.3)
MONOCYTES ABSOLUTE: 0.3 K/UL (ref 0–1.3)
MONOCYTES ABSOLUTE: 0.6 K/UL (ref 0–1.3)
MONOCYTES ABSOLUTE: 0.6 K/UL (ref 0–1.3)
MONOCYTES ABSOLUTE: 0.7 K/UL (ref 0–1.3)
MONOCYTES ABSOLUTE: 0.8 K/UL (ref 0–1.3)
MONOCYTES ABSOLUTE: 0.9 K/UL (ref 0–1.3)
MONOCYTES ABSOLUTE: 0.9 K/UL (ref 0–1.3)
MONOCYTES ABSOLUTE: 1 K/UL (ref 0–1.3)
MONOCYTES ABSOLUTE: 1.1 K/UL (ref 0–1.3)
MONOCYTES RELATIVE PERCENT: 2 %
MONOCYTES RELATIVE PERCENT: 2 %
MONOCYTES RELATIVE PERCENT: 6 %
MONOCYTES RELATIVE PERCENT: 6 %
MONOCYTES RELATIVE PERCENT: 6.4 %
MONOCYTES RELATIVE PERCENT: 6.6 %
MONOCYTES RELATIVE PERCENT: 6.7 %
MONOCYTES RELATIVE PERCENT: 6.7 %
MONOCYTES RELATIVE PERCENT: 6.8 %
MONOCYTES RELATIVE PERCENT: 7 %
MONOCYTES RELATIVE PERCENT: 7 %
MONOCYTES RELATIVE PERCENT: 7.1 %
MONOCYTES RELATIVE PERCENT: 7.3 %
MONOCYTES RELATIVE PERCENT: 7.6 %
MONOCYTES RELATIVE PERCENT: 7.8 %
MONOCYTES RELATIVE PERCENT: 8 %
MONOCYTES RELATIVE PERCENT: 8.1 %
MONOCYTES RELATIVE PERCENT: 8.2 %
MONOCYTES RELATIVE PERCENT: 8.5 %
MONONUCLEAR UNIDENTIFIED CELLS FLUID BAL: 5 %
MONONUCLEAR UNIDENTIFIED CELLS FLUID: 11 %
MUCUS: ABNORMAL /LPF
MYELOCYTE PERCENT: 1 %
MYELOCYTE PERCENT: 1 %
NEUTROPHIL, FLUID: 2 %
NEUTROPHIL, FLUID: 5 %
NEUTROPHILS ABSOLUTE: 1.6 K/UL (ref 1.7–7.7)
NEUTROPHILS ABSOLUTE: 10.4 K/UL (ref 1.7–7.7)
NEUTROPHILS ABSOLUTE: 11 K/UL (ref 1.7–7.7)
NEUTROPHILS ABSOLUTE: 11.2 K/UL (ref 1.7–7.7)
NEUTROPHILS ABSOLUTE: 11.3 K/UL (ref 1.7–7.7)
NEUTROPHILS ABSOLUTE: 12.2 K/UL (ref 1.7–7.7)
NEUTROPHILS ABSOLUTE: 12.3 K/UL (ref 1.7–7.7)
NEUTROPHILS ABSOLUTE: 4.9 K/UL (ref 1.7–7.7)
NEUTROPHILS ABSOLUTE: 6.3 K/UL (ref 1.7–7.7)
NEUTROPHILS ABSOLUTE: 6.4 K/UL (ref 1.7–7.7)
NEUTROPHILS ABSOLUTE: 6.5 K/UL (ref 1.7–7.7)
NEUTROPHILS ABSOLUTE: 7.5 K/UL (ref 1.7–7.7)
NEUTROPHILS ABSOLUTE: 7.9 K/UL (ref 1.7–7.7)
NEUTROPHILS ABSOLUTE: 8.1 K/UL (ref 1.7–7.7)
NEUTROPHILS ABSOLUTE: 8.2 K/UL (ref 1.7–7.7)
NEUTROPHILS ABSOLUTE: 8.3 K/UL (ref 1.7–7.7)
NEUTROPHILS ABSOLUTE: 9.4 K/UL (ref 1.7–7.7)
NEUTROPHILS ABSOLUTE: 9.7 K/UL (ref 1.7–7.7)
NEUTROPHILS ABSOLUTE: 9.8 K/UL (ref 1.7–7.7)
NEUTROPHILS RELATIVE PERCENT: 20 %
NEUTROPHILS RELATIVE PERCENT: 56.4 %
NEUTROPHILS RELATIVE PERCENT: 63.3 %
NEUTROPHILS RELATIVE PERCENT: 63.6 %
NEUTROPHILS RELATIVE PERCENT: 63.7 %
NEUTROPHILS RELATIVE PERCENT: 63.9 %
NEUTROPHILS RELATIVE PERCENT: 67.4 %
NEUTROPHILS RELATIVE PERCENT: 68.6 %
NEUTROPHILS RELATIVE PERCENT: 68.9 %
NEUTROPHILS RELATIVE PERCENT: 70.6 %
NEUTROPHILS RELATIVE PERCENT: 71.4 %
NEUTROPHILS RELATIVE PERCENT: 71.8 %
NEUTROPHILS RELATIVE PERCENT: 72 %
NEUTROPHILS RELATIVE PERCENT: 77.2 %
NEUTROPHILS RELATIVE PERCENT: 78.5 %
NEUTROPHILS RELATIVE PERCENT: 78.5 %
NEUTROPHILS RELATIVE PERCENT: 80 %
NEUTROPHILS RELATIVE PERCENT: 81.9 %
NEUTROPHILS RELATIVE PERCENT: 85 %
NITRITE, URINE: NEGATIVE
NUCLEATED CELLS FLUID: 586 /CUMM
NUCLEATED CELLS FLUID: 794 /CUMM
NUCLEATED RED BLOOD CELLS: 12 /100 WBC
NUCLEATED RED BLOOD CELLS: 12 /100 WBC
NUMBER OF CELLS COUNTED BAL (LAVAGE): 100
NUMBER OF CELLS COUNTED FLUID: 100
NUMBER OF CELLS COUNTED FLUID: 100
O2 CONTENT ARTERIAL: 15 ML/DL
O2 CONTENT ARTERIAL: 15 ML/DL
O2 CONTENT ARTERIAL: 16 ML/DL
O2 CONTENT ARTERIAL: 16 ML/DL
O2 CONTENT, VEN: 10 VOL %
O2 CONTENT, VEN: 15 VOL %
O2 CONTENT, VEN: 9 VOL %
O2 SAT, ARTERIAL: 92.7 %
O2 SAT, ARTERIAL: 93.2 %
O2 SAT, ARTERIAL: 94.3 %
O2 SAT, ARTERIAL: 95.6 %
O2 SAT, VEN: 59 %
O2 SAT, VEN: 63 %
O2 SAT, VEN: 91 %
O2 THERAPY: ABNORMAL
PATH CONSULT FLUID: NORMAL
PATH CONSULT FLUID: NORMAL
PCO2 ARTERIAL: 34.3 MMHG (ref 35–45)
PCO2 ARTERIAL: 35.1 MMHG (ref 35–45)
PCO2 ARTERIAL: 44.4 MMHG (ref 35–45)
PCO2 ARTERIAL: 50.3 MMHG (ref 35–45)
PCO2, VEN: 37.3 MMHG (ref 40–50)
PCO2, VEN: 53.2 MMHG (ref 40–50)
PCO2, VEN: 77.4 MMHG (ref 40–50)
PDW BLD-RTO: 14 % (ref 12.4–15.4)
PDW BLD-RTO: 14.3 % (ref 12.4–15.4)
PDW BLD-RTO: 14.3 % (ref 12.4–15.4)
PDW BLD-RTO: 14.5 % (ref 12.4–15.4)
PDW BLD-RTO: 14.6 % (ref 12.4–15.4)
PDW BLD-RTO: 14.7 % (ref 12.4–15.4)
PDW BLD-RTO: 14.8 % (ref 12.4–15.4)
PDW BLD-RTO: 15 % (ref 12.4–15.4)
PDW BLD-RTO: 15.2 % (ref 12.4–15.4)
PDW BLD-RTO: 15.2 % (ref 12.4–15.4)
PDW BLD-RTO: 15.3 % (ref 12.4–15.4)
PDW BLD-RTO: 15.4 % (ref 12.4–15.4)
PDW BLD-RTO: 15.4 % (ref 12.4–15.4)
PDW BLD-RTO: 15.5 % (ref 12.4–15.4)
PDW BLD-RTO: 15.6 % (ref 12.4–15.4)
PDW BLD-RTO: 15.7 % (ref 12.4–15.4)
PDW BLD-RTO: 15.7 % (ref 12.4–15.4)
PDW BLD-RTO: 15.8 % (ref 12.4–15.4)
PDW BLD-RTO: 15.8 % (ref 12.4–15.4)
PDW BLD-RTO: 15.9 % (ref 12.4–15.4)
PDW BLD-RTO: 15.9 % (ref 12.4–15.4)
PDW BLD-RTO: 16 % (ref 12.4–15.4)
PDW BLD-RTO: 16.1 % (ref 12.4–15.4)
PDW BLD-RTO: 16.3 % (ref 12.4–15.4)
PDW BLD-RTO: 16.5 % (ref 12.4–15.4)
PERFORMED ON: ABNORMAL
PERFORMED ON: NORMAL
PH ARTERIAL: 7.35 (ref 7.35–7.45)
PH ARTERIAL: 7.35 (ref 7.35–7.45)
PH ARTERIAL: 7.41 (ref 7.35–7.45)
PH ARTERIAL: 7.42 (ref 7.35–7.45)
PH UA: 5.5 (ref 5–8)
PH UA: 6 (ref 5–8)
PH UA: 6 (ref 5–8)
PH VENOUS: 7.11 (ref 7.35–7.45)
PH VENOUS: 7.24 (ref 7.35–7.45)
PH VENOUS: 7.41 (ref 7.35–7.45)
PH, BODY FLUID: 8
PH, BODY FLUID: 8.1
PHOSPHORUS: 2.5 MG/DL (ref 2.5–4.9)
PHOSPHORUS: 3.7 MG/DL (ref 2.5–4.9)
PHOSPHORUS: 4.3 MG/DL (ref 2.5–4.9)
PLATELET # BLD: 203 K/UL (ref 135–450)
PLATELET # BLD: 205 K/UL (ref 135–450)
PLATELET # BLD: 205 K/UL (ref 135–450)
PLATELET # BLD: 214 K/UL (ref 135–450)
PLATELET # BLD: 222 K/UL (ref 135–450)
PLATELET # BLD: 224 K/UL (ref 135–450)
PLATELET # BLD: 225 K/UL (ref 135–450)
PLATELET # BLD: 227 K/UL (ref 135–450)
PLATELET # BLD: 231 K/UL (ref 135–450)
PLATELET # BLD: 235 K/UL (ref 135–450)
PLATELET # BLD: 237 K/UL (ref 135–450)
PLATELET # BLD: 237 K/UL (ref 135–450)
PLATELET # BLD: 242 K/UL (ref 135–450)
PLATELET # BLD: 251 K/UL (ref 135–450)
PLATELET # BLD: 261 K/UL (ref 135–450)
PLATELET # BLD: 263 K/UL (ref 135–450)
PLATELET # BLD: 269 K/UL (ref 135–450)
PLATELET # BLD: 270 K/UL (ref 135–450)
PLATELET # BLD: 274 K/UL (ref 135–450)
PLATELET # BLD: 279 K/UL (ref 135–450)
PLATELET # BLD: 279 K/UL (ref 135–450)
PLATELET # BLD: 285 K/UL (ref 135–450)
PLATELET # BLD: 286 K/UL (ref 135–450)
PLATELET # BLD: 288 K/UL (ref 135–450)
PLATELET # BLD: 288 K/UL (ref 135–450)
PLATELET # BLD: 297 K/UL (ref 135–450)
PLATELET # BLD: 297 K/UL (ref 135–450)
PLATELET SLIDE REVIEW: ADEQUATE
PMV BLD AUTO: 10.1 FL (ref 5–10.5)
PMV BLD AUTO: 10.3 FL (ref 5–10.5)
PMV BLD AUTO: 10.4 FL (ref 5–10.5)
PMV BLD AUTO: 10.5 FL (ref 5–10.5)
PMV BLD AUTO: 10.5 FL (ref 5–10.5)
PMV BLD AUTO: 10.6 FL (ref 5–10.5)
PMV BLD AUTO: 10.8 FL (ref 5–10.5)
PMV BLD AUTO: 10.8 FL (ref 5–10.5)
PMV BLD AUTO: 10.9 FL (ref 5–10.5)
PMV BLD AUTO: 11 FL (ref 5–10.5)
PMV BLD AUTO: 11.1 FL (ref 5–10.5)
PMV BLD AUTO: 9.8 FL (ref 5–10.5)
PO2 ARTERIAL: 67.1 MMHG (ref 75–108)
PO2 ARTERIAL: 70.1 MMHG (ref 75–108)
PO2 ARTERIAL: 70.4 MMHG (ref 75–108)
PO2 ARTERIAL: 76.9 MMHG (ref 75–108)
PO2, VEN: 36 MMHG (ref 25–40)
PO2, VEN: 44.3 MMHG (ref 25–40)
PO2, VEN: 58.5 MMHG (ref 25–40)
POIKILOCYTES: ABNORMAL
POLYCHROMASIA: ABNORMAL
POLYCHROMASIA: ABNORMAL
POTASSIUM REFLEX MAGNESIUM: 4.2 MMOL/L (ref 3.5–5.1)
POTASSIUM REFLEX MAGNESIUM: 4.3 MMOL/L (ref 3.5–5.1)
POTASSIUM REFLEX MAGNESIUM: 4.4 MMOL/L (ref 3.5–5.1)
POTASSIUM REFLEX MAGNESIUM: 4.5 MMOL/L (ref 3.5–5.1)
POTASSIUM REFLEX MAGNESIUM: 4.5 MMOL/L (ref 3.5–5.1)
POTASSIUM REFLEX MAGNESIUM: 4.6 MMOL/L (ref 3.5–5.1)
POTASSIUM REFLEX MAGNESIUM: 4.6 MMOL/L (ref 3.5–5.1)
POTASSIUM REFLEX MAGNESIUM: 4.7 MMOL/L (ref 3.5–5.1)
POTASSIUM REFLEX MAGNESIUM: 4.7 MMOL/L (ref 3.5–5.1)
POTASSIUM REFLEX MAGNESIUM: 4.8 MMOL/L (ref 3.5–5.1)
POTASSIUM REFLEX MAGNESIUM: 4.9 MMOL/L (ref 3.5–5.1)
POTASSIUM REFLEX MAGNESIUM: 5 MMOL/L (ref 3.5–5.1)
POTASSIUM REFLEX MAGNESIUM: 5 MMOL/L (ref 3.5–5.1)
POTASSIUM REFLEX MAGNESIUM: 5.1 MMOL/L (ref 3.5–5.1)
POTASSIUM REFLEX MAGNESIUM: 5.5 MMOL/L (ref 3.5–5.1)
POTASSIUM SERPL-SCNC: 4.1 MMOL/L (ref 3.5–5.1)
POTASSIUM SERPL-SCNC: 4.3 MMOL/L (ref 3.5–5.1)
POTASSIUM SERPL-SCNC: 4.4 MMOL/L (ref 3.5–5.1)
POTASSIUM SERPL-SCNC: 4.5 MMOL/L (ref 3.5–5.1)
POTASSIUM SERPL-SCNC: 4.5 MMOL/L (ref 3.5–5.1)
POTASSIUM SERPL-SCNC: 4.6 MMOL/L (ref 3.5–5.1)
POTASSIUM SERPL-SCNC: 4.6 MMOL/L (ref 3.5–5.1)
POTASSIUM SERPL-SCNC: 4.7 MMOL/L (ref 3.5–5.1)
POTASSIUM SERPL-SCNC: 4.7 MMOL/L (ref 3.5–5.1)
POTASSIUM SERPL-SCNC: 4.8 MMOL/L (ref 3.5–5.1)
POTASSIUM SERPL-SCNC: 4.9 MMOL/L (ref 3.5–5.1)
POTASSIUM SERPL-SCNC: 5 MMOL/L (ref 3.5–5.1)
POTASSIUM SERPL-SCNC: 5 MMOL/L (ref 3.5–5.1)
POTASSIUM SERPL-SCNC: 5.1 MMOL/L (ref 3.5–5.1)
POTASSIUM SERPL-SCNC: 5.2 MMOL/L (ref 3.5–5.1)
POTASSIUM SERPL-SCNC: 5.3 MMOL/L (ref 3.5–5.1)
POTASSIUM SERPL-SCNC: 5.6 MMOL/L (ref 3.5–5.1)
PRO-BNP: 1598 PG/ML (ref 0–124)
PRO-BNP: 1734 PG/ML (ref 0–124)
PRO-BNP: 2007 PG/ML (ref 0–124)
PRO-BNP: 2039 PG/ML (ref 0–124)
PRO-BNP: 2073 PG/ML (ref 0–124)
PRO-BNP: 2153 PG/ML (ref 0–124)
PRO-BNP: 2318 PG/ML (ref 0–124)
PRO-BNP: 2562 PG/ML (ref 0–124)
PRO-BNP: 3392 PG/ML (ref 0–124)
PRO-BNP: 806 PG/ML (ref 0–124)
PRO-BNP: 8462 PG/ML (ref 0–124)
PROCALCITONIN: 0.52 NG/ML (ref 0–0.15)
PROCALCITONIN: 0.68 NG/ML (ref 0–0.15)
PROCALCITONIN: 18.03 NG/ML (ref 0–0.15)
PROMYELOCYTES PERCENT: 2 %
PROTEIN FLUID: 3 G/DL
PROTEIN UA: ABNORMAL MG/DL
PROTEIN UA: NEGATIVE MG/DL
PROTEIN UA: NEGATIVE MG/DL
PROTHROMBIN TIME: 12.2 SEC (ref 10–13.2)
PROTHROMBIN TIME: 13.2 SEC (ref 10–13.2)
PROTHROMBIN TIME: 14 SEC (ref 10–13.2)
RBC # BLD: 3.12 M/UL (ref 4–5.2)
RBC # BLD: 3.19 M/UL (ref 4–5.2)
RBC # BLD: 3.26 M/UL (ref 4–5.2)
RBC # BLD: 3.26 M/UL (ref 4–5.2)
RBC # BLD: 3.33 M/UL (ref 4–5.2)
RBC # BLD: 3.38 M/UL (ref 4–5.2)
RBC # BLD: 3.45 M/UL (ref 4–5.2)
RBC # BLD: 3.46 M/UL (ref 4–5.2)
RBC # BLD: 3.47 M/UL (ref 4–5.2)
RBC # BLD: 3.49 M/UL (ref 4–5.2)
RBC # BLD: 3.5 M/UL (ref 4–5.2)
RBC # BLD: 3.54 M/UL (ref 4–5.2)
RBC # BLD: 3.56 M/UL (ref 4–5.2)
RBC # BLD: 3.6 M/UL (ref 4–5.2)
RBC # BLD: 3.65 M/UL (ref 4–5.2)
RBC # BLD: 3.67 M/UL (ref 4–5.2)
RBC # BLD: 3.68 M/UL (ref 4–5.2)
RBC # BLD: 3.69 M/UL (ref 4–5.2)
RBC # BLD: 3.7 M/UL (ref 4–5.2)
RBC # BLD: 3.7 M/UL (ref 4–5.2)
RBC # BLD: 3.71 M/UL (ref 4–5.2)
RBC # BLD: 3.71 M/UL (ref 4–5.2)
RBC # BLD: 3.87 M/UL (ref 4–5.2)
RBC # BLD: 3.92 M/UL (ref 4–5.2)
RBC # BLD: 3.95 M/UL (ref 4–5.2)
RBC FLUID: 2500 /CUMM
RBC FLUID: 5500 /CUMM
RBC UA: ABNORMAL /HPF (ref 0–4)
RBC, BAL: ABNORMAL /CUMM
REPORT: NORMAL
REPORT: NORMAL
RHEUMATOID FACTOR: 24 IU/ML
SARS-COV-2, PCR: NOT DETECTED
SARS-COV-2: NOT DETECTED
SEDIMENTATION RATE, ERYTHROCYTE: 38 MM/HR (ref 0–30)
SEGMENTED NEUTROPHILS, BAL: 68 % (ref 5–10)
SLIDE REVIEW: ABNORMAL
SODIUM BLD-SCNC: 129 MMOL/L (ref 136–145)
SODIUM BLD-SCNC: 133 MMOL/L (ref 136–145)
SODIUM BLD-SCNC: 133 MMOL/L (ref 136–145)
SODIUM BLD-SCNC: 134 MMOL/L (ref 136–145)
SODIUM BLD-SCNC: 135 MMOL/L (ref 136–145)
SODIUM BLD-SCNC: 136 MMOL/L (ref 136–145)
SODIUM BLD-SCNC: 137 MMOL/L (ref 136–145)
SODIUM BLD-SCNC: 138 MMOL/L (ref 136–145)
SODIUM BLD-SCNC: 139 MMOL/L (ref 136–145)
SODIUM BLD-SCNC: 140 MMOL/L (ref 136–145)
SODIUM BLD-SCNC: 141 MMOL/L (ref 136–145)
SODIUM BLD-SCNC: 143 MMOL/L (ref 136–145)
SPECIFIC GRAVITY UA: 1.01 (ref 1–1.03)
SPECIFIC GRAVITY UA: 1.02 (ref 1–1.03)
SPECIFIC GRAVITY UA: 1.02 (ref 1–1.03)
STREP PNEUMONIAE ANTIGEN, URINE: NORMAL
TCO2 ARTERIAL: 20.1 MMOL/L
TCO2 ARTERIAL: 22.1 MMOL/L
TCO2 ARTERIAL: 28.7 MMOL/L
TCO2 ARTERIAL: 29.5 MMOL/L
TCO2 CALC VENOUS: 24 MMOL/L
TCO2 CALC VENOUS: 24 MMOL/L
TCO2 CALC VENOUS: 26 MMOL/L
THIS TEST SENT TO: NORMAL
TOTAL CK: 70 U/L (ref 26–192)
TOTAL IRON BINDING CAPACITY: 255 UG/DL (ref 260–445)
TOTAL PROTEIN: 5.3 G/DL (ref 6.4–8.2)
TOTAL PROTEIN: 6.8 G/DL (ref 6.4–8.2)
TOTAL PROTEIN: 6.9 G/DL (ref 6.4–8.2)
TOTAL PROTEIN: 7.1 G/DL (ref 6.4–8.2)
TOTAL PROTEIN: 7.2 G/DL (ref 6.4–8.2)
TROPONIN: <0.01 NG/ML
TSH REFLEX: 2.14 UIU/ML (ref 0.27–4.2)
URINE TYPE: ABNORMAL
URINE TYPE: ABNORMAL
URINE TYPE: NORMAL
UROBILINOGEN, URINE: 0.2 E.U./DL
WBC # BLD: 10 K/UL (ref 4–11)
WBC # BLD: 10.1 K/UL (ref 4–11)
WBC # BLD: 10.6 K/UL (ref 4–11)
WBC # BLD: 11 K/UL (ref 4–11)
WBC # BLD: 11 K/UL (ref 4–11)
WBC # BLD: 11.1 K/UL (ref 4–11)
WBC # BLD: 11.8 K/UL (ref 4–11)
WBC # BLD: 11.8 K/UL (ref 4–11)
WBC # BLD: 12.9 K/UL (ref 4–11)
WBC # BLD: 13 K/UL (ref 4–11)
WBC # BLD: 13.4 K/UL (ref 4–11)
WBC # BLD: 13.7 K/UL (ref 4–11)
WBC # BLD: 13.7 K/UL (ref 4–11)
WBC # BLD: 14 K/UL (ref 4–11)
WBC # BLD: 14.1 K/UL (ref 4–11)
WBC # BLD: 14.3 K/UL (ref 4–11)
WBC # BLD: 15.8 K/UL (ref 4–11)
WBC # BLD: 3.3 K/UL (ref 4–11)
WBC # BLD: 7.6 K/UL (ref 4–11)
WBC # BLD: 7.9 K/UL (ref 4–11)
WBC # BLD: 8.1 K/UL (ref 4–11)
WBC # BLD: 8.7 K/UL (ref 4–11)
WBC # BLD: 9.1 K/UL (ref 4–11)
WBC # BLD: 9.4 K/UL (ref 4–11)
WBC # BLD: 9.9 K/UL (ref 4–11)
WBC UA: ABNORMAL /HPF (ref 0–5)
WBC/EPI CELLS BAL: 2588 /CUMM
YEAST: PRESENT /HPF

## 2020-01-01 PROCEDURE — 2000000000 HC ICU R&B

## 2020-01-01 PROCEDURE — 88341 IMHCHEM/IMCYTCHM EA ADD ANTB: CPT

## 2020-01-01 PROCEDURE — 88342 IMHCHEM/IMCYTCHM 1ST ANTB: CPT

## 2020-01-01 PROCEDURE — 6370000000 HC RX 637 (ALT 250 FOR IP): Performed by: INTERNAL MEDICINE

## 2020-01-01 PROCEDURE — 92950 HEART/LUNG RESUSCITATION CPR: CPT

## 2020-01-01 PROCEDURE — 82010 KETONE BODYS QUAN: CPT

## 2020-01-01 PROCEDURE — 71045 X-RAY EXAM CHEST 1 VIEW: CPT

## 2020-01-01 PROCEDURE — 2580000003 HC RX 258: Performed by: NURSE PRACTITIONER

## 2020-01-01 PROCEDURE — 6370000000 HC RX 637 (ALT 250 FOR IP): Performed by: NURSE PRACTITIONER

## 2020-01-01 PROCEDURE — 1111F DSCHRG MED/CURRENT MED MERGE: CPT | Performed by: NURSE PRACTITIONER

## 2020-01-01 PROCEDURE — 99232 SBSQ HOSP IP/OBS MODERATE 35: CPT | Performed by: INTERNAL MEDICINE

## 2020-01-01 PROCEDURE — U0003 INFECTIOUS AGENT DETECTION BY NUCLEIC ACID (DNA OR RNA); SEVERE ACUTE RESPIRATORY SYNDROME CORONAVIRUS 2 (SARS-COV-2) (CORONAVIRUS DISEASE [COVID-19]), AMPLIFIED PROBE TECHNIQUE, MAKING USE OF HIGH THROUGHPUT TECHNOLOGIES AS DESCRIBED BY CMS-2020-01-R: HCPCS

## 2020-01-01 PROCEDURE — 94761 N-INVAS EAR/PLS OXIMETRY MLT: CPT

## 2020-01-01 PROCEDURE — 6360000002 HC RX W HCPCS: Performed by: INTERNAL MEDICINE

## 2020-01-01 PROCEDURE — 87275 INFLUENZA B AG IF: CPT

## 2020-01-01 PROCEDURE — 51702 INSERT TEMP BLADDER CATH: CPT

## 2020-01-01 PROCEDURE — 99233 SBSQ HOSP IP/OBS HIGH 50: CPT | Performed by: INTERNAL MEDICINE

## 2020-01-01 PROCEDURE — 97116 GAIT TRAINING THERAPY: CPT

## 2020-01-01 PROCEDURE — 2060000000 HC ICU INTERMEDIATE R&B

## 2020-01-01 PROCEDURE — 2700000000 HC OXYGEN THERAPY PER DAY

## 2020-01-01 PROCEDURE — 85025 COMPLETE CBC W/AUTO DIFF WBC: CPT

## 2020-01-01 PROCEDURE — 2500000003 HC RX 250 WO HCPCS: Performed by: INTERNAL MEDICINE

## 2020-01-01 PROCEDURE — 6360000002 HC RX W HCPCS: Performed by: NURSE PRACTITIONER

## 2020-01-01 PROCEDURE — 2500000003 HC RX 250 WO HCPCS

## 2020-01-01 PROCEDURE — 83880 ASSAY OF NATRIURETIC PEPTIDE: CPT

## 2020-01-01 PROCEDURE — 3046F HEMOGLOBIN A1C LEVEL >9.0%: CPT | Performed by: FAMILY MEDICINE

## 2020-01-01 PROCEDURE — 80048 BASIC METABOLIC PNL TOTAL CA: CPT

## 2020-01-01 PROCEDURE — 83550 IRON BINDING TEST: CPT

## 2020-01-01 PROCEDURE — 99291 CRITICAL CARE FIRST HOUR: CPT | Performed by: INTERNAL MEDICINE

## 2020-01-01 PROCEDURE — 36415 COLL VENOUS BLD VENIPUNCTURE: CPT

## 2020-01-01 PROCEDURE — 87205 SMEAR GRAM STAIN: CPT

## 2020-01-01 PROCEDURE — 0W993ZZ DRAINAGE OF RIGHT PLEURAL CAVITY, PERCUTANEOUS APPROACH: ICD-10-PCS | Performed by: RADIOLOGY

## 2020-01-01 PROCEDURE — 99292 CRITICAL CARE ADDL 30 MIN: CPT | Performed by: INTERNAL MEDICINE

## 2020-01-01 PROCEDURE — 83735 ASSAY OF MAGNESIUM: CPT

## 2020-01-01 PROCEDURE — 99255 IP/OBS CONSLTJ NEW/EST HI 80: CPT | Performed by: INTERNAL MEDICINE

## 2020-01-01 PROCEDURE — 4004F PT TOBACCO SCREEN RCVD TLK: CPT | Performed by: NURSE PRACTITIONER

## 2020-01-01 PROCEDURE — 94640 AIRWAY INHALATION TREATMENT: CPT

## 2020-01-01 PROCEDURE — 1200000000 HC SEMI PRIVATE

## 2020-01-01 PROCEDURE — 96374 THER/PROPH/DIAG INJ IV PUSH: CPT

## 2020-01-01 PROCEDURE — 93451 RIGHT HEART CATH: CPT | Performed by: INTERNAL MEDICINE

## 2020-01-01 PROCEDURE — 2709999900 CT NEEDLE BIOPSY LIVER PERCUTANEOUS

## 2020-01-01 PROCEDURE — 87206 SMEAR FLUORESCENT/ACID STAI: CPT

## 2020-01-01 PROCEDURE — 88305 TISSUE EXAM BY PATHOLOGIST: CPT

## 2020-01-01 PROCEDURE — 2580000003 HC RX 258: Performed by: INTERNAL MEDICINE

## 2020-01-01 PROCEDURE — 83986 ASSAY PH BODY FLUID NOS: CPT

## 2020-01-01 PROCEDURE — A9503 TC99M MEDRONATE: HCPCS | Performed by: INTERNAL MEDICINE

## 2020-01-01 PROCEDURE — 87252 VIRUS INOCULATION TISSUE: CPT

## 2020-01-01 PROCEDURE — 2580000003 HC RX 258: Performed by: PHYSICIAN ASSISTANT

## 2020-01-01 PROCEDURE — 86431 RHEUMATOID FACTOR QUANT: CPT

## 2020-01-01 PROCEDURE — 80069 RENAL FUNCTION PANEL: CPT

## 2020-01-01 PROCEDURE — 97530 THERAPEUTIC ACTIVITIES: CPT

## 2020-01-01 PROCEDURE — 87260 ADENOVIRUS AG IF: CPT

## 2020-01-01 PROCEDURE — 84100 ASSAY OF PHOSPHORUS: CPT

## 2020-01-01 PROCEDURE — 88185 FLOWCYTOMETRY/TC ADD-ON: CPT

## 2020-01-01 PROCEDURE — 99285 EMERGENCY DEPT VISIT HI MDM: CPT

## 2020-01-01 PROCEDURE — 0BC88ZZ EXTIRPATION OF MATTER FROM LEFT UPPER LOBE BRONCHUS, VIA NATURAL OR ARTIFICIAL OPENING ENDOSCOPIC: ICD-10-PCS | Performed by: INTERNAL MEDICINE

## 2020-01-01 PROCEDURE — 36600 WITHDRAWAL OF ARTERIAL BLOOD: CPT

## 2020-01-01 PROCEDURE — 87015 SPECIMEN INFECT AGNT CONCNTJ: CPT

## 2020-01-01 PROCEDURE — 6360000002 HC RX W HCPCS: Performed by: PHYSICIAN ASSISTANT

## 2020-01-01 PROCEDURE — 99291 CRITICAL CARE FIRST HOUR: CPT | Performed by: NURSE PRACTITIONER

## 2020-01-01 PROCEDURE — 85027 COMPLETE CBC AUTOMATED: CPT

## 2020-01-01 PROCEDURE — 6370000000 HC RX 637 (ALT 250 FOR IP): Performed by: PHYSICIAN ASSISTANT

## 2020-01-01 PROCEDURE — G8427 DOCREV CUR MEDS BY ELIG CLIN: HCPCS | Performed by: NURSE PRACTITIONER

## 2020-01-01 PROCEDURE — 99291 CRITICAL CARE FIRST HOUR: CPT

## 2020-01-01 PROCEDURE — 87070 CULTURE OTHR SPECIMN AEROBIC: CPT

## 2020-01-01 PROCEDURE — 6370000000 HC RX 637 (ALT 250 FOR IP): Performed by: EMERGENCY MEDICINE

## 2020-01-01 PROCEDURE — 3017F COLORECTAL CA SCREEN DOC REV: CPT | Performed by: NURSE PRACTITIONER

## 2020-01-01 PROCEDURE — 83605 ASSAY OF LACTIC ACID: CPT

## 2020-01-01 PROCEDURE — 82803 BLOOD GASES ANY COMBINATION: CPT

## 2020-01-01 PROCEDURE — 85652 RBC SED RATE AUTOMATED: CPT

## 2020-01-01 PROCEDURE — 84484 ASSAY OF TROPONIN QUANT: CPT

## 2020-01-01 PROCEDURE — 1111F DSCHRG MED/CURRENT MED MERGE: CPT | Performed by: FAMILY MEDICINE

## 2020-01-01 PROCEDURE — 97162 PT EVAL MOD COMPLEX 30 MIN: CPT

## 2020-01-01 PROCEDURE — 88112 CYTOPATH CELL ENHANCE TECH: CPT

## 2020-01-01 PROCEDURE — 6360000002 HC RX W HCPCS

## 2020-01-01 PROCEDURE — 2580000003 HC RX 258

## 2020-01-01 PROCEDURE — 84157 ASSAY OF PROTEIN OTHER: CPT

## 2020-01-01 PROCEDURE — 94750 HC PULMONARY COMPLIANCE STUDY: CPT

## 2020-01-01 PROCEDURE — 89051 BODY FLUID CELL COUNT: CPT

## 2020-01-01 PROCEDURE — 3E03305 INTRODUCTION OF OTHER ANTINEOPLASTIC INTO PERIPHERAL VEIN, PERCUTANEOUS APPROACH: ICD-10-PCS | Performed by: HOSPITALIST

## 2020-01-01 PROCEDURE — 97166 OT EVAL MOD COMPLEX 45 MIN: CPT

## 2020-01-01 PROCEDURE — 87077 CULTURE AEROBIC IDENTIFY: CPT

## 2020-01-01 PROCEDURE — 94150 VITAL CAPACITY TEST: CPT

## 2020-01-01 PROCEDURE — 36592 COLLECT BLOOD FROM PICC: CPT

## 2020-01-01 PROCEDURE — C1894 INTRO/SHEATH, NON-LASER: HCPCS

## 2020-01-01 PROCEDURE — 93306 TTE W/DOPPLER COMPLETE: CPT

## 2020-01-01 PROCEDURE — 31622 DX BRONCHOSCOPE/WASH: CPT

## 2020-01-01 PROCEDURE — 2500000003 HC RX 250 WO HCPCS: Performed by: HOSPITALIST

## 2020-01-01 PROCEDURE — 81001 URINALYSIS AUTO W/SCOPE: CPT

## 2020-01-01 PROCEDURE — 99214 OFFICE O/P EST MOD 30 MIN: CPT | Performed by: NURSE PRACTITIONER

## 2020-01-01 PROCEDURE — 36573 INSJ PICC RS&I 5 YR+: CPT

## 2020-01-01 PROCEDURE — 87116 MYCOBACTERIA CULTURE: CPT

## 2020-01-01 PROCEDURE — 96376 TX/PRO/DX INJ SAME DRUG ADON: CPT

## 2020-01-01 PROCEDURE — 0W993ZZ DRAINAGE OF RIGHT PLEURAL CAVITY, PERCUTANEOUS APPROACH: ICD-10-PCS | Performed by: INTERNAL MEDICINE

## 2020-01-01 PROCEDURE — 87040 BLOOD CULTURE FOR BACTERIA: CPT

## 2020-01-01 PROCEDURE — 4A033BC MEASUREMENT OF ARTERIAL PRESSURE, CORONARY, PERCUTANEOUS APPROACH: ICD-10-PCS | Performed by: INTERNAL MEDICINE

## 2020-01-01 PROCEDURE — 84443 ASSAY THYROID STIM HORMONE: CPT

## 2020-01-01 PROCEDURE — G8926 SPIRO NO PERF OR DOC: HCPCS | Performed by: FAMILY MEDICINE

## 2020-01-01 PROCEDURE — 2022F DILAT RTA XM EVC RTNOPTHY: CPT | Performed by: FAMILY MEDICINE

## 2020-01-01 PROCEDURE — 71046 X-RAY EXAM CHEST 2 VIEWS: CPT

## 2020-01-01 PROCEDURE — C1751 CATH, INF, PER/CENT/MIDLINE: HCPCS

## 2020-01-01 PROCEDURE — 80053 COMPREHEN METABOLIC PANEL: CPT

## 2020-01-01 PROCEDURE — 83036 HEMOGLOBIN GLYCOSYLATED A1C: CPT

## 2020-01-01 PROCEDURE — 88184 FLOWCYTOMETRY/ TC 1 MARKER: CPT

## 2020-01-01 PROCEDURE — 82945 GLUCOSE OTHER FLUID: CPT

## 2020-01-01 PROCEDURE — 93005 ELECTROCARDIOGRAM TRACING: CPT | Performed by: EMERGENCY MEDICINE

## 2020-01-01 PROCEDURE — 97535 SELF CARE MNGMENT TRAINING: CPT

## 2020-01-01 PROCEDURE — 87150 DNA/RNA AMPLIFIED PROBE: CPT

## 2020-01-01 PROCEDURE — 31624 DX BRONCHOSCOPE/LAVAGE: CPT | Performed by: INTERNAL MEDICINE

## 2020-01-01 PROCEDURE — 82550 ASSAY OF CK (CPK): CPT

## 2020-01-01 PROCEDURE — 87081 CULTURE SCREEN ONLY: CPT

## 2020-01-01 PROCEDURE — 86140 C-REACTIVE PROTEIN: CPT

## 2020-01-01 PROCEDURE — 0BC68ZZ EXTIRPATION OF MATTER FROM RIGHT LOWER LOBE BRONCHUS, VIA NATURAL OR ARTIFICIAL OPENING ENDOSCOPIC: ICD-10-PCS | Performed by: INTERNAL MEDICINE

## 2020-01-01 PROCEDURE — 74176 CT ABD & PELVIS W/O CONTRAST: CPT

## 2020-01-01 PROCEDURE — 32555 ASPIRATE PLEURA W/ IMAGING: CPT

## 2020-01-01 PROCEDURE — 71250 CT THORAX DX C-: CPT

## 2020-01-01 PROCEDURE — G8417 CALC BMI ABV UP PARAM F/U: HCPCS | Performed by: NURSE PRACTITIONER

## 2020-01-01 PROCEDURE — G8484 FLU IMMUNIZE NO ADMIN: HCPCS | Performed by: FAMILY MEDICINE

## 2020-01-01 PROCEDURE — 84145 PROCALCITONIN (PCT): CPT

## 2020-01-01 PROCEDURE — 5A1935Z RESPIRATORY VENTILATION, LESS THAN 24 CONSECUTIVE HOURS: ICD-10-PCS | Performed by: INTERNAL MEDICINE

## 2020-01-01 PROCEDURE — C1769 GUIDE WIRE: HCPCS

## 2020-01-01 PROCEDURE — 97110 THERAPEUTIC EXERCISES: CPT

## 2020-01-01 PROCEDURE — 97164 PT RE-EVAL EST PLAN CARE: CPT

## 2020-01-01 PROCEDURE — 0BH18EZ INSERTION OF ENDOTRACHEAL AIRWAY INTO TRACHEA, VIA NATURAL OR ARTIFICIAL OPENING ENDOSCOPIC: ICD-10-PCS | Performed by: INTERNAL MEDICINE

## 2020-01-01 PROCEDURE — 80076 HEPATIC FUNCTION PANEL: CPT

## 2020-01-01 PROCEDURE — 83690 ASSAY OF LIPASE: CPT

## 2020-01-01 PROCEDURE — 83615 LACTATE (LD) (LDH) ENZYME: CPT

## 2020-01-01 PROCEDURE — 6360000002 HC RX W HCPCS: Performed by: EMERGENCY MEDICINE

## 2020-01-01 PROCEDURE — 2580000003 HC RX 258: Performed by: HOSPITALIST

## 2020-01-01 PROCEDURE — 87254 VIRUS INOCULATION SHELL VIA: CPT

## 2020-01-01 PROCEDURE — 93005 ELECTROCARDIOGRAM TRACING: CPT | Performed by: STUDENT IN AN ORGANIZED HEALTH CARE EDUCATION/TRAINING PROGRAM

## 2020-01-01 PROCEDURE — 70551 MRI BRAIN STEM W/O DYE: CPT

## 2020-01-01 PROCEDURE — 74018 RADEX ABDOMEN 1 VIEW: CPT

## 2020-01-01 PROCEDURE — 99233 SBSQ HOSP IP/OBS HIGH 50: CPT | Performed by: NURSE PRACTITIONER

## 2020-01-01 PROCEDURE — 3430000000 HC RX DIAGNOSTIC RADIOPHARMACEUTICAL: Performed by: INTERNAL MEDICINE

## 2020-01-01 PROCEDURE — 93503 INSERT/PLACE HEART CATHETER: CPT

## 2020-01-01 PROCEDURE — 99254 IP/OBS CNSLTJ NEW/EST MOD 60: CPT | Performed by: INTERNAL MEDICINE

## 2020-01-01 PROCEDURE — 87449 NOS EACH ORGANISM AG IA: CPT

## 2020-01-01 PROCEDURE — 96365 THER/PROPH/DIAG IV INF INIT: CPT

## 2020-01-01 PROCEDURE — 85610 PROTHROMBIN TIME: CPT

## 2020-01-01 PROCEDURE — 99152 MOD SED SAME PHYS/QHP 5/>YRS: CPT | Performed by: INTERNAL MEDICINE

## 2020-01-01 PROCEDURE — 99232 SBSQ HOSP IP/OBS MODERATE 35: CPT | Performed by: NURSE PRACTITIONER

## 2020-01-01 PROCEDURE — 36415 COLL VENOUS BLD VENIPUNCTURE: CPT | Performed by: FAMILY MEDICINE

## 2020-01-01 PROCEDURE — 3609027000 HC BRONCHOSCOPY: Performed by: INTERNAL MEDICINE

## 2020-01-01 PROCEDURE — 93010 ELECTROCARDIOGRAM REPORT: CPT | Performed by: INTERNAL MEDICINE

## 2020-01-01 PROCEDURE — 3609011100 HC BRONCHOSCOPY BRUSHINGS: Performed by: INTERNAL MEDICINE

## 2020-01-01 PROCEDURE — 6360000002 HC RX W HCPCS: Performed by: HOSPITALIST

## 2020-01-01 PROCEDURE — 32554 ASPIRATE PLEURA W/O IMAGING: CPT | Performed by: INTERNAL MEDICINE

## 2020-01-01 PROCEDURE — 93451 RIGHT HEART CATH: CPT

## 2020-01-01 PROCEDURE — 31500 INSERT EMERGENCY AIRWAY: CPT | Performed by: INTERNAL MEDICINE

## 2020-01-01 PROCEDURE — 87102 FUNGUS ISOLATION CULTURE: CPT

## 2020-01-01 PROCEDURE — G8427 DOCREV CUR MEDS BY ELIG CLIN: HCPCS | Performed by: FAMILY MEDICINE

## 2020-01-01 PROCEDURE — 77012 CT SCAN FOR NEEDLE BIOPSY: CPT

## 2020-01-01 PROCEDURE — 36591 DRAW BLOOD OFF VENOUS DEVICE: CPT

## 2020-01-01 PROCEDURE — 87279 PARAINFLUENZA AG IF: CPT

## 2020-01-01 PROCEDURE — 88307 TISSUE EXAM BY PATHOLOGIST: CPT

## 2020-01-01 PROCEDURE — 2709999900 HC NON-CHARGEABLE SUPPLY: Performed by: INTERNAL MEDICINE

## 2020-01-01 PROCEDURE — 94003 VENT MGMT INPAT SUBQ DAY: CPT

## 2020-01-01 PROCEDURE — 02HV33Z INSERTION OF INFUSION DEVICE INTO SUPERIOR VENA CAVA, PERCUTANEOUS APPROACH: ICD-10-PCS | Performed by: RADIOLOGY

## 2020-01-01 PROCEDURE — 81003 URINALYSIS AUTO W/O SCOPE: CPT

## 2020-01-01 PROCEDURE — 31623 DX BRONCHOSCOPE/BRUSH: CPT | Performed by: INTERNAL MEDICINE

## 2020-01-01 PROCEDURE — 99214 OFFICE O/P EST MOD 30 MIN: CPT | Performed by: FAMILY MEDICINE

## 2020-01-01 PROCEDURE — 0BD68ZX EXTRACTION OF RIGHT LOWER LOBE BRONCHUS, VIA NATURAL OR ARTIFICIAL OPENING ENDOSCOPIC, DIAGNOSTIC: ICD-10-PCS | Performed by: INTERNAL MEDICINE

## 2020-01-01 PROCEDURE — 4004F PT TOBACCO SCREEN RCVD TLK: CPT | Performed by: FAMILY MEDICINE

## 2020-01-01 PROCEDURE — 94002 VENT MGMT INPAT INIT DAY: CPT

## 2020-01-01 PROCEDURE — 3017F COLORECTAL CA SCREEN DOC REV: CPT | Performed by: FAMILY MEDICINE

## 2020-01-01 PROCEDURE — G8484 FLU IMMUNIZE NO ADMIN: HCPCS | Performed by: NURSE PRACTITIONER

## 2020-01-01 PROCEDURE — G8417 CALC BMI ABV UP PARAM F/U: HCPCS | Performed by: FAMILY MEDICINE

## 2020-01-01 PROCEDURE — 6370000000 HC RX 637 (ALT 250 FOR IP)

## 2020-01-01 PROCEDURE — 86038 ANTINUCLEAR ANTIBODIES: CPT

## 2020-01-01 PROCEDURE — 93308 TTE F-UP OR LMTD: CPT

## 2020-01-01 PROCEDURE — 31645 BRNCHSC W/THER ASPIR 1ST: CPT | Performed by: INTERNAL MEDICINE

## 2020-01-01 PROCEDURE — 83540 ASSAY OF IRON: CPT

## 2020-01-01 PROCEDURE — 0FB03ZX EXCISION OF LIVER, PERCUTANEOUS APPROACH, DIAGNOSTIC: ICD-10-PCS | Performed by: RADIOLOGY

## 2020-01-01 PROCEDURE — 87299 ANTIBODY DETECTION NOS IF: CPT

## 2020-01-01 PROCEDURE — 88360 TUMOR IMMUNOHISTOCHEM/MANUAL: CPT

## 2020-01-01 PROCEDURE — 87280 RESPIRATORY SYNCYTIAL AG IF: CPT

## 2020-01-01 PROCEDURE — 87186 SC STD MICRODIL/AGAR DIL: CPT

## 2020-01-01 PROCEDURE — 85730 THROMBOPLASTIN TIME PARTIAL: CPT

## 2020-01-01 PROCEDURE — 87106 FUNGI IDENTIFICATION YEAST: CPT

## 2020-01-01 PROCEDURE — 0B9F8ZX DRAINAGE OF RIGHT LOWER LUNG LOBE, VIA NATURAL OR ARTIFICIAL OPENING ENDOSCOPIC, DIAGNOSTIC: ICD-10-PCS | Performed by: INTERNAL MEDICINE

## 2020-01-01 PROCEDURE — 3023F SPIROM DOC REV: CPT | Performed by: FAMILY MEDICINE

## 2020-01-01 PROCEDURE — 78306 BONE IMAGING WHOLE BODY: CPT

## 2020-01-01 PROCEDURE — 87276 INFLUENZA A AG IF: CPT

## 2020-01-01 PROCEDURE — 97168 OT RE-EVAL EST PLAN CARE: CPT

## 2020-01-01 PROCEDURE — 4A023N6 MEASUREMENT OF CARDIAC SAMPLING AND PRESSURE, RIGHT HEART, PERCUTANEOUS APPROACH: ICD-10-PCS | Performed by: INTERNAL MEDICINE

## 2020-01-01 PROCEDURE — P9047 ALBUMIN (HUMAN), 25%, 50ML: HCPCS | Performed by: NURSE PRACTITIONER

## 2020-01-01 RX ORDER — SENNA AND DOCUSATE SODIUM 50; 8.6 MG/1; MG/1
2 TABLET, FILM COATED ORAL 2 TIMES DAILY
Status: DISCONTINUED | OUTPATIENT
Start: 2020-01-01 | End: 2020-01-01 | Stop reason: HOSPADM

## 2020-01-01 RX ORDER — LIDOCAINE HYDROCHLORIDE 10 MG/ML
5 INJECTION, SOLUTION INFILTRATION; PERINEURAL ONCE
Status: DISCONTINUED | OUTPATIENT
Start: 2020-01-01 | End: 2020-01-01 | Stop reason: HOSPADM

## 2020-01-01 RX ORDER — VITAMIN B COMPLEX
1 TABLET ORAL DAILY
Status: DISCONTINUED | OUTPATIENT
Start: 2020-01-01 | End: 2020-01-01 | Stop reason: HOSPADM

## 2020-01-01 RX ORDER — FUROSEMIDE 20 MG/1
20 TABLET ORAL DAILY
Status: DISCONTINUED | OUTPATIENT
Start: 2020-01-01 | End: 2020-01-01

## 2020-01-01 RX ORDER — SILDENAFIL CITRATE 20 MG/1
20 TABLET ORAL 3 TIMES DAILY
Qty: 90 TABLET | Refills: 3 | Status: SHIPPED | OUTPATIENT
Start: 2020-01-01 | End: 2020-01-01 | Stop reason: SDUPTHER

## 2020-01-01 RX ORDER — ALBUTEROL SULFATE 2.5 MG/3ML
2.5 SOLUTION RESPIRATORY (INHALATION)
Status: DISCONTINUED | OUTPATIENT
Start: 2020-01-01 | End: 2020-01-01

## 2020-01-01 RX ORDER — ALBUTEROL SULFATE 2.5 MG/3ML
2.5 SOLUTION RESPIRATORY (INHALATION) EVERY 6 HOURS PRN
Qty: 120 EACH | Refills: 5 | Status: SHIPPED | OUTPATIENT
Start: 2020-01-01

## 2020-01-01 RX ORDER — INSULIN GLARGINE 100 [IU]/ML
15 INJECTION, SOLUTION SUBCUTANEOUS NIGHTLY
Status: DISCONTINUED | OUTPATIENT
Start: 2020-01-01 | End: 2020-01-01

## 2020-01-01 RX ORDER — LEVALBUTEROL 1.25 MG/.5ML
0.63 SOLUTION, CONCENTRATE RESPIRATORY (INHALATION)
Status: DISCONTINUED | OUTPATIENT
Start: 2020-01-01 | End: 2020-01-01 | Stop reason: HOSPADM

## 2020-01-01 RX ORDER — ISOSORBIDE MONONITRATE 30 MG/1
30 TABLET, EXTENDED RELEASE ORAL DAILY
Status: DISCONTINUED | OUTPATIENT
Start: 2020-01-01 | End: 2020-01-01

## 2020-01-01 RX ORDER — FUROSEMIDE 10 MG/ML
40 INJECTION INTRAMUSCULAR; INTRAVENOUS ONCE
Status: COMPLETED | OUTPATIENT
Start: 2020-01-01 | End: 2020-01-01

## 2020-01-01 RX ORDER — FUROSEMIDE 10 MG/ML
20 INJECTION INTRAMUSCULAR; INTRAVENOUS ONCE
Status: COMPLETED | OUTPATIENT
Start: 2020-01-01 | End: 2020-01-01

## 2020-01-01 RX ORDER — NICOTINE 21 MG/24HR
1 PATCH, TRANSDERMAL 24 HOURS TRANSDERMAL DAILY
Status: DISCONTINUED | OUTPATIENT
Start: 2020-01-01 | End: 2020-01-01 | Stop reason: HOSPADM

## 2020-01-01 RX ORDER — ATORVASTATIN CALCIUM 40 MG/1
TABLET, FILM COATED ORAL
Qty: 30 TABLET | Refills: 4 | Status: ON HOLD | OUTPATIENT
Start: 2020-01-01 | End: 2020-01-01

## 2020-01-01 RX ORDER — LEVALBUTEROL 1.25 MG/.5ML
0.63 SOLUTION, CONCENTRATE RESPIRATORY (INHALATION) EVERY 6 HOURS PRN
Status: DISCONTINUED | OUTPATIENT
Start: 2020-01-01 | End: 2020-01-01

## 2020-01-01 RX ORDER — BUDESONIDE AND FORMOTEROL FUMARATE DIHYDRATE 160; 4.5 UG/1; UG/1
2 AEROSOL RESPIRATORY (INHALATION) 2 TIMES DAILY
Status: DISCONTINUED | OUTPATIENT
Start: 2020-01-01 | End: 2020-01-01

## 2020-01-01 RX ORDER — CLOPIDOGREL BISULFATE 75 MG/1
75 TABLET ORAL DAILY
Status: DISCONTINUED | OUTPATIENT
Start: 2020-01-01 | End: 2020-01-01

## 2020-01-01 RX ORDER — FUROSEMIDE 40 MG/1
40 TABLET ORAL ONCE
Status: COMPLETED | OUTPATIENT
Start: 2020-01-01 | End: 2020-01-01

## 2020-01-01 RX ORDER — PROPOFOL 10 MG/ML
INJECTION, EMULSION INTRAVENOUS
Status: COMPLETED
Start: 2020-01-01 | End: 2020-01-01

## 2020-01-01 RX ORDER — KETOROLAC TROMETHAMINE 30 MG/ML
15 INJECTION, SOLUTION INTRAMUSCULAR; INTRAVENOUS ONCE
Status: COMPLETED | OUTPATIENT
Start: 2020-01-01 | End: 2020-01-01

## 2020-01-01 RX ORDER — FUROSEMIDE 10 MG/ML
40 INJECTION INTRAMUSCULAR; INTRAVENOUS DAILY
Status: DISCONTINUED | OUTPATIENT
Start: 2020-01-01 | End: 2020-01-01

## 2020-01-01 RX ORDER — ISOSORBIDE DINITRATE 10 MG/1
10 TABLET ORAL 3 TIMES DAILY
Status: DISCONTINUED | OUTPATIENT
Start: 2020-01-01 | End: 2020-01-01

## 2020-01-01 RX ORDER — OXYBUTYNIN CHLORIDE 5 MG/1
TABLET ORAL
Qty: 90 TABLET | Refills: 2 | Status: SHIPPED | OUTPATIENT
Start: 2020-01-01 | End: 2020-01-01

## 2020-01-01 RX ORDER — ISOSORBIDE MONONITRATE 30 MG/1
30 TABLET, EXTENDED RELEASE ORAL DAILY
Status: DISCONTINUED | OUTPATIENT
Start: 2020-01-01 | End: 2020-01-01 | Stop reason: HOSPADM

## 2020-01-01 RX ORDER — CLONAZEPAM 0.5 MG/1
TABLET ORAL
Qty: 30 TABLET | Refills: 0 | Status: SHIPPED | OUTPATIENT
Start: 2020-01-01 | End: 2020-01-01

## 2020-01-01 RX ORDER — NITROGLYCERIN 0.4 MG/1
TABLET SUBLINGUAL
Qty: 25 TABLET | Refills: 3 | Status: ON HOLD
Start: 2020-01-01 | End: 2020-01-01 | Stop reason: HOSPADM

## 2020-01-01 RX ORDER — PROMETHAZINE HYDROCHLORIDE 25 MG/1
12.5 TABLET ORAL EVERY 6 HOURS PRN
Status: DISCONTINUED | OUTPATIENT
Start: 2020-01-01 | End: 2020-01-01 | Stop reason: HOSPADM

## 2020-01-01 RX ORDER — FUROSEMIDE 10 MG/ML
INJECTION INTRAMUSCULAR; INTRAVENOUS
Status: COMPLETED | OUTPATIENT
Start: 2020-01-01 | End: 2020-01-01

## 2020-01-01 RX ORDER — GABAPENTIN 300 MG/1
300 CAPSULE ORAL 3 TIMES DAILY
Status: DISCONTINUED | OUTPATIENT
Start: 2020-01-01 | End: 2020-01-01 | Stop reason: HOSPADM

## 2020-01-01 RX ORDER — SODIUM CHLORIDE 9 MG/ML
INJECTION, SOLUTION INTRAVENOUS
Status: COMPLETED
Start: 2020-01-01 | End: 2020-01-01

## 2020-01-01 RX ORDER — OXYCODONE HYDROCHLORIDE 5 MG/1
5 TABLET ORAL EVERY 4 HOURS PRN
Status: DISCONTINUED | OUTPATIENT
Start: 2020-01-01 | End: 2020-01-01

## 2020-01-01 RX ORDER — INSULIN GLARGINE 100 [IU]/ML
55 INJECTION, SOLUTION SUBCUTANEOUS 2 TIMES DAILY
Status: DISCONTINUED | OUTPATIENT
Start: 2020-01-01 | End: 2020-01-01

## 2020-01-01 RX ORDER — TRAMADOL HYDROCHLORIDE 50 MG/1
50 TABLET ORAL EVERY 8 HOURS PRN
Status: DISCONTINUED | OUTPATIENT
Start: 2020-01-01 | End: 2020-01-01

## 2020-01-01 RX ORDER — INSULIN GLARGINE 100 [IU]/ML
28 INJECTION, SOLUTION SUBCUTANEOUS 2 TIMES DAILY
Status: DISCONTINUED | OUTPATIENT
Start: 2020-01-01 | End: 2020-01-01

## 2020-01-01 RX ORDER — SODIUM CHLORIDE 9 MG/ML
INJECTION, SOLUTION INTRAVENOUS
Status: DISPENSED
Start: 2020-01-01 | End: 2020-01-01

## 2020-01-01 RX ORDER — CLOPIDOGREL BISULFATE 75 MG/1
TABLET ORAL
Qty: 30 TABLET | Refills: 0 | Status: ON HOLD | OUTPATIENT
Start: 2020-01-01 | End: 2020-01-01

## 2020-01-01 RX ORDER — FUROSEMIDE 10 MG/ML
40 INJECTION INTRAMUSCULAR; INTRAVENOUS 2 TIMES DAILY
Status: DISCONTINUED | OUTPATIENT
Start: 2020-01-01 | End: 2020-01-01

## 2020-01-01 RX ORDER — SODIUM CHLORIDE 0.9 % (FLUSH) 0.9 %
10 SYRINGE (ML) INJECTION EVERY 12 HOURS SCHEDULED
Status: DISCONTINUED | OUTPATIENT
Start: 2020-01-01 | End: 2020-01-01 | Stop reason: HOSPADM

## 2020-01-01 RX ORDER — RANOLAZINE 500 MG/1
TABLET, EXTENDED RELEASE ORAL
Qty: 180 TABLET | Refills: 1 | Status: ON HOLD
Start: 2020-01-01 | End: 2020-01-01 | Stop reason: HOSPADM

## 2020-01-01 RX ORDER — NICOTINE POLACRILEX 4 MG
15 LOZENGE BUCCAL PRN
Status: DISCONTINUED | OUTPATIENT
Start: 2020-01-01 | End: 2020-01-01 | Stop reason: HOSPADM

## 2020-01-01 RX ORDER — FUROSEMIDE 40 MG/1
40 TABLET ORAL 2 TIMES DAILY
Status: DISCONTINUED | OUTPATIENT
Start: 2020-01-01 | End: 2020-01-01 | Stop reason: HOSPADM

## 2020-01-01 RX ORDER — FUROSEMIDE 20 MG/1
20 TABLET ORAL 2 TIMES DAILY
Status: DISCONTINUED | OUTPATIENT
Start: 2020-01-01 | End: 2020-01-01 | Stop reason: HOSPADM

## 2020-01-01 RX ORDER — IPRATROPIUM BROMIDE AND ALBUTEROL SULFATE 2.5; .5 MG/3ML; MG/3ML
1 SOLUTION RESPIRATORY (INHALATION)
Status: DISCONTINUED | OUTPATIENT
Start: 2020-01-01 | End: 2020-01-01

## 2020-01-01 RX ORDER — FERROUS SULFATE 325(65) MG
325 TABLET ORAL
Status: DISCONTINUED | OUTPATIENT
Start: 2020-01-01 | End: 2020-01-01 | Stop reason: HOSPADM

## 2020-01-01 RX ORDER — POTASSIUM CHLORIDE 7.45 MG/ML
10 INJECTION INTRAVENOUS PRN
Status: DISCONTINUED | OUTPATIENT
Start: 2020-01-01 | End: 2020-01-01 | Stop reason: HOSPADM

## 2020-01-01 RX ORDER — SPIRONOLACTONE 25 MG/1
25 TABLET ORAL DAILY
Qty: 30 TABLET | Refills: 3 | Status: SHIPPED | OUTPATIENT
Start: 2020-01-01

## 2020-01-01 RX ORDER — OXYCODONE AND ACETAMINOPHEN 7.5; 325 MG/1; MG/1
1 TABLET ORAL EVERY 6 HOURS PRN
Status: DISCONTINUED | OUTPATIENT
Start: 2020-01-01 | End: 2020-01-01 | Stop reason: HOSPADM

## 2020-01-01 RX ORDER — OXYBUTYNIN CHLORIDE 5 MG/1
5 TABLET ORAL 2 TIMES DAILY
Status: DISCONTINUED | OUTPATIENT
Start: 2020-01-01 | End: 2020-01-01 | Stop reason: HOSPADM

## 2020-01-01 RX ORDER — ASPIRIN 81 MG/1
81 TABLET ORAL DAILY
Status: DISCONTINUED | OUTPATIENT
Start: 2020-01-01 | End: 2020-01-01 | Stop reason: HOSPADM

## 2020-01-01 RX ORDER — SODIUM CHLORIDE 0.9 % (FLUSH) 0.9 %
10 SYRINGE (ML) INJECTION PRN
Status: DISCONTINUED | OUTPATIENT
Start: 2020-01-01 | End: 2020-01-01 | Stop reason: HOSPADM

## 2020-01-01 RX ORDER — BUDESONIDE AND FORMOTEROL FUMARATE DIHYDRATE 160; 4.5 UG/1; UG/1
2 AEROSOL RESPIRATORY (INHALATION) 2 TIMES DAILY
Status: DISCONTINUED | OUTPATIENT
Start: 2020-01-01 | End: 2020-01-01 | Stop reason: HOSPADM

## 2020-01-01 RX ORDER — DEXTROSE MONOHYDRATE 25 G/50ML
12.5 INJECTION, SOLUTION INTRAVENOUS PRN
Status: DISCONTINUED | OUTPATIENT
Start: 2020-01-01 | End: 2020-01-01 | Stop reason: HOSPADM

## 2020-01-01 RX ORDER — LANOLIN ALCOHOL/MO/W.PET/CERES
325 CREAM (GRAM) TOPICAL
Qty: 30 TABLET | Refills: 3 | Status: SHIPPED | OUTPATIENT
Start: 2020-01-01

## 2020-01-01 RX ORDER — FLUTICASONE PROPIONATE 50 MCG
2 SPRAY, SUSPENSION (ML) NASAL DAILY
Qty: 1 BOTTLE | Refills: 2 | Status: SHIPPED | OUTPATIENT
Start: 2020-01-01 | End: 2020-01-01

## 2020-01-01 RX ORDER — ALLOPURINOL 300 MG/1
300 TABLET ORAL DAILY
Status: DISCONTINUED | OUTPATIENT
Start: 2020-01-01 | End: 2020-01-01 | Stop reason: HOSPADM

## 2020-01-01 RX ORDER — ALBUTEROL SULFATE 90 UG/1
2 AEROSOL, METERED RESPIRATORY (INHALATION) EVERY 4 HOURS PRN
Status: DISCONTINUED | OUTPATIENT
Start: 2020-01-01 | End: 2020-01-01

## 2020-01-01 RX ORDER — BUDESONIDE AND FORMOTEROL FUMARATE DIHYDRATE 160; 4.5 UG/1; UG/1
1 AEROSOL RESPIRATORY (INHALATION) 2 TIMES DAILY
Status: DISCONTINUED | OUTPATIENT
Start: 2020-01-01 | End: 2020-01-01 | Stop reason: HOSPADM

## 2020-01-01 RX ORDER — ACETAMINOPHEN 325 MG/1
650 TABLET ORAL EVERY 6 HOURS PRN
Status: DISCONTINUED | OUTPATIENT
Start: 2020-01-01 | End: 2020-01-01 | Stop reason: HOSPADM

## 2020-01-01 RX ORDER — GABAPENTIN 300 MG/1
CAPSULE ORAL
Qty: 270 CAPSULE | Refills: 1 | Status: SHIPPED | OUTPATIENT
Start: 2020-01-01 | End: 2020-01-01

## 2020-01-01 RX ORDER — ACETAMINOPHEN 650 MG/1
650 SUPPOSITORY RECTAL EVERY 6 HOURS PRN
Status: DISCONTINUED | OUTPATIENT
Start: 2020-01-01 | End: 2020-01-01 | Stop reason: HOSPADM

## 2020-01-01 RX ORDER — FUROSEMIDE 10 MG/ML
20 INJECTION INTRAMUSCULAR; INTRAVENOUS 2 TIMES DAILY
Status: DISCONTINUED | OUTPATIENT
Start: 2020-01-01 | End: 2020-01-01

## 2020-01-01 RX ORDER — INSULIN GLARGINE 100 [IU]/ML
20 INJECTION, SOLUTION SUBCUTANEOUS 2 TIMES DAILY
Status: DISCONTINUED | OUTPATIENT
Start: 2020-01-01 | End: 2020-01-01

## 2020-01-01 RX ORDER — ACETAMINOPHEN 325 MG/1
650 TABLET ORAL EVERY 4 HOURS PRN
Status: DISCONTINUED | OUTPATIENT
Start: 2020-01-01 | End: 2020-01-01 | Stop reason: HOSPADM

## 2020-01-01 RX ORDER — ATORVASTATIN CALCIUM 40 MG/1
40 TABLET, FILM COATED ORAL NIGHTLY
Status: DISCONTINUED | OUTPATIENT
Start: 2020-01-01 | End: 2020-01-01 | Stop reason: HOSPADM

## 2020-01-01 RX ORDER — CHLORHEXIDINE GLUCONATE 0.12 MG/ML
15 RINSE ORAL 2 TIMES DAILY
Status: DISCONTINUED | OUTPATIENT
Start: 2020-01-01 | End: 2020-01-01 | Stop reason: HOSPADM

## 2020-01-01 RX ORDER — FUROSEMIDE 20 MG/1
TABLET ORAL
Qty: 60 TABLET | Refills: 3 | Status: SHIPPED | OUTPATIENT
Start: 2020-01-01

## 2020-01-01 RX ORDER — RANOLAZINE 500 MG/1
500 TABLET, EXTENDED RELEASE ORAL 2 TIMES DAILY
Status: DISCONTINUED | OUTPATIENT
Start: 2020-01-01 | End: 2020-01-01 | Stop reason: HOSPADM

## 2020-01-01 RX ORDER — MIDAZOLAM HYDROCHLORIDE 5 MG/ML
INJECTION INTRAMUSCULAR; INTRAVENOUS
Status: COMPLETED | OUTPATIENT
Start: 2020-01-01 | End: 2020-01-01

## 2020-01-01 RX ORDER — LIDOCAINE HYDROCHLORIDE 10 MG/ML
INJECTION, SOLUTION INFILTRATION; PERINEURAL
Status: DISCONTINUED
Start: 2020-01-01 | End: 2020-01-01 | Stop reason: WASHOUT

## 2020-01-01 RX ORDER — OXYCODONE HYDROCHLORIDE 5 MG/1
10 TABLET ORAL EVERY 4 HOURS PRN
Status: DISCONTINUED | OUTPATIENT
Start: 2020-01-01 | End: 2020-01-01

## 2020-01-01 RX ORDER — ALBUMIN (HUMAN) 12.5 G/50ML
25 SOLUTION INTRAVENOUS ONCE
Status: COMPLETED | OUTPATIENT
Start: 2020-01-01 | End: 2020-01-01

## 2020-01-01 RX ORDER — SODIUM CHLORIDE 9 MG/ML
INJECTION, SOLUTION INTRAVENOUS
Status: DISCONTINUED
Start: 2020-01-01 | End: 2020-01-01 | Stop reason: HOSPADM

## 2020-01-01 RX ORDER — MORPHINE SULFATE 30 MG/1
30 TABLET, FILM COATED, EXTENDED RELEASE ORAL EVERY 12 HOURS SCHEDULED
Status: DISCONTINUED | OUTPATIENT
Start: 2020-01-01 | End: 2020-01-01

## 2020-01-01 RX ORDER — ALBUTEROL SULFATE 90 UG/1
2 AEROSOL, METERED RESPIRATORY (INHALATION)
Status: DISCONTINUED | OUTPATIENT
Start: 2020-01-01 | End: 2020-01-01

## 2020-01-01 RX ORDER — FUROSEMIDE 10 MG/ML
40 INJECTION INTRAMUSCULAR; INTRAVENOUS 2 TIMES DAILY
Status: COMPLETED | OUTPATIENT
Start: 2020-01-01 | End: 2020-01-01

## 2020-01-01 RX ORDER — LIDOCAINE 4 G/G
1 PATCH TOPICAL ONCE
Status: DISCONTINUED | OUTPATIENT
Start: 2020-01-01 | End: 2020-01-01 | Stop reason: HOSPADM

## 2020-01-01 RX ORDER — LIDOCAINE HYDROCHLORIDE 40 MG/ML
SOLUTION TOPICAL
Status: COMPLETED
Start: 2020-01-01 | End: 2020-01-01

## 2020-01-01 RX ORDER — DOXYCYCLINE HYCLATE 100 MG
100 TABLET ORAL 2 TIMES DAILY
Qty: 14 TABLET | Refills: 0 | Status: SHIPPED | OUTPATIENT
Start: 2020-01-01 | End: 2020-01-01

## 2020-01-01 RX ORDER — PROCHLORPERAZINE MALEATE 10 MG
10 TABLET ORAL EVERY 6 HOURS PRN
Status: DISCONTINUED | OUTPATIENT
Start: 2020-01-01 | End: 2020-01-01 | Stop reason: HOSPADM

## 2020-01-01 RX ORDER — ALBUTEROL SULFATE 90 UG/1
2 AEROSOL, METERED RESPIRATORY (INHALATION) EVERY 6 HOURS PRN
Status: DISCONTINUED | OUTPATIENT
Start: 2020-01-01 | End: 2020-01-01 | Stop reason: HOSPADM

## 2020-01-01 RX ORDER — SPIRONOLACTONE 25 MG/1
25 TABLET ORAL DAILY
Status: DISCONTINUED | OUTPATIENT
Start: 2020-01-01 | End: 2020-01-01 | Stop reason: HOSPADM

## 2020-01-01 RX ORDER — LISINOPRIL 5 MG/1
5 TABLET ORAL DAILY
Status: DISCONTINUED | OUTPATIENT
Start: 2020-01-01 | End: 2020-01-01

## 2020-01-01 RX ORDER — PROPOFOL 10 MG/ML
10 INJECTION, EMULSION INTRAVENOUS
Status: DISCONTINUED | OUTPATIENT
Start: 2020-01-01 | End: 2020-01-01 | Stop reason: HOSPADM

## 2020-01-01 RX ORDER — OXYCODONE HYDROCHLORIDE 5 MG/1
10 TABLET ORAL EVERY 4 HOURS PRN
Status: DISCONTINUED | OUTPATIENT
Start: 2020-01-01 | End: 2020-01-01 | Stop reason: HOSPADM

## 2020-01-01 RX ORDER — INSULIN GLARGINE 100 [IU]/ML
40 INJECTION, SOLUTION SUBCUTANEOUS 2 TIMES DAILY
Status: DISCONTINUED | OUTPATIENT
Start: 2020-01-01 | End: 2020-01-01

## 2020-01-01 RX ORDER — POTASSIUM CHLORIDE 20 MEQ/1
40 TABLET, EXTENDED RELEASE ORAL PRN
Status: DISCONTINUED | OUTPATIENT
Start: 2020-01-01 | End: 2020-01-01 | Stop reason: HOSPADM

## 2020-01-01 RX ORDER — IPRATROPIUM BROMIDE AND ALBUTEROL SULFATE 2.5; .5 MG/3ML; MG/3ML
1 SOLUTION RESPIRATORY (INHALATION) EVERY 4 HOURS PRN
Status: DISCONTINUED | OUTPATIENT
Start: 2020-01-01 | End: 2020-01-01 | Stop reason: HOSPADM

## 2020-01-01 RX ORDER — INSULIN GLARGINE 100 [IU]/ML
35 INJECTION, SOLUTION SUBCUTANEOUS 2 TIMES DAILY
Status: DISCONTINUED | OUTPATIENT
Start: 2020-01-01 | End: 2020-01-01

## 2020-01-01 RX ORDER — METHYLPREDNISOLONE SODIUM SUCCINATE 125 MG/2ML
125 INJECTION, POWDER, LYOPHILIZED, FOR SOLUTION INTRAMUSCULAR; INTRAVENOUS ONCE
Status: COMPLETED | OUTPATIENT
Start: 2020-01-01 | End: 2020-01-01

## 2020-01-01 RX ORDER — ASPIRIN 81 MG/1
81 TABLET, CHEWABLE ORAL DAILY
Status: DISCONTINUED | OUTPATIENT
Start: 2020-01-01 | End: 2020-01-01

## 2020-01-01 RX ORDER — LEVOTHYROXINE SODIUM 0.05 MG/1
TABLET ORAL
Qty: 30 TABLET | Refills: 3 | Status: SHIPPED | OUTPATIENT
Start: 2020-01-01

## 2020-01-01 RX ORDER — ALBUTEROL SULFATE 2.5 MG/3ML
2.5 SOLUTION RESPIRATORY (INHALATION) EVERY 6 HOURS PRN
Status: DISCONTINUED | OUTPATIENT
Start: 2020-01-01 | End: 2020-01-01

## 2020-01-01 RX ORDER — LISINOPRIL 2.5 MG/1
2.5 TABLET ORAL DAILY
Status: DISCONTINUED | OUTPATIENT
Start: 2020-01-01 | End: 2020-01-01 | Stop reason: HOSPADM

## 2020-01-01 RX ORDER — ASPIRIN 81 MG/1
81 TABLET ORAL DAILY
Status: COMPLETED | OUTPATIENT
Start: 2020-01-01 | End: 2020-01-01

## 2020-01-01 RX ORDER — ASPIRIN 81 MG/1
81 TABLET, CHEWABLE ORAL DAILY
Status: DISCONTINUED | OUTPATIENT
Start: 2020-01-01 | End: 2020-01-01 | Stop reason: HOSPADM

## 2020-01-01 RX ORDER — LEVOTHYROXINE SODIUM 0.05 MG/1
50 TABLET ORAL DAILY
Status: DISCONTINUED | OUTPATIENT
Start: 2020-01-01 | End: 2020-01-01 | Stop reason: HOSPADM

## 2020-01-01 RX ORDER — ISOSORBIDE MONONITRATE 30 MG/1
TABLET, EXTENDED RELEASE ORAL
Qty: 30 TABLET | Refills: 0 | Status: ON HOLD
Start: 2020-01-01 | End: 2020-01-01 | Stop reason: HOSPADM

## 2020-01-01 RX ORDER — DEXTROSE MONOHYDRATE 50 MG/ML
100 INJECTION, SOLUTION INTRAVENOUS PRN
Status: DISCONTINUED | OUTPATIENT
Start: 2020-01-01 | End: 2020-01-01 | Stop reason: HOSPADM

## 2020-01-01 RX ORDER — SIMETHICONE 80 MG
80 TABLET,CHEWABLE ORAL EVERY 6 HOURS PRN
Status: DISCONTINUED | OUTPATIENT
Start: 2020-01-01 | End: 2020-01-01 | Stop reason: HOSPADM

## 2020-01-01 RX ORDER — LEVOTHYROXINE SODIUM 0.03 MG/1
50 TABLET ORAL DAILY
Status: DISCONTINUED | OUTPATIENT
Start: 2020-01-01 | End: 2020-01-01 | Stop reason: HOSPADM

## 2020-01-01 RX ORDER — POLYETHYLENE GLYCOL 3350 17 G/17G
17 POWDER, FOR SOLUTION ORAL 2 TIMES DAILY
Status: DISCONTINUED | OUTPATIENT
Start: 2020-01-01 | End: 2020-01-01 | Stop reason: HOSPADM

## 2020-01-01 RX ORDER — INSULIN GLARGINE 100 [IU]/ML
45 INJECTION, SOLUTION SUBCUTANEOUS 2 TIMES DAILY
Status: DISCONTINUED | OUTPATIENT
Start: 2020-01-01 | End: 2020-01-01

## 2020-01-01 RX ORDER — DEXTROSE MONOHYDRATE 50 MG/ML
100 INJECTION, SOLUTION INTRAVENOUS PRN
Status: DISCONTINUED | OUTPATIENT
Start: 2020-01-01 | End: 2020-01-01

## 2020-01-01 RX ORDER — MIDAZOLAM HYDROCHLORIDE 5 MG/ML
INJECTION INTRAMUSCULAR; INTRAVENOUS PRN
Status: DISCONTINUED | OUTPATIENT
Start: 2020-01-01 | End: 2020-01-01 | Stop reason: ALTCHOICE

## 2020-01-01 RX ORDER — METOPROLOL SUCCINATE 25 MG/1
25 TABLET, EXTENDED RELEASE ORAL DAILY
Status: DISCONTINUED | OUTPATIENT
Start: 2020-01-01 | End: 2020-01-01

## 2020-01-01 RX ORDER — MORPHINE SULFATE 4 MG/ML
4 INJECTION, SOLUTION INTRAMUSCULAR; INTRAVENOUS EVERY 4 HOURS PRN
Status: DISCONTINUED | OUTPATIENT
Start: 2020-01-01 | End: 2020-01-01 | Stop reason: HOSPADM

## 2020-01-01 RX ORDER — ACETAMINOPHEN 500 MG
1000 TABLET ORAL ONCE
Status: COMPLETED | OUTPATIENT
Start: 2020-01-01 | End: 2020-01-01

## 2020-01-01 RX ORDER — LIDOCAINE HYDROCHLORIDE 20 MG/ML
INJECTION, SOLUTION EPIDURAL; INFILTRATION; INTRACAUDAL; PERINEURAL PRN
Status: DISCONTINUED | OUTPATIENT
Start: 2020-01-01 | End: 2020-01-01 | Stop reason: ALTCHOICE

## 2020-01-01 RX ORDER — LEVOTHYROXINE SODIUM 0.05 MG/1
TABLET ORAL
Qty: 30 TABLET | Refills: 4 | Status: ON HOLD | OUTPATIENT
Start: 2020-01-01 | End: 2020-01-01

## 2020-01-01 RX ORDER — METOPROLOL SUCCINATE 25 MG/1
25 TABLET, EXTENDED RELEASE ORAL DAILY
Qty: 30 TABLET | Refills: 3 | Status: ON HOLD | OUTPATIENT
Start: 2020-01-01 | End: 2020-01-01 | Stop reason: HOSPADM

## 2020-01-01 RX ORDER — LEVOTHYROXINE SODIUM 0.1 MG/1
100 TABLET ORAL DAILY
Status: DISCONTINUED | OUTPATIENT
Start: 2020-01-01 | End: 2020-01-01 | Stop reason: HOSPADM

## 2020-01-01 RX ORDER — FUROSEMIDE 10 MG/ML
20 INJECTION INTRAMUSCULAR; INTRAVENOUS 2 TIMES DAILY
Status: DISCONTINUED | OUTPATIENT
Start: 2020-01-01 | End: 2020-01-01 | Stop reason: HOSPADM

## 2020-01-01 RX ORDER — INSULIN GLARGINE 100 [IU]/ML
35 INJECTION, SOLUTION SUBCUTANEOUS 2 TIMES DAILY
Qty: 2 VIAL | Refills: 3 | Status: SHIPPED | OUTPATIENT
Start: 2020-01-01

## 2020-01-01 RX ORDER — MIDAZOLAM HYDROCHLORIDE 1 MG/ML
2 INJECTION INTRAMUSCULAR; INTRAVENOUS
Status: DISCONTINUED | OUTPATIENT
Start: 2020-01-01 | End: 2020-01-01 | Stop reason: HOSPADM

## 2020-01-01 RX ORDER — INSULIN GLARGINE 100 [IU]/ML
15 INJECTION, SOLUTION SUBCUTANEOUS ONCE
Status: DISCONTINUED | OUTPATIENT
Start: 2020-01-01 | End: 2020-01-01

## 2020-01-01 RX ORDER — FUROSEMIDE 40 MG/1
40 TABLET ORAL 2 TIMES DAILY
Qty: 60 TABLET | Refills: 3 | Status: ON HOLD | OUTPATIENT
Start: 2020-01-01 | End: 2020-01-01

## 2020-01-01 RX ORDER — METOPROLOL SUCCINATE 25 MG/1
25 TABLET, EXTENDED RELEASE ORAL DAILY
Status: DISCONTINUED | OUTPATIENT
Start: 2020-01-01 | End: 2020-01-01 | Stop reason: HOSPADM

## 2020-01-01 RX ORDER — GUAIFENESIN 600 MG/1
600 TABLET, EXTENDED RELEASE ORAL 2 TIMES DAILY PRN
Qty: 60 TABLET | Refills: 2 | Status: CANCELLED | OUTPATIENT
Start: 2020-01-01 | End: 2020-01-01

## 2020-01-01 RX ORDER — MAGNESIUM SULFATE 1 G/100ML
1 INJECTION INTRAVENOUS PRN
Status: DISCONTINUED | OUTPATIENT
Start: 2020-01-01 | End: 2020-01-01 | Stop reason: HOSPADM

## 2020-01-01 RX ORDER — IPRATROPIUM BROMIDE AND ALBUTEROL SULFATE 2.5; .5 MG/3ML; MG/3ML
1 SOLUTION RESPIRATORY (INHALATION) EVERY 4 HOURS
Status: DISCONTINUED | OUTPATIENT
Start: 2020-01-01 | End: 2020-01-01 | Stop reason: HOSPADM

## 2020-01-01 RX ORDER — SILDENAFIL CITRATE 20 MG/1
20 TABLET ORAL 3 TIMES DAILY
Status: DISCONTINUED | OUTPATIENT
Start: 2020-01-01 | End: 2020-01-01 | Stop reason: HOSPADM

## 2020-01-01 RX ORDER — DEXTROSE MONOHYDRATE 25 G/50ML
12.5 INJECTION, SOLUTION INTRAVENOUS PRN
Status: DISCONTINUED | OUTPATIENT
Start: 2020-01-01 | End: 2020-01-01

## 2020-01-01 RX ORDER — CLONAZEPAM 0.5 MG/1
0.5 TABLET ORAL 2 TIMES DAILY PRN
Status: DISCONTINUED | OUTPATIENT
Start: 2020-01-01 | End: 2020-01-01 | Stop reason: HOSPADM

## 2020-01-01 RX ORDER — MAGNESIUM HYDROXIDE 1200 MG/15ML
LIQUID ORAL CONTINUOUS PRN
Status: COMPLETED | OUTPATIENT
Start: 2020-01-01 | End: 2020-01-01

## 2020-01-01 RX ORDER — KETOROLAC TROMETHAMINE 30 MG/ML
15 INJECTION, SOLUTION INTRAMUSCULAR; INTRAVENOUS EVERY 6 HOURS PRN
Status: DISCONTINUED | OUTPATIENT
Start: 2020-01-01 | End: 2020-01-01

## 2020-01-01 RX ORDER — CLONAZEPAM 0.5 MG/1
TABLET ORAL
Qty: 30 TABLET | Refills: 0 | Status: SHIPPED | OUTPATIENT
Start: 2020-01-01 | End: 2020-01-01 | Stop reason: SDUPTHER

## 2020-01-01 RX ORDER — TC 99M MEDRONATE 20 MG/10ML
26 INJECTION, POWDER, LYOPHILIZED, FOR SOLUTION INTRAVENOUS
Status: COMPLETED | OUTPATIENT
Start: 2020-01-01 | End: 2020-01-01

## 2020-01-01 RX ORDER — FLUTICASONE PROPIONATE 50 MCG
2 SPRAY, SUSPENSION (ML) NASAL DAILY
Status: DISCONTINUED | OUTPATIENT
Start: 2020-01-01 | End: 2020-01-01 | Stop reason: HOSPADM

## 2020-01-01 RX ORDER — MORPHINE SULFATE 15 MG/1
15 TABLET, FILM COATED, EXTENDED RELEASE ORAL EVERY 12 HOURS SCHEDULED
Status: DISCONTINUED | OUTPATIENT
Start: 2020-01-01 | End: 2020-01-01

## 2020-01-01 RX ORDER — POLYETHYLENE GLYCOL 3350 17 G/17G
17 POWDER, FOR SOLUTION ORAL DAILY PRN
Status: DISCONTINUED | OUTPATIENT
Start: 2020-01-01 | End: 2020-01-01 | Stop reason: HOSPADM

## 2020-01-01 RX ORDER — BISACODYL 10 MG
10 SUPPOSITORY, RECTAL RECTAL DAILY PRN
Status: DISCONTINUED | OUTPATIENT
Start: 2020-01-01 | End: 2020-01-01 | Stop reason: HOSPADM

## 2020-01-01 RX ORDER — NICOTINE POLACRILEX 4 MG
15 LOZENGE BUCCAL PRN
Status: DISCONTINUED | OUTPATIENT
Start: 2020-01-01 | End: 2020-01-01

## 2020-01-01 RX ORDER — INSULIN GLARGINE 100 [IU]/ML
35 INJECTION, SOLUTION SUBCUTANEOUS ONCE
Status: COMPLETED | OUTPATIENT
Start: 2020-01-01 | End: 2020-01-01

## 2020-01-01 RX ORDER — INSULIN GLARGINE 100 [IU]/ML
35 INJECTION, SOLUTION SUBCUTANEOUS NIGHTLY
Status: DISCONTINUED | OUTPATIENT
Start: 2020-01-01 | End: 2020-01-01

## 2020-01-01 RX ORDER — INSULIN GLARGINE 100 [IU]/ML
40 INJECTION, SOLUTION SUBCUTANEOUS 2 TIMES DAILY
Status: DISCONTINUED | OUTPATIENT
Start: 2020-01-01 | End: 2020-01-01 | Stop reason: HOSPADM

## 2020-01-01 RX ORDER — ONDANSETRON 2 MG/ML
4 INJECTION INTRAMUSCULAR; INTRAVENOUS EVERY 6 HOURS PRN
Status: DISCONTINUED | OUTPATIENT
Start: 2020-01-01 | End: 2020-01-01 | Stop reason: HOSPADM

## 2020-01-01 RX ORDER — 0.9 % SODIUM CHLORIDE 0.9 %
250 INTRAVENOUS SOLUTION INTRAVENOUS ONCE
Status: COMPLETED | OUTPATIENT
Start: 2020-01-01 | End: 2020-01-01

## 2020-01-01 RX ORDER — SERTRALINE HYDROCHLORIDE 100 MG/1
100 TABLET, FILM COATED ORAL DAILY
Qty: 30 TABLET | Refills: 2 | Status: SHIPPED | OUTPATIENT
Start: 2020-01-01 | End: 2020-01-01

## 2020-01-01 RX ORDER — FUROSEMIDE 40 MG/1
40 TABLET ORAL 2 TIMES DAILY
Status: DISCONTINUED | OUTPATIENT
Start: 2020-01-01 | End: 2020-01-01

## 2020-01-01 RX ORDER — PREGABALIN 150 MG/1
150 CAPSULE ORAL 2 TIMES DAILY
Qty: 60 CAPSULE | OUTPATIENT
Start: 2020-01-01 | End: 2020-01-01

## 2020-01-01 RX ORDER — SERTRALINE HYDROCHLORIDE 100 MG/1
100 TABLET, FILM COATED ORAL DAILY
Status: DISCONTINUED | OUTPATIENT
Start: 2020-01-01 | End: 2020-01-01 | Stop reason: HOSPADM

## 2020-01-01 RX ORDER — POLYETHYLENE GLYCOL 3350 17 G/17G
17 POWDER, FOR SOLUTION ORAL DAILY
Status: DISCONTINUED | OUTPATIENT
Start: 2020-01-01 | End: 2020-01-01 | Stop reason: HOSPADM

## 2020-01-01 RX ORDER — MORPHINE SULFATE 4 MG/ML
4 INJECTION, SOLUTION INTRAMUSCULAR; INTRAVENOUS EVERY 30 MIN PRN
Status: COMPLETED | OUTPATIENT
Start: 2020-01-01 | End: 2020-01-01

## 2020-01-01 RX ORDER — TIOTROPIUM BROMIDE INHALATION SPRAY 3.12 UG/1
SPRAY, METERED RESPIRATORY (INHALATION)
Qty: 4 G | Refills: 0 | Status: SHIPPED | OUTPATIENT
Start: 2020-01-01 | End: 2020-01-01

## 2020-01-01 RX ORDER — MORPHINE SULFATE 2 MG/ML
2 INJECTION, SOLUTION INTRAMUSCULAR; INTRAVENOUS EVERY 4 HOURS PRN
Status: DISCONTINUED | OUTPATIENT
Start: 2020-01-01 | End: 2020-01-01 | Stop reason: HOSPADM

## 2020-01-01 RX ORDER — SODIUM CHLORIDE 9 MG/ML
INJECTION, SOLUTION INTRAVENOUS CONTINUOUS
Status: DISCONTINUED | OUTPATIENT
Start: 2020-01-01 | End: 2020-01-01

## 2020-01-01 RX ORDER — INSULIN GLARGINE 100 [IU]/ML
30 INJECTION, SOLUTION SUBCUTANEOUS 2 TIMES DAILY
Status: DISCONTINUED | OUTPATIENT
Start: 2020-01-01 | End: 2020-01-01

## 2020-01-01 RX ORDER — CLOPIDOGREL BISULFATE 75 MG/1
75 TABLET ORAL DAILY
Status: DISCONTINUED | OUTPATIENT
Start: 2020-01-01 | End: 2020-01-01 | Stop reason: HOSPADM

## 2020-01-01 RX ORDER — PREDNISONE 20 MG/1
40 TABLET ORAL DAILY
Qty: 10 TABLET | Refills: 0 | Status: SHIPPED | OUTPATIENT
Start: 2020-01-01 | End: 2020-01-01

## 2020-01-01 RX ORDER — INSULIN GLARGINE 100 [IU]/ML
50 INJECTION, SOLUTION SUBCUTANEOUS 2 TIMES DAILY
Status: DISCONTINUED | OUTPATIENT
Start: 2020-01-01 | End: 2020-01-01

## 2020-01-01 RX ORDER — INSULIN GLARGINE 100 [IU]/ML
8 INJECTION, SOLUTION SUBCUTANEOUS NIGHTLY
Status: DISCONTINUED | OUTPATIENT
Start: 2020-01-01 | End: 2020-01-01

## 2020-01-01 RX ORDER — FENTANYL CITRATE 50 UG/ML
25 INJECTION, SOLUTION INTRAMUSCULAR; INTRAVENOUS
Status: DISCONTINUED | OUTPATIENT
Start: 2020-01-01 | End: 2020-01-01 | Stop reason: HOSPADM

## 2020-01-01 RX ORDER — SILDENAFIL CITRATE 20 MG/1
20 TABLET ORAL 3 TIMES DAILY
Qty: 90 TABLET | Refills: 3 | Status: SHIPPED | OUTPATIENT
Start: 2020-01-01

## 2020-01-01 RX ORDER — LISINOPRIL 2.5 MG/1
2.5 TABLET ORAL DAILY
Qty: 90 TABLET | Refills: 5 | Status: ON HOLD
Start: 2020-01-01 | End: 2020-01-01 | Stop reason: HOSPADM

## 2020-01-01 RX ORDER — NICOTINE 21 MG/24HR
1 PATCH, TRANSDERMAL 24 HOURS TRANSDERMAL DAILY
Qty: 30 PATCH | Refills: 0 | Status: SHIPPED | OUTPATIENT
Start: 2020-01-01

## 2020-01-01 RX ORDER — FENTANYL CITRATE 50 UG/ML
INJECTION, SOLUTION INTRAMUSCULAR; INTRAVENOUS PRN
Status: DISCONTINUED | OUTPATIENT
Start: 2020-01-01 | End: 2020-01-01 | Stop reason: ALTCHOICE

## 2020-01-01 RX ORDER — ALBUTEROL SULFATE 90 UG/1
2 AEROSOL, METERED RESPIRATORY (INHALATION) EVERY 6 HOURS PRN
Status: DISCONTINUED | OUTPATIENT
Start: 2020-01-01 | End: 2020-01-01

## 2020-01-01 RX ORDER — IPRATROPIUM BROMIDE AND ALBUTEROL SULFATE 2.5; .5 MG/3ML; MG/3ML
1 SOLUTION RESPIRATORY (INHALATION) ONCE
Status: COMPLETED | OUTPATIENT
Start: 2020-01-01 | End: 2020-01-01

## 2020-01-01 RX ORDER — LANOLIN ALCOHOL/MO/W.PET/CERES
325 CREAM (GRAM) TOPICAL
Qty: 30 TABLET | Refills: 4 | Status: ON HOLD | OUTPATIENT
Start: 2020-01-01 | End: 2020-01-01

## 2020-01-01 RX ORDER — NITROGLYCERIN 0.4 MG/1
0.4 TABLET SUBLINGUAL EVERY 5 MIN PRN
Status: DISCONTINUED | OUTPATIENT
Start: 2020-01-01 | End: 2020-01-01

## 2020-01-01 RX ORDER — MORPHINE SULFATE 2 MG/ML
2 INJECTION, SOLUTION INTRAMUSCULAR; INTRAVENOUS
Status: DISCONTINUED | OUTPATIENT
Start: 2020-01-01 | End: 2020-01-01

## 2020-01-01 RX ORDER — IPRATROPIUM BROMIDE AND ALBUTEROL SULFATE 2.5; .5 MG/3ML; MG/3ML
1 SOLUTION RESPIRATORY (INHALATION) ONCE
Status: DISCONTINUED | OUTPATIENT
Start: 2020-01-01 | End: 2020-01-01

## 2020-01-01 RX ORDER — RANOLAZINE 500 MG/1
500 TABLET, EXTENDED RELEASE ORAL 2 TIMES DAILY
Status: DISCONTINUED | OUTPATIENT
Start: 2020-01-01 | End: 2020-01-01

## 2020-01-01 RX ORDER — SPIRONOLACTONE 25 MG/1
25 TABLET ORAL DAILY
Status: DISCONTINUED | OUTPATIENT
Start: 2020-01-01 | End: 2020-01-01

## 2020-01-01 RX ORDER — LISINOPRIL 5 MG/1
2.5 TABLET ORAL DAILY
Status: DISCONTINUED | OUTPATIENT
Start: 2020-01-01 | End: 2020-01-01

## 2020-01-01 RX ORDER — FUROSEMIDE 10 MG/ML
INJECTION INTRAMUSCULAR; INTRAVENOUS
Status: DISPENSED
Start: 2020-01-01 | End: 2020-01-01

## 2020-01-01 RX ORDER — GABAPENTIN 300 MG/1
900 CAPSULE ORAL 3 TIMES DAILY
Status: DISCONTINUED | OUTPATIENT
Start: 2020-01-01 | End: 2020-01-01

## 2020-01-01 RX ORDER — OXYCODONE HYDROCHLORIDE 5 MG/1
5 TABLET ORAL ONCE
Status: COMPLETED | OUTPATIENT
Start: 2020-01-01 | End: 2020-01-01

## 2020-01-01 RX ORDER — OXYCODONE AND ACETAMINOPHEN 7.5; 325 MG/1; MG/1
1 TABLET ORAL EVERY 6 HOURS PRN
COMMUNITY

## 2020-01-01 RX ORDER — SODIUM CHLORIDE 9 MG/ML
INJECTION, SOLUTION INTRAVENOUS ONCE
Status: COMPLETED | OUTPATIENT
Start: 2020-01-01 | End: 2020-01-01

## 2020-01-01 RX ORDER — GABAPENTIN 300 MG/1
CAPSULE ORAL
Qty: 270 CAPSULE | Refills: 1 | Status: SHIPPED | OUTPATIENT
Start: 2020-01-01 | End: 2020-01-01 | Stop reason: SDUPTHER

## 2020-01-01 RX ORDER — ATORVASTATIN CALCIUM 40 MG/1
TABLET, FILM COATED ORAL
Qty: 30 TABLET | Refills: 3 | Status: SHIPPED | OUTPATIENT
Start: 2020-01-01

## 2020-01-01 RX ORDER — GUAIFENESIN 600 MG/1
600 TABLET, EXTENDED RELEASE ORAL 2 TIMES DAILY PRN
Qty: 60 TABLET | Refills: 2 | Status: SHIPPED | OUTPATIENT
Start: 2020-01-01 | End: 2020-01-01

## 2020-01-01 RX ORDER — GABAPENTIN 300 MG/1
900 CAPSULE ORAL 3 TIMES DAILY
Status: DISCONTINUED | OUTPATIENT
Start: 2020-01-01 | End: 2020-01-01 | Stop reason: HOSPADM

## 2020-01-01 RX ORDER — FUROSEMIDE 10 MG/ML
20 INJECTION INTRAMUSCULAR; INTRAVENOUS ONCE
Status: DISCONTINUED | OUTPATIENT
Start: 2020-01-01 | End: 2020-01-01

## 2020-01-01 RX ORDER — LISINOPRIL 2.5 MG/1
2.5 TABLET ORAL DAILY
Qty: 30 TABLET | Refills: 3 | Status: ON HOLD | OUTPATIENT
Start: 2020-01-01 | End: 2020-01-01 | Stop reason: HOSPADM

## 2020-01-01 RX ORDER — NITROGLYCERIN 0.4 MG/1
0.4 TABLET SUBLINGUAL EVERY 5 MIN PRN
Status: DISCONTINUED | OUTPATIENT
Start: 2020-01-01 | End: 2020-01-01 | Stop reason: HOSPADM

## 2020-01-01 RX ORDER — SENNA AND DOCUSATE SODIUM 50; 8.6 MG/1; MG/1
2 TABLET, FILM COATED ORAL DAILY
Status: DISCONTINUED | OUTPATIENT
Start: 2020-01-01 | End: 2020-01-01 | Stop reason: HOSPADM

## 2020-01-01 RX ORDER — FUROSEMIDE 20 MG/1
TABLET ORAL
Qty: 60 TABLET | Refills: 4 | Status: ON HOLD
Start: 2020-01-01 | End: 2020-01-01 | Stop reason: HOSPADM

## 2020-01-01 RX ORDER — LISINOPRIL 5 MG/1
5 TABLET ORAL DAILY
Status: DISCONTINUED | OUTPATIENT
Start: 2020-01-01 | End: 2020-01-01 | Stop reason: HOSPADM

## 2020-01-01 RX ORDER — ASPIRIN 81 MG/1
243 TABLET, CHEWABLE ORAL ONCE
Status: DISCONTINUED | OUTPATIENT
Start: 2020-01-01 | End: 2020-01-01 | Stop reason: CLARIF

## 2020-01-01 RX ORDER — LISINOPRIL 2.5 MG/1
2.5 TABLET ORAL DAILY
COMMUNITY
End: 2020-01-01 | Stop reason: SDUPTHER

## 2020-01-01 RX ORDER — METHYLPREDNISOLONE SODIUM SUCCINATE 40 MG/ML
40 INJECTION, POWDER, LYOPHILIZED, FOR SOLUTION INTRAMUSCULAR; INTRAVENOUS DAILY
Status: DISCONTINUED | OUTPATIENT
Start: 2020-01-01 | End: 2020-01-01 | Stop reason: HOSPADM

## 2020-01-01 RX ORDER — INSULIN GLARGINE 100 [IU]/ML
8 INJECTION, SOLUTION SUBCUTANEOUS NIGHTLY
Status: DISCONTINUED | OUTPATIENT
Start: 2020-01-01 | End: 2020-01-01 | Stop reason: HOSPADM

## 2020-01-01 RX ORDER — ATORVASTATIN CALCIUM 40 MG/1
40 TABLET, FILM COATED ORAL DAILY
Status: DISCONTINUED | OUTPATIENT
Start: 2020-01-01 | End: 2020-01-01 | Stop reason: HOSPADM

## 2020-01-01 RX ORDER — DOCUSATE SODIUM 100 MG/1
100 CAPSULE, LIQUID FILLED ORAL DAILY
Status: DISCONTINUED | OUTPATIENT
Start: 2020-01-01 | End: 2020-01-01 | Stop reason: HOSPADM

## 2020-01-01 RX ORDER — FENTANYL CITRATE 50 UG/ML
INJECTION, SOLUTION INTRAMUSCULAR; INTRAVENOUS
Status: COMPLETED | OUTPATIENT
Start: 2020-01-01 | End: 2020-01-01

## 2020-01-01 RX ORDER — OXYCODONE AND ACETAMINOPHEN 7.5; 325 MG/1; MG/1
1 TABLET ORAL EVERY 6 HOURS PRN
Status: DISCONTINUED | OUTPATIENT
Start: 2020-01-01 | End: 2020-01-01

## 2020-01-01 RX ORDER — INSULIN GLARGINE 100 [IU]/ML
50 INJECTION, SOLUTION SUBCUTANEOUS EVERY MORNING
Status: DISCONTINUED | OUTPATIENT
Start: 2020-01-01 | End: 2020-01-01

## 2020-01-01 RX ADMIN — MORPHINE SULFATE 4 MG: 4 INJECTION, SOLUTION INTRAMUSCULAR; INTRAVENOUS at 05:01

## 2020-01-01 RX ADMIN — GABAPENTIN 900 MG: 300 CAPSULE ORAL at 08:55

## 2020-01-01 RX ADMIN — PROPOFOL 10 MCG/KG/MIN: 10 INJECTION, EMULSION INTRAVENOUS at 15:37

## 2020-01-01 RX ADMIN — OXYBUTYNIN CHLORIDE 5 MG: 5 TABLET ORAL at 21:44

## 2020-01-01 RX ADMIN — SPIRONOLACTONE 25 MG: 25 TABLET ORAL at 09:03

## 2020-01-01 RX ADMIN — INSULIN LISPRO 9 UNITS: 100 INJECTION, SOLUTION INTRAVENOUS; SUBCUTANEOUS at 16:54

## 2020-01-01 RX ADMIN — RANOLAZINE 500 MG: 500 TABLET, FILM COATED, EXTENDED RELEASE ORAL at 22:09

## 2020-01-01 RX ADMIN — SILDENAFIL CITRATE 20 MG: 20 TABLET ORAL at 08:14

## 2020-01-01 RX ADMIN — INSULIN LISPRO 10 UNITS: 100 INJECTION, SOLUTION INTRAVENOUS; SUBCUTANEOUS at 07:58

## 2020-01-01 RX ADMIN — ATORVASTATIN CALCIUM 40 MG: 40 TABLET, FILM COATED ORAL at 20:30

## 2020-01-01 RX ADMIN — CEFTRIAXONE SODIUM 1 G: 1 INJECTION, POWDER, FOR SOLUTION INTRAMUSCULAR; INTRAVENOUS at 05:17

## 2020-01-01 RX ADMIN — Medication 2 PUFF: at 06:55

## 2020-01-01 RX ADMIN — SENNOSIDES AND DOCUSATE SODIUM 2 TABLET: 8.6; 5 TABLET ORAL at 09:00

## 2020-01-01 RX ADMIN — GABAPENTIN 900 MG: 300 CAPSULE ORAL at 08:07

## 2020-01-01 RX ADMIN — INSULIN LISPRO 4 UNITS: 100 INJECTION, SOLUTION INTRAVENOUS; SUBCUTANEOUS at 22:39

## 2020-01-01 RX ADMIN — HYDROMORPHONE HYDROCHLORIDE 1 MG: 1 INJECTION, SOLUTION INTRAMUSCULAR; INTRAVENOUS; SUBCUTANEOUS at 09:24

## 2020-01-01 RX ADMIN — Medication 2 PUFF: at 07:46

## 2020-01-01 RX ADMIN — MORPHINE SULFATE 2 MG: 2 INJECTION, SOLUTION INTRAMUSCULAR; INTRAVENOUS at 11:33

## 2020-01-01 RX ADMIN — OXYCODONE HYDROCHLORIDE AND ACETAMINOPHEN 1 TABLET: 7.5; 325 TABLET ORAL at 04:48

## 2020-01-01 RX ADMIN — Medication 10 ML: at 08:31

## 2020-01-01 RX ADMIN — INSULIN LISPRO 10 UNITS: 100 INJECTION, SOLUTION INTRAVENOUS; SUBCUTANEOUS at 17:43

## 2020-01-01 RX ADMIN — OXYCODONE HYDROCHLORIDE AND ACETAMINOPHEN 1 TABLET: 7.5; 325 TABLET ORAL at 09:24

## 2020-01-01 RX ADMIN — Medication 2 PUFF: at 19:56

## 2020-01-01 RX ADMIN — VANCOMYCIN HYDROCHLORIDE 1000 MG: 1 INJECTION, POWDER, LYOPHILIZED, FOR SOLUTION INTRAVENOUS at 01:17

## 2020-01-01 RX ADMIN — Medication 2 PUFF: at 08:36

## 2020-01-01 RX ADMIN — SODIUM CHLORIDE, PRESERVATIVE FREE 10 ML: 5 INJECTION INTRAVENOUS at 08:54

## 2020-01-01 RX ADMIN — INSULIN GLARGINE 35 UNITS: 100 INJECTION, SOLUTION SUBCUTANEOUS at 15:01

## 2020-01-01 RX ADMIN — INSULIN LISPRO 2 UNITS: 100 INJECTION, SOLUTION INTRAVENOUS; SUBCUTANEOUS at 18:02

## 2020-01-01 RX ADMIN — ISOSORBIDE DINITRATE 10 MG: 10 TABLET ORAL at 16:24

## 2020-01-01 RX ADMIN — INSULIN LISPRO 12 UNITS: 100 INJECTION, SOLUTION INTRAVENOUS; SUBCUTANEOUS at 06:21

## 2020-01-01 RX ADMIN — GABAPENTIN 300 MG: 300 CAPSULE ORAL at 16:18

## 2020-01-01 RX ADMIN — Medication 10 ML: at 08:15

## 2020-01-01 RX ADMIN — ASPIRIN 81 MG: 81 TABLET ORAL at 09:08

## 2020-01-01 RX ADMIN — OXYCODONE HYDROCHLORIDE 10 MG: 5 TABLET ORAL at 10:15

## 2020-01-01 RX ADMIN — OXYCODONE HYDROCHLORIDE 10 MG: 5 TABLET ORAL at 04:24

## 2020-01-01 RX ADMIN — SERTRALINE 100 MG: 100 TABLET, FILM COATED ORAL at 08:14

## 2020-01-01 RX ADMIN — Medication 2 PUFF: at 07:49

## 2020-01-01 RX ADMIN — SENNOSIDES AND DOCUSATE SODIUM 2 TABLET: 8.6; 5 TABLET ORAL at 08:57

## 2020-01-01 RX ADMIN — INSULIN LISPRO 15 UNITS: 100 INJECTION, SOLUTION INTRAVENOUS; SUBCUTANEOUS at 08:59

## 2020-01-01 RX ADMIN — SENNOSIDES AND DOCUSATE SODIUM 2 TABLET: 8.6; 5 TABLET ORAL at 21:44

## 2020-01-01 RX ADMIN — ENOXAPARIN SODIUM 40 MG: 40 INJECTION SUBCUTANEOUS at 11:59

## 2020-01-01 RX ADMIN — ATORVASTATIN CALCIUM 40 MG: 40 TABLET, FILM COATED ORAL at 20:42

## 2020-01-01 RX ADMIN — OXYCODONE HYDROCHLORIDE AND ACETAMINOPHEN 1 TABLET: 7.5; 325 TABLET ORAL at 22:18

## 2020-01-01 RX ADMIN — FUROSEMIDE 40 MG: 40 TABLET ORAL at 23:10

## 2020-01-01 RX ADMIN — FUROSEMIDE 40 MG: 10 INJECTION, SOLUTION INTRAMUSCULAR; INTRAVENOUS at 17:53

## 2020-01-01 RX ADMIN — GABAPENTIN 300 MG: 300 CAPSULE ORAL at 20:28

## 2020-01-01 RX ADMIN — GABAPENTIN 300 MG: 300 CAPSULE ORAL at 13:15

## 2020-01-01 RX ADMIN — OXYCODONE HYDROCHLORIDE 10 MG: 5 TABLET ORAL at 14:20

## 2020-01-01 RX ADMIN — Medication 2 PUFF: at 19:25

## 2020-01-01 RX ADMIN — OXYBUTYNIN CHLORIDE 5 MG: 5 TABLET ORAL at 21:11

## 2020-01-01 RX ADMIN — ATORVASTATIN CALCIUM 40 MG: 40 TABLET, FILM COATED ORAL at 22:09

## 2020-01-01 RX ADMIN — INSULIN LISPRO 4 UNITS: 100 INJECTION, SOLUTION INTRAVENOUS; SUBCUTANEOUS at 16:31

## 2020-01-01 RX ADMIN — ONDANSETRON 4 MG: 2 INJECTION INTRAMUSCULAR; INTRAVENOUS at 16:41

## 2020-01-01 RX ADMIN — INSULIN LISPRO 10 UNITS: 100 INJECTION, SOLUTION INTRAVENOUS; SUBCUTANEOUS at 12:23

## 2020-01-01 RX ADMIN — CLONAZEPAM 0.5 MG: 0.5 TABLET ORAL at 16:31

## 2020-01-01 RX ADMIN — MORPHINE SULFATE 15 MG: 15 TABLET, FILM COATED, EXTENDED RELEASE ORAL at 21:05

## 2020-01-01 RX ADMIN — MIDAZOLAM HYDROCHLORIDE 2 MG: 1 INJECTION, SOLUTION INTRAMUSCULAR; INTRAVENOUS at 17:47

## 2020-01-01 RX ADMIN — MORPHINE SULFATE 30 MG: 30 TABLET, FILM COATED, EXTENDED RELEASE ORAL at 08:18

## 2020-01-01 RX ADMIN — INSULIN LISPRO 3 UNITS: 100 INJECTION, SOLUTION INTRAVENOUS; SUBCUTANEOUS at 20:56

## 2020-01-01 RX ADMIN — LEVOTHYROXINE SODIUM 50 MCG: 0.05 TABLET ORAL at 06:35

## 2020-01-01 RX ADMIN — INSULIN LISPRO 2 UNITS: 100 INJECTION, SOLUTION INTRAVENOUS; SUBCUTANEOUS at 20:36

## 2020-01-01 RX ADMIN — INSULIN GLARGINE 35 UNITS: 100 INJECTION, SOLUTION SUBCUTANEOUS at 21:14

## 2020-01-01 RX ADMIN — CLOPIDOGREL BISULFATE 75 MG: 75 TABLET ORAL at 08:11

## 2020-01-01 RX ADMIN — OXYCODONE HYDROCHLORIDE AND ACETAMINOPHEN 1 TABLET: 7.5; 325 TABLET ORAL at 09:01

## 2020-01-01 RX ADMIN — INSULIN LISPRO 12 UNITS: 100 INJECTION, SOLUTION INTRAVENOUS; SUBCUTANEOUS at 11:20

## 2020-01-01 RX ADMIN — Medication 10 ML: at 09:25

## 2020-01-01 RX ADMIN — Medication 2 PUFF: at 08:45

## 2020-01-01 RX ADMIN — ALBUTEROL SULFATE 2.5 MG: 2.5 SOLUTION RESPIRATORY (INHALATION) at 15:52

## 2020-01-01 RX ADMIN — TIOTROPIUM BROMIDE INHALATION SPRAY 2 PUFF: 3.12 SPRAY, METERED RESPIRATORY (INHALATION) at 08:22

## 2020-01-01 RX ADMIN — FUROSEMIDE 40 MG: 10 INJECTION, SOLUTION INTRAMUSCULAR; INTRAVENOUS at 17:31

## 2020-01-01 RX ADMIN — GABAPENTIN 900 MG: 300 CAPSULE ORAL at 09:34

## 2020-01-01 RX ADMIN — CEFTRIAXONE SODIUM 1 G: 1 INJECTION, POWDER, FOR SOLUTION INTRAMUSCULAR; INTRAVENOUS at 05:15

## 2020-01-01 RX ADMIN — FERROUS SULFATE TAB 325 MG (65 MG ELEMENTAL FE) 325 MG: 325 (65 FE) TAB at 08:24

## 2020-01-01 RX ADMIN — CLOPIDOGREL BISULFATE 75 MG: 75 TABLET ORAL at 12:05

## 2020-01-01 RX ADMIN — INSULIN LISPRO 2 UNITS: 100 INJECTION, SOLUTION INTRAVENOUS; SUBCUTANEOUS at 13:07

## 2020-01-01 RX ADMIN — FERROUS SULFATE TAB 325 MG (65 MG ELEMENTAL FE) 325 MG: 325 (65 FE) TAB at 09:35

## 2020-01-01 RX ADMIN — OXYBUTYNIN CHLORIDE 5 MG: 5 TABLET ORAL at 10:40

## 2020-01-01 RX ADMIN — SENNOSIDES AND DOCUSATE SODIUM 2 TABLET: 8.6; 5 TABLET ORAL at 19:42

## 2020-01-01 RX ADMIN — MORPHINE SULFATE 4 MG: 4 INJECTION, SOLUTION INTRAMUSCULAR; INTRAVENOUS at 19:15

## 2020-01-01 RX ADMIN — SERTRALINE HYDROCHLORIDE 100 MG: 50 TABLET ORAL at 09:47

## 2020-01-01 RX ADMIN — RANOLAZINE 500 MG: 500 TABLET, FILM COATED, EXTENDED RELEASE ORAL at 09:58

## 2020-01-01 RX ADMIN — Medication 10 ML: at 20:19

## 2020-01-01 RX ADMIN — Medication 1 PUFF: at 20:40

## 2020-01-01 RX ADMIN — ATORVASTATIN CALCIUM 40 MG: 40 TABLET, FILM COATED ORAL at 20:18

## 2020-01-01 RX ADMIN — DEXTROSE 50 % IN WATER (D50W) INTRAVENOUS SYRINGE 12.5 G: at 23:18

## 2020-01-01 RX ADMIN — ACETAMINOPHEN 650 MG: 650 SUPPOSITORY RECTAL at 22:22

## 2020-01-01 RX ADMIN — INSULIN GLARGINE 28 UNITS: 100 INJECTION, SOLUTION SUBCUTANEOUS at 20:09

## 2020-01-01 RX ADMIN — OXYCODONE HYDROCHLORIDE AND ACETAMINOPHEN 1 TABLET: 7.5; 325 TABLET ORAL at 05:36

## 2020-01-01 RX ADMIN — SODIUM CHLORIDE 250 ML: 9 INJECTION, SOLUTION INTRAVENOUS at 00:37

## 2020-01-01 RX ADMIN — Medication 2 PUFF: at 19:21

## 2020-01-01 RX ADMIN — FLUTICASONE PROPIONATE 2 SPRAY: 50 SPRAY, METERED NASAL at 09:47

## 2020-01-01 RX ADMIN — LEVOTHYROXINE SODIUM 100 MCG: 100 TABLET ORAL at 05:50

## 2020-01-01 RX ADMIN — GABAPENTIN 900 MG: 300 CAPSULE ORAL at 21:11

## 2020-01-01 RX ADMIN — INSULIN GLARGINE 40 UNITS: 100 INJECTION, SOLUTION SUBCUTANEOUS at 20:57

## 2020-01-01 RX ADMIN — INSULIN GLARGINE 50 UNITS: 100 INJECTION, SOLUTION SUBCUTANEOUS at 20:52

## 2020-01-01 RX ADMIN — Medication 10 ML: at 08:02

## 2020-01-01 RX ADMIN — Medication 10 ML: at 09:00

## 2020-01-01 RX ADMIN — ACETAMINOPHEN 650 MG: 325 TABLET ORAL at 04:06

## 2020-01-01 RX ADMIN — SPIRONOLACTONE 25 MG: 25 TABLET ORAL at 08:14

## 2020-01-01 RX ADMIN — Medication 2 PUFF: at 07:19

## 2020-01-01 RX ADMIN — OXYCODONE HYDROCHLORIDE AND ACETAMINOPHEN 1 TABLET: 7.5; 325 TABLET ORAL at 03:39

## 2020-01-01 RX ADMIN — SERTRALINE HYDROCHLORIDE 100 MG: 50 TABLET ORAL at 09:03

## 2020-01-01 RX ADMIN — Medication 10 ML: at 19:48

## 2020-01-01 RX ADMIN — Medication 2 PUFF: at 07:47

## 2020-01-01 RX ADMIN — METOPROLOL SUCCINATE 25 MG: 25 TABLET, FILM COATED, EXTENDED RELEASE ORAL at 12:04

## 2020-01-01 RX ADMIN — DOCUSATE SODIUM 100 MG: 100 CAPSULE, LIQUID FILLED ORAL at 08:48

## 2020-01-01 RX ADMIN — IPRATROPIUM BROMIDE AND ALBUTEROL SULFATE 1 AMPULE: .5; 3 SOLUTION RESPIRATORY (INHALATION) at 18:35

## 2020-01-01 RX ADMIN — Medication 2 PUFF: at 18:57

## 2020-01-01 RX ADMIN — FUROSEMIDE 40 MG: 10 INJECTION, SOLUTION INTRAMUSCULAR; INTRAVENOUS at 21:11

## 2020-01-01 RX ADMIN — INSULIN LISPRO 10 UNITS: 100 INJECTION, SOLUTION INTRAVENOUS; SUBCUTANEOUS at 09:05

## 2020-01-01 RX ADMIN — FERROUS SULFATE TAB 325 MG (65 MG ELEMENTAL FE) 325 MG: 325 (65 FE) TAB at 08:53

## 2020-01-01 RX ADMIN — AZITHROMYCIN MONOHYDRATE 500 MG: 500 INJECTION, POWDER, LYOPHILIZED, FOR SOLUTION INTRAVENOUS at 00:16

## 2020-01-01 RX ADMIN — MORPHINE SULFATE 4 MG: 4 INJECTION, SOLUTION INTRAMUSCULAR; INTRAVENOUS at 21:12

## 2020-01-01 RX ADMIN — Medication 2 PUFF: at 07:38

## 2020-01-01 RX ADMIN — INSULIN LISPRO 6 UNITS: 100 INJECTION, SOLUTION INTRAVENOUS; SUBCUTANEOUS at 08:54

## 2020-01-01 RX ADMIN — ONDANSETRON HYDROCHLORIDE 4 MG: 2 INJECTION, SOLUTION INTRAMUSCULAR; INTRAVENOUS at 17:30

## 2020-01-01 RX ADMIN — INSULIN LISPRO 9 UNITS: 100 INJECTION, SOLUTION INTRAVENOUS; SUBCUTANEOUS at 12:07

## 2020-01-01 RX ADMIN — INSULIN GLARGINE 28 UNITS: 100 INJECTION, SOLUTION SUBCUTANEOUS at 08:36

## 2020-01-01 RX ADMIN — TIOTROPIUM BROMIDE INHALATION SPRAY 2 PUFF: 3.12 SPRAY, METERED RESPIRATORY (INHALATION) at 08:07

## 2020-01-01 RX ADMIN — VITAMIN D, TAB 1000IU (100/BT) 1000 UNITS: 25 TAB at 08:22

## 2020-01-01 RX ADMIN — MORPHINE SULFATE 2 MG: 2 INJECTION, SOLUTION INTRAMUSCULAR; INTRAVENOUS at 11:54

## 2020-01-01 RX ADMIN — ISOSORBIDE DINITRATE 10 MG: 10 TABLET ORAL at 22:39

## 2020-01-01 RX ADMIN — OXYBUTYNIN CHLORIDE 5 MG: 5 TABLET ORAL at 22:09

## 2020-01-01 RX ADMIN — Medication 2 PUFF: at 20:02

## 2020-01-01 RX ADMIN — SERTRALINE HYDROCHLORIDE 100 MG: 50 TABLET ORAL at 10:39

## 2020-01-01 RX ADMIN — LEVOTHYROXINE SODIUM 50 MCG: 25 TABLET ORAL at 05:16

## 2020-01-01 RX ADMIN — GABAPENTIN 300 MG: 300 CAPSULE ORAL at 09:43

## 2020-01-01 RX ADMIN — MORPHINE SULFATE 2 MG: 2 INJECTION, SOLUTION INTRAMUSCULAR; INTRAVENOUS at 13:56

## 2020-01-01 RX ADMIN — LEVOTHYROXINE SODIUM 50 MCG: 25 TABLET ORAL at 06:24

## 2020-01-01 RX ADMIN — Medication 2 PUFF: at 08:01

## 2020-01-01 RX ADMIN — Medication 2 PUFF: at 15:13

## 2020-01-01 RX ADMIN — SILDENAFIL CITRATE 20 MG: 20 TABLET ORAL at 08:05

## 2020-01-01 RX ADMIN — SODIUM BICARBONATE 50 MEQ: 84 INJECTION, SOLUTION INTRAVENOUS at 02:01

## 2020-01-01 RX ADMIN — FUROSEMIDE 40 MG: 10 INJECTION, SOLUTION INTRAMUSCULAR; INTRAVENOUS at 09:24

## 2020-01-01 RX ADMIN — GABAPENTIN 900 MG: 300 CAPSULE ORAL at 21:01

## 2020-01-01 RX ADMIN — INSULIN LISPRO 6 UNITS: 100 INJECTION, SOLUTION INTRAVENOUS; SUBCUTANEOUS at 08:14

## 2020-01-01 RX ADMIN — OXYCODONE HYDROCHLORIDE AND ACETAMINOPHEN 1 TABLET: 7.5; 325 TABLET ORAL at 23:55

## 2020-01-01 RX ADMIN — RANOLAZINE 500 MG: 500 TABLET, FILM COATED, EXTENDED RELEASE ORAL at 09:07

## 2020-01-01 RX ADMIN — SILDENAFIL CITRATE 20 MG: 20 TABLET ORAL at 13:15

## 2020-01-01 RX ADMIN — FERROUS SULFATE TAB 325 MG (65 MG ELEMENTAL FE) 325 MG: 325 (65 FE) TAB at 09:58

## 2020-01-01 RX ADMIN — MORPHINE SULFATE 2 MG: 2 INJECTION, SOLUTION INTRAMUSCULAR; INTRAVENOUS at 13:23

## 2020-01-01 RX ADMIN — GABAPENTIN 900 MG: 300 CAPSULE ORAL at 16:37

## 2020-01-01 RX ADMIN — POLYETHYLENE GLYCOL 3350 17 G: 17 POWDER, FOR SOLUTION ORAL at 09:26

## 2020-01-01 RX ADMIN — INSULIN LISPRO 6 UNITS: 100 INJECTION, SOLUTION INTRAVENOUS; SUBCUTANEOUS at 08:36

## 2020-01-01 RX ADMIN — INSULIN GLARGINE 55 UNITS: 100 INJECTION, SOLUTION SUBCUTANEOUS at 09:14

## 2020-01-01 RX ADMIN — INSULIN LISPRO 2 UNITS: 100 INJECTION, SOLUTION INTRAVENOUS; SUBCUTANEOUS at 20:46

## 2020-01-01 RX ADMIN — Medication 2 PUFF: at 19:50

## 2020-01-01 RX ADMIN — METOPROLOL SUCCINATE 25 MG: 25 TABLET, EXTENDED RELEASE ORAL at 08:22

## 2020-01-01 RX ADMIN — RANOLAZINE 500 MG: 500 TABLET, FILM COATED, EXTENDED RELEASE ORAL at 22:30

## 2020-01-01 RX ADMIN — LEVOTHYROXINE SODIUM 100 MCG: 100 TABLET ORAL at 05:25

## 2020-01-01 RX ADMIN — MORPHINE SULFATE 4 MG: 4 INJECTION, SOLUTION INTRAMUSCULAR; INTRAVENOUS at 16:37

## 2020-01-01 RX ADMIN — FUROSEMIDE 40 MG: 10 INJECTION, SOLUTION INTRAMUSCULAR; INTRAVENOUS at 17:04

## 2020-01-01 RX ADMIN — Medication 10 ML: at 08:58

## 2020-01-01 RX ADMIN — FLUTICASONE PROPIONATE 2 SPRAY: 50 SPRAY, METERED NASAL at 08:41

## 2020-01-01 RX ADMIN — Medication 10 ML: at 21:02

## 2020-01-01 RX ADMIN — Medication 10 ML: at 15:14

## 2020-01-01 RX ADMIN — SENNOSIDES AND DOCUSATE SODIUM 2 TABLET: 8.6; 5 TABLET ORAL at 08:43

## 2020-01-01 RX ADMIN — OXYBUTYNIN CHLORIDE 5 MG: 5 TABLET ORAL at 09:08

## 2020-01-01 RX ADMIN — SENNOSIDES AND DOCUSATE SODIUM 2 TABLET: 8.6; 5 TABLET ORAL at 08:24

## 2020-01-01 RX ADMIN — MORPHINE SULFATE 30 MG: 30 TABLET, FILM COATED, EXTENDED RELEASE ORAL at 08:00

## 2020-01-01 RX ADMIN — OXYCODONE HYDROCHLORIDE AND ACETAMINOPHEN 1 TABLET: 7.5; 325 TABLET ORAL at 11:41

## 2020-01-01 RX ADMIN — Medication 1 PUFF: at 08:37

## 2020-01-01 RX ADMIN — INSULIN LISPRO 3 UNITS: 100 INJECTION, SOLUTION INTRAVENOUS; SUBCUTANEOUS at 08:10

## 2020-01-01 RX ADMIN — Medication 2 PUFF: at 23:17

## 2020-01-01 RX ADMIN — FERROUS SULFATE TAB 325 MG (65 MG ELEMENTAL FE) 325 MG: 325 (65 FE) TAB at 09:46

## 2020-01-01 RX ADMIN — INSULIN LISPRO 10 UNITS: 100 INJECTION, SOLUTION INTRAVENOUS; SUBCUTANEOUS at 11:27

## 2020-01-01 RX ADMIN — INSULIN LISPRO 6 UNITS: 100 INJECTION, SOLUTION INTRAVENOUS; SUBCUTANEOUS at 20:49

## 2020-01-01 RX ADMIN — Medication 2 PUFF: at 20:32

## 2020-01-01 RX ADMIN — ALBUTEROL SULFATE 2.5 MG: 2.5 SOLUTION RESPIRATORY (INHALATION) at 12:14

## 2020-01-01 RX ADMIN — METOPROLOL SUCCINATE 25 MG: 25 TABLET, FILM COATED, EXTENDED RELEASE ORAL at 07:57

## 2020-01-01 RX ADMIN — MORPHINE SULFATE 4 MG: 4 INJECTION, SOLUTION INTRAMUSCULAR; INTRAVENOUS at 02:29

## 2020-01-01 RX ADMIN — Medication 10 ML: at 09:17

## 2020-01-01 RX ADMIN — ACETAMINOPHEN 650 MG: 325 TABLET ORAL at 17:11

## 2020-01-01 RX ADMIN — SPIRONOLACTONE 25 MG: 25 TABLET ORAL at 08:40

## 2020-01-01 RX ADMIN — INSULIN GLARGINE 30 UNITS: 100 INJECTION, SOLUTION SUBCUTANEOUS at 12:05

## 2020-01-01 RX ADMIN — OXYCODONE HYDROCHLORIDE 10 MG: 5 TABLET ORAL at 06:02

## 2020-01-01 RX ADMIN — INSULIN LISPRO 3 UNITS: 100 INJECTION, SOLUTION INTRAVENOUS; SUBCUTANEOUS at 17:09

## 2020-01-01 RX ADMIN — OXYBUTYNIN CHLORIDE 5 MG: 5 TABLET ORAL at 08:56

## 2020-01-01 RX ADMIN — ENOXAPARIN SODIUM 40 MG: 40 INJECTION SUBCUTANEOUS at 08:49

## 2020-01-01 RX ADMIN — Medication 2 PUFF: at 23:23

## 2020-01-01 RX ADMIN — Medication 2 PUFF: at 11:41

## 2020-01-01 RX ADMIN — OXYCODONE HYDROCHLORIDE AND ACETAMINOPHEN 1 TABLET: 7.5; 325 TABLET ORAL at 15:06

## 2020-01-01 RX ADMIN — FUROSEMIDE 20 MG: 10 INJECTION, SOLUTION INTRAMUSCULAR; INTRAVENOUS at 08:53

## 2020-01-01 RX ADMIN — GABAPENTIN 900 MG: 300 CAPSULE ORAL at 08:59

## 2020-01-01 RX ADMIN — OXYBUTYNIN CHLORIDE 5 MG: 5 TABLET ORAL at 09:25

## 2020-01-01 RX ADMIN — HYDROMORPHONE HYDROCHLORIDE 1 MG: 1 INJECTION, SOLUTION INTRAMUSCULAR; INTRAVENOUS; SUBCUTANEOUS at 23:15

## 2020-01-01 RX ADMIN — FUROSEMIDE 40 MG: 10 INJECTION, SOLUTION INTRAMUSCULAR; INTRAVENOUS at 10:46

## 2020-01-01 RX ADMIN — GABAPENTIN 900 MG: 300 CAPSULE ORAL at 07:57

## 2020-01-01 RX ADMIN — VITAMIN D, TAB 1000IU (100/BT) 1000 UNITS: 25 TAB at 07:57

## 2020-01-01 RX ADMIN — ASPIRIN 81 MG: 81 TABLET, COATED ORAL at 09:58

## 2020-01-01 RX ADMIN — OXYCODONE HYDROCHLORIDE AND ACETAMINOPHEN 1 TABLET: 7.5; 325 TABLET ORAL at 21:20

## 2020-01-01 RX ADMIN — SENNOSIDES AND DOCUSATE SODIUM 2 TABLET: 8.6; 5 TABLET ORAL at 08:53

## 2020-01-01 RX ADMIN — DOCUSATE SODIUM 100 MG: 100 CAPSULE, LIQUID FILLED ORAL at 08:56

## 2020-01-01 RX ADMIN — OXYCODONE HYDROCHLORIDE AND ACETAMINOPHEN 1 TABLET: 7.5; 325 TABLET ORAL at 06:57

## 2020-01-01 RX ADMIN — VITAMIN D, TAB 1000IU (100/BT) 1000 UNITS: 25 TAB at 10:39

## 2020-01-01 RX ADMIN — DOCUSATE SODIUM 100 MG: 100 CAPSULE, LIQUID FILLED ORAL at 07:57

## 2020-01-01 RX ADMIN — IPRATROPIUM BROMIDE AND ALBUTEROL SULFATE 1 AMPULE: .5; 3 SOLUTION RESPIRATORY (INHALATION) at 23:08

## 2020-01-01 RX ADMIN — GABAPENTIN 300 MG: 300 CAPSULE ORAL at 09:58

## 2020-01-01 RX ADMIN — TIOTROPIUM BROMIDE INHALATION SPRAY 2 PUFF: 3.12 SPRAY, METERED RESPIRATORY (INHALATION) at 07:42

## 2020-01-01 RX ADMIN — FUROSEMIDE 40 MG: 10 INJECTION, SOLUTION INTRAVENOUS at 14:27

## 2020-01-01 RX ADMIN — OXYCODONE HYDROCHLORIDE AND ACETAMINOPHEN 1 TABLET: 7.5; 325 TABLET ORAL at 05:16

## 2020-01-01 RX ADMIN — RANOLAZINE 500 MG: 500 TABLET, FILM COATED, EXTENDED RELEASE ORAL at 19:58

## 2020-01-01 RX ADMIN — Medication 2 PUFF: at 19:30

## 2020-01-01 RX ADMIN — OXYBUTYNIN CHLORIDE 5 MG: 5 TABLET ORAL at 09:58

## 2020-01-01 RX ADMIN — Medication 10 ML: at 22:25

## 2020-01-01 RX ADMIN — FERROUS SULFATE TAB 325 MG (65 MG ELEMENTAL FE) 325 MG: 325 (65 FE) TAB at 09:25

## 2020-01-01 RX ADMIN — GABAPENTIN 900 MG: 300 CAPSULE ORAL at 08:56

## 2020-01-01 RX ADMIN — INSULIN LISPRO 9 UNITS: 100 INJECTION, SOLUTION INTRAVENOUS; SUBCUTANEOUS at 17:06

## 2020-01-01 RX ADMIN — MIDAZOLAM HYDROCHLORIDE 2 MG: 1 INJECTION, SOLUTION INTRAMUSCULAR; INTRAVENOUS at 16:58

## 2020-01-01 RX ADMIN — OXYCODONE HYDROCHLORIDE AND ACETAMINOPHEN 1 TABLET: 7.5; 325 TABLET ORAL at 22:38

## 2020-01-01 RX ADMIN — METFORMIN HYDROCHLORIDE 1000 MG: 500 TABLET ORAL at 17:09

## 2020-01-01 RX ADMIN — INSULIN LISPRO 10 UNITS: 100 INJECTION, SOLUTION INTRAVENOUS; SUBCUTANEOUS at 12:34

## 2020-01-01 RX ADMIN — SILDENAFIL CITRATE 20 MG: 20 TABLET ORAL at 09:05

## 2020-01-01 RX ADMIN — OXYCODONE HYDROCHLORIDE AND ACETAMINOPHEN 1 TABLET: 7.5; 325 TABLET ORAL at 21:13

## 2020-01-01 RX ADMIN — ENOXAPARIN SODIUM 40 MG: 40 INJECTION SUBCUTANEOUS at 08:23

## 2020-01-01 RX ADMIN — SPIRONOLACTONE 25 MG: 25 TABLET ORAL at 08:58

## 2020-01-01 RX ADMIN — POLYETHYLENE GLYCOL (3350) 17 G: 17 POWDER, FOR SOLUTION ORAL at 21:17

## 2020-01-01 RX ADMIN — SENNOSIDES AND DOCUSATE SODIUM 2 TABLET: 8.6; 5 TABLET ORAL at 21:12

## 2020-01-01 RX ADMIN — RANOLAZINE 500 MG: 500 TABLET, FILM COATED, EXTENDED RELEASE ORAL at 08:59

## 2020-01-01 RX ADMIN — INSULIN LISPRO 2 UNITS: 100 INJECTION, SOLUTION INTRAVENOUS; SUBCUTANEOUS at 20:24

## 2020-01-01 RX ADMIN — GABAPENTIN 300 MG: 300 CAPSULE ORAL at 16:25

## 2020-01-01 RX ADMIN — BISACODYL 10 MG: 10 SUPPOSITORY RECTAL at 02:21

## 2020-01-01 RX ADMIN — SERTRALINE HYDROCHLORIDE 100 MG: 50 TABLET ORAL at 08:57

## 2020-01-01 RX ADMIN — SPIRONOLACTONE 25 MG: 25 TABLET ORAL at 08:15

## 2020-01-01 RX ADMIN — SODIUM CHLORIDE: 9 INJECTION, SOLUTION INTRAVENOUS at 03:45

## 2020-01-01 RX ADMIN — OXYCODONE HYDROCHLORIDE AND ACETAMINOPHEN 1 TABLET: 7.5; 325 TABLET ORAL at 08:59

## 2020-01-01 RX ADMIN — TIOTROPIUM BROMIDE INHALATION SPRAY 2 PUFF: 3.12 SPRAY, METERED RESPIRATORY (INHALATION) at 08:42

## 2020-01-01 RX ADMIN — PROMETHAZINE HYDROCHLORIDE 12.5 MG: 25 TABLET ORAL at 08:06

## 2020-01-01 RX ADMIN — OXYBUTYNIN CHLORIDE 5 MG: 5 TABLET ORAL at 20:46

## 2020-01-01 RX ADMIN — SILDENAFIL CITRATE 20 MG: 20 TABLET ORAL at 21:30

## 2020-01-01 RX ADMIN — Medication 10 ML: at 02:30

## 2020-01-01 RX ADMIN — OXYBUTYNIN CHLORIDE 5 MG: 5 TABLET ORAL at 20:54

## 2020-01-01 RX ADMIN — TRAMADOL HYDROCHLORIDE 50 MG: 50 TABLET, FILM COATED ORAL at 11:37

## 2020-01-01 RX ADMIN — SENNOSIDES AND DOCUSATE SODIUM 2 TABLET: 8.6; 5 TABLET ORAL at 07:59

## 2020-01-01 RX ADMIN — Medication 2 PUFF: at 18:30

## 2020-01-01 RX ADMIN — SENNOSIDES AND DOCUSATE SODIUM 2 TABLET: 8.6; 5 TABLET ORAL at 20:54

## 2020-01-01 RX ADMIN — SENNOSIDES AND DOCUSATE SODIUM 2 TABLET: 8.6; 5 TABLET ORAL at 08:30

## 2020-01-01 RX ADMIN — FUROSEMIDE 40 MG: 10 INJECTION, SOLUTION INTRAMUSCULAR; INTRAVENOUS at 20:45

## 2020-01-01 RX ADMIN — ATORVASTATIN CALCIUM 40 MG: 40 TABLET, FILM COATED ORAL at 08:49

## 2020-01-01 RX ADMIN — TRAMADOL HYDROCHLORIDE 50 MG: 50 TABLET, FILM COATED ORAL at 09:03

## 2020-01-01 RX ADMIN — SILDENAFIL CITRATE 20 MG: 20 TABLET ORAL at 13:49

## 2020-01-01 RX ADMIN — Medication 10 ML: at 08:05

## 2020-01-01 RX ADMIN — VITAMIN D, TAB 1000IU (100/BT) 1000 UNITS: 25 TAB at 09:35

## 2020-01-01 RX ADMIN — FUROSEMIDE 20 MG: 20 TABLET ORAL at 09:44

## 2020-01-01 RX ADMIN — GABAPENTIN 300 MG: 300 CAPSULE ORAL at 08:00

## 2020-01-01 RX ADMIN — Medication 10 ML: at 08:00

## 2020-01-01 RX ADMIN — RANOLAZINE 500 MG: 500 TABLET, FILM COATED, EXTENDED RELEASE ORAL at 20:42

## 2020-01-01 RX ADMIN — INSULIN LISPRO 6 UNITS: 100 INJECTION, SOLUTION INTRAVENOUS; SUBCUTANEOUS at 09:38

## 2020-01-01 RX ADMIN — FLUTICASONE PROPIONATE 2 SPRAY: 50 SPRAY, METERED NASAL at 12:05

## 2020-01-01 RX ADMIN — FUROSEMIDE 5 MG/HR: 10 INJECTION, SOLUTION INTRAMUSCULAR; INTRAVENOUS at 23:34

## 2020-01-01 RX ADMIN — PROMETHAZINE HYDROCHLORIDE 12.5 MG: 25 TABLET ORAL at 18:23

## 2020-01-01 RX ADMIN — MORPHINE SULFATE 2 MG: 2 INJECTION, SOLUTION INTRAMUSCULAR; INTRAVENOUS at 03:22

## 2020-01-01 RX ADMIN — Medication 10 ML: at 09:03

## 2020-01-01 RX ADMIN — INSULIN GLARGINE 28 UNITS: 100 INJECTION, SOLUTION SUBCUTANEOUS at 08:59

## 2020-01-01 RX ADMIN — TIOTROPIUM BROMIDE INHALATION SPRAY 2 PUFF: 3.12 SPRAY, METERED RESPIRATORY (INHALATION) at 08:36

## 2020-01-01 RX ADMIN — Medication 10 ML: at 22:41

## 2020-01-01 RX ADMIN — INSULIN LISPRO 10 UNITS: 100 INJECTION, SOLUTION INTRAVENOUS; SUBCUTANEOUS at 13:05

## 2020-01-01 RX ADMIN — OXYCODONE HYDROCHLORIDE AND ACETAMINOPHEN 1 TABLET: 7.5; 325 TABLET ORAL at 06:09

## 2020-01-01 RX ADMIN — ALBUTEROL SULFATE 2.5 MG: 2.5 SOLUTION RESPIRATORY (INHALATION) at 11:39

## 2020-01-01 RX ADMIN — GABAPENTIN 300 MG: 300 CAPSULE ORAL at 08:58

## 2020-01-01 RX ADMIN — FUROSEMIDE 40 MG: 10 INJECTION, SOLUTION INTRAMUSCULAR; INTRAVENOUS at 02:11

## 2020-01-01 RX ADMIN — MORPHINE SULFATE 30 MG: 30 TABLET, FILM COATED, EXTENDED RELEASE ORAL at 09:43

## 2020-01-01 RX ADMIN — FUROSEMIDE 40 MG: 10 INJECTION, SOLUTION INTRAMUSCULAR; INTRAVENOUS at 16:38

## 2020-01-01 RX ADMIN — Medication 2 PUFF: at 07:01

## 2020-01-01 RX ADMIN — Medication 2 PUFF: at 05:49

## 2020-01-01 RX ADMIN — SENNOSIDES AND DOCUSATE SODIUM 2 TABLET: 8.6; 5 TABLET ORAL at 22:39

## 2020-01-01 RX ADMIN — ENOXAPARIN SODIUM 40 MG: 40 INJECTION SUBCUTANEOUS at 10:36

## 2020-01-01 RX ADMIN — ASPIRIN 81 MG: 81 TABLET, COATED ORAL at 08:14

## 2020-01-01 RX ADMIN — Medication 10 ML: at 05:43

## 2020-01-01 RX ADMIN — Medication 10 ML: at 22:13

## 2020-01-01 RX ADMIN — OXYCODONE HYDROCHLORIDE AND ACETAMINOPHEN 1 TABLET: 7.5; 325 TABLET ORAL at 17:27

## 2020-01-01 RX ADMIN — LEVOTHYROXINE SODIUM 50 MCG: 25 TABLET ORAL at 05:09

## 2020-01-01 RX ADMIN — OXYBUTYNIN CHLORIDE 5 MG: 5 TABLET ORAL at 20:52

## 2020-01-01 RX ADMIN — SERTRALINE HYDROCHLORIDE 100 MG: 50 TABLET ORAL at 08:52

## 2020-01-01 RX ADMIN — INSULIN LISPRO 10 UNITS: 100 INJECTION, SOLUTION INTRAVENOUS; SUBCUTANEOUS at 11:55

## 2020-01-01 RX ADMIN — MUPIROCIN: 20 OINTMENT TOPICAL at 22:38

## 2020-01-01 RX ADMIN — MORPHINE SULFATE 2 MG: 2 INJECTION, SOLUTION INTRAMUSCULAR; INTRAVENOUS at 23:29

## 2020-01-01 RX ADMIN — HYDROMORPHONE HYDROCHLORIDE 1 MG: 1 INJECTION, SOLUTION INTRAMUSCULAR; INTRAVENOUS; SUBCUTANEOUS at 02:12

## 2020-01-01 RX ADMIN — INSULIN LISPRO 10 UNITS: 100 INJECTION, SOLUTION INTRAVENOUS; SUBCUTANEOUS at 11:08

## 2020-01-01 RX ADMIN — ALBUTEROL SULFATE 2.5 MG: 2.5 SOLUTION RESPIRATORY (INHALATION) at 19:50

## 2020-01-01 RX ADMIN — OXYCODONE HYDROCHLORIDE 5 MG: 5 TABLET ORAL at 09:50

## 2020-01-01 RX ADMIN — GABAPENTIN 300 MG: 300 CAPSULE ORAL at 22:38

## 2020-01-01 RX ADMIN — KETOROLAC TROMETHAMINE 15 MG: 30 INJECTION, SOLUTION INTRAMUSCULAR at 18:20

## 2020-01-01 RX ADMIN — Medication 10 ML: at 20:29

## 2020-01-01 RX ADMIN — ATORVASTATIN CALCIUM 40 MG: 40 TABLET, FILM COATED ORAL at 22:38

## 2020-01-01 RX ADMIN — SPIRONOLACTONE 25 MG: 25 TABLET ORAL at 11:49

## 2020-01-01 RX ADMIN — FUROSEMIDE 40 MG: 10 INJECTION, SOLUTION INTRAMUSCULAR; INTRAVENOUS at 05:56

## 2020-01-01 RX ADMIN — INSULIN LISPRO 10 UNITS: 100 INJECTION, SOLUTION INTRAVENOUS; SUBCUTANEOUS at 11:18

## 2020-01-01 RX ADMIN — MORPHINE SULFATE 4 MG: 4 INJECTION, SOLUTION INTRAMUSCULAR; INTRAVENOUS at 14:44

## 2020-01-01 RX ADMIN — CLOPIDOGREL BISULFATE 75 MG: 75 TABLET ORAL at 14:44

## 2020-01-01 RX ADMIN — FERROUS SULFATE TAB 325 MG (65 MG ELEMENTAL FE) 325 MG: 325 (65 FE) TAB at 08:32

## 2020-01-01 RX ADMIN — Medication 25 MEQ: at 01:24

## 2020-01-01 RX ADMIN — METOPROLOL TARTRATE 25 MG: 25 TABLET, FILM COATED ORAL at 08:00

## 2020-01-01 RX ADMIN — OXYCODONE HYDROCHLORIDE 10 MG: 5 TABLET ORAL at 15:25

## 2020-01-01 RX ADMIN — OXYCODONE HYDROCHLORIDE AND ACETAMINOPHEN 1 TABLET: 7.5; 325 TABLET ORAL at 09:16

## 2020-01-01 RX ADMIN — ASPIRIN 81 MG 81 MG: 81 TABLET ORAL at 08:03

## 2020-01-01 RX ADMIN — TIOTROPIUM BROMIDE INHALATION SPRAY 2 PUFF: 3.12 SPRAY, METERED RESPIRATORY (INHALATION) at 08:45

## 2020-01-01 RX ADMIN — FUROSEMIDE 40 MG: 10 INJECTION, SOLUTION INTRAMUSCULAR; INTRAVENOUS at 08:57

## 2020-01-01 RX ADMIN — ISOSORBIDE MONONITRATE 30 MG: 30 TABLET, EXTENDED RELEASE ORAL at 08:53

## 2020-01-01 RX ADMIN — INSULIN LISPRO 3 UNITS: 100 INJECTION, SOLUTION INTRAVENOUS; SUBCUTANEOUS at 11:38

## 2020-01-01 RX ADMIN — METOPROLOL TARTRATE 25 MG: 25 TABLET ORAL at 08:51

## 2020-01-01 RX ADMIN — OXYBUTYNIN CHLORIDE 5 MG: 5 TABLET ORAL at 21:20

## 2020-01-01 RX ADMIN — FLUTICASONE PROPIONATE 2 SPRAY: 50 SPRAY, METERED NASAL at 09:06

## 2020-01-01 RX ADMIN — FERROUS SULFATE TAB 325 MG (65 MG ELEMENTAL FE) 325 MG: 325 (65 FE) TAB at 08:07

## 2020-01-01 RX ADMIN — FENTANYL CITRATE 50 MCG/HR: 50 INJECTION, SOLUTION INTRAMUSCULAR; INTRAVENOUS at 22:41

## 2020-01-01 RX ADMIN — Medication 10 ML: at 00:18

## 2020-01-01 RX ADMIN — OXYCODONE HYDROCHLORIDE AND ACETAMINOPHEN 1 TABLET: 7.5; 325 TABLET ORAL at 15:35

## 2020-01-01 RX ADMIN — METOPROLOL SUCCINATE 25 MG: 25 TABLET, FILM COATED, EXTENDED RELEASE ORAL at 08:33

## 2020-01-01 RX ADMIN — GABAPENTIN 300 MG: 300 CAPSULE ORAL at 14:35

## 2020-01-01 RX ADMIN — INSULIN LISPRO 2 UNITS: 100 INJECTION, SOLUTION INTRAVENOUS; SUBCUTANEOUS at 21:27

## 2020-01-01 RX ADMIN — OXYCODONE HYDROCHLORIDE AND ACETAMINOPHEN 1 TABLET: 7.5; 325 TABLET ORAL at 08:15

## 2020-01-01 RX ADMIN — OXYBUTYNIN CHLORIDE 5 MG: 5 TABLET ORAL at 08:07

## 2020-01-01 RX ADMIN — SPIRONOLACTONE 25 MG: 25 TABLET ORAL at 09:02

## 2020-01-01 RX ADMIN — Medication 2 PUFF: at 06:44

## 2020-01-01 RX ADMIN — GABAPENTIN 900 MG: 300 CAPSULE ORAL at 09:08

## 2020-01-01 RX ADMIN — MORPHINE SULFATE 2 MG: 2 INJECTION, SOLUTION INTRAMUSCULAR; INTRAVENOUS at 11:37

## 2020-01-01 RX ADMIN — MEROPENEM 1 G: 1 INJECTION, POWDER, FOR SOLUTION INTRAVENOUS at 22:55

## 2020-01-01 RX ADMIN — GABAPENTIN 300 MG: 300 CAPSULE ORAL at 21:44

## 2020-01-01 RX ADMIN — OXYCODONE HYDROCHLORIDE 10 MG: 5 TABLET ORAL at 20:05

## 2020-01-01 RX ADMIN — SILDENAFIL CITRATE 20 MG: 20 TABLET ORAL at 15:25

## 2020-01-01 RX ADMIN — METOPROLOL SUCCINATE 25 MG: 25 TABLET, FILM COATED, EXTENDED RELEASE ORAL at 09:34

## 2020-01-01 RX ADMIN — OXYBUTYNIN CHLORIDE 5 MG: 5 TABLET ORAL at 20:20

## 2020-01-01 RX ADMIN — CLOPIDOGREL BISULFATE 75 MG: 75 TABLET ORAL at 09:12

## 2020-01-01 RX ADMIN — INSULIN LISPRO 3 UNITS: 100 INJECTION, SOLUTION INTRAVENOUS; SUBCUTANEOUS at 23:04

## 2020-01-01 RX ADMIN — OXYBUTYNIN CHLORIDE 5 MG: 5 TABLET ORAL at 08:22

## 2020-01-01 RX ADMIN — SILDENAFIL CITRATE 20 MG: 20 TABLET ORAL at 12:45

## 2020-01-01 RX ADMIN — SPIRONOLACTONE 25 MG: 25 TABLET ORAL at 08:05

## 2020-01-01 RX ADMIN — ATORVASTATIN CALCIUM 40 MG: 40 TABLET, FILM COATED ORAL at 20:38

## 2020-01-01 RX ADMIN — OXYBUTYNIN CHLORIDE 5 MG: 5 TABLET ORAL at 08:53

## 2020-01-01 RX ADMIN — FERROUS SULFATE TAB 325 MG (65 MG ELEMENTAL FE) 325 MG: 325 (65 FE) TAB at 08:14

## 2020-01-01 RX ADMIN — OXYCODONE HYDROCHLORIDE AND ACETAMINOPHEN 1 TABLET: 7.5; 325 TABLET ORAL at 02:05

## 2020-01-01 RX ADMIN — GABAPENTIN 900 MG: 300 CAPSULE ORAL at 20:52

## 2020-01-01 RX ADMIN — SILDENAFIL CITRATE 20 MG: 20 TABLET ORAL at 14:34

## 2020-01-01 RX ADMIN — METFORMIN HYDROCHLORIDE 1000 MG: 500 TABLET ORAL at 17:27

## 2020-01-01 RX ADMIN — LEVOTHYROXINE SODIUM 50 MCG: 25 TABLET ORAL at 08:00

## 2020-01-01 RX ADMIN — SILDENAFIL CITRATE 20 MG: 20 TABLET ORAL at 08:30

## 2020-01-01 RX ADMIN — HYDROMORPHONE HYDROCHLORIDE 1 MG: 1 INJECTION, SOLUTION INTRAMUSCULAR; INTRAVENOUS; SUBCUTANEOUS at 02:08

## 2020-01-01 RX ADMIN — SPIRONOLACTONE 25 MG: 25 TABLET ORAL at 08:00

## 2020-01-01 RX ADMIN — INSULIN LISPRO 2 UNITS: 100 INJECTION, SOLUTION INTRAVENOUS; SUBCUTANEOUS at 13:23

## 2020-01-01 RX ADMIN — Medication 2 PUFF: at 19:26

## 2020-01-01 RX ADMIN — ASPIRIN 81 MG: 81 TABLET ORAL at 12:05

## 2020-01-01 RX ADMIN — OXYCODONE HYDROCHLORIDE AND ACETAMINOPHEN 1 TABLET: 7.5; 325 TABLET ORAL at 21:24

## 2020-01-01 RX ADMIN — MORPHINE SULFATE 2 MG: 2 INJECTION, SOLUTION INTRAMUSCULAR; INTRAVENOUS at 17:17

## 2020-01-01 RX ADMIN — INSULIN GLARGINE 40 UNITS: 100 INJECTION, SOLUTION SUBCUTANEOUS at 08:38

## 2020-01-01 RX ADMIN — RANOLAZINE 500 MG: 500 TABLET, FILM COATED, EXTENDED RELEASE ORAL at 08:00

## 2020-01-01 RX ADMIN — Medication 2 PUFF: at 08:40

## 2020-01-01 RX ADMIN — OXYBUTYNIN CHLORIDE 5 MG: 5 TABLET ORAL at 19:42

## 2020-01-01 RX ADMIN — INSULIN GLARGINE 50 UNITS: 100 INJECTION, SOLUTION SUBCUTANEOUS at 09:38

## 2020-01-01 RX ADMIN — INSULIN LISPRO 10 UNITS: 100 INJECTION, SOLUTION INTRAVENOUS; SUBCUTANEOUS at 10:24

## 2020-01-01 RX ADMIN — METFORMIN HYDROCHLORIDE 1000 MG: 500 TABLET ORAL at 17:38

## 2020-01-01 RX ADMIN — INSULIN LISPRO 15 UNITS: 100 INJECTION, SOLUTION INTRAVENOUS; SUBCUTANEOUS at 12:09

## 2020-01-01 RX ADMIN — METHYLPREDNISOLONE SODIUM SUCCINATE 40 MG: 40 INJECTION, POWDER, FOR SOLUTION INTRAMUSCULAR; INTRAVENOUS at 08:57

## 2020-01-01 RX ADMIN — Medication 2 PUFF: at 11:14

## 2020-01-01 RX ADMIN — FERROUS SULFATE TAB 325 MG (65 MG ELEMENTAL FE) 325 MG: 325 (65 FE) TAB at 09:03

## 2020-01-01 RX ADMIN — Medication 2 PUFF: at 20:01

## 2020-01-01 RX ADMIN — ASPIRIN 81 MG: 81 TABLET ORAL at 07:57

## 2020-01-01 RX ADMIN — SILDENAFIL CITRATE 20 MG: 20 TABLET ORAL at 08:11

## 2020-01-01 RX ADMIN — Medication 1 PUFF: at 19:46

## 2020-01-01 RX ADMIN — SENNOSIDES AND DOCUSATE SODIUM 2 TABLET: 8.6; 5 TABLET ORAL at 20:01

## 2020-01-01 RX ADMIN — GABAPENTIN 900 MG: 300 CAPSULE ORAL at 12:19

## 2020-01-01 RX ADMIN — MUPIROCIN: 20 OINTMENT TOPICAL at 21:10

## 2020-01-01 RX ADMIN — DEXTROSE 50 % IN WATER (D50W) INTRAVENOUS SYRINGE 12.5 G: at 08:15

## 2020-01-01 RX ADMIN — LEVOTHYROXINE SODIUM 50 MCG: 25 TABLET ORAL at 06:35

## 2020-01-01 RX ADMIN — ATORVASTATIN CALCIUM 40 MG: 40 TABLET, FILM COATED ORAL at 22:37

## 2020-01-01 RX ADMIN — LEVOTHYROXINE SODIUM 100 MCG: 100 TABLET ORAL at 08:40

## 2020-01-01 RX ADMIN — ATORVASTATIN CALCIUM 40 MG: 40 TABLET, FILM COATED ORAL at 21:12

## 2020-01-01 RX ADMIN — INSULIN LISPRO 6 UNITS: 100 INJECTION, SOLUTION INTRAVENOUS; SUBCUTANEOUS at 12:56

## 2020-01-01 RX ADMIN — ISOSORBIDE MONONITRATE 30 MG: 30 TABLET, EXTENDED RELEASE ORAL at 08:49

## 2020-01-01 RX ADMIN — TIOTROPIUM BROMIDE INHALATION SPRAY 2 PUFF: 3.12 SPRAY, METERED RESPIRATORY (INHALATION) at 08:51

## 2020-01-01 RX ADMIN — INSULIN GLARGINE 28 UNITS: 100 INJECTION, SOLUTION SUBCUTANEOUS at 09:06

## 2020-01-01 RX ADMIN — GABAPENTIN 300 MG: 300 CAPSULE ORAL at 14:19

## 2020-01-01 RX ADMIN — AZITHROMYCIN MONOHYDRATE 500 MG: 500 INJECTION, POWDER, LYOPHILIZED, FOR SOLUTION INTRAVENOUS at 12:46

## 2020-01-01 RX ADMIN — INSULIN GLARGINE 35 UNITS: 100 INJECTION, SOLUTION SUBCUTANEOUS at 15:12

## 2020-01-01 RX ADMIN — ENOXAPARIN SODIUM 40 MG: 40 INJECTION SUBCUTANEOUS at 07:59

## 2020-01-01 RX ADMIN — Medication 2 PUFF: at 07:07

## 2020-01-01 RX ADMIN — OXYCODONE HYDROCHLORIDE AND ACETAMINOPHEN 1 TABLET: 7.5; 325 TABLET ORAL at 05:59

## 2020-01-01 RX ADMIN — INSULIN LISPRO 9 UNITS: 100 INJECTION, SOLUTION INTRAVENOUS; SUBCUTANEOUS at 17:10

## 2020-01-01 RX ADMIN — GABAPENTIN 300 MG: 300 CAPSULE ORAL at 13:49

## 2020-01-01 RX ADMIN — OXYCODONE HYDROCHLORIDE AND ACETAMINOPHEN 1 TABLET: 7.5; 325 TABLET ORAL at 19:09

## 2020-01-01 RX ADMIN — ASPIRIN 81 MG: 81 TABLET ORAL at 09:25

## 2020-01-01 RX ADMIN — SERTRALINE HYDROCHLORIDE 100 MG: 50 TABLET ORAL at 07:57

## 2020-01-01 RX ADMIN — CLONAZEPAM 0.5 MG: 0.5 TABLET ORAL at 23:43

## 2020-01-01 RX ADMIN — SODIUM BICARBONATE: 84 INJECTION, SOLUTION INTRAVENOUS at 01:59

## 2020-01-01 RX ADMIN — INSULIN LISPRO 3 UNITS: 100 INJECTION, SOLUTION INTRAVENOUS; SUBCUTANEOUS at 17:13

## 2020-01-01 RX ADMIN — IPRATROPIUM BROMIDE AND ALBUTEROL SULFATE 1 AMPULE: .5; 3 SOLUTION RESPIRATORY (INHALATION) at 02:36

## 2020-01-01 RX ADMIN — GABAPENTIN 900 MG: 300 CAPSULE ORAL at 13:57

## 2020-01-01 RX ADMIN — ATORVASTATIN CALCIUM 40 MG: 40 TABLET, FILM COATED ORAL at 20:01

## 2020-01-01 RX ADMIN — INSULIN GLARGINE 28 UNITS: 100 INJECTION, SOLUTION SUBCUTANEOUS at 08:43

## 2020-01-01 RX ADMIN — INSULIN LISPRO 10 UNITS: 100 INJECTION, SOLUTION INTRAVENOUS; SUBCUTANEOUS at 09:49

## 2020-01-01 RX ADMIN — INSULIN LISPRO 3 UNITS: 100 INJECTION, SOLUTION INTRAVENOUS; SUBCUTANEOUS at 11:53

## 2020-01-01 RX ADMIN — INSULIN GLARGINE 30 UNITS: 100 INJECTION, SOLUTION SUBCUTANEOUS at 22:40

## 2020-01-01 RX ADMIN — Medication 2 PUFF: at 07:10

## 2020-01-01 RX ADMIN — MORPHINE SULFATE 4 MG: 4 INJECTION, SOLUTION INTRAMUSCULAR; INTRAVENOUS at 17:07

## 2020-01-01 RX ADMIN — POLYETHYLENE GLYCOL 3350 17 G: 17 POWDER, FOR SOLUTION ORAL at 08:44

## 2020-01-01 RX ADMIN — Medication 2 PUFF: at 20:16

## 2020-01-01 RX ADMIN — ATORVASTATIN CALCIUM 40 MG: 40 TABLET, FILM COATED ORAL at 21:05

## 2020-01-01 RX ADMIN — GABAPENTIN 300 MG: 300 CAPSULE ORAL at 08:14

## 2020-01-01 RX ADMIN — CEFTRIAXONE SODIUM 1 G: 1 INJECTION, POWDER, FOR SOLUTION INTRAMUSCULAR; INTRAVENOUS at 10:46

## 2020-01-01 RX ADMIN — OXYCODONE HYDROCHLORIDE AND ACETAMINOPHEN 1 TABLET: 7.5; 325 TABLET ORAL at 22:00

## 2020-01-01 RX ADMIN — IPRATROPIUM BROMIDE AND ALBUTEROL SULFATE 1 AMPULE: .5; 3 SOLUTION RESPIRATORY (INHALATION) at 23:28

## 2020-01-01 RX ADMIN — KETOROLAC TROMETHAMINE 15 MG: 30 INJECTION, SOLUTION INTRAMUSCULAR at 13:08

## 2020-01-01 RX ADMIN — Medication 2 PUFF: at 23:10

## 2020-01-01 RX ADMIN — OXYCODONE HYDROCHLORIDE AND ACETAMINOPHEN 1 TABLET: 7.5; 325 TABLET ORAL at 02:44

## 2020-01-01 RX ADMIN — SENNOSIDES AND DOCUSATE SODIUM 2 TABLET: 8.6; 5 TABLET ORAL at 08:45

## 2020-01-01 RX ADMIN — HYDROMORPHONE HYDROCHLORIDE 1 MG: 1 INJECTION, SOLUTION INTRAMUSCULAR; INTRAVENOUS; SUBCUTANEOUS at 04:25

## 2020-01-01 RX ADMIN — DOCUSATE SODIUM 100 MG: 100 CAPSULE, LIQUID FILLED ORAL at 09:35

## 2020-01-01 RX ADMIN — SILDENAFIL CITRATE 20 MG: 20 TABLET ORAL at 20:30

## 2020-01-01 RX ADMIN — GABAPENTIN 300 MG: 300 CAPSULE ORAL at 08:29

## 2020-01-01 RX ADMIN — MORPHINE SULFATE 4 MG: 4 INJECTION, SOLUTION INTRAMUSCULAR; INTRAVENOUS at 21:43

## 2020-01-01 RX ADMIN — Medication 2 PUFF: at 08:03

## 2020-01-01 RX ADMIN — FUROSEMIDE 40 MG: 10 INJECTION, SOLUTION INTRAMUSCULAR; INTRAVENOUS at 20:32

## 2020-01-01 RX ADMIN — TIOTROPIUM BROMIDE INHALATION SPRAY 2 PUFF: 3.12 SPRAY, METERED RESPIRATORY (INHALATION) at 08:40

## 2020-01-01 RX ADMIN — INSULIN LISPRO 10 UNITS: 100 INJECTION, SOLUTION INTRAVENOUS; SUBCUTANEOUS at 12:11

## 2020-01-01 RX ADMIN — OXYBUTYNIN CHLORIDE 5 MG: 5 TABLET ORAL at 09:12

## 2020-01-01 RX ADMIN — RANOLAZINE 500 MG: 500 TABLET, FILM COATED, EXTENDED RELEASE ORAL at 08:57

## 2020-01-01 RX ADMIN — FUROSEMIDE 40 MG: 10 INJECTION, SOLUTION INTRAMUSCULAR; INTRAVENOUS at 08:01

## 2020-01-01 RX ADMIN — OXYCODONE HYDROCHLORIDE AND ACETAMINOPHEN 1 TABLET: 7.5; 325 TABLET ORAL at 00:53

## 2020-01-01 RX ADMIN — SERTRALINE HYDROCHLORIDE 100 MG: 50 TABLET ORAL at 08:32

## 2020-01-01 RX ADMIN — CEFTRIAXONE SODIUM 1 G: 1 INJECTION, POWDER, FOR SOLUTION INTRAMUSCULAR; INTRAVENOUS at 23:12

## 2020-01-01 RX ADMIN — OXYBUTYNIN CHLORIDE 5 MG: 5 TABLET ORAL at 08:33

## 2020-01-01 RX ADMIN — TRAMADOL HYDROCHLORIDE 50 MG: 50 TABLET, FILM COATED ORAL at 18:41

## 2020-01-01 RX ADMIN — SILDENAFIL CITRATE 20 MG: 20 TABLET ORAL at 13:48

## 2020-01-01 RX ADMIN — TIOTROPIUM BROMIDE INHALATION SPRAY 2 PUFF: 3.12 SPRAY, METERED RESPIRATORY (INHALATION) at 08:01

## 2020-01-01 RX ADMIN — ONDANSETRON HYDROCHLORIDE 4 MG: 2 INJECTION, SOLUTION INTRAMUSCULAR; INTRAVENOUS at 17:17

## 2020-01-01 RX ADMIN — ENOXAPARIN SODIUM 40 MG: 40 INJECTION SUBCUTANEOUS at 08:00

## 2020-01-01 RX ADMIN — MORPHINE SULFATE 4 MG: 4 INJECTION, SOLUTION INTRAMUSCULAR; INTRAVENOUS at 04:57

## 2020-01-01 RX ADMIN — INSULIN LISPRO 10 UNITS: 100 INJECTION, SOLUTION INTRAVENOUS; SUBCUTANEOUS at 11:56

## 2020-01-01 RX ADMIN — GABAPENTIN 300 MG: 300 CAPSULE ORAL at 09:04

## 2020-01-01 RX ADMIN — METOPROLOL SUCCINATE 25 MG: 25 TABLET, FILM COATED, EXTENDED RELEASE ORAL at 08:25

## 2020-01-01 RX ADMIN — OXYCODONE HYDROCHLORIDE AND ACETAMINOPHEN 1 TABLET: 7.5; 325 TABLET ORAL at 03:47

## 2020-01-01 RX ADMIN — SPIRONOLACTONE 25 MG: 25 TABLET ORAL at 08:10

## 2020-01-01 RX ADMIN — INSULIN GLARGINE 40 UNITS: 100 INJECTION, SOLUTION SUBCUTANEOUS at 12:56

## 2020-01-01 RX ADMIN — SILDENAFIL CITRATE 20 MG: 20 TABLET ORAL at 14:37

## 2020-01-01 RX ADMIN — GABAPENTIN 300 MG: 300 CAPSULE ORAL at 08:48

## 2020-01-01 RX ADMIN — Medication 2 PUFF: at 07:48

## 2020-01-01 RX ADMIN — POLYETHYLENE GLYCOL 3350 17 G: 17 POWDER, FOR SOLUTION ORAL at 08:21

## 2020-01-01 RX ADMIN — LEVOTHYROXINE SODIUM 50 MCG: 25 TABLET ORAL at 05:06

## 2020-01-01 RX ADMIN — INSULIN GLARGINE 50 UNITS: 100 INJECTION, SOLUTION SUBCUTANEOUS at 08:31

## 2020-01-01 RX ADMIN — DOCUSATE SODIUM 100 MG: 100 CAPSULE, LIQUID FILLED ORAL at 10:30

## 2020-01-01 RX ADMIN — METFORMIN HYDROCHLORIDE 1000 MG: 500 TABLET ORAL at 08:14

## 2020-01-01 RX ADMIN — ENOXAPARIN SODIUM 40 MG: 40 INJECTION SUBCUTANEOUS at 09:44

## 2020-01-01 RX ADMIN — PROMETHAZINE HYDROCHLORIDE 12.5 MG: 25 TABLET ORAL at 23:32

## 2020-01-01 RX ADMIN — Medication 10 ML: at 00:35

## 2020-01-01 RX ADMIN — Medication 2 PUFF: at 07:51

## 2020-01-01 RX ADMIN — CLOPIDOGREL BISULFATE 75 MG: 75 TABLET ORAL at 08:06

## 2020-01-01 RX ADMIN — OXYBUTYNIN CHLORIDE 5 MG: 5 TABLET ORAL at 21:14

## 2020-01-01 RX ADMIN — LEVOTHYROXINE SODIUM 50 MCG: 25 TABLET ORAL at 06:54

## 2020-01-01 RX ADMIN — GABAPENTIN 300 MG: 300 CAPSULE ORAL at 15:31

## 2020-01-01 RX ADMIN — Medication 2 PUFF: at 11:12

## 2020-01-01 RX ADMIN — MORPHINE SULFATE 4 MG: 4 INJECTION, SOLUTION INTRAMUSCULAR; INTRAVENOUS at 08:45

## 2020-01-01 RX ADMIN — FUROSEMIDE 20 MG: 10 INJECTION, SOLUTION INTRAMUSCULAR; INTRAVENOUS at 08:51

## 2020-01-01 RX ADMIN — Medication 2 PUFF: at 11:00

## 2020-01-01 RX ADMIN — INSULIN GLARGINE 28 UNITS: 100 INJECTION, SOLUTION SUBCUTANEOUS at 08:17

## 2020-01-01 RX ADMIN — OXYBUTYNIN CHLORIDE 5 MG: 5 TABLET ORAL at 08:18

## 2020-01-01 RX ADMIN — GABAPENTIN 300 MG: 300 CAPSULE ORAL at 15:25

## 2020-01-01 RX ADMIN — OXYBUTYNIN CHLORIDE 5 MG: 5 TABLET ORAL at 20:42

## 2020-01-01 RX ADMIN — RANOLAZINE 500 MG: 500 TABLET, FILM COATED, EXTENDED RELEASE ORAL at 09:25

## 2020-01-01 RX ADMIN — ATORVASTATIN CALCIUM 40 MG: 40 TABLET, FILM COATED ORAL at 20:45

## 2020-01-01 RX ADMIN — CLOPIDOGREL BISULFATE 75 MG: 75 TABLET ORAL at 08:22

## 2020-01-01 RX ADMIN — CLOPIDOGREL BISULFATE 75 MG: 75 TABLET ORAL at 08:40

## 2020-01-01 RX ADMIN — SILDENAFIL CITRATE 20 MG: 20 TABLET ORAL at 09:02

## 2020-01-01 RX ADMIN — SILDENAFIL CITRATE 20 MG: 20 TABLET ORAL at 10:02

## 2020-01-01 RX ADMIN — CLOPIDOGREL 75 MG: 75 TABLET, FILM COATED ORAL at 09:58

## 2020-01-01 RX ADMIN — SODIUM CHLORIDE: 9 INJECTION, SOLUTION INTRAVENOUS at 11:41

## 2020-01-01 RX ADMIN — SILDENAFIL CITRATE 20 MG: 20 TABLET ORAL at 23:10

## 2020-01-01 RX ADMIN — Medication 10 ML: at 21:07

## 2020-01-01 RX ADMIN — Medication 2 PUFF: at 11:15

## 2020-01-01 RX ADMIN — CLOPIDOGREL BISULFATE 75 MG: 75 TABLET ORAL at 08:57

## 2020-01-01 RX ADMIN — MORPHINE SULFATE 2 MG: 2 INJECTION, SOLUTION INTRAMUSCULAR; INTRAVENOUS at 12:55

## 2020-01-01 RX ADMIN — FUROSEMIDE 40 MG: 10 INJECTION, SOLUTION INTRAMUSCULAR; INTRAVENOUS at 13:11

## 2020-01-01 RX ADMIN — SILDENAFIL CITRATE 20 MG: 20 TABLET ORAL at 08:18

## 2020-01-01 RX ADMIN — SERTRALINE HYDROCHLORIDE 100 MG: 50 TABLET ORAL at 08:00

## 2020-01-01 RX ADMIN — INSULIN LISPRO 10 UNITS: 100 INJECTION, SOLUTION INTRAVENOUS; SUBCUTANEOUS at 17:09

## 2020-01-01 RX ADMIN — Medication 2 PUFF: at 15:12

## 2020-01-01 RX ADMIN — METFORMIN HYDROCHLORIDE 1000 MG: 500 TABLET ORAL at 17:48

## 2020-01-01 RX ADMIN — GABAPENTIN 900 MG: 300 CAPSULE ORAL at 20:18

## 2020-01-01 RX ADMIN — IPRATROPIUM BROMIDE AND ALBUTEROL SULFATE 1 AMPULE: .5; 3 SOLUTION RESPIRATORY (INHALATION) at 12:48

## 2020-01-01 RX ADMIN — ENOXAPARIN SODIUM 40 MG: 40 INJECTION SUBCUTANEOUS at 10:18

## 2020-01-01 RX ADMIN — MORPHINE SULFATE 30 MG: 30 TABLET, FILM COATED, EXTENDED RELEASE ORAL at 08:31

## 2020-01-01 RX ADMIN — GABAPENTIN 900 MG: 300 CAPSULE ORAL at 08:32

## 2020-01-01 RX ADMIN — INSULIN LISPRO 2 UNITS: 100 INJECTION, SOLUTION INTRAVENOUS; SUBCUTANEOUS at 22:25

## 2020-01-01 RX ADMIN — Medication 2 PUFF: at 07:06

## 2020-01-01 RX ADMIN — FUROSEMIDE 40 MG: 10 INJECTION, SOLUTION INTRAMUSCULAR; INTRAVENOUS at 10:17

## 2020-01-01 RX ADMIN — Medication 10 ML: at 01:25

## 2020-01-01 RX ADMIN — ASPIRIN 81 MG: 81 TABLET ORAL at 08:57

## 2020-01-01 RX ADMIN — METFORMIN HYDROCHLORIDE 1000 MG: 500 TABLET ORAL at 08:58

## 2020-01-01 RX ADMIN — Medication 2 PUFF: at 22:37

## 2020-01-01 RX ADMIN — MORPHINE SULFATE 30 MG: 30 TABLET, FILM COATED, EXTENDED RELEASE ORAL at 20:28

## 2020-01-01 RX ADMIN — GABAPENTIN 300 MG: 300 CAPSULE ORAL at 23:10

## 2020-01-01 RX ADMIN — TRAMADOL HYDROCHLORIDE 50 MG: 50 TABLET, FILM COATED ORAL at 02:28

## 2020-01-01 RX ADMIN — LEVOTHYROXINE SODIUM 100 MCG: 100 TABLET ORAL at 07:00

## 2020-01-01 RX ADMIN — INSULIN GLARGINE 28 UNITS: 100 INJECTION, SOLUTION SUBCUTANEOUS at 20:23

## 2020-01-01 RX ADMIN — SENNOSIDES AND DOCUSATE SODIUM 2 TABLET: 8.6; 5 TABLET ORAL at 09:35

## 2020-01-01 RX ADMIN — GABAPENTIN 300 MG: 300 CAPSULE ORAL at 07:59

## 2020-01-01 RX ADMIN — SPIRONOLACTONE 25 MG: 25 TABLET ORAL at 09:01

## 2020-01-01 RX ADMIN — ISOSORBIDE MONONITRATE 30 MG: 30 TABLET, EXTENDED RELEASE ORAL at 09:58

## 2020-01-01 RX ADMIN — GABAPENTIN 900 MG: 300 CAPSULE ORAL at 15:06

## 2020-01-01 RX ADMIN — ASPIRIN 81 MG: 81 TABLET ORAL at 08:59

## 2020-01-01 RX ADMIN — FUROSEMIDE 40 MG: 10 INJECTION, SOLUTION INTRAMUSCULAR; INTRAVENOUS at 12:51

## 2020-01-01 RX ADMIN — SENNOSIDES AND DOCUSATE SODIUM 2 TABLET: 8.6; 5 TABLET ORAL at 21:21

## 2020-01-01 RX ADMIN — METFORMIN HYDROCHLORIDE 1000 MG: 500 TABLET ORAL at 08:09

## 2020-01-01 RX ADMIN — LEVOTHYROXINE SODIUM 100 MCG: 100 TABLET ORAL at 06:21

## 2020-01-01 RX ADMIN — SENNOSIDES AND DOCUSATE SODIUM 2 TABLET: 8.6; 5 TABLET ORAL at 20:42

## 2020-01-01 RX ADMIN — METHYLPREDNISOLONE SODIUM SUCCINATE 40 MG: 40 INJECTION, POWDER, FOR SOLUTION INTRAMUSCULAR; INTRAVENOUS at 09:11

## 2020-01-01 RX ADMIN — RANOLAZINE 500 MG: 500 TABLET, FILM COATED, EXTENDED RELEASE ORAL at 09:01

## 2020-01-01 RX ADMIN — INSULIN LISPRO 10 UNITS: 100 INJECTION, SOLUTION INTRAVENOUS; SUBCUTANEOUS at 11:41

## 2020-01-01 RX ADMIN — OXYBUTYNIN CHLORIDE 5 MG: 5 TABLET ORAL at 22:37

## 2020-01-01 RX ADMIN — FERROUS SULFATE TAB 325 MG (65 MG ELEMENTAL FE) 325 MG: 325 (65 FE) TAB at 09:08

## 2020-01-01 RX ADMIN — DOCUSATE SODIUM 100 MG: 100 CAPSULE, LIQUID FILLED ORAL at 08:01

## 2020-01-01 RX ADMIN — MIDAZOLAM HYDROCHLORIDE 2 MG: 1 INJECTION, SOLUTION INTRAMUSCULAR; INTRAVENOUS at 22:10

## 2020-01-01 RX ADMIN — INSULIN LISPRO 6 UNITS: 100 INJECTION, SOLUTION INTRAVENOUS; SUBCUTANEOUS at 08:43

## 2020-01-01 RX ADMIN — OXYCODONE HYDROCHLORIDE AND ACETAMINOPHEN 1 TABLET: 7.5; 325 TABLET ORAL at 16:38

## 2020-01-01 RX ADMIN — INSULIN GLARGINE 45 UNITS: 100 INJECTION, SOLUTION SUBCUTANEOUS at 09:05

## 2020-01-01 RX ADMIN — Medication 10 ML: at 15:48

## 2020-01-01 RX ADMIN — SENNOSIDES AND DOCUSATE SODIUM 2 TABLET: 8.6; 5 TABLET ORAL at 08:07

## 2020-01-01 RX ADMIN — POLYETHYLENE GLYCOL 3350 17 G: 17 POWDER, FOR SOLUTION ORAL at 08:08

## 2020-01-01 RX ADMIN — SILDENAFIL CITRATE 20 MG: 20 TABLET ORAL at 21:05

## 2020-01-01 RX ADMIN — SERTRALINE HYDROCHLORIDE 100 MG: 50 TABLET ORAL at 12:05

## 2020-01-01 RX ADMIN — OXYBUTYNIN CHLORIDE 5 MG: 5 TABLET ORAL at 21:45

## 2020-01-01 RX ADMIN — SENNOSIDES AND DOCUSATE SODIUM 2 TABLET: 8.6; 5 TABLET ORAL at 20:52

## 2020-01-01 RX ADMIN — SILDENAFIL CITRATE 20 MG: 20 TABLET ORAL at 20:55

## 2020-01-01 RX ADMIN — ASPIRIN 81 MG: 81 TABLET ORAL at 09:03

## 2020-01-01 RX ADMIN — FUROSEMIDE 40 MG: 10 INJECTION, SOLUTION INTRAMUSCULAR; INTRAVENOUS at 17:48

## 2020-01-01 RX ADMIN — SERTRALINE HYDROCHLORIDE 100 MG: 50 TABLET ORAL at 08:18

## 2020-01-01 RX ADMIN — VANCOMYCIN HYDROCHLORIDE 1250 MG: 10 INJECTION, POWDER, LYOPHILIZED, FOR SOLUTION INTRAVENOUS at 17:28

## 2020-01-01 RX ADMIN — RANOLAZINE 500 MG: 500 TABLET, FILM COATED, EXTENDED RELEASE ORAL at 08:53

## 2020-01-01 RX ADMIN — ASPIRIN 81 MG: 81 TABLET ORAL at 08:15

## 2020-01-01 RX ADMIN — IPRATROPIUM BROMIDE AND ALBUTEROL SULFATE 1 AMPULE: .5; 3 SOLUTION RESPIRATORY (INHALATION) at 06:41

## 2020-01-01 RX ADMIN — LEVOTHYROXINE SODIUM 100 MCG: 100 TABLET ORAL at 08:35

## 2020-01-01 RX ADMIN — MORPHINE SULFATE 4 MG: 4 INJECTION, SOLUTION INTRAMUSCULAR; INTRAVENOUS at 09:25

## 2020-01-01 RX ADMIN — INSULIN GLARGINE 40 UNITS: 100 INJECTION, SOLUTION SUBCUTANEOUS at 11:04

## 2020-01-01 RX ADMIN — AZITHROMYCIN MONOHYDRATE 500 MG: 500 INJECTION, POWDER, LYOPHILIZED, FOR SOLUTION INTRAVENOUS at 22:50

## 2020-01-01 RX ADMIN — OXYCODONE HYDROCHLORIDE AND ACETAMINOPHEN 1 TABLET: 7.5; 325 TABLET ORAL at 13:33

## 2020-01-01 RX ADMIN — Medication 10 ML: at 20:55

## 2020-01-01 RX ADMIN — OXYCODONE HYDROCHLORIDE 10 MG: 5 TABLET ORAL at 02:34

## 2020-01-01 RX ADMIN — Medication 25 MEQ: at 01:11

## 2020-01-01 RX ADMIN — FLUTICASONE PROPIONATE 2 SPRAY: 50 SPRAY, METERED NASAL at 08:22

## 2020-01-01 RX ADMIN — LIDOCAINE HYDROCHLORIDE: 40 SOLUTION TOPICAL at 15:54

## 2020-01-01 RX ADMIN — VITAMIN D, TAB 1000IU (100/BT) 1000 UNITS: 25 TAB at 09:46

## 2020-01-01 RX ADMIN — OXYCODONE HYDROCHLORIDE AND ACETAMINOPHEN 1 TABLET: 7.5; 325 TABLET ORAL at 16:53

## 2020-01-01 RX ADMIN — SERTRALINE 100 MG: 100 TABLET, FILM COATED ORAL at 08:10

## 2020-01-01 RX ADMIN — FUROSEMIDE 20 MG: 10 INJECTION, SOLUTION INTRAMUSCULAR; INTRAVENOUS at 18:19

## 2020-01-01 RX ADMIN — SENNOSIDES AND DOCUSATE SODIUM 2 TABLET: 8.6; 5 TABLET ORAL at 09:08

## 2020-01-01 RX ADMIN — OXYCODONE HYDROCHLORIDE AND ACETAMINOPHEN 1 TABLET: 7.5; 325 TABLET ORAL at 15:54

## 2020-01-01 RX ADMIN — SERTRALINE HYDROCHLORIDE 100 MG: 50 TABLET ORAL at 08:16

## 2020-01-01 RX ADMIN — ALBUMIN (HUMAN) 25 G: 0.25 INJECTION, SOLUTION INTRAVENOUS at 01:25

## 2020-01-01 RX ADMIN — SENNOSIDES AND DOCUSATE SODIUM 2 TABLET: 8.6; 5 TABLET ORAL at 09:03

## 2020-01-01 RX ADMIN — LEVOTHYROXINE SODIUM 100 MCG: 100 TABLET ORAL at 06:43

## 2020-01-01 RX ADMIN — OXYBUTYNIN CHLORIDE 5 MG: 5 TABLET ORAL at 21:21

## 2020-01-01 RX ADMIN — Medication 10 ML: at 13:09

## 2020-01-01 RX ADMIN — SENNOSIDES AND DOCUSATE SODIUM 2 TABLET: 8.6; 5 TABLET ORAL at 20:30

## 2020-01-01 RX ADMIN — Medication 10 ML: at 11:11

## 2020-01-01 RX ADMIN — GABAPENTIN 900 MG: 300 CAPSULE ORAL at 20:45

## 2020-01-01 RX ADMIN — INSULIN LISPRO 5 UNITS: 100 INJECTION, SOLUTION INTRAVENOUS; SUBCUTANEOUS at 20:32

## 2020-01-01 RX ADMIN — Medication 10 ML: at 21:24

## 2020-01-01 RX ADMIN — Medication 10 ML: at 19:43

## 2020-01-01 RX ADMIN — FUROSEMIDE 40 MG: 10 INJECTION, SOLUTION INTRAMUSCULAR; INTRAVENOUS at 18:30

## 2020-01-01 RX ADMIN — INSULIN GLARGINE 28 UNITS: 100 INJECTION, SOLUTION SUBCUTANEOUS at 20:46

## 2020-01-01 RX ADMIN — ASPIRIN 81 MG: 81 TABLET ORAL at 09:12

## 2020-01-01 RX ADMIN — ASPIRIN 81 MG: 81 TABLET, COATED ORAL at 08:16

## 2020-01-01 RX ADMIN — INSULIN LISPRO 3 UNITS: 100 INJECTION, SOLUTION INTRAVENOUS; SUBCUTANEOUS at 20:02

## 2020-01-01 RX ADMIN — GABAPENTIN 900 MG: 300 CAPSULE ORAL at 13:32

## 2020-01-01 RX ADMIN — ATORVASTATIN CALCIUM 40 MG: 40 TABLET, FILM COATED ORAL at 22:23

## 2020-01-01 RX ADMIN — ATORVASTATIN CALCIUM 40 MG: 40 TABLET, FILM COATED ORAL at 19:42

## 2020-01-01 RX ADMIN — GABAPENTIN 300 MG: 300 CAPSULE ORAL at 08:16

## 2020-01-01 RX ADMIN — NOREPINEPHRINE BITARTRATE 10 MCG/MIN: 1 INJECTION INTRAVENOUS at 01:31

## 2020-01-01 RX ADMIN — MORPHINE SULFATE 2 MG: 2 INJECTION, SOLUTION INTRAMUSCULAR; INTRAVENOUS at 06:52

## 2020-01-01 RX ADMIN — PROPOFOL 50 MCG/KG/MIN: 10 INJECTION, EMULSION INTRAVENOUS at 18:51

## 2020-01-01 RX ADMIN — HYDROMORPHONE HYDROCHLORIDE 1 MG: 1 INJECTION, SOLUTION INTRAMUSCULAR; INTRAVENOUS; SUBCUTANEOUS at 21:13

## 2020-01-01 RX ADMIN — FUROSEMIDE 20 MG: 10 INJECTION, SOLUTION INTRAMUSCULAR; INTRAVENOUS at 09:59

## 2020-01-01 RX ADMIN — CEFTRIAXONE SODIUM 1 G: 1 INJECTION, POWDER, FOR SOLUTION INTRAMUSCULAR; INTRAVENOUS at 17:45

## 2020-01-01 RX ADMIN — Medication 10 ML: at 11:40

## 2020-01-01 RX ADMIN — SERTRALINE HYDROCHLORIDE 100 MG: 50 TABLET ORAL at 08:43

## 2020-01-01 RX ADMIN — DOCUSATE SODIUM 100 MG: 100 CAPSULE, LIQUID FILLED ORAL at 08:18

## 2020-01-01 RX ADMIN — OXYCODONE HYDROCHLORIDE 10 MG: 5 TABLET ORAL at 17:48

## 2020-01-01 RX ADMIN — HYDROMORPHONE HYDROCHLORIDE 1 MG: 1 INJECTION, SOLUTION INTRAMUSCULAR; INTRAVENOUS; SUBCUTANEOUS at 14:37

## 2020-01-01 RX ADMIN — Medication 10 ML: at 01:17

## 2020-01-01 RX ADMIN — POLYETHYLENE GLYCOL (3350) 17 G: 17 POWDER, FOR SOLUTION ORAL at 20:36

## 2020-01-01 RX ADMIN — OXYCODONE HYDROCHLORIDE AND ACETAMINOPHEN 1 TABLET: 7.5; 325 TABLET ORAL at 12:52

## 2020-01-01 RX ADMIN — GABAPENTIN 900 MG: 300 CAPSULE ORAL at 09:36

## 2020-01-01 RX ADMIN — ATORVASTATIN CALCIUM 40 MG: 40 TABLET, FILM COATED ORAL at 08:22

## 2020-01-01 RX ADMIN — INSULIN LISPRO 10 UNITS: 100 INJECTION, SOLUTION INTRAVENOUS; SUBCUTANEOUS at 11:40

## 2020-01-01 RX ADMIN — GABAPENTIN 300 MG: 300 CAPSULE ORAL at 08:44

## 2020-01-01 RX ADMIN — FUROSEMIDE 40 MG: 10 INJECTION, SOLUTION INTRAMUSCULAR; INTRAVENOUS at 17:57

## 2020-01-01 RX ADMIN — TC 99M MEDRONATE 26 MILLICURIE: 20 INJECTION, POWDER, LYOPHILIZED, FOR SOLUTION INTRAVENOUS at 09:49

## 2020-01-01 RX ADMIN — OXYBUTYNIN CHLORIDE 5 MG: 5 TABLET ORAL at 08:55

## 2020-01-01 RX ADMIN — GABAPENTIN 900 MG: 300 CAPSULE ORAL at 09:12

## 2020-01-01 RX ADMIN — OXYCODONE HYDROCHLORIDE 10 MG: 5 TABLET ORAL at 11:29

## 2020-01-01 RX ADMIN — MORPHINE SULFATE 2 MG: 2 INJECTION, SOLUTION INTRAMUSCULAR; INTRAVENOUS at 14:19

## 2020-01-01 RX ADMIN — HYDROMORPHONE HYDROCHLORIDE 1 MG: 1 INJECTION, SOLUTION INTRAMUSCULAR; INTRAVENOUS; SUBCUTANEOUS at 01:54

## 2020-01-01 RX ADMIN — RANOLAZINE 500 MG: 500 TABLET, FILM COATED, EXTENDED RELEASE ORAL at 12:05

## 2020-01-01 RX ADMIN — INSULIN GLARGINE 55 UNITS: 100 INJECTION, SOLUTION SUBCUTANEOUS at 14:36

## 2020-01-01 RX ADMIN — POLYETHYLENE GLYCOL 3350 17 G: 17 POWDER, FOR SOLUTION ORAL at 08:57

## 2020-01-01 RX ADMIN — RANOLAZINE 500 MG: 500 TABLET, FILM COATED, EXTENDED RELEASE ORAL at 08:48

## 2020-01-01 RX ADMIN — INSULIN LISPRO 10 UNITS: 100 INJECTION, SOLUTION INTRAVENOUS; SUBCUTANEOUS at 18:03

## 2020-01-01 RX ADMIN — SERTRALINE HYDROCHLORIDE 100 MG: 50 TABLET ORAL at 08:53

## 2020-01-01 RX ADMIN — ASPIRIN 81 MG: 81 TABLET ORAL at 08:55

## 2020-01-01 RX ADMIN — ACETAMINOPHEN 650 MG: 325 TABLET ORAL at 04:32

## 2020-01-01 RX ADMIN — MUPIROCIN: 20 OINTMENT TOPICAL at 09:25

## 2020-01-01 RX ADMIN — SERTRALINE 100 MG: 100 TABLET, FILM COATED ORAL at 08:40

## 2020-01-01 RX ADMIN — OXYCODONE HYDROCHLORIDE AND ACETAMINOPHEN 1 TABLET: 7.5; 325 TABLET ORAL at 02:27

## 2020-01-01 RX ADMIN — INSULIN LISPRO 3 UNITS: 100 INJECTION, SOLUTION INTRAVENOUS; SUBCUTANEOUS at 07:57

## 2020-01-01 RX ADMIN — MORPHINE SULFATE 4 MG: 4 INJECTION, SOLUTION INTRAMUSCULAR; INTRAVENOUS at 15:41

## 2020-01-01 RX ADMIN — FUROSEMIDE 40 MG: 10 INJECTION, SOLUTION INTRAMUSCULAR; INTRAVENOUS at 08:33

## 2020-01-01 RX ADMIN — GABAPENTIN 300 MG: 300 CAPSULE ORAL at 08:06

## 2020-01-01 RX ADMIN — INSULIN GLARGINE 28 UNITS: 100 INJECTION, SOLUTION SUBCUTANEOUS at 22:24

## 2020-01-01 RX ADMIN — SERTRALINE 100 MG: 100 TABLET, FILM COATED ORAL at 08:30

## 2020-01-01 RX ADMIN — ASPIRIN 81 MG: 81 TABLET ORAL at 08:49

## 2020-01-01 RX ADMIN — SILDENAFIL CITRATE 20 MG: 20 TABLET ORAL at 20:53

## 2020-01-01 RX ADMIN — BISACODYL 10 MG: 5 TABLET, COATED ORAL at 14:20

## 2020-01-01 RX ADMIN — INSULIN LISPRO 10 UNITS: 100 INJECTION, SOLUTION INTRAVENOUS; SUBCUTANEOUS at 12:49

## 2020-01-01 RX ADMIN — LISINOPRIL 2.5 MG: 2.5 TABLET ORAL at 14:47

## 2020-01-01 RX ADMIN — MORPHINE SULFATE 2 MG: 2 INJECTION, SOLUTION INTRAMUSCULAR; INTRAVENOUS at 15:54

## 2020-01-01 RX ADMIN — INSULIN LISPRO 9 UNITS: 100 INJECTION, SOLUTION INTRAVENOUS; SUBCUTANEOUS at 12:19

## 2020-01-01 RX ADMIN — FUROSEMIDE 40 MG: 10 INJECTION, SOLUTION INTRAMUSCULAR; INTRAVENOUS at 15:06

## 2020-01-01 RX ADMIN — OXYBUTYNIN CHLORIDE 5 MG: 5 TABLET ORAL at 22:38

## 2020-01-01 RX ADMIN — SENNOSIDES AND DOCUSATE SODIUM 2 TABLET: 8.6; 5 TABLET ORAL at 09:44

## 2020-01-01 RX ADMIN — FERROUS SULFATE TAB 325 MG (65 MG ELEMENTAL FE) 325 MG: 325 (65 FE) TAB at 07:57

## 2020-01-01 RX ADMIN — KETOROLAC TROMETHAMINE 15 MG: 30 INJECTION, SOLUTION INTRAMUSCULAR at 12:42

## 2020-01-01 RX ADMIN — METFORMIN HYDROCHLORIDE 1000 MG: 500 TABLET ORAL at 09:02

## 2020-01-01 RX ADMIN — INSULIN LISPRO 3 UNITS: 100 INJECTION, SOLUTION INTRAVENOUS; SUBCUTANEOUS at 08:51

## 2020-01-01 RX ADMIN — METOPROLOL SUCCINATE 25 MG: 25 TABLET, FILM COATED, EXTENDED RELEASE ORAL at 08:11

## 2020-01-01 RX ADMIN — VITAMIN D, TAB 1000IU (100/BT) 1000 UNITS: 25 TAB at 12:05

## 2020-01-01 RX ADMIN — MORPHINE SULFATE 30 MG: 30 TABLET, FILM COATED, EXTENDED RELEASE ORAL at 22:23

## 2020-01-01 RX ADMIN — MEROPENEM 1 G: 1 INJECTION, POWDER, FOR SOLUTION INTRAVENOUS at 06:00

## 2020-01-01 RX ADMIN — ASPIRIN 81 MG: 81 TABLET, COATED ORAL at 08:53

## 2020-01-01 RX ADMIN — Medication 2 PUFF: at 14:48

## 2020-01-01 RX ADMIN — SERTRALINE 100 MG: 100 TABLET, FILM COATED ORAL at 09:43

## 2020-01-01 RX ADMIN — INSULIN LISPRO 3 UNITS: 100 INJECTION, SOLUTION INTRAVENOUS; SUBCUTANEOUS at 13:16

## 2020-01-01 RX ADMIN — GABAPENTIN 900 MG: 300 CAPSULE ORAL at 15:20

## 2020-01-01 RX ADMIN — TRAMADOL HYDROCHLORIDE 50 MG: 50 TABLET, FILM COATED ORAL at 18:48

## 2020-01-01 RX ADMIN — GABAPENTIN 900 MG: 300 CAPSULE ORAL at 14:01

## 2020-01-01 RX ADMIN — GABAPENTIN 300 MG: 300 CAPSULE ORAL at 15:04

## 2020-01-01 RX ADMIN — RANOLAZINE 500 MG: 500 TABLET, FILM COATED, EXTENDED RELEASE ORAL at 08:52

## 2020-01-01 RX ADMIN — INSULIN LISPRO 10 UNITS: 100 INJECTION, SOLUTION INTRAVENOUS; SUBCUTANEOUS at 17:31

## 2020-01-01 RX ADMIN — MORPHINE SULFATE 30 MG: 30 TABLET, FILM COATED, EXTENDED RELEASE ORAL at 20:38

## 2020-01-01 RX ADMIN — Medication 2 PUFF: at 08:47

## 2020-01-01 RX ADMIN — OXYCODONE HYDROCHLORIDE AND ACETAMINOPHEN 1 TABLET: 7.5; 325 TABLET ORAL at 06:04

## 2020-01-01 RX ADMIN — GABAPENTIN 300 MG: 300 CAPSULE ORAL at 20:30

## 2020-01-01 RX ADMIN — PROMETHAZINE HYDROCHLORIDE 12.5 MG: 25 TABLET ORAL at 12:46

## 2020-01-01 RX ADMIN — INSULIN GLARGINE 28 UNITS: 100 INJECTION, SOLUTION SUBCUTANEOUS at 21:06

## 2020-01-01 RX ADMIN — INSULIN LISPRO 10 UNITS: 100 INJECTION, SOLUTION INTRAVENOUS; SUBCUTANEOUS at 09:38

## 2020-01-01 RX ADMIN — MORPHINE SULFATE 2 MG: 2 INJECTION, SOLUTION INTRAMUSCULAR; INTRAVENOUS at 18:23

## 2020-01-01 RX ADMIN — INSULIN LISPRO 15 UNITS: 100 INJECTION, SOLUTION INTRAVENOUS; SUBCUTANEOUS at 11:52

## 2020-01-01 RX ADMIN — POLYETHYLENE GLYCOL 3350 17 G: 17 POWDER, FOR SOLUTION ORAL at 09:05

## 2020-01-01 RX ADMIN — OXYCODONE HYDROCHLORIDE AND ACETAMINOPHEN 1 TABLET: 7.5; 325 TABLET ORAL at 13:07

## 2020-01-01 RX ADMIN — Medication 2 PUFF: at 11:20

## 2020-01-01 RX ADMIN — Medication 10 ML: at 20:43

## 2020-01-01 RX ADMIN — Medication 2 PUFF: at 23:32

## 2020-01-01 RX ADMIN — INSULIN LISPRO 10 UNITS: 100 INJECTION, SOLUTION INTRAVENOUS; SUBCUTANEOUS at 16:37

## 2020-01-01 RX ADMIN — OXYCODONE HYDROCHLORIDE AND ACETAMINOPHEN 1 TABLET: 7.5; 325 TABLET ORAL at 23:32

## 2020-01-01 RX ADMIN — INSULIN LISPRO 9 UNITS: 100 INJECTION, SOLUTION INTRAVENOUS; SUBCUTANEOUS at 23:12

## 2020-01-01 RX ADMIN — OXYCODONE HYDROCHLORIDE AND ACETAMINOPHEN 1 TABLET: 7.5; 325 TABLET ORAL at 20:54

## 2020-01-01 RX ADMIN — ALBUTEROL SULFATE 2.5 MG: 2.5 SOLUTION RESPIRATORY (INHALATION) at 16:16

## 2020-01-01 RX ADMIN — RANOLAZINE 500 MG: 500 TABLET, FILM COATED, EXTENDED RELEASE ORAL at 07:57

## 2020-01-01 RX ADMIN — Medication 2 PUFF: at 06:56

## 2020-01-01 RX ADMIN — OXYCODONE HYDROCHLORIDE AND ACETAMINOPHEN 1 TABLET: 7.5; 325 TABLET ORAL at 22:43

## 2020-01-01 RX ADMIN — INSULIN LISPRO 3 UNITS: 100 INJECTION, SOLUTION INTRAVENOUS; SUBCUTANEOUS at 17:15

## 2020-01-01 RX ADMIN — RANOLAZINE 500 MG: 500 TABLET, FILM COATED, EXTENDED RELEASE ORAL at 21:20

## 2020-01-01 RX ADMIN — OXYCODONE HYDROCHLORIDE AND ACETAMINOPHEN 1 TABLET: 7.5; 325 TABLET ORAL at 14:29

## 2020-01-01 RX ADMIN — Medication 10 ML: at 21:31

## 2020-01-01 RX ADMIN — MORPHINE SULFATE 2 MG: 2 INJECTION, SOLUTION INTRAMUSCULAR; INTRAVENOUS at 01:17

## 2020-01-01 RX ADMIN — OXYCODONE HYDROCHLORIDE 10 MG: 5 TABLET ORAL at 02:30

## 2020-01-01 RX ADMIN — Medication 2 PUFF: at 23:28

## 2020-01-01 RX ADMIN — INSULIN LISPRO 4 UNITS: 100 INJECTION, SOLUTION INTRAVENOUS; SUBCUTANEOUS at 12:45

## 2020-01-01 RX ADMIN — Medication 2 PUFF: at 20:04

## 2020-01-01 RX ADMIN — ATORVASTATIN CALCIUM 40 MG: 40 TABLET, FILM COATED ORAL at 20:55

## 2020-01-01 RX ADMIN — SILDENAFIL CITRATE 20 MG: 20 TABLET ORAL at 08:32

## 2020-01-01 RX ADMIN — RANOLAZINE 500 MG: 500 TABLET, FILM COATED, EXTENDED RELEASE ORAL at 22:38

## 2020-01-01 RX ADMIN — FERROUS SULFATE TAB 325 MG (65 MG ELEMENTAL FE) 325 MG: 325 (65 FE) TAB at 09:34

## 2020-01-01 RX ADMIN — ASPIRIN 81 MG: 81 TABLET, COATED ORAL at 08:40

## 2020-01-01 RX ADMIN — SENNOSIDES AND DOCUSATE SODIUM 2 TABLET: 8.6; 5 TABLET ORAL at 07:57

## 2020-01-01 RX ADMIN — SERTRALINE 100 MG: 100 TABLET, FILM COATED ORAL at 08:00

## 2020-01-01 RX ADMIN — LISINOPRIL 2.5 MG: 5 TABLET ORAL at 12:06

## 2020-01-01 RX ADMIN — OXYBUTYNIN CHLORIDE 5 MG: 5 TABLET ORAL at 12:05

## 2020-01-01 RX ADMIN — Medication 10 ML: at 08:17

## 2020-01-01 RX ADMIN — GABAPENTIN 300 MG: 300 CAPSULE ORAL at 20:53

## 2020-01-01 RX ADMIN — VASOPRESSIN 0.04 UNITS/MIN: 20 INJECTION INTRAVENOUS at 04:59

## 2020-01-01 RX ADMIN — SERTRALINE HYDROCHLORIDE 100 MG: 50 TABLET ORAL at 08:59

## 2020-01-01 RX ADMIN — PROMETHAZINE HYDROCHLORIDE 12.5 MG: 25 TABLET ORAL at 14:09

## 2020-01-01 RX ADMIN — Medication 10 ML: at 20:33

## 2020-01-01 RX ADMIN — INSULIN LISPRO 3 UNITS: 100 INJECTION, SOLUTION INTRAVENOUS; SUBCUTANEOUS at 22:43

## 2020-01-01 RX ADMIN — INSULIN GLARGINE 30 UNITS: 100 INJECTION, SOLUTION SUBCUTANEOUS at 21:11

## 2020-01-01 RX ADMIN — Medication 2 PUFF: at 20:03

## 2020-01-01 RX ADMIN — RANOLAZINE 500 MG: 500 TABLET, FILM COATED, EXTENDED RELEASE ORAL at 09:45

## 2020-01-01 RX ADMIN — Medication 2 PUFF: at 15:35

## 2020-01-01 RX ADMIN — OXYCODONE HYDROCHLORIDE AND ACETAMINOPHEN 1 TABLET: 7.5; 325 TABLET ORAL at 20:32

## 2020-01-01 RX ADMIN — Medication 2 PUFF: at 07:42

## 2020-01-01 RX ADMIN — RANOLAZINE 500 MG: 500 TABLET, FILM COATED, EXTENDED RELEASE ORAL at 09:12

## 2020-01-01 RX ADMIN — INSULIN LISPRO 3 UNITS: 100 INJECTION, SOLUTION INTRAVENOUS; SUBCUTANEOUS at 21:45

## 2020-01-01 RX ADMIN — GABAPENTIN 900 MG: 300 CAPSULE ORAL at 20:42

## 2020-01-01 RX ADMIN — ASPIRIN 81 MG: 81 TABLET ORAL at 08:24

## 2020-01-01 RX ADMIN — METHYLPREDNISOLONE SODIUM SUCCINATE 125 MG: 125 INJECTION, POWDER, FOR SOLUTION INTRAMUSCULAR; INTRAVENOUS at 03:06

## 2020-01-01 RX ADMIN — SILDENAFIL CITRATE 20 MG: 20 TABLET ORAL at 08:40

## 2020-01-01 RX ADMIN — INSULIN GLARGINE 28 UNITS: 100 INJECTION, SOLUTION SUBCUTANEOUS at 20:59

## 2020-01-01 RX ADMIN — SILDENAFIL CITRATE 20 MG: 20 TABLET ORAL at 20:43

## 2020-01-01 RX ADMIN — INSULIN LISPRO 3 UNITS: 100 INJECTION, SOLUTION INTRAVENOUS; SUBCUTANEOUS at 11:54

## 2020-01-01 RX ADMIN — CLOPIDOGREL 75 MG: 75 TABLET, FILM COATED ORAL at 08:49

## 2020-01-01 RX ADMIN — MORPHINE SULFATE 4 MG: 4 INJECTION, SOLUTION INTRAMUSCULAR; INTRAVENOUS at 08:19

## 2020-01-01 RX ADMIN — MORPHINE SULFATE 2 MG: 2 INJECTION, SOLUTION INTRAMUSCULAR; INTRAVENOUS at 19:48

## 2020-01-01 RX ADMIN — SODIUM CHLORIDE, PRESERVATIVE FREE 10 ML: 5 INJECTION INTRAVENOUS at 22:10

## 2020-01-01 RX ADMIN — MORPHINE SULFATE 2 MG: 2 INJECTION, SOLUTION INTRAMUSCULAR; INTRAVENOUS at 16:24

## 2020-01-01 RX ADMIN — OXYCODONE HYDROCHLORIDE AND ACETAMINOPHEN 1 TABLET: 7.5; 325 TABLET ORAL at 05:04

## 2020-01-01 RX ADMIN — OXYCODONE HYDROCHLORIDE AND ACETAMINOPHEN 1 TABLET: 7.5; 325 TABLET ORAL at 13:14

## 2020-01-01 RX ADMIN — LISINOPRIL 5 MG: 5 TABLET ORAL at 09:58

## 2020-01-01 RX ADMIN — INSULIN GLARGINE 30 UNITS: 100 INJECTION, SOLUTION SUBCUTANEOUS at 20:02

## 2020-01-01 RX ADMIN — METOPROLOL TARTRATE 25 MG: 25 TABLET, FILM COATED ORAL at 20:36

## 2020-01-01 RX ADMIN — INSULIN LISPRO 2 UNITS: 100 INJECTION, SOLUTION INTRAVENOUS; SUBCUTANEOUS at 21:19

## 2020-01-01 RX ADMIN — OXYBUTYNIN CHLORIDE 5 MG: 5 TABLET ORAL at 09:34

## 2020-01-01 RX ADMIN — ATORVASTATIN CALCIUM 40 MG: 40 TABLET, FILM COATED ORAL at 08:57

## 2020-01-01 RX ADMIN — OXYCODONE HYDROCHLORIDE AND ACETAMINOPHEN 1 TABLET: 7.5; 325 TABLET ORAL at 09:00

## 2020-01-01 RX ADMIN — RANOLAZINE 500 MG: 500 TABLET, FILM COATED, EXTENDED RELEASE ORAL at 20:54

## 2020-01-01 RX ADMIN — Medication 2 PUFF: at 20:08

## 2020-01-01 RX ADMIN — MORPHINE SULFATE 4 MG: 4 INJECTION, SOLUTION INTRAMUSCULAR; INTRAVENOUS at 09:33

## 2020-01-01 RX ADMIN — Medication 2 PUFF: at 11:31

## 2020-01-01 RX ADMIN — TIOTROPIUM BROMIDE INHALATION SPRAY 2 PUFF: 3.12 SPRAY, METERED RESPIRATORY (INHALATION) at 11:41

## 2020-01-01 RX ADMIN — OXYBUTYNIN CHLORIDE 5 MG: 5 TABLET ORAL at 08:25

## 2020-01-01 RX ADMIN — ONDANSETRON HYDROCHLORIDE 4 MG: 2 INJECTION, SOLUTION INTRAMUSCULAR; INTRAVENOUS at 08:50

## 2020-01-01 RX ADMIN — MORPHINE SULFATE 4 MG: 4 INJECTION, SOLUTION INTRAMUSCULAR; INTRAVENOUS at 22:12

## 2020-01-01 RX ADMIN — INSULIN GLARGINE 30 UNITS: 100 INJECTION, SOLUTION SUBCUTANEOUS at 09:15

## 2020-01-01 RX ADMIN — METOPROLOL SUCCINATE 25 MG: 25 TABLET, EXTENDED RELEASE ORAL at 09:12

## 2020-01-01 RX ADMIN — DOCUSATE SODIUM 100 MG: 100 CAPSULE, LIQUID FILLED ORAL at 08:25

## 2020-01-01 RX ADMIN — VITAMIN D, TAB 1000IU (100/BT) 1000 UNITS: 25 TAB at 09:03

## 2020-01-01 RX ADMIN — INSULIN LISPRO 2 UNITS: 100 INJECTION, SOLUTION INTRAVENOUS; SUBCUTANEOUS at 21:06

## 2020-01-01 RX ADMIN — MORPHINE SULFATE 4 MG: 4 INJECTION, SOLUTION INTRAMUSCULAR; INTRAVENOUS at 05:36

## 2020-01-01 RX ADMIN — AZITHROMYCIN MONOHYDRATE 500 MG: 500 INJECTION, POWDER, LYOPHILIZED, FOR SOLUTION INTRAVENOUS at 00:02

## 2020-01-01 RX ADMIN — OXYCODONE HYDROCHLORIDE AND ACETAMINOPHEN 1 TABLET: 7.5; 325 TABLET ORAL at 02:21

## 2020-01-01 RX ADMIN — CLOPIDOGREL BISULFATE 75 MG: 75 TABLET ORAL at 08:49

## 2020-01-01 RX ADMIN — INSULIN GLARGINE 8 UNITS: 100 INJECTION, SOLUTION SUBCUTANEOUS at 22:51

## 2020-01-01 RX ADMIN — ENOXAPARIN SODIUM 40 MG: 40 INJECTION SUBCUTANEOUS at 09:05

## 2020-01-01 RX ADMIN — ENOXAPARIN SODIUM 40 MG: 40 INJECTION SUBCUTANEOUS at 08:11

## 2020-01-01 RX ADMIN — Medication 10 ML: at 09:04

## 2020-01-01 RX ADMIN — ASPIRIN 81 MG: 81 TABLET, COATED ORAL at 09:02

## 2020-01-01 RX ADMIN — SERTRALINE HYDROCHLORIDE 100 MG: 50 TABLET ORAL at 09:58

## 2020-01-01 RX ADMIN — SERTRALINE 100 MG: 100 TABLET, FILM COATED ORAL at 08:06

## 2020-01-01 RX ADMIN — Medication 10 ML: at 09:24

## 2020-01-01 RX ADMIN — RANOLAZINE 500 MG: 500 TABLET, FILM COATED, EXTENDED RELEASE ORAL at 21:13

## 2020-01-01 RX ADMIN — SODIUM CHLORIDE 250 ML: 9 INJECTION, SOLUTION INTRAVENOUS at 23:12

## 2020-01-01 RX ADMIN — SENNOSIDES AND DOCUSATE SODIUM 2 TABLET: 8.6; 5 TABLET ORAL at 20:25

## 2020-01-01 RX ADMIN — INSULIN LISPRO 3 UNITS: 100 INJECTION, SOLUTION INTRAVENOUS; SUBCUTANEOUS at 20:42

## 2020-01-01 RX ADMIN — TIOTROPIUM BROMIDE INHALATION SPRAY 2 PUFF: 3.12 SPRAY, METERED RESPIRATORY (INHALATION) at 11:15

## 2020-01-01 RX ADMIN — OXYCODONE HYDROCHLORIDE AND ACETAMINOPHEN 1 TABLET: 7.5; 325 TABLET ORAL at 23:13

## 2020-01-01 RX ADMIN — RANOLAZINE 500 MG: 500 TABLET, FILM COATED, EXTENDED RELEASE ORAL at 08:22

## 2020-01-01 RX ADMIN — INSULIN GLARGINE 50 UNITS: 100 INJECTION, SOLUTION SUBCUTANEOUS at 09:44

## 2020-01-01 RX ADMIN — FUROSEMIDE 40 MG: 10 INJECTION, SOLUTION INTRAMUSCULAR; INTRAVENOUS at 18:12

## 2020-01-01 RX ADMIN — LISINOPRIL 5 MG: 5 TABLET ORAL at 08:49

## 2020-01-01 RX ADMIN — FUROSEMIDE 40 MG: 40 TABLET ORAL at 12:15

## 2020-01-01 RX ADMIN — INSULIN LISPRO 10 UNITS: 100 INJECTION, SOLUTION INTRAVENOUS; SUBCUTANEOUS at 17:59

## 2020-01-01 RX ADMIN — LEVOTHYROXINE SODIUM 50 MCG: 25 TABLET ORAL at 05:59

## 2020-01-01 RX ADMIN — PROPOFOL 50 MCG/KG/MIN: 10 INJECTION, EMULSION INTRAVENOUS at 23:29

## 2020-01-01 RX ADMIN — GABAPENTIN 300 MG: 300 CAPSULE ORAL at 21:17

## 2020-01-01 RX ADMIN — INSULIN LISPRO 6 UNITS: 100 INJECTION, SOLUTION INTRAVENOUS; SUBCUTANEOUS at 09:14

## 2020-01-01 RX ADMIN — FUROSEMIDE 40 MG: 10 INJECTION, SOLUTION INTRAMUSCULAR; INTRAVENOUS at 18:53

## 2020-01-01 RX ADMIN — Medication 2 PUFF: at 22:47

## 2020-01-01 RX ADMIN — AZITHROMYCIN MONOHYDRATE 500 MG: 500 INJECTION, POWDER, LYOPHILIZED, FOR SOLUTION INTRAVENOUS at 14:40

## 2020-01-01 RX ADMIN — SERTRALINE 100 MG: 100 TABLET, FILM COATED ORAL at 08:16

## 2020-01-01 RX ADMIN — METFORMIN HYDROCHLORIDE 1000 MG: 500 TABLET ORAL at 08:29

## 2020-01-01 RX ADMIN — INSULIN GLARGINE 28 UNITS: 100 INJECTION, SOLUTION SUBCUTANEOUS at 20:44

## 2020-01-01 RX ADMIN — FUROSEMIDE 40 MG: 10 INJECTION, SOLUTION INTRAMUSCULAR; INTRAVENOUS at 17:39

## 2020-01-01 RX ADMIN — ASPIRIN 81 MG: 81 TABLET ORAL at 09:34

## 2020-01-01 RX ADMIN — METHYLPREDNISOLONE SODIUM SUCCINATE 40 MG: 40 INJECTION, POWDER, FOR SOLUTION INTRAMUSCULAR; INTRAVENOUS at 08:22

## 2020-01-01 RX ADMIN — Medication 2 PUFF: at 08:51

## 2020-01-01 RX ADMIN — MORPHINE SULFATE 4 MG: 4 INJECTION, SOLUTION INTRAMUSCULAR; INTRAVENOUS at 04:27

## 2020-01-01 RX ADMIN — GABAPENTIN 900 MG: 300 CAPSULE ORAL at 22:38

## 2020-01-01 RX ADMIN — CLOPIDOGREL BISULFATE 75 MG: 75 TABLET ORAL at 09:35

## 2020-01-01 RX ADMIN — INSULIN GLARGINE 30 UNITS: 100 INJECTION, SOLUTION SUBCUTANEOUS at 08:04

## 2020-01-01 RX ADMIN — VITAMIN D, TAB 1000IU (100/BT) 1000 UNITS: 25 TAB at 08:00

## 2020-01-01 RX ADMIN — LEVOTHYROXINE SODIUM 50 MCG: 25 TABLET ORAL at 09:27

## 2020-01-01 RX ADMIN — INSULIN LISPRO 2 UNITS: 100 INJECTION, SOLUTION INTRAVENOUS; SUBCUTANEOUS at 19:49

## 2020-01-01 RX ADMIN — CEFTRIAXONE SODIUM 1 G: 1 INJECTION, POWDER, FOR SOLUTION INTRAMUSCULAR; INTRAVENOUS at 05:20

## 2020-01-01 RX ADMIN — Medication 2 PUFF: at 16:09

## 2020-01-01 RX ADMIN — INSULIN GLARGINE 28 UNITS: 100 INJECTION, SOLUTION SUBCUTANEOUS at 20:33

## 2020-01-01 RX ADMIN — FERROUS SULFATE TAB 325 MG (65 MG ELEMENTAL FE) 325 MG: 325 (65 FE) TAB at 08:51

## 2020-01-01 RX ADMIN — INSULIN LISPRO 9 UNITS: 100 INJECTION, SOLUTION INTRAVENOUS; SUBCUTANEOUS at 11:27

## 2020-01-01 RX ADMIN — AZITHROMYCIN MONOHYDRATE 500 MG: 500 INJECTION, POWDER, LYOPHILIZED, FOR SOLUTION INTRAVENOUS at 11:27

## 2020-01-01 RX ADMIN — SPIRONOLACTONE 25 MG: 25 TABLET ORAL at 08:30

## 2020-01-01 RX ADMIN — SILDENAFIL CITRATE 20 MG: 20 TABLET ORAL at 15:31

## 2020-01-01 RX ADMIN — OXYBUTYNIN CHLORIDE 5 MG: 5 TABLET ORAL at 08:49

## 2020-01-01 RX ADMIN — MORPHINE SULFATE 2 MG: 2 INJECTION, SOLUTION INTRAMUSCULAR; INTRAVENOUS at 04:48

## 2020-01-01 RX ADMIN — GABAPENTIN 300 MG: 300 CAPSULE ORAL at 14:14

## 2020-01-01 RX ADMIN — OXYCODONE HYDROCHLORIDE AND ACETAMINOPHEN 1 TABLET: 7.5; 325 TABLET ORAL at 08:40

## 2020-01-01 RX ADMIN — Medication 2 PUFF: at 08:22

## 2020-01-01 RX ADMIN — Medication 10 ML: at 18:12

## 2020-01-01 RX ADMIN — SILDENAFIL CITRATE 20 MG: 20 TABLET ORAL at 08:37

## 2020-01-01 RX ADMIN — LEVOTHYROXINE SODIUM 50 MCG: 25 TABLET ORAL at 05:36

## 2020-01-01 RX ADMIN — Medication 10 ML: at 09:43

## 2020-01-01 RX ADMIN — OXYBUTYNIN CHLORIDE 5 MG: 5 TABLET ORAL at 08:44

## 2020-01-01 RX ADMIN — SERTRALINE HYDROCHLORIDE 100 MG: 50 TABLET ORAL at 08:22

## 2020-01-01 RX ADMIN — GABAPENTIN 900 MG: 300 CAPSULE ORAL at 08:22

## 2020-01-01 RX ADMIN — INSULIN GLARGINE 35 UNITS: 100 INJECTION, SOLUTION SUBCUTANEOUS at 20:55

## 2020-01-01 RX ADMIN — Medication 2 PUFF: at 08:42

## 2020-01-01 RX ADMIN — ALBUTEROL SULFATE 2.5 MG: 2.5 SOLUTION RESPIRATORY (INHALATION) at 07:30

## 2020-01-01 RX ADMIN — POLYETHYLENE GLYCOL 3350 17 G: 17 POWDER, FOR SOLUTION ORAL at 09:51

## 2020-01-01 RX ADMIN — GABAPENTIN 900 MG: 300 CAPSULE ORAL at 09:46

## 2020-01-01 RX ADMIN — OXYCODONE HYDROCHLORIDE AND ACETAMINOPHEN 1 TABLET: 7.5; 325 TABLET ORAL at 18:28

## 2020-01-01 RX ADMIN — VITAMIN D, TAB 1000IU (100/BT) 1000 UNITS: 25 TAB at 08:55

## 2020-01-01 RX ADMIN — Medication 10 ML: at 08:52

## 2020-01-01 RX ADMIN — SILDENAFIL CITRATE 20 MG: 20 TABLET ORAL at 20:26

## 2020-01-01 RX ADMIN — MORPHINE SULFATE 4 MG: 4 INJECTION, SOLUTION INTRAMUSCULAR; INTRAVENOUS at 11:38

## 2020-01-01 RX ADMIN — OXYCODONE HYDROCHLORIDE 10 MG: 5 TABLET ORAL at 07:00

## 2020-01-01 RX ADMIN — INSULIN GLARGINE 28 UNITS: 100 INJECTION, SOLUTION SUBCUTANEOUS at 21:18

## 2020-01-01 RX ADMIN — Medication 2 PUFF: at 11:01

## 2020-01-01 RX ADMIN — ASPIRIN 81 MG: 81 TABLET, COATED ORAL at 08:10

## 2020-01-01 RX ADMIN — FUROSEMIDE 5 MG/HR: 10 INJECTION, SOLUTION INTRAMUSCULAR; INTRAVENOUS at 00:46

## 2020-01-01 RX ADMIN — ALBUTEROL SULFATE 2.5 MG: 2.5 SOLUTION RESPIRATORY (INHALATION) at 08:40

## 2020-01-01 RX ADMIN — ATORVASTATIN CALCIUM 40 MG: 40 TABLET, FILM COATED ORAL at 20:43

## 2020-01-01 RX ADMIN — VITAMIN D, TAB 1000IU (100/BT) 1000 UNITS: 25 TAB at 09:26

## 2020-01-01 RX ADMIN — POLYETHYLENE GLYCOL 3350 17 G: 17 POWDER, FOR SOLUTION ORAL at 12:04

## 2020-01-01 RX ADMIN — SERTRALINE HYDROCHLORIDE 100 MG: 50 TABLET ORAL at 08:06

## 2020-01-01 RX ADMIN — SERTRALINE 100 MG: 100 TABLET, FILM COATED ORAL at 08:58

## 2020-01-01 RX ADMIN — RANOLAZINE 500 MG: 500 TABLET, FILM COATED, EXTENDED RELEASE ORAL at 21:11

## 2020-01-01 RX ADMIN — CLONAZEPAM 0.5 MG: 0.5 TABLET ORAL at 22:46

## 2020-01-01 RX ADMIN — OXYCODONE HYDROCHLORIDE AND ACETAMINOPHEN 1 TABLET: 7.5; 325 TABLET ORAL at 04:21

## 2020-01-01 RX ADMIN — POLYETHYLENE GLYCOL 3350 17 G: 17 POWDER, FOR SOLUTION ORAL at 14:56

## 2020-01-01 RX ADMIN — SERTRALINE HYDROCHLORIDE 100 MG: 50 TABLET ORAL at 08:01

## 2020-01-01 RX ADMIN — HYDROMORPHONE HYDROCHLORIDE 1 MG: 1 INJECTION, SOLUTION INTRAMUSCULAR; INTRAVENOUS; SUBCUTANEOUS at 10:31

## 2020-01-01 RX ADMIN — FUROSEMIDE 20 MG: 10 INJECTION, SOLUTION INTRAMUSCULAR; INTRAVENOUS at 18:02

## 2020-01-01 RX ADMIN — SPIRONOLACTONE 25 MG: 25 TABLET ORAL at 08:16

## 2020-01-01 RX ADMIN — SILDENAFIL CITRATE 20 MG: 20 TABLET ORAL at 08:06

## 2020-01-01 RX ADMIN — Medication 10 ML: at 13:56

## 2020-01-01 RX ADMIN — INSULIN LISPRO 2 UNITS: 100 INJECTION, SOLUTION INTRAVENOUS; SUBCUTANEOUS at 02:06

## 2020-01-01 RX ADMIN — SODIUM CHLORIDE, PRESERVATIVE FREE 10 ML: 5 INJECTION INTRAVENOUS at 08:53

## 2020-01-01 RX ADMIN — HYDROMORPHONE HYDROCHLORIDE 1 MG: 1 INJECTION, SOLUTION INTRAMUSCULAR; INTRAVENOUS; SUBCUTANEOUS at 14:33

## 2020-01-01 RX ADMIN — OXYBUTYNIN CHLORIDE 5 MG: 5 TABLET ORAL at 09:00

## 2020-01-01 RX ADMIN — OXYCODONE HYDROCHLORIDE AND ACETAMINOPHEN 1 TABLET: 7.5; 325 TABLET ORAL at 21:10

## 2020-01-01 RX ADMIN — OXYBUTYNIN CHLORIDE 5 MG: 5 TABLET ORAL at 20:17

## 2020-01-01 RX ADMIN — METHYLPREDNISOLONE SODIUM SUCCINATE 40 MG: 40 INJECTION, POWDER, FOR SOLUTION INTRAMUSCULAR; INTRAVENOUS at 15:13

## 2020-01-01 RX ADMIN — ASPIRIN 81 MG: 81 TABLET ORAL at 08:00

## 2020-01-01 RX ADMIN — GABAPENTIN 900 MG: 300 CAPSULE ORAL at 13:56

## 2020-01-01 RX ADMIN — GABAPENTIN 900 MG: 300 CAPSULE ORAL at 20:46

## 2020-01-01 RX ADMIN — MORPHINE SULFATE 30 MG: 30 TABLET, FILM COATED, EXTENDED RELEASE ORAL at 21:32

## 2020-01-01 RX ADMIN — INSULIN LISPRO 3 UNITS: 100 INJECTION, SOLUTION INTRAVENOUS; SUBCUTANEOUS at 17:41

## 2020-01-01 RX ADMIN — MUPIROCIN: 20 OINTMENT TOPICAL at 20:01

## 2020-01-01 RX ADMIN — ATORVASTATIN CALCIUM 40 MG: 40 TABLET, FILM COATED ORAL at 23:10

## 2020-01-01 RX ADMIN — ENOXAPARIN SODIUM 40 MG: 40 INJECTION SUBCUTANEOUS at 08:18

## 2020-01-01 RX ADMIN — TRAMADOL HYDROCHLORIDE 50 MG: 50 TABLET, FILM COATED ORAL at 11:11

## 2020-01-01 RX ADMIN — OXYBUTYNIN CHLORIDE 5 MG: 5 TABLET ORAL at 09:33

## 2020-01-01 RX ADMIN — OXYBUTYNIN CHLORIDE 5 MG: 5 TABLET ORAL at 09:47

## 2020-01-01 RX ADMIN — MUPIROCIN: 20 OINTMENT TOPICAL at 08:42

## 2020-01-01 RX ADMIN — Medication 10 ML: at 20:44

## 2020-01-01 RX ADMIN — SILDENAFIL CITRATE 20 MG: 20 TABLET ORAL at 23:43

## 2020-01-01 RX ADMIN — RANOLAZINE 500 MG: 500 TABLET, FILM COATED, EXTENDED RELEASE ORAL at 21:12

## 2020-01-01 RX ADMIN — OXYCODONE HYDROCHLORIDE AND ACETAMINOPHEN 1 TABLET: 7.5; 325 TABLET ORAL at 18:23

## 2020-01-01 RX ADMIN — FERROUS SULFATE TAB 325 MG (65 MG ELEMENTAL FE) 325 MG: 325 (65 FE) TAB at 08:01

## 2020-01-01 RX ADMIN — MORPHINE SULFATE 30 MG: 30 TABLET, FILM COATED, EXTENDED RELEASE ORAL at 21:17

## 2020-01-01 RX ADMIN — DOCUSATE SODIUM 100 MG: 100 CAPSULE, LIQUID FILLED ORAL at 08:14

## 2020-01-01 RX ADMIN — AZITHROMYCIN MONOHYDRATE 500 MG: 500 INJECTION, POWDER, LYOPHILIZED, FOR SOLUTION INTRAVENOUS at 00:35

## 2020-01-01 RX ADMIN — OXYBUTYNIN CHLORIDE 5 MG: 5 TABLET ORAL at 07:57

## 2020-01-01 RX ADMIN — INSULIN LISPRO 3 UNITS: 100 INJECTION, SOLUTION INTRAVENOUS; SUBCUTANEOUS at 22:08

## 2020-01-01 RX ADMIN — Medication 2 PUFF: at 20:26

## 2020-01-01 RX ADMIN — PROMETHAZINE HYDROCHLORIDE 12.5 MG: 25 TABLET ORAL at 20:32

## 2020-01-01 RX ADMIN — OXYCODONE HYDROCHLORIDE AND ACETAMINOPHEN 1 TABLET: 7.5; 325 TABLET ORAL at 13:52

## 2020-01-01 RX ADMIN — MUPIROCIN: 20 OINTMENT TOPICAL at 09:03

## 2020-01-01 RX ADMIN — MORPHINE SULFATE 4 MG: 4 INJECTION, SOLUTION INTRAMUSCULAR; INTRAVENOUS at 00:35

## 2020-01-01 RX ADMIN — MORPHINE SULFATE 4 MG: 4 INJECTION, SOLUTION INTRAMUSCULAR; INTRAVENOUS at 05:43

## 2020-01-01 RX ADMIN — FUROSEMIDE 40 MG: 10 INJECTION, SOLUTION INTRAMUSCULAR; INTRAVENOUS at 12:09

## 2020-01-01 RX ADMIN — FUROSEMIDE 20 MG: 10 INJECTION, SOLUTION INTRAMUSCULAR; INTRAVENOUS at 17:15

## 2020-01-01 RX ADMIN — GABAPENTIN 300 MG: 300 CAPSULE ORAL at 14:42

## 2020-01-01 RX ADMIN — Medication 10 ML: at 23:32

## 2020-01-01 RX ADMIN — TIOTROPIUM BROMIDE INHALATION SPRAY 2 PUFF: 3.12 SPRAY, METERED RESPIRATORY (INHALATION) at 07:51

## 2020-01-01 RX ADMIN — OXYBUTYNIN CHLORIDE 5 MG: 5 TABLET ORAL at 20:45

## 2020-01-01 RX ADMIN — GABAPENTIN 300 MG: 300 CAPSULE ORAL at 15:34

## 2020-01-01 RX ADMIN — MIDAZOLAM HYDROCHLORIDE 1 MG: 5 INJECTION INTRAMUSCULAR; INTRAVENOUS at 09:16

## 2020-01-01 RX ADMIN — INSULIN GLARGINE 8 UNITS: 100 INJECTION, SOLUTION SUBCUTANEOUS at 22:38

## 2020-01-01 RX ADMIN — INSULIN GLARGINE 40 UNITS: 100 INJECTION, SOLUTION SUBCUTANEOUS at 20:25

## 2020-01-01 RX ADMIN — LEVOTHYROXINE SODIUM 50 MCG: 25 TABLET ORAL at 09:03

## 2020-01-01 RX ADMIN — RANOLAZINE 500 MG: 500 TABLET, FILM COATED, EXTENDED RELEASE ORAL at 08:56

## 2020-01-01 RX ADMIN — SERTRALINE 100 MG: 100 TABLET, FILM COATED ORAL at 14:33

## 2020-01-01 RX ADMIN — METOPROLOL TARTRATE 25 MG: 25 TABLET ORAL at 09:58

## 2020-01-01 RX ADMIN — GABAPENTIN 900 MG: 300 CAPSULE ORAL at 15:12

## 2020-01-01 RX ADMIN — ATORVASTATIN CALCIUM 40 MG: 40 TABLET, FILM COATED ORAL at 09:12

## 2020-01-01 RX ADMIN — ASPIRIN 81 MG: 81 TABLET, COATED ORAL at 08:59

## 2020-01-01 RX ADMIN — MORPHINE SULFATE 2 MG: 2 INJECTION, SOLUTION INTRAMUSCULAR; INTRAVENOUS at 21:17

## 2020-01-01 RX ADMIN — SERTRALINE 100 MG: 100 TABLET, FILM COATED ORAL at 08:03

## 2020-01-01 RX ADMIN — SPIRONOLACTONE 25 MG: 25 TABLET ORAL at 08:20

## 2020-01-01 RX ADMIN — LEVOTHYROXINE SODIUM 50 MCG: 25 TABLET ORAL at 06:52

## 2020-01-01 RX ADMIN — SPIRONOLACTONE 25 MG: 25 TABLET ORAL at 09:25

## 2020-01-01 RX ADMIN — OXYCODONE HYDROCHLORIDE 5 MG: 5 TABLET ORAL at 12:31

## 2020-01-01 RX ADMIN — GABAPENTIN 300 MG: 300 CAPSULE ORAL at 09:02

## 2020-01-01 RX ADMIN — ATORVASTATIN CALCIUM 40 MG: 40 TABLET, FILM COATED ORAL at 20:46

## 2020-01-01 RX ADMIN — GABAPENTIN 300 MG: 300 CAPSULE ORAL at 09:25

## 2020-01-01 RX ADMIN — MORPHINE SULFATE 4 MG: 4 INJECTION, SOLUTION INTRAMUSCULAR; INTRAVENOUS at 14:19

## 2020-01-01 RX ADMIN — MORPHINE SULFATE 2 MG: 2 INJECTION, SOLUTION INTRAMUSCULAR; INTRAVENOUS at 23:52

## 2020-01-01 RX ADMIN — METFORMIN HYDROCHLORIDE 1000 MG: 500 TABLET ORAL at 08:16

## 2020-01-01 RX ADMIN — PROMETHAZINE HYDROCHLORIDE 12.5 MG: 25 TABLET ORAL at 23:55

## 2020-01-01 RX ADMIN — KETOROLAC TROMETHAMINE 15 MG: 30 INJECTION, SOLUTION INTRAMUSCULAR at 23:11

## 2020-01-01 RX ADMIN — Medication 10 ML: at 08:32

## 2020-01-01 RX ADMIN — ASPIRIN 81 MG: 81 TABLET ORAL at 09:47

## 2020-01-01 RX ADMIN — GABAPENTIN 900 MG: 300 CAPSULE ORAL at 21:21

## 2020-01-01 RX ADMIN — GABAPENTIN 300 MG: 300 CAPSULE ORAL at 22:09

## 2020-01-01 RX ADMIN — FUROSEMIDE 40 MG: 10 INJECTION, SOLUTION INTRAMUSCULAR; INTRAVENOUS at 09:33

## 2020-01-01 RX ADMIN — GABAPENTIN 300 MG: 300 CAPSULE ORAL at 08:40

## 2020-01-01 RX ADMIN — INSULIN LISPRO 18 UNITS: 100 INJECTION, SOLUTION INTRAVENOUS; SUBCUTANEOUS at 08:15

## 2020-01-01 RX ADMIN — ATORVASTATIN CALCIUM 40 MG: 40 TABLET, FILM COATED ORAL at 21:44

## 2020-01-01 RX ADMIN — IPRATROPIUM BROMIDE AND ALBUTEROL SULFATE 1 AMPULE: .5; 3 SOLUTION RESPIRATORY (INHALATION) at 10:56

## 2020-01-01 RX ADMIN — OXYCODONE HYDROCHLORIDE 10 MG: 5 TABLET ORAL at 22:03

## 2020-01-01 RX ADMIN — GABAPENTIN 900 MG: 300 CAPSULE ORAL at 20:54

## 2020-01-01 RX ADMIN — Medication 2 PUFF: at 19:12

## 2020-01-01 RX ADMIN — MORPHINE SULFATE 30 MG: 30 TABLET, FILM COATED, EXTENDED RELEASE ORAL at 08:29

## 2020-01-01 RX ADMIN — LEVOTHYROXINE SODIUM 100 MCG: 100 TABLET ORAL at 06:19

## 2020-01-01 RX ADMIN — ATORVASTATIN CALCIUM 40 MG: 40 TABLET, FILM COATED ORAL at 21:31

## 2020-01-01 RX ADMIN — INSULIN GLARGINE 28 UNITS: 100 INJECTION, SOLUTION SUBCUTANEOUS at 10:02

## 2020-01-01 RX ADMIN — PROMETHAZINE HYDROCHLORIDE 12.5 MG: 25 TABLET ORAL at 06:00

## 2020-01-01 RX ADMIN — INSULIN LISPRO 9 UNITS: 100 INJECTION, SOLUTION INTRAVENOUS; SUBCUTANEOUS at 08:09

## 2020-01-01 RX ADMIN — INSULIN GLARGINE 35 UNITS: 100 INJECTION, SOLUTION SUBCUTANEOUS at 10:02

## 2020-01-01 RX ADMIN — GABAPENTIN 300 MG: 300 CAPSULE ORAL at 08:19

## 2020-01-01 RX ADMIN — RANOLAZINE 500 MG: 500 TABLET, FILM COATED, EXTENDED RELEASE ORAL at 15:20

## 2020-01-01 RX ADMIN — ISOSORBIDE DINITRATE 10 MG: 10 TABLET ORAL at 21:12

## 2020-01-01 RX ADMIN — GABAPENTIN 300 MG: 300 CAPSULE ORAL at 20:38

## 2020-01-01 RX ADMIN — ENOXAPARIN SODIUM 40 MG: 40 INJECTION SUBCUTANEOUS at 08:32

## 2020-01-01 RX ADMIN — METOPROLOL SUCCINATE 25 MG: 25 TABLET, FILM COATED, EXTENDED RELEASE ORAL at 08:53

## 2020-01-01 RX ADMIN — LEVOTHYROXINE SODIUM 50 MCG: 0.05 TABLET ORAL at 09:58

## 2020-01-01 RX ADMIN — GABAPENTIN 300 MG: 300 CAPSULE ORAL at 21:12

## 2020-01-01 RX ADMIN — HYDROMORPHONE HYDROCHLORIDE 1 MG: 1 INJECTION, SOLUTION INTRAMUSCULAR; INTRAVENOUS; SUBCUTANEOUS at 14:43

## 2020-01-01 RX ADMIN — OXYCODONE HYDROCHLORIDE 10 MG: 5 TABLET ORAL at 12:53

## 2020-01-01 RX ADMIN — INSULIN LISPRO 9 UNITS: 100 INJECTION, SOLUTION INTRAVENOUS; SUBCUTANEOUS at 21:15

## 2020-01-01 RX ADMIN — METFORMIN HYDROCHLORIDE 1000 MG: 500 TABLET ORAL at 17:33

## 2020-01-01 RX ADMIN — VITAMIN D, TAB 1000IU (100/BT) 1000 UNITS: 25 TAB at 09:08

## 2020-01-01 RX ADMIN — MORPHINE SULFATE 4 MG: 4 INJECTION, SOLUTION INTRAMUSCULAR; INTRAVENOUS at 12:04

## 2020-01-01 RX ADMIN — INSULIN GLARGINE 35 UNITS: 100 INJECTION, SOLUTION SUBCUTANEOUS at 23:13

## 2020-01-01 RX ADMIN — ASPIRIN 81 MG: 81 TABLET ORAL at 08:18

## 2020-01-01 RX ADMIN — MORPHINE SULFATE 2 MG: 2 INJECTION, SOLUTION INTRAMUSCULAR; INTRAVENOUS at 20:19

## 2020-01-01 RX ADMIN — OXYBUTYNIN CHLORIDE 5 MG: 5 TABLET ORAL at 20:30

## 2020-01-01 RX ADMIN — MORPHINE SULFATE 2 MG: 2 INJECTION, SOLUTION INTRAMUSCULAR; INTRAVENOUS at 03:56

## 2020-01-01 RX ADMIN — RANOLAZINE 500 MG: 500 TABLET, FILM COATED, EXTENDED RELEASE ORAL at 20:52

## 2020-01-01 RX ADMIN — SPIRONOLACTONE 25 MG: 25 TABLET ORAL at 14:33

## 2020-01-01 RX ADMIN — INSULIN LISPRO 9 UNITS: 100 INJECTION, SOLUTION INTRAVENOUS; SUBCUTANEOUS at 11:07

## 2020-01-01 RX ADMIN — METFORMIN HYDROCHLORIDE 1000 MG: 500 TABLET ORAL at 17:43

## 2020-01-01 RX ADMIN — FUROSEMIDE 20 MG: 10 INJECTION, SOLUTION INTRAMUSCULAR; INTRAVENOUS at 08:52

## 2020-01-01 RX ADMIN — CLOPIDOGREL BISULFATE 75 MG: 75 TABLET ORAL at 09:02

## 2020-01-01 RX ADMIN — Medication 2 PUFF: at 19:35

## 2020-01-01 RX ADMIN — GABAPENTIN 300 MG: 300 CAPSULE ORAL at 08:30

## 2020-01-01 RX ADMIN — LEVOTHYROXINE SODIUM 50 MCG: 0.05 TABLET ORAL at 06:49

## 2020-01-01 RX ADMIN — MORPHINE SULFATE 2 MG: 2 INJECTION, SOLUTION INTRAMUSCULAR; INTRAVENOUS at 15:14

## 2020-01-01 RX ADMIN — OXYBUTYNIN CHLORIDE 5 MG: 5 TABLET ORAL at 20:25

## 2020-01-01 RX ADMIN — FUROSEMIDE 40 MG: 10 INJECTION, SOLUTION INTRAMUSCULAR; INTRAVENOUS at 11:10

## 2020-01-01 RX ADMIN — GABAPENTIN 300 MG: 300 CAPSULE ORAL at 08:18

## 2020-01-01 RX ADMIN — OXYCODONE HYDROCHLORIDE AND ACETAMINOPHEN 1 TABLET: 7.5; 325 TABLET ORAL at 12:41

## 2020-01-01 RX ADMIN — GABAPENTIN 300 MG: 300 CAPSULE ORAL at 21:31

## 2020-01-01 RX ADMIN — TIOTROPIUM BROMIDE INHALATION SPRAY 2 PUFF: 3.12 SPRAY, METERED RESPIRATORY (INHALATION) at 07:46

## 2020-01-01 RX ADMIN — ATORVASTATIN CALCIUM 40 MG: 40 TABLET, FILM COATED ORAL at 20:54

## 2020-01-01 RX ADMIN — GABAPENTIN 300 MG: 300 CAPSULE ORAL at 08:03

## 2020-01-01 RX ADMIN — DOCUSATE SODIUM 100 MG: 100 CAPSULE, LIQUID FILLED ORAL at 08:52

## 2020-01-01 RX ADMIN — INSULIN LISPRO 3 UNITS: 100 INJECTION, SOLUTION INTRAVENOUS; SUBCUTANEOUS at 20:19

## 2020-01-01 RX ADMIN — INSULIN LISPRO 10 UNITS: 100 INJECTION, SOLUTION INTRAVENOUS; SUBCUTANEOUS at 18:52

## 2020-01-01 RX ADMIN — Medication 10 ML: at 08:50

## 2020-01-01 RX ADMIN — OXYBUTYNIN CHLORIDE 5 MG: 5 TABLET ORAL at 08:01

## 2020-01-01 RX ADMIN — RANOLAZINE 500 MG: 500 TABLET, FILM COATED, EXTENDED RELEASE ORAL at 12:07

## 2020-01-01 RX ADMIN — INSULIN GLARGINE 28 UNITS: 100 INJECTION, SOLUTION SUBCUTANEOUS at 08:33

## 2020-01-01 RX ADMIN — MORPHINE SULFATE 30 MG: 30 TABLET, FILM COATED, EXTENDED RELEASE ORAL at 08:03

## 2020-01-01 RX ADMIN — INSULIN LISPRO 15 UNITS: 100 INJECTION, SOLUTION INTRAVENOUS; SUBCUTANEOUS at 16:44

## 2020-01-01 RX ADMIN — SERTRALINE HYDROCHLORIDE 100 MG: 50 TABLET ORAL at 09:35

## 2020-01-01 RX ADMIN — GABAPENTIN 300 MG: 300 CAPSULE ORAL at 15:30

## 2020-01-01 RX ADMIN — AZITHROMYCIN MONOHYDRATE 500 MG: 500 INJECTION, POWDER, LYOPHILIZED, FOR SOLUTION INTRAVENOUS at 00:36

## 2020-01-01 RX ADMIN — Medication 10 ML: at 20:38

## 2020-01-01 RX ADMIN — SENNOSIDES AND DOCUSATE SODIUM 2 TABLET: 8.6; 5 TABLET ORAL at 22:37

## 2020-01-01 RX ADMIN — Medication 2 PUFF: at 07:20

## 2020-01-01 RX ADMIN — INSULIN LISPRO 10 UNITS: 100 INJECTION, SOLUTION INTRAVENOUS; SUBCUTANEOUS at 17:36

## 2020-01-01 RX ADMIN — GABAPENTIN 900 MG: 300 CAPSULE ORAL at 20:19

## 2020-01-01 RX ADMIN — MORPHINE SULFATE 2 MG: 2 INJECTION, SOLUTION INTRAMUSCULAR; INTRAVENOUS at 11:16

## 2020-01-01 RX ADMIN — ATORVASTATIN CALCIUM 40 MG: 40 TABLET, FILM COATED ORAL at 20:32

## 2020-01-01 RX ADMIN — Medication 10 ML: at 21:14

## 2020-01-01 RX ADMIN — SERTRALINE 100 MG: 100 TABLET, FILM COATED ORAL at 08:32

## 2020-01-01 RX ADMIN — OXYCODONE HYDROCHLORIDE AND ACETAMINOPHEN 1 TABLET: 7.5; 325 TABLET ORAL at 08:04

## 2020-01-01 RX ADMIN — CLOPIDOGREL 75 MG: 75 TABLET, FILM COATED ORAL at 08:53

## 2020-01-01 RX ADMIN — INSULIN GLARGINE 50 UNITS: 100 INJECTION, SOLUTION SUBCUTANEOUS at 07:58

## 2020-01-01 RX ADMIN — METFORMIN HYDROCHLORIDE 1000 MG: 500 TABLET ORAL at 08:02

## 2020-01-01 RX ADMIN — CLONAZEPAM 0.5 MG: 0.5 TABLET ORAL at 09:04

## 2020-01-01 RX ADMIN — INSULIN LISPRO 10 UNITS: 100 INJECTION, SOLUTION INTRAVENOUS; SUBCUTANEOUS at 08:11

## 2020-01-01 RX ADMIN — ALBUTEROL SULFATE 2.5 MG: 2.5 SOLUTION RESPIRATORY (INHALATION) at 20:01

## 2020-01-01 RX ADMIN — ACETAMINOPHEN 1000 MG: 500 TABLET ORAL at 23:10

## 2020-01-01 RX ADMIN — Medication 2 PUFF: at 20:09

## 2020-01-01 RX ADMIN — INSULIN LISPRO 9 UNITS: 100 INJECTION, SOLUTION INTRAVENOUS; SUBCUTANEOUS at 20:49

## 2020-01-01 RX ADMIN — METOPROLOL SUCCINATE 25 MG: 25 TABLET, FILM COATED, EXTENDED RELEASE ORAL at 09:49

## 2020-01-01 RX ADMIN — GABAPENTIN 900 MG: 300 CAPSULE ORAL at 14:49

## 2020-01-01 RX ADMIN — FUROSEMIDE 40 MG: 10 INJECTION, SOLUTION INTRAMUSCULAR; INTRAVENOUS at 17:08

## 2020-01-01 RX ADMIN — INSULIN GLARGINE 8 UNITS: 100 INJECTION, SOLUTION SUBCUTANEOUS at 02:06

## 2020-01-01 RX ADMIN — Medication 10 ML: at 21:45

## 2020-01-01 RX ADMIN — METOPROLOL SUCCINATE 25 MG: 25 TABLET, FILM COATED, EXTENDED RELEASE ORAL at 10:38

## 2020-01-01 RX ADMIN — SILDENAFIL CITRATE 20 MG: 20 TABLET ORAL at 16:17

## 2020-01-01 RX ADMIN — SPIRONOLACTONE 25 MG: 25 TABLET ORAL at 08:24

## 2020-01-01 RX ADMIN — GABAPENTIN 300 MG: 300 CAPSULE ORAL at 08:53

## 2020-01-01 RX ADMIN — CEFTRIAXONE SODIUM 1 G: 1 INJECTION, POWDER, FOR SOLUTION INTRAMUSCULAR; INTRAVENOUS at 11:18

## 2020-01-01 RX ADMIN — GABAPENTIN 300 MG: 300 CAPSULE ORAL at 20:46

## 2020-01-01 RX ADMIN — GABAPENTIN 300 MG: 300 CAPSULE ORAL at 14:29

## 2020-01-01 RX ADMIN — ASPIRIN 81 MG: 81 TABLET ORAL at 08:19

## 2020-01-01 RX ADMIN — Medication 2 PUFF: at 10:51

## 2020-01-01 RX ADMIN — MORPHINE SULFATE 2 MG: 2 INJECTION, SOLUTION INTRAMUSCULAR; INTRAVENOUS at 08:45

## 2020-01-01 RX ADMIN — GABAPENTIN 900 MG: 300 CAPSULE ORAL at 14:36

## 2020-01-01 RX ADMIN — SERTRALINE HYDROCHLORIDE 100 MG: 50 TABLET ORAL at 09:12

## 2020-01-01 RX ADMIN — OXYBUTYNIN CHLORIDE 5 MG: 5 TABLET ORAL at 20:36

## 2020-01-01 RX ADMIN — FUROSEMIDE 40 MG: 10 INJECTION INTRAMUSCULAR; INTRAVENOUS at 09:48

## 2020-01-01 RX ADMIN — FUROSEMIDE 20 MG: 20 TABLET ORAL at 12:31

## 2020-01-01 RX ADMIN — MORPHINE SULFATE 2 MG: 2 INJECTION, SOLUTION INTRAMUSCULAR; INTRAVENOUS at 21:00

## 2020-01-01 RX ADMIN — FUROSEMIDE 40 MG: 10 INJECTION, SOLUTION INTRAMUSCULAR; INTRAVENOUS at 10:30

## 2020-01-01 RX ADMIN — MORPHINE SULFATE 2 MG: 2 INJECTION, SOLUTION INTRAMUSCULAR; INTRAVENOUS at 22:22

## 2020-01-01 RX ADMIN — GABAPENTIN 300 MG: 300 CAPSULE ORAL at 14:37

## 2020-01-01 RX ADMIN — OXYCODONE HYDROCHLORIDE 10 MG: 5 TABLET ORAL at 11:14

## 2020-01-01 RX ADMIN — POLYETHYLENE GLYCOL 3350 17 G: 17 POWDER, FOR SOLUTION ORAL at 07:59

## 2020-01-01 RX ADMIN — Medication 10 ML: at 20:30

## 2020-01-01 RX ADMIN — ATORVASTATIN CALCIUM 40 MG: 40 TABLET, FILM COATED ORAL at 08:01

## 2020-01-01 RX ADMIN — GABAPENTIN 900 MG: 300 CAPSULE ORAL at 14:35

## 2020-01-01 RX ADMIN — OXYCODONE HYDROCHLORIDE AND ACETAMINOPHEN 1 TABLET: 7.5; 325 TABLET ORAL at 04:00

## 2020-01-01 RX ADMIN — POLYETHYLENE GLYCOL 3350 17 G: 17 POWDER, FOR SOLUTION ORAL at 09:47

## 2020-01-01 RX ADMIN — CLOPIDOGREL BISULFATE 75 MG: 75 TABLET ORAL at 08:01

## 2020-01-01 RX ADMIN — SILDENAFIL CITRATE 20 MG: 20 TABLET ORAL at 08:58

## 2020-01-01 RX ADMIN — GABAPENTIN 900 MG: 300 CAPSULE ORAL at 14:27

## 2020-01-01 RX ADMIN — INSULIN GLARGINE 28 UNITS: 100 INJECTION, SOLUTION SUBCUTANEOUS at 08:14

## 2020-01-01 RX ADMIN — SENNOSIDES AND DOCUSATE SODIUM 2 TABLET: 8.6; 5 TABLET ORAL at 20:46

## 2020-01-01 RX ADMIN — FUROSEMIDE 40 MG: 10 INJECTION, SOLUTION INTRAMUSCULAR; INTRAVENOUS at 07:59

## 2020-01-01 RX ADMIN — GABAPENTIN 300 MG: 300 CAPSULE ORAL at 14:15

## 2020-01-01 RX ADMIN — SENNOSIDES AND DOCUSATE SODIUM 2 TABLET: 8.6; 5 TABLET ORAL at 20:16

## 2020-01-01 RX ADMIN — FUROSEMIDE 40 MG: 40 TABLET ORAL at 18:25

## 2020-01-01 RX ADMIN — OXYCODONE HYDROCHLORIDE 5 MG: 5 TABLET ORAL at 17:41

## 2020-01-01 RX ADMIN — HYDROMORPHONE HYDROCHLORIDE 1 MG: 1 INJECTION, SOLUTION INTRAMUSCULAR; INTRAVENOUS; SUBCUTANEOUS at 05:50

## 2020-01-01 RX ADMIN — LEVOTHYROXINE SODIUM 50 MCG: 25 TABLET ORAL at 07:50

## 2020-01-01 RX ADMIN — OXYCODONE HYDROCHLORIDE AND ACETAMINOPHEN 1 TABLET: 7.5; 325 TABLET ORAL at 13:17

## 2020-01-01 RX ADMIN — LISINOPRIL 5 MG: 5 TABLET ORAL at 08:53

## 2020-01-01 RX ADMIN — OXYCODONE HYDROCHLORIDE AND ACETAMINOPHEN 1 TABLET: 7.5; 325 TABLET ORAL at 11:19

## 2020-01-01 RX ADMIN — INSULIN LISPRO 10 UNITS: 100 INJECTION, SOLUTION INTRAVENOUS; SUBCUTANEOUS at 09:07

## 2020-01-01 RX ADMIN — ATORVASTATIN CALCIUM 40 MG: 40 TABLET, FILM COATED ORAL at 21:11

## 2020-01-01 RX ADMIN — INSULIN LISPRO 10 UNITS: 100 INJECTION, SOLUTION INTRAVENOUS; SUBCUTANEOUS at 07:59

## 2020-01-01 RX ADMIN — Medication 10 ML: at 20:54

## 2020-01-01 RX ADMIN — GABAPENTIN 900 MG: 300 CAPSULE ORAL at 19:42

## 2020-01-01 RX ADMIN — OXYCODONE HYDROCHLORIDE AND ACETAMINOPHEN 1 TABLET: 7.5; 325 TABLET ORAL at 08:07

## 2020-01-01 RX ADMIN — LEVOTHYROXINE SODIUM 100 MCG: 100 TABLET ORAL at 06:14

## 2020-01-01 RX ADMIN — OXYCODONE HYDROCHLORIDE AND ACETAMINOPHEN 1 TABLET: 7.5; 325 TABLET ORAL at 14:09

## 2020-01-01 RX ADMIN — FUROSEMIDE 40 MG: 10 INJECTION, SOLUTION INTRAMUSCULAR; INTRAVENOUS at 17:43

## 2020-01-01 RX ADMIN — ISOSORBIDE DINITRATE 10 MG: 10 TABLET ORAL at 09:30

## 2020-01-01 RX ADMIN — Medication 10 ML: at 08:21

## 2020-01-01 RX ADMIN — ISOSORBIDE DINITRATE 10 MG: 10 TABLET ORAL at 20:01

## 2020-01-01 RX ADMIN — FERROUS SULFATE TAB 325 MG (65 MG ELEMENTAL FE) 325 MG: 325 (65 FE) TAB at 08:55

## 2020-01-01 RX ADMIN — TIOTROPIUM BROMIDE INHALATION SPRAY 2 PUFF: 3.12 SPRAY, METERED RESPIRATORY (INHALATION) at 08:47

## 2020-01-01 RX ADMIN — RANOLAZINE 500 MG: 500 TABLET, FILM COATED, EXTENDED RELEASE ORAL at 21:21

## 2020-01-01 RX ADMIN — OXYBUTYNIN CHLORIDE 5 MG: 5 TABLET ORAL at 21:12

## 2020-01-01 RX ADMIN — ASPIRIN 81 MG: 81 TABLET ORAL at 08:07

## 2020-01-01 RX ADMIN — SILDENAFIL CITRATE 20 MG: 20 TABLET ORAL at 20:32

## 2020-01-01 RX ADMIN — MORPHINE SULFATE 2 MG: 2 INJECTION, SOLUTION INTRAMUSCULAR; INTRAVENOUS at 01:33

## 2020-01-01 RX ADMIN — GABAPENTIN 900 MG: 300 CAPSULE ORAL at 08:57

## 2020-01-01 RX ADMIN — SENNOSIDES AND DOCUSATE SODIUM 2 TABLET: 8.6; 5 TABLET ORAL at 17:38

## 2020-01-01 RX ADMIN — OXYBUTYNIN CHLORIDE 5 MG: 5 TABLET ORAL at 20:01

## 2020-01-01 RX ADMIN — ATORVASTATIN CALCIUM 40 MG: 40 TABLET, FILM COATED ORAL at 20:52

## 2020-01-01 RX ADMIN — OXYCODONE HYDROCHLORIDE AND ACETAMINOPHEN 1 TABLET: 7.5; 325 TABLET ORAL at 06:48

## 2020-01-01 RX ADMIN — INSULIN LISPRO 4 UNITS: 100 INJECTION, SOLUTION INTRAVENOUS; SUBCUTANEOUS at 08:48

## 2020-01-01 RX ADMIN — LEVOTHYROXINE SODIUM 50 MCG: 0.05 TABLET ORAL at 09:12

## 2020-01-01 RX ADMIN — INSULIN LISPRO 10 UNITS: 100 INJECTION, SOLUTION INTRAVENOUS; SUBCUTANEOUS at 18:14

## 2020-01-01 RX ADMIN — INSULIN LISPRO 10 UNITS: 100 INJECTION, SOLUTION INTRAVENOUS; SUBCUTANEOUS at 09:16

## 2020-01-01 RX ADMIN — SPIRONOLACTONE 25 MG: 25 TABLET ORAL at 09:44

## 2020-01-01 RX ADMIN — FUROSEMIDE 20 MG: 10 INJECTION, SOLUTION INTRAMUSCULAR; INTRAVENOUS at 08:22

## 2020-01-01 RX ADMIN — OXYCODONE HYDROCHLORIDE 5 MG: 5 TABLET ORAL at 19:23

## 2020-01-01 RX ADMIN — FUROSEMIDE 40 MG: 10 INJECTION, SOLUTION INTRAMUSCULAR; INTRAVENOUS at 09:15

## 2020-01-01 RX ADMIN — Medication 2 PUFF: at 11:09

## 2020-01-01 RX ADMIN — INSULIN GLARGINE 55 UNITS: 100 INJECTION, SOLUTION SUBCUTANEOUS at 20:49

## 2020-01-01 RX ADMIN — SPIRONOLACTONE 25 MG: 25 TABLET ORAL at 08:44

## 2020-01-01 RX ADMIN — ENOXAPARIN SODIUM 40 MG: 40 INJECTION SUBCUTANEOUS at 08:57

## 2020-01-01 RX ADMIN — FUROSEMIDE 40 MG: 10 INJECTION, SOLUTION INTRAMUSCULAR; INTRAVENOUS at 09:03

## 2020-01-01 RX ADMIN — INSULIN LISPRO 18 UNITS: 100 INJECTION, SOLUTION INTRAVENOUS; SUBCUTANEOUS at 17:06

## 2020-01-01 RX ADMIN — Medication 2 PUFF: at 11:33

## 2020-01-01 RX ADMIN — GABAPENTIN 300 MG: 300 CAPSULE ORAL at 14:20

## 2020-01-01 RX ADMIN — RANOLAZINE 500 MG: 500 TABLET, FILM COATED, EXTENDED RELEASE ORAL at 09:08

## 2020-01-01 RX ADMIN — ALBUTEROL SULFATE 2.5 MG: 2.5 SOLUTION RESPIRATORY (INHALATION) at 19:21

## 2020-01-01 RX ADMIN — INSULIN GLARGINE 35 UNITS: 100 INJECTION, SOLUTION SUBCUTANEOUS at 12:11

## 2020-01-01 RX ADMIN — SILDENAFIL CITRATE 20 MG: 20 TABLET ORAL at 21:48

## 2020-01-01 RX ADMIN — INSULIN LISPRO 7 UNITS: 100 INJECTION, SOLUTION INTRAVENOUS; SUBCUTANEOUS at 20:45

## 2020-01-01 RX ADMIN — INSULIN GLARGINE 8 UNITS: 100 INJECTION, SOLUTION SUBCUTANEOUS at 22:08

## 2020-01-01 RX ADMIN — FUROSEMIDE 5 MG/HR: 10 INJECTION, SOLUTION INTRAMUSCULAR; INTRAVENOUS at 10:51

## 2020-01-01 RX ADMIN — GABAPENTIN 300 MG: 300 CAPSULE ORAL at 20:32

## 2020-01-01 RX ADMIN — FUROSEMIDE 40 MG: 40 TABLET ORAL at 08:44

## 2020-01-01 RX ADMIN — INSULIN GLARGINE 8 UNITS: 100 INJECTION, SOLUTION SUBCUTANEOUS at 23:04

## 2020-01-01 RX ADMIN — TIOTROPIUM BROMIDE INHALATION SPRAY 2 PUFF: 3.12 SPRAY, METERED RESPIRATORY (INHALATION) at 08:03

## 2020-01-01 RX ADMIN — MORPHINE SULFATE 2 MG: 2 INJECTION, SOLUTION INTRAMUSCULAR; INTRAVENOUS at 09:01

## 2020-01-01 RX ADMIN — SILDENAFIL CITRATE 20 MG: 20 TABLET ORAL at 17:29

## 2020-01-01 RX ADMIN — METOPROLOL TARTRATE 25 MG: 25 TABLET ORAL at 08:53

## 2020-01-01 RX ADMIN — ONDANSETRON 4 MG: 2 INJECTION INTRAMUSCULAR; INTRAVENOUS at 00:47

## 2020-01-01 RX ADMIN — SILDENAFIL CITRATE 20 MG: 20 TABLET ORAL at 22:23

## 2020-01-01 RX ADMIN — SENNOSIDES AND DOCUSATE SODIUM 2 TABLET: 8.6; 5 TABLET ORAL at 20:45

## 2020-01-01 RX ADMIN — LEVALBUTEROL HYDROCHLORIDE 1.25 MG: 1.25 SOLUTION, CONCENTRATE RESPIRATORY (INHALATION) at 11:16

## 2020-01-01 RX ADMIN — SILDENAFIL CITRATE 20 MG: 20 TABLET ORAL at 14:14

## 2020-01-01 RX ADMIN — Medication 10 ML: at 00:17

## 2020-01-01 RX ADMIN — FENTANYL CITRATE 25 MCG: 50 INJECTION, SOLUTION INTRAMUSCULAR; INTRAVENOUS at 23:01

## 2020-01-01 RX ADMIN — SODIUM CHLORIDE, PRESERVATIVE FREE 10 ML: 5 INJECTION INTRAVENOUS at 09:59

## 2020-01-01 RX ADMIN — ATORVASTATIN CALCIUM 40 MG: 40 TABLET, FILM COATED ORAL at 20:26

## 2020-01-01 RX ADMIN — SERTRALINE HYDROCHLORIDE 100 MG: 50 TABLET ORAL at 09:34

## 2020-01-01 RX ADMIN — PROMETHAZINE HYDROCHLORIDE 12.5 MG: 25 TABLET ORAL at 04:48

## 2020-01-01 RX ADMIN — SILDENAFIL CITRATE 20 MG: 20 TABLET ORAL at 15:45

## 2020-01-01 RX ADMIN — SILDENAFIL CITRATE 20 MG: 20 TABLET ORAL at 20:44

## 2020-01-01 RX ADMIN — INSULIN LISPRO 6 UNITS: 100 INJECTION, SOLUTION INTRAVENOUS; SUBCUTANEOUS at 16:47

## 2020-01-01 RX ADMIN — OXYBUTYNIN CHLORIDE 5 MG: 5 TABLET ORAL at 08:16

## 2020-01-01 RX ADMIN — INSULIN LISPRO 10 UNITS: 100 INJECTION, SOLUTION INTRAVENOUS; SUBCUTANEOUS at 08:32

## 2020-01-01 RX ADMIN — INSULIN LISPRO 10 UNITS: 100 INJECTION, SOLUTION INTRAVENOUS; SUBCUTANEOUS at 12:22

## 2020-01-01 RX ADMIN — INSULIN GLARGINE 40 UNITS: 100 INJECTION, SOLUTION SUBCUTANEOUS at 21:22

## 2020-01-01 RX ADMIN — SILDENAFIL CITRATE 20 MG: 20 TABLET ORAL at 14:43

## 2020-01-01 RX ADMIN — Medication 10 ML: at 22:39

## 2020-01-01 RX ADMIN — ALBUTEROL SULFATE 2.5 MG: 2.5 SOLUTION RESPIRATORY (INHALATION) at 20:20

## 2020-01-01 RX ADMIN — ALBUTEROL SULFATE 2.5 MG: 2.5 SOLUTION RESPIRATORY (INHALATION) at 16:05

## 2020-01-01 RX ADMIN — INSULIN LISPRO 2 UNITS: 100 INJECTION, SOLUTION INTRAVENOUS; SUBCUTANEOUS at 20:34

## 2020-01-01 RX ADMIN — FUROSEMIDE 20 MG: 20 TABLET ORAL at 09:12

## 2020-01-01 RX ADMIN — INSULIN LISPRO 5 UNITS: 100 INJECTION, SOLUTION INTRAVENOUS; SUBCUTANEOUS at 20:59

## 2020-01-01 RX ADMIN — IPRATROPIUM BROMIDE AND ALBUTEROL SULFATE 1 AMPULE: .5; 3 SOLUTION RESPIRATORY (INHALATION) at 07:05

## 2020-01-01 RX ADMIN — LEVOTHYROXINE SODIUM 50 MCG: 0.05 TABLET ORAL at 05:10

## 2020-01-01 RX ADMIN — LEVOTHYROXINE SODIUM 100 MCG: 100 TABLET ORAL at 05:56

## 2020-01-01 RX ADMIN — FUROSEMIDE 40 MG: 40 TABLET ORAL at 08:11

## 2020-01-01 RX ADMIN — ISOSORBIDE MONONITRATE 30 MG: 30 TABLET, EXTENDED RELEASE ORAL at 08:00

## 2020-01-01 RX ADMIN — Medication 2 PUFF: at 20:45

## 2020-01-01 RX ADMIN — Medication 2 PUFF: at 11:26

## 2020-01-01 RX ADMIN — SILDENAFIL CITRATE 20 MG: 20 TABLET ORAL at 21:19

## 2020-01-01 RX ADMIN — SENNOSIDES AND DOCUSATE SODIUM 2 TABLET: 8.6; 5 TABLET ORAL at 20:19

## 2020-01-01 RX ADMIN — OXYCODONE HYDROCHLORIDE AND ACETAMINOPHEN 1 TABLET: 7.5; 325 TABLET ORAL at 21:42

## 2020-01-01 RX ADMIN — ENOXAPARIN SODIUM 40 MG: 40 INJECTION SUBCUTANEOUS at 08:01

## 2020-01-01 RX ADMIN — Medication 2 PUFF: at 08:07

## 2020-01-01 RX ADMIN — CEFTRIAXONE SODIUM 1 G: 1 INJECTION, POWDER, FOR SOLUTION INTRAMUSCULAR; INTRAVENOUS at 05:09

## 2020-01-01 RX ADMIN — GABAPENTIN 300 MG: 300 CAPSULE ORAL at 14:34

## 2020-01-01 RX ADMIN — INSULIN LISPRO 3 UNITS: 100 INJECTION, SOLUTION INTRAVENOUS; SUBCUTANEOUS at 20:43

## 2020-01-01 RX ADMIN — GABAPENTIN 300 MG: 300 CAPSULE ORAL at 14:33

## 2020-01-01 RX ADMIN — ISOSORBIDE DINITRATE 10 MG: 10 TABLET ORAL at 14:27

## 2020-01-01 RX ADMIN — MIDAZOLAM HYDROCHLORIDE 2 MG: 1 INJECTION, SOLUTION INTRAMUSCULAR; INTRAVENOUS at 20:12

## 2020-01-01 RX ADMIN — Medication 10 ML: at 23:12

## 2020-01-01 RX ADMIN — DOCUSATE SODIUM 100 MG: 100 CAPSULE, LIQUID FILLED ORAL at 09:36

## 2020-01-01 RX ADMIN — GABAPENTIN 900 MG: 300 CAPSULE ORAL at 20:01

## 2020-01-01 RX ADMIN — LEVOTHYROXINE SODIUM 50 MCG: 0.05 TABLET ORAL at 05:16

## 2020-01-01 RX ADMIN — FENTANYL CITRATE 25 MCG: 50 INJECTION, SOLUTION INTRAMUSCULAR; INTRAVENOUS at 09:16

## 2020-01-01 RX ADMIN — SENNOSIDES AND DOCUSATE SODIUM 2 TABLET: 8.6; 5 TABLET ORAL at 09:34

## 2020-01-01 RX ADMIN — OXYCODONE HYDROCHLORIDE AND ACETAMINOPHEN 1 TABLET: 7.5; 325 TABLET ORAL at 22:04

## 2020-01-01 RX ADMIN — INSULIN LISPRO 12 UNITS: 100 INJECTION, SOLUTION INTRAVENOUS; SUBCUTANEOUS at 09:05

## 2020-01-01 RX ADMIN — INSULIN HUMAN 7 UNITS: 100 INJECTION, SOLUTION PARENTERAL at 20:40

## 2020-01-01 RX ADMIN — SILDENAFIL CITRATE 20 MG: 20 TABLET ORAL at 08:43

## 2020-01-01 RX ADMIN — FLUTICASONE PROPIONATE 2 SPRAY: 50 SPRAY, METERED NASAL at 08:47

## 2020-01-01 RX ADMIN — POLYETHYLENE GLYCOL (3350) 17 G: 17 POWDER, FOR SOLUTION ORAL at 21:32

## 2020-01-01 RX ADMIN — INSULIN LISPRO 8 UNITS: 100 INJECTION, SOLUTION INTRAVENOUS; SUBCUTANEOUS at 10:02

## 2020-01-01 RX ADMIN — ISOSORBIDE DINITRATE 10 MG: 10 TABLET ORAL at 08:00

## 2020-01-01 RX ADMIN — INSULIN LISPRO 10 UNITS: 100 INJECTION, SOLUTION INTRAVENOUS; SUBCUTANEOUS at 10:01

## 2020-01-01 RX ADMIN — INSULIN GLARGINE 50 UNITS: 100 INJECTION, SOLUTION SUBCUTANEOUS at 09:07

## 2020-01-01 RX ADMIN — Medication 10 ML: at 20:36

## 2020-01-01 RX ADMIN — VITAMIN D, TAB 1000IU (100/BT) 1000 UNITS: 25 TAB at 08:43

## 2020-01-01 RX ADMIN — OXYCODONE HYDROCHLORIDE AND ACETAMINOPHEN 1 TABLET: 7.5; 325 TABLET ORAL at 19:58

## 2020-01-01 RX ADMIN — OXYBUTYNIN CHLORIDE 5 MG: 5 TABLET ORAL at 09:03

## 2020-01-01 RX ADMIN — OXYCODONE HYDROCHLORIDE AND ACETAMINOPHEN 1 TABLET: 7.5; 325 TABLET ORAL at 09:34

## 2020-01-01 RX ADMIN — ALBUTEROL SULFATE 2.5 MG: 2.5 SOLUTION RESPIRATORY (INHALATION) at 08:36

## 2020-01-01 RX ADMIN — ATORVASTATIN CALCIUM 40 MG: 40 TABLET, FILM COATED ORAL at 21:22

## 2020-01-01 RX ADMIN — INSULIN LISPRO 10 UNITS: 100 INJECTION, SOLUTION INTRAVENOUS; SUBCUTANEOUS at 16:48

## 2020-01-01 RX ADMIN — HYDROMORPHONE HYDROCHLORIDE 1 MG: 1 INJECTION, SOLUTION INTRAMUSCULAR; INTRAVENOUS; SUBCUTANEOUS at 12:41

## 2020-01-01 RX ADMIN — MORPHINE SULFATE 2 MG: 2 INJECTION, SOLUTION INTRAMUSCULAR; INTRAVENOUS at 06:54

## 2020-01-01 RX ADMIN — Medication 2 PUFF: at 20:21

## 2020-01-01 RX ADMIN — OXYCODONE HYDROCHLORIDE 10 MG: 5 TABLET ORAL at 19:06

## 2020-01-01 RX ADMIN — MORPHINE SULFATE 4 MG: 4 INJECTION, SOLUTION INTRAMUSCULAR; INTRAVENOUS at 12:47

## 2020-01-01 RX ADMIN — MORPHINE SULFATE 15 MG: 15 TABLET, FILM COATED, EXTENDED RELEASE ORAL at 08:16

## 2020-01-01 RX ADMIN — RANOLAZINE 500 MG: 500 TABLET, FILM COATED, EXTENDED RELEASE ORAL at 20:19

## 2020-01-01 RX ADMIN — OXYCODONE HYDROCHLORIDE AND ACETAMINOPHEN 1 TABLET: 7.5; 325 TABLET ORAL at 05:09

## 2020-01-01 RX ADMIN — VITAMIN D, TAB 1000IU (100/BT) 1000 UNITS: 25 TAB at 08:57

## 2020-01-01 RX ADMIN — ENOXAPARIN SODIUM 40 MG: 40 INJECTION SUBCUTANEOUS at 08:17

## 2020-01-01 RX ADMIN — INSULIN LISPRO 10 UNITS: 100 INJECTION, SOLUTION INTRAVENOUS; SUBCUTANEOUS at 16:56

## 2020-01-01 RX ADMIN — INSULIN LISPRO 10 UNITS: 100 INJECTION, SOLUTION INTRAVENOUS; SUBCUTANEOUS at 11:52

## 2020-01-01 RX ADMIN — Medication 2 PUFF: at 06:41

## 2020-01-01 RX ADMIN — ISOSORBIDE MONONITRATE 30 MG: 30 TABLET, EXTENDED RELEASE ORAL at 12:05

## 2020-01-01 RX ADMIN — VITAMIN D, TAB 1000IU (100/BT) 1000 UNITS: 25 TAB at 08:15

## 2020-01-01 RX ADMIN — HYDROMORPHONE HYDROCHLORIDE 1 MG: 1 INJECTION, SOLUTION INTRAMUSCULAR; INTRAVENOUS; SUBCUTANEOUS at 00:38

## 2020-01-01 RX ADMIN — CLOPIDOGREL BISULFATE 75 MG: 75 TABLET ORAL at 09:00

## 2020-01-01 RX ADMIN — OXYCODONE HYDROCHLORIDE AND ACETAMINOPHEN 1 TABLET: 7.5; 325 TABLET ORAL at 09:23

## 2020-01-01 RX ADMIN — POLYETHYLENE GLYCOL 3350 17 G: 17 POWDER, FOR SOLUTION ORAL at 08:13

## 2020-01-01 RX ADMIN — SILDENAFIL CITRATE 20 MG: 20 TABLET ORAL at 08:16

## 2020-01-01 RX ADMIN — OXYCODONE HYDROCHLORIDE 5 MG: 5 TABLET ORAL at 04:49

## 2020-01-01 RX ADMIN — OXYCODONE HYDROCHLORIDE AND ACETAMINOPHEN 1 TABLET: 7.5; 325 TABLET ORAL at 22:50

## 2020-01-01 RX ADMIN — SILDENAFIL CITRATE 20 MG: 20 TABLET ORAL at 14:20

## 2020-01-01 RX ADMIN — POLYETHYLENE GLYCOL 3350 17 G: 17 POWDER, FOR SOLUTION ORAL at 08:55

## 2020-01-01 RX ADMIN — TRAMADOL HYDROCHLORIDE 50 MG: 50 TABLET, FILM COATED ORAL at 10:54

## 2020-01-01 RX ADMIN — LEVOTHYROXINE SODIUM 100 MCG: 100 TABLET ORAL at 06:02

## 2020-01-01 RX ADMIN — MORPHINE SULFATE 2 MG: 2 INJECTION, SOLUTION INTRAMUSCULAR; INTRAVENOUS at 15:45

## 2020-01-01 RX ADMIN — INSULIN LISPRO 3 UNITS: 100 INJECTION, SOLUTION INTRAVENOUS; SUBCUTANEOUS at 17:06

## 2020-01-01 RX ADMIN — GABAPENTIN 900 MG: 300 CAPSULE ORAL at 15:31

## 2020-01-01 RX ADMIN — INSULIN LISPRO 2 UNITS: 100 INJECTION, SOLUTION INTRAVENOUS; SUBCUTANEOUS at 12:40

## 2020-01-01 RX ADMIN — ENOXAPARIN SODIUM 40 MG: 40 INJECTION SUBCUTANEOUS at 09:03

## 2020-01-01 RX ADMIN — FERROUS SULFATE TAB 325 MG (65 MG ELEMENTAL FE) 325 MG: 325 (65 FE) TAB at 08:56

## 2020-01-01 RX ADMIN — HYDROMORPHONE HYDROCHLORIDE 0.5 MG: 1 INJECTION, SOLUTION INTRAMUSCULAR; INTRAVENOUS; SUBCUTANEOUS at 14:34

## 2020-01-01 RX ADMIN — RANOLAZINE 500 MG: 500 TABLET, FILM COATED, EXTENDED RELEASE ORAL at 20:32

## 2020-01-01 RX ADMIN — ISOSORBIDE DINITRATE 10 MG: 10 TABLET ORAL at 08:24

## 2020-01-01 RX ADMIN — OXYBUTYNIN CHLORIDE 5 MG: 5 TABLET ORAL at 08:00

## 2020-01-01 RX ADMIN — OXYCODONE HYDROCHLORIDE 10 MG: 5 TABLET ORAL at 09:44

## 2020-01-01 RX ADMIN — SENNOSIDES AND DOCUSATE SODIUM 2 TABLET: 8.6; 5 TABLET ORAL at 09:25

## 2020-01-01 RX ADMIN — INSULIN LISPRO 6 UNITS: 100 INJECTION, SOLUTION INTRAVENOUS; SUBCUTANEOUS at 09:07

## 2020-01-01 RX ADMIN — Medication 10 ML: at 21:03

## 2020-01-01 RX ADMIN — ASPIRIN 81 MG: 81 TABLET ORAL at 08:22

## 2020-01-01 RX ADMIN — OXYBUTYNIN CHLORIDE 5 MG: 5 TABLET ORAL at 08:48

## 2020-01-01 RX ADMIN — SERTRALINE HYDROCHLORIDE 100 MG: 50 TABLET ORAL at 09:25

## 2020-01-01 RX ADMIN — LEVOTHYROXINE SODIUM 50 MCG: 25 TABLET ORAL at 06:41

## 2020-01-01 RX ADMIN — OXYCODONE HYDROCHLORIDE AND ACETAMINOPHEN 1 TABLET: 7.5; 325 TABLET ORAL at 12:17

## 2020-01-01 RX ADMIN — INSULIN LISPRO 3 UNITS: 100 INJECTION, SOLUTION INTRAVENOUS; SUBCUTANEOUS at 17:36

## 2020-01-01 RX ADMIN — OXYCODONE HYDROCHLORIDE AND ACETAMINOPHEN 1 TABLET: 7.5; 325 TABLET ORAL at 21:01

## 2020-01-01 RX ADMIN — LEVOTHYROXINE SODIUM 50 MCG: 25 TABLET ORAL at 06:34

## 2020-01-01 RX ADMIN — LEVOTHYROXINE SODIUM 50 MCG: 0.05 TABLET ORAL at 09:01

## 2020-01-01 RX ADMIN — POLYETHYLENE GLYCOL 3350 17 G: 17 POWDER, FOR SOLUTION ORAL at 15:21

## 2020-01-01 RX ADMIN — VITAMIN D, TAB 1000IU (100/BT) 1000 UNITS: 25 TAB at 08:18

## 2020-01-01 RX ADMIN — MORPHINE SULFATE 15 MG: 15 TABLET, FILM COATED, EXTENDED RELEASE ORAL at 08:58

## 2020-01-01 RX ADMIN — KETOROLAC TROMETHAMINE 15 MG: 30 INJECTION, SOLUTION INTRAMUSCULAR at 11:37

## 2020-01-01 RX ADMIN — Medication 2 PUFF: at 19:22

## 2020-01-01 RX ADMIN — GABAPENTIN 300 MG: 300 CAPSULE ORAL at 21:05

## 2020-01-01 RX ADMIN — ATORVASTATIN CALCIUM 40 MG: 40 TABLET, FILM COATED ORAL at 21:17

## 2020-01-01 RX ADMIN — SERTRALINE HYDROCHLORIDE 100 MG: 50 TABLET ORAL at 09:08

## 2020-01-01 RX ADMIN — VITAMIN D, TAB 1000IU (100/BT) 1000 UNITS: 25 TAB at 08:07

## 2020-01-01 RX ADMIN — INSULIN LISPRO 2 UNITS: 100 INJECTION, SOLUTION INTRAVENOUS; SUBCUTANEOUS at 20:10

## 2020-01-01 RX ADMIN — Medication 1 PUFF: at 08:24

## 2020-01-01 RX ADMIN — FERROUS SULFATE TAB 325 MG (65 MG ELEMENTAL FE) 325 MG: 325 (65 FE) TAB at 08:18

## 2020-01-01 RX ADMIN — ATORVASTATIN CALCIUM 40 MG: 40 TABLET, FILM COATED ORAL at 20:28

## 2020-01-01 RX ADMIN — MORPHINE SULFATE 4 MG: 4 INJECTION, SOLUTION INTRAMUSCULAR; INTRAVENOUS at 06:11

## 2020-01-01 RX ADMIN — GABAPENTIN 300 MG: 300 CAPSULE ORAL at 20:43

## 2020-01-01 RX ADMIN — METOPROLOL TARTRATE 25 MG: 25 TABLET ORAL at 22:09

## 2020-01-01 RX ADMIN — METFORMIN HYDROCHLORIDE 1000 MG: 500 TABLET ORAL at 08:40

## 2020-01-01 RX ADMIN — RANOLAZINE 500 MG: 500 TABLET, FILM COATED, EXTENDED RELEASE ORAL at 21:10

## 2020-01-01 RX ADMIN — INSULIN GLARGINE 35 UNITS: 100 INJECTION, SOLUTION SUBCUTANEOUS at 21:42

## 2020-01-01 RX ADMIN — MORPHINE SULFATE 4 MG: 4 INJECTION, SOLUTION INTRAMUSCULAR; INTRAVENOUS at 05:40

## 2020-01-01 RX ADMIN — Medication 10 ML: at 08:40

## 2020-01-01 RX ADMIN — SILDENAFIL CITRATE 20 MG: 20 TABLET ORAL at 20:18

## 2020-01-01 RX ADMIN — FUROSEMIDE 40 MG: 10 INJECTION, SOLUTION INTRAMUSCULAR; INTRAVENOUS at 08:04

## 2020-01-01 RX ADMIN — TIOTROPIUM BROMIDE INHALATION SPRAY 2 PUFF: 3.12 SPRAY, METERED RESPIRATORY (INHALATION) at 07:50

## 2020-01-01 RX ADMIN — OXYCODONE HYDROCHLORIDE 10 MG: 5 TABLET ORAL at 01:58

## 2020-01-01 RX ADMIN — MORPHINE SULFATE 30 MG: 30 TABLET, FILM COATED, EXTENDED RELEASE ORAL at 20:46

## 2020-01-01 RX ADMIN — METOPROLOL SUCCINATE 25 MG: 25 TABLET, EXTENDED RELEASE ORAL at 08:57

## 2020-01-01 RX ADMIN — ATORVASTATIN CALCIUM 40 MG: 40 TABLET, FILM COATED ORAL at 20:20

## 2020-01-01 RX ADMIN — ASPIRIN 81 MG: 81 TABLET, COATED ORAL at 08:48

## 2020-01-01 RX ADMIN — INSULIN GLARGINE 40 UNITS: 100 INJECTION, SOLUTION SUBCUTANEOUS at 10:24

## 2020-01-01 RX ADMIN — VITAMIN D, TAB 1000IU (100/BT) 1000 UNITS: 25 TAB at 08:32

## 2020-01-01 RX ADMIN — SENNOSIDES AND DOCUSATE SODIUM 2 TABLET: 8.6; 5 TABLET ORAL at 09:46

## 2020-01-01 RX ADMIN — INSULIN GLARGINE 30 UNITS: 100 INJECTION, SOLUTION SUBCUTANEOUS at 08:33

## 2020-01-01 RX ADMIN — LISINOPRIL 5 MG: 5 TABLET ORAL at 08:01

## 2020-01-01 RX ADMIN — INSULIN LISPRO 3 UNITS: 100 INJECTION, SOLUTION INTRAVENOUS; SUBCUTANEOUS at 12:47

## 2020-01-01 RX ADMIN — INSULIN LISPRO 6 UNITS: 100 INJECTION, SOLUTION INTRAVENOUS; SUBCUTANEOUS at 08:33

## 2020-01-01 RX ADMIN — Medication 10 ML: at 17:53

## 2020-01-01 RX ADMIN — DOCUSATE SODIUM 100 MG: 100 CAPSULE, LIQUID FILLED ORAL at 09:03

## 2020-01-01 RX ADMIN — ASPIRIN 81 MG: 81 TABLET, COATED ORAL at 09:31

## 2020-01-01 RX ADMIN — Medication 2 PUFF: at 19:55

## 2020-01-01 RX ADMIN — POLYETHYLENE GLYCOL 3350 17 G: 17 POWDER, FOR SOLUTION ORAL at 09:00

## 2020-01-01 RX ADMIN — ASPIRIN 81 MG: 81 TABLET ORAL at 08:44

## 2020-01-01 RX ADMIN — FUROSEMIDE 40 MG: 10 INJECTION, SOLUTION INTRAMUSCULAR; INTRAVENOUS at 17:20

## 2020-01-01 RX ADMIN — MORPHINE SULFATE 2 MG: 2 INJECTION, SOLUTION INTRAMUSCULAR; INTRAVENOUS at 18:49

## 2020-01-01 RX ADMIN — Medication 2 PUFF: at 19:44

## 2020-01-01 RX ADMIN — INSULIN LISPRO 6 UNITS: 100 INJECTION, SOLUTION INTRAVENOUS; SUBCUTANEOUS at 16:21

## 2020-01-01 RX ADMIN — INSULIN LISPRO 3 UNITS: 100 INJECTION, SOLUTION INTRAVENOUS; SUBCUTANEOUS at 09:49

## 2020-01-01 RX ADMIN — POLYETHYLENE GLYCOL 3350 17 G: 17 POWDER, FOR SOLUTION ORAL at 17:38

## 2020-01-01 RX ADMIN — SERTRALINE HYDROCHLORIDE 100 MG: 50 TABLET ORAL at 08:23

## 2020-01-01 RX ADMIN — GABAPENTIN 900 MG: 300 CAPSULE ORAL at 14:47

## 2020-01-01 RX ADMIN — INSULIN LISPRO 10 UNITS: 100 INJECTION, SOLUTION INTRAVENOUS; SUBCUTANEOUS at 17:44

## 2020-01-01 RX ADMIN — SILDENAFIL CITRATE 20 MG: 20 TABLET ORAL at 09:30

## 2020-01-01 RX ADMIN — SPIRONOLACTONE 25 MG: 25 TABLET ORAL at 08:55

## 2020-01-01 RX ADMIN — POLYETHYLENE GLYCOL (3350) 17 G: 17 POWDER, FOR SOLUTION ORAL at 09:24

## 2020-01-01 RX ADMIN — ALBUTEROL SULFATE 2.5 MG: 2.5 SOLUTION RESPIRATORY (INHALATION) at 15:12

## 2020-01-01 RX ADMIN — SENNOSIDES AND DOCUSATE SODIUM 2 TABLET: 8.6; 5 TABLET ORAL at 21:11

## 2020-01-01 RX ADMIN — FUROSEMIDE 40 MG: 10 INJECTION, SOLUTION INTRAMUSCULAR; INTRAVENOUS at 08:18

## 2020-01-01 RX ADMIN — FERROUS SULFATE TAB 325 MG (65 MG ELEMENTAL FE) 325 MG: 325 (65 FE) TAB at 08:44

## 2020-01-01 RX ADMIN — IPRATROPIUM BROMIDE AND ALBUTEROL SULFATE 1 AMPULE: .5; 3 SOLUTION RESPIRATORY (INHALATION) at 20:39

## 2020-01-01 RX ADMIN — INSULIN LISPRO 9 UNITS: 100 INJECTION, SOLUTION INTRAVENOUS; SUBCUTANEOUS at 09:07

## 2020-01-01 RX ADMIN — LEVOTHYROXINE SODIUM 100 MCG: 100 TABLET ORAL at 06:18

## 2020-01-01 RX ADMIN — INSULIN LISPRO 3 UNITS: 100 INJECTION, SOLUTION INTRAVENOUS; SUBCUTANEOUS at 12:03

## 2020-01-01 RX ADMIN — OXYCODONE HYDROCHLORIDE 5 MG: 5 TABLET ORAL at 23:32

## 2020-01-01 RX ADMIN — HYDROMORPHONE HYDROCHLORIDE 1 MG: 1 INJECTION, SOLUTION INTRAMUSCULAR; INTRAVENOUS; SUBCUTANEOUS at 10:11

## 2020-01-01 RX ADMIN — METOPROLOL SUCCINATE 25 MG: 25 TABLET, FILM COATED, EXTENDED RELEASE ORAL at 12:05

## 2020-01-01 RX ADMIN — MUPIROCIN: 20 OINTMENT TOPICAL at 09:26

## 2020-01-01 RX ADMIN — INSULIN LISPRO 12 UNITS: 100 INJECTION, SOLUTION INTRAVENOUS; SUBCUTANEOUS at 08:22

## 2020-01-01 RX ADMIN — IPRATROPIUM BROMIDE AND ALBUTEROL SULFATE 1 AMPULE: .5; 3 SOLUTION RESPIRATORY (INHALATION) at 19:31

## 2020-01-01 RX ADMIN — INSULIN LISPRO 8 UNITS: 100 INJECTION, SOLUTION INTRAVENOUS; SUBCUTANEOUS at 12:11

## 2020-01-01 RX ADMIN — INSULIN GLARGINE 35 UNITS: 100 INJECTION, SOLUTION SUBCUTANEOUS at 08:22

## 2020-01-01 RX ADMIN — SPIRONOLACTONE 25 MG: 25 TABLET ORAL at 08:03

## 2020-01-01 RX ADMIN — GABAPENTIN 300 MG: 300 CAPSULE ORAL at 20:18

## 2020-01-01 RX ADMIN — OXYCODONE HYDROCHLORIDE AND ACETAMINOPHEN 1 TABLET: 7.5; 325 TABLET ORAL at 00:41

## 2020-01-01 RX ADMIN — GABAPENTIN 900 MG: 300 CAPSULE ORAL at 14:24

## 2020-01-01 RX ADMIN — Medication 2 PUFF: at 08:17

## 2020-01-01 RX ADMIN — INSULIN LISPRO 18 UNITS: 100 INJECTION, SOLUTION INTRAVENOUS; SUBCUTANEOUS at 17:43

## 2020-01-01 RX ADMIN — TIOTROPIUM BROMIDE INHALATION SPRAY 2 PUFF: 3.12 SPRAY, METERED RESPIRATORY (INHALATION) at 08:17

## 2020-01-01 RX ADMIN — GABAPENTIN 300 MG: 300 CAPSULE ORAL at 20:55

## 2020-01-01 RX ADMIN — INSULIN GLARGINE 40 UNITS: 100 INJECTION, SOLUTION SUBCUTANEOUS at 20:54

## 2020-01-01 RX ADMIN — INSULIN GLARGINE 45 UNITS: 100 INJECTION, SOLUTION SUBCUTANEOUS at 20:41

## 2020-01-01 RX ADMIN — Medication 10 ML: at 05:38

## 2020-01-01 RX ADMIN — OXYCODONE HYDROCHLORIDE AND ACETAMINOPHEN 1 TABLET: 7.5; 325 TABLET ORAL at 12:14

## 2020-01-01 RX ADMIN — INSULIN LISPRO 3 UNITS: 100 INJECTION, SOLUTION INTRAVENOUS; SUBCUTANEOUS at 11:29

## 2020-01-01 RX ADMIN — METFORMIN HYDROCHLORIDE 1000 MG: 500 TABLET ORAL at 08:06

## 2020-01-01 RX ADMIN — SODIUM CHLORIDE 250 ML: 9 INJECTION, SOLUTION INTRAVENOUS at 05:03

## 2020-01-01 RX ADMIN — OXYCODONE HYDROCHLORIDE AND ACETAMINOPHEN 1 TABLET: 7.5; 325 TABLET ORAL at 20:30

## 2020-01-01 RX ADMIN — ENOXAPARIN SODIUM 40 MG: 40 INJECTION SUBCUTANEOUS at 09:07

## 2020-01-01 RX ADMIN — FUROSEMIDE 40 MG: 10 INJECTION, SOLUTION INTRAMUSCULAR; INTRAVENOUS at 09:45

## 2020-01-01 RX ADMIN — INSULIN LISPRO 2 UNITS: 100 INJECTION, SOLUTION INTRAVENOUS; SUBCUTANEOUS at 09:59

## 2020-01-01 RX ADMIN — ALBUTEROL SULFATE 2.5 MG: 2.5 SOLUTION RESPIRATORY (INHALATION) at 11:52

## 2020-01-01 RX ADMIN — FUROSEMIDE 20 MG: 10 INJECTION, SOLUTION INTRAMUSCULAR; INTRAVENOUS at 02:16

## 2020-01-01 RX ADMIN — SERTRALINE 100 MG: 100 TABLET, FILM COATED ORAL at 09:02

## 2020-01-01 RX ADMIN — FUROSEMIDE 40 MG: 10 INJECTION, SOLUTION INTRAMUSCULAR; INTRAVENOUS at 20:36

## 2020-01-01 RX ADMIN — ALBUTEROL SULFATE 2.5 MG: 2.5 SOLUTION RESPIRATORY (INHALATION) at 07:46

## 2020-01-01 RX ADMIN — Medication 10 ML: at 09:02

## 2020-01-01 RX ADMIN — LEVOTHYROXINE SODIUM 50 MCG: 25 TABLET ORAL at 08:55

## 2020-01-01 RX ADMIN — ALBUTEROL SULFATE 2.5 MG: 2.5 SOLUTION RESPIRATORY (INHALATION) at 11:47

## 2020-01-01 RX ADMIN — CLOPIDOGREL BISULFATE 75 MG: 75 TABLET ORAL at 09:46

## 2020-01-01 RX ADMIN — FUROSEMIDE 40 MG: 40 TABLET ORAL at 10:23

## 2020-01-01 RX ADMIN — ATORVASTATIN CALCIUM 40 MG: 40 TABLET, FILM COATED ORAL at 21:21

## 2020-01-01 RX ADMIN — ONDANSETRON 4 MG: 2 INJECTION INTRAMUSCULAR; INTRAVENOUS at 12:44

## 2020-01-01 RX ADMIN — IPRATROPIUM BROMIDE AND ALBUTEROL SULFATE 1 AMPULE: .5; 3 SOLUTION RESPIRATORY (INHALATION) at 03:58

## 2020-01-01 RX ADMIN — ONDANSETRON HYDROCHLORIDE 4 MG: 2 INJECTION, SOLUTION INTRAMUSCULAR; INTRAVENOUS at 17:25

## 2020-01-01 RX ADMIN — INSULIN LISPRO 3 UNITS: 100 INJECTION, SOLUTION INTRAVENOUS; SUBCUTANEOUS at 08:27

## 2020-01-01 RX ADMIN — POLYETHYLENE GLYCOL (3350) 17 G: 17 POWDER, FOR SOLUTION ORAL at 09:43

## 2020-01-01 RX ADMIN — GABAPENTIN 900 MG: 300 CAPSULE ORAL at 21:14

## 2020-01-01 RX ADMIN — ALLOPURINOL 300 MG: 300 TABLET ORAL at 09:43

## 2020-01-01 RX ADMIN — FUROSEMIDE 20 MG: 10 INJECTION, SOLUTION INTRAMUSCULAR; INTRAVENOUS at 12:07

## 2020-01-01 RX ADMIN — LEVOTHYROXINE SODIUM 50 MCG: 0.05 TABLET ORAL at 06:52

## 2020-01-01 RX ADMIN — ISOSORBIDE DINITRATE 10 MG: 10 TABLET ORAL at 15:04

## 2020-01-01 RX ADMIN — OXYCODONE HYDROCHLORIDE 10 MG: 5 TABLET ORAL at 15:02

## 2020-01-01 RX ADMIN — GABAPENTIN 300 MG: 300 CAPSULE ORAL at 20:25

## 2020-01-01 RX ADMIN — SENNOSIDES AND DOCUSATE SODIUM 2 TABLET: 8.6; 5 TABLET ORAL at 08:18

## 2020-01-01 RX ADMIN — INSULIN LISPRO 10 UNITS: 100 INJECTION, SOLUTION INTRAVENOUS; SUBCUTANEOUS at 12:56

## 2020-01-01 RX ADMIN — Medication 2 PUFF: at 00:35

## 2020-01-01 RX ADMIN — SENNOSIDES AND DOCUSATE SODIUM 2 TABLET: 8.6; 5 TABLET ORAL at 12:40

## 2020-01-01 RX ADMIN — ATORVASTATIN CALCIUM 40 MG: 40 TABLET, FILM COATED ORAL at 22:39

## 2020-01-01 RX ADMIN — OXYCODONE HYDROCHLORIDE AND ACETAMINOPHEN 1 TABLET: 7.5; 325 TABLET ORAL at 11:09

## 2020-01-01 RX ADMIN — INSULIN LISPRO 3 UNITS: 100 INJECTION, SOLUTION INTRAVENOUS; SUBCUTANEOUS at 12:31

## 2020-01-01 RX ADMIN — Medication 10 ML: at 09:11

## 2020-01-01 RX ADMIN — OXYBUTYNIN CHLORIDE 5 MG: 5 TABLET ORAL at 21:01

## 2020-01-01 RX ADMIN — Medication 2 PUFF: at 08:00

## 2020-01-01 RX ADMIN — POLYETHYLENE GLYCOL (3350) 17 G: 17 POWDER, FOR SOLUTION ORAL at 20:39

## 2020-01-01 RX ADMIN — RANOLAZINE 500 MG: 500 TABLET, FILM COATED, EXTENDED RELEASE ORAL at 20:36

## 2020-01-01 RX ADMIN — SENNOSIDES AND DOCUSATE SODIUM 2 TABLET: 8.6; 5 TABLET ORAL at 14:20

## 2020-01-01 RX ADMIN — ASPIRIN 81 MG: 81 TABLET ORAL at 07:59

## 2020-01-01 RX ADMIN — FUROSEMIDE 40 MG: 10 INJECTION, SOLUTION INTRAMUSCULAR; INTRAVENOUS at 09:36

## 2020-01-01 RX ADMIN — METOPROLOL SUCCINATE 25 MG: 25 TABLET, FILM COATED, EXTENDED RELEASE ORAL at 08:57

## 2020-01-01 RX ADMIN — GABAPENTIN 300 MG: 300 CAPSULE ORAL at 22:37

## 2020-01-01 RX ADMIN — ASPIRIN 81 MG: 81 TABLET, COATED ORAL at 08:07

## 2020-01-01 RX ADMIN — TIOTROPIUM BROMIDE INHALATION SPRAY 2 PUFF: 3.12 SPRAY, METERED RESPIRATORY (INHALATION) at 08:00

## 2020-01-01 RX ADMIN — OXYCODONE HYDROCHLORIDE AND ACETAMINOPHEN 1 TABLET: 7.5; 325 TABLET ORAL at 12:19

## 2020-01-01 RX ADMIN — HYDROMORPHONE HYDROCHLORIDE 1 MG: 1 INJECTION, SOLUTION INTRAMUSCULAR; INTRAVENOUS; SUBCUTANEOUS at 11:27

## 2020-01-01 RX ADMIN — TIOTROPIUM BROMIDE INHALATION SPRAY 2 PUFF: 3.12 SPRAY, METERED RESPIRATORY (INHALATION) at 07:38

## 2020-01-01 RX ADMIN — CLOPIDOGREL BISULFATE 75 MG: 75 TABLET ORAL at 08:04

## 2020-01-01 RX ADMIN — ASPIRIN 81 MG: 81 TABLET ORAL at 09:35

## 2020-01-01 RX ADMIN — SILDENAFIL CITRATE 20 MG: 20 TABLET ORAL at 22:41

## 2020-01-01 RX ADMIN — ONDANSETRON 4 MG: 2 INJECTION INTRAMUSCULAR; INTRAVENOUS at 23:17

## 2020-01-01 RX ADMIN — SILDENAFIL CITRATE 20 MG: 20 TABLET ORAL at 15:30

## 2020-01-01 RX ADMIN — FUROSEMIDE 40 MG: 10 INJECTION, SOLUTION INTRAMUSCULAR; INTRAVENOUS at 09:25

## 2020-01-01 RX ADMIN — SERTRALINE HYDROCHLORIDE 100 MG: 50 TABLET ORAL at 08:48

## 2020-01-01 RX ADMIN — OXYCODONE HYDROCHLORIDE AND ACETAMINOPHEN 1 TABLET: 7.5; 325 TABLET ORAL at 20:52

## 2020-01-01 SDOH — SOCIAL STABILITY: SOCIAL NETWORK
IN A TYPICAL WEEK, HOW MANY TIMES DO YOU TALK ON THE PHONE WITH FAMILY, FRIENDS, OR NEIGHBORS?: MORE THAN THREE TIMES A WEEK

## 2020-01-01 SDOH — HEALTH STABILITY: PHYSICAL HEALTH: ON AVERAGE, HOW MANY MINUTES DO YOU ENGAGE IN EXERCISE AT THIS LEVEL?: 10 MIN

## 2020-01-01 SDOH — ECONOMIC STABILITY: TRANSPORTATION INSECURITY
IN THE PAST 12 MONTHS, HAS THE LACK OF TRANSPORTATION KEPT YOU FROM MEDICAL APPOINTMENTS OR FROM GETTING MEDICATIONS?: YES

## 2020-01-01 SDOH — SOCIAL STABILITY: SOCIAL NETWORK
DO YOU BELONG TO ANY CLUBS OR ORGANIZATIONS SUCH AS CHURCH GROUPS UNIONS, FRATERNAL OR ATHLETIC GROUPS, OR SCHOOL GROUPS?: NO

## 2020-01-01 SDOH — ECONOMIC STABILITY: INCOME INSECURITY: HOW HARD IS IT FOR YOU TO PAY FOR THE VERY BASICS LIKE FOOD, HOUSING, MEDICAL CARE, AND HEATING?: SOMEWHAT HARD

## 2020-01-01 SDOH — SOCIAL STABILITY: SOCIAL NETWORK: HOW OFTEN DO YOU GET TOGETHER WITH FRIENDS OR RELATIVES?: ONCE A WEEK

## 2020-01-01 SDOH — SOCIAL STABILITY: SOCIAL NETWORK: HOW OFTEN DO YOU ATTEND CHURCH OR RELIGIOUS SERVICES?: 1 TO 4 TIMES PER YEAR

## 2020-01-01 SDOH — SOCIAL STABILITY: SOCIAL NETWORK: HOW OFTEN DO YOU ATTENT MEETINGS OF THE CLUB OR ORGANIZATION YOU BELONG TO?: NEVER

## 2020-01-01 SDOH — SOCIAL STABILITY: SOCIAL NETWORK: ARE YOU MARRIED, WIDOWED, DIVORCED, SEPARATED, NEVER MARRIED, OR LIVING WITH A PARTNER?: DIVORCED

## 2020-01-01 SDOH — ECONOMIC STABILITY: TRANSPORTATION INSECURITY
IN THE PAST 12 MONTHS, HAS LACK OF TRANSPORTATION KEPT YOU FROM MEETINGS, WORK, OR FROM GETTING THINGS NEEDED FOR DAILY LIVING?: YES

## 2020-01-01 SDOH — HEALTH STABILITY: PHYSICAL HEALTH: ON AVERAGE, HOW MANY DAYS PER WEEK DO YOU ENGAGE IN MODERATE TO STRENUOUS EXERCISE (LIKE A BRISK WALK)?: 2 DAYS

## 2020-01-01 ASSESSMENT — PAIN DESCRIPTION - ORIENTATION
ORIENTATION: LOWER
ORIENTATION: MID;RIGHT;LEFT
ORIENTATION: UPPER;LOWER
ORIENTATION: RIGHT;LEFT
ORIENTATION: RIGHT;LEFT
ORIENTATION: MID
ORIENTATION: RIGHT;LEFT;MID
ORIENTATION: RIGHT;LEFT
ORIENTATION: RIGHT;LEFT;LOWER
ORIENTATION: RIGHT;LEFT;MID
ORIENTATION: RIGHT
ORIENTATION: MID;RIGHT;LEFT
ORIENTATION: MID
ORIENTATION: RIGHT;LEFT;MID
ORIENTATION: RIGHT;LEFT;LOWER
ORIENTATION: RIGHT;LEFT
ORIENTATION: MID;RIGHT;LEFT
ORIENTATION: ANTERIOR;RIGHT
ORIENTATION: RIGHT
ORIENTATION: ANTERIOR
ORIENTATION: RIGHT;LEFT
ORIENTATION: RIGHT;LEFT;MID
ORIENTATION: RIGHT
ORIENTATION: RIGHT;LEFT
ORIENTATION: RIGHT;LEFT;MID
ORIENTATION: RIGHT;LEFT
ORIENTATION: MID
ORIENTATION: ANTERIOR
ORIENTATION: RIGHT;LEFT;MID
ORIENTATION: RIGHT;LEFT
ORIENTATION: ANTERIOR
ORIENTATION: MID;LEFT;RIGHT
ORIENTATION: RIGHT;LEFT;MID
ORIENTATION: RIGHT;LEFT
ORIENTATION: MID;RIGHT;LEFT
ORIENTATION: RIGHT;LEFT
ORIENTATION: ANTERIOR
ORIENTATION: RIGHT;LEFT
ORIENTATION: RIGHT
ORIENTATION: ANTERIOR
ORIENTATION: MID;UPPER
ORIENTATION: RIGHT;LEFT
ORIENTATION: LOWER
ORIENTATION: MID;LOWER
ORIENTATION: ANTERIOR
ORIENTATION: ANTERIOR;RIGHT
ORIENTATION: MID
ORIENTATION: MID
ORIENTATION: UPPER;LOWER
ORIENTATION: ANTERIOR;RIGHT

## 2020-01-01 ASSESSMENT — PAIN SCALES - GENERAL
PAINLEVEL_OUTOF10: 0
PAINLEVEL_OUTOF10: 5
PAINLEVEL_OUTOF10: 7
PAINLEVEL_OUTOF10: 10
PAINLEVEL_OUTOF10: 10
PAINLEVEL_OUTOF10: 5
PAINLEVEL_OUTOF10: 7
PAINLEVEL_OUTOF10: 10
PAINLEVEL_OUTOF10: 9
PAINLEVEL_OUTOF10: 10
PAINLEVEL_OUTOF10: 6
PAINLEVEL_OUTOF10: 0
PAINLEVEL_OUTOF10: 6
PAINLEVEL_OUTOF10: 10
PAINLEVEL_OUTOF10: 10
PAINLEVEL_OUTOF10: 5
PAINLEVEL_OUTOF10: 9
PAINLEVEL_OUTOF10: 10
PAINLEVEL_OUTOF10: 10
PAINLEVEL_OUTOF10: 5
PAINLEVEL_OUTOF10: 0
PAINLEVEL_OUTOF10: 8
PAINLEVEL_OUTOF10: 8
PAINLEVEL_OUTOF10: 10
PAINLEVEL_OUTOF10: 7
PAINLEVEL_OUTOF10: 10
PAINLEVEL_OUTOF10: 8
PAINLEVEL_OUTOF10: 2
PAINLEVEL_OUTOF10: 9
PAINLEVEL_OUTOF10: 7
PAINLEVEL_OUTOF10: 9
PAINLEVEL_OUTOF10: 0
PAINLEVEL_OUTOF10: 10
PAINLEVEL_OUTOF10: 0
PAINLEVEL_OUTOF10: 9
PAINLEVEL_OUTOF10: 10
PAINLEVEL_OUTOF10: 0
PAINLEVEL_OUTOF10: 10
PAINLEVEL_OUTOF10: 0
PAINLEVEL_OUTOF10: 10
PAINLEVEL_OUTOF10: 8
PAINLEVEL_OUTOF10: 0
PAINLEVEL_OUTOF10: 0
PAINLEVEL_OUTOF10: 10
PAINLEVEL_OUTOF10: 10
PAINLEVEL_OUTOF10: 9
PAINLEVEL_OUTOF10: 10
PAINLEVEL_OUTOF10: 9
PAINLEVEL_OUTOF10: 10
PAINLEVEL_OUTOF10: 8
PAINLEVEL_OUTOF10: 8
PAINLEVEL_OUTOF10: 10
PAINLEVEL_OUTOF10: 10
PAINLEVEL_OUTOF10: 0
PAINLEVEL_OUTOF10: 6
PAINLEVEL_OUTOF10: 10
PAINLEVEL_OUTOF10: 0
PAINLEVEL_OUTOF10: 0
PAINLEVEL_OUTOF10: 10
PAINLEVEL_OUTOF10: 9
PAINLEVEL_OUTOF10: 10
PAINLEVEL_OUTOF10: 10
PAINLEVEL_OUTOF10: 7
PAINLEVEL_OUTOF10: 3
PAINLEVEL_OUTOF10: 10
PAINLEVEL_OUTOF10: 10
PAINLEVEL_OUTOF10: 4
PAINLEVEL_OUTOF10: 0
PAINLEVEL_OUTOF10: 9
PAINLEVEL_OUTOF10: 10
PAINLEVEL_OUTOF10: 10
PAINLEVEL_OUTOF10: 7
PAINLEVEL_OUTOF10: 5
PAINLEVEL_OUTOF10: 8
PAINLEVEL_OUTOF10: 10
PAINLEVEL_OUTOF10: 0
PAINLEVEL_OUTOF10: 8
PAINLEVEL_OUTOF10: 8
PAINLEVEL_OUTOF10: 10
PAINLEVEL_OUTOF10: 9
PAINLEVEL_OUTOF10: 0
PAINLEVEL_OUTOF10: 10
PAINLEVEL_OUTOF10: 9
PAINLEVEL_OUTOF10: 10
PAINLEVEL_OUTOF10: 9
PAINLEVEL_OUTOF10: 7
PAINLEVEL_OUTOF10: 10
PAINLEVEL_OUTOF10: 9
PAINLEVEL_OUTOF10: 9
PAINLEVEL_OUTOF10: 10
PAINLEVEL_OUTOF10: 10
PAINLEVEL_OUTOF10: 0
PAINLEVEL_OUTOF10: 9
PAINLEVEL_OUTOF10: 10
PAINLEVEL_OUTOF10: 7
PAINLEVEL_OUTOF10: 6
PAINLEVEL_OUTOF10: 8
PAINLEVEL_OUTOF10: 10
PAINLEVEL_OUTOF10: 10
PAINLEVEL_OUTOF10: 9
PAINLEVEL_OUTOF10: 10
PAINLEVEL_OUTOF10: 6
PAINLEVEL_OUTOF10: 10
PAINLEVEL_OUTOF10: 8
PAINLEVEL_OUTOF10: 8
PAINLEVEL_OUTOF10: 7
PAINLEVEL_OUTOF10: 9
PAINLEVEL_OUTOF10: 10
PAINLEVEL_OUTOF10: 10
PAINLEVEL_OUTOF10: 0
PAINLEVEL_OUTOF10: 10
PAINLEVEL_OUTOF10: 10
PAINLEVEL_OUTOF10: 0
PAINLEVEL_OUTOF10: 0
PAINLEVEL_OUTOF10: 9
PAINLEVEL_OUTOF10: 2
PAINLEVEL_OUTOF10: 10
PAINLEVEL_OUTOF10: 10
PAINLEVEL_OUTOF10: 8
PAINLEVEL_OUTOF10: 0
PAINLEVEL_OUTOF10: 10
PAINLEVEL_OUTOF10: 0
PAINLEVEL_OUTOF10: 10
PAINLEVEL_OUTOF10: 6
PAINLEVEL_OUTOF10: 0
PAINLEVEL_OUTOF10: 10
PAINLEVEL_OUTOF10: 9
PAINLEVEL_OUTOF10: 10
PAINLEVEL_OUTOF10: 7
PAINLEVEL_OUTOF10: 0
PAINLEVEL_OUTOF10: 10
PAINLEVEL_OUTOF10: 6
PAINLEVEL_OUTOF10: 9
PAINLEVEL_OUTOF10: 10
PAINLEVEL_OUTOF10: 10
PAINLEVEL_OUTOF10: 9
PAINLEVEL_OUTOF10: 10
PAINLEVEL_OUTOF10: 4
PAINLEVEL_OUTOF10: 0
PAINLEVEL_OUTOF10: 10
PAINLEVEL_OUTOF10: 6
PAINLEVEL_OUTOF10: 10
PAINLEVEL_OUTOF10: 6
PAINLEVEL_OUTOF10: 6
PAINLEVEL_OUTOF10: 10
PAINLEVEL_OUTOF10: 0
PAINLEVEL_OUTOF10: 9
PAINLEVEL_OUTOF10: 10
PAINLEVEL_OUTOF10: 7
PAINLEVEL_OUTOF10: 10
PAINLEVEL_OUTOF10: 0
PAINLEVEL_OUTOF10: 10
PAINLEVEL_OUTOF10: 9
PAINLEVEL_OUTOF10: 9
PAINLEVEL_OUTOF10: 0
PAINLEVEL_OUTOF10: 9
PAINLEVEL_OUTOF10: 8
PAINLEVEL_OUTOF10: 9
PAINLEVEL_OUTOF10: 10
PAINLEVEL_OUTOF10: 9
PAINLEVEL_OUTOF10: 10
PAINLEVEL_OUTOF10: 10
PAINLEVEL_OUTOF10: 9
PAINLEVEL_OUTOF10: 0
PAINLEVEL_OUTOF10: 10
PAINLEVEL_OUTOF10: 5
PAINLEVEL_OUTOF10: 3
PAINLEVEL_OUTOF10: 10
PAINLEVEL_OUTOF10: 10
PAINLEVEL_OUTOF10: 4
PAINLEVEL_OUTOF10: 0
PAINLEVEL_OUTOF10: 4
PAINLEVEL_OUTOF10: 9
PAINLEVEL_OUTOF10: 7
PAINLEVEL_OUTOF10: 10
PAINLEVEL_OUTOF10: 10
PAINLEVEL_OUTOF10: 0
PAINLEVEL_OUTOF10: 0
PAINLEVEL_OUTOF10: 10
PAINLEVEL_OUTOF10: 8
PAINLEVEL_OUTOF10: 10
PAINLEVEL_OUTOF10: 5
PAINLEVEL_OUTOF10: 10
PAINLEVEL_OUTOF10: 0
PAINLEVEL_OUTOF10: 10
PAINLEVEL_OUTOF10: 10
PAINLEVEL_OUTOF10: 6
PAINLEVEL_OUTOF10: 10
PAINLEVEL_OUTOF10: 7
PAINLEVEL_OUTOF10: 10
PAINLEVEL_OUTOF10: 6
PAINLEVEL_OUTOF10: 7
PAINLEVEL_OUTOF10: 0
PAINLEVEL_OUTOF10: 10
PAINLEVEL_OUTOF10: 6
PAINLEVEL_OUTOF10: 10
PAINLEVEL_OUTOF10: 6
PAINLEVEL_OUTOF10: 5
PAINLEVEL_OUTOF10: 10
PAINLEVEL_OUTOF10: 10
PAINLEVEL_OUTOF10: 4
PAINLEVEL_OUTOF10: 10
PAINLEVEL_OUTOF10: 10
PAINLEVEL_OUTOF10: 5
PAINLEVEL_OUTOF10: 10
PAINLEVEL_OUTOF10: 4
PAINLEVEL_OUTOF10: 10
PAINLEVEL_OUTOF10: 0
PAINLEVEL_OUTOF10: 7
PAINLEVEL_OUTOF10: 9
PAINLEVEL_OUTOF10: 7
PAINLEVEL_OUTOF10: 7
PAINLEVEL_OUTOF10: 10
PAINLEVEL_OUTOF10: 9
PAINLEVEL_OUTOF10: 10
PAINLEVEL_OUTOF10: 0
PAINLEVEL_OUTOF10: 0
PAINLEVEL_OUTOF10: 9
PAINLEVEL_OUTOF10: 9
PAINLEVEL_OUTOF10: 7
PAINLEVEL_OUTOF10: 10
PAINLEVEL_OUTOF10: 9
PAINLEVEL_OUTOF10: 10
PAINLEVEL_OUTOF10: 9
PAINLEVEL_OUTOF10: 10
PAINLEVEL_OUTOF10: 0
PAINLEVEL_OUTOF10: 10
PAINLEVEL_OUTOF10: 10
PAINLEVEL_OUTOF10: 8
PAINLEVEL_OUTOF10: 8
PAINLEVEL_OUTOF10: 7
PAINLEVEL_OUTOF10: 10
PAINLEVEL_OUTOF10: 0
PAINLEVEL_OUTOF10: 10
PAINLEVEL_OUTOF10: 0
PAINLEVEL_OUTOF10: 7
PAINLEVEL_OUTOF10: 10
PAINLEVEL_OUTOF10: 9
PAINLEVEL_OUTOF10: 10
PAINLEVEL_OUTOF10: 3
PAINLEVEL_OUTOF10: 0
PAINLEVEL_OUTOF10: 8
PAINLEVEL_OUTOF10: 10
PAINLEVEL_OUTOF10: 10
PAINLEVEL_OUTOF10: 2
PAINLEVEL_OUTOF10: 4
PAINLEVEL_OUTOF10: 8
PAINLEVEL_OUTOF10: 9
PAINLEVEL_OUTOF10: 9
PAINLEVEL_OUTOF10: 10
PAINLEVEL_OUTOF10: 9
PAINLEVEL_OUTOF10: 10
PAINLEVEL_OUTOF10: 10
PAINLEVEL_OUTOF10: 3
PAINLEVEL_OUTOF10: 10
PAINLEVEL_OUTOF10: 8
PAINLEVEL_OUTOF10: 0
PAINLEVEL_OUTOF10: 6
PAINLEVEL_OUTOF10: 2
PAINLEVEL_OUTOF10: 10
PAINLEVEL_OUTOF10: 8
PAINLEVEL_OUTOF10: 0
PAINLEVEL_OUTOF10: 10
PAINLEVEL_OUTOF10: 0
PAINLEVEL_OUTOF10: 9
PAINLEVEL_OUTOF10: 9
PAINLEVEL_OUTOF10: 10
PAINLEVEL_OUTOF10: 9
PAINLEVEL_OUTOF10: 8
PAINLEVEL_OUTOF10: 10
PAINLEVEL_OUTOF10: 8
PAINLEVEL_OUTOF10: 6
PAINLEVEL_OUTOF10: 10
PAINLEVEL_OUTOF10: 10
PAINLEVEL_OUTOF10: 0
PAINLEVEL_OUTOF10: 10
PAINLEVEL_OUTOF10: 0
PAINLEVEL_OUTOF10: 9
PAINLEVEL_OUTOF10: 10
PAINLEVEL_OUTOF10: 9
PAINLEVEL_OUTOF10: 9
PAINLEVEL_OUTOF10: 10
PAINLEVEL_OUTOF10: 7
PAINLEVEL_OUTOF10: 9
PAINLEVEL_OUTOF10: 0
PAINLEVEL_OUTOF10: 8
PAINLEVEL_OUTOF10: 6
PAINLEVEL_OUTOF10: 5
PAINLEVEL_OUTOF10: 10
PAINLEVEL_OUTOF10: 10
PAINLEVEL_OUTOF10: 3
PAINLEVEL_OUTOF10: 10
PAINLEVEL_OUTOF10: 10
PAINLEVEL_OUTOF10: 2
PAINLEVEL_OUTOF10: 10
PAINLEVEL_OUTOF10: 9
PAINLEVEL_OUTOF10: 0
PAINLEVEL_OUTOF10: 9
PAINLEVEL_OUTOF10: 8
PAINLEVEL_OUTOF10: 10
PAINLEVEL_OUTOF10: 3
PAINLEVEL_OUTOF10: 7
PAINLEVEL_OUTOF10: 7
PAINLEVEL_OUTOF10: 9

## 2020-01-01 ASSESSMENT — PAIN DESCRIPTION - PAIN TYPE
TYPE: CHRONIC PAIN
TYPE: ACUTE PAIN
TYPE: CHRONIC PAIN
TYPE: ACUTE PAIN
TYPE: CHRONIC PAIN
TYPE: CHRONIC PAIN
TYPE: ACUTE PAIN
TYPE: ACUTE PAIN
TYPE: CHRONIC PAIN
TYPE: CHRONIC PAIN
TYPE: ACUTE PAIN
TYPE: ACUTE PAIN
TYPE: CHRONIC PAIN
TYPE: CHRONIC PAIN
TYPE: ACUTE PAIN
TYPE: CHRONIC PAIN
TYPE: ACUTE PAIN
TYPE: ACUTE PAIN
TYPE: CHRONIC PAIN
TYPE: ACUTE PAIN
TYPE: CHRONIC PAIN
TYPE: ACUTE PAIN
TYPE: ACUTE PAIN
TYPE: CHRONIC PAIN
TYPE: ACUTE PAIN;CHRONIC PAIN
TYPE: ACUTE PAIN
TYPE: CHRONIC PAIN
TYPE: ACUTE PAIN;CHRONIC PAIN
TYPE: ACUTE PAIN
TYPE: CHRONIC PAIN
TYPE: ACUTE PAIN;CHRONIC PAIN
TYPE: CHRONIC PAIN
TYPE: ACUTE PAIN
TYPE: CHRONIC PAIN
TYPE: ACUTE PAIN
TYPE: ACUTE PAIN
TYPE: CHRONIC PAIN
TYPE: ACUTE PAIN;CHRONIC PAIN
TYPE: CHRONIC PAIN
TYPE: ACUTE PAIN
TYPE: CHRONIC PAIN
TYPE: CHRONIC PAIN
TYPE: ACUTE PAIN
TYPE: CHRONIC PAIN
TYPE: ACUTE PAIN
TYPE: ACUTE PAIN
TYPE: ACUTE PAIN;CHRONIC PAIN
TYPE: CHRONIC PAIN
TYPE: ACUTE PAIN;CHRONIC PAIN
TYPE: ACUTE PAIN;CHRONIC PAIN
TYPE: ACUTE PAIN
TYPE: CHRONIC PAIN
TYPE: ACUTE PAIN
TYPE: CHRONIC PAIN
TYPE: ACUTE PAIN;CHRONIC PAIN
TYPE: CHRONIC PAIN
TYPE: ACUTE PAIN;CHRONIC PAIN
TYPE: CHRONIC PAIN
TYPE: CHRONIC PAIN;ACUTE PAIN
TYPE: CHRONIC PAIN

## 2020-01-01 ASSESSMENT — PAIN DESCRIPTION - PROGRESSION
CLINICAL_PROGRESSION: NOT CHANGED
CLINICAL_PROGRESSION: GRADUALLY IMPROVING
CLINICAL_PROGRESSION: NOT CHANGED
CLINICAL_PROGRESSION: GRADUALLY WORSENING
CLINICAL_PROGRESSION: GRADUALLY IMPROVING
CLINICAL_PROGRESSION: GRADUALLY WORSENING
CLINICAL_PROGRESSION: NOT CHANGED
CLINICAL_PROGRESSION: GRADUALLY WORSENING
CLINICAL_PROGRESSION: NOT CHANGED
CLINICAL_PROGRESSION: GRADUALLY WORSENING
CLINICAL_PROGRESSION: NOT CHANGED
CLINICAL_PROGRESSION: GRADUALLY IMPROVING
CLINICAL_PROGRESSION: NOT CHANGED
CLINICAL_PROGRESSION: NOT CHANGED
CLINICAL_PROGRESSION: GRADUALLY IMPROVING
CLINICAL_PROGRESSION: NOT CHANGED
CLINICAL_PROGRESSION: GRADUALLY IMPROVING
CLINICAL_PROGRESSION: NOT CHANGED
CLINICAL_PROGRESSION: GRADUALLY WORSENING
CLINICAL_PROGRESSION: NOT CHANGED
CLINICAL_PROGRESSION: GRADUALLY WORSENING
CLINICAL_PROGRESSION: GRADUALLY IMPROVING
CLINICAL_PROGRESSION: NOT CHANGED
CLINICAL_PROGRESSION: GRADUALLY WORSENING
CLINICAL_PROGRESSION: NOT CHANGED
CLINICAL_PROGRESSION: GRADUALLY WORSENING
CLINICAL_PROGRESSION: NOT CHANGED
CLINICAL_PROGRESSION: NOT CHANGED
CLINICAL_PROGRESSION: GRADUALLY WORSENING
CLINICAL_PROGRESSION: GRADUALLY WORSENING
CLINICAL_PROGRESSION: NOT CHANGED
CLINICAL_PROGRESSION: GRADUALLY WORSENING
CLINICAL_PROGRESSION: NOT CHANGED
CLINICAL_PROGRESSION: GRADUALLY WORSENING
CLINICAL_PROGRESSION: NOT CHANGED
CLINICAL_PROGRESSION: GRADUALLY IMPROVING
CLINICAL_PROGRESSION: NOT CHANGED

## 2020-01-01 ASSESSMENT — PAIN DESCRIPTION - DESCRIPTORS
DESCRIPTORS: PATIENT UNABLE TO DESCRIBE
DESCRIPTORS: ACHING;CONSTANT
DESCRIPTORS: ACHING;DISCOMFORT
DESCRIPTORS: ACHING;CONSTANT
DESCRIPTORS: ACHING
DESCRIPTORS: ACHING;CONSTANT
DESCRIPTORS: ACHING
DESCRIPTORS: ACHING;CONSTANT
DESCRIPTORS: ACHING;CONSTANT
DESCRIPTORS: DISCOMFORT;THROBBING;POUNDING
DESCRIPTORS: ACHING;CONSTANT;DISCOMFORT
DESCRIPTORS: ACHING;CONSTANT
DESCRIPTORS: ACHING
DESCRIPTORS: ACHING;CRAMPING
DESCRIPTORS: ACHING;CONSTANT
DESCRIPTORS: CONSTANT;DISCOMFORT
DESCRIPTORS: DISCOMFORT
DESCRIPTORS: ACHING
DESCRIPTORS: ACHING
DESCRIPTORS: SHARP
DESCRIPTORS: ACHING;CONSTANT
DESCRIPTORS: ACHING;DISCOMFORT
DESCRIPTORS: ACHING;SHARP;SHOOTING
DESCRIPTORS: ACHING;DISCOMFORT
DESCRIPTORS: ACHING
DESCRIPTORS: ACHING
DESCRIPTORS: ACHING;CONSTANT
DESCRIPTORS: CONSTANT
DESCRIPTORS: ACHING;DISCOMFORT;SHARP
DESCRIPTORS: DISCOMFORT;CONSTANT
DESCRIPTORS: ACHING;CRAMPING
DESCRIPTORS: ACHING;CONSTANT
DESCRIPTORS: CONSTANT
DESCRIPTORS: SHARP
DESCRIPTORS: CRUSHING;SHARP
DESCRIPTORS: ACHING;DISCOMFORT
DESCRIPTORS: ACHING;DISCOMFORT
DESCRIPTORS: PATIENT UNABLE TO DESCRIBE
DESCRIPTORS: ACHING
DESCRIPTORS: PATIENT UNABLE TO DESCRIBE
DESCRIPTORS: ACHING
DESCRIPTORS: ACHING;CONSTANT
DESCRIPTORS: ACHING;DISCOMFORT;SHARP
DESCRIPTORS: CONSTANT
DESCRIPTORS: SHARP;DISCOMFORT;ACHING
DESCRIPTORS: ACHING
DESCRIPTORS: ACHING;SHARP;SHOOTING
DESCRIPTORS: CONSTANT
DESCRIPTORS: ACHING
DESCRIPTORS: ACHING;SHARP;SHOOTING
DESCRIPTORS: CONSTANT;DISCOMFORT
DESCRIPTORS: ACHING;CONSTANT
DESCRIPTORS: ACHING;CRAMPING
DESCRIPTORS: ACHING
DESCRIPTORS: ACHING
DESCRIPTORS: CONSTANT;CRAMPING
DESCRIPTORS: ACHING;CONSTANT
DESCRIPTORS: ACHING;CONSTANT
DESCRIPTORS: ACHING;CONSTANT;DISCOMFORT
DESCRIPTORS: DISCOMFORT
DESCRIPTORS: ACHING;CRAMPING
DESCRIPTORS: DISCOMFORT

## 2020-01-01 ASSESSMENT — PAIN DESCRIPTION - LOCATION
LOCATION: RIB CAGE;CHEST;BACK
LOCATION: BACK
LOCATION: ABDOMEN
LOCATION: GENERALIZED
LOCATION: BACK;GENERALIZED
LOCATION: BACK;RIB CAGE
LOCATION: BACK;GENERALIZED
LOCATION: ABDOMEN;FLANK
LOCATION: ABDOMEN;FLANK
LOCATION: BACK;NECK
LOCATION: ABDOMEN
LOCATION: BACK
LOCATION: GENERALIZED
LOCATION: BACK;GENERALIZED
LOCATION: BACK
LOCATION: GENERALIZED
LOCATION: ABDOMEN;CHEST
LOCATION: CHEST;BACK;RIB CAGE
LOCATION: ABDOMEN
LOCATION: BACK
LOCATION: BACK;HIP
LOCATION: CHEST
LOCATION: BACK
LOCATION: BACK;ABDOMEN
LOCATION: GENERALIZED;BACK
LOCATION: ABDOMEN;BACK;CHEST
LOCATION: GENERALIZED
LOCATION: BACK
LOCATION: BACK;GENERALIZED
LOCATION: BACK
LOCATION: BACK;HIP
LOCATION: BACK
LOCATION: BACK
LOCATION: GENERALIZED
LOCATION: GENERALIZED
LOCATION: BACK;HIP
LOCATION: CHEST
LOCATION: RIB CAGE;GENERALIZED
LOCATION: BACK;HIP
LOCATION: GENERALIZED
LOCATION: CHEST;ABDOMEN
LOCATION: GENERALIZED
LOCATION: GENERALIZED
LOCATION: BACK
LOCATION: ABDOMEN
LOCATION: GENERALIZED
LOCATION: BACK
LOCATION: GENERALIZED
LOCATION: ABDOMEN;FLANK
LOCATION: BACK;RIB CAGE;GENERALIZED
LOCATION: GENERALIZED
LOCATION: GENERALIZED
LOCATION: BACK;HIP
LOCATION: RIB CAGE
LOCATION: GENERALIZED
LOCATION: GENERALIZED
LOCATION: CHEST;ABDOMEN
LOCATION: ABDOMEN
LOCATION: GENERALIZED
LOCATION: RIB CAGE
LOCATION: BACK;HIP
LOCATION: GENERALIZED
LOCATION: ABDOMEN
LOCATION: GENERALIZED
LOCATION: GENERALIZED
LOCATION: GENERALIZED;BACK
LOCATION: BACK;HIP
LOCATION: GENERALIZED
LOCATION: BACK
LOCATION: GENERALIZED;BACK
LOCATION: CHEST
LOCATION: GENERALIZED
LOCATION: ABDOMEN
LOCATION: BACK
LOCATION: ABDOMEN
LOCATION: BACK
LOCATION: BACK;HIP
LOCATION: BACK;HIP
LOCATION: ABDOMEN;FLANK;BACK
LOCATION: NECK
LOCATION: GENERALIZED
LOCATION: GENERALIZED
LOCATION: BACK;HIP
LOCATION: ABDOMEN
LOCATION: BACK;ABDOMEN
LOCATION: GENERALIZED
LOCATION: GENERALIZED
LOCATION: BACK
LOCATION: CHEST
LOCATION: RIB CAGE
LOCATION: GENERALIZED
LOCATION: ABDOMEN

## 2020-01-01 ASSESSMENT — PULMONARY FUNCTION TESTS
PIF_VALUE: 41
PIF_VALUE: 42
PIF_VALUE: 28
PIF_VALUE: 44
PIF_VALUE: 34

## 2020-01-01 ASSESSMENT — PAIN DESCRIPTION - ONSET
ONSET: ON-GOING
ONSET: PROGRESSIVE
ONSET: ON-GOING
ONSET: PROGRESSIVE
ONSET: ON-GOING
ONSET: PROGRESSIVE
ONSET: ON-GOING
ONSET: PROGRESSIVE
ONSET: ON-GOING
ONSET: ON-GOING
ONSET: PROGRESSIVE
ONSET: ON-GOING

## 2020-01-01 ASSESSMENT — PAIN - FUNCTIONAL ASSESSMENT
PAIN_FUNCTIONAL_ASSESSMENT: PREVENTS OR INTERFERES SOME ACTIVE ACTIVITIES AND ADLS
PAIN_FUNCTIONAL_ASSESSMENT: PREVENTS OR INTERFERES WITH MANY ACTIVE NOT PASSIVE ACTIVITIES
PAIN_FUNCTIONAL_ASSESSMENT: PREVENTS OR INTERFERES SOME ACTIVE ACTIVITIES AND ADLS
PAIN_FUNCTIONAL_ASSESSMENT: PREVENTS OR INTERFERES WITH MANY ACTIVE NOT PASSIVE ACTIVITIES
PAIN_FUNCTIONAL_ASSESSMENT: ACTIVITIES ARE NOT PREVENTED
PAIN_FUNCTIONAL_ASSESSMENT: PREVENTS OR INTERFERES SOME ACTIVE ACTIVITIES AND ADLS
PAIN_FUNCTIONAL_ASSESSMENT: ACTIVITIES ARE NOT PREVENTED
PAIN_FUNCTIONAL_ASSESSMENT: PREVENTS OR INTERFERES SOME ACTIVE ACTIVITIES AND ADLS
PAIN_FUNCTIONAL_ASSESSMENT: PREVENTS OR INTERFERES WITH ALL ACTIVE AND SOME PASSIVE ACTIVITIES
PAIN_FUNCTIONAL_ASSESSMENT: PREVENTS OR INTERFERES SOME ACTIVE ACTIVITIES AND ADLS
PAIN_FUNCTIONAL_ASSESSMENT: ACTIVITIES ARE NOT PREVENTED
PAIN_FUNCTIONAL_ASSESSMENT: PREVENTS OR INTERFERES SOME ACTIVE ACTIVITIES AND ADLS

## 2020-01-01 ASSESSMENT — PAIN DESCRIPTION - FREQUENCY
FREQUENCY: CONTINUOUS
FREQUENCY: CONTINUOUS
FREQUENCY: INTERMITTENT
FREQUENCY: CONTINUOUS
FREQUENCY: INTERMITTENT
FREQUENCY: INTERMITTENT
FREQUENCY: CONTINUOUS
FREQUENCY: OTHER (COMMENT)
FREQUENCY: CONTINUOUS
FREQUENCY: INTERMITTENT
FREQUENCY: CONTINUOUS
FREQUENCY: INTERMITTENT
FREQUENCY: CONTINUOUS

## 2020-01-01 ASSESSMENT — ENCOUNTER SYMPTOMS
EYES NEGATIVE: 1
NAUSEA: 0
WHEEZING: 1
COUGH: 1
SHORTNESS OF BREATH: 1
SHORTNESS OF BREATH: 1
ABDOMINAL PAIN: 0
SINUS PRESSURE: 0
CHEST TIGHTNESS: 0
EYES NEGATIVE: 1
COLOR CHANGE: 0
CHEST TIGHTNESS: 0
DIARRHEA: 0
COUGH: 1
RHINORRHEA: 0
SORE THROAT: 0
NAUSEA: 0
ABDOMINAL PAIN: 0
DIARRHEA: 0
EYE REDNESS: 0
CONSTIPATION: 0
VOMITING: 0
VOMITING: 0
SORE THROAT: 0
NAUSEA: 0
COUGH: 0
SHORTNESS OF BREATH: 1
EYE DISCHARGE: 0
BLOOD IN STOOL: 0
ABDOMINAL PAIN: 0
VOMITING: 0
RHINORRHEA: 0
WHEEZING: 0
BACK PAIN: 0
ABDOMINAL DISTENTION: 0
COLOR CHANGE: 0
SHORTNESS OF BREATH: 1
CONSTIPATION: 0
SINUS PAIN: 0

## 2020-01-01 ASSESSMENT — PAIN DESCRIPTION - DIRECTION
RADIATING_TOWARDS: ABDOMEN AND THIGHS
RADIATING_TOWARDS: ABDOMEN
RADIATING_TOWARDS: ABDOMEN
RADIATING_TOWARDS: RIBCAGE
RADIATING_TOWARDS: ABDOMEN
RADIATING_TOWARDS: RIBS
RADIATING_TOWARDS: RIBS

## 2020-02-12 PROBLEM — I50.33 ACUTE ON CHRONIC DIASTOLIC (CONGESTIVE) HEART FAILURE (HCC): Status: ACTIVE | Noted: 2020-01-01

## 2020-02-13 NOTE — PLAN OF CARE
Patient verbalized understanding of safety. States she will not get up without assistance. Patient alert and oriented and does not call for a bed check. Will monitor.

## 2020-02-13 NOTE — PLAN OF CARE
Patient's EF (Ejection Fraction) is greater than 40%    Heart Failure Medications:  Diuretics[de-identified] Furosemide   ACE[de-identified] Lisinopril  ARB::   ARNI:: None  Evidenced Based Beta Blocker[de-identified] Carvedilol- Coreg, Bisoprolol and None    Patient's weights and intake/output reviewed: Yes    Patient's Last Weight: 187.8lb obtained by standing weight. Today's weight 184.11 lb obtained by standing weight difference of  Lbs 3.3lb less than last documented weight. Intake/Output Summary (Last 24 hours) at 2/13/2020 2145  Last data filed at 2/13/2020 0120  Gross per 24 hour   Intake --   Output 1000 ml   Net -1000 ml       Comorbidities Reviewed Yes    Patient has a past medical history of Acute osteomyelitis of left hallux ([address] Anemia, Asthma, Cellulitis of foot, CHF (congestive heart failure) ([facility] Claudication of left upper extremity due to atherosclerosis [facility]), Diabetic ulcer of left hallux, with necrosis of bone ([address] DVT (deep venous thrombosis) ([address] Kidney stones, Morbid obesity with BMI of 45.0-49.9, adult ([address] Neuroma of hand, Open fracture of left hallux, Pneumonia, Rotator cuff tendinitis, STEMI (ST elevation myocardial infarction) ([address] and Urinary incontinence. >>For CHF and Comorbidity documentation on Education Time and Topics, please see Education Tab    Progressive Mobility Assessment:  What is this patient's Current Level of Mobility?: Ambulatory- with Assistance  How was this patient Mobilized today?: Edge of Bed, Up to Chair, Bedside Commode, Up in [facility], Up in [place], Unable to Mobilize and Patient Refuses to Mobilizes                 With Whom? PT, OT and Self                 Level of Difficulty/Assistance: 1x Assist     Pt resting in bed at this time on 2 L O2. Pt with complaints of shortness of breath. Pt with pitting lower extremity edema.      Patient and/or Family's stated Goal of Care this Admission: increase activity tolerance, better understand heart failure and disease management and reduce lower extremity edema prior to [facility]

## 2020-02-13 NOTE — CARE COORDINATION
CASE MANAGEMENT INITIAL ASSESSMENT      Reviewed chart and met with patient today, re: Possible d/c needs   Explained Case Management role/services. Family present: None   Primary contact information: Pt's daughter Javy Couch [phone]    Admit date/status: 2/12/20   Diagnosis: CHF   Is this a Readmission?: No    Insurance: [employer] required for SNF - Y        3 night stay required - N    Living arrangements, Adls, care needs, prior to admission: Pt lives at home with her daughter independent of adl's     Transportation: family     Durable Medical Equipment at home: Walker_X_Cane_X_RTS__ BSC__Shower Chair__  02__ HHN__ CPAP__  [facility] Bed__ W/C___ Other__________    Services in the home and/or outpatient, prior to admission: None     Dialysis Facility (if applicable) N/A   · Name:  · Address:  · Dialysis Schedule:  · Phone:  · Fax:    PT/OT recs: Not ordered     Hospital Exemption Notification (HEN): Needed for SNF     Barriers to discharge: None     Plan/comments: 2/13/20 Pt denies any discharge needs. Watch for possible o2 needs. CM  does not anticipate any other  discharge needs at this time. Please notify Discharge Planner should any needs arise.      ECOC on chart for MD signature

## 2020-02-13 NOTE — CONSULTS
Nutrition Education    Type and Reason for Visit: Patient Education    Nutrition Assessment:  Educated pt on low sodium, fluid restriction and weight monitoring, provided written and verbal instruction. Pt reports not adding extra salt to her foods. Pt states she likes to use spices to flavor her foods including garlic salt, encouraged spices without salt like garlic powder. Pt states she drink about 2 liters of fluids per day including coffee and diet soda, encouraged water or flavored water options. Pt reports not taking daily weights and she does not plan on doing it after discharge. Informed pt of the importance of weight monitoring and encouraged daily weights. Pt refused diabetes nutrition education, left handout at bedside. · Verbally reviewed information with Patient and Family  · Written educational materials provided. · Contact name and number provided. · Refer to Patient Education activity for more details.     Electronically signed by Maisha Rodriguez MS, RD, LD on 2/13/20 at 2:34 PM    Contact Number: Office: [phone]

## 2020-02-13 NOTE — CONSULTS
intake/output reviewed:         Last three weights hospital weights reviewed:    Patient Vitals for the past 96 hrs (Last 3 readings):   Weight   02/13/20 0609 184 lb 11.2 oz (83.8 kg)   02/12/20 2222 187 lb 8 oz (85 kg)   02/12/20 2218 187 lb 8 oz (85 kg)       Patient provided with both written and verbal education on CHF signs/symptoms, causes, discharge medications, daily weights, low sodium diet, activity, and follow-up. HF zone self management written instructions provided/reviewed and advised to call MD when in \"yellow\" zone. Instructed them to call the doctor post discharge if they experiences increasing worsening shortness of breath, worsening chest pains, increased swelling from their baseline, worsening cough, or weight gain of >2- 3 lbs in a day/ 5 lb gain in a week. Also advised to call the doctor if they feels dizzy, increased fatigue, decreased or difficulty urinating. Instructed on and emphasized importance of scheduled hospital follow-up appointment with Flex Santamaria MD on 02- at 3:15pm.       Olu Amaya verbalized understanding. No additional questions at this time. Stated she will alert nurse with any questions. PATIENT/CAREGIVER TEACHING:    Level of patient/caregiver understanding able to:   [ X ] Verbalize understanding [ ] Demonstrate understanding [ ] Teach back   [ X ] Needs reinforcement [ ] Other:     Education Time: 20 minutes    Recommendations:   1. Encourage follow-up appointment compliance. Next appointment: 02-20  2. Educate further on fluid restriction of <64oz during inpatient stay so they can understand how to measure intake at home. 3. Review sodium restrictions. Encouraged to not add table salt to their foods and avoid foods that are high in sodium. 4. Continue to educate on S/S. Stress the importance of calling the MD with the earliest signs of an acute exacerbation.    5. Emphasize daily weights - instructed to call the MD if the patient gains 3 lb in a day or 5 lb in a week. 6. Provided patient with CHF Resource Line for questions and concerns. 7. Smoking cessation    KEvan  [facility] BSN-RN  Heart Failure Navigator  [address] [place] [address]  [phone]

## 2020-02-13 NOTE — PLAN OF CARE
Problem: OXYGENATION/RESPIRATORY FUNCTION  Goal: Patient will maintain patent airway  Outcome: Ongoing  Note:     Patient's EF (Ejection Fraction) is greater than 40%    Heart Failure Medications:  Diuretics[de-identified] Furosemide    (One of the following REQUIRED for EF <40%/SYSTOLIC FAILURE but MAY be used in EF% >40%/DIASTOLIC FAILURE)        ACE[de-identified] Lisinopril        ARB[de-identified] None         ARNI[de-identified] None    (Beta Blockers)  NON- Evidenced Based Beta Blocker (for EF% >40%/DIASTOLIC FAILURE): Metoprolol TARTrate- Lopressor    Evidenced Based Beta Blocker::(REQUIRED for EF% <40%/SYSTOLIC FAILURE) None  . .................................................................................................................................................. Patient's weights and intake/output reviewed: Yes    Patient's Last Weight: 184 lbs obtained by standing scale. Difference of 3 lbs less than last documented weight. Intake/Output Summary (Last 24 hours) at 2/13/2020 1533  Last data filed at 2/13/2020 1435  Gross per 24 hour   Intake 480 ml   Output 1900 ml   Net -1420 ml       Comorbidities Reviewed Yes    Patient has a past medical history of Acute osteomyelitis of left hallux ([facility] [address] Anemia, Asthma, Cellulitis of foot, CHF (congestive heart failure) ([facility] [address] Claudication of left upper extremity due to atherosclerosis [facility]), Diabetic ulcer of left hallux, with necrosis of bone ([facility] [address] DVT (deep venous thrombosis) ([facility] [address] Kidney stones, Morbid obesity with BMI of 45.0-49.9, adult ([facility] [address] Neuroma of hand, Open fracture of left hallux, Pneumonia, Rotator cuff tendinitis, STEMI (ST elevation myocardial infarction) ([facility] [address] and Urinary incontinence.      >>For CHF and Comorbidity documentation on Education Time and Topics, please see Education Tab    Progressive Mobility Assessment:  What is this patient's Current Level of Mobility?: Ambulatory- with Assistance  How was this patient Mobilized today?:  Up to Toilet/Shower and Up in Room With Whom? Nurse                 Level of Difficulty/Assistance: 1x Assist     Pt resting in bed at this time on 2 L O2. Pt denies shortness of breath. Pt with pitting lower extremity edema.      Patient and/or Family's stated Goal of Care this Admission: reduce shortness of breath, increase activity tolerance, better understand heart failure and disease management, be more comfortable, and reduce lower extremity edema prior to discharge        :

## 2020-02-13 NOTE — H&P
6/3/2019    LEFT HALLUX AMPUTATION WITH GRAFT APPLICATION performed by Leigh Adames DPM at [facility] Left 8/20/2019    PARTIAL FIRST RAY  AMPUTATION LEFT FOOT performed by Tanya Chavez DPM at [address] VASCULAR SURGERY Left 06/2005    atherectomy of SFA and popliteal artery    VASCULAR SURGERY Left 10/2007    subclavian angiopalsty and stent       Medications Prior to Admission:      Prior to Admission medications    Medication Sig Start Date End Date Taking? Authorizing Provider   clonazePAM (KLONOPIN) 0.5 MG tablet TAKE ONE TABLET BY MOUTH THREE TIMES A DAY AS NEEDED FOR ANXIETY 1/31/20 2/10/20  Gaby Blanco MD   pregabalin (LYRICA) 150 MG capsule Take 1 capsule by mouth 2 times daily for 30 days. . To replace gabapentin 1/22/20 2/21/20  Jorge Alberto Garcia MD   ferrous sulfate 325 (65 Fe) MG EC tablet Take 1 tablet by mouth daily (with breakfast) 1/22/20   Jorge Alberto Garcia MD   furosemide (LASIX) 20 MG tablet Take 1 tablet by mouth twice daily. 1/22/20   Jorge Alberto Garcia MD   levothyroxine (SYNTHROID) 50 MCG tablet Take 1 tablet by mouth daily. 1/22/20   Jorge Alberto Garcia MD   atorvastatin (LIPITOR) 40 MG tablet Take 1 tablet by mouth daily. 1/22/20   Jorge Alberto Garcia MD   ranolazine (RANEXA) 500 MG extended release tablet Take 1 tablet by mouth twice daily. 10/24/19   Lopez Hawkins MD   isosorbide mononitrate (IMDUR) 30 MG extended release tablet Take 1 tablet by mouth daily. 10/24/19   Jorge Alberto Garcia MD   metFORMIN (GLUCOPHAGE) 1000 MG tablet Take 1 tablet by mouth twice daily with meals. 10/24/19   Jorge Alberto Garcia MD   metoprolol tartrate (LOPRESSOR) 25 MG tablet Take 1 tablet by mouth twice daily. 10/24/19   Jorge Alberto Garcia MD   SPIRIVA RESPIMAT 2.5 MCG/ACT AERS inhaler INHALE 2 PUFFS BY MOUTH DAILY 10/24/19   Jorge Alberto Garcia MD   clopidogrel (PLAVIX) 75 MG tablet Take 1 tablet by mouth daily.  10/24/19   Jorge Alberto Garcia MD   fluticasone (FLONASE) 50 MCG/ACT nasal stable - denies chest pain. Continue ASA, Plavix, Imdur, Ranexa and Lipitor     Hypothyroid - clinically euthyroid on oral replacement therapy. Continue, with outpt monitoring as previously arranged. DM2 - uncontrolled on home oral insulin - continued and adjust as needed. Follow FSBS/SSI medium regimen. Last HbA1c 13.8 % dated 6/3/2019. Need to adjust insulin, lifestyle modification to achieve target HgBA1C. DVT Prophylaxis: Lovenox   Diet: DIET GENERAL;  Code Status: Full Code    PT/OT Eval Status: ordered     Dispo - Admit to telemetry unit. Nat Burdick MD    Thank you Galileo Meyers MD for the opportunity to be involved in this patient's care. If you have any questions or concerns please feel free to contact me at [phone].

## 2020-02-13 NOTE — ED PROVIDER NOTES
EMERGENCY DEPARTMENT ENCOUNTER      This patient was seen and evaluated by the attending physician. Pt Name: Heather Sorensen  MRN: [id number]  [place] 1960  Date of evaluation: 2/12/2020  Provider: MITCHELL White  PCP: Sanya Mullins MD  ED Attending: Dr. John May. History provided by the patient. CHIEF COMPLAINT:     Chief Complaint   Patient presents with    Leg Swelling     Pt states she has had swelling in her legs for the past week, today noticed swelling in her arms and face. Pt tried to make an appointment with cards, unable to see them until March. Pt takes lasix as ordered at home.  Shortness of Breath     Pt's O2 saturation 87-88% on RA while in triage. Pt moved to room with O2. HISTORY OF PRESENT ILLNESS:      Heather Sorensen is a 63 y.o. female who presents to [facility] ER with complaints of swelling in her legs is been getting worse since the past week. Patient states that she feels like her whole body is swollen including her face. Patient states that she tried to call her cardiologist however was unable to see them until March. Patient does take Lasix at home, she has not changed her dose. Patient was hypoxic on room air at triage. Patient is not on oxygen at home. She denies any aliza chest pain. Denies nausea or vomiting. She is here for further evaluation. Location:-  Quality:-  Severity:-  Duration:-  Modifying factors:-    Nursing Notes were reviewed     REVIEW OF SYSTEMS:     Review of Systems  All systems, atotal of 10, are reviewed and negative except for those that were just noted in history present illness.         PAST MEDICAL HISTORY:     Past Medical History:   Diagnosis Date    Acute osteomyelitis of left hallux ([facility] [address] 06/03/2019    Anemia 6/2/2015    Asthma     Cellulitis of foot 06/2019    CHF (congestive heart failure) (HCC)     Claudication of left upper extremity due to atherosclerosis ([facility] [address] 10/2007    Diabetic ulcer of levothyroxine (SYNTHROID) 50 MCG tablet Take 1 tablet by mouth daily. Qty: 30 tablet, Refills: 4      atorvastatin (LIPITOR) 40 MG tablet Take 1 tablet by mouth daily. Qty: 30 tablet, Refills: 4      ranolazine (RANEXA) 500 MG extended release tablet Take 1 tablet by mouth twice daily. Qty: 180 tablet, Refills: 1      isosorbide mononitrate (IMDUR) 30 MG extended release tablet Take 1 tablet by mouth daily. Qty: 30 tablet, Refills: 5      metFORMIN (GLUCOPHAGE) 1000 MG tablet Take 1 tablet by mouth twice daily with meals. Qty: 60 tablet, Refills: 5      metoprolol tartrate (LOPRESSOR) 25 MG tablet Take 1 tablet by mouth twice daily. Qty: 60 tablet, Refills: 5      SPIRIVA RESPIMAT 2.5 MCG/ACT AERS inhaler INHALE 2 PUFFS BY MOUTH DAILY  Qty: 1 Inhaler, Refills: 5      clopidogrel (PLAVIX) 75 MG tablet Take 1 tablet by mouth daily. Qty: 30 tablet, Refills: 5      fluticasone (FLONASE) 50 MCG/ACT nasal spray 2 sprays by Nasal route daily  Qty: 1 Bottle, Refills: 3    Associated Diagnoses: PND (post-nasal drip)      sertraline (ZOLOFT) 100 MG tablet Take 1 tablet by mouth daily  Qty: 30 tablet, Refills: 3    Associated Diagnoses: Anxiety and depression      oxybutynin (DITROPAN) 5 MG tablet 1 po q am and 2 po qhs  Qty: 90 tablet, Refills: 3    Associated Diagnoses: Stress incontinence      gabapentin (NEURONTIN) 300 MG capsule Take 3 capsules by mouth 3 times daily. insulin glargine (LANTUS) 100 UNIT/ML injection vial Inject 8 Units into the skin nightly  Qty: 1 vial, Refills: 3      insulin lispro (HUMALOG) 100 UNIT/ML injection vial Inject 0-6 Units into the skin nightly  Qty: 1 vial, Refills: 3      blood glucose test strips (FREESTYLE LITE) strip TEST THREE TIMES A DAY  Qty: 100 strip, Refills: 2      lisinopril (PRINIVIL;ZESTRIL) 5 MG tablet Take 1 tablet by mouth daily  Qty: 90 tablet, Refills: 2      SYMBICORT 160-4.5 MCG/ACT AERO Inhale 2 puffs by mouth twice daily.   Qty: 1 Inhaler, Refills: 3 of cauda equina. No tenderness on exam  /ANORECTAL: Not assessed  MUSKULOSKELETAL: Normal ROM. No acute deformities. 3+ pitting edema to bilateral lower extremities. SKIN: Warm and dry. NEUROLOGICAL:  GCS 15. CN II-XII grossly intact. Strength is 5/5 in allextremities and sensation is intact. PSYCHIATRIC: Normal affect, normal insight and judgement. Alert andoriented x 3.         DIAGNOSTIC RESULTS:     LABS:    Results for orders placed or performed during the hospital encounter of 02/12/20   CBC   Result Value Ref Range    WBC 10.6 4.0 - 11.0 K/uL    RBC 3.38 (L) 4.00 - 5.20 M/uL    Hemoglobin 10.9 (L) 12.0 - 16.0 g/dL    Hematocrit 32.7 (L) 36.0 - 48.0 %    MCV 96.8 80.0 - 100.0 fL    MCH 32.3 26.0 - 34.0 pg    MCHC 33.4 31.0 - 36.0 g/dL    RDW 14.6 12.4 - 15.4 %    Platelets 559 106 - 943 K/uL    MPV 10.6 (H) 5.0 - 10.5 fL   Comprehensive Metabolic Panel   Result Value Ref Range    Sodium 139 136 - 145 mmol/L    Potassium 4.7 3.5 - 5.1 mmol/L    Chloride 102 99 - 110 mmol/L    CO2 25 21 - 32 mmol/L    Anion Gap 12 3 - 16    Glucose 179 (H) 70 - 99 mg/dL    BUN 19 7 - 20 mg/dL    CREATININE 0.9 0.6 - 1.1 mg/dL    GFR Non-African American >60 >60    GFR African American >60 >60    Calcium 8.9 8.3 - 10.6 mg/dL    Total Protein 6.9 6.4 - 8.2 g/dL    Alb 3.9 3.4 - 5.0 g/dL    Albumin/Globulin Ratio 1.3 1.1 - 2.2    Total Bilirubin <0.2 0.0 - 1.0 mg/dL    Alkaline Phosphatase 68 40 - 129 U/L    ALT 12 10 - 40 U/L    AST 12 (L) 15 - 37 U/L    Globulin 3.0 g/dL   Brain Natriuretic Peptide   Result Value Ref Range    Pro-BNP 2,318 (H) 0 - 124 pg/mL   Troponin   Result Value Ref Range    Troponin <0.01 <0.01 ng/mL   Urinalysis   Result Value Ref Range    Color, UA Yellow Straw/Yellow    Clarity, UA Clear Clear    Glucose, Ur 500 (A) Negative mg/dL    Bilirubin Urine Negative Negative    Ketones, Urine Negative Negative mg/dL    Specific Gravity, UA 1.020 1.005 - 1.030    Blood, Urine Negative Negative    pH, UA 6.0 5.0 - 8.0    Protein, UA Negative Negative mg/dL    Urobilinogen, Urine 0.2 <2.0 E.U./dL    Nitrite, Urine Negative Negative    Leukocyte Esterase, Urine Negative Negative    Microscopic Examination Not Indicated     Urine Type NotGiven    POCT Glucose   Result Value Ref Range    POC Glucose 204 (H) 70 - 99 mg/dl    Performed on ACCU-CHEK    EKG 12 Lead   Result Value Ref Range    Ventricular Rate 60 BPM    Atrial Rate 60 BPM    P-R Interval 138 ms    QRS Duration 72 ms    Q-T Interval 454 ms    QTc Calculation (Bazett) 454 ms    P Axis 70 degrees    R Axis 11 degrees    T Axis 34 degrees    Diagnosis       Normal sinus rhythmLow voltage QRSBorderline ECGWhen compared with ECG of 28-JUN-2018 02:30,No significant change was found         RADIOLOGY:  All x-ray studies are viewed/reviewedby me. Formal interpretations per the radiologist are as follows:      XR CHEST STANDARD (2 VW)   Final Result   Stable cardiomegaly with bilateral airspace disease likely edema. Given   asymmetric right basilar increased opacity, pneumonia could be considered. EKG:  See EKG interpretation by an attending phsyician      PROCEDURES:   N/A    CRITICAL CARE TIME:   N/A    CONSULTS:  IP CONSULT TO HOSPITALIST      EMERGENCYDEPARTMENT COURSE and DIFFERENTIAL DIAGNOSIS/MDM:   Vitals:    Vitals:    02/12/20 2125 02/12/20 2218 02/12/20 2222 02/13/20 0048   BP: (!) 94/54 (!) 100/49  98/64   Pulse: 61 58  55   Resp: 18 20  16   Temp:  98 °F (36.7 °C)  97.7 °F (36.5 °C)   TempSrc:  Oral  Oral   SpO2: 97%   92%   Weight:  187 lb 8 oz (85 kg) 187 lb 8 oz (85 kg)    Height:   5' (1.524 m)            Patient was evaluated by both myself and Dr. Hero Fritz. Patient presented to the emergency room today with complaints of swelling. Patient tells me she has a diagnosis of CHF. She had no aliza chest pain. Patient is on Lasix at home, unable to see her cardiologist.  Patient work-up here showed no leukocytosis, normal renal function.

## 2020-02-13 NOTE — PLAN OF CARE
Problem: Falls - Risk of:  Goal: Will remain free from falls  Description  Will remain free from falls  Outcome: Ongoing  Note:   Pt will remain free from falls throughout hospital stay. Fall precautions in place, bed alarm on, bed in lowest position with wheels locked and side rails 2/4 up. Room door open and hourly rounding completed. Will continue to monitor throughout shift. Problem: Pain:  Goal: Pain level will decrease  Description  Pain level will decrease  Outcome: Ongoing  Note:   Pt will be satisfied with pain control with PRN Q6 Percocet. Pt uses numeric pain rating scale with reassessments after pain med administration. Will continue to monitor progression throughout shift. Problem: Serum Glucose Level - Abnormal:  Goal: Ability to maintain appropriate glucose levels will improve  Description  Ability to maintain appropriate glucose levels will improve  Outcome: Ongoing  Note:   Pt will have accuchecks before meals and at bedtime with sliding scale insulin in place for coverage. Will continue to monitor for signs and symptoms of hypoglycemia and hyperglycemia throughout shift.

## 2020-02-13 NOTE — PROGRESS NOTES
turgor normal.  No rashes or lesions. Neurologic:  Neurovascularly intact without any focal sensory/motor deficits. Cranial nerves: II-XII intact, grossly non-focal.  Psychiatric: Alert and oriented, thought content appropriate, normal insight  Capillary Refill: Brisk,< 3 seconds   Peripheral Pulses: +2 palpable, equal bilaterally       Labs:   Recent Labs     02/12/20 1919   WBC 10.6   HGB 10.9*   HCT 32.7*        Recent Labs     02/12/20 1919      K 4.7      CO2 25   BUN 19   CREATININE 0.9   CALCIUM 8.9     Recent Labs     02/12/20 1919   AST 12*   ALT 12   BILITOT <0.2   ALKPHOS 68     No results for input(s): INR in the last 72 hours. Recent Labs     02/12/20 1919   TROPONINI <0.01       Urinalysis:      Lab Results   Component Value Date    NITRU Negative 02/12/2020    WBCUA 20-50 06/03/2019    BACTERIA Rare 06/03/2019    RBCUA 0-2 06/03/2019    BLOODU Negative 02/12/2020    SPECGRAV 1.020 02/12/2020    GLUCOSEU 500 02/12/2020    GLUCOSEU 100 05/26/2012       Radiology:  XR CHEST STANDARD (2 VW)   Final Result   Stable cardiomegaly with bilateral airspace disease likely edema. Given   asymmetric right basilar increased opacity, pneumonia could be considered.                  Assessment/Plan:    Active Hospital Problems    Diagnosis Date Noted    Acute on chronic diastolic (congestive) heart failure (HCC) [I50.33] 02/12/2020    PVD (peripheral vascular disease) ([facility] [address] [I73.9]     Class 1 obesity due to excess calories with serious comorbidity and body mass index (BMI) of 34.0 to 34.9 in adult [E66.09, Z68.34] 07/09/2019    Anxiety and depression [F41.9, F32.9]     Tobacco use [Z72.0] 11/01/2018    Coronary artery disease involving native coronary artery of native heart without angina pectoris [I25.10] 07/24/2018    Chronic low back pain [M54.5, G89.29] 02/04/2016    JORDI on CPAP [G47.33, Z99.89] 10/29/2015    HTN (hypertension) [I10] 02/26/2014    Hypothyroid [E03.9] 02/26/2014  Uncontrolled type 2 diabetes mellitus with diabetic polyneuropathy, with long-term current use of insulin ([facility] [address] [E11.42, Z79.4, E11.65] 02/20/2014    Reflux esophagitis [K21.0] 08/18/2011     1. Acute on chronic diastolic heart failure  -Echocardiogram from 2/13/2020 showed EF of 55-60% with grade III diastolic dysfunction along with severe pulmonary hypertension.   -Currently on 2L of oxygen. Wean oxygen as tolerated. Patient states she feels like there has been improvement since she arrived. - (-)1L since admission on 2/12/2020  -Continue IV Lasix 20mg BID, daily weights, daily BMP, closely monitor intake and output.   -The patient was encouraged to be compliant while wearing compression stockings at home and also encouraged to elevate her lower extremities to help with the edema. -CHF nurse consulted. 2. Uncontrolled type 2 diabetes with diabetic polyneuropathy  -Last HgbA1c 13.8% 6/3/2019.   -Continue medium dose correction sliding scale.   -Encourage low carbohydrate diet and exercise as tolerated. -Continue gabapentin. 3. Coronary artery disease   -Denies chest pain, EKG 2/12/2020 showed normal sinus rhythm with no ischemic changes.   -Continue aspirin, clopidogrel, atorvastatin, ranolazine, and imdur. 4. Hypertension  -Blood pressures have been stable. -Continue metoprolol and lisinopril. Continue to monitor. 5. Hypothyroidism  -Continue levothyroxine 50mcg.   -The patient currently denies signs/symptoms related to hyper/hypothyroidism such as weight loss/gain, or cold/heat intolerance. 6. Chronic low back pain   -Continue Percocet 7.5-325 every 6 hours for severe pain    7.  Peripheral vascular disease  -Continue atorvastatin and clopidogrel        DVT Prophylaxis: Enoxaparin  Diet: DIET GENERAL; Carb Control: 4 carb choices (60 gms)/meal; Low Sodium (2 GM)  Code Status: Full Code    PT/OT Eval Status: Denies need for PT evaluation    Dispo - Likely two days pending clinical

## 2020-02-14 NOTE — PLAN OF CARE
Problem: OXYGENATION/RESPIRATORY FUNCTION  Goal: Patient will maintain patent airway  Outcome: Ongoing  Note:     Patient's EF (Ejection Fraction) is greater than 40%    Heart Failure Medications:  Diuretics[de-identified] Furosemide    (One of the following REQUIRED for EF <40%/SYSTOLIC FAILURE but MAY be used in EF% >40%/DIASTOLIC FAILURE)        ACE[de-identified] Lisinopril        ARB[de-identified] None         ARNI[de-identified] None    (Beta Blockers)  NON- Evidenced Based Beta Blocker (for EF% >40%/DIASTOLIC FAILURE): None    Evidenced Based Beta Blocker::(REQUIRED for EF% <40%/SYSTOLIC FAILURE) None  . .................................................................................................................................................. Patient's weights and intake/output reviewed: Yes    Patient's Last Weight: 187 lbs obtained by standing scale. Difference of 3 lbs more than last documented weight. Intake/Output Summary (Last 24 hours) at 2/14/2020 1149  Last data filed at 2/14/2020 1874  Gross per 24 hour   Intake 1440 ml   Output 1650 ml   Net -210 ml       Comorbidities Reviewed Yes    Patient has a past medical history of Acute osteomyelitis of left hallux ([facility] [address] Anemia, Asthma, Cellulitis of foot, CHF (congestive heart failure) ([facility] [address] Claudication of left upper extremity due to atherosclerosis [facility]), Diabetic ulcer of left hallux, with necrosis of bone ([facility] [address] DVT (deep venous thrombosis) ([facility] [address] Kidney stones, Morbid obesity with BMI of 45.0-49.9, adult ([facility] [address] Neuroma of hand, Open fracture of left hallux, Pneumonia, Rotator cuff tendinitis, STEMI (ST elevation myocardial infarction) ([address] and Urinary incontinence. >>For CHF and Comorbidity documentation on Education Time and Topics, please see Education Tab    Progressive Mobility Assessment:  What is this patient's Current Level of Mobility?: Ambulatory- with Assistance  How was this patient Mobilized today?:  Up to Toilet/Shower                 With Whom?  Nurse

## 2020-02-14 NOTE — PROGRESS NOTES
Hospitalist Progress Note      PCP: Andrews Gonzalez MD    Date of Admission: 2/12/2020    Chief Complaint   Patient presents with    Leg Swelling     Pt states she has had swelling in her legs for the past week, today noticed swelling in her arms and face. Pt tried to make an appointment with cards, unable to see them until March. Pt takes lasix as ordered at home.  Shortness of Breath     Pt's O2 saturation 87-88% on RA while in triage. Pt moved to room with O2. Hospital Course: Romayne Frames is a 63year old female with a history of congestive heart failure and MI in 2014 that presented to [facility] ED on 2/12/2020 with complaints of increasing swelling in her legs that has increased over the past week. The patient contacted her cardiologist, Dr. Anil Taylor, however he was unable to see her until March. The patient takes 20mg Lasix twice daily and denies changing dose and states she has been compliant with her medications. While in the ED, the patient's oxygen saturations were ~88% on room air and required oxygen, however, she does not use oxygen at home. She denied any chest pain, nausea, vomiting at that time. Work up in the ED showed no leukocytosis, normal renal function, negative troponin, EKG showed normal sinus rhythm. Pro-BNP 2,318. Chest x-ray showed stable cardiomegaly with pulmonary edema. The patient was given IV lasix in the ED. CHF nurse consulted. Echocardiogram from 8/10/2017 showed combined sytolic/diastolic failure with preserved EF 55%, echocardiogram from 2/13/2020 showed EF of 55-60%, grade III diastolic dysfunction, and severe pulmonary hypertension with a systolic pulmonary artery pressure of 83mmHg. Her oxygen has been stable on 2L via nasal cannula. Subjective: Meena Navarro states that she feels ready to go home as she feels much better than she did upon arrival. She denies any shortness of breath, chest pain, nausea, vomiting, diarrhea.        Medications:  Reviewed    Infusion Medications    dextrose       Scheduled Medications    aspirin  81 mg Oral Daily    isosorbide mononitrate  30 mg Oral Daily    ranolazine  500 mg Oral BID    budesonide-formoterol  1 puff Inhalation BID    sertraline  100 mg Oral Daily    gabapentin  300 mg Oral TID    insulin glargine  8 Units Subcutaneous Nightly    atorvastatin  40 mg Oral Nightly    lisinopril  5 mg Oral Daily    metoprolol tartrate  25 mg Oral BID    oxybutynin  5 mg Oral BID    levothyroxine  50 mcg Oral Daily    clopidogrel  75 mg Oral Daily    ferrous sulfate  325 mg Oral Daily with breakfast    insulin lispro  0-12 Units Subcutaneous TID WC    insulin lispro  0-6 Units Subcutaneous Nightly    furosemide  20 mg Intravenous BID    sodium chloride flush  10 mL Intravenous 2 times per day    enoxaparin  40 mg Subcutaneous Daily     PRN Meds: nitroGLYCERIN, glucose, dextrose, glucagon (rDNA), dextrose, perflutren lipid microspheres, oxyCODONE-acetaminophen, sodium chloride flush, magnesium hydroxide, ondansetron, acetaminophen      Intake/Output Summary (Last 24 hours) at 2/14/2020 1228  Last data filed at 2/14/2020 5989  Gross per 24 hour   Intake 1680 ml   Output 1650 ml   Net 30 ml       Physical Exam Performed:    /66   Pulse 87   Temp 97.7 °F (36.5 °C) (Oral)   Resp 16   Ht 5' (1.524 m)   Wt 187 lb 3.2 oz (84.9 kg)   LMP  (LMP Unknown)   SpO2 92%   BMI 36.56 kg/m²     General appearance: Obese  female in no apparent distress, appears stated age and cooperative. HEENT: Pupils equal, round, and reactive to light. Conjunctivae/corneas clear. Neck: Supple, with full range of motion. No jugular venous distention. Trachea midline. Respiratory:  Normal respiratory effort. Ronchi in the bilateral bases. Cardiovascular: Regular rate and rhythm with normal S1/S2 without murmurs, rubs or gallops. Abdomen: Soft, non-tender, non-distended with normal bowel sounds.   Musculoskeletal: +1 pretibial edema bilaterally. No clubbing, or cyanosis. Full range of motion without deformity. Skin: Skin color, texture, turgor normal.  No rashes or lesions. Neurologic:  Neurovascularly intact without any focal sensory/motor deficits. Cranial nerves: II-XII intact, grossly non-focal.  Psychiatric: Alert and oriented, thought content appropriate, normal insight  Capillary Refill: Brisk,< 3 seconds   Peripheral Pulses: +2 palpable, equal bilaterally       Labs:   Recent Labs     02/12/20 1919 02/14/20  1005   WBC 10.6 8.1   HGB 10.9* 10.6*   HCT 32.7* 31.7*    270     Recent Labs     02/12/20 1919 02/13/20  1032 02/14/20  1005    139 138   K 4.7 4.6 4.5    100 100   CO2 25 27 26   BUN 19 18 29*   CREATININE 0.9 1.0 1.1   CALCIUM 8.9 9.0 8.8   PHOS  --  3.7  --      Recent Labs     02/12/20 1919   AST 12*   ALT 12   BILITOT <0.2   ALKPHOS 68       Recent Labs     02/12/20 1919   TROPONINI <0.01       Urinalysis:      Lab Results   Component Value Date    NITRU Negative 02/12/2020    WBCUA 20-50 06/03/2019    BACTERIA Rare 06/03/2019    RBCUA 0-2 06/03/2019    BLOODU Negative 02/12/2020    SPECGRAV 1.020 02/12/2020    GLUCOSEU 500 02/12/2020    GLUCOSEU 100 05/26/2012       Radiology:  XR CHEST STANDARD (2 VW)   Final Result   Stable cardiomegaly with bilateral airspace disease likely edema. Given   asymmetric right basilar increased opacity, pneumonia could be considered.                  Assessment/Plan:    Active Hospital Problems    Diagnosis Date Noted    Acute on chronic diastolic (congestive) heart failure (HCC) [I50.33] 02/12/2020    PVD (peripheral vascular disease) ([facility] [address] [I73.9]     Class 1 obesity due to excess calories with serious comorbidity and body mass index (BMI) of 34.0 to 34.9 in adult [E66.09, Z68.34] 07/09/2019    Anxiety and depression [F41.9, F32.9]     Tobacco use [Z72.0] 11/01/2018    Coronary artery disease involving native coronary artery of native heart without angina pectoris [I25.10] 07/24/2018    Chronic low back pain [M54.5, G89.29] 02/04/2016    JORDI on CPAP [G47.33, Z99.89] 10/29/2015    HTN (hypertension) [I10] 02/26/2014    Hypothyroid [E03.9] 02/26/2014    Uncontrolled type 2 diabetes mellitus with diabetic polyneuropathy, with long-term current use of insulin ([facility] [address] [E11.42, Z79.4, E11.65] 02/20/2014    Reflux esophagitis [K21.0] 08/18/2011       1. Acute on chronic diastolic heart failure  -Echocardiogram from 2/13/2020 showed EF of 55-60% with grade III diastolic dysfunction along with severe pulmonary hypertension.   -Currently on 2L of oxygen. Was on room air this morning, but saturations went down to 89%, so 2L NC was reapplied. Wean oxygen as tolerated. Keep oxygen saturation 90-96%. Patient states she feels like there has been improvement since she arrived. - (-)1.6L since admission on 2/12/2020  -Continue IV Lasix 20mg BID, daily weights, daily BMP, closely monitor intake and output.   -The patient was encouraged to be compliant while wearing compression stockings at home and also encouraged to elevate her lower extremities to help with the edema. She was also encouraged to ambulate as much as tolerated today to prepare for discharge. -CHF nurse consulted. Continue to encourage daily weights, low sodium/low carbohydrate diet, and fluid restrictions. 2. Obstructive sleep apnea, noncompliant with CPAP  -The patient endorses a history of sleep apnea, but has not been using her CPAP due to poorly fitting mask  -The patient was off of oxygen yesterday evening, however, required 2L NC to be reapplied during the night due to JORDI. -Plan to re-evaluate JORDI as an outpatient with sleep study, encourage use of CPAP every night once re-evaluated     3. Uncontrolled type 2 diabetes with diabetic polyneuropathy  -Last HgbA1c 13.8% 6/3/2019.   -Continue medium dose correction sliding scale.   -Encourage low carbohydrate diet and exercise as tolerated.    -Continue gabapentin.     4. Coronary artery disease   -Denies chest pain, EKG 2/12/2020 showed normal sinus rhythm with no ischemic changes.   -Continue aspirin, clopidogrel, atorvastatin, ranolazine, and imdur.      5. Hypertension  -Blood pressures have been stable. -Continue metoprolol and lisinopril. Continue to monitor.     6. Hypothyroidism  -Continue levothyroxine 50mcg.   -The patient currently denies signs/symptoms related to hyper/hypothyroidism such as weight loss/gain, or cold/heat intolerance.     7. Chronic low back pain   -Continue Percocet 7.5-325 every 6 hours for severe pain     8. Peripheral vascular disease  -Continue atorvastatin and clopidogrel        DVT Prophylaxis: Enoxaparin  Diet: DIET GENERAL; Carb Control: 4 carb choices (60 gms)/meal; Low Sodium (2 GM)  Code Status: Full Code    PT/OT Eval Status: Patient denied need for evaluation    Dispo - Likely one day pending clinical improvement. Patient case seen and discussed under supervision of preceptor, Dr. Mayi Layton. Note made by PA-S2 student in the status of a scribe, with review by preceptor. Vivienne CALLES-S2      Addendum to PA student progress note:  Pt seen, examined, and evaluated with PA student, who acted as my scribe for the above documentation. I have reviewed the current history, physical findings, labs, assessment, and plan and agree with the note as documented. Eric Cooper MD  Hospitalist Physician       The note was completed using EMR. Every effort was made to ensure accuracy; However, inadvertent computerized transcription errors may be present.

## 2020-02-14 NOTE — PLAN OF CARE
Problem: Falls - Risk of:  Goal: Will remain free from falls  Description  Will remain free from falls  Outcome: Ongoing  Note:   Pt will remain free from falls throughout hospital stay. Fall precautions in place, bed alarm on, bed in lowest position with wheels locked and side rails 2/4 up. Room door open and hourly rounding completed. Will continue to monitor throughout shift. Problem: Pain:  Goal: Pain level will decrease  Description  Pain level will decrease  Outcome: Ongoing  Note:   Pt will be satisfied with pain control with PRN Percocet. Pt uses numeric pain rating scale with reassessments after pain med administration. Will continue to monitor progression throughout shift. Problem: Serum Glucose Level - Abnormal:  Goal: Ability to maintain appropriate glucose levels will improve  Description  Ability to maintain appropriate glucose levels will improve  Outcome: Ongoing  Note:   Pt will have accuchecks before meals and at bedtime with sliding scale insulin in place for coverage. Will continue to monitor for signs and symptoms of hypoglycemia and hyperglycemia throughout shift.

## 2020-02-15 NOTE — DISCHARGE SUMMARY
Hospital Medicine Discharge Summary    Patient ID: Emily Jordan      Patient's PCP: Addis Riojas MD    Admit Date: 2/12/2020     Discharge Date:  02/15/20    Admitting Physician: Jimmy Ledesma MD     Discharge Physician: Eduardo Ray MD     Discharge Diagnoses: Active Hospital Problems    Diagnosis    Acute on chronic diastolic (congestive) heart failure (HCC) [I50.33]    PVD (peripheral vascular disease) ([facility] [address] [I73.9]    Class 1 obesity due to excess calories with serious comorbidity and body mass index (BMI) of 34.0 to 34.9 in adult [E66.09, Z68.34]    Anxiety and depression [F41.9, F32.9]    Tobacco use [Z72.0]    Coronary artery disease involving native coronary artery of native heart without angina pectoris [I25.10]    Chronic low back pain [M54.5, G89.29]    JORDI on CPAP [G47.33, Z99.89]    HTN (hypertension) [I10]    Hypothyroid [E03.9]    Uncontrolled type 2 diabetes mellitus with diabetic polyneuropathy, with long-term current use of insulin (HCC) [E11.42, Z79.4, E11.65]    Reflux esophagitis [K21.0]       The patient was seen and examined on day of discharge and this discharge summary is in conjunction with any daily progress note from day of discharge. Hospital Course: Chandler Carter is a 63year old female with a history of congestive heart failure and MI in 2014 that presented to [facility] ED on 2/12/2020 with complaints of increasing swelling in her legs that has increased over the past week. The patient contacted her cardiologist, Dr. Jeny Bales, however he was unable to see her until March. The patient takes 20mg Lasix twice daily and denies changing dose and states she has been compliant with her medications. While in the ED, the patient's oxygen saturations were ~88% on room air and required oxygen, however, she does not use oxygen at home. She denied any chest pain, nausea, vomiting at that time.  Work up in the ED showed no leukocytosis, normal renal function, negative troponin, EKG showed normal sinus rhythm. Pro-BNP 2,318. Chest x-ray showed stable cardiomegaly with pulmonary edema. The patient was given IV lasix in the ED. CHF nurse consulted. Echocardiogram from 8/10/2017 showed combined sytolic/diastolic failure with preserved EF 55%, echocardiogram from 2/13/2020 showed EF of 55-60%, grade III diastolic dysfunction, and severe pulmonary hypertension with a systolic pulmonary artery pressure of 83mmHg. Her oxygen has been stable on 2L via nasal cannula. She got admitted to the hospital for further evaluation and management as follows       By problem list  1. Acute on chronic diastolic heart failure  -Echocardiogram from 2/13/2020 showed EF of 55-60% with grade III diastolic dysfunction along with severe pulmonary hypertension.   -Initially required 2L of oxygen. Improved with IV diuresis and weaned off oxygen by discharge  -Net negative balance 1.9 L since admission on 2/12/2020  -Received IV Lasix 20mg BID, resumed home dose at discharge  -The patient was encouraged to be compliant while wearing compression stockings at home and also encouraged to elevate her lower extremities to help with the edema. She was also encouraged to ambulate as much as tolerated today to prepare for discharge. -CHF nurse consulted. Continue to encourage daily weights, low sodium/low carbohydrate diet, and fluid restrictions.     2. Obstructive sleep apnea, noncompliant with CPAP  -The patient endorses a history of sleep apnea, but has not been using her CPAP due to poorly fitting mask  -The patient was off of oxygen yesterday evening, however, required 2L NC to be reapplied during the night due to JORDI. -Plan to re-evaluate JORDI as an outpatient with sleep study (referral provided) , encourage use of CPAP every night once re-evaluated     3.  Uncontrolled type 2 diabetes with diabetic polyneuropathy  -Last HgbA1c 13.8% 6/3/2019.   -Continue home dose of Lantus, medium dose correction sliding scale.   -Encourage low carbohydrate diet and exercise as tolerated.   -Continue gabapentin.     4. Coronary artery disease   -Denies chest pain, EKG 2/12/2020 showed normal sinus rhythm with no ischemic changes.   -Continue aspirin, clopidogrel, atorvastatin, ranolazine, and imdur.      5. Hypertension  -Blood pressures have been stable.   -Continue metoprolol and lisinopril. Continue to monitor.     6. Hypothyroidism  -Continue levothyroxine 50mcg.   -The patient currently denies signs/symptoms related to hyper/hypothyroidism such as weight loss/gain, or cold/heat intolerance.     7. Chronic low back pain   -Continue Percocet 7.5-325 every 6 hours for severe pain     8. Peripheral vascular disease  -Continue atorvastatin and clopidogrel     Patient discharged home in stable medical condition    Physical Exam Performed:   BP (!) 125/58   Pulse 55   Temp 97.9 °F (36.6 °C) (Oral)   Resp 18   Ht 5' (1.524 m)   Wt 187 lb 1.6 oz (84.9 kg)   LMP  (LMP Unknown)   SpO2 92%   BMI 36.54 kg/m²     General appearance: Obese  female in no apparent distress, appears stated age and cooperative. HEENT: Pupils equal, round, and reactive to light. Conjunctivae/corneas clear. Neck: Supple, with full range of motion. No jugular venous distention. Trachea midline. Respiratory:  Normal respiratory effort. Ronchi in the bilateral bases. Cardiovascular: Regular rate and rhythm with normal S1/S2 without murmurs, rubs or gallops. Abdomen: Soft, non-tender, non-distended with normal bowel sounds. Musculoskeletal: +1 pretibial edema bilaterally. No clubbing, or cyanosis. Full range of motion without deformity. Skin: Skin color, texture, turgor normal.  No rashes or lesions. Neurologic:  Neurovascularly intact without any focal sensory/motor deficits.  Cranial nerves: II-XII intact, grossly non-focal.  Psychiatric: Alert and oriented, thought content appropriate, normal insight  Capillary Refill: Brisk,< 3 seconds Peripheral Pulses: +2 palpable, equal bilaterally       Labs: For convenience and continuity at follow-up the following most recent labs are provided:      CBC:    Lab Results   Component Value Date    WBC 8.1 02/14/2020    HGB 10.6 02/14/2020    HCT 31.7 02/14/2020     02/14/2020       Renal:    Lab Results   Component Value Date     02/14/2020    K 4.5 02/14/2020     02/14/2020    CO2 26 02/14/2020    BUN 29 02/14/2020    CREATININE 1.1 02/14/2020    CALCIUM 8.8 02/14/2020    PHOS 3.7 02/13/2020         Significant Diagnostic Studies    Radiology:   XR CHEST STANDARD (2 VW)   Final Result   Stable cardiomegaly with bilateral airspace disease likely edema. Given   asymmetric right basilar increased opacity, pneumonia could be considered. Consults:   IP CONSULT TO HOSPITALIST  IP CONSULT TO HEART FAILURE NURSE/COORDINATOR    Disposition: Home    Condition at Discharge: Stable    Discharge Instructions/Follow-up: PCP and cardiology as instructed    Code Status:  Full Code     Activity: activity as tolerated    Diet: cardiac diet and diabetic diet      Discharge Medications:   Current Discharge Medication List           Details   oxyCODONE-acetaminophen (PERCOCET) 7.5-325 MG per tablet Take 1 tablet by mouth every 6 hours as needed for Pain. clonazePAM (KLONOPIN) 0.5 MG tablet TAKE ONE TABLET BY MOUTH THREE TIMES A DAY AS NEEDED FOR ANXIETY  Qty: 30 tablet, Refills: 0    Associated Diagnoses: Anxiety      pregabalin (LYRICA) 150 MG capsule Take 1 capsule by mouth 2 times daily for 30 days. . To replace gabapentin  Qty: 60 capsule, Refills: 0    Comments: Needs appt before future refills  Associated Diagnoses: Uncontrolled type 2 diabetes mellitus with hyperglycemia ([address] Amputated great toe of left foot ([address]  Phantom pain (HCC)      ferrous sulfate 325 (65 Fe) MG EC tablet Take 1 tablet by mouth daily (with breakfast)  Qty: 30 tablet, Refills: 4      furosemide (LASIX) 20 MG AERO Inhale 2 puffs by mouth twice daily. Qty: 1 Inhaler, Refills: 3      aspirin 81 MG EC tablet Take 81 mg by mouth daily      albuterol (PROVENTIL) (2.5 MG/3ML) 0.083% nebulizer solution Take 3 mLs by nebulization every 6 hours as needed for Wheezing  Qty: 120 each, Refills: 5      nitroGLYCERIN (NITROSTAT) 0.4 MG SL tablet DISSOLVE ONE TABLET UNDER THE TONGUE AS NEEDED FOR CHEST PAIN EVERY 5 MINUTES UP TO 3 TIMES. IF NO RELIEF CALL 911. Qty: 25 tablet, Refills: 3      Cholecalciferol (VITAMIN D3) 1000 units TABS Take 1 tablet by mouth daily  Qty: 30 tablet, Refills: 5             Time Spent on discharge is more than 30 minutes in the examination, evaluation, counseling and review of medications and discharge plan. Signed:    Ryder Gotti MD   2/15/2020      Thank you Aldo Xie MD for the opportunity to be involved in this patient's care. If you have any questions or concerns please feel free to contact me at [phone].

## 2020-02-15 NOTE — PLAN OF CARE
Patient's EF (Ejection Fraction) is greater than 40%    Heart Failure Medications:  Diuretics[de-identified] Furosemide    (One of the following REQUIRED for EF <40%/SYSTOLIC FAILURE but MAY be used in EF% >40%/DIASTOLIC FAILURE)        ACE[de-identified] Lisinopril        ARB[de-identified] None         ARNI[de-identified] None    (Beta Blockers)  NON- Evidenced Based Beta Blocker (for EF% >40%/DIASTOLIC FAILURE): Metoprolol TARTrate- Lopressor    Evidenced Based Beta Blocker::(REQUIRED for EF% <40%/SYSTOLIC FAILURE) None  . .................................................................................................................................................. Patient's weights and intake/output reviewed: Yes    Patient's Last Weight: 187 lbs obtained by standing scale. Difference of 0 lbs more or less than last documented weight. Intake/Output Summary (Last 24 hours) at 2/15/2020 1119  Last data filed at 2/14/2020 1716  Gross per 24 hour   Intake 480 ml   Output 550 ml   Net -70 ml       Comorbidities Reviewed Yes    Patient has a past medical history of Acute osteomyelitis of left hallux ([facility] [address] Anemia, Asthma, Cellulitis of foot, CHF (congestive heart failure) ([facility] [address] Claudication of left upper extremity due to atherosclerosis [facility]), Diabetic ulcer of left hallux, with necrosis of bone ([facility] [address] DVT (deep venous thrombosis) ([facility] [address] Kidney stones, Morbid obesity with BMI of 45.0-49.9, adult ([facility] [address] Neuroma of hand, Open fracture of left hallux, Pneumonia, Rotator cuff tendinitis, STEMI (ST elevation myocardial infarction) ([facility] [address] and Urinary incontinence. >>For CHF and Comorbidity documentation on Education Time and Topics, please see Education Tab    Progressive Mobility Assessment:  What is this patient's Current Level of Mobility?: Ambulatory-Up Ad Brigitte  How was this patient Mobilized today?: Edge of Bed,  Up to Toilet/Shower, Up in Room, and Up in Hallway                 With Whom?  Self                 Level of Difficulty/Assistance: Independent Pt resting in bed at this time on room air. Pt with complaints of shortness of breath. Pt without lower extremity edema.      Patient and/or Family's stated Goal of Care this Admission: reduce shortness of breath, increase activity tolerance, better understand heart failure and disease management, and be more comfortable prior to discharge        :

## 2020-02-15 NOTE — PROGRESS NOTES
Tele box removed and returned. [facility] aware of discharge. PIV removed. Tip intact. Dressing in place. Discharge paperwork went over with and given to pt and pt's granddaughter. No questions or concerns voiced. Lock box emptied. Pt wheeled down via wheelchair to private car with all belongings. Pt discharged home at this time in stable condition.

## 2020-02-17 NOTE — TELEPHONE ENCOUNTER
[facility] Transitions Initial Follow Up Call    Outreach made within 2 business days of discharge: Yes    Patient: Clement Mota Patient : 1960   MRN: <[id number]>  Reason for Admission: There are no discharge diagnoses documented for the most recent discharge. Discharge Date: 2/15/20       Spoke with: Negro Mukherjee    Discharge department/facility: [facility] Interactive Patient Contact:  Was patient able to fill all prescriptions:   Was patient instructed to bring all medications to the follow-up visit: Yes  Is patient taking all medications as directed in the discharge summary?  Yes  Does patient understand their discharge instructions: Yes  Does patient have questions or concerns that need addressed prior to 7-14 day follow up office visit: no    Scheduled appointment with PCP within 7-14 days    Follow Up  Future Appointments   Date Time Provider Roxanna Martinez   2020 10:15 AM MITCHELL Mac CNP [facility]   2020  3:15 PM Savita Donaldson MD [facility]   3/2/2020  9:45 AM MD Lianne Jacques [address] [place], [place]

## 2020-02-17 NOTE — TELEPHONE ENCOUNTER
3rd Attempt; No Answer- Left HIPAA compliant voicemail with Non-Urgent Heart Failure Resource Line number for call back.     Nadine Cao HF BSN-RN  Heart Failure Navigator  [address] [place] [address]  [phone]

## 2020-02-17 NOTE — TELEPHONE ENCOUNTER
2nd Attempt; No Answer- Left HIPAA compliant voicemail with Non-Urgent Heart Failure Resource Line number for call back.     Awa Mckeon HF BSN-RN  Heart Failure Navigator  [address] [place] [address]  [phone]

## 2020-02-18 NOTE — PROGRESS NOTES
[address]   Cardiology Note              Date:  2020  Patientname: Martin Chun  YOB: 1960    Primary Care physician: KAILEY [facility] MD WHITNEY    HISTORY OF PRESENT ILLNESS: Martin Chun is a 63 y.o. female with a history of CAD, HTN, HLD, CHF, DM, PAD. History of subclavian stent . In 2014 she was admitted for  STEMI. Coronary angiography showed 100% LCx treated with CONNIE. Echo showed EF 55%. LHC 10/11/2016 showed patent sent, nonobstructive CAD, medical management. On  2019 she had stent to left SFA. She was admitted 2020 for acute on chronic diastolic CHF. Echo 2020 showed EF 55-60% grade III DD, severe pulmonary HTN. Today she presents for hospital follow up for CHF, CAD, HTN, HLD. She has ongoing fatigue. She has occasional chest pain that occurs with exertion, not severe, similar to symptoms before and concerns her. She has chronic shortness of breath with exertion that is about the same. She does not weigh herself at home but just got a scale. She just got compression stockings as well. She does not wear her CPAP. She smokes. Discharge weight 2/15/2020: 187 lbs  Todays weight 2020: 184 lbs   Last office visit 10/24/2018: 178 lbs    Past Medical History:   has a past medical history of Acute osteomyelitis of left hallux ([address] Anemia, Asthma, Cellulitis of foot, CHF (congestive heart failure) ([address] Claudication of left upper extremity due to atherosclerosis [facility]), Diabetic ulcer of left hallux, with necrosis of bone ([address] DVT (deep venous thrombosis) ([address] Kidney stones, Morbid obesity with BMI of 45.0-49.9, adult ([address] Neuroma of hand, Open fracture of left hallux, Pneumonia, Rotator cuff tendinitis, STEMI (ST elevation myocardial infarction) ([address] and Urinary incontinence. Past Surgical History:   has a past surgical history that includes Lithotripsy;  section; vascular surgery (Left, 2005);  Dilation and curettage of 10/24/19 10/23/20  Jorge Alberto Garcia MD   sertraline (ZOLOFT) 100 MG tablet Take 1 tablet by mouth daily 10/24/19   Jorge Alberto Garcia MD   oxybutynin (DITROPAN) 5 MG tablet 1 po q am and 2 po qhs 10/24/19   Jorge Alberto Garcia MD   gabapentin (NEURONTIN) 300 MG capsule Take 3 capsules by mouth 3 times daily. 5/28/19   Historical Provider, MD   insulin glargine (LANTUS) 100 UNIT/ML injection vial Inject 8 Units into the skin nightly 8/27/19   Rosa Ardon MD   insulin lispro (HUMALOG) 100 UNIT/ML injection vial Inject 0-6 Units into the skin nightly 8/27/19   Rosa Ardon MD   blood glucose test strips (FREESTYLE LITE) strip TEST THREE TIMES A DAY 7/30/19   MITCHELL Centeno CNP   lisinopril (PRINIVIL;ZESTRIL) 5 MG tablet Take 1 tablet by mouth daily 7/29/19   Krystal De La Fuente MD   SYMBICORT 160-4.5 MCG/ACT AERO Inhale 2 puffs by mouth twice daily. 7/26/19   MITCHELL Matute CNP   aspirin 81 MG EC tablet Take 81 mg by mouth daily    Historical Provider, MD   albuterol (PROVENTIL) (2.5 MG/3ML) 0.083% nebulizer solution Take 3 mLs by nebulization every 6 hours as needed for Wheezing 12/13/18   Jorge Alberto Garcia MD   nitroGLYCERIN (NITROSTAT) 0.4 MG SL tablet DISSOLVE ONE TABLET UNDER THE TONGUE AS NEEDED FOR CHEST PAIN EVERY 5 MINUTES UP TO 3 TIMES. IF NO RELIEF CALL 911. 10/24/18   Krystal De La Fuente MD   Cholecalciferol (VITAMIN D3) 1000 units TABS Take 1 tablet by mouth daily 8/30/18   Jorge Alberto Garcia MD     Allergies:  Flexeril [cyclobenzaprine]; Januvia [sitagliptin]; and Iodine    Social History:   reports that she has been smoking cigarettes. She has a 20.00 pack-year smoking history. She has quit using smokeless tobacco. She reports that she does not drink alcohol or use drugs. Family History: family history includes Asthma in her grandchild; Cancer in her maternal grandmother; Diabetes in her brother and mother; Heart Disease in her maternal grandfather; Other in her mother.     Review of Systems Review of Systems   Constitutional: Positive for fatigue. Respiratory: Positive for shortness of breath. Cardiovascular: Positive for chest pain and leg swelling. Neurological: Negative. OBJECTIVE:    Vital signs:    /68   Pulse 62   Ht 5' (1.524 m)   Wt 184 lb 8 oz (83.7 kg)   LMP  (LMP Unknown)   SpO2 98%   BMI 36.03 kg/m²      Physical Exam:  Constitutional:  Comfortable and alert, NAD, appears older stated age  Eyes: PERRL, sclera nonicteric  Neck:  Supple, no masses, no thyroidmegaly, no JVD  Skin:  Warm and dry; no rash or lesions  Heart:  Regular, normal apex, S1 and S2 normal, no M/G/R  Lungs:  Normal respiratory effort; wheezing  Abdomen: soft, non tender, + bowel sounds  Extremities:  1+ BLE edema  Neuro: alert and oriented, moves legs and arms equally, normal mood and affect    Data Reviewed:      Echo 2/13/2020:  Normal left ventricle size, wall thickness, and systolic function with a   visually estimated ejection fraction of 55-60%. There appears to be slight diastolic septal flattening (\"D-shaped\" septum)   consistent with right ventricular volume overload. Grade III diastolic dysfunction with elevated LV filling pressures. The right ventricle is moderately dilated with normal function. Mild tricuspid regurgitation. Systolic pulmonary artery pressure (SPAP) is elevated and estimated at 83   mmHg (right atrial pressure 8 mmHg) consistent with severe pulmonary   hypertension. The IVC is normal in size (<2.1 cm) but collapses <50% with respiration   consistent with elevated right atrial pressures (8 mmHg) . Coronary angiogram 10/11/2016:  LM: eccentric distal 50% stenosis  LAD: mild disease. Mid 20% lesion, distal 30-40% smooth lesion.   LCX: ostial/prox 50-60% lesion, mid LCx with patent stent and trace restenosis of <10%  Ramus: prox 20%  RCA: dominant, mild diffuse disease, mid 20-30% shelf like lesion then mid15% lesion   LVEDP: 30  LVEF: 65%  IVUS of left main

## 2020-02-18 NOTE — LETTER
and curettage of uterus; Appendectomy; Coronary angioplasty with stent (02/26/14); Cataract removal with implant (Bilateral, 05/2015); Foot Amputation (Left, 6/3/2019); vascular surgery (Left, 10/2007); and Foot Amputation (Left, 8/20/2019). Home Medications:    Prior to Admission medications    Medication Sig Start Date End Date Taking? Authorizing Provider   oxyCODONE-acetaminophen (PERCOCET) 7.5-325 MG per tablet Take 1 tablet by mouth every 6 hours as needed for Pain. Historical Provider, MD   clonazePAM (KLONOPIN) 0.5 MG tablet TAKE ONE TABLET BY MOUTH THREE TIMES A DAY AS NEEDED FOR ANXIETY 1/31/20 2/10/20  Nancy Taylor MD   pregabalin (LYRICA) 150 MG capsule Take 1 capsule by mouth 2 times daily for 30 days. . To replace gabapentin 1/22/20 2/21/20  Callie De León MD   ferrous sulfate 325 (65 Fe) MG EC tablet Take 1 tablet by mouth daily (with breakfast) 1/22/20   Callie De León MD   furosemide (LASIX) 20 MG tablet Take 1 tablet by mouth twice daily. 1/22/20   Callie De León MD   levothyroxine (SYNTHROID) 50 MCG tablet Take 1 tablet by mouth daily. 1/22/20   Callie De León MD   atorvastatin (LIPITOR) 40 MG tablet Take 1 tablet by mouth daily. 1/22/20   Callie De León MD   ranolazine (RANEXA) 500 MG extended release tablet Take 1 tablet by mouth twice daily. 10/24/19   Neela Clarke MD   isosorbide mononitrate (IMDUR) 30 MG extended release tablet Take 1 tablet by mouth daily. 10/24/19   Callie De León MD   metFORMIN (GLUCOPHAGE) 1000 MG tablet Take 1 tablet by mouth twice daily with meals. 10/24/19   Callie De León MD   metoprolol tartrate (LOPRESSOR) 25 MG tablet Take 1 tablet by mouth twice daily. 10/24/19   Callie De León MD   SPIRIVA RESPIMAT 2.5 MCG/ACT AERS inhaler INHALE 2 PUFFS BY MOUTH DAILY 10/24/19   Callie De León MD   clopidogrel (PLAVIX) 75 MG tablet Take 1 tablet by mouth daily.  10/24/19   Callie De León MD RCA: dominant, mild diffuse disease, mid 20-30% shelf like lesion then mid15% lesion   LVEDP: 30  LVEF: 65%  IVUS of left main and LCx  Left main: 9.6mm(2)  Ostial LCx: 5.6mm (2)  Proximal LCx 5.5 and 5.3mm(2)  1. Patent LCx stent  2. Nonobstruction CAD as above with negative IVUS of left main and LCx ostial/proximal stenosis. 3. No spasm seen today as compared to prior films  4. Trial of Ranexa for microvascular angina. Echo 2/26/2014:   Limited 2-D echocardiogram due to suboptimal imaging and technical  Limitations. Overall left ventricular function appears grossly normal. Ejection   Fraction is visually estimated to be 55-60 %. Moderate concentric left ventricular hypertrophy is present. TDS secondary to habitus and pt supine due to cardiac cath. Coronary angiogram 2/26/2014:  1.  Left main coronary tapered distally to about 30% or 40%.   The left main  trifurcated to an LAD, ramus, and a left circumflex. 2.  The LAD had minor luminal irregularities throughout its course up to  about 20%.   The LAD ran in the interventricular groove to about the mid LAD  aspect, and then it became a very small vessel to the apex, but it did not  wrap.  There were several small diagonal branches that were seen without  significant disease.   3.  The ramus ran down the anterior aspect of the heart.  It had a proximal  30% focal lesion and mild-to-moderate diffuse disease in the [id number]  ramus.  Distal to this area, the ramus split into 2 branches.  The disease in  the ramus did not appear to be visually stenotic.     4.  The left circumflex was small to moderate in nature.  There was 100%  occlusion in the midleft circumflex with SABINO-0 flow.  There was evidence of  some flow into an OM 1 branch at the bifurcation at the site of the  occlusion.  Following intervention, there was a small OM 1 branch that was  seen to start to recannulate with SABINO-1 flow.  The majority of the flow as running down into the large OM 2 branch that was SABINO-3 in nature.  There was  evidence of a small true left circumflex in the AV groove that was starting  to recannulate with Integrilin.     5.  The right coronary artery was noted to be large and dominant.  It had a  30% lesion noted in its proximal aspect.  There was mild diffuse disease in  the distal RCA.  There was evidence of the beginning of some right-to-left  collaterals that were seen as well.     ASSESSMENT:  At this time, the patient presents with 100% occlusion of the  midleft circumflex artery.  She is status post percutaneous intervention with  a Xience Expedition 2.25 x 15 mm drug-eluting stent to the mid left  circumflex.  There was, due to poor outflow, reduced flow in a small obtuse  marginal 1 and the true distal left circumflex.  Following the case, the  residual lesion stenosis was 0%, and there was SABINO-3 flow into the large  obtuse marginal 2 branch.     PLAN:     1.  At this time, the patient will be admitted to ICU following her ST  elevation and her STEMI.  We will continue 18 hours of Integrilin and hope  that the outflow issues improve within the distal vascular bed for both the  OM 1 and the true distal circumflex.     2.  She will continue on aspirin 324 mg daily for 1 month, followed by 81 mg  of aspirin daily for life.     3.  She will get Effient 60 mg p.o. x1 and will continue on 10 mg daily for a  minimum of 1 year.  We will get an echocardiogram to evaluate any damage to  the lateral wall, as well as serial troponins to peak.  She will need  aggressive _____ medical management, and further inpatient stay for her  coronary artery disease and STEMI. Cardiology Labs Reviewed:   CBC: No results for input(s): WBC, HGB, HCT, PLT in the last 72 hours. BMP:No results for input(s): NA, K, CO2, BUN, CREATININE, LABGLOM, GLUCOSE in the last 72 hours. PT/INR: No results for input(s): PROTIME, INR in the last 72 hours. APTT:No results for input(s): APTT in the last 72 hours. FASTING LIPID PANEL:  Lab Results   Component Value Date    HDL 59 08/17/2019    LDLCALC 46 08/17/2019    TRIG 67 08/17/2019     LIVER PROFILE:No results for input(s): AST, ALT, ALB in the last 72 hours. BNP:   Lab Results   Component Value Date    PROBNP 2,318 02/12/2020    PROBNP 314 06/28/2018    PROBNP 479 06/07/2017    PROBNP 641 07/28/2014     Reviewed all labs and imaging today    Assessment:   Shortness of breath: chronic, multifactorial with CHF and COPD  Fatigue: ongoing, likely due to JORDI and noncompliance with CPAP  Chest pain: chronic, worse recently  Chronic diastolic CHF: likely still volume overloaded but -3 lbs since hospital discharge   - 184 lbs today, 178 lbs in office 10/2018  CAD: ongoing angina; patent stent and nonobstructive on angiogram 10/11/2016; s/p CONNIE LCx in the setting of STEMI 2/26/2014  PAD: s/p stent left SFA 8/19/2019; left subclavian stent 2007  HTN: controlled  HLD: controlled, LDL 46, continue statin  COPD: has not followed up with pulmonary in >2 years  JORDI: noncompliant with CPAP  Tobacco abuse: cessation recommended    Plan:   1. Continue lasix  2. Check BMP and BNP  3. Stress test for worsening angina  4. Continue aspirin, plavix, lopressor, ranexa, lisinopril, imdur, statin  5. Daily weights, compression stockings, low salt diet, use CPAP, stop smoking  6. Follow up with pulmonary  7.  Follow up in 6 weeks    Lazaro Kelsey APRN-CNP  [employer]  [phone]

## 2020-03-12 NOTE — PROGRESS NOTES
Patient: Monica Mcpherson is a 63 y. o.female who presents today with the following Chief Complaint(s):  Chief Complaint   Patient presents with    Follow-Up from Hospital     FU from [facility] [place] [facility] for COPD, pt is still coughing         HPI: Pt with PVD, lbp, hypoT4, htn, CAD, DM2, JORDI, CHF was hospitalized 2/12-2/15/20 for LE swelling, sob. Was hypoxic in ED. CXR confirmed CHF. Echo showed nl EF, grade III diastolic dysfx and severe pulm htn. Sx's improved w/IV lasix, was weaned off O2. Was sent home with lasix 20 po bid as prior to hosp. Compression hose were rec'd and fluid restriction was rec'd. Pt reports noncompliance with cpap, was encouraged to resume and f/u with Dr Shay Ho though she is unable to see him 2/2 too many missed appts. Pt reports since home, can't find cpap. Would like nasal pillows. A1C=13.8. Is overdue for f/u with Dr Peri Samuel. Takes lantus 25 u qhs, takes humalog 10-15 u with largest meal of the day. With more stress or busy day, may not take insulin. When lies down, has increased cough and sob. Can cough so hard that she feels faint. Is trying to cut down tob but struggles when she feels stress. Props herself up with pillows during sleep. Since L great toe amputation, has on/off phantom pain. Uses walker or cane to improve balance, no recent falls. Lyrica seems to cause generalized itching and does not help chronic pain. Pt prefers to resume juan. Has order for BNP and BMP today per cards. Stress test was ordered at [facility] d/c, is not sure she will proceed 2/2 transportation difficulty, does not drive, lives in [place]. Current Outpatient Medications   Medication Sig Dispense Refill    albuterol (PROVENTIL) (2.5 MG/3ML) 0.083% nebulizer solution Take 3 mLs by nebulization every 6 hours as needed for Wheezing 120 each 5    gabapentin (NEURONTIN) 300 MG capsule Take 3 capsules by mouth 3 times daily.  270 capsule 1    guaiFENesin (MUCINEX) 600 MG extended (PRINIVIL;ZESTRIL) 5 MG tablet Take 1 tablet by mouth daily 90 tablet 2    SYMBICORT 160-4.5 MCG/ACT AERO Inhale 2 puffs by mouth twice daily. 1 Inhaler 3    aspirin 81 MG EC tablet Take 81 mg by mouth daily      Cholecalciferol (VITAMIN D3) 1000 units TABS Take 1 tablet by mouth daily 30 tablet 5     No current facility-administered medications for this visit. Patient's past medical history,surgical history, family history, medications,  and allergies  were all reviewed and updated as appropriate today. Review of Systems   Constitutional: Positive for fatigue. Negative for activity change, appetite change, chills, fever and unexpected weight change. HENT: Negative. Eyes: Negative. Respiratory: Positive for cough and shortness of breath. Negative for chest tightness and wheezing. Cardiovascular: Negative for chest pain, palpitations and leg swelling. Gastrointestinal: Negative for abdominal distention, abdominal pain, blood in stool, constipation, diarrhea, nausea and vomiting. Genitourinary: Negative for difficulty urinating and dysuria. Musculoskeletal: Negative for arthralgias. Skin: Negative for color change, pallor and rash. Neurological: Negative for dizziness, syncope, weakness, light-headedness and headaches. Hematological: Negative. Psychiatric/Behavioral: Positive for sleep disturbance. Negative for dysphoric mood. The patient is nervous/anxious. Physical Exam  Constitutional:       Appearance: Normal appearance. She is well-developed. She is ill-appearing. HENT:      Head: Normocephalic and atraumatic. Right Ear: Tympanic membrane, ear canal and external ear normal.      Left Ear: Tympanic membrane, ear canal and external ear normal.      Mouth/Throat:      Mouth: Mucous membranes are moist.      Pharynx: Oropharynx is clear. No oropharyngeal exudate. Eyes:      General: No scleral icterus. Extraocular Movements: Extraocular movements intact. (PROVENTIL) (2.5 MG/3ML) 0.083% nebulizer solution; Take 3 mLs by nebulization every 6 hours as needed for Wheezing, rf  -     guaiFENesin (MUCINEX) 600 MG extended release tablet; Take 1 tablet by mouth 2 times daily as needed for Congestion, new rx    Tobacco use  -     guaiFENesin (MUCINEX) 600 MG extended release tablet; Take 1 tablet by mouth 2 times daily as needed for Congestion   Cessation strongly encouraged, not yet ready to consider    Chronic diastolic congestive heart failure (HCC)   Stress test ordered per cards, pt isn't sure if she will proceed    JORDI (obstructive sleep apnea)  -     External Referral To Sleep Medicine    Anxiety  -     clonazePAM (KLONOPIN) 0.5 MG tablet; 1 po qd prn anxiety, rf    Essential hypertension, per cards orders:  -     Stress test myoview  -     BRAIN NATRIURETIC PEPTIDE (BNP)  -     BASIC METABOLIC PANEL    Phantom pain (HCC)  -     gabapentin (NEURONTIN) 300 MG capsule; Take 3 capsules by mouth 3 times daily. Magnolia Rizzo has been asked to call pt to assist with barriers to health care, transportation.

## 2020-03-13 NOTE — TELEPHONE ENCOUNTER
----- Message from MITCHELL King CNP sent at 3/13/2020  8:26 AM EDT -----  Please let patient know that kidney function is stable but potassium is high. She should hold lisinopril for 3 days then restart at 2.5 mg daily. Check BMP Monday. Check BP at home. Limit high potassium foods. Thanks!

## 2020-03-13 NOTE — TELEPHONE ENCOUNTER
Pt called in stating that guaiFENesin (MUCINEX) 600 MG extended release tablet [[id number]] was sent to wrong pharmacy. Needs to be sent to [facility] [address], [address] [phone] - f [phone]. Pt also requesting a prescription to help her get a BP Device. Pt was instructed to track her BP every day and has nothing to do this with. Please call back pt to notify if this is possible.

## 2020-03-13 NOTE — LETTER
3/13/2020    [address] [facility]      Dear Nathan Manuel,      My name is Jose Escudero and I am a registered nurse who partners with Luz Marina Elam MD to improve patients' health. Luz Marina Elam MD believes you would benefit from working with me. As a member of your health care team, I would work with other providers involved in your care, offer education for your specific health conditions, and connect you with additional resources as needed. I will collaborate with Luz Marina Elam MD to support you in following your treatment plan. The additional support I provide is no additional cost to you. My primary focus is to help you achieve specific goals and improve your health. We are committed to walk with you on this journey and look forward to working with you. Please call me to further discuss your healthcare needs. I am available by phone or for appointments at the office. You can reach me at the number below.       In good health,       Keya Hutson RN, MSN  Ambulatory Care Manager  [phone]

## 2020-03-13 NOTE — TELEPHONE ENCOUNTER
Created telephone encounter. Spoke with Vianey Chamorro relayed message per [facility] regarding labs. Pt verbalized understanding and wrote instructions down. Lab order placed in epic.

## 2020-03-13 NOTE — CARE COORDINATION
Placed call to introduce patient to care coordination and explain role of ACM. Unable to leave a message d/t \"voice mail full\". Will send a letter with ACM contact information.     Deepa Magana RN, MSN  Ambulatory Care Manager  [phone]

## 2020-03-16 NOTE — CARE COORDINATION
Reasonable to good understanding and already engages in managing health or is willing to undertake better management   Do other services need to be involved to help this patient?:  Other care/services in place but not sufficient   Are current services involved with this patient well-coordinated? (Include coordination with other services you are now recommendation):  Required care/services in place and adequately coordinated   Suggested Interventions and Community Resources  Fall Risk Prevention: In Process Home Health Services: In Process (Comment: Referral to [facility])   Zone Management Tools: In Process                  Prior to Admission medications    Medication Sig Start Date End Date Taking? Authorizing Provider   albuterol (PROVENTIL) (2.5 MG/3ML) 0.083% nebulizer solution Take 3 mLs by nebulization every 6 hours as needed for Wheezing 3/12/20   Jenni Herbert MD   gabapentin (NEURONTIN) 300 MG capsule Take 3 capsules by mouth 3 times daily. 3/12/20 4/11/20  Jenni Herbert MD   guaiFENesin (MUCINEX) 600 MG extended release tablet Take 1 tablet by mouth 2 times daily as needed for Congestion 3/12/20 4/11/20  Jenni Herbert MD   clonazePAM (KLONOPIN) 0.5 MG tablet 1 po qd prn anxiety 3/12/20 3/30/20  Jenni Herbert MD   oxybutynin (DITROPAN) 5 MG tablet 1 po q am and 2 po qhs 2/21/20   MITCHELL Shin CNP   sertraline (ZOLOFT) 100 MG tablet Take 1 tablet by mouth daily 2/21/20   MITCHELL Shin CNP   fluticasone [facility]) 50 MCG/ACT nasal spray 2 sprays by Nasal route daily 2/21/20 2/20/21  Nereyda Wilson MD   nitroGLYCERIN (NITROSTAT) 0.4 MG SL tablet DISSOLVE ONE TABLET UNDER THE TONGUE AS NEEDED FOR CHEST PAIN EVERY 5 MINUTES UP TO 3 TIMES. IF NO RELIEF CALL 911. 2/18/20   MITCHELL Ho CNP   oxyCODONE-acetaminophen (PERCOCET) 7.5-325 MG per tablet Take 1 tablet by mouth every 6 hours as needed for Pain.     Historical Provider, MD   ferrous sulfate 325 (65 Fe) MG EC tablet Take 1 tablet by mouth daily (with breakfast) 1/22/20   Ta Panda MD   furosemide (LASIX) 20 MG tablet Take 1 tablet by mouth twice daily. 1/22/20   Ta Panda MD   levothyroxine (SYNTHROID) 50 MCG tablet Take 1 tablet by mouth daily. 1/22/20   Ta Panda MD   atorvastatin (LIPITOR) 40 MG tablet Take 1 tablet by mouth daily. 1/22/20   Ta Panda MD   ranolazine (RANEXA) 500 MG extended release tablet Take 1 tablet by mouth twice daily. 10/24/19   Murlene Saint, MD   isosorbide mononitrate (IMDUR) 30 MG extended release tablet Take 1 tablet by mouth daily. 10/24/19   Ta Panda MD   metFORMIN (GLUCOPHAGE) 1000 MG tablet Take 1 tablet by mouth twice daily with meals. 10/24/19   Ta Panda MD   metoprolol tartrate (LOPRESSOR) 25 MG tablet Take 1 tablet by mouth twice daily. 10/24/19   Ta Panda MD   SPIRIVA RESPIMAT 2.5 MCG/ACT AERS inhaler INHALE 2 PUFFS BY MOUTH DAILY 10/24/19   Ta Panda MD   clopidogrel (PLAVIX) 75 MG tablet Take 1 tablet by mouth daily. 10/24/19   Ta Panda MD   insulin glargine (LANTUS) 100 UNIT/ML injection vial Inject 8 Units into the skin nightly 8/27/19   Cooper Akbar MD   insulin lispro (HUMALOG) 100 UNIT/ML injection vial Inject 0-6 Units into the skin nightly 8/27/19   Cooper Akbar MD   blood glucose test strips (FREESTYLE LITE) strip TEST THREE TIMES A DAY 7/30/19   MITCHELL Nicolas CNP   lisinopril (PRINIVIL;ZESTRIL) 5 MG tablet Take 1 tablet by mouth daily 7/29/19   Mary Crews MD   SYMBICORT 160-4.5 MCG/ACT AERO Inhale 2 puffs by mouth twice daily.  7/26/19   [facility], APRN - CNP   aspirin 81 MG EC tablet Take 81 mg by mouth daily    Historical Provider, MD   Cholecalciferol (VITAMIN D3) 1000 units TABS Take 1 tablet by mouth daily 8/30/18   Ta Panda MD       Future Appointments   Date Time Provider Roxanna Martinez   3/30/2020  7:30 AM [place] [id number] [facility]

## 2020-03-18 NOTE — CARE COORDINATION
Call to patient to assess ongoing Care Coordination needs. HIPPA compliant vm message left, informed of referral to [facility] [place], [facility]. placed and to expect a call to set up visit. [facility] return contact number provided.     Justin Womack RN, MSN  Ambulatory Care Manager  [phone]

## 2020-03-18 NOTE — CARE COORDINATION
Communicated with PCP regarding need for HHC. Call placed to [facility] to confirm receipt of order.   Patient will be assessed and set up for the Telehealth vitals program.      Meme Bashir RN, MSN  Ambulatory Care Manager  [phone]

## 2020-03-23 NOTE — CARE COORDINATION
Ambulatory Care Coordination Note  3/23/2020  CM Risk Score: 15  Charlson 10 Year Mortality Risk Score: 100%     ACC: Chantel Elliott RN    Summary Note:     Call to patient to assess ongoing Care Coordination needs. Patient reports getting B/P monitor. 147/69 today. Not weighing daily. Denies swelling. Reports her \"blood sugars are good\". Checks BG every morning and sometimes in the evening. Eats about 2 meals a day. Does not keep a log. Shares that her A1C was \"down to 8.9\". [facility] RN made first visit yesterday. [facility] did not set up [facility] health program yet. Podiatry appointment and labs rescheduled to this Friday 3/27. Transportation arranged. Interventions:    Explained the rationale for daily weights to detect fluid imbalance early  Explained importance in knowing BG to determine good BG control  Instructed patient to begin check BG BID and keeping log  Education on foods high in sodium  Education on plate method for managing diabetes    Plan:    FU in about a week on CHF, DM  Instructed patient to call Cardiologist with B/P readings to determine need to restart Lisinopril  Informed of plan to call patient next week to review logs             Care Coordination Interventions    Program Enrollment:  Complex Care  Referral from Primary Care Provider:  Yes  Suggested Interventions and Community Resources  Fall Risk Prevention: In Process  Home Health Services: In Process (Comment: Referral to [facility] vitals programs)  Zone Management Tools: In Process (Comment: CHF, COPD, DM)         Goals Addressed    None         Prior to Admission medications    Medication Sig Start Date End Date Taking?  Authorizing Provider   lisinopril (PRINIVIL;ZESTRIL) 2.5 MG tablet Take 1 tablet by mouth daily 3/20/20   MITCHELL Cordero - CNP   albuterol (PROVENTIL) (2.5 MG/3ML) 0.083% nebulizer solution Take 3 mLs by nebulization every 6 hours as needed for Wheezing 3/12/20   Lennox Bougie, MD   gabapentin (NEURONTIN) 300 MG capsule Take 3 capsules by mouth 3 times daily. 3/12/20 4/11/20  Jenni Herbert MD   guaiFENesin (MUCINEX) 600 MG extended release tablet Take 1 tablet by mouth 2 times daily as needed for Congestion 3/12/20 4/11/20  Jenni Herbert MD   clonazePAM (KLONOPIN) 0.5 MG tablet 1 po qd prn anxiety 3/12/20 3/30/20  Jenni Herbert MD   oxybutynin (DITROPAN) 5 MG tablet 1 po q am and 2 po qhs 2/21/20   MITCHELL Shin CNP   sertraline (ZOLOFT) 100 MG tablet Take 1 tablet by mouth daily 2/21/20   MITCHELL Shin CNP   fluticasone Charlene Roque) 50 MCG/ACT nasal spray 2 sprays by Nasal route daily 2/21/20 2/20/21  Nereyda Wilson MD   nitroGLYCERIN (NITROSTAT) 0.4 MG SL tablet DISSOLVE ONE TABLET UNDER THE TONGUE AS NEEDED FOR CHEST PAIN EVERY 5 MINUTES UP TO 3 TIMES. IF NO RELIEF CALL 911. 2/18/20   MITCHELL Ho CNP   oxyCODONE-acetaminophen (PERCOCET) 7.5-325 MG per tablet Take 1 tablet by mouth every 6 hours as needed for Pain. Randa Fragoso MD   ferrous sulfate 325 (65 Fe) MG EC tablet Take 1 tablet by mouth daily (with breakfast) 1/22/20   Jenni Herbert MD   furosemide (LASIX) 20 MG tablet Take 1 tablet by mouth twice daily. 1/22/20   Jenni Herbert MD   levothyroxine (SYNTHROID) 50 MCG tablet Take 1 tablet by mouth daily. 1/22/20   Jenni Herbert MD   atorvastatin (LIPITOR) 40 MG tablet Take 1 tablet by mouth daily. 1/22/20   Jenni Herbert MD   ranolazine (RANEXA) 500 MG extended release tablet Take 1 tablet by mouth twice daily. 10/24/19   Yani Blum MD   isosorbide mononitrate (IMDUR) 30 MG extended release tablet Take 1 tablet by mouth daily. 10/24/19   Jenni Herbert MD   metFORMIN (GLUCOPHAGE) 1000 MG tablet Take 1 tablet by mouth twice daily with meals. 10/24/19   Jenni Herbert MD   metoprolol tartrate (LOPRESSOR) 25 MG tablet Take 1 tablet by mouth twice daily.  10/24/19   Jenni Herbert MD   SPIRIVA RESPIMAT 2.5 MCG/ACT AERS inhaler INHALE 2 PUFFS BY MOUTH DAILY 10/24/19   Dominick Cronin MD   clopidogrel (PLAVIX) 75 MG tablet Take 1 tablet by mouth daily. 10/24/19   Dominick Cronin MD   insulin glargine (LANTUS) 100 UNIT/ML injection vial Inject 8 Units into the skin nightly 8/27/19   Helen Crystal MD   insulin lispro (HUMALOG) 100 UNIT/ML injection vial Inject 0-6 Units into the skin nightly 8/27/19   Helen Crystal MD   blood glucose test strips (FREESTYLE LITE) strip TEST THREE TIMES A DAY 7/30/19   MITCHELL Frank CNP   SYMBICORT 160-4.5 MCG/ACT AERO Inhale 2 puffs by mouth twice daily.  7/26/19   MITCHELL El CNP   aspirin 81 MG EC tablet Take 81 mg by mouth daily    Historical Provider, MD   Cholecalciferol (VITAMIN D3) 1000 units TABS Take 1 tablet by mouth daily 8/30/18   Dominick Cronin MD       Future Appointments   Date Time Provider Roxanna Martinez   3/30/2020  7:30 AM [facility] CARDIAC RM 1 [facility] NUC MED Ana Rad   3/30/2020  8:00 AM [facility] STRESS ANA [facility] STRESS [facility]   3/31/2020 11:00 AM MITCHELL Arroyo - CNP [facility]     ,   Congestive Heart Failure Assessment    Are you currently restricting fluids?:  2000cc  Do you understand a low sodium diet?:  Yes  Do you understand how to read food labels?:  No  How many restaurant meals do you eat per week?:  0  Do you salt your food before tasting it?:  No     No patient-reported symptoms      Symptoms:      Symptom course:  stable  Salt intake watch compared to last visit:  stable     ,   COPD Assessment    Does the patient understand envrionmental exposure?:  Yes  Is the patient able to verbalize Rescue vs. Long Acting medications?:  Yes  Does the patient have a nebulizer?:  Yes  Does the patient use a space with inhaled medications?:  No     No patient-reported symptoms         Symptoms:          and   General Assessment

## 2020-03-31 NOTE — TELEPHONE ENCOUNTER
Within the last four days weight has been 176-177. Pt states swelling has gone down. Pt states she still does get winded when walking long distance. Results relayed and v/u. Labs ordered.

## 2020-03-31 NOTE — TELEPHONE ENCOUNTER
Great thank you. Swelling and weight has improved. Please have her repeat BMP and BNP 4/2/2020 or 4/3/2020. Thanks!

## 2020-04-28 NOTE — TELEPHONE ENCOUNTER
Also need clarification on which pharmacy she's using. [address] [facility] or [address] [address] [facility].

## 2020-04-28 NOTE — TELEPHONE ENCOUNTER
Please call patient to clarify her questions about her nebulizer and inhaler. I did send in the klonopin, it went to [address] [place] [address].

## 2020-05-01 NOTE — CARE COORDINATION
Call to patient to assess ongoing Care Coordination needs. HIPPA compliant message left with ACM contact information and request for return call.     Mohamud Cain RN, MSN  Ambulatory Care Manager  [phone]

## 2020-05-13 PROBLEM — J96.01 ACUTE RESPIRATORY FAILURE WITH HYPOXIA (HCC): Status: ACTIVE | Noted: 2020-01-01

## 2020-05-13 NOTE — ED NOTES
Bed: 19  Expected date:   Expected time:   Means of arrival:   Comments:  Liam Chauhan, [place]  05/13/20 5520

## 2020-05-13 NOTE — ED NOTES
PS Hospitalist @ [phone]   reg Pneumonia, pleural effusion, hypoxia, chronic kidney disease per STEVAN [address]. @ [facility] [place] [facility]  05/13/20 6161

## 2020-05-13 NOTE — ED NOTES
PT'S DAUGHTER WOULD LIKE TO BE CALLED WITH UPDATES.      [address] [facility], [place]  05/13/20 8168

## 2020-05-13 NOTE — H&P
AMPUTATION Left 6/3/2019    LEFT HALLUX AMPUTATION WITH GRAFT APPLICATION performed by Esthela Allen DPM at [facility] Left 8/20/2019    PARTIAL FIRST RAY  AMPUTATION LEFT FOOT [facility] Left 06/2005    atherectomy of SFA and popliteal artery    VASCULAR SURGERY Left 10/2007    subclavian angiopalsty and stent       Medications Prior to Admission:      Prior to Admission medications    Medication Sig Start Date End Date Taking? Authorizing Provider   doxycycline hyclate (VIBRA-TABS) 100 MG tablet Take 1 tablet by mouth 2 times daily for 7 days 5/11/20 5/18/20  Qing Alexis MD   clonazePAM (KLONOPIN) 0.5 MG tablet TAKE ONE TABLET BY MOUTH DAILY AS NEEDED FOR ANXIETY 4/29/20 5/29/20  MITCHELL Colorado CNP   SPIRIVA RESPIMAT 2.5 MCG/ACT AERS inhaler INHALE 2 PUFFS BY MOUTH DAILY 4/21/20   Nas Clark MD   metFORMIN (GLUCOPHAGE) 1000 MG tablet Take 1 tablet by mouth twice daily with meals. 4/21/20   Nas Clark MD   isosorbide mononitrate (IMDUR) 30 MG extended release tablet Take 1 tablet by mouth daily. 4/21/20   Nas Clark MD   clopidogrel (PLAVIX) 75 MG tablet Take 1 tablet by mouth daily. 4/21/20   Nas Clark MD   metoprolol tartrate (LOPRESSOR) 25 MG tablet Take 1 tablet by mouth twice daily. 4/21/20   Nas Clark MD   ranolazine (RANEXA) 500 MG extended release tablet Take 1 tablet by mouth twice daily. 4/21/20   MITCHELL Saldana CNP   lisinopril (PRINIVIL;ZESTRIL) 2.5 MG tablet Take 1 tablet by mouth daily  Patient taking differently: Take 5 mg by mouth daily  3/20/20   MITCHELL Saldana CNP   albuterol (PROVENTIL) (2.5 MG/3ML) 0.083% nebulizer solution Take 3 mLs by nebulization every 6 hours as needed for Wheezing 3/12/20   Qing Alexis MD   gabapentin (NEURONTIN) 300 MG capsule Take 3 capsules by mouth 3 times daily.  3/12/20 4/11/20  Qing Alexis MD   oxybutynin (DITROPAN) 5 MG

## 2020-05-13 NOTE — ED PROVIDER NOTES
back pain, gait problem and neck pain. Skin: Negative for color change. Neurological: Negative for dizziness, syncope and headaches. All other systems reviewed and are negative. Except as noted above in the ROS, all other systems were reviewed and negative.          PAST MEDICAL HISTORY:     Past Medical History:   Diagnosis Date    Acute osteomyelitis of left hallux ([address] 2019    Anemia 2015    Asthma     Cellulitis of foot 2019    CHF (congestive heart failure) (HCC)     Claudication of left upper extremity due to atherosclerosis ([address] 10/2007    Diabetic ulcer of left hallux, with necrosis of bone ([address] 2019    DVT (deep venous thrombosis) ([address]     Kidney stones     Morbid obesity with BMI of 45.0-49.9, adult ([address] 2014    Neuroma of hand 2013    Open fracture of left hallux 2019    Pneumonia     Rotator cuff tendinitis 2014    STEMI (ST elevation myocardial infarction) ([address] 14    Dr. Mary Victoria Urinary incontinence          SURGICAL HISTORY:      Past Surgical History:   Procedure Laterality Date    APPENDECTOMY      CATARACT REMOVAL WITH IMPLANT Bilateral 2015     SECTION      CORONARY ANGIOPLASTY WITH STENT PLACEMENT  14    DILATION AND CURETTAGE OF UTERUS      FOOT AMPUTATION Left 6/3/2019    LEFT HALLUX AMPUTATION WITH GRAFT APPLICATION performed by Florian Bond DPM at [facility] Left 2019    PARTIAL FIRST RAY  AMPUTATION LEFT FOOT performed by Raúl Restrepo DPM at [address] 2005    atherectomy of SFA and popliteal artery    VASCULAR SURGERY Left 10/2007    subclavian angiopalsty and stent         CURRENT MEDICATIONS:       Previous Medications    ALBUTEROL (PROVENTIL) (2.5 MG/3ML) 0.083% NEBULIZER SOLUTION    Take 3 mLs by nebulization every 6 hours as needed for Wheezing    ASPIRIN 81 MG EC TABLET    Take 81 mg by mouth daily    ATORVASTATIN (LIPITOR) 40 MG TABLET    Take 1 tablet by mouth daily. BLOOD GLUCOSE TEST STRIPS (FREESTYLE LITE) STRIP    TEST THREE TIMES A DAY    CHOLECALCIFEROL (VITAMIN D3) 1000 UNITS TABS    Take 1 tablet by mouth daily    CLONAZEPAM (KLONOPIN) 0.5 MG TABLET    TAKE ONE TABLET BY MOUTH DAILY AS NEEDED FOR ANXIETY    CLOPIDOGREL (PLAVIX) 75 MG TABLET    Take 1 tablet by mouth daily. DOXYCYCLINE HYCLATE (VIBRA-TABS) 100 MG TABLET    Take 1 tablet by mouth 2 times daily for 7 days    FERROUS SULFATE 325 (65 FE) MG EC TABLET    Take 1 tablet by mouth daily (with breakfast)    FLUTICASONE (FLONASE) 50 MCG/ACT NASAL SPRAY    2 sprays by Nasal route daily    FUROSEMIDE (LASIX) 20 MG TABLET    Take 1 tablet by mouth twice daily. GABAPENTIN (NEURONTIN) 300 MG CAPSULE    Take 3 capsules by mouth 3 times daily. INSULIN GLARGINE (LANTUS) 100 UNIT/ML INJECTION VIAL    Inject 8 Units into the skin nightly    INSULIN LISPRO (HUMALOG) 100 UNIT/ML INJECTION VIAL    Inject 0-6 Units into the skin nightly    ISOSORBIDE MONONITRATE (IMDUR) 30 MG EXTENDED RELEASE TABLET    Take 1 tablet by mouth daily. LEVOTHYROXINE (SYNTHROID) 50 MCG TABLET    Take 1 tablet by mouth daily. LISINOPRIL (PRINIVIL;ZESTRIL) 2.5 MG TABLET    Take 1 tablet by mouth daily    METFORMIN (GLUCOPHAGE) 1000 MG TABLET    Take 1 tablet by mouth twice daily with meals. METOPROLOL TARTRATE (LOPRESSOR) 25 MG TABLET    Take 1 tablet by mouth twice daily. NITROGLYCERIN (NITROSTAT) 0.4 MG SL TABLET    DISSOLVE ONE TABLET UNDER THE TONGUE AS NEEDED FOR CHEST PAIN EVERY 5 MINUTES UP TO 3 TIMES. IF NO RELIEF CALL 911. OXYBUTYNIN (DITROPAN) 5 MG TABLET    1 po q am and 2 po qhs    OXYCODONE-ACETAMINOPHEN (PERCOCET) 7.5-325 MG PER TABLET    Take 1 tablet by mouth every 6 hours as needed for Pain. RANOLAZINE (RANEXA) 500 MG EXTENDED RELEASE TABLET    Take 1 tablet by mouth twice daily.     SERTRALINE (ZOLOFT) 100 MG TABLET    Take 1 tablet by mouth daily Kun Durhma MD.      PROCEDURES:   N/A    CRITICAL CARE TIME:       Due to the immediate potential for life-threatening deterioration due to hypoxia, I spent 30 minutes providing critical care. This time is excluding time spent performing procedures. CONSULTS:  IP CONSULT TO HOSPITALIST      EMERGENCY DEPARTMENT COURSE and DIFFERENTIAL DIAGNOSIS/MDM:   Vitals:    Vitals:    05/13/20 1555   BP: (!) 109/58   Pulse: 72   Resp: 20   Temp: 98.5 °F (36.9 °C)   TempSrc: Oral   SpO2: (!) 88%   Weight: 181 lb (82.1 kg)       Patient was given the following medications:  Medications   vancomycin (VANCOCIN) 1,250 mg in dextrose 5 % 250 mL IVPB (has no administration in time range)   piperacillin-tazobactam (ZOSYN) 3.375 g in dextrose 5 % 50 mL IVPB (mini-bag) (has no administration in time range)         Patient was evaluated by both myself and Kun Durham MD.   Old records were reviewed. Patient arrives to the emergency department by ambulance with chest pain today but with complaints of ongoing coughing and shortness of breath. She is a COPD patient that is typically not oxygen dependent at home. Upon arrival to the ED her oxygen saturations are 88% on room air. She appears ill. EKG shows sinus rhythm without ischemic change. See interpretation by my attending. Portable chest x-ray shows progression of pleural-parenchymal opacity within the right lung base, likely a combination of a small to moderate right pleural effusion and right basilar airspace consolidation. Mild interstitial pulmonary edema also noted. Urinalysis normal.  CBC with normal white count. H&H 11.7 and 34.5. CMP reveals hyperglycemia at 193. BUN is 26 and creatinine 1.4 which is about baseline for the patient with her chronic kidney disease. Her troponin is <0.01. BNP 2007. Procalcitonin 0.52. The patient has a lactate still pending as well as blood cultures x2.   Given her symptoms on arrival, her hypoxia and complaint of

## 2020-05-14 NOTE — ACP (ADVANCE CARE PLANNING)
Advance Care Planning     Advance Care Planning Activator (Inpatient)  Conversation Note      Date of ACP Conversation: 5/13/2020    Conversation Conducted with: Patient with Decision Making Capacity    ACP Activator: Manjit Cazares    *When Decision Maker makes decisions on behalf of the incapacitated patient: Decision Maker is asked to consider and make decisions based on patient values, known preferences, or best interests. Health Care Decision Maker:     Current Designated Health Care Decision Maker:   Primary Decision Maker: Alice Lucio [facility] - [phone]   validated this information with the Patient      Care Preferences    Ventilation: \"If you were in your present state of health and suddenly became very ill and were unable to breathe on your own, what would your preference be about the use of a ventilator (breathing machine) if it were available to you? \"      Would the patient desire the use of ventilator (breathing machine)?: yes    \"If your health worsens and it becomes clear that your chance of recovery is unlikely, what would your preference be about the use of a ventilator (breathing machine) if it were available to you? \"     Would the patient desire the use of ventilator (breathing machine)?: No      Resuscitation  \"CPR works best to restart the heart when there is a sudden event, like a heart attack, in someone who is otherwise healthy. Unfortunately, CPR does not typically restart the heart for people who have serious health conditions or who are very sick. \"    \"In the event your heart stopped as a result of an underlying serious health condition, would you want attempts to be made to restart your heart (answer \"yes\" for attempt to resuscitate) or would you prefer a natural death (answer \"no\" for do not attempt to resuscitate)? \" yes       [x] Yes   [] No   Educated Patient / Kvng Caceres regarding differences between Advance Directives and portable DNR orders.     Length of ACP Conversation in minutes:  13  Conversation Outcomes:  [x] ACP discussion completed  [] Existing advance directive reviewed with patient; no changes to patient's previously recorded wishes  [] New Advance Directive completed  [] Portable Do Not Rescitate prepared for Provider review and signature  [] POLST/POST/MOLST/MOST prepared for Provider review and signature      Follow-up plan:    [] Schedule follow-up conversation to continue planning  [x] Referred individual to Provider for additional questions/concerns   [] Advised patient/agent/surrogate to review completed ACP document and update if needed with changes in condition, patient preferences or care setting    [] This note routed to one or more involved healthcare providers     SW completed ACP planing with the Pt. She reported that she only wanted her one daughter listed. SW updated the emergency contacts. SW educated the Pt on advanced Directives. Pt reported that she would like to have   help her complete the advanced Directives. SW sent consult to . No further ACP follow up needed.    Lorrene Kanner, LSW

## 2020-05-14 NOTE — PROGRESS NOTES
Page to Dr. Ruthie Mares: Just an FYI: pt's most recent systolic BPs have been in the 90s with MAPs still in the sixties. I did increase her O2 to 3 as she was in the high 80s/low 90s. There are no tele orders, do you want her on tele or any other orders? Call received from Dr. Ruthie Mares. Per Dr. Ruthie Mares, add telemetry orders for patient, hold lisinopril, metoprolol, and lasix. Dr. Ruthie Mares will reevaluate in the morning.

## 2020-05-14 NOTE — PLAN OF CARE
None  ................................................................................................................................................... Patient's weights and intake/output reviewed: Yes    Patient's Last Weight: 181 lbs obtained by standing scale on admission. Intake/Output Summary (Last 24 hours) at 5/14/2020 1531  Last data filed at 5/14/2020 1136  Gross per 24 hour   Intake 540 ml   Output 500 ml   Net 40 ml       Comorbidities Reviewed Yes    Patient has a past medical history of Acute osteomyelitis of left hallux ([address] Anemia, Asthma, Cellulitis of foot, CHF (congestive heart failure) ([facility] [address] Claudication of left upper extremity due to atherosclerosis [facility]), Diabetic ulcer of left hallux, with necrosis of bone ([address] DVT (deep venous thrombosis) ([facility] [address] Kidney stones, Morbid obesity with BMI of 45.0-49.9, adult ([address] Neuroma of hand, Open fracture of left hallux, Pneumonia, Rotator cuff tendinitis, STEMI (ST elevation myocardial infarction) ([facility] [address] and Urinary incontinence. >>For CHF and Comorbidity documentation on Education Time and Topics, please see Education Tab    Progressive Mobility Assessment:  What is this patient's Current Level of Mobility?: Ambulatory- with Assistance  How was this patient Mobilized today?: Edge of Bed and Bedside Commode                 With Whom? Nurse                 Level of Difficulty/Assistance: 1x Assist     Pt resting in bed at this time on 2 L O2. Pt denies shortness of breath. Pt without lower extremity edema.      Patient and/or Family's stated Goal of Care this Admission: reduce shortness of breath, increase activity tolerance, better understand heart failure and disease management, be more comfortable, and reduce lower extremity edema prior to discharge        :     Goal: Patient will achieve/maintain normal respiratory rate/effort  Description: Respiratory rate and effort will be within normal limits for the patient  Outcome: Ongoing

## 2020-05-14 NOTE — PROGRESS NOTES
SECTION      CORONARY ANGIOPLASTY WITH STENT PLACEMENT  14    DILATION AND CURETTAGE OF UTERUS      FOOT AMPUTATION Left 6/3/2019    LEFT HALLUX AMPUTATION WITH GRAFT APPLICATION performed by Maged Mckee DPM at [facility] Left 2019    PARTIAL FIRST RAY  AMPUTATION LEFT FOOT performed by Gardenia Park DPM at [place] [id number] Left 2005    atherectomy of SFA and popliteal artery    VASCULAR SURGERY Left 10/2007    subclavian angiopalsty and stent       Level of Consciousness: Alert, Oriented, and Cooperative = 0    Level of Activity: Walking with assistance = 1    Respiratory Pattern: Regular Pattern; RR 8-20 = 0    Breath Sounds: Absent bilaterally and/or with wheezes = 3    Sputum   ,  ,    Cough: Strong, productive = 1    Vital Signs   /61   Pulse 70   Temp 98 °F (36.7 °C) (Oral)   Resp 20   Ht 5' 2\" (1.575 m)   Wt 181 lb (82.1 kg)   LMP  (LMP Unknown)   SpO2 96%   BMI 33.11 kg/m²   SPO2 (COPD values may differ): 90-91% on room air or greater than 92% on FiO2 24- 28% = 1    Peak Flow (asthma only): not applicable = 0    RSI: 7-8 = BID and Q4HPRN (every four hours as needed) for dyspnea        Plan       Goals: medication delivery, mobilize retained secretions, volume expansion and improve oxygenation    Patient/caregiver was educated on the proper method of use for Respiratory Care Devices:  Yes      Level of patient/caregiver understanding able to:   ? Verbalize understanding   ? Demonstrate understanding       ? Teach back        ? Needs reinforcement       ? No available caregiver               ? Other:     Response to education:  Good     Is patient being placed on Home Treatment Regimen? Yes     Does the patient have everything they need prior to discharge? NA     Comments:Patient assessed    Plan of Care: Keep current therapy.     Electronically signed by Jeffery Cervantes RCP on 2020 at 8:01

## 2020-05-14 NOTE — PLAN OF CARE
Problem: Falls - Risk of:  Goal: Will remain free from falls  Description: Will remain free from falls  Note: Bed in low position, wheels locked. Side rails up x2. Instructed to call for assistance before getting out of bed, patient verbalizes understanding. Call light in reach and bed alarm remains activated.

## 2020-05-14 NOTE — PROGRESS NOTES
Hospitalist Progress Note      PCP: Nadira Aguilar MD    Date of Admission: 5/13/2020    Chief Complaint: SOB, cough, chest pain    Hospital Course: \"62 y.o. female who presented to [facility] with SOB, cough and chest pain. Patient has cough and SOB at baseline but this had been worsening for several days. Cough is productive of thick dark mucus which is baseline. This has been associated with chest pain which is worsened by coughing. She was started on doxycycline yesterday by her PCP for presumed pneumonia. \"       Subjective:  She feels about the same. It does seem like she probably has pneumonia. Nothing in particular really points to COVID, but at this point I still think it is prudent to test every inpatient pneumonia for COVID in an effort to prevent nosocomial spread. Updated nurse.          Medications:  Reviewed    Infusion Medications    dextrose       Scheduled Medications    piperacillin-tazobactam  3.375 g Intravenous Once    aspirin  81 mg Oral Daily    atorvastatin  40 mg Oral Daily    clopidogrel  75 mg Oral Daily    insulin glargine  8 Units Subcutaneous Nightly    isosorbide mononitrate  30 mg Oral Daily    levothyroxine  50 mcg Oral Daily    lisinopril  5 mg Oral Daily    metoprolol tartrate  25 mg Oral BID    oxybutynin  5 mg Oral BID    ranolazine  500 mg Oral BID    sertraline  100 mg Oral Daily    tiotropium  2 puff Inhalation Daily    budesonide-formoterol  2 puff Inhalation BID    sodium chloride flush  10 mL Intravenous 2 times per day    enoxaparin  40 mg Subcutaneous Daily    azithromycin  500 mg Intravenous Q24H    And    cefTRIAXone (ROCEPHIN) IV  1 g Intravenous Q24H    insulin lispro  0-6 Units Subcutaneous TID WC    insulin lispro  0-3 Units Subcutaneous Nightly    nicotine  1 patch Transdermal Daily    albuterol  2.5 mg Nebulization Q4H WA     PRN Meds: sodium chloride flush, acetaminophen **OR** acetaminophen, polyethylene glycol, promethazine 05/13/2020    WBCUA 20-50 06/03/2019    BACTERIA Rare 06/03/2019    RBCUA 0-2 06/03/2019    BLOODU Negative 05/13/2020    SPECGRAV 1.020 05/13/2020    GLUCOSEU Negative 05/13/2020    GLUCOSEU 100 05/26/2012       Radiology:  XR CHEST PORTABLE   Final Result   1. Interval progression of pleural and parenchymal opacity within the right   lung base, likely a combination of a small to moderate right pleural effusion   and right basilar airspace consolidation. 2. Mild interstitial pulmonary edema. 3. Stable mild left basilar atelectasis. Assessment/Plan:    Active Hospital Problems    Diagnosis    Acute respiratory failure with hypoxia [facility]) [J96.01]       \"62 y.o. female who presented to [facility] with SOB, cough and chest pain. Patient has cough and SOB at baseline but this had been worsening for several days. Cough is productive of thick dark mucus which is baseline. This has been associated with chest pain which is worsened by coughing. She was started on doxycycline yesterday by her PCP for presumed pneumonia. \"      Acute on chronic diastolic heart failure  - last echo 2/20 with EF 55-60%  - furosemide IV. She weighed 181 at her last clinic visit. - reduced metoprolol, held lisinopril, held ISMN. She had some BPs in the 90's/50's here, had some nonspecific symptoms at the time. It looks like she was 90's/50's at last clinic visit two months ago, and had intermittent BPs in the 90's/50's during her admission three months ago. Once we are finished actively diuresing her she can probably resume these meds at lower doses.      Pneumonia, probably bacterial  - consolidation seen on CXR with associated pleural effusion. Procalcitonin positive. - had one day of doxy at home. Given zosyn, vanc in the ED.   Continued with ceftriaxone, azithromycin.  - no evidence of sepsis on admission  - f/u blood cultures, COVID, urine Ags, sputum culture     Acute hypoxic respiratory failure  - 2/2 CHF, pneumonia  - wean to RA as tolerated. The patient has no supplemental O2 requirement at baseline. She saturated well on RA during clinic visits 2 2-3 months ago. - scheduled nebs ordered    Tobacco smoking, nicotine dependence  - encouraged to quit. Educated about risks. - she has no emphysema on limited lung imaging. Needs outpatient PFTs.    CAD  - no ongoing chest pain. It sounds like she had some pleurisy in the last few days. - continue home ASA, plavix, metoprolol, lipitor, ranexa, imdur     DMII  - poorly controlled   - holding home oral meds  - lantus with SSI ordered     HLD  - continue home statin     CKD stage III  - Cr at baseline  - continue to monitor     Asthma  - no evidence of exacerbation  - continue home inhalers     Hypothyroidism  - continue home synthroid      DVT Prophylaxis: enoxaparin  Diet: DIET GENERAL;  Code Status: Full Code    PT/OT Eval Status: not indicated    Dispo - perhaps 5/16, when respiratory status is stable. She lives at home.        Rekha Cortez MD

## 2020-05-15 NOTE — PROGRESS NOTES
Patient transporting to A1 room 109. Report given to [facility] at bedside. Telemetry box obtained from [facility] and placed on patient. CMU confirmed NSR at 77 with arrhthymias. Patient belongings at bedside during transfer. Patient stated she would notify family of transfer. MD aware of transfer. Call back number given to RN with questions or concerns.

## 2020-05-15 NOTE — PROGRESS NOTES
End of shift report given at bedside to [facility]. Call light within reach, bed in lowest position, no needs at this time.

## 2020-05-15 NOTE — PLAN OF CARE
With Whom? Nurse, PCA, PT, and OT                 Level of Difficulty/Assistance: 1x Assist     Pt sitting in bed at this time on 3 L O2. Pt with complaints of shortness of breath. Pt with pitting lower extremity edema.      Patient and/or Family's stated Goal of Care this Admission: reduce shortness of breath, increase activity tolerance, better understand heart failure and disease management, be more comfortable, and reduce lower extremity edema prior to discharge        :

## 2020-05-15 NOTE — PROGRESS NOTES
Pt's IV infiltrated. Pt complaining of pain and burning at site, given ice pack. Unsuccessful attempts to regain IV access x2. Pt refusing to be stuck again at this time, states \"it will have to wait until later. \"

## 2020-05-15 NOTE — PROGRESS NOTES
Kari COOPER paged \"Soft BP's overnight- do you want to set order parameters for Metoprolol and Lasix? Please advise. Thanks! \"    Awaiting response. Will continue to monitor. Per Kari COOPER \"I change the order so that we can skip metoprolol this morning. Also, please get the standing weight so we know whether or not she has diary sing successfully. OK to give Lasix. \"

## 2020-05-15 NOTE — PROGRESS NOTES
oxyCODONE-acetaminophen, sodium chloride flush, acetaminophen **OR** acetaminophen, polyethylene glycol, promethazine **OR** ondansetron, glucose, dextrose, glucagon (rDNA), dextrose      Intake/Output Summary (Last 24 hours) at 5/15/2020 1350  Last data filed at 5/15/2020 1307  Gross per 24 hour   Intake 960 ml   Output 1825 ml   Net -865 ml       Physical Exam Performed:    /78   Pulse 78   Temp 97.7 °F (36.5 °C) (Oral)   Resp 18   Ht 5' 2\" (1.575 m)   Wt 184 lb 6.4 oz (83.6 kg)   LMP  (LMP Unknown)   SpO2 98%   BMI 33.73 kg/m²       General appearance: No apparent distress, appears stated age and cooperative. HEENT: Pupils equal, round, and reactive to light. Conjunctivae/corneas clear. Neck: Supple, with full range of motion. No jugular venous distention. Trachea midline. Respiratory:  Normal respiratory effort. Bilaterally without Rhonchi. Bibasilar rales, R>L, improved. Bilateral expiratory wheezing. Cardiovascular: Regular rate and rhythm with normal S1/S2 without murmurs, rubs or gallops. Abdomen: Soft, non-tender, non-distended with normal bowel sounds. Musculoskeletal: No clubbing, cyanosis or edema bilaterally. Full range of motion without deformity. Skin: Skin color, texture, turgor normal.  No rashes or lesions. Neurologic:  Neurovascularly intact without any focal sensory/motor deficits.  Cranial nerves: II-XII intact, grossly non-focal.  Psychiatric: Alert and oriented, thought content appropriate, normal insight  Capillary Refill: Brisk,< 3 seconds   Peripheral Pulses: +2 palpable, equal bilaterally       Labs:   Recent Labs     05/13/20  1558 05/14/20  0522 05/15/20  0525   WBC 10.1 9.1 8.7   HGB 11.7* 11.1* 11.5*   HCT 34.5* 33.1* 34.1*    274 288     Recent Labs     05/13/20  1558 05/14/20  0522 05/15/20  0525    136 133*   K 5.1 4.8 5.5*   CL 98* 101 99   CO2 20* 22 24   BUN 26* 27* 26*   CREATININE 1.4* 1.1 0.9   CALCIUM 9.0 8.8 9.0     Recent Labs Procalcitonin positive. - had one day of doxy at home. Given zosyn, vanc in the ED. Continued with ceftriaxone, azithromycin.  - no evidence of sepsis on admission  - negative blood cultures, COVID, and urine Ags. Sputum culture also ordered.     AECOPD  - steroids, inhaled bronchodilators, abx as above  - with ongoing tobacco smoking, nicotine dependence. Encouraged to quit. Educated about risks. She has no emphysema on limited lung imaging. Needs outpatient PFTs. Acute hypoxic respiratory failure  - 2/2 CHF, pneumonia, AECOPD  - wean to RA as tolerated. The patient has no supplemental O2 requirement at baseline. She saturated well on RA during clinic visits 2 2-3 months ago.     CAD  - no ongoing chest pain. It sounds like she have had some pleurisy in the last few days. - continue home ASA, plavix, metoprolol, lipitor, ranexa, imdur     DMII  - poorly controlled   - holding home oral meds  - adjusted insulin regimen     HLD  - continue home statin     CKD stage III  - continue to monitor     Asthma  - no evidence of exacerbation  - continue home inhalers     Hypothyroidism  - continue home synthroid      DVT Prophylaxis: enoxaparin  Diet: DIET GENERAL;  Code Status: Full Code    PT/OT Eval Status: not indicated    Dispo - perhaps 5/17, when respiratory status is stable, ideally when weaned to RA. She lives at home.        Mamadou Dickson MD

## 2020-05-16 NOTE — PROGRESS NOTES
hours. Recent Labs     05/13/20  1558   TROPONINI <0.01       Urinalysis:      Lab Results   Component Value Date    NITRU Negative 05/13/2020    WBCUA 20-50 06/03/2019    BACTERIA Rare 06/03/2019    RBCUA 0-2 06/03/2019    BLOODU Negative 05/13/2020    SPECGRAV 1.020 05/13/2020    GLUCOSEU Negative 05/13/2020    GLUCOSEU 100 05/26/2012       Radiology:  XR CHEST PORTABLE   Final Result   1. Interval progression of pleural and parenchymal opacity within the right   lung base, likely a combination of a small to moderate right pleural effusion   and right basilar airspace consolidation. 2. Mild interstitial pulmonary edema. 3. Stable mild left basilar atelectasis. Assessment/Plan:    Active Hospital Problems    Diagnosis    Acute respiratory failure with hypoxia [facility]) [J96.01]       \"62 y.o. female who presented to [facility] with SOB, cough and chest pain. Patient has cough and SOB at baseline but this had been worsening for several days. Cough is productive of thick dark mucus which is baseline. This has been associated with chest pain which is worsened by coughing. She was started on doxycycline yesterday by her PCP for presumed pneumonia. \"      Acute on chronic diastolic heart failure  - last echo 2/20 with EF 55-60%  - furosemide IV. She weighed 181 at her last clinic visit. - reduced metoprolol, held lisinopril, held ISMN. She had some BPs in the 90's/50's here, had some nonspecific symptoms at the time. It looks like she was 90's/50's at last clinic visit two months ago, and had intermittent BPs in the 90's/50's during her admission three months ago. Once we are finished actively diuresing her she can probably resume most of these beneficial meds at lower doses.      Pneumonia, probably bacterial  - consolidation seen on CXR with associated pleural effusion. Procalcitonin positive. - had one day of doxy at home. Given zosyn, vanc in the ED.   Continued with ceftriaxone,

## 2020-05-17 NOTE — PROGRESS NOTES
Patient's EF (Ejection Fraction) is greater than 40%    Heart Failure Medications:   Diuretics[de-identified] Furosemide     (One of the following REQUIRED for EF <40%/SYSTOLIC FAILURE but MAY be used in EF% >40%/DIASTOLIC FAILURE)        ACE[de-identified] None        ARB[de-identified] None         ARNI[de-identified] None    (Beta Blockers)   NON- Evidenced Based Beta Blocker (for EF% >40%/DIASTOLIC FAILURE): None     Evidenced Based Beta Blocker::(REQUIRED for EF% <40%/SYSTOLIC FAILURE) None  . .................................................................................................................................................. Patient's weights and intake/output reviewed: Yes    Patient's Last Weight: 183 lbs obtained by standing scale. Difference of 1lbs less than last documented weight. Intake/Output Summary (Last 24 hours) at 5/17/2020 1350  Last data filed at 5/17/2020 1247  Gross per 24 hour   Intake 1560 ml   Output 4500 ml   Net -2940 ml       Comorbidities Reviewed Yes    Patient has a past medical history of Acute osteomyelitis of left hallux ([address] Anemia, Asthma, Cellulitis of foot, CHF (congestive heart failure) ([address] Claudication of left upper extremity due to atherosclerosis [facility]), Diabetic ulcer of left hallux, with necrosis of bone ([address] DVT (deep venous thrombosis) ([address] Kidney stones, Morbid obesity with BMI of 45.0-49.9, adult ([address] Neuroma of hand, Open fracture of left hallux, Pneumonia, Rotator cuff tendinitis, STEMI (ST elevation myocardial infarction) ([address] and Urinary incontinence. >>For CHF and Comorbidity documentation on Education Time and Topics, please see Education Tab    Progressive Mobility Assessment:  What is this patient's Current Level of Mobility?: Ambulatory-Up Ad Brigitte  How was this patient Mobilized today?: up to chair, up to bathroom                 With Whom? Self                 Level of Difficulty/Assistance: Independent     Pt sitting in bed at this time on 2 L O2.  Pt with complaints of

## 2020-05-17 NOTE — PLAN OF CARE
Problem: Falls - Risk of:  Goal: Will remain free from falls  Description: Will remain free from falls  Outcome: Ongoing     Problem: Pain:  Goal: Pain level will decrease  Description: Pain level will decrease  Outcome: Ongoing     Problem: Breathing Pattern - Ineffective:  Goal: Ability to achieve and maintain a regular respiratory rate will improve  Description: Ability to achieve and maintain a regular respiratory rate will improve  Outcome: Ongoing     Problem: OXYGENATION/RESPIRATORY FUNCTION  Goal: Patient will maintain patent airway  Outcome: Ongoing     Problem: Serum Glucose Level - Abnormal:  Goal: Ability to maintain appropriate glucose levels will improve  Description: Ability to maintain appropriate glucose levels will improve  Outcome: Ongoing

## 2020-05-17 NOTE — PROGRESS NOTES
promethazine **OR** ondansetron, glucose, dextrose, glucagon (rDNA), dextrose      Intake/Output Summary (Last 24 hours) at 5/17/2020 1348  Last data filed at 5/17/2020 1247  Gross per 24 hour   Intake 1560 ml   Output 4500 ml   Net -2940 ml       Physical Exam Performed:    /65   Pulse 85   Temp 97.7 °F (36.5 °C) (Axillary)   Resp 17   Ht 5' 2\" (1.575 m)   Wt 181 lb 12.8 oz (82.5 kg)   LMP  (LMP Unknown)   SpO2 93%   BMI 33.25 kg/m²       General appearance: No apparent distress, appears stated age and cooperative. HEENT: Pupils equal, round, and reactive to light. Conjunctivae/corneas clear. Neck: Supple, with full range of motion. No jugular venous distention. Trachea midline. Respiratory:  Normal respiratory effort. Bilaterally without Rhonchi. Bibasilar rales, R>L, improved. Bilateral expiratory wheezing has improved. Cardiovascular: Regular rate and rhythm with normal S1/S2 without murmurs, rubs or gallops. Abdomen: Soft, non-tender, non-distended with normal bowel sounds. Musculoskeletal: No clubbing, cyanosis or edema bilaterally. Full range of motion without deformity. Skin: Skin color, texture, turgor normal.  No rashes or lesions. Neurologic:  Neurovascularly intact without any focal sensory/motor deficits. Cranial nerves: II-XII intact, grossly non-focal.  Psychiatric: Alert and oriented, thought content appropriate, normal insight  Capillary Refill: Brisk,< 3 seconds   Peripheral Pulses: +2 palpable, equal bilaterally       Labs:   Recent Labs     05/15/20  0525 05/17/20  0640   WBC 8.7 11.0   HGB 11.5* 10.8*   HCT 34.1* 32.7*    285     Recent Labs     05/15/20  0525 05/17/20  0640   * 135*   K 5.5* 4.7   CL 99 100   CO2 24 26   BUN 26* 30*   CREATININE 0.9 1.2*   CALCIUM 9.0 9.3     No results for input(s): AST, ALT, BILIDIR, BILITOT, ALKPHOS in the last 72 hours. No results for input(s): INR in the last 72 hours.   No results for input(s): Kim Garcia in

## 2020-05-17 NOTE — PLAN OF CARE
Patient's EF (Ejection Fraction) is greater than 40%    Heart Failure Medications:  Diuretics[de-identified] Furosemide    (One of the following REQUIRED for EF <40%/SYSTOLIC FAILURE but MAY be used in EF% >40%/DIASTOLIC FAILURE)        ACE[de-identified] None        ARB[de-identified] None         ARNI[de-identified] None    (Beta Blockers)  NON- Evidenced Based Beta Blocker (for EF% >40%/DIASTOLIC FAILURE): None    Evidenced Based Beta Blocker::(REQUIRED for EF% <40%/SYSTOLIC FAILURE) Metoprolol SUCCinate- Toprol XL  . .................................................................................................................................................. Patient's weights and intake/output reviewed: Yes    Patient's Last Weight: 183 lbs obtained by standing scale. Difference of 1 lbs less than last documented weight. Intake/Output Summary (Last 24 hours) at 5/16/2020 6134  Last data filed at 5/16/2020 2111  Gross per 24 hour   Intake 1680 ml   Output 3600 ml   Net -1920 ml       Comorbidities Reviewed Yes    Patient has a past medical history of Acute osteomyelitis of left hallux ([facility] [address] Anemia, Asthma, Cellulitis of foot, CHF (congestive heart failure) ([facility] [address] Claudication of left upper extremity due to atherosclerosis [facility]), Diabetic ulcer of left hallux, with necrosis of bone ([facility] [address] DVT (deep venous thrombosis) ([facility] [address] Kidney stones, Morbid obesity with BMI of 45.0-49.9, adult ([facility] [address] Neuroma of hand, Open fracture of left hallux, Pneumonia, Rotator cuff tendinitis, STEMI (ST elevation myocardial infarction) ([facility] [address] and Urinary incontinence. >>For CHF and Comorbidity documentation on Education Time and Topics, please see Education Tab    Progressive Mobility Assessment:  What is this patient's Current Level of Mobility?: Ambulatory-Up Ad Brigitte  How was this patient Mobilized today?: Edge of Bed,  Up to Toilet/Shower, and Up in Room                 With Whom?  Self                 Level of Difficulty/Assistance: Independent     Pt resting in bed at this time on 1 L O2. Pt denies shortness of breath. Pt without lower extremity edema.      Patient and/or Family's stated Goal of Care this Admission: reduce shortness of breath and be more comfortable prior to discharge        :

## 2020-05-18 NOTE — DISCHARGE INSTR - COC
 Chronic bronchitis ([facility]) J42    Anxiety and depression F41.9, F32.9    Fibromyalgia M79.7    Phantom pain ([facility]) G54.6    Osteomyelitis ([facility]) M86.9    PVD (peripheral vascular disease) ([facility]) I73.9    Chronic osteomyelitis of left foot ([facility]) M59.403    Acute osteomyelitis of foot ([facility]) M86.179    Acute on chronic diastolic (congestive) heart failure ([facility]) I50.33    Acute respiratory failure with hypoxia ([facility]) J96.01       Isolation/Infection:   Isolation          No Isolation        Patient Infection Status     Infection Onset Added Last Indicated Last Indicated By Review Planned Expiration Resolved Resolved By    None active    Resolved    COVID-19 Rule Out 05/14/20 05/14/20 05/14/20 COVID-19 (Ordered)   05/14/20 Rule-Out Test Resulted          Nurse Assessment:  Last Vital Signs: /74   Pulse 74   Temp 98.2 °F (36.8 °C) (Oral)   Resp 16   Ht 5' 2\" (1.575 m)   Wt 183 lb 14.4 oz (83.4 kg)   LMP  (LMP Unknown)   SpO2 92%   BMI 33.64 kg/m²     Last documented pain score (0-10 scale): Pain Level: 0  Last Weight:   Wt Readings from Last 1 Encounters:   05/18/20 183 lb 14.4 oz (83.4 kg)     Mental Status:  oriented and alert    IV Access:  - None    Nursing Mobility/ADLs:  Walking   Independent  Transfer  Independent  Bathing  Independent  Dressing  Independent  Toileting  Independent  Feeding  Independent  Med Admin  Independent  Med Delivery   whole    Wound Care Documentation and Therapy:  Wound 08/15/19 Foot Left;Dorsal (Active)   Number of days: 276       Wound 02/13/20 Foot Anterior; Left wound above recent toe amputation 8/19 (Active)   Number of days: 95        Elimination:  Continence:   · Bowel:  Yes  · Bladder: Yes  Urinary Catheter: None   Colostomy/Ileostomy/Ileal Conduit: No       Date of Last BM:     Intake/Output Summary (Last 24 hours) at 5/18/2020 1311  Last data filed at 5/18/2020 1000  Gross per 24 hour   Intake 1800 ml   Output 2300 ml   Net -500 ml     I/O last 3 completed shifts: In: 2280 [P.O.:2280]  Out: 4000 [Urine:4000]    Safety Concerns: At Risk for Falls    Impairments/Disabilities:      None    Nutrition Therapy:  Current Nutrition Therapy:   - Oral Diet:  Carb Control 3 carbs/meal (1500kcals/day)    Routes of Feeding: Oral  Liquids: Thin Liquids  Daily Fluid Restriction: no  Last Modified Barium Swallow with Video (Video Swallowing Test): not done    Treatments at the Time of Hospital Discharge:   Respiratory Treatments: ***  Oxygen Therapy:  is on oxygen at 3 L/min per nasal cannula.   Ventilator:    - No ventilator support    Rehab Therapies: Nurse  Weight Bearing Status/Restrictions: No weight bearing restirctions  Other Medical Equipment (for information only, NOT a DME order):  None    Other Treatments: HOME HEALTH CARE: LEVEL 3 [facility] [address] to establish plan of care for patient over 60 day period   Nursing  Initial home SN evaluation visit to occur within 24-48 hours for:  1)  medication management  2)  VS and clinical assessment  3)  S&S chronic disease exacerbation education + when to contact MD/NP  4)  care coordination  Medication Reconciliation during 1st SN visit  PT/OT/Speech   Evaluations in home within 24-48 hours of discharge to include DME and home safety   Frontload therapy 5 days, then 3x a week   OT to evaluate if patient has [address] needs for personal care    evaluation within 24-48 hours to evaluate resources & insurance for potential AL, [place], LTC, and Medicaid options   Palliative Care referral within 5 days of hospital discharge   PCP Visit scheduled within 3 - 7 days of hospital discharge [address] (If patient is agreeable and meets guidelines)      Patient's personal belongings (please select all that are sent with patient):  None    RN SIGNATURE:  Electronically signed by Nicole Hinson RN on 5/18/20 at 3:02 PM EDT    CASE MANAGEMENT/SOCIAL WORK SECTION    Inpatient Status Date:

## 2020-05-18 NOTE — CARE COORDINATION
[facility]    DC order noted, all docs needed have been faxed to [facility] for home care services.     Home care to see patient within 24-48 hrs    Eliazar Souza RN, BSN CTN  [facility] [phone]

## 2020-05-18 NOTE — DISCHARGE SUMMARY
Hospital Medicine Discharge Summary    Patient ID: Argelia Yoo      Patient's PCP: Douglas Caldera MD    Admit Date: 5/13/2020     Discharge Date:   05/18/20     Admitting Physician: Evangelista Obregon MD     Discharge Physician: Melissa Barragan MD     Discharge Diagnoses: Active Hospital Problems    Diagnosis    Acute respiratory failure with hypoxia ([facility] [address] [J96.01]       The patient was seen and examined on day of discharge and this discharge summary is in conjunction with any daily progress note from day of discharge. Hospital Course:  \"60 y.o. female who presented to [facility] with SOB, cough and chest pain. Patient has cough and SOB at baseline but this had been worsening for several days. Cough is productive of thick dark mucus which is baseline. This has been associated with chest pain which is worsened by coughing. She was started on doxycycline yesterday by her PCP for presumed pneumonia. \"        Acute on chronic diastolic heart failure  - last echo 2/2020 with EF 55-60%  - furosemide IV, then transitioned back to her usual PO dose, which should be effective once her other acute issues have resolved. She weighed 181 at her last clinic visit. Down to 183 here. - reduced metoprolol, reduced lisinopril, stopped ISMN. She had some BPs in the 90's/50's here, had some nonspecific symptoms at the time. It looks like she was 90's/50's at last clinic visit two months ago, and had intermittent BPs in the 90's/50's during her admission three months ago.       Pneumonia, probably bacterial  - consolidation seen on CXR with associated pleural effusion. Procalcitonin positive. - had one day of doxy at home. Given zosyn, vanc in the ED. Continued with ceftriaxone, azithromycin. Completed course here.    - no evidence of sepsis on admission  - negative blood cultures, COVID, and urine Ags.       AECOPD  - steroids, inhaled bronchodilators, abx as above  - with ongoing tobacco smoking, nicotine dependence. Encouraged to quit. Educated about risks. She has no emphysema on limited lung imaging. Needs outpatient PFTs.    Acute hypoxic respiratory failure  - 2/2 CHF, pneumonia, AECOPD  - The patient had no supplemental O2 requirement at baseline. She saturated well on RA during clinic visits 2 2-3 months ago. We were unable to wean her to RA on this prolonged admission, however. Continue to try as outpatient. Discharged with new 2LNC requirement.       CAD  - no ongoing chest pain. It sounds like she have had some pleurisy in the last few days. I do not suspect PE or acute aortic pathology. - continue home ASA, plavix, metoprolol, lipitor, ranexa, imdur     DMII  - poorly controlled, A1c 10.3. Worse while here on steroids. - resumed home oral meds upon discharge.  - adjusted insulin regimen. She had been taking 35 qhs and ~15 TIDAC. F/u with PCP.     HLD  - continue home statin     CKD stage III  - continue to monitor     Hypothyroidism  - continue home synthroid          Physical Exam Performed:     /74   Pulse 74   Temp 98.2 °F (36.8 °C) (Oral)   Resp 16   Ht 5' 2\" (1.575 m)   Wt 183 lb 14.4 oz (83.4 kg)   LMP  (LMP Unknown)   SpO2 92%   BMI 33.64 kg/m²       General appearance: No apparent distress, appears stated age and cooperative. HEENT: Pupils equal, round, and reactive to light. Conjunctivae/corneas clear. Neck: Supple, with full range of motion. No jugular venous distention. Trachea midline. Respiratory:  Normal respiratory effort. Bilaterally without Rhonchi. Bibasilar rales resolved. Bilateral expiratory wheezing has improved. Cardiovascular: Regular rate and rhythm with normal S1/S2 without murmurs, rubs or gallops. Abdomen: Soft, non-tender, non-distended with normal bowel sounds. Musculoskeletal: No clubbing, cyanosis or edema bilaterally. Full range of motion without deformity.   Skin: Skin color, texture, turgor normal.  No rashes or daily  Qty: 30 tablet, Refills: 2    Associated Diagnoses: Anxiety and depression      fluticasone (FLONASE) 50 MCG/ACT nasal spray 2 sprays by Nasal route daily  Qty: 1 Bottle, Refills: 2    Associated Diagnoses: PND (post-nasal drip)      nitroGLYCERIN (NITROSTAT) 0.4 MG SL tablet DISSOLVE ONE TABLET UNDER THE TONGUE AS NEEDED FOR CHEST PAIN EVERY 5 MINUTES UP TO 3 TIMES. IF NO RELIEF CALL 911. Qty: 25 tablet, Refills: 3    Associated Diagnoses: Essential hypertension; Pure hypercholesterolemia      oxyCODONE-acetaminophen (PERCOCET) 7.5-325 MG per tablet Take 1 tablet by mouth every 6 hours as needed for Pain. ferrous sulfate 325 (65 Fe) MG EC tablet Take 1 tablet by mouth daily (with breakfast)  Qty: 30 tablet, Refills: 4      furosemide (LASIX) 20 MG tablet Take 1 tablet by mouth twice daily. Qty: 60 tablet, Refills: 4      levothyroxine (SYNTHROID) 50 MCG tablet Take 1 tablet by mouth daily. Qty: 30 tablet, Refills: 4      atorvastatin (LIPITOR) 40 MG tablet Take 1 tablet by mouth daily. Qty: 30 tablet, Refills: 4      blood glucose test strips (FREESTYLE LITE) strip TEST THREE TIMES A DAY  Qty: 100 strip, Refills: 2      SYMBICORT 160-4.5 MCG/ACT AERO Inhale 2 puffs by mouth twice daily. Qty: 1 Inhaler, Refills: 3      aspirin 81 MG EC tablet Take 81 mg by mouth daily      Cholecalciferol (VITAMIN D3) 1000 units TABS Take 1 tablet by mouth daily  Qty: 30 tablet, Refills: 5               Time Spent on discharge is more than 30 minutes in the examination, evaluation, counseling and review of medications and discharge plan. Signed:    Phylicia Robles MD   5/18/2020      Thank you Chiqui Cameron MD for the opportunity to be involved in this patient's care. If you have any questions or concerns please feel free to contact me at [phone]. Patient was evaluated today for the diagnosis of COPD.   I entered a DME order for home oxygen because the diagnosis and testing requires the

## 2020-05-18 NOTE — PLAN OF CARE
Problem: OXYGENATION/RESPIRATORY FUNCTION  Goal: Patient will maintain patent airway  Outcome: Ongoing     Patient's EF (Ejection Fraction) is greater than 40%    Heart Failure Medications:  Diuretics[de-identified] Furosemide    (One of the following REQUIRED for EF <40%/SYSTOLIC FAILURE but MAY be used in EF% >40%/DIASTOLIC FAILURE)        ACE[de-identified] None        ARB[de-identified] None         ARNI[de-identified] None    (Beta Blockers)  NON- Evidenced Based Beta Blocker (for EF% >40%/DIASTOLIC FAILURE): None    Evidenced Based Beta Blocker::(REQUIRED for EF% <40%/SYSTOLIC FAILURE) None  . .................................................................................................................................................. Patient's weights and intake/output reviewed: Yes    Patient's Last Weight: 183 lbs obtained by standing scale. Difference of 2 lbs more than last documented weight. Intake/Output Summary (Last 24 hours) at 5/18/2020 1108  Last data filed at 5/18/2020 6766  Gross per 24 hour   Intake 2400 ml   Output 4000 ml   Net -1600 ml       Comorbidities Reviewed Yes    Patient has a past medical history of Acute osteomyelitis of left hallux ([facility] [address] Anemia, Asthma, Cellulitis of foot, CHF (congestive heart failure) ([facility] [address] Claudication of left upper extremity due to atherosclerosis [facility]), Diabetic ulcer of left hallux, with necrosis of bone ([facility] [address] DVT (deep venous thrombosis) ([facility] [address] Kidney stones, Morbid obesity with BMI of 45.0-49.9, adult ([facility] [address] Neuroma of hand, Open fracture of left hallux, Pneumonia, Rotator cuff tendinitis, STEMI (ST elevation myocardial infarction) ([facility] [address] and Urinary incontinence.      >>For CHF and Comorbidity documentation on Education Time and Topics, please see Education Tab    Progressive Mobility Assessment:  What is this patient's Current Level of Mobility?: Ambulatory-Up Ad Brigitte  How was this patient Mobilized today?: Edge of Bed, Up to Chair, Bedside Commode,  Up to Toilet/Shower, and Up in Room With Whom? Nurse, PCA, PT, OT, and Self                 Level of Difficulty/Assistance: Independent     Pt resting in bed at this time on 3 L O2. Pt with complaints of shortness of breath. Pt with pitting lower extremity edema.      Patient and/or Family's stated Goal of Care this Admission: reduce shortness of breath, increase activity tolerance, better understand heart failure and disease management, be more comfortable, and reduce lower extremity edema prior to discharge        :

## 2020-05-18 NOTE — CARE COORDINATION
Pt was initially assessed by CM 5/14 and denied any needs upon DC. Per MD note 5/17, pt will be weaned to RA prior to DC home. She is still currently requiring 2L/NC. O2 documentation placed in Treatment Team Sticky note. CM will follow for possible new requirement at DC.      Peyton Garcia RN

## 2020-05-18 NOTE — PLAN OF CARE
Problem: OXYGENATION/RESPIRATORY FUNCTION  Goal: Patient will achieve/maintain normal respiratory rate/effort  Description: Respiratory rate and effort will be within normal limits for the patient  Outcome: Ongoing     Problem: Serum Glucose Level - Abnormal:  Goal: Ability to maintain appropriate glucose levels will improve  Description: Ability to maintain appropriate glucose levels will improve  5/18/2020 0223 by Kay Cowan RN  Outcome: Ongoing    Patient's EF (Ejection Fraction) is greater than 40%    Heart Failure Medications:  Diuretics[de-identified] Furosemide    (One of the following REQUIRED for EF <40%/SYSTOLIC FAILURE but MAY be used in EF% >40%/DIASTOLIC FAILURE)        ACE[de-identified] None        ARB[de-identified] None         ARNI[de-identified] None    (Beta Blockers)  NON- Evidenced Based Beta Blocker (for EF% >40%/DIASTOLIC FAILURE): None    Evidenced Based Beta Blocker::(REQUIRED for EF% <40%/SYSTOLIC FAILURE) None  . .................................................................................................................................................. Patient's weights and intake/output reviewed: Yes    Patient's Last Weight: 181 lbs obtained by standing scale. Difference of 2 lbs less than last documented weight.       Intake/Output Summary (Last 24 hours) at 5/18/2020 0224  Last data filed at 5/18/2020 0015  Gross per 24 hour   Intake 2400 ml   Output 3800 ml   Net -1400 ml       Comorbidities Reviewed Yes    Patient has a past medical history of Acute osteomyelitis of left hallux ([facility] [address] Anemia, Asthma, Cellulitis of foot, CHF (congestive heart failure) ([facility] [address] Claudication of left upper extremity due to atherosclerosis [facility]), Diabetic ulcer of left hallux, with necrosis of bone ([facility] [address] DVT (deep venous thrombosis) ([facility] Kidney stones, Morbid obesity with BMI of 45.0-49.9, adult ([facility] [address] Neuroma of hand, Open fracture of left hallux, Pneumonia, Rotator cuff tendinitis, STEMI (ST elevation myocardial infarction) ([facility] [address] and Urinary

## 2020-05-18 NOTE — CARE COORDINATION
[facility]    Referral received from  to follow for home care services. I will follow for needs, and speak with patient to verify demos.     Yara Quezada RN, BSN CTN  [facility] [phone]

## 2020-05-18 NOTE — PLAN OF CARE
Problem: Serum Glucose Level - Abnormal:  Goal: Ability to maintain appropriate glucose levels will improve  Description: Ability to maintain appropriate glucose levels will improve  5/18/2020 1110 by Allison Heredia RN  Outcome: Ongoing

## 2020-05-19 NOTE — CARE COORDINATION
Patient contacted regarding recent discharge and COVID-19 risk   Care Transition Nurse/ Ambulatory Care Manager contacted the patient by telephone to perform post discharge assessment. Verified name and  with patient as identifiers. Patient has following risk factors of: heart failure and COPD. CTN/ACM reviewed discharge instructions, medical action plan and red flags related to discharge diagnosis. Reviewed and educated them on any new and changed medications related to discharge diagnosis. Advised obtaining a 90-day supply of all daily and as-needed medications. Education provided regarding infection prevention, and signs and symptoms of COVID-19 and when to seek medical attention with patient who verbalized understanding. Discussed exposure protocols and quarantine from [address] you at higher risk for severe illness  and given an opportunity for questions and concerns. The patient agrees to contact the COVID-19 hotline [phone] or PCP office for questions related to their healthcare. CTN/ACM provided contact information for future reference. From CDC: Are you at higher risk for severe illness?  Wash your hands often.  Avoid close contact (6 feet, which is about two arm lengths) with people who are sick.  Put distance between yourself and other people if COVID-19 is spreading in your community.  Clean and disinfect frequently touched surfaces.  Avoid all cruise travel and non-essential air travel.  Call your healthcare professional if you have concerns about COVID-19 and your underlying condition or if you are sick. For more information on steps you can take to protect yourself, see CDC's How to [id number] [place] [address] for follow-up call in 5-7 days based on severity of symptoms and risk factors. Spoke with patient who reported she is ok but waiting for her oxygen tanks to arrive. Patient reported her breathing is ok but difficult to talk on the phone.  CTN attempted to review new and changed medications. Patient reports she is to take her Lantus and Humalog via a sliding scale and CTN discussed that the Lantus prescription is written for 35 units BID and Humalog is a sliding scale. Patient stated she was given a paper and instructed to do her medications via a sliding scale because of the prednisone she is taking is causing a spike in her BS's. CTN again attempted to clarify prescription instructions with patient and she stated \"I know how to take my Lantus and my Humalog and know what the paper says that the doctor gave me. \" CTN asked patient to obtain her prescriptions of Lantus and Humalog and also her discharge instructions. Patient started to cough and stated \" I am not going to get them right now and I really don't want to talk any further. \" Patient agreeable to CTN contacting patient back later this afternoon. CTN contacted [address] [place] [address] and spoke with Kayley Borrero who verified Lantus prescription was written as 35 units BID and Humalog is a sliding scale. Kayley Borrero stated patient can call back to pharmacy to discuss as well. CTN contacted [facility] liaison KATIE Pradhan who verified [facility] orders were received and message has been left of patient to call back. CTN requested nurse visit today for patient to also review medications. KATIE Pradhan to send message to [facility]  for soc. CTN also contacted [facility] Valdes liaison to verify delivery of Home Oxygen. Justine Mckee to contact CTN regarding delivery. 214 pm: CTN outreached to patient who stated [facility] nurse was with her now and oxygen is scheduled to be delivered between 3-5 pm. Patient requested call back tomorrow.      Emi FERRO, RN, [facility]  Care Transition Nurse   [phone]

## 2020-05-27 NOTE — CARE COORDINATION
Patient contacted regarding COVID-19 risk and screening. Discussed COVID-19 related testing which was available at this time. Test results were negative. Patient informed of results, if available? Yes. Care Transition Nurse/ Ambulatory Care Manager contacted the patient by telephone to perform follow-up assessment. Verified name and  with patient as identifiers. Patient has following risk factors of: heart failure, pneumonia and diabetes. Symptoms reviewed with patient who verbalized the following symptoms: no new symptoms and no worsening symptoms. Due to no new or worsening symptoms encounter was not routed to provider for escalation. Education provided regarding infection prevention, and signs and symptoms of COVID-19 and when to seek medical attention with patient who verbalized understanding. Discussed exposure protocols and quarantine from [address] you at higher risk for severe illness  and given an opportunity for questions and concerns. The patient agrees to contact the COVID-19 hotline [phone] or PCP office for questions related to their healthcare. CTN/ACM provided contact information for future reference. From CDC: Are you at higher risk for severe illness?  Wash your hands often.  Avoid close contact (6 feet, which is about two arm lengths) with people who are sick.  Put distance between yourself and other people if COVID-19 is spreading in your community.  Clean and disinfect frequently touched surfaces.  Avoid all cruise travel and non-essential air travel.  Call your healthcare professional if you have concerns about COVID-19 and your underlying condition or if you are sick. For more information on steps you can take to protect yourself, see CDC's How to [place] with patient who reported she is doing alright. Patient stated her breathing has been good with oxygen and denied any increase in SOB or CP.  Patient stated she

## 2020-05-29 NOTE — CARE COORDINATION
Ambulatory Care Coordination Note  5/29/2020  CM Risk Score: 7  Charlson 10 Year Mortality Risk Score: 100%     ACC: Matteo Headley RN    Summary Note:     Patient called ACM to discuss her current health goals. Is checking B/P at home and reports they [employer] been good\" Less than 150/90. No log. Reports her BG have been running high in 200's. Reports compliance with long acting insulin. Does not always check BG before meals. Takes 8 units of humalog prior to eating. Reports \"some pedal edema\". Pain and decreased mobility \"makes it difficult to weigh d/t unable to bend over to turn on the scale. Reports being O2 dependent at [facility] since hospital discharge. Is having difficulty showering as well. Grand-daughter helped her yesterday. Feels a shower chair would help. Missed her VV with PCP recently d/t grand son using her phone. Continues with [facility]. Feels she may need an adjustment on some medications. Feels her pain is not currently controlled    Interventions:    Educated on checking BG 3 times daily and using SS insulin  Attempted to review medication list.  Patient did not have available  Encouraged to re-schedule PCP appt. Plan:    F/U after CC episode resolved. Care Coordination Interventions    Program Enrollment:  Complex Care  Referral from Primary Care Provider:  Yes  Suggested Interventions and Community Resources  Fall Risk Prevention: In Process  Home Health Services: In Process (Comment: Referral to [facility] vitals programs)  Zone Management Tools: In Process (Comment: CHF, COPD, DM)         Goals Addressed                 This Visit's Progress       Care Coordination     Conditions and Symptoms   No change     I will follow my Zone Management tool to seek urgent or emergent care.     Barriers: lack of education  Plan for overcoming my barriers: Work w ACM in [facility] RN  Confidence: 7/10  Anticipated Goal Completion Date: 5/13/20        [facility] Self Monitoring   Improving sugar?:  No          ,   Congestive Heart Failure Assessment    Are you currently restricting fluids?:  2000cc  Do you understand a low sodium diet?:  Yes  Do you understand how to read food labels?:  No  How many restaurant meals do you eat per week?:  0  Do you salt your food before tasting it?:  No     Swelling (worse than baseline) in hands, feet/legs or around abdomen      Symptoms:   CHF associated leg swelling: Pos      Symptom course:  stable  Salt intake watch compared to last visit:  stable     ,   COPD Assessment    Does the patient understand envrionmental exposure?:  Yes  Is the patient able to verbalize Rescue vs. Long Acting medications?:  Yes  Does the patient have a nebulizer?:  Yes  Does the patient use a space with inhaled medications?:  No     No patient-reported symptoms         Symptoms:      Have you had a recent diagnosis of pneumonia either by PCP or at a hospital?:  Yes at Hospital  Have you seen your PCP for follow up or do you have an appointment scheduled?:  No  Are you taking your antibiotics as prescribed?:  Yes  Symptom Course:  improving      and   General Assessment

## 2020-05-30 PROBLEM — J90 PLEURAL EFFUSION ON RIGHT: Status: ACTIVE | Noted: 2020-01-01

## 2020-05-30 PROBLEM — J96.10 CHRONIC RESPIRATORY FAILURE (HCC): Status: ACTIVE | Noted: 2020-01-01

## 2020-05-30 PROBLEM — R07.9 CHEST PAIN: Status: ACTIVE | Noted: 2020-01-01

## 2020-05-30 PROBLEM — M79.10 MYALGIA: Status: ACTIVE | Noted: 2020-01-01

## 2020-05-30 PROBLEM — R10.9 ABDOMINAL PAIN: Status: ACTIVE | Noted: 2020-01-01

## 2020-05-30 NOTE — ED PROVIDER NOTES
CHF (congestive heart failure) ([facility]     Claudication of left upper extremity due to atherosclerosis ([facility] 10/2007    Diabetic ulcer of left hallux, with necrosis of bone ([facility] 2019    DVT (deep venous thrombosis) ([facility]     Kidney stones     Morbid obesity with BMI of 45.0-49.9, adult ([facility] 2014    Neuroma of hand 2013    Open fracture of left hallux 2019    Pneumonia     Rotator cuff tendinitis 2014    STEMI (ST elevation myocardial infarction) ([facility] 14    Dr. Ramsey Escalona Urinary incontinence         Surgical History:   Past Surgical History:   Procedure Laterality Date    APPENDECTOMY      CATARACT REMOVAL WITH IMPLANT Bilateral 2015     SECTION      CORONARY ANGIOPLASTY WITH STENT PLACEMENT  14    DILATION AND CURETTAGE OF UTERUS      FOOT AMPUTATION Left 6/3/2019    LEFT HALLUX AMPUTATION WITH GRAFT APPLICATION performed by Cielo Tapia DPM at [facility] Left 2019    PARTIAL FIRST RAY  AMPUTATION LEFT [facility]    atherectomy of SFA and popliteal artery    VASCULAR SURGERY Left 10/2007    subclavian angiopalsty and stent        Family History:    Family History   Problem Relation Age of Onset    Other Mother          of unknown lung issue at 61    Diabetes Mother     Diabetes Brother     Heart Disease Maternal Grandfather     Cancer Maternal Grandmother     Asthma Grandchild        Social History     Socioeconomic History    Marital status:      Spouse name: Not on file    Number of children: Not on file    Years of education: Not on file    Highest education level: Not on file   Occupational History    Not on file   Social Needs    Financial resource strain: Somewhat hard    Food insecurity     Worry: Not on file     Inability: Not on file    Transportation needs     Medical: Yes     Non-medical: Yes pleural effusion and or airspace disease. Interstitial edema/pulmonary vascular congestion. XR ABDOMEN (KUB) (SINGLE AP VIEW)    (Results Pending)        Chest X-Ray    Interpreted by: Emergency Department Physician    View: Portable chest xray    Findings: Pleural effusion on the right -reviewed chest x-ray from February 12, 2020 and May 13, 2020 patient has increasing white out on the right lower lobe and today the right lower lobe white out is much more dense than the previous x-ray    LABS  No results found for this visit on 05/30/20. PROCEDURES      MEDICAL DECISION MAKING    Procedures/interventions/images ordered for this visit  Orders Placed This Encounter   Procedures    XR CHEST PORTABLE    CBC Auto Differential    Comprehensive Metabolic Panel w/ Reflex to MG    Brain Natriuretic Peptide    EKG 12 Lead       Medications ordered for this visit  Orders Placed This Encounter   Medications    ipratropium-albuterol (DUONEB) nebulizer solution 1 ampule       ED course notes for this visit     REVIEW OF PREVIOUS RECORDS  I have reviewed the old records of Luciana Sheehan which reveal the following pertinent information:    Patient was recently admitted from May 13 May 18 for shortness of breath and pneumonia as well as right pleural effusion. She was discharged on antibiotics which she is completed the regimen. I wore gown, goggles, surgical mask, N95 mask and gloves when I evaluated the patient.         - Patient seen and evaluated in room 18    Results for orders placed or performed during the hospital encounter of 05/30/20   CBC Auto Differential   Result Value Ref Range    WBC 11.0 4.0 - 11.0 K/uL    RBC 3.87 (L) 4.00 - 5.20 M/uL    Hemoglobin 11.9 (L) 12.0 - 16.0 g/dL    Hematocrit 36.4 36.0 - 48.0 %    MCV 94.0 80.0 - 100.0 fL    MCH 30.8 26.0 - 34.0 pg    MCHC 32.7 31.0 - 36.0 g/dL    RDW 14.7 12.4 - 15.4 %    Platelets 212 199 - 180 K/uL    MPV 10.8 (H) 5.0 - 10.5 fL Neutrophils % 71.8 %    Lymphocytes % 18.2 %    Monocytes % 6.8 %    Eosinophils % 1.9 %    Basophils % 1.3 %    Neutrophils Absolute 7.9 (H) 1.7 - 7.7 K/uL    Lymphocytes Absolute 2.0 1.0 - 5.1 K/uL    Monocytes Absolute 0.8 0.0 - 1.3 K/uL    Eosinophils Absolute 0.2 0.0 - 0.6 K/uL    Basophils Absolute 0.1 0.0 - 0.2 K/uL   Comprehensive Metabolic Panel w/ Reflex to MG   Result Value Ref Range    Sodium 133 (L) 136 - 145 mmol/L    Potassium reflex Magnesium 4.9 3.5 - 5.1 mmol/L    Chloride 97 (L) 99 - 110 mmol/L    CO2 23 21 - 32 mmol/L    Anion Gap 13 3 - 16    Glucose 458 (H) 70 - 99 mg/dL    BUN 17 7 - 20 mg/dL    CREATININE 1.1 0.6 - 1.1 mg/dL    GFR Non- 51 (A) >60    GFR African American >60 >60    Calcium 9.1 8.3 - 10.6 mg/dL    Total Protein 6.8 6.4 - 8.2 g/dL    Alb 4.0 3.4 - 5.0 g/dL    Albumin/Globulin Ratio 1.4 1.1 - 2.2    Total Bilirubin 0.3 0.0 - 1.0 mg/dL    Alkaline Phosphatase 93 40 - 129 U/L    ALT 16 10 - 40 U/L    AST 15 15 - 37 U/L    Globulin 2.8 g/dL   Brain Natriuretic Peptide   Result Value Ref Range    Pro-BNP 2,039 (H) 0 - 124 pg/mL   Troponin   Result Value Ref Range    Troponin <0.01 <0.01 ng/mL   Lipase   Result Value Ref Range    Lipase 19.0 13.0 - 60.0 U/L       I spoke with Dr. Wendy Webb. We thoroughly discussed the history, physical exam, laboratory and imaging studies, as well as, emergency department course. Based upon that discussion, we've decided to admit Renetta Meyer for further observation and evaluation of Carin Lopez's dyspnea. As I have deemed necessary from their history, physical, and studies, I have considered and evaluated Renetta Meyer for the following diagnoses:  ACUTE CORONARY SYNDROME, CHRONIC OBSTRUCTIVE PULMONARY DISEASE, CONGESTIVE HEART FAILURE, PERICARDIAL TAMPONADE, PNEUMONIA, PNEUMOTHORAX, PULMONARY EMBOLISM, SEPSIS, and THORACIC DISSECTION. FINAL IMPRESSION  1. Pleural effusion on right    2.  Myalgia        Vitals:  Blood pressure 133/60, pulse 73, temperature 98.6 °F (37 °C), temperature source Oral, resp. rate 24, height 5' (1.524 m), weight 177 lb (80.3 kg), SpO2 97 %, not currently breastfeeding. Disposition    Pt is in stable condition upon Admit to telemetry. Please note, critical care time was at least 10 minutes, obtaining history, conducting a physical exam, performing and monitoring interventions, ordering, collecting and interpreting tests, and establishing medical decision-making and discussion with the patient and/or family, specifically for management of the presenting complaint and symptoms initially, direct bedside care, reevaluation, review of records, and consultation. There was a high probability of clinically significant life-threatening deterioration in the patient's condition, which required my urgent intervention. This time does not include separately billable procedures. The note was completed using Dragon voice recognition transcription. Every effort was made to ensure accuracy; however, inadvertent transcription errors may be present despite my best efforts to edit errors.     Nancy Victoria MD  [address] [place] [address]        Nancy Victoria MD  05/30/20 [id number]

## 2020-05-30 NOTE — H&P
Patient tolerates this medication. 5/18/20  Yes Justin Martinez MD   nicotine (NICODERM CQ) 14 MG/24HR Place 1 patch onto the skin daily 5/19/20  Yes Justin Martinez MD   ranolazine (RANEXA) 500 MG extended release tablet Take 1 tablet by mouth twice daily. 4/21/20  Yes MITCHELL Stokes CNP   oxyCODONE-acetaminophen (PERCOCET) 7.5-325 MG per tablet Take 1 tablet by mouth every 6 hours as needed for Pain. Yes Historical Provider, MD   furosemide (LASIX) 20 MG tablet Take 1 tablet by mouth twice daily. 1/22/20  Yes Zachary Gilbert MD   levothyroxine (SYNTHROID) 50 MCG tablet Take 1 tablet by mouth daily. 1/22/20  Yes Zachary Gilbert MD   atorvastatin (LIPITOR) 40 MG tablet Take 1 tablet by mouth daily. 1/22/20  Yes Zachary Gilbert MD   blood glucose test strips (FREESTYLE LITE) strip TEST THREE TIMES A DAY 7/30/19  Yes MITCHELL Vanessa CNP   SYMBICORT 160-4.5 MCG/ACT AERO Inhale 2 puffs by mouth twice daily. 7/26/19  Yes MITCHELL Park CNP   aspirin 81 MG EC tablet Take 81 mg by mouth daily   Yes Historical Provider, MD   Cholecalciferol (VITAMIN D3) 1000 units TABS Take 1 tablet by mouth daily 8/30/18  Yes Zachary Gilbert MD   metoprolol tartrate (LOPRESSOR) 25 MG tablet Take 1 tablet by mouth twice daily. 5/21/20   Zachary Gilbert MD   clonazePAM (KLONOPIN) 0.5 MG tablet TAKE ONE TABLET BY MOUTH DAILY AS NEEDED FOR ANXIETY 4/29/20 5/29/20  MITCHELL Park CNP   albuterol (PROVENTIL) (2.5 MG/3ML) 0.083% nebulizer solution Take 3 mLs by nebulization every 6 hours as needed for Wheezing 3/12/20   Zachary Gilbert MD   nitroGLYCERIN (NITROSTAT) 0.4 MG SL tablet DISSOLVE ONE TABLET UNDER THE TONGUE AS NEEDED FOR CHEST PAIN EVERY 5 MINUTES UP TO 3 TIMES.  IF NO RELIEF CALL 911. 2/18/20   MITCHELL Stokes CNP   ferrous sulfate 325 (65 Fe) MG EC tablet Take 1 tablet by mouth daily (with breakfast) 1/22/20   Zachary Gilbert MD       Allergies:  Flexeril [cyclobenzaprine]; Anup Barclay likely reflects a moderate right pleural effusion and/or airspace disease. There is pulmonary vascular congestion/interstitial edema. There is no appreciable pneumothorax. BONES/SOFT TISSUE: No acute abnormality. Worsening opacity in the right mid and lower lung. This likely reflects a moderate right pleural effusion and or airspace disease. Interstitial edema/pulmonary vascular congestion. Xr Chest Portable    Result Date: 5/13/2020  EXAMINATION: ONE XRAY VIEW OF THE CHEST 5/13/2020 3:58 pm COMPARISON: 2/12/2020, 6/29/2019 HISTORY: ORDERING SYSTEM PROVIDED HISTORY: chest pain TECHNOLOGIST PROVIDED HISTORY: Reason for exam:->chest pain Reason for Exam: Chest Pain ([place] wheezing and coughing, radiating into shoulders and jaw) Acuity: Acute Type of Exam: Initial FINDINGS: A single frontal view of the chest was obtained. There is no acute skeletal abnormality. There is a stable vascular stent overlying the upper left mediastinum. The heart size is mildly enlarged, though stable. The mediastinal contours are stable. There is mild interstitial pulmonary edema. There is progressive pleural and parenchymal opacity within the right lung base, progressed from the study of 2/12/2020, most likely a combination of a small to moderate right pleural effusion and right basilar airspace consolidation. Mild left basilar atelectasis is stable and unchanged. There is no evidence of a pneumothorax. 1. Interval progression of pleural and parenchymal opacity within the right lung base, likely a combination of a small to moderate right pleural effusion and right basilar airspace consolidation. 2. Mild interstitial pulmonary edema. 3. Stable mild left basilar atelectasis.        ASSESSMENT:    Principal Problem:    Acute on chronic diastolic (congestive) heart failure (HCC)  Active Problems:    Uncontrolled type 2 diabetes mellitus with diabetic polyneuropathy, with long-term

## 2020-05-30 NOTE — ED NOTES
Pt resting in bed on side. PT requesting more pain medication and pt provided ice chips. Pt denies any other needs at this time.       [employer], RN  05/30/20 2041

## 2020-05-31 NOTE — PROGRESS NOTES
murmurs, rubs or gallops. Abdomen: Soft, non-tender, non-distended with normal bowel sounds. Musculoskelatal: No clubbing, cyanosis. Mild lower extremity edema. Full range of motion without deformity. Neurologic:  Neurovascularly intact without any focal sensory/motor deficits. Cranial nerves: II-XII intact, grossly non-focal.  Psychiatric: Alert and oriented, thought content appropriate, normal insight  Skin: Skin color, texture, turgor normal.  No rashes or lesions. Capillary Refill: Brisk,< 3 seconds   Peripheral Pulses: +2 palpable, equal bilaterally     Assessment/Plan:    Active Hospital Problems    Diagnosis    Pleural effusion on right [J90]    Myalgia [M79.10]    Abdominal pain [R10.9]    Chest pain [R07.9]    Chronic respiratory failure (HCC) [J96.10]    Acute on chronic diastolic (congestive) heart failure (HCC) [I50.33]    HTN (hypertension) [I10]    Uncontrolled type 2 diabetes mellitus with diabetic polyneuropathy, with long-term current use of insulin (HCC) [E11.42, Z79.4, E11.65]     Acute on chronic diastolic (congestive) heart failure: Although not clearly etiology of her multiple presenting complaints (myalgias, abdominal pain), this certainly may be contributing to her shortness of breath and chest pains. Chest x-ray shows persistent right pleural effusion. BNP is elevated similar to prior presentation. Her pulmonary exam reveals bilateral crackles. Treated with IV Lasix; will hold after this AM dose. Monitor I&O, daily weights, BMP. No need to repeat echocardiogram at this time. Will go ahead with NM Stress Testing which had been ordered by her Cardiologist last week as an outpatient. Abdominal pain: Abdominal x-ray is benign. Suspect this may be related to constipation. Continue bowel regimen. COPD: Stable without evidence of exacerbation. Continue bronchodilator regimen. Diabetes type 2, uncontrolled: Continue basal bolus insulin regimen and titrate as needed.

## 2020-06-01 PROBLEM — E87.1 HYPONATREMIA: Status: ACTIVE | Noted: 2020-01-01

## 2020-06-01 PROBLEM — G47.30 OBSERVED SLEEP APNEA: Status: ACTIVE | Noted: 2020-01-01

## 2020-06-01 PROBLEM — R06.02 SOB (SHORTNESS OF BREATH): Status: ACTIVE | Noted: 2020-01-01

## 2020-06-01 PROBLEM — R07.81 PLEURITIC CHEST PAIN: Status: ACTIVE | Noted: 2020-01-01

## 2020-06-01 PROBLEM — F11.90 CHRONIC NARCOTIC USE: Status: ACTIVE | Noted: 2020-01-01

## 2020-06-01 PROBLEM — J98.4 RESTRICTIVE LUNG DISEASE: Status: ACTIVE | Noted: 2020-01-01

## 2020-06-01 PROBLEM — J98.11 COMPRESSION ATELECTASIS: Status: ACTIVE | Noted: 2020-01-01

## 2020-06-01 PROBLEM — R79.89 ELEVATED BRAIN NATRIURETIC PEPTIDE (BNP) LEVEL: Status: ACTIVE | Noted: 2020-01-01

## 2020-06-01 NOTE — CONSULTS
that she does not have any change in the ambient environment, on specific questioning patient states that she has been told that she may have sleep apnea and she was supposed to have a sleep study but because of the coronavirus pandemic she was not able to get it done, patient when seen was not having any confusion lethargy, patient states that she has heart problem including congestive heart failure and also history of stent placements in the past, patient continues to be on Plavix at this time, no other pertinent review of system of concern    As per PDMP=patient has been prescribed gabapentin/oxycodone-acetaminophen/clonazepam      Patient Active Problem List    Diagnosis Date Noted    SOB (shortness of breath) 06/01/2020    Compression atelectasis 06/01/2020    Observed sleep apnea 06/01/2020    Elevated brain natriuretic peptide (BNP) level 06/01/2020    Hyponatremia 06/01/2020    Restrictive lung disease 06/01/2020    Chronic narcotic use 06/01/2020    Pleural effusion on right 05/30/2020    Myalgia 05/30/2020    Abdominal pain 05/30/2020    Pleuritic chest pain 05/30/2020    Chronic respiratory failure ([address] 05/30/2020    Acute respiratory failure with hypoxia ([address] 05/13/2020    Acute on chronic diastolic (congestive) heart failure ([address] 02/12/2020    Acute osteomyelitis of foot ([address] 08/23/2019    PVD (peripheral vascular disease) ([address]     Chronic osteomyelitis of left foot ([address]     Osteomyelitis ([address] 08/13/2019    Phantom pain ([address] 07/20/2019    Class 1 obesity due to excess calories with serious comorbidity and body mass index (BMI) of 34.0 to 34.9 in adult 07/09/2019    Diabetic ulcer of left foot associated with type 2 diabetes mellitus, with bone involvement without evidence of necrosis ([address] 07/09/2019    OAB (overactive bladder) 07/09/2019    Allergic rhinitis 07/09/2019    Simple chronic bronchitis (HCC)     Anxiety and depression     Fibromyalgia     Tobacco use excision  Lymphadenopathy: No cervical or supraclavicular adenopathy. Skin: Skin is warm and dry. No rash or nodules on the exposed extremities. Psychiatric: Normal mood and affect. Behavior is normal.  No anxiety. Neurologic: Alert, awake and oriented. PERRL. Speech fluent        Results:  CBC:   Recent Labs     05/30/20 0318   WBC 11.0   HGB 11.9*   HCT 36.4   MCV 94.0        BMP:   Recent Labs     05/30/20  0318 05/31/20  0508 06/01/20  0503   * 134* 129*   K 4.9 5.1 5.1   CL 97* 98* 97*   CO2 23 26 24   BUN 17 22* 31*   CREATININE 1.1 1.2* 1.1     LIVER PROFILE:   Recent Labs     05/30/20 0318   AST 15   ALT 16   LIPASE 19.0   BILITOT 0.3   ALKPHOS 93       Imaging:  I have reviewed radiology images personally. XR CHEST PORTABLE   Final Result   Unchanged large right pleural effusion with adjacent compressive atelectasis. Slightly decreased severity of interstitial pulmonary edema. XR ABDOMEN (KUB) (SINGLE AP VIEW)   Final Result   1. Indeterminate bowel gas pattern. XR CHEST PORTABLE   Final Result   Worsening opacity in the right mid and lower lung. This likely reflects a   moderate right pleural effusion and or airspace disease. Interstitial edema/pulmonary vascular congestion. NM Cardiac Stress Test Nuclear Imaging    (Results Pending)       Results for Maxime Serrano \"KEVON\" (MRN [id number]) as of 6/1/2020 15:56   Ref.  Range 5/31/2020 05:08 5/31/2020 07:33 5/31/2020 11:05 5/31/2020 16:08 5/31/2020 19:49 6/1/2020 02:20 6/1/2020 05:03 6/1/2020 06:16 6/1/2020 07:31 6/1/2020 11:56   Sodium Latest Ref Range: 136 - 145 mmol/L 134 (L)      129 (L)      Potassium Latest Ref Range: 3.5 - 5.1 mmol/L 5.1      5.1      Chloride Latest Ref Range: 99 - 110 mmol/L 98 (L)      97 (L)      CO2 Latest Ref Range: 21 - 32 mmol/L 26      24      BUN Latest Ref Range: 7 - 20 mg/dL 22 (H)      31 (H)      Creatinine Latest Ref Range: 0.6 - 1.1 mg/dL 1.2 (H)      1.1 Detected  Not Detected   COVID-19 Unknown Rpt       ONE XRAY VIEW OF THE CHEST       6/1/2020 11:46 am       COMPARISON:   May 30, 2020       HISTORY:   ORDERING SYSTEM PROVIDED HISTORY: Right sided chest pain, effusion   TECHNOLOGIST PROVIDED HISTORY:   Reason for exam:->Right sided chest pain, effusion   Reason for Exam: Right sided chest pain, effusion   Acuity: Acute   Type of Exam: Initial       FINDINGS:   A large right-sided pleural effusion remains present, no significant change. Adjacent compressive atelectasis.  Pulmonary edema remains present, appearing   slightly decreased           Impression   Unchanged large right pleural effusion with adjacent compressive atelectasis.       Slightly decreased severity of interstitial pulmonary edema. Echocardiogram:Summary   Normal left ventricle size, wall thickness, and systolic function with a   visually estimated ejection fraction of 55-60%. There appears to be slight diastolic septal flattening (\"D-shaped\" septum)   consistent with right ventricular volume overload. Grade III diastolic dysfunction with elevated LV filling pressures. The right ventricle is moderately dilated with normal function. Mild tricuspid regurgitation. Systolic pulmonary artery pressure (SPAP) is elevated and estimated at 83   mmHg (right atrial pressure 8 mmHg) consistent with severe pulmonary   hypertension. The IVC is normal in size (<2.1 cm) but collapses <50% with respiration   consistent with elevated right atrial pressures (8 mmHg) . PFT:2014-INDICATIONS:  Dyspnea. SPIROMETRY:  The FEV1 is 1.25 liters, which is 53% predicted. The FEV1/FVC  is reduced to 0.68. Inhaled bronchodilators were given and there is  significant improvement in the FEV1 of 16%. LUNG VOLUMES:  Total lung capacity is reduced at 3.16 liters or 69% of  predicted. There is no air trapping. DIFFUSION CAPACITY:  DLCO is 11.59, which is 54% predicted.      FLOW VOLUME LOOP:  Shows

## 2020-06-01 NOTE — PROGRESS NOTES
Call placed to cardiology to confirm if we can hold pts plavix so a thoracentesis can be done. Dr. Sariah Leal returned call and ok to hold tomorrows does and cardiology will see pt in the morning.

## 2020-06-01 NOTE — PROGRESS NOTES
Neuroma of hand, Open fracture of left hallux, Pneumonia, Rotator cuff tendinitis, STEMI (ST elevation myocardial infarction) ([facility] [address] and Urinary incontinence. has a past surgical history that includes Lithotripsy;  section; vascular surgery (Left, 2005); Dilation and curettage of uterus; Appendectomy; Coronary angioplasty with stent (14); Cataract removal with implant (Bilateral, 2015); Foot Amputation (Left, 6/3/2019); vascular surgery (Left, 10/2007); and Foot Amputation (Left, 2019).     Restrictions  Restrictions/Precautions  Restrictions/Precautions: General Precautions, Up as Tolerated     Vision/Hearing  Vision: Impaired  Vision Exceptions: Wears glasses for reading  Hearing: Exceptions to WFL(pt reports some issues with hearing)       Subjective  General  Chart Reviewed: Yes  Patient assessed for rehabilitation services?: Yes  Response To Previous Treatment: Not applicable  Family / Caregiver Present: No  Referring Practitioner: Caden Sanchez  Referral Date : 20  Follows Commands: Within Functional Limits  General Comment  Comments: RN cleared pt for therapy eval.  Subjective  Subjective: Patient resting supine in bed upon therapy arrival.  Patient agreeable to therapy eval.  Pain Screening  Patient Currently in Pain: Yes  Pain Assessment  Pain Assessment: 0-10  Pain Level: 10  Pain Type: Chronic pain  Pain Location: Generalized     Orientation  Orientation  Overall Orientation Status: Within Normal Limits     Social/Functional History  Social/Functional History  Lives With: Daughter(daughter works days; granddaughter helps during the day)  Type of Home: Trailer  Home Layout: One level  Home Access: Stairs to enter without rails  Entrance Stairs - Number of Steps: 2  Bathroom Shower/Tub: Tub/Shower unit  Bathroom Toilet: Standard  Home Equipment: Cane, 4 wheeled walker, Oxygen(2L 24 hrs)  ADL Assistance: Independent  Homemaking Responsibilities: No(daughter completes

## 2020-06-01 NOTE — PROGRESS NOTES
Occupational Therapy   Occupational Therapy Initial Assessment and Treatment Note  Date: 2020   Patient Name: Luciana Sheehan  MRN: [id number]     : 1960    Date of Service: 2020    Discharge Recommendations:  Home with Home health OT, 24 hour supervision or assist  OT Equipment Recommendations  Equipment Needed: Yes  Mobility Devices: ADL Assistive Devices  ADL Assistive Devices: Transfer Tub Bench  Other: Tub transfer bench for pt to safely complete bathing and tub transfers. Pt's tub height is too tall for her to safely step in/out of it, increasing risk of falls. Assessment   Performance deficits / Impairments: Decreased functional mobility ; Decreased ADL status; Decreased safe awareness;Decreased cognition;Decreased balance;Decreased endurance  Assessment: OT eval completed. During eval, pt demo'd impaired safety awareness and insight during mobility. She needs ongoing instruction regarding safe performance of ADLs and transfers. Pt would benefit from tub transfer bench at home for safety. She states that her tub height is too tall for her safely transfer in/out. Cont OT. Prognosis: Fair  Decision Making: Medium Complexity  OT Education: OT Role;ADL Adaptive Strategies;Transfer Training;Plan of Care;Energy Conservation;Equipment  REQUIRES OT FOLLOW UP: Yes  Activity Tolerance  Activity Tolerance: Patient limited by fatigue;Treatment limited secondary to decreased cognition  Activity Tolerance: 2LO2  Safety Devices  Safety Devices in place: Yes  Type of devices: Nurse notified;Gait belt;Call light within reach; Bed alarm in place; Left in bed         Patient Diagnosis(es): The primary encounter diagnosis was Pleural effusion on right. A diagnosis of Myalgia was also pertinent to this visit.      has a past medical history of Acute osteomyelitis of left hallux ([address] Anemia, Asthma, Cellulitis of foot, CHF (congestive heart failure) ([facility] [address] Claudication of left upper extremity due to Layout: One level  Home Access: Stairs to enter without rails  Entrance Stairs - Number of Steps: 2  Bathroom Shower/Tub: Tub/Shower unit  Bathroom Toilet: Standard  Home Equipment: Cane, 4 wheeled walker, Oxygen(2L 24 hrs)  ADL Assistance: Independent  Homemaking Responsibilities: No(daughter completes cooking/cleaning)  Ambulation Assistance: Independent(with cane)  Transfer Assistance: Independent  Active : No  Patient's  Info: Family drives the patient. Daughter is primary . Leisure & Hobbies: Bingo, play with grandchildren     Objective   Vision: Impaired  Vision Exceptions: Wears glasses for reading  Hearing: Exceptions to [facility]    Orientation  Overall Orientation Status: Within Functional Limits     Balance  Sitting Balance: Supervision  Standing Balance: Contact guard assistance(RW)  Standing Balance  Activity: Lack of safety and insight during ambulation and transfers. Pt not receptive to instruction   Functional Mobility  Functional - Mobility Device: Rolling Walker  Activity: To/from bathroom  Assist Level: Contact guard assistance  Functional Mobility Comments: Pt insisted on using SW as she exited bathroom; however, pt began to tilt walker as she ambulated and push it too far forward. When pt was instructed on safe technique, she became agitated and argumentative. Toilet Transfers  Toilet - Technique: Ambulating  Equipment Used: Grab bars  Toilet Transfer: Stand by assistance;Contact guard assistance  ADL  LE Dressing: Moderate assistance(brief and slippers (pt refused non-skid socks))  Toileting:  Moderate assistance(assist to change brief; pt performed pericare with encouragement)  Tone RUE  RUE Tone: Normotonic  Tone LUE  LUE Tone: Normotonic  Coordination  Movements Are Fluid And Coordinated: Yes     Bed mobility  Supine to Sit: Stand by assistance  Sit to Supine: Stand by assistance  Transfers  Stand Pivot Transfers: Contact guard assistance(RW/SW)  Sit to stand: Contact guard

## 2020-06-01 NOTE — PROGRESS NOTES
Hospitalist Progress Note    Date of Admission: 5/30/2020    Chief Complaint: myalgias, chest pain, SOB    Hospital Course: 62 y.o. female who presented to [facility] with Generalized myalgias, chest pain, abdominal pain, SOB. PMHx significant for DM 2, CAD, CHF, morbid obesity, JORDI. She was recently hospitalized here for CHF exacerbation and pneumonia. She was discharged home on 5/18/2020. Over the last few days she reports increasing generalized muscle aches. Associated with some right-sided chest pain and generalized abdominal pain. She also reports constipation. She was started on home oxygen therapy at the time of her last discharge and continued on 2 L continuous. She does report some increase in shortness of breath and swelling of bilateral lower extremities. Subjective: Worsening right sided chest pain. Mild SOB. New purulent sputum production. Couldn't tolerate NM Stress due to pain. Ongoing constipation and bloating. Labs:   Recent Labs     05/30/20  0318   WBC 11.0   HGB 11.9*   HCT 36.4        Recent Labs     05/30/20  0318 05/31/20  0508 06/01/20  0503   * 134* 129*   K 4.9 5.1 5.1   CL 97* 98* 97*   CO2 23 26 24   BUN 17 22* 31*   CREATININE 1.1 1.2* 1.1   CALCIUM 9.1 9.2 9.0     Recent Labs     05/30/20  0318   AST 15   ALT 16   BILITOT 0.3   ALKPHOS 93     No results for input(s): INR in the last 72 hours. Physical Exam Performed:    /75   Pulse 76   Temp 97.7 °F (36.5 °C) (Oral)   Resp 18   Ht 5' (1.524 m)   Wt 176 lb 8 oz (80.1 kg)   LMP  (LMP Unknown)   SpO2 92%   BMI 34.47 kg/m²     General appearance: No apparent distress, appears stated age and cooperative. HEENT:  Normal cephalic, atraumatic without obvious deformity. Pupils equal, round, and reactive to light. Extra ocular muscles intact. Conjunctivae/corneas clear. Neck: Supple, no jugular venous distention. Trachea midline with full range of motion.   Respiratory:  Normal

## 2020-06-01 NOTE — DISCHARGE INSTR - COC
[ZOCED:0222]    Safety Concerns: At Risk for Falls    Impairments/Disabilities:      None    Nutrition Therapy:  Current Nutrition Therapy:   - Oral Diet:  Carb Control 4 carbs/meal (1800kcals/day) and Low Sodium (3-4gm)    Routes of Feeding: Oral  Liquids: Thin Liquids  Daily Fluid Restriction: yes - amount 1500 ml  Last Modified Barium Swallow with Video (Video Swallowing Test): not done    Treatments at the Time of Hospital Discharge:   Oxygen Therapy:  is on oxygen at 2 L/min per nasal cannula.   Ventilator:    - No ventilator support     HOME HEALTH CARE: LEVEL 3 SAFETY     Home health agency to establish plan of care for patient over 60 day period   Nursing  Initial home SN evaluation visit to occur within 24-48 hours for:  1)  medication management  2)  VS and clinical assessment  3)  S&S chronic disease exacerbation education + when to contact MD/NP  4)  care coordination  Medication Reconciliation during 1st SN visit  PT/OT/Speech   Evaluations in home within 24-48 hours of discharge to include DME and home safety   Frontload therapy 5 days, then 3x a week   OT to evaluate if patient has [address] needs for personal care    evaluation within 24-48 hours to evaluate resources & insurance for potential [place], [place], LTC, and Medicaid options   Palliative Care referral within 5 days of hospital discharge   PCP Visit scheduled within 3 - 7 days of hospital discharge [address] [address] (If patient is agreeable and meets guidelines)        RN SIGNATURE:  Electronically signed by Kathy Armstrong RN on 6/17/20 at 4:44 PM EDT    CASE MANAGEMENT/SOCIAL WORK SECTION    Inpatient Status Date: ***    Readmission Risk Assessment Score:  Readmission Risk              Risk of Unplanned Readmission:        30           Discharging to Facility/ Agency   Name:  [facility] care    Address: [address] [place] [address]., [address] [facility]  Phone: [phone]  Fax: [phone]      Dialysis Facility (if applicable)   · Name:  · Address:  · Dialysis Schedule:  · Phone:  · Fax:    / signature: {Esignature:[id number]}    PHYSICIAN SECTION    Prognosis: Fair    Condition at Discharge: Stable    Rehab Potential (if transferring to Rehab): Fair    Recommended Labs or Other Treatments After Discharge: weights, vitals, CHF education, medication education  Recommended Follow-up, Labs or Other Treatments After Discharge:      Sn/pt/ot           Physician Certification: I certify the above information and transfer of Jada More  is necessary for the continuing treatment of the diagnosis listed and that she requires Home Care for greater 30 days.      Update Admission H&P: No change in H&P    PHYSICIAN SIGNATURE:  Electronically signed by Pecolia Holter, MD on 6/17/20 at 4:34 PM EDT

## 2020-06-02 PROBLEM — I25.119 CORONARY ARTERY DISEASE INVOLVING NATIVE CORONARY ARTERY OF NATIVE HEART WITH ANGINA PECTORIS (HCC): Status: ACTIVE | Noted: 2018-07-24

## 2020-06-02 NOTE — PLAN OF CARE
weight. Intake/Output Summary (Last 24 hours) at 6/2/2020 0127  Last data filed at 6/2/2020 0005  Gross per 24 hour   Intake 1090 ml   Output 1600 ml   Net -510 ml       Comorbidities Reviewed Yes    Patient has a past medical history of Acute osteomyelitis of left hallux ([facility] [address] Anemia, Asthma, Cellulitis of foot, CHF (congestive heart failure) ([facility] [address] Claudication of left upper extremity due to atherosclerosis [facility]), Diabetic ulcer of left hallux, with necrosis of bone ([facility] [address] DVT (deep venous thrombosis) ([facility] [address] Kidney stones, Morbid obesity with BMI of 45.0-49.9, adult ([facility] [address] Neuroma of hand, Open fracture of left hallux, Pneumonia, Rotator cuff tendinitis, STEMI (ST elevation myocardial infarction) ([facility] [address] and Urinary incontinence. >>For CHF and Comorbidity documentation on Education Time and Topics, please see Education Tab    Progressive Mobility Assessment:  What is this patient's Current Level of Mobility?: Ambulatory- with Assistance  How was this patient Mobilized today?: Edge of Bed,  Up to Toilet/Shower, and Up in Room                 With Whom? Nurse                 Level of Difficulty/Assistance: Standy by     Pt resting in bed at this time on 3 L O2. Pt denies shortness of breath. Pt with pitting lower extremity edema.      Patient and/or Family's stated Goal of Care this Admission: reduce shortness of breath, increase activity tolerance, better understand heart failure and disease management, be more comfortable, and reduce lower extremity edema prior to discharge        :

## 2020-06-03 NOTE — CONSULTS
aDve 1960    History:  Past Medical History:   Diagnosis Date    Acute osteomyelitis of left hallux ([facility] [address] 06/03/2019    Anemia 6/2/2015    Asthma     Cellulitis of foot 06/2019    CHF (congestive heart failure) (HCC)     Claudication of left upper extremity due to atherosclerosis ([facility] [address] 10/2007    Diabetic ulcer of left hallux, with necrosis of bone ([facility] [address] 06/03/2019    DVT (deep venous thrombosis) ([facility] [address]     Kidney stones     Morbid obesity with BMI of 45.0-49.9, adult ([facility] [address] 9/9/2014    Neuroma of hand 5/21/2013    Open fracture of left hallux 05/2019    Pneumonia     Rotator cuff tendinitis 11/12/2014    STEMI (ST elevation myocardial infarction) ([facility] [address] 2/26/14    Dr. Johanne Zaman Urinary incontinence        ECHO: 55%    Discharge plans: home with family    Family Present: none    Advanced Directives: patient does not have advance directives and declines assistance completing them at this time    Patient's stated goal of care: resolve symptoms      Patient's current functional capacity:  Marked limitation of physical activity. Comfortable at rest. Less than ordinary activity causes fatigue, palpitation, or dyspnea. Pt reports compliance with low salt and fluid restrictions. She performs a daily weight and did notice an up tick in her weight prior to admission, scheduled a video visit with her PCP but still ended up in the hospital. She tells me that she does take her medicines daily. She quit smoking 4 weeks ago. Is in good contact with her PCP. Believes that her pneumonia is the root cause of her symptoms this admission.        Last three weights hospital weights reviewed:    Patient Vitals for the past 96 hrs (Last 3 readings):   Weight   06/03/20 0630 178 lb (80.7 kg)   06/02/20 0702 181 lb (82.1 kg)   06/01/20 0537 176 lb 8 oz (80.1 kg)       Patient provided with both written and verbal education on CHF signs/symptoms, causes,

## 2020-06-03 NOTE — PROGRESS NOTES
INPATIENT PULMONARY CRITICAL CARE PROGRESS NOTE      Reason for visit    CHF/COPD, large right pleural effusion, possible recurrent pneumonia    SUBJECTIVE: Patient when seen this morning continues to be symptomatic, patient was having increasing shortness of breath and right-sided chest pain along with some coughing, patient was not feeling well as per the patient, patient was feeling tired and having no energy, patient was afebrile and hemodynamically [place], patient was on 3 L of nasal cannula oxygen with saturation 92%, patient had sinus rhythm on the monitor, patient's blood sugars were not controlled, patient had adequate urine output with the Lasix which was given yesterday with cumulative fluid balance of -1.9 L, patient seem to be slightly anxious anxious this morning when seen, no other pertinent review of system of concern         Physical Exam:  Blood pressure 129/73, pulse 85, temperature 98 °F (36.7 °C), temperature source Oral, resp. rate 16, height 5' (1.524 m), weight 181 lb (82.1 kg), SpO2 92 %, not currently breastfeeding.'     Constitutional:  No acute distress while lying in the bed. HENT:  Oropharynx is clear and moist. No thyromegaly. Eyes:  Conjunctivae are normal. Pupils equal, round, and reactive to light. No scleral icterus. Neck: . No tracheal deviation present. No obvious thyroid mass. Short enlarged neck  Cardiovascular: Normal rate, regular rhythm, normal heart sounds. No right ventricular heave. 1+ lower extremity edema. Pulmonary/Chest: No wheezes. decreased B/L  rales. Chest wall is not dull to percussion. No accessory muscle usage or stridor. Decreased BSI on Rt side; Rt sided chest wall tenderness  Abdominal: Soft. Bowel sounds present. No distension or hernia. possible free fluid in peritoneal cavity  No tenderness. obese   Musculoskeletal: No cyanosis. No clubbing. metatarsal excision  Lymphadenopathy: No cervical or supraclavicular adenopathy.    Skin: Skin is warm and dry. No rash or nodules on the exposed extremities. Psychiatric: Normal mood and affect. Behavior is normal.  slight anxiety when seen   Neurologic: Alert, awake and oriented. PERRL. Speech fluent           Results:  CBC: No results for input(s): WBC, HGB, HCT, MCV, PLT in the last 72 hours. BMP:   Recent Labs     05/31/20  0508 06/01/20  0503 06/02/20  0458   * 129* 137   K 5.1 5.1 4.9   CL 98* 97* 102   CO2 26 24 25   BUN 22* 31* 27*   CREATININE 1.2* 1.1 1.1       Imaging:  I have reviewed radiology images personally. XR CHEST PORTABLE   Final Result   Unchanged large right pleural effusion with adjacent compressive atelectasis. Slightly decreased severity of interstitial pulmonary edema. XR ABDOMEN (KUB) (SINGLE AP VIEW)   Final Result   1. Indeterminate bowel gas pattern. XR CHEST PORTABLE   Final Result   Worsening opacity in the right mid and lower lung. This likely reflects a   moderate right pleural effusion and or airspace disease. Interstitial edema/pulmonary vascular congestion. Xr Abdomen (kub) (single Ap View)    Result Date: 5/30/2020  EXAMINATION: ONE SUPINE XRAY VIEW(S) OF THE ABDOMEN 5/30/2020 5:45 am COMPARISON: 01/02/2019 HISTORY: ORDERING SYSTEM PROVIDED HISTORY: Abdominal distention with constipation, rule out obstruction TECHNOLOGIST PROVIDED HISTORY: Reason for exam:->Abdominal distention with constipation, rule out obstruction Reason for Exam: Abdominal distention with constipation, rule out obstruction Type of Exam: Unknown FINDINGS: There is an indeterminate bowel gas pattern with stool and air present throughout the colon. Phleboliths are present in the pelvis. Degenerative changes involve the lumbar spine and bilateral hips. Vascular calcifications are noted. 1. Indeterminate bowel gas pattern.      Xr Chest Portable    Result Date: 6/1/2020  EXAMINATION: ONE XRAY VIEW OF THE CHEST 6/1/2020 11:46 am COMPARISON: May 30, 2020 0.15 ng/mL    0.68 (H)         Results for Jett Short \"KEVON\" (MRN [id number]) as of 6/2/2020 20:16   Ref. Range 2/12/2020 19:19 3/12/2020 14:48 5/13/2020 15:58 5/30/2020 03:18 5/31/2020 05:08 6/2/2020 04:58   Total CK Latest Ref Range: 26 - 192 U/L     70    Pro-BNP Latest Ref Range: 0 - 124 pg/mL 2,318 (H) 1,734 (H) 2,007 (H) 2,039 (H)  2,562 (H)   Troponin Latest Ref Range: <0.01 ng/mL <0.01  <0.01 <0.01       Results for Jett Short \"KEVON\" (MRN [id number]) as of 6/2/2020 20:16   Ref. Range 6/1/2020 11:56   TSH Latest Ref Range: 0.27 - 4.20 uIU/mL 2.14       Assessment:  Principal Problem:    Acute on chronic diastolic congestive heart failure (HCC)  Active Problems:    Uncontrolled type 2 diabetes mellitus with diabetic polyneuropathy, with long-term current use of insulin (HCC)    HTN (hypertension)    Coronary artery disease involving native coronary artery of native heart with angina pectoris (HCC)    Tobacco use    Class 1 obesity due to excess calories with serious comorbidity and body mass index (BMI) of 34.0 to 34.9 in adult    Simple chronic bronchitis (HCC)    Pleural effusion on right    Myalgia    Abdominal pain    Pleuritic chest pain    Chronic respiratory failure (HCC)    SOB (shortness of breath)    Compression atelectasis    Observed sleep apnea    Elevated brain natriuretic peptide (BNP) level    Hyponatremia    Restrictive lung disease    Chronic narcotic use    Medication management    Pulmonary hypertension ([facility] [address]  Resolved Problems:    * No resolved hospital problems.  *          Plan:   · Oxygen supplementation to keep saturation between 90 to 94% only-please titrate oxygen as per these parameters  · Pulmonary toilet  · Patient has a significantly large right-sided pleural effusion which may be causing the patient to have pleuritic chest pain  · Patient has been started on IV Lasix by cardiology and addition of Aldactone is being contemplated  · Optimization of heart failure treatment being propagated before any thoracentesis to be considered  · Question of right heart cath at some point  · Strict input output charting  · Patient may benefit from ultrasound-guided thoracentesis but patient is on Plavix at this time-we will request cardiology to instruct if patient's Plavix can be withheld for few days for the procedure-Plavix has been held but will hold off on any thoracentesis which may have to be done at least 5 to 7 days of today if patient is still having large pleural effusion with optimization of heart failure treatment  · Bronchodilators  · Patient is on Ditropan along with Spiriva and needs to monitor for any worsening anticholinergic side effects  · Patient has been put on Rocephin and Zithromax by admitting provider-not sure if patient needs that even though patient's procalcitonin level is slightly on the higher side- will reassess depending on patient's clinical status and cultures  · Avoid narcotics and sedatives  · Smoking cessation advised  · Patient states that she was supposed to have a sleep study but could not be done and patient does not use any noninvasive ventilation at nighttime  · Patient will need a sleep study as an outpatient  · Synthroid as per IM  · TSH reviewed  · Patient appears to have hypervolemic hyponatremia and will need to be trended-patient sodium level is improving with the Lasix  · Insulins as per IM on the basis of blood glucose monitoring  · Patient's blood sugars are not controlled and dose of Lantus insulin has been changed by internal medicine team  · Blood glucose monitoring with sliding scale insulin  · Monitor for any worsening hypoventilation and hypercarbia  · PUD and DVT prophylaxis as per IM     Case discussed with patient and nursing         Electronically signed by:  Osvaldo Cifuentes MD    6/2/2020    8:14 PM.

## 2020-06-03 NOTE — PROGRESS NOTES
[facility]   Daily Cardiovascular Progress Note    Admit Date: 5/30/2020    Chief complaint: SOB  HPI: states not really better, still constipated      Medications/Labs all Reviewed:  Patient Active Problem List   Diagnosis    HLD (hyperlipidemia)    Uncontrolled type 2 diabetes mellitus with diabetic polyneuropathy, with long-term current use of insulin ([address]    Type 2 diabetes mellitus with diabetic peripheral angiopathy without gangrene ([address]    HTN (hypertension)    Hypothyroid    Localized osteoarthrosis, lower leg    PFS (patellofemoral syndrome)    Disc displacement, lumbar    Pulmonary emphysema ([address]    JORDI on CPAP    Chronic low back pain    Coronary artery disease involving native coronary artery of native heart with angina pectoris (HCC)    Tobacco use    Astigmatism    Mild nonproliferative diabetic retinopathy (HCC)    Reflux esophagitis    Class 1 obesity due to excess calories with serious comorbidity and body mass index (BMI) of 34.0 to 34.9 in adult    Diabetic ulcer of left foot associated with type 2 diabetes mellitus, with bone involvement without evidence of necrosis (HCC)    OAB (overactive bladder)    Allergic rhinitis    Simple chronic bronchitis (HCC)    Anxiety and depression    Fibromyalgia    Phantom pain (HCC)    Osteomyelitis ([address]    PVD (peripheral vascular disease) ([address]    Chronic osteomyelitis of left foot ([address]    Acute osteomyelitis of foot ([address]    Acute on chronic diastolic congestive heart failure (HCC)    Acute respiratory failure with hypoxia (HCC)    Pleural effusion on right    Myalgia    Abdominal pain    Pleuritic chest pain    Chronic respiratory failure (HCC)    SOB (shortness of breath)    Compression atelectasis    Observed sleep apnea    Elevated brain natriuretic peptide (BNP) level    Hyponatremia    Restrictive lung disease    Chronic narcotic use    Medication management    Pulmonary hypertension coronary artery of native heart with angina pectoris (HCC)    Tobacco use    Astigmatism    Mild nonproliferative diabetic retinopathy (HCC)    Reflux esophagitis    Class 1 obesity due to excess calories with serious comorbidity and body mass index (BMI) of 34.0 to 34.9 in adult    Diabetic ulcer of left foot associated with type 2 diabetes mellitus, with bone involvement without evidence of necrosis (HCC)    OAB (overactive bladder)    Allergic rhinitis    Simple chronic bronchitis (HCC)    Anxiety and depression    Fibromyalgia    Phantom pain (HCC)    Osteomyelitis ([address]    PVD (peripheral vascular disease) (HCC)    Chronic osteomyelitis of left foot (HCC)    Acute osteomyelitis of foot ([address]    Acute on chronic diastolic congestive heart failure (HCC)    Acute respiratory failure with hypoxia (HCC)    Pleural effusion on right    Myalgia    Abdominal pain    Pleuritic chest pain    Chronic respiratory failure (HCC)    SOB (shortness of breath)    Compression atelectasis    Observed sleep apnea    Elevated brain natriuretic peptide (BNP) level    Hyponatremia    Restrictive lung disease    Chronic narcotic use    Medication management    Pulmonary hypertension ([address]         Thank you for allowing me to participate in the care of your patient. Please call me with any questions [phone].       Lili Jaramillo MD, [address] Cardiologist  [facility]  [phone] [facility]  [phone] [address] [place] [address]  6/3/2020 7:58 AM

## 2020-06-03 NOTE — PROGRESS NOTES
INPATIENT PULMONARY CRITICAL CARE PROGRESS NOTE      Reason for visit    CHF/COPD, large right pleural effusion, possible recurrent pneumonia    SUBJECTIVE: Patient when seen this morning was feeling minimally better , patient was having less cough/shortness of breath, patient was afebrile and hemodynamically maintained ;patient still has Rt sided chest pain , patient was on 3 L of nasal cannula oxygen with saturation 93%, patient had sinus rhythm on the monitor, patient's blood sugars were better controlled, patient had adequate urine output with the Lasix which was given yesterday with cumulative fluid balance of -4 L,  no other pertinent review of system of concern         Physical Exam:  Blood pressure 124/63, pulse 79, temperature 98.9 °F (37.2 °C), temperature source Oral, resp. rate 18, height 5' (1.524 m), weight 178 lb (80.7 kg), SpO2 93 %, not currently breastfeeding.'     Constitutional:  No acute distress while lying in the bed. HENT:  Oropharynx is clear and moist. No thyromegaly. Eyes:  Conjunctivae are normal. Pupils equal, round, and reactive to light. No scleral icterus. Neck: . No tracheal deviation present. No obvious thyroid mass. Short enlarged neck  Cardiovascular: Normal rate, regular rhythm, normal heart sounds. No right ventricular heave. 1+ lower extremity edema. Pulmonary/Chest: No wheezes. decreased B/L  rales. Chest wall is not dull to percussion. No accessory muscle usage or stridor. Decreased BSI on Rt side; Rt sided chest wall tenderness  Abdominal: Soft. Bowel sounds present. No distension or hernia. possible free fluid in peritoneal cavity  No tenderness. obese   Musculoskeletal: No cyanosis. No clubbing. metatarsal excision  Lymphadenopathy: No cervical or supraclavicular adenopathy. Skin: Skin is warm and dry. No rash or nodules on the exposed extremities. Psychiatric: Normal mood and affect.  Behavior is normal.  noanxiety when seen   Neurologic: Alert, awake and oriented. PERRL. Speech fluent           Results:  CBC: No results for input(s): WBC, HGB, HCT, MCV, PLT in the last 72 hours. BMP:   Recent Labs     06/01/20  0503 06/02/20  0458 06/03/20  0512   * 137 137   K 5.1 4.9 4.9   CL 97* 102 102   CO2 24 25 25   BUN 31* 27* 29*   CREATININE 1.1 1.1 1.0       Imaging:  I have reviewed radiology images personally. XR CHEST PORTABLE   Final Result   Unchanged large right pleural effusion with adjacent compressive atelectasis. Slightly decreased severity of interstitial pulmonary edema. XR ABDOMEN (KUB) (SINGLE AP VIEW)   Final Result   1. Indeterminate bowel gas pattern. XR CHEST PORTABLE   Final Result   Worsening opacity in the right mid and lower lung. This likely reflects a   moderate right pleural effusion and or airspace disease. Interstitial edema/pulmonary vascular congestion. Results for Wong Mcdermott \"KEVON\" (MRN [id number]) as of 6/3/2020 14:43   Ref.  Range 6/2/2020 04:58 6/2/2020 08:42 6/2/2020 11:25 6/2/2020 16:28 6/2/2020 20:17 6/3/2020 05:12   Sodium Latest Ref Range: 136 - 145 mmol/L 137     137   Potassium Latest Ref Range: 3.5 - 5.1 mmol/L 4.9     4.9   Chloride Latest Ref Range: 99 - 110 mmol/L 102     102   CO2 Latest Ref Range: 21 - 32 mmol/L 25     25   BUN Latest Ref Range: 7 - 20 mg/dL 27 (H)     29 (H)   Creatinine Latest Ref Range: 0.6 - 1.1 mg/dL 1.1     1.0   Anion Gap Latest Ref Range: 3 - 16  10     10   GFR Non- Latest Ref Range: >60  51 (A)     57 (A)   GFR  Latest Ref Range: >60  >60     >60   Magnesium Latest Ref Range: 1.80 - 2.40 mg/dL 2.10     2.30   Glucose Latest Ref Range: 70 - 99 mg/dL 205 (H)     129 (H)   POC Glucose Latest Ref Range: 70 - 99 mg/dl  316 (H) 293 (H) 94 126 (H)    Calcium Latest Ref Range: 8.3 - 10.6 mg/dL 9.1     9.2         Assessment:  Principal Problem:    Acute on chronic diastolic congestive heart failure (HCC)  Active Problems:

## 2020-06-03 NOTE — PROGRESS NOTES
gallops. Abdomen: Soft, non-tender, non-distended with normal bowel sounds. Musculoskelatal: No clubbing, cyanosis. Mild lower extremity edema. Full range of motion without deformity. Neurologic:  Neurovascularly intact without any focal sensory/motor deficits. Cranial nerves: II-XII intact, grossly non-focal.  Psychiatric: Alert and oriented, thought content appropriate, normal insight  Skin: Skin color, texture, turgor normal.  No rashes or lesions. Capillary Refill: Brisk,< 3 seconds   Peripheral Pulses: +2 palpable, equal bilaterally     Assessment/Plan:    Active Hospital Problems    Diagnosis    Medication management [Z79.899]    Pulmonary hypertension (HCC) [I27.20]    SOB (shortness of breath) [R06.02]    Compression atelectasis [J98.11]    Observed sleep apnea [G47.30]    Elevated brain natriuretic peptide (BNP) level [R79.89]    Hyponatremia [E87.1]    Restrictive lung disease [J98.4]    Chronic narcotic use [F11.90]    Pleural effusion on right [J90]    Myalgia [M79.10]    Abdominal pain [R10.9]    Pleuritic chest pain [R07.81]    Chronic respiratory failure (HCC) [J96.10]    Acute on chronic diastolic congestive heart failure (HCC) [I50.33]    Class 1 obesity due to excess calories with serious comorbidity and body mass index (BMI) of 34.0 to 34.9 in adult [E66.09, Z68.34]    Simple chronic bronchitis (HCC) [J41.0]    Tobacco use [Z72.0]    Coronary artery disease involving native coronary artery of native heart with angina pectoris ([facility] [I25.119]    HTN (hypertension) [I10]    Uncontrolled type 2 diabetes mellitus with diabetic polyneuropathy, with long-term current use of insulin (HCC) [E11.42, Z79.4, E11.65]     Acute on chronic diastolic (congestive) heart failure: Although not clearly etiology of her multiple presenting complaints (myalgias, abdominal pain), this certainly may be contributing to her shortness of breath and chest pains.   Chest x-ray showed persistent right pleural effusion. BNP is elevated similar to prior presentation. Her pulmonary exam revealed bilateral crackles. Treated with IV Lasix initially without much improvement in the effusion. Monitor I&O, daily weights, BMP. No need to repeat echocardiogram at this time. Consider NM Stress Testing which had been ordered by her Cardiologist last week as an outpatient; canceled due to chest pain. Possible Pneumonia: Worsening right sided chest pain. New purulent sputum. Cannot rule out Pneumonia based on initial imaging. Repeat CXR shows persistent large right pleural effusion associated airspace disease. Procalcitonin elevated. Empiric Abx. Right Pleural effusion: Was initially thought to be most likely secondary to CHF. However not improving with diuresis. This has been present since her last hospitalization at treatment for pneumonia. During current admission she has developed worsening right-sided chest pain, and purulent sputum. Her procalcitonin is elevated. Concern for infection. Pulmonology consulted. Hold Plavix for possible Thoracentesis in 4-5 more days. Abdominal pain: Abdominal x-ray is benign. Suspect this may be related to constipation. Continue bowel regimen. COPD: Stable without evidence of exacerbation. Continue bronchodilator regimen. Diabetes type 2, uncontrolled: Continue basal bolus insulin regimen and titrate as needed. Monitor. Hypertension, benign: Controlled. Continue current medication regimen, monitor and adjust as needed. Myalgia: Etiology unclear. Wonder if this is simply related to her CHF and constipation. She is chronically treated with atorvastatin. CK normal.    Chronic respiratory failure with hypoxia: Secondary to COPD and CHF. Currently at baseline. Wean O2 as tolerated.     DVT Prophylaxis: Lovenox  Diet: DIET CARB CONTROL; No Caffeine  Code Status: Full Code  PT/OT Eval Status: Pending    Dispo - Anticipate resuming home health care

## 2020-06-03 NOTE — PROGRESS NOTES
Occupational Therapy  Facility/Department: [facility] C4 PCU  Daily Treatment Note  NAME: Gretel Wagner  : 1960  MRN: [id number]    Date of Service: 6/3/2020    Discharge Recommendations:  Home with Home health OT, 24 hour supervision or assist  OT Equipment Recommendations  Equipment Needed: Yes  Mobility Devices: ADL Assistive Devices  ADL Assistive Devices: Transfer Tub Bench  Other: Tub transfer bench for pt to safely complete bathing and tub transfers. Pt's tub height is too tall for her to safely step in/out of it. Pt with deficits in activity tolerance and standing balance. tub transfer bench would help promote functional independence and prevent falls. Assessment   Performance deficits / Impairments: Decreased functional mobility ; Decreased ADL status; Decreased safe awareness;Decreased cognition;Decreased balance;Decreased endurance      Assessment: Pt with fair tolerance of OT session. Pt limited by fatigue and agitation. Pt requiring CGA-SBA with ADLs and functional transfers performed this date. Pt impulsive and disregarding cues for safety with RW during ambulation. Pt becoming agitated and argumentative, refusing continued therapy after bathroom mobility and ambulation to chair. SpO2 ranging from 87%-93% on 2.5L O2. Pt is not at baseline level of occupational performance. Recommend continued OT services to increase safety and independence with ADLs and functional transfers. Prognosis: Fair      OT Education: OT Role;ADL Adaptive Strategies;Transfer Training;Plan of Care;Energy Conservation;IADL Safety  Patient Education: home safety concerns, hand placement for transfers, safety with ambulation using RW, importance of OOB activity. Barriers to Learning: pt verbalizes understanding. Pt disregarding safety cues throughout session.    REQUIRES OT FOLLOW UP: Yes      Activity Tolerance  Activity Tolerance: Patient Tolerated treatment well;Patient limited by fatigue;Treatment limited secondary to agitation  Activity Tolerance: SpO2 ranging 87%-93% on 2.5L O2. Pt becoming agitated and argumentative during session and refuses additional therapy after grooming at sink and walking to chair. Safety Devices  Safety Devices in place: Yes  Type of devices: Nurse notified;Gait belt;Call light within reach; Chair alarm in place; Left in chair         Patient Diagnosis(es): The primary encounter diagnosis was Pleural effusion on right. A diagnosis of Myalgia was also pertinent to this visit. has a past medical history of Acute osteomyelitis of left hallux ([facility] Anemia, Asthma, Cellulitis of foot, CHF (congestive heart failure) ([facility] Claudication of left upper extremity due to atherosclerosis [facility]), Diabetic ulcer of left hallux, with necrosis of bone ([facility] DVT (deep venous thrombosis) ([facility] Kidney stones, Morbid obesity with BMI of 45.0-49.9, adult ([facility] Neuroma of hand, Open fracture of left hallux, Pneumonia, Rotator cuff tendinitis, STEMI (ST elevation myocardial infarction) ([facility] and Urinary incontinence. has a past surgical history that includes Lithotripsy;  section; vascular surgery (Left, 2005); Dilation and curettage of uterus; Appendectomy; Coronary angioplasty with stent (14); Cataract removal with implant (Bilateral, 2015); Foot Amputation (Left, 6/3/2019); vascular surgery (Left, 10/2007); and Foot Amputation (Left, 2019).     Restrictions  Restrictions/Precautions  Restrictions/Precautions: General Precautions, Up as Tolerated, Fall Risk  Position Activity Restriction  Other position/activity restrictions: O2, up in chair       Subjective   General  Chart Reviewed: Yes  Patient assessed for rehabilitation services?: Yes  Additional Pertinent Hx: Hx L partial ray amputation , DM, emphysema, fibromyalgia, CHF  Referring Practitioner: W Carlean Kayser      Diagnosis: Pt admitted with acute on chronic diastolic CHF, possible pneumonia, R pleural effusion, abdominal pain

## 2020-06-03 NOTE — PROGRESS NOTES
MD    Electronically signed by mAy Spencer RCP on 6/3/2020 at 11:12 AM    Respiratory Protocol Guidelines     1. Assessment and treatment by Respiratory Therapy will be initiated for medication and therapeutic interventions upon initiation of aerosolized medication. 2. Physician will be contacted for respiratory rate (RR) greater than 35 breaths per minute. Therapy will be held for heart rate (HR) greater than 140 beats per minute, pending direction from physician. 3. Bronchodilators will be administered via Metered Dose Inhaler (MDI) with spacer when the following criteria are met:  a. Alert and cooperative     b. HR < 140 bpm  c. RR < 30 bpm                d. Can demonstrate a 2-3 second inspiratory hold  4. Bronchodilators will be administered via Hand Held Nebulizer SHUN [facility]) to patients when ANY of the following criteria are met  a. Incognizant or uncooperative          b. Patients treated with HHN at Home        c. Unable to demonstrate proper use of MDI with spacer     d. RR > 30 bpm   5. Bronchodilators will be delivered via Metered Dose Inhaler (MDI), HHN, Aerogen to intubated patients on mechanical ventilation. 6. Inhalation medication orders will be delivered and/or substituted as outlined below. Aerosolized Medications Ordering and Administration Guidelines:    1. All Medications will be ordered by a physician, and their frequency and/or modality will be adjusted as defined by the patients Respiratory Severity Index (RSI) score. 2. If the patient does not have documented COPD, consider discontinuing anticholinergics when RSI is less than 9.  3. If the bronchospasm worsens (increased RSI), then the bronchodilator frequency can be increased to a maximum of every 4 hours. If greater than every 4 hours is required, the physician will be contacted.   4. If the bronchospasm improves, the frequency of the bronchodilator can be decreased, based on the patient's RSI, but not less than home treatment

## 2020-06-04 NOTE — PROGRESS NOTES
[address] or Facility: A From: Noah Martinez RE: Ange Hendrickson 1960 RM: 433 Lantus 50 units scheduled for 2100. This is new to pt as she has been an uncompliant diabetic for quite some time now. POC blood sugar 100 this evening, was 67 this afternoon. Okay to hold Lantus, cut back on the dose, or just give? Please advise, thank you!  Need Callback: NO CALLBACK REQ C4 PROGRESSIVE CARE  Unread

## 2020-06-04 NOTE — PROGRESS NOTES
Kidney stones, Morbid obesity with BMI of 45.0-49.9, adult ([facility] Neuroma of hand, Open fracture of left hallux, Pneumonia, Rotator cuff tendinitis, STEMI (ST elevation myocardial infarction) ([facility] and Urinary incontinence. has a past surgical history that includes Lithotripsy;  section; vascular surgery (Left, 2005); Dilation and curettage of uterus; Appendectomy; Coronary angioplasty with stent (14); Cataract removal with implant (Bilateral, 2015); Foot Amputation (Left, 6/3/2019); vascular surgery (Left, 10/2007); and Foot Amputation (Left, 2019). Restrictions  Restrictions/Precautions  Restrictions/Precautions: General Precautions, Up as Tolerated, Fall Risk  Position Activity Restriction  Other position/activity restrictions: 3L O2, up in chair     Subjective   General  Chart Reviewed: Yes  Patient assessed for rehabilitation services?: Yes  Additional Pertinent Hx: Hx L partial ray amputation , DM, emphysema, fibromyalgia, CHF  Response to previous treatment: Patient with no complaints from previous session  Family / Caregiver Present: No  Referring Practitioner: Braden Ayala  Diagnosis: Pt admitted with acute on chronic diastolic CHF, possible pneumonia, R pleural effusion, abdominal pain possibly due to constipation. Subjective  Subjective: Pt asleep in bed, with head at foot of bed at writer's entrance. Pt initiallyl agreeable to OT treatment. Pt quickly agitated by any instruction or education, insists on using SW set for height of invdividual ~6ft tall, despite safety recommendation of appropriate height walker. Pain Assessment  Pain Assessment: 0-10  Pain Level: 10  Pain Type: Chronic pain  Pain Location: Back;Hip  Non-Pharmaceutical Pain Intervention(s): Repositioned; Ambulation/Increased Activity  Pre Treatment Pain Screening  Intervention List: Patient able to continue with treatment  Vital Signs  Patient Currently in Pain: Yes Perform functional transfers with SBA with RW; ongoing  Short term goal 3: Perform 2 grooming tasks at sink with SBA by 6/4; GOAL MET 6/3/20  Short term goal 4: Perform UE exer 15x each to improve endurance-- ongoing  Patient Goals   Patient goals : \"go home\"    If pt is discharged prior to next OT session, this note will serve as the discharge summary.   Therapy Time   Individual Concurrent Group Co-treatment   Time In 1259         Time Out 1418         Minutes [address] [facility], ELLIS/L

## 2020-06-04 NOTE — PROGRESS NOTES
Hospitalist Progress Note    Date of Admission: 5/30/2020    Chief Complaint: myalgias, chest pain, SOB    Hospital Course: 62 y.o. female who presented to [facility] with Generalized myalgias, chest pain, abdominal pain, SOB. PMHx significant for DM 2, CAD, CHF, morbid obesity, JORDI. She was recently hospitalized here for CHF exacerbation and pneumonia. She was discharged home on 5/18/2020. Over the last few days she reports increasing generalized muscle aches. Associated with some right-sided chest pain and generalized abdominal pain. She also reports constipation. She was started on home oxygen therapy at the time of her last discharge and continued on 2 L continuous. She does report some increase in shortness of breath and swelling of bilateral lower extremities. Subjective: Persistent SOB and ongoing right sided chest pain. Labs:   No results for input(s): WBC, HGB, HCT, PLT in the last 72 hours. Recent Labs     06/02/20  0458 06/03/20  0512 06/04/20  0454    137 135*   K 4.9 4.9 5.0    102 100   CO2 25 25 25   BUN 27* 29* 32*   CREATININE 1.1 1.0 1.2*   CALCIUM 9.1 9.2 9.1     No results for input(s): AST, ALT, BILIDIR, BILITOT, ALKPHOS in the last 72 hours. No results for input(s): INR in the last 72 hours. Physical Exam Performed:    /66   Pulse 78   Temp 98.1 °F (36.7 °C) (Oral)   Resp 18   Ht 5' (1.524 m)   Wt 195 lb 8 oz (88.7 kg)   LMP  (LMP Unknown)   SpO2 95%   BMI 38.18 kg/m²     General appearance: No apparent distress, appears stated age and cooperative. HEENT:  Normal cephalic, atraumatic without obvious deformity. Pupils equal, round, and reactive to light. Extra ocular muscles intact. Conjunctivae/corneas clear. Neck: Supple, no jugular venous distention. Trachea midline with full range of motion. Respiratory:  Normal respiratory effort. Diminished but clear.    Cardiovascular: Regular rate and rhythm with normal S1/S2 without murmurs, rubs

## 2020-06-04 NOTE — PROGRESS NOTES
Pt called out @ 0232 AM stating that she \"found a bed bug\" in her bed. Confirmed it was a bed bug, then placed it in a specimen cup and informed Charge RN, Chris Pabon. Pt's bed and pillows were inspected and wiped with bleach. Linens changed. Helped pt wipe herself off with bath wipes and change out of personal clothing and into a clean gown. Pt's skin was inspected; Several pin point sized open areas found on scalp, some had been scabbed over. Pt reports having an \"itchy spot\" on her right shoulder. Upon inspection there is a small, red area on her right shoulder that has several open spots in a cluster. No other bugs were found upon inspection of additional clothing or bedding in room. Environmental isolation implemented.

## 2020-06-04 NOTE — FLOWSHEET NOTE
06/03/20 2131   Assessment   Charting Type Shift assessment   Neurological   Neuro (WDL) WDL   Level of Consciousness 0   Orientation Level Oriented X4   Cognition Appropriate judgement; Appropriate safety awareness; Appropriate attention/concentration; Appropriate for developmental age; Follows commands   Language Clear   Lety Coma Scale   Eye Opening 4   Best Verbal Response 5   Best Motor Response 6   Lety Coma Scale Score 15   NIH/MNHISS Stroke Scale   NIH/MNIHSS Stroke Scale Assessed No   HEENT   HEENT (WDL) X   Right Eye Impaired vision  (Glasses)   Left Eye Impaired vision  (Glasses)   Teeth Missing teeth   Respiratory   Respiratory (WDL) WDL  (2L baseline)   Respiratory Pattern Regular   Respiratory Depth Normal   Respiratory Quality/Effort Unlabored;Dyspnea at rest   Chest Assessment Trachea midline; Chest expansion symmetrical   L Breath Sounds Clear   R Breath Sounds Clear;Crackles   Breath Sounds   Right Upper Lobe Clear   Right Middle Lobe Crackles   Right Lower Lobe Expiratory Wheezes;Crackles   Left Upper Lobe Clear   Left Lower Lobe Clear   Cough/Sputum   Sputum How Obtained None   Cough None   Sputum Amount None   Sputum Color None   Tenacity None   Cough and Deep Breathe   Cough and Deep Breathe Yes  (reminded pt, reinforced education @ bedside)   Cardiac   Cardiac (WDL) WDL   Cardiac Regularity Regular   Heart Sounds S1, S2   Cardiac Rhythm NSR   Cardiac Monitor   Telemetry Monitor On Yes   Telemetry Audible Yes   Telemetry Alarms Set Yes   Telemetry Box Number 92   Gastrointestinal   Abdominal (WDL) WDL   Last BM (including prior to admit) 05/26/20   Peripheral Vascular   Peripheral Vascular (WDL) X   Edema Right lower extremity; Left lower extremity   RLE Edema +1;Non-pitting   LLE Edema +1;Non-pitting   Skin Color/Condition   Skin Color/Condition (WDL) WDL   Skin Color Appropriate for ethnicity   Skin Condition/Temp Dry; Warm   Skin Integrity   Skin Integrity (WDL) X   Skin Integrity Other (Comment)  (Previous L Great Toe amputation)   Location L Great Toe Amputation    Skin Integrity Site 3   Skin Integrity Location 3 Bruising    Location 3 Scattered   Preventative Dressing Patient Refused   Assessed this shift?  Yes   Musculoskeletal   Musculoskeletal (WDL) X   RL Extremity Full movement;Weakness   RUE Full movement   LUE Full movement   LL Extremity Full movement;Weakness   Musculoskeletal Details   Lower Back Limited movement   Upper Back Limited movement   Genitourinary   Genitourinary (WDL) WDL   Flank Tenderness No   Suprapubic Tenderness No   Dysuria No   Urine Assessment   Urine Color Kaylee   Urine Appearance Cloudy   Incontinence No   Anus/Rectum   Anus/Rectum (WDL) WDL   Psychosocial   Psychosocial (WDL) WDL

## 2020-06-04 NOTE — CARE COORDINATION
Per MD:  Pulmonology consulted. Hold Plavix for possible Thoracentesis in 4-5 more days. Right heart cath at some point being contemplated     Patient working with therapy at this time. OT has addressed with patient that she has been approved for shower chair through [address] [place] [address]. Her plan is still to discharge to home with [facility] and services through [place] [facility].

## 2020-06-05 NOTE — PROGRESS NOTES
revealed bilateral crackles. Repeat ECHO stable. Persistent symptoms despite initial diuresis  -Right heart catheterization with Holloway placement on 6/5/2020. Revealed elevated pressures and pulmonary pretension.  -Transferred to ICU for further diuresis. -Continue IV Lasix. -Monitor I&O, daily weights, BMP. Ischemic Cardiomyopathy: Considered NM Stress Testing which had been ordered by her Cardiologist last week as an outpatient; canceled due to chest pain. Defer to Cardiology if needs to be re-ordered. Possible Pneumonia: Worsening right sided chest pain. New purulent sputum. Cannot rule out Pneumonia based on initial imaging. Repeat CXR shows persistent large right pleural effusion associated airspace disease. Procalcitonin elevated. Short course of Abx completed. Right Pleural effusion: Was initially thought to be most likely secondary to CHF. However not improving with diuresis. This has been present since her last hospitalization at treatment for pneumonia. During current admission she has developed worsening right-sided chest pain, and purulent sputum. Her procalcitonin is elevated. Concern for infection. Pulmonology consulted. Hold Plavix for possible Thoracentesis depending on progress with diuresis. Abdominal pain: Abdominal x-ray is benign. Suspect this may be related to constipation. Continue bowel regimen. COPD: Stable without evidence of exacerbation. Continue bronchodilator regimen. Diabetes type 2, uncontrolled: Continue basal bolus insulin regimen and titrate as needed. Monitor. Hypertension, benign: Controlled. Continue current medication regimen, monitor and adjust as needed. Myalgia: Etiology unclear. Wonder if this is simply related to her CHF and constipation. She is chronically treated with atorvastatin. CK normal.    Chronic respiratory failure with hypoxia: Secondary to COPD and CHF. Currently at baseline. Wean O2 as tolerated.     DVT

## 2020-06-05 NOTE — PROGRESS NOTES
Occupational Therapy  Pt transferred to ICU. Will need new OT order when medically appropriate. Thank you.   Garry Lee OTR/L

## 2020-06-05 NOTE — PROGRESS NOTES
[facility] [address]   Daily Cardiovascular Progress Note    Admit Date: 5/30/2020    Chief complaint: SOB  HPI: Remains SOB      Medications/Labs all Reviewed:  Patient Active Problem List   Diagnosis    HLD (hyperlipidemia)    Uncontrolled type 2 diabetes mellitus with diabetic polyneuropathy, with long-term current use of insulin ([address]    Type 2 diabetes mellitus with diabetic peripheral angiopathy without gangrene ([address]    HTN (hypertension)    Hypothyroid    Localized osteoarthrosis, lower leg    PFS (patellofemoral syndrome)    Disc displacement, lumbar    Pulmonary emphysema ([address]    JORDI on CPAP    Chronic low back pain    Coronary artery disease involving native coronary artery of native heart with angina pectoris (HCC)    Tobacco use    Astigmatism    Mild nonproliferative diabetic retinopathy (HCC)    Reflux esophagitis    Class 1 obesity due to excess calories with serious comorbidity and body mass index (BMI) of 34.0 to 34.9 in adult    Diabetic ulcer of left foot associated with type 2 diabetes mellitus, with bone involvement without evidence of necrosis (HCC)    OAB (overactive bladder)    Allergic rhinitis    Simple chronic bronchitis (HCC)    Anxiety and depression    Fibromyalgia    Phantom pain (HCC)    Osteomyelitis ([address]    PVD (peripheral vascular disease) ([address]    Chronic osteomyelitis of left foot ([address]    Acute osteomyelitis of foot ([address]    Acute on chronic diastolic congestive heart failure (HCC)    Acute respiratory failure with hypoxia (HCC)    Pleural effusion on right    Myalgia    Abdominal pain    Pleuritic chest pain    Chronic respiratory failure (HCC)    SOB (shortness of breath)    Compression atelectasis    Observed sleep apnea    Elevated brain natriuretic peptide (BNP) level    Hyponatremia    Restrictive lung disease    Chronic narcotic use    Medication management    Pulmonary hypertension (HCC)       Medications:  aspirin coronary artery of native heart with angina pectoris (HCC)    Tobacco use    Astigmatism    Mild nonproliferative diabetic retinopathy (HCC)    Reflux esophagitis    Class 1 obesity due to excess calories with serious comorbidity and body mass index (BMI) of 34.0 to 34.9 in adult    Diabetic ulcer of left foot associated with type 2 diabetes mellitus, with bone involvement without evidence of necrosis (HCC)    OAB (overactive bladder)    Allergic rhinitis    Simple chronic bronchitis (HCC)    Anxiety and depression    Fibromyalgia    Phantom pain (HCC)    Osteomyelitis ([address]    PVD (peripheral vascular disease) (HCC)    Chronic osteomyelitis of left foot (HCC)    Acute osteomyelitis of foot ([address]    Acute on chronic diastolic congestive heart failure (HCC)    Acute respiratory failure with hypoxia (HCC)    Pleural effusion on right    Myalgia    Abdominal pain    Pleuritic chest pain    Chronic respiratory failure (HCC)    SOB (shortness of breath)    Compression atelectasis    Observed sleep apnea    Elevated brain natriuretic peptide (BNP) level    Hyponatremia    Restrictive lung disease    Chronic narcotic use    Medication management    Pulmonary hypertension ([facility]         Thank you for allowing me to participate in the care of your patient. Please call me with any questions [phone].       Jeannie Green MD, [address] [facility] Cardiologist  [facility]  [phone] [facility]  [phone] [address] [place] [address]  6/5/2020 8:58 AM

## 2020-06-05 NOTE — PROCEDURES
Procedure Laterality Date    APPENDECTOMY      CATARACT REMOVAL WITH IMPLANT Bilateral 2015     SECTION      CORONARY ANGIOPLASTY WITH STENT PLACEMENT  14    DILATION AND CURETTAGE OF UTERUS      FOOT AMPUTATION Left 6/3/2019    LEFT HALLUX AMPUTATION WITH GRAFT APPLICATION performed by Cielo Tapia DPM at [facility] Left 2019    PARTIAL FIRST RAY  AMPUTATION LEFT FOOT performed by Harinder Tran DPM at [place] [address] Left 2005    atherectomy of SFA and popliteal artery    VASCULAR SURGERY Left 10/2007    subclavian angiopalsty and stent         Medications:  Current Facility-Administered Medications   Medication Dose Route Frequency Provider Last Rate Last Dose    aspirin chewable tablet 243 mg  243 mg Oral Once Jocelyn Elliott MD        bisacodyl (DULCOLAX) suppository 10 mg  10 mg Rectal Daily PRN MITCHELL Frye CNP   10 mg at 20 0221    docusate sodium (COLACE) capsule 100 mg  100 mg Oral Daily MITCHELL Frye CNP   100 mg at 20 0856    albuterol sulfate  (90 Base) MCG/ACT inhaler 2 puff  2 puff Inhalation Q6H PRN Ahmet Garcia MD   2 puff at 20 0801    furosemide (LASIX) injection 40 mg  40 mg Intravenous BID Jocelyn Elliott MD   40 mg at 20 1830    insulin glargine (LANTUS) injection vial 50 Units  50 Units Subcutaneous QAM MITCHELL Villavicencio CNP   50 Units at 20 5988    perflutren lipid microspheres (DEFINITY) injection 1.65 mg  1.5 mL Intravenous ONCE PRN Jocelyn Elliott MD        [facility]) chewable tablet 80 mg  80 mg Oral Q6H PRN Ahmet Garcia MD        morphine (PF) injection 2 mg  2 mg Intravenous Q4H PRN Ahmet Garcia MD   2 mg at 20 1419    Or    morphine (PF) injection 4 mg  4 mg Intravenous Q4H PRN Ahmet Garcia MD   4 mg at 20 0543    insulin lispro (HUMALOG) injection vial 0-18 Units  0-18 Units Subcutaneous TID Irvin Acevedo MD   900 mg at 06/04/20 2019    levothyroxine (SYNTHROID) tablet 50 mcg  50 mcg Oral Daily Irvin Acevedo MD   Stopped at 06/05/20 0546    metoprolol succinate (TOPROL XL) extended release tablet 25 mg  25 mg Oral Daily Irvin Acevedo MD   25 mg at 06/04/20 0857    nicotine (NICODERM CQ) 14 MG/24HR 1 patch  1 patch Transdermal Daily Irvin Acevedo MD   1 patch at 06/04/20 0856    nitroGLYCERIN (NITROSTAT) SL tablet 0.4 mg  0.4 mg Sublingual Q5 Min PRN Irvin Acevedo MD        [facility]) tablet 5 mg  5 mg Oral BID Irvin Acevedo MD   5 mg at 06/04/20 2020    sertraline (ZOLOFT) tablet 100 mg  100 mg Oral Daily Irvin Acevedo MD   100 mg at 06/04/20 0857    ranolazine (RANEXA) extended release tablet 500 mg  500 mg Oral BID Irvin Acevedo MD   500 mg at 06/04/20 2019    tiotropium (SPIRIVA RESPIMAT) 2.5 MCG/ACT inhaler 2 puff  2 puff Inhalation Daily Irvin Acevedo MD   2 puff at 06/04/20 0801    budesonide-formoterol (SYMBICORT) 160-4.5 MCG/ACT inhaler 2 puff  2 puff Inhalation BID Irvin Acevedo MD   2 puff at 06/04/20 2002    oxyCODONE-acetaminophen (PERCOCET) 7.5-325 MG per tablet 1 tablet  1 tablet Oral Q6H PRN Irvin Acevedo MD   1 tablet at 06/04/20 2124    glucose (GLUTOSE) 40 % oral gel 15 g  15 g Oral PRN Irvin Acevedo MD        dextrose 50 % IV solution  12.5 g Intravenous PRN Irvin Acevedo MD        glucagon (rDNA) injection 1 mg  1 mg Intramuscular PRN Irvin Acevedo MD        dextrose 5 % solution  100 mL/hr Intravenous PRN Irvin Acevedo MD        insulin lispro (HUMALOG) injection vial 10 Units  10 Units Subcutaneous TID WC Irvin Acevedo MD   10 Units at 06/04/20 1256    sennosides-docusate sodium (SENOKOT-S) 8.6-50 MG tablet 2 tablet  2 tablet Oral BID Irvin Acevedo MD   2 tablet at 06/04/20 2019    polyethylene glycol (GLYCOLAX) packet 17 g  17 g Oral Daily Irvin Acevedo MD   17 g at 06/04/20 2025           Pre-Sedation:    Pre-Sedation Documentation and Exam:  I have assessed the patient and agree with the H&P present on the chart. Prior History of Anesthesia Complications:   none    Modified Mallampati:  III (soft palate, base of uvula visible)    ASA Classification:  Class 4 - A patient with an incapacitating systemic disease that is a constant threat to life      Lio Scale: Activity:  2 - Able to move 4 extremities voluntarily on command  Respiration:  1 - Dyspnic, shallow, or limited breathing  Circulation:  2 - BP+/- 20mmHg of normal  Consciousness:  2 - Fully awake  Oxygen Saturation (color):  1 - Needs oxygen to maintain oxygen saturation >90%    Sedation/Anesthesia Plan:  Guard the patient's safety and welfare. Minimize physical discomfort and pain. Minimize negative psychological responses to treatment by providing sedation and analgesia and maximize the potential amnesia. Patient to meet pre-procedure discharge plan.     Medication Planned:  midazolam intravenously and fentanyl intravenously    Patient is an appropriate candidate for plan of sedation: yes      Electronically signed by Eric Mart MD on 6/5/2020 at 8:56 AM

## 2020-06-05 NOTE — PROGRESS NOTES
Nutrition Assessment    Type and Reason for Visit: Initial(LOS)    Nutrition Recommendations:   1. May need to add Carb control back to diet order when appropriate pending blood sugar trends and po intake (BG were greater than 200-300 mg/dl on C4)  2. Add Glucerna with meals if po intake less than 50% meals  3. Will monitor nutritional adequacy, nutrition-related labs, weights, BMs, and clinical progress   4. Monitor for further education needs when medically appropriate    Nutrition Assessment: Education provided on CHF and Diabetes on 5/30/20. Status post right heart cath earlier today, transferred to ICU post op for gentle diuresis per MD.  Diet re-advanced to cardiac diet no caffeine eating % meals. Will continue to monitor nutrition progression. Malnutrition Assessment:  · Malnutrition Status: At risk for malnutrition    Nutrition Risk Level: Moderate    Nutrient Needs:  · Estimated Daily Total Kcal: 0053-0696  · Estimated Daily Protein (g):   · Estimated Daily Total Fluid (ml/day): 1 kcal/ml    Nutrition Diagnosis:   · Problem: Increased nutrient needs  · Etiology: related to Increased demand for energy/nutrients     Signs and symptoms:  as evidenced by BMI    Objective Information:  · Nutrition-Focused Physical Findings: Last BM on 6/3/20  · Wound Type: (scattered bruising)  · Current Nutrition Therapies:  · Oral Diet Orders: Cardiac(no caffeine)   · Oral Diet intake: Unable to assess  · Oral Nutrition Supplement (ONS) Orders:    · ONS intake: Unable to assess  · Anthropometric Measures:  · Ht: 5' (152.4 cm)   · Current Body Wt: 192 lb 9 oz (87.3 kg)  · Ideal Body Wt: 100 lb (45.4 kg), % Ideal Body    · BMI Classification: BMI 35.0 - 39.9 Obese Class II    Nutrition Interventions:   Continue current diet  Continued Inpatient Monitoring, Education Completed    Nutrition Evaluation:   · Evaluation: Goals set   · Goals: Patient will eat 50% or greater of meals and supplements. · Monitoring: Pertinent Labs, Supplement Intake, Meal Intake, Nutrition Progression      Electronically signed by Dulce Foote RD, MILVIA on 6/5/20 at 1:21 PM EDT    Contact Number: Office: [phone]; [address]: [id number]

## 2020-06-05 NOTE — PROGRESS NOTES
or supraclavicular adenopathy. Skin: Skin is warm and dry. No rash or nodules on the exposed extremities. Neurologic: sleeping when seen           Results:  CBC: No results for input(s): WBC, HGB, HCT, MCV, PLT in the last 72 hours. BMP:   Recent Labs     06/03/20  0512 06/04/20  0454    135*   K 4.9 5.0    100   CO2 25 25   BUN 29* 32*   CREATININE 1.0 1.2*       Imaging:  I have reviewed radiology images personally. XR CHEST PORTABLE   Final Result   No significant change in large right pleural effusion. XR CHEST PORTABLE   Final Result   Unchanged large right pleural effusion with adjacent compressive atelectasis. Slightly decreased severity of interstitial pulmonary edema. XR ABDOMEN (KUB) (SINGLE AP VIEW)   Final Result   1. Indeterminate bowel gas pattern. XR CHEST PORTABLE   Final Result   Worsening opacity in the right mid and lower lung. This likely reflects a   moderate right pleural effusion and or airspace disease. Interstitial edema/pulmonary vascular congestion. Results for Indira Nolan \"KEVON\" (MRN [id number]) as of 6/5/2020 18:42   Ref. Range 5/13/2020 15:58 5/30/2020 03:18 5/31/2020 05:08 6/2/2020 04:58 6/5/2020 05:01   Total CK Latest Ref Range: 26 - 192 U/L   70     Pro-BNP Latest Ref Range: 0 - 124 pg/mL 2,007 (H) 2,039 (H)  2,562 (H) 1,598 (H)   Troponin Latest Ref Range: <0.01 ng/mL <0.01 <0.01            ECHO-Summary   LV systolic function is normal with EF estimated at 55-60%. There is diastolic septal flattening c/w RV pressure and volume overload. Septal bounce R-L noted. Grade II diastolic dysfunction with elevated LV filling pressure. Mild mitral annular calcification. The left atrium is moderately dilated in size. Moderate right-sided chamber enlargement. Right ventricular systolic function is mildly reduced . Moderate tricuspid regurgitation.    Systolic pulmonary artery pressure (SPAP) is estimated at significantly large right-sided pleural effusion which may be causing the patient to have pleuritic chest pain  · Patient has been started on IV Lasix by cardiology and addition of Aldactone is being contemplated-patient's renal function trending lower   · Optimization of heart failure treatment being propagated before any thoracentesis to be considered  · Right heart cath done today which confirms that patient has severe pulmonary HTN and pulmonary HTN specialist to see the patient  · Cardiology/IM to decide if patient needs any workup for collagen vascular issues   · Strict input output charting  · Patient may benefit from ultrasound-guided thoracentesis but patient is on Plavix at this time-we will request cardiology to instruct if patient's Plavix can be withheld for few days for the procedure-Plavix has been held but will hold off on any thoracentesis which may have to be done at least 5 to 7 days of today if patient is still having large pleural effusion with optimization of heart failure treatment -to be directed by cardiology   · Bronchodilators  · Patient is on Ditropan along with Spiriva and needs to monitor for any worsening anticholinergic side effects  · Patient is off antibiotics   · Avoid narcotics and sedatives  · Patient states that she was supposed to have a sleep study but could not be done and patient does not use any noninvasive ventilation at nighttime  · Patient will need a sleep study as an outpatient  · Synthroid as per IM  · Insulins as per IM on the basis of blood glucose monitoring  · Blood glucose monitoring with sliding scale insulin  · Monitor for any worsening hypoventilation and hypercarbia  · PUD and DVT prophylaxis as per IM     Case discussed with nursing          Electronically signed by:  Bo Colvin MD    6/5/2020    6:40 PM.

## 2020-06-05 NOTE — PROGRESS NOTES
Physical Therapy Attempt  Attempted to see patient for therapy. Pt getting ready to leave floor for cath lab. Will follow up as appropriate.   Kristin Geller,   [facility]

## 2020-06-06 NOTE — PLAN OF CARE
Problem: Pain:  Goal: Control of chronic pain  Description: Control of chronic pain  Outcome: Ongoing     Problem: Falls - Risk of:  Goal: Will remain free from falls  Description: Will remain free from falls  Outcome: Ongoing     Problem: Falls - Risk of:  Goal: Absence of physical injury  Description: Absence of physical injury  Outcome: Ongoing     Problem: OXYGENATION/RESPIRATORY FUNCTION  Goal: Patient will maintain patent airway  Outcome: Ongoing     Problem: OXYGENATION/RESPIRATORY FUNCTION  Goal: Patient will achieve/maintain normal respiratory rate/effort  Description: Respiratory rate and effort will be within normal limits for the patient  Outcome: Ongoing     Problem: HEMODYNAMIC STATUS  Goal: Patient has stable vital signs and fluid balance  Outcome: Ongoing     Problem: FLUID AND ELECTROLYTE IMBALANCE  Goal: Fluid and electrolyte balance are achieved/maintained  6/5/2020 2141 by Shakila Vanegas RN  Outcome: Ongoing  6/5/2020 1827 by Chidi Rondon RN  Outcome: Ongoing     Problem: ACTIVITY INTOLERANCE/IMPAIRED MOBILITY  Goal: Mobility/activity is maintained at optimum level for patient  Outcome: Ongoing

## 2020-06-06 NOTE — PLAN OF CARE
Problem: Pain:  Goal: Control of acute pain  Description: Control of acute pain  Outcome: Ongoing  Note: Pt. Uses 0-10 pain scale. Medications available as needed. Problem: Falls - Risk of:  Goal: Will remain free from falls  Description: Will remain free from falls  Outcome: Ongoing  Note: Pt. Calls out appropriately for needs. Problem: OXYGENATION/RESPIRATORY FUNCTION  Goal: Patient will achieve/maintain normal respiratory rate/effort  Description: Respiratory rate and effort will be within normal limits for the patient  Outcome: Ongoing  Note: Oxygen saturation is monitored continuously. Supplementary oxygen as needed. Problem: FLUID AND ELECTROLYTE IMBALANCE  Goal: Fluid and electrolyte balance are achieved/maintained  Outcome: Ongoing  Note: Electrolytes monitored daily. Medications available as needed.

## 2020-06-06 NOTE — PROGRESS NOTES
MCV 94.1        BMP:   Recent Labs     06/04/20  0454 06/06/20  0437   * 139   K 5.0 4.4    103   CO2 25 27   PHOS  --  4.3   BUN 32* 23*   CREATININE 1.2* 1.0       Imaging:  I have reviewed radiology images personally. XR CHEST PORTABLE   Final Result   No significant change in large right pleural effusion. XR CHEST PORTABLE   Final Result   Unchanged large right pleural effusion with adjacent compressive atelectasis. Slightly decreased severity of interstitial pulmonary edema. XR ABDOMEN (KUB) (SINGLE AP VIEW)   Final Result   1. Indeterminate bowel gas pattern. XR CHEST PORTABLE   Final Result   Worsening opacity in the right mid and lower lung. This likely reflects a   moderate right pleural effusion and or airspace disease. Interstitial edema/pulmonary vascular congestion. XR CHEST PORTABLE    (Results Pending)     Resu        ECHO-Summary   LV systolic function is normal with EF estimated at 55-60%. There is diastolic septal flattening c/w RV pressure and volume overload. Septal bounce R-L noted. Grade II diastolic dysfunction with elevated LV filling pressure. Mild mitral annular calcification. The left atrium is moderately dilated in size. Moderate right-sided chamber enlargement. Right ventricular systolic function is mildly reduced . Moderate tricuspid regurgitation. Systolic pulmonary artery pressure (SPAP) is estimated at 106mmHg c/w severe   pulmonary hypertension (RAP of 3mmHg). Previous Echo-Feb 2020-Summary   Normal left ventricle size, wall thickness, and systolic function with a   visually estimated ejection fraction of 55-60%. There appears to be slight diastolic septal flattening (\"D-shaped\" septum)   consistent with right ventricular volume overload. Grade III diastolic dysfunction with elevated LV filling pressures. The right ventricle is moderately dilated with normal function.    Mild tricuspid Ref Range: 26.0 - 34.0 pg 30.9 30.8 30.8   MCHC Latest Ref Range: 31.0 - 36.0 g/dL 33.1 32.7 32.7   MPV Latest Ref Range: 5.0 - 10.5 fL 10.4 10.8 (H) 10.5   RDW Latest Ref Range: 12.4 - 15.4 % 14.8 14.7 15.8 (H)   Platelet Count Latest Ref Range: 135 - 450 K/uL 285 297 235   Neutrophils % Latest Units: %  71.8      Results for Keily Arciniega \"KEVON\" (MRN [id number]) as of 6/6/2020 11:47   Ref. Range 5/14/2020 08:30 5/14/2020 09:45   SARS-CoV-2, PCR Latest Ref Range: Not Detected   Not Detected   COVID-19 Unknown  Rpt   L. pneumophila Serogp 1 Ur Ag Unknown Presumptive Negat. .. LEGIONELLA ANTIGEN, URINE Unknown Rpt      Assessment:  Principal Problem:    Acute on chronic diastolic congestive heart failure (HCC)  Active Problems:    Uncontrolled type 2 diabetes mellitus with diabetic polyneuropathy, with long-term current use of insulin (HCC)    HTN (hypertension)    Coronary artery disease involving native coronary artery of native heart with angina pectoris (HCC)    Tobacco use    Class 1 obesity due to excess calories with serious comorbidity and body mass index (BMI) of 34.0 to 34.9 in adult    Simple chronic bronchitis (HCC)    Pleural effusion on right    Myalgia    Abdominal pain    Pleuritic chest pain    Chronic respiratory failure (HCC)    SOB (shortness of breath)    Compression atelectasis    Observed sleep apnea    Elevated brain natriuretic peptide (BNP) level    Hyponatremia    Restrictive lung disease    Chronic narcotic use    Medication management    Pulmonary hypertension ([facility] [address]  Resolved Problems:    * No resolved hospital problems.  *          Plan:   · Oxygen supplementation to keep saturation between 90 to 94% only-please titrate oxygen as per these parameters  · Pulmonary toilet  · Patient's pleural effusion was evaluated under U/S and no large pleural effusion big enough to tap   · Will Rpt CXR in AM and if the Rt sided opacity is still present -will do CT chest to assess if patient needs

## 2020-06-06 NOTE — FLOWSHEET NOTE
2044 Pt assessed, see note for details. Assisted Pt back to bed from chair, Pt became very SOB and had to take a few minutes to recover. Pt verbalized understanding of plan of care to include pain control and reducing SOB by diuresis per MD order. Fresh water at bedside. Spoke to Pt about finding a bed bug. Pt stated, I found it on me here. \" Pt denies needs, encouraged call light use. 0026 Assessment complete, see note for details. Pt resting in bed with eyes closed, call light at bedside. 0200 Pt resting in bed with eyes closed, call light at bedside. 4127 Assessment complete, see note for details. 0530 While this RN was in another Pt's room across the unit, this Pt could be heard yelling , \"Help me\". When this RN asked the Pt \"what can I help you with\". The Pt yelled, \"YOUR JOB! \" This RN proceeded to explain to the Pt, I will be with you as soon as I'm able to. The Pt then yelled, \"Bitch\". 1 This RN received a phone call from the Pt's daughter. Pt's daughter was upset and wanted to know why her mom has been neglected all night. This RN stated \"The Pt has been sleeping most of the night. This RN also spoke to the Pt's daughter about bed bugs (The Pt's daughter brought up the topic), the Pt's daughter stated, Norma Pickett mom has been treated different ever since the bed bug was found at your facility\"  This RN explained to the Pt's daughter the Pt had not been treat any differently than any other Pt and this RN had been checking in on the Pt numerous times throughout the night while the Pt was resting in bed with her eyes closed. This RN did tell the Pt's daughter the Pt was yelling and called this RN a \"Bitch\". The daughter said David Askew she can be a hand full at times. I'll talk to her. \"  This conversion was ended by this RN ensuring to check on the Pt and address her chronic pain, see MAR for details. 0559 Percocet administered as ordered.  Prior to administering it, the Pt stated,\"I want the

## 2020-06-06 NOTE — PROGRESS NOTES
with any questions [phone].       Elaina Redman MD, [address] [facility] Cardiologist  [facility]  [phone] [facility]  [phone] [address] [place] [address]  6/6/2020 8:06 AM

## 2020-06-06 NOTE — PROGRESS NOTES
Page sent out to Dr. Kumar Muñoz to let him know that Pulmonology is not able to do the thoracentesis and they no longer need plavix held from their standpoint. Awaiting return call from cardiology to get it restarted if appropriate.

## 2020-06-06 NOTE — PROGRESS NOTES
Hospitalist Progress Note    Date of Admission: 5/30/2020    Chief Complaint: myalgias, chest pain, SOB    Hospital Course: 62 y.o. female who presented to [facility] with Generalized myalgias, chest pain, abdominal pain, SOB. PMHx significant for DM 2, CAD, CHF, morbid obesity, JORDI. She was recently hospitalized here for CHF exacerbation and pneumonia. She was discharged home on 5/18/2020. Over the last few days she reports increasing generalized muscle aches. Associated with some right-sided chest pain and generalized abdominal pain. She also reports constipation. She was started on home oxygen therapy at the time of her last discharge and continued on 2 L continuous. She does report some increase in shortness of breath and swelling of bilateral lower extremities. Subjective: sob has improved a bit   Not much cough   Denies any chest pain , n/v/d     Labs:   Recent Labs     06/06/20  0437   WBC 7.9   HGB 10.6*   HCT 32.5*        Recent Labs     06/04/20  0454 06/06/20  0437   * 139   K 5.0 4.4    103   CO2 25 27   BUN 32* 23*   CREATININE 1.2* 1.0   CALCIUM 9.1 9.1   PHOS  --  4.3     No results for input(s): AST, ALT, BILIDIR, BILITOT, ALKPHOS in the last 72 hours. No results for input(s): INR in the last 72 hours. Physical Exam Performed:    BP (!) 115/51   Pulse 79   Temp 97.4 °F (36.3 °C) (Axillary)   Resp 14   Ht 5' (1.524 m)   Wt 188 lb 7.9 oz (85.5 kg)   LMP  (LMP Unknown)   SpO2 95%   BMI 36.81 kg/m²     General appearance: No apparent distress, appears stated age and cooperative. HEENT:  Normal cephalic, atraumatic without obvious deformity. Pupils equal, round, and reactive to light. Extra ocular muscles intact. Conjunctivae/corneas clear. Neck: Supple, no jugular venous distention. Trachea midline with full range of motion. Respiratory:  Normal respiratory effort. Diminished but clear.    Cardiovascular: Regular rate and rhythm with normal S1/S2 without murmurs, rubs or gallops. Abdomen: Soft, non-tender, non-distended with normal bowel sounds. Musculoskelatal: No clubbing, cyanosis. Mild lower extremity edema. Full range of motion without deformity. Neurologic:  Neurovascularly intact without any focal sensory/motor deficits. Cranial nerves: II-XII intact, grossly non-focal.  Psychiatric: Alert and oriented, thought content appropriate, normal insight  Skin: Skin color, texture, turgor normal.  No rashes or lesions. Capillary Refill: Brisk,< 3 seconds   Peripheral Pulses: +2 palpable, equal bilaterally     Assessment/Plan:    Active Hospital Problems    Diagnosis    Medication management [Z79.899]    Pulmonary hypertension (HCC) [I27.20]    SOB (shortness of breath) [R06.02]    Compression atelectasis [J98.11]    Observed sleep apnea [G47.30]    Elevated brain natriuretic peptide (BNP) level [R79.89]    Hyponatremia [E87.1]    Restrictive lung disease [J98.4]    Chronic narcotic use [F11.90]    Pleural effusion on right [J90]    Myalgia [M79.10]    Abdominal pain [R10.9]    Pleuritic chest pain [R07.81]    Chronic respiratory failure (HCC) [J96.10]    Acute on chronic diastolic congestive heart failure (HCC) [I50.33]    Class 1 obesity due to excess calories with serious comorbidity and body mass index (BMI) of 34.0 to 34.9 in adult [E66.09, Z68.34]    Simple chronic bronchitis (HCC) [J41.0]    Tobacco use [Z72.0]    Coronary artery disease involving native coronary artery of native heart with angina pectoris ([facility] [address] [I25.119]    HTN (hypertension) [I10]    Uncontrolled type 2 diabetes mellitus with diabetic polyneuropathy, with long-term current use of insulin (HCC) [E11.42, Z79.4, E11.65]     Acute on chronic diastolic (congestive) heart failure: Chest x-ray showed persistent right pleural effusion. BNP is elevated similar to prior presentation. Her pulmonary exam revealed bilateral crackles. Repeat ECHO stable.  Persistent

## 2020-06-07 PROBLEM — R59.0 HILAR ADENOPATHY: Status: ACTIVE | Noted: 2020-01-01

## 2020-06-07 PROBLEM — R59.0 MEDIASTINAL ADENOPATHY: Status: ACTIVE | Noted: 2020-01-01

## 2020-06-07 PROBLEM — J98.19 LUNG COLLAPSE: Status: ACTIVE | Noted: 2020-01-01

## 2020-06-07 NOTE — PROGRESS NOTES
[address]   Daily Cardiovascular Progress Note    Admit Date: 5/30/2020    Chief complaint: SOB  HPI: Remains SOB      Medications/Labs all Reviewed:  Patient Active Problem List   Diagnosis    HLD (hyperlipidemia)    Uncontrolled type 2 diabetes mellitus with diabetic polyneuropathy, with long-term current use of insulin ([address]    Type 2 diabetes mellitus with diabetic peripheral angiopathy without gangrene ([address]    HTN (hypertension)    Hypothyroid    Localized osteoarthrosis, lower leg    PFS (patellofemoral syndrome)    Disc displacement, lumbar    Pulmonary emphysema ([address]    JORDI on CPAP    Chronic low back pain    Coronary artery disease involving native coronary artery of native heart with angina pectoris (HCC)    Tobacco use    Astigmatism    Mild nonproliferative diabetic retinopathy (HCC)    Reflux esophagitis    Class 1 obesity due to excess calories with serious comorbidity and body mass index (BMI) of 34.0 to 34.9 in adult    Diabetic ulcer of left foot associated with type 2 diabetes mellitus, with bone involvement without evidence of necrosis (HCC)    OAB (overactive bladder)    Allergic rhinitis    Simple chronic bronchitis (HCC)    Anxiety and depression    Fibromyalgia    Phantom pain (HCC)    Osteomyelitis ([address]    PVD (peripheral vascular disease) ([address]    Chronic osteomyelitis of left foot ([address]    Acute osteomyelitis of foot ([address]    Acute on chronic diastolic congestive heart failure (HCC)    Acute respiratory failure with hypoxia (HCC)    Pleural effusion on right    Myalgia    Abdominal pain    Pleuritic chest pain    Chronic respiratory failure (HCC)    SOB (shortness of breath)    Compression atelectasis    Observed sleep apnea    Elevated brain natriuretic peptide (BNP) level    Hyponatremia    Restrictive lung disease    Chronic narcotic use    Medication management    Pulmonary hypertension ([address]  Pulmonary hypertension ([facility] [address]         Thank you for allowing me to participate in the care of your patient. Please call me with any questions [phone].       Prasanna Perdomo MD, [address] [facility] Cardiologist  [facility]  [phone] [facility]  [phone] [address]  6/7/2020 6:58 AM

## 2020-06-07 NOTE — PROGRESS NOTES
HCT 32.5*   MCV 94.1        BMP:   Recent Labs     06/06/20  0437 06/07/20  0414    136   K 4.4 5.1    98*   CO2 27 26   PHOS 4.3  --    BUN 23* 25*   CREATININE 1.0 1.0       Imaging:  I have reviewed radiology images personally. CT CHEST HIGH RESOLUTION   Final Result   1. HRCT is limited due to acute process. If evaluation of interstitial lung   disease is desired, outpatient follow-up is recommended when the patient's   acute symptoms have resolved. 2. Complete opacification of the right middle and lower lobes with air   bronchograms and moderate pleural effusion. Pattern may represent a   centrally obstructing process or pneumonia. 3. Scattered ground-glass opacities in the left lung likely correspond to   above-described acute process. Underlying chronic interstitial change can   not be excluded. 4. Bulky reactive mediastinal and hilar lymphadenopathy. XR CHEST PORTABLE   Final Result   [place] placement as above. XR CHEST PORTABLE   Final Result   No significant change in large right pleural effusion. XR CHEST PORTABLE   Final Result   Unchanged large right pleural effusion with adjacent compressive atelectasis. Slightly decreased severity of interstitial pulmonary edema. XR ABDOMEN (KUB) (SINGLE AP VIEW)   Final Result   1. Indeterminate bowel gas pattern. XR CHEST PORTABLE   Final Result   Worsening opacity in the right mid and lower lung. This likely reflects a   moderate right pleural effusion and or airspace disease. Interstitial edema/pulmonary vascular congestion. Results for Herb Barragan \"KEVON\" (MRN [id number]) as of 6/7/2020 12:32   Ref.  Range 6/6/2020 04:37 6/6/2020 09:31 6/6/2020 12:14 6/6/2020 16:52 6/6/2020 20:15 6/7/2020 04:14   Sodium Latest Ref Range: 136 - 145 mmol/L 139     136   Potassium Latest Ref Range: 3.5 - 5.1 mmol/L 4.4     5.1   Chloride Latest Ref Range: 99 - 110 mmol/L 103     98 (L)   CO2 Latest Ref Range: 21 - 32 mmol/L 27     26   BUN Latest Ref Range: 7 - 20 mg/dL 23 (H)     25 (H)   Creatinine Latest Ref Range: 0.6 - 1.1 mg/dL 1.0     1.0   Anion Gap Latest Ref Range: 3 - 16  9     12   GFR Non- Latest Ref Range: >60  57 (A)     57 (A)   GFR  Latest Ref Range: >60  >60     >60   Magnesium Latest Ref Range: 1.80 - 2.40 mg/dL 2.20        Glucose Latest Ref Range: 70 - 99 mg/dL 165 (H)     313 (H)   POC Glucose Latest Ref Range: 70 - 99 mg/dl  157 (H) 106 (H) 292 (H) 245 (H)    Calcium Latest Ref Range: 8.3 - 10.6 mg/dL 9.1     9.2   Phosphorus Latest Ref Range: 2.5 - 4.9 mg/dL 4.3          Results for Keily Arciniega \"KEVON\" (MRN [id number]) as of 6/7/2020 12:32   Ref. Range 5/13/2020 15:58 5/14/2020 05:22 5/15/2020 05:25 5/17/2020 06:40 5/30/2020 03:18 6/6/2020 04:37   WBC Latest Ref Range: 4.0 - 11.0 K/uL 10.1 9.1 8.7 11.0 11.0 7.9   RBC Latest Ref Range: 4.00 - 5.20 M/uL 3.70 (L) 3.54 (L) 3.65 (L) 3.50 (L) 3.87 (L) 3.45 (L)   Hemoglobin Quant Latest Ref Range: 12.0 - 16.0 g/dL 11.7 (L) 11.1 (L) 11.5 (L) 10.8 (L) 11.9 (L) 10.6 (L)   Hematocrit Latest Ref Range: 36.0 - 48.0 % 34.5 (L) 33.1 (L) 34.1 (L) 32.7 (L) 36.4 32.5 (L)   MCV Latest Ref Range: 80.0 - 100.0 fL 93.0 93.4 93.5 93.4 94.0 94.1   MCH Latest Ref Range: 26.0 - 34.0 pg 31.4 31.3 31.6 30.9 30.8 30.8   MCHC Latest Ref Range: 31.0 - 36.0 g/dL 33.8 33.6 33.8 33.1 32.7 32.7   MPV Latest Ref Range: 5.0 - 10.5 fL 10.3 10.3 10.3 10.4 10.8 (H) 10.5   RDW Latest Ref Range: 12.4 - 15.4 % 14.5 14.0 14.3 14.8 14.7 15.8 (H)   Platelet Count Latest Ref Range: 135 - 450 K/uL 297 274 288 285 297 235       ECHO-Summary   LV systolic function is normal with EF estimated at 55-60%. There is diastolic septal flattening c/w RV pressure and volume overload. Septal bounce R-L noted. Grade II diastolic dysfunction with elevated LV filling pressure. Mild mitral annular calcification.    The left atrium is moderately dilated in size. Moderate right-sided chamber enlargement. Right ventricular systolic function is mildly reduced . Moderate tricuspid regurgitation. Systolic pulmonary artery pressure (SPAP) is estimated at 106mmHg c/w severe   pulmonary hypertension (RAP of 3mmHg). Previous Echo-Feb 2020-Summary   Normal left ventricle size, wall thickness, and systolic function with a   visually estimated ejection fraction of 55-60%. There appears to be slight diastolic septal flattening (\"D-shaped\" septum)   consistent with right ventricular volume overload. Grade III diastolic dysfunction with elevated LV filling pressures. The right ventricle is moderately dilated with normal function. Mild tricuspid regurgitation. Systolic pulmonary artery pressure (SPAP) is elevated and estimated at 83   mmHg (right atrial pressure 8 mmHg) consistent with severe pulmonary   hypertension. The IVC is normal in size (<2.1 cm) but collapses <50% with respiration   consistent with elevated right atrial pressures (8 mmHg) . Results for Daphnie Estrada \"KEVON\" (MRN [id number]) as of 6/6/2020 11:47   Ref.  Range 6/4/2020 04:54 6/4/2020 07:26 6/4/2020 11:14 6/4/2020 16:09 6/4/2020 19:47 6/5/2020 07:40 6/5/2020 12:00 6/5/2020 16:18 6/5/2020 20:47 6/6/2020 04:37   Sodium Latest Ref Range: 136 - 145 mmol/L 135 (L)         139   Potassium Latest Ref Range: 3.5 - 5.1 mmol/L 5.0         4.4   Chloride Latest Ref Range: 99 - 110 mmol/L 100         103   CO2 Latest Ref Range: 21 - 32 mmol/L 25         27   BUN Latest Ref Range: 7 - 20 mg/dL 32 (H)         23 (H)   Creatinine Latest Ref Range: 0.6 - 1.1 mg/dL 1.2 (H)         1.0   Anion Gap Latest Ref Range: 3 - 16  10         9   GFR Non- Latest Ref Range: >60  46 (A)         57 (A)   GFR  Latest Ref Range: >60  56 (A)         >60   Magnesium Latest Ref Range: 1.80 - 2.40 mg/dL 2.20         2.20   Glucose Latest Ref Range: 70 - 99 mg/dL 292

## 2020-06-07 NOTE — PROGRESS NOTES
symptoms despite initial diuresis  -Right heart catheterization with [place] placement on 6/5/2020. Revealed elevated pressures and pulmonary pretension.  -Transferred to ICU for further diuresis. -Continue IV Lasix. -Monitor I&O, daily weights, BMP. Neg 8.5 L by 6/7     Ischemic Cardiomyopathy: Considered NM Stress Testing which had been ordered by her Cardiologist last week as an outpatient; canceled due to chest pain. Defer to Cardiology if needs to be re-ordered. Possible Pneumonia: Worsening right sided chest pain. New purulent sputum. Cannot rule out Pneumonia based on initial imaging. Repeat CXR shows persistent large right pleural effusion associated airspace disease. Procalcitonin elevated. Short course of Abx completed. Right Pleural effusion: Was initially thought to be most likely secondary to CHF. However not improving with diuresis. This has been present since her last hospitalization at treatment for pneumonia. During current admission she has developed worsening right-sided chest pain, and purulent sputum. Her procalcitonin is elevated. Concern for infection. Pulmonology consulted. No plan for thoracenetesis , plavix restarted       Abdominal pain: Abdominal x-ray is benign. Suspect this may be related to constipation. Continue bowel regimen. COPD: Stable without evidence of exacerbation. Continue bronchodilator regimen. Diabetes type 2, uncontrolled: Continue basal bolus insulin regimen and titrate as needed. Monitor. Hypertension, benign: Controlled. Continue current medication regimen, monitor and adjust as needed. Myalgia: Etiology unclear. Wonder if this is simply related to her CHF and constipation. She is chronically treated with atorvastatin. CK normal.    Chronic respiratory failure with hypoxia: Secondary to COPD and CHF. Currently at baseline. Wean O2 as tolerated. DVT Prophylaxis: Lovenox  Diet: DIET CARDIAC; Daily Fluid Restriction: 2000 ml;  No Caffeine  Code Status: Full Code  PT/OT Eval Status: Pending    Dispo - ICU Care. Anticipate resuming home health care services at discharge.  Likely here several more days for diuresis       Mohinder Bucio MD

## 2020-06-08 NOTE — PROGRESS NOTES
Received bedside report from off going RN. Pts skin was assessed. Turned and repositioned. Ruiz in place. Orders verified. Pt A&O able to follow commands. Call light in reach, bed in lowest position, will continue to monitor.

## 2020-06-08 NOTE — ADT AUTH CERT
level has  with the Lasix   · Insulins as per IM on the basis of blood glucose monitoring   · Patient's blood sugars are better controlled    · Blood glucose monitoring with sliding scale insulin   · Monitor for any worsening hypoventilation and hypercarbia   · PUD and DVT prophylaxis as per IM       Per IM PN:  Assessment/Plan:       Active Hospital Problems     Diagnosis   · Medication management [Z79.899]   · Pulmonary hypertension (HCC) [I27.20]   · SOB (shortness of breath) [R06.02]   · Compression atelectasis [J98.11]   · Observed sleep apnea [G47.30]   · Elevated brain natriuretic peptide (BNP) level [R79.89]   · Hyponatremia [E87.1]   · Restrictive lung disease [J98.4]   · Chronic narcotic use [F11.90]   · Pleural effusion on right [J90]   · Myalgia [M79.10]   · Abdominal pain [R10.9]   · Pleuritic chest pain [R07.81]   · Chronic respiratory failure (HCC) [J96.10]   · Acute on chronic diastolic congestive heart failure (HCC) [I50.33]   · Class 1 obesity due to excess calories with serious comorbidity and body mass index (BMI) of 34.0 to 34.9 in adult [E66.09, Z68.34]   · Simple chronic bronchitis (HCC) [J41.0]   · Tobacco use [Z72.0]   · Coronary artery disease involving native coronary artery of native heart with angina pectoris ([facility] [I25.119]   · HTN (hypertension) [I10]   · Uncontrolled type 2 diabetes mellitus with diabetic polyneuropathy, with long-term current use of insulin (HCC) [E11.42, Z79.4, E11.65]       Acute on chronic diastolic (congestive) heart failure: Chest x-ray showed persistent right pleural effusion.  BNP is elevated similar to prior presentation.  Her pulmonary exam revealed bilateral crackles.     -Continue IV Lasix. -Monitor I&O, daily weights, BMP.    -Repeat ECHO stable. - Consider NM Stress Testing which had been ordered by her Cardiologist last week as an outpatient; canceled due to chest pain.  Defer to Cardiology if needs to be re-ordered.        Possible Pneumonia: Worsening right sided chest pain. New purulent sputum. Cannot rule out Pneumonia based on initial imaging. Repeat CXR shows persistent large right pleural effusion associated airspace disease.  Procalcitonin elevated. Empiric Abx.        Right Pleural effusion: Was initially thought to be most likely secondary to CHF.  However not improving with diuresis.  This has been present since her last hospitalization at treatment for pneumonia.  During current admission she has developed worsening right-sided chest pain, and purulent sputum.  Her procalcitonin is elevated.  Concern for infection.  Pulmonology consulted. Hold Plavix for possible Thoracentesis in 4-5 more days.         Abdominal pain: Abdominal x-ray is benign.  Suspect this may be related to constipation.  Continue bowel regimen.       COPD: Stable without evidence of exacerbation.  Continue bronchodilator regimen.       Diabetes type 2, uncontrolled: Continue basal bolus insulin regimen and titrate as needed.  Monitor.       Hypertension, benign: Controlled.  Continue current medication regimen, monitor and adjust as needed.        Myalgia: Etiology unclear.  Wonder if this is simply related to her CHF and constipation.  She is chronically treated with atorvastatin.  CK normal.       Chronic respiratory failure with hypoxia: Secondary to COPD and CHF.  Currently at baseline.  Wean O2 as tolerated.

## 2020-06-08 NOTE — PROGRESS NOTES
Hospitalist Progress Note      PCP: Camille Almonte MD    Date of Admission: 5/30/2020      Subjective:   States that she feels better today. Shortness of breath is improved.   No chest pain, fevers or chills      Medications:  Reviewed    Infusion Medications    dextrose       Scheduled Medications    mupirocin   Nasal BID    furosemide  40 mg Intravenous BID    docusate sodium  100 mg Oral Daily    insulin glargine  50 Units Subcutaneous QAM    insulin lispro  0-18 Units Subcutaneous TID WC    insulin lispro  0-9 Units Subcutaneous Nightly    enoxaparin  40 mg Subcutaneous Daily    aspirin  81 mg Oral Daily    atorvastatin  40 mg Oral Nightly    Vitamin D  1 tablet Oral Daily    ferrous sulfate  325 mg Oral Daily with breakfast    fluticasone  2 spray Nasal Daily    gabapentin  900 mg Oral TID    levothyroxine  50 mcg Oral Daily    metoprolol succinate  25 mg Oral Daily    nicotine  1 patch Transdermal Daily    oxybutynin  5 mg Oral BID    sertraline  100 mg Oral Daily    ranolazine  500 mg Oral BID    tiotropium  2 puff Inhalation Daily    budesonide-formoterol  2 puff Inhalation BID    insulin lispro  10 Units Subcutaneous TID     sennosides-docusate sodium  2 tablet Oral BID    polyethylene glycol  17 g Oral Daily     PRN Meds: bisacodyl, albuterol sulfate HFA, perflutren lipid microspheres, simethicone, morphine **OR** morphine, sodium chloride flush, potassium chloride **OR** potassium alternative oral replacement **OR** potassium chloride, magnesium sulfate, acetaminophen **OR** acetaminophen, promethazine **OR** ondansetron, clonazePAM, nitroGLYCERIN, oxyCODONE-acetaminophen, glucose, dextrose, glucagon (rDNA), dextrose      Intake/Output Summary (Last 24 hours) at 6/8/2020 0950  Last data filed at 6/8/2020 0941  Gross per 24 hour   Intake 980 ml   Output 1745 ml   Net -765 ml       Exam:    /65   Pulse 77   Temp 98.7 °F (37.1 °C) (Axillary)   Resp 22   Ht 5' pulmonary hypertension, status post RHC/[place] 6/5/20  -Coronary artery disease hx STEMI status post PCI/LCx stent 2014  -Nonischemic cardiomyopathy, EF 55%  -Chronic venous insufficiency  -Essential hypertension-controlled  -Hyperlipidemia  -HBGA-zwdwxc-vy inhaled steroids and bronchodilators  -Chronic respiratory failure with ykmsgva-cqnqga-lu 2 L nasal cannula  -Diabetes mellitus type 2, uncontrolled-on insulin  -JORDI ?  CPAP use  -Obesity-BMI 35    Plan  Continue IV Lasix, daily weights and strict I's and O's  Thoracentesis, bronch/EBUS-per pulmonology  Continue current treatment    DVT Prophylaxis: Lovenox  Diet: DIET CARB CONTROL; Daily Fluid Restriction: 2000 ml; No Caffeine  Code Status: Full Code      \  Mesha Apodaca MD

## 2020-06-08 NOTE — CARE COORDINATION
Chart review completed. Pt a 62year old female, admitted for pleural effusions on right. Hospital day 9, currently in ICU level of care. Prior to admission pt was active with [facility] and [facility] for home oxygen. Plans are to return home with the above services. Writer went to bedside to offer support. Pt confirms her plan is to return home with family and [facility]. States either her daughter or grandson are with her at all times. Pt asked about getting a ramp built for her scooter to get into her home. Writer updated her that we are unable to do this but may be best to contact [facility] job and family services/senior services for assistance. Due to LOS and Critical illness writer initiated LTACH screening. Magdalena to review.   Miguel Lynn RN

## 2020-06-08 NOTE — PROGRESS NOTES
[facility]    Mediastinal adenopathy    Hilar adenopathy    Lung collapse       Medications:  mupirocin (BACTROBAN) 2 % ointment, BID  clopidogrel (PLAVIX) tablet 75 mg, Daily  furosemide (LASIX) injection 40 mg, BID  bisacodyl (DULCOLAX) suppository 10 mg, Daily PRN  docusate sodium (COLACE) capsule 100 mg, Daily  albuterol sulfate  (90 Base) MCG/ACT inhaler 2 puff, Q6H PRN  insulin glargine (LANTUS) injection vial 50 Units, QAM  perflutren lipid microspheres (DEFINITY) injection 1.65 mg, ONCE PRN  simethicone (MYLICON) chewable tablet 80 mg, Q6H PRN  morphine (PF) injection 2 mg, Q4H PRN    Or  morphine (PF) injection 4 mg, Q4H PRN  insulin lispro (HUMALOG) injection vial 0-18 Units, TID WC  insulin lispro (HUMALOG) injection vial 0-9 Units, Nightly  sodium chloride flush 0.9 % injection 10 mL, PRN  potassium chloride (KLOR-CON M) extended release tablet 40 mEq, PRN    Or  potassium bicarb-citric acid (EFFER-K) effervescent tablet 40 mEq, PRN    Or  potassium chloride 10 mEq/100 mL IVPB (Peripheral Line), PRN  magnesium sulfate 1 g in dextrose 5% 100 mL IVPB, PRN  acetaminophen (TYLENOL) tablet 650 mg, Q6H PRN    Or  acetaminophen (TYLENOL) suppository 650 mg, Q6H PRN  promethazine (PHENERGAN) tablet 12.5 mg, Q6H PRN    Or  ondansetron (ZOFRAN) injection 4 mg, Q6H PRN  enoxaparin (LOVENOX) injection 40 mg, Daily  aspirin EC tablet 81 mg, Daily  atorvastatin (LIPITOR) tablet 40 mg, Nightly  Vitamin D (CHOLECALCIFEROL) tablet 1,000 Units, Daily  clonazePAM (KLONOPIN) tablet 0.5 mg, BID PRN  ferrous sulfate (IRON 325) tablet 325 mg, Daily with breakfast  fluticasone (FLONASE) 50 MCG/ACT nasal spray 2 spray, Daily  gabapentin (NEURONTIN) capsule 900 mg, TID  levothyroxine (SYNTHROID) tablet 50 mcg, Daily  metoprolol succinate (TOPROL XL) extended release tablet 25 mg, Daily  nicotine (NICODERM CQ) 14 MG/24HR 1 patch, Daily  nitroGLYCERIN (NITROSTAT) SL tablet 0.4 mg, Q5 Min PRN  oxybutynin (DITROPAN) tablet 5 mg,

## 2020-06-09 NOTE — PLAN OF CARE
Patient's EF (Ejection Fraction) is greater than 40%    Heart Failure Medications:  Diuretics[de-identified] Furosemide    (One of the following REQUIRED for EF <40%/SYSTOLIC FAILURE but MAY be used in EF% >40%/DIASTOLIC FAILURE)        ACE[de-identified] None        ARB[de-identified] None         ARNI[de-identified] None    (Beta Blockers)  NON- Evidenced Based Beta Blocker (for EF% >40%/DIASTOLIC FAILURE): None    Evidenced Based Beta Blocker::(REQUIRED for EF% <40%/SYSTOLIC FAILURE) Metoprolol SUCCinate- Toprol XL  . .................................................................................................................................................. Patient's Last Weight: 85.6 kg kg obtained by standing scale. Difference in weight increased by 3.2 kg from 2 days ago    Intake/Output Summary (Last 24 hours) at 6/9/2020 1001  Last data filed at 6/9/2020 0900  Gross per 24 hour   Intake 300 ml   Output 2800 ml   Net -2500 ml       Comorbidities Reviewed No  Patient has a past medical history of Acute osteomyelitis of left hallux ([address] Anemia, Asthma, Cellulitis of foot, CHF (congestive heart failure) ([address] Claudication of left upper extremity due to atherosclerosis [facility]), Diabetic ulcer of left hallux, with necrosis of bone ([address] DVT (deep venous thrombosis) ([address] Kidney stones, Morbid obesity with BMI of 45.0-49.9, adult ([address] Neuroma of hand, Open fracture of left hallux, Pneumonia, Rotator cuff tendinitis, STEMI (ST elevation myocardial infarction) ([address] and Urinary incontinence.     >> For CHF and Comorbidity Education Time and Topics, please see Education Tab. Progressive Mobility Assessment:  What is this patient's Current Level of Mobility?: Ambulatory- with Assistance  How was this patient Mobilized today?: Up to Chair all day                 With Whom? Nurse                 Level of Difficulty/Assistance: 1x Assist standby    Pt is currently on 2 L O2. Pt with nonpitting lower extremity edema.  Patient's weights and intake/output reviewed:      Patient and/or Family's stated Goal of Care this Admission: reduce shortness of breath, increase activity tolerance, be more comfortable, and reduce lower extremity edema prior to discharge

## 2020-06-09 NOTE — PROGRESS NOTES
Physical Exam Performed:    BP (!) 106/45   Pulse 75   Temp 98 °F (36.7 °C) (Core)   Resp 18   Ht 5' (1.524 m)   Wt 188 lb 11.4 oz (85.6 kg)   LMP  (LMP Unknown)   SpO2 95%   BMI 36.86 kg/m²     General appearance: obese, chronically ill appearing   HEENT: Pupils equal, round, and reactive to light. Neck:  Lamy in place, RIJ   Respiratory:  Normal respiratory effort. Clear to auscultation, bilaterally without Rales/Wheezes/Rhonchi. Cardiovascular: Regular rate and rhythm with normal S1/S2 without murmurs, rubs or gallops. Abdomen: Soft, non-tender, non-distended with normal bowel sounds. Musculoskeletal: No clubbing, cyanosis + edema bilaterally. Full range of motion without deformity. Skin: Skin color, texture, turgor normal.  No rashes or lesions. Neurologic:  Neurovascularly intact without any focal sensory/motor deficits. Cranial nerves: II-XII intact, grossly non-focal.  Psychiatric: Alert and oriented, thought content appropriate, normal insight  Peripheral Pulses: +2 palpable, equal bilaterally       Labs:   No results for input(s): WBC, HGB, HCT, PLT in the last 72 hours. Recent Labs     06/07/20  0414 06/08/20  0418 06/09/20  0451    136 136   K 5.1 5.0 4.8   CL 98* 98* 96*   CO2 26 26 27   BUN 25* 26* 25*   CREATININE 1.0 1.1 1.1   CALCIUM 9.2 9.5 9.2     No results for input(s): AST, ALT, BILIDIR, BILITOT, ALKPHOS in the last 72 hours. No results for input(s): INR in the last 72 hours. No results for input(s): Adrian Saran in the last 72 hours. Urinalysis:      Lab Results   Component Value Date    NITRU Negative 05/13/2020    WBCUA 20-50 06/03/2019    BACTERIA Rare 06/03/2019    RBCUA 0-2 06/03/2019    BLOODU Negative 05/13/2020    SPECGRAV 1.020 05/13/2020    GLUCOSEU Negative 05/13/2020    GLUCOSEU 100 05/26/2012       Radiology:  CT CHEST HIGH RESOLUTION   Final Result   1. HRCT is limited due to acute process.   If evaluation of interstitial lung   disease is

## 2020-06-09 NOTE — PROGRESS NOTES
[facility]   Daily Progress Note    Admit Date:  5/30/2020  HPI:    Chief Complaint   Patient presents with    Generalized Body Aches     Here for complaints that her body has been aching for the past 4 days and has become more severe. Pt reports SOB because she usually wears oxygen but didn't bring oxygen with her. Presented with shortness of breath, cough and myalgia's. Hx of CAD/ prior PCI to LCX, coronary vasospasm, ICM, dCHF and RHF, PHTN, HTN, HLD as well as DM, JORDI (untreated), COPD, smoker. Diuresed with IV Lasix. RHC with severe pulmonary hypertension (mixed arterial and venous), PA catheter left in place to assist with diuresis. Subjective:  Ms. Shanelle Lawrence sleeping in chair, reclined nearly flat. Breathing stable at rest but states dyspnea on exertion with moving from chair to bed, only slightly improved since admission. + chest pain with cough, down to abdomen. Cough occasionally productive but moist sounding. Objective:   BP (!) 126/56   Pulse 71   Temp 98 °F (36.7 °C) (Core)   Resp 17   Ht 5' (1.524 m)   Wt 188 lb 11.4 oz (85.6 kg)   LMP  (LMP Unknown)   SpO2 94%   BMI 36.86 kg/m²       Intake/Output Summary (Last 24 hours) at 6/9/2020 1015  Last data filed at 6/9/2020 1000  Gross per 24 hour   Intake 300 ml   Output 2975 ml   Net -2675 ml     Wt Readings from Last 3 Encounters:   06/09/20 188 lb 11.4 oz (85.6 kg)   05/18/20 183 lb 14.4 oz (83.4 kg)   03/12/20 181 lb 9.6 oz (82.4 kg)         ASSESSMENT:   1. CHF, acute on chronic diastolic and RHF - net neg 11 liters, weight inconsistent, BNP up (?) on Lasix 40 mg IV bid, CVP down to 7-8 this am, PA 70-80/18-22(42-47). 2. PHTN - mixed arterial and venous, secondary to CHF, COPD, untreated COPD  3. CAD - no angina, neg troponin X1, on ASA, statin, BB  4. Pleural effusion, right - minimal, not enough to tap  5. Mediastinal lymphadenopathy - needs bronch/ EBUS per pulmonary  6. JORDI - untreated  7.  COPD - per for input(s): WBC, HGB, PLT in the last 72 hours. BMP:    Recent Labs     06/07/20  0414 06/08/20  0418 06/09/20  0451    136 136   K 5.1 5.0 4.8   CO2 26 26 27   BUN 25* 26* 25*   CREATININE 1.0 1.1 1.1     INR:  No results for input(s): INR in the last 72 hours. BNP:    Recent Labs     06/08/20  0418   PROBNP 2,073*     Cardiac Enzymes: No results for input(s): TROPONINI in the last 72 hours. Lipids:   Lab Results   Component Value Date    TRIG 67 08/17/2019    TRIG 179 06/07/2017    HDL 59 08/17/2019    HDL 54 06/07/2017    LDLCALC 46 08/17/2019    LDLCALC 35 06/07/2017       Cardiac Imaging:    RIGHT HEART CATH  RA: 10   RV: 100/10 (RVEDP 18)   PA: 100/24   and mean of 56  PCWP:  30  (transpulm gradient 26)   Sats: on 3L NC  Ao: 100%  RA: 59%  PA: 59%   CO/CI 3.77/2.05  SVR/PVR 1719/552 (6.9 [place] unit)   Assessment  1. 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 Gentle diuresis using swan guidance                - goal ~1L net negative  3. Will need Pulmonary HTN specialist as well        Echo: 2/12/2020    Normal left ventricle size, wall thickness, and systolic function with a   visually estimated ejection fraction of 55-60%. There appears to be slight diastolic septal flattening (\"D-shaped\" septum)   consistent with right ventricular volume overload. Grade III diastolic dysfunction with elevated LV filling pressures. The right ventricle is moderately dilated with normal function. Mild tricuspid regurgitation. Systolic pulmonary artery pressure (SPAP) is elevated and estimated at 83   mmHg (right atrial pressure 8 mmHg) consistent with severe pulmonary   hypertension. The IVC is normal in size (<2.1 cm) but collapses <50% with respiration   consistent with elevated right atrial pressures (8 mmHg) . Echo Aug 2017:    Normal left ventricle size, wall thickness and systolic function with an   estimated ejection fraction of 55%.  No regional wall motion intervention, there was a small OM 1 branch that was  seen to start to recannulate with SABINO-1 flow. The majority of the flow as  running down into the large OM 2 branch that was SABINO-3 in nature. There was  evidence of a small true left circumflex in the AV groove that was starting  to recannulate with Integrilin. 5.  The right coronary artery was noted to be large and dominant. It had a  30% lesion noted in its proximal aspect. There was mild diffuse disease in  the distal RCA. There was evidence of the beginning of some right-to-left  collaterals that were seen as well. ASSESSMENT:  At this time, the patient presents with 100% occlusion of the  midleft circumflex artery. She is status post percutaneous intervention with  a Xience Expedition 2.25 x 15 mm drug-eluting stent to the mid left  circumflex. There was, due to poor outflow, reduced flow in a small obtuse  marginal 1 and the true distal left circumflex. Following the case, the  residual lesion stenosis was 0%, and there was SABINO-3 flow into the large  obtuse marginal 2 branch. PLAN:     1. At this time, the patient will be admitted to ICU following her ST  elevation and her STEMI. We will continue 18 hours of Integrilin and hope  that the outflow issues improve within the distal vascular bed for both the  OM 1 and the true distal circumflex. 2.  She will continue on aspirin 324 mg daily for 1 month, followed by 81 mg  of aspirin daily for life. 3.  She will get Effient 60 mg p.o. x1 and will continue on 10 mg daily for a  minimum of 1 year. We will get an echocardiogram to evaluate any damage to  the lateral wall, as well as serial troponins to peak. She will need  aggressive _____ medical management, and further inpatient stay for her  coronary artery disease and STEMI.

## 2020-06-09 NOTE — PROGRESS NOTES
likely to need treatment of JORDI, possibly medical treatment for pulmonary arterial hypertension for residual elevated right heart pressures after LVEDP has decreased. COPD. Bronchodilator therapy. JORDI. Should resume with treatment given the severe pulmonary hypertension. Diastolic CHF. Per cardiology. Being cautiously diuresed with assistance of [place] catheter. Diastolic CHF, CAD. Per Dr. Irma Bateman. DM, HTN, HLD, hypothyroidism. Per IM  DVT prophylaxis. Lovenox    Discussed with patient, ICU team, nursing and Dr. Irma Bateman.       Electronically signed by:  Krista Claros MD    6/9/2020    5:19 PM.

## 2020-06-10 NOTE — FLOWSHEET NOTE
Bedside report given by Alissa Dillard RN, Skin assessment completed. Shift assessment completed and documented; see flowsheets for details. Pt resting in bed, VSS, Lines checked and verified. Repositioned patient, sacral Mepilex in place, call light within reach, will continue to closely monitor.

## 2020-06-10 NOTE — CARE COORDINATION
Spoke to Dr Rober Boxer during rounds. Pt still having respiratory issues. Will get therapy assessment when medically stable. CM anticipates a great need than HHC.  Aye Palacios RN

## 2020-06-10 NOTE — PROGRESS NOTES
Shift handoff at the bedside. Vitals remain stable. The Patient remains up in the chair, and denies needs.

## 2020-06-10 NOTE — PROGRESS NOTES
mmHg      Intake/Output Summary (Last 24 hours) at 6/10/2020 0745  Last data filed at 6/10/2020 0402  Gross per 24 hour   Intake 1230 ml   Output 1630 ml   Net -400 ml       EXAM:  General: Pleasant, short, morbidly obese, in no respiratory distress. Awake and oriented, communicative. Eyes: PERRL. No sclera icterus. No conjunctival injection. ENT: No discharge. Pharynx clear. Class III airway, no oral lesions. Edentulous  Neck: RIJ S-G catheter, short and large neck. Resp: Diminished air entry right hemithorax, few right basal crackles. No accessory muscle use. No wheezing. No rhonchi. CV: Regular rate. Regular rhythm. Basal ejection systolic murmur no rub. No edema. GI: Deferred. Skin: Warm and dry. No nodule on exposed extremities. No rash on exposed extremities. Lymph: Deferred. M/S: No cyanosis. No joint deformity. No clubbing. Neuro: Awake. Follows commands. No obvious neurologic deficit, detailed exam not performed  Psych: Oriented to person, place, time. No anxiety or agitation.      Medications:  Scheduled Meds:   insulin glargine  30 Units Subcutaneous BID    spironolactone  25 mg Oral Daily    mupirocin   Nasal BID    furosemide  40 mg Intravenous BID    docusate sodium  100 mg Oral Daily    insulin lispro  0-18 Units Subcutaneous TID WC    insulin lispro  0-9 Units Subcutaneous Nightly    enoxaparin  40 mg Subcutaneous Daily    aspirin  81 mg Oral Daily    atorvastatin  40 mg Oral Nightly    Vitamin D  1 tablet Oral Daily    ferrous sulfate  325 mg Oral Daily with breakfast    fluticasone  2 spray Nasal Daily    gabapentin  900 mg Oral TID    levothyroxine  50 mcg Oral Daily    metoprolol succinate  25 mg Oral Daily    nicotine  1 patch Transdermal Daily    oxybutynin  5 mg Oral BID    sertraline  100 mg Oral Daily    ranolazine  500 mg Oral BID    tiotropium  2 puff Inhalation Daily    budesonide-formoterol  2 puff Inhalation BID    insulin lispro  10 Units Subcutaneous TID     sennosides-docusate sodium  2 tablet Oral BID    polyethylene glycol  17 g Oral Daily       PRN Meds:  bisacodyl, albuterol sulfate HFA, perflutren lipid microspheres, simethicone, morphine **OR** morphine, sodium chloride flush, potassium chloride **OR** potassium alternative oral replacement **OR** potassium chloride, magnesium sulfate, acetaminophen **OR** acetaminophen, promethazine **OR** ondansetron, clonazePAM, nitroGLYCERIN, oxyCODONE-acetaminophen, glucose, dextrose, glucagon (rDNA), dextrose    Results:  CBC:   No results for input(s): WBC, HGB, HCT, MCV, PLT in the last 72 hours. BMP:   Recent Labs     06/08/20  0418 06/09/20  0451 06/10/20  0433    136 139   K 5.0 4.8 4.8   CL 98* 96* 99   CO2 26 27 28   BUN 26* 25* 28*   CREATININE 1.1 1.1 1.1       Cultures:  None    Films:  CXR and CT chestreviewed by me      Assessment and plan:  Acute on chronic respiratory failure. Titrate oxygen to SaO2 >90%. Was recently discharged on oxygen at 2 LPM.  Back on 2 LPM O2  Right lung infiltrate/atelectasis. Clinical picture does not suggest pneumonia, she is at high risk for neoplasm. Chest imaging began to become abnormal around February this year, was definitely abnormal on May 13 and subsequently remained abnormal.  She does require bronchoscopy, but has very high right heart pressures. She is at high risk for bleeding. Will wait for further direction from cardiology before performing bronchoscopy. Discussed with Dr. Kellie Lieberman, still remains at high risk for any procedure   Bulky mediastinal adenopathy. Could be reactive, but high suspicion for a neoplastic process. Would prefer to perform bronchoscopic biopsies of the nodes utilizing EBUS. I believe at this point given risk of anesthesia remains high. This was discussed with the patient and her daughter bedside. Right pleural effusion. Small, IR did not feel there was enough fluid to tap. Severe pulmonary hypertension.

## 2020-06-10 NOTE — PROGRESS NOTES
[facility] 82   Daily Progress Note    Admit Date:  5/30/2020  HPI:    Chief Complaint   Patient presents with    Generalized Body Aches     Here for complaints that her body has been aching for the past 4 days and has become more severe. Pt reports SOB because she usually wears oxygen but didn't bring oxygen with her. Presented with shortness of breath, cough and myalgia's. Hx of CAD/ prior PCI to LCX, coronary vasospasm, ICM, dCHF and RHF, PHTN, HTN, HLD as well as DM, JORDI (untreated), COPD, smoker. Diuresed with IV Lasix. RHC with severe pulmonary hypertension (mixed arterial and venous), PA catheter left in place to assist with diuresis. Subjective:  Ms. Ethan Castaneda awake alert, states breathing somewhat better. Still with cough. Objective:   BP 99/74   Pulse 76   Temp 99.4 °F (37.4 °C) (Core)   Resp 18   Ht 5' (1.524 m)   Wt 188 lb 11.4 oz (85.6 kg)   LMP  (LMP Unknown)   SpO2 93%   BMI 36.86 kg/m²       Intake/Output Summary (Last 24 hours) at 6/10/2020 1035  Last data filed at 6/10/2020 0803  Gross per 24 hour   Intake 930 ml   Output 1290 ml   Net -360 ml     Wt Readings from Last 3 Encounters:   06/09/20 188 lb 11.4 oz (85.6 kg)   05/18/20 183 lb 14.4 oz (83.4 kg)   03/12/20 181 lb 9.6 oz (82.4 kg)     6/9/20:   CVP 7-8, PA 70-80/18-22(42-47)  6/10/20: CVP 3-7, PA 74-91/17-21(38-48), WP 23. ASSESSMENT:   1. CHF, acute on chronic diastolic and RHF - net neg 12 liters, neg 475 ml past 24 hrs, weight inconsistent, on Lasix 40 mg IV bid, CVP down to 3-7 this am, PA 74-91/17-21(38-48), WP 23.    2. PHTN - mixed arterial and venous, secondary to CHF, COPD, untreated COPD  3. CAD - no angina, neg troponin X1, on ASA, statin, BB  4. Pleural effusion, right - minimal, not enough to tap  5. Mediastinal lymphadenopathy - needs bronch/ EBUS per pulmonary  6. JORDI - untreated  7. COPD - per pulmonary  8. HTN - stable  9. HLD - on statin, LDL 46  10. DM  11.  Anemia - iron

## 2020-06-10 NOTE — PROGRESS NOTES
Gave Bedside report to: Maggie Carrion RN    4 Eyes Skin Assessment     The patient is being assess for   Shift Handoff    I agree that 2 RN's have performed a thorough Head to Toe Skin Assessment on the patient. ALL assessment sites listed below have been assessed. Areas assessed by both nurses:   [x]   Head, Face, and Ears   [x]   Shoulders, Back, and Chest, Abdomen  [x]   Arms, Elbows, and Hands   [x]   Coccyx, Sacrum, and Ischium  [x]   Legs, Feet, and Heels        Does the Patient have Skin Breakdown?   No          Srinivas Prevention initiated:  Yes   Wound Care Orders initiated:  No      WOC nurse consulted for Pressure Injury (Stage 3,4, Unstageable, DTI, NWPT, Complex wounds)and New or Established Ostomies:  NA      Primary Nurse eSignature: Electronically signed by Simon Murray RN on 6/10/20 at 6:53 PM EDT    **SHARE this note so that the co-signing nurse is able to place an eSignature**    Co-signer eSignature: Electronically signed by Amarilys Calvillo RN on 6/10/20 at 7:25 PM EDT

## 2020-06-10 NOTE — ADT AUTH CERT
t: 37.2 p: 74 rr: 22 bp: 106/45 spo2: 94% ra      Abn labs:  gluc: 243, bun: 25      Orders:   Lovenox 40mg sq daily   Lasix 40mg iv bid   Accu checks ac/hs with ssi coverage      Per Cardiology PN:   ASSESSMENT:  At this time, the patient presents with 100% occlusion of the   midleft circumflex artery.  She is status post percutaneous intervention with   a Xience Expedition 2.25 x 15 mm drug-eluting stent to the mid left   circumflex.  There was, due to poor outflow, reduced flow in a small obtuse   marginal 1 and the true distal left circumflex.  Following the case, the   residual lesion stenosis was 0%, and there was SABINO-3 flow into the large   obtuse marginal 2 branch.          PLAN:      1.  At this time, the patient will be admitted to ICU following her ST   elevation and her STEMI.  We will continue 18 hours of Integrilin and hope   that the outflow issues improve within the distal vascular bed for both the   OM 1 and the true distal circumflex.      2.  She will continue on aspirin 324 mg daily for 1 month, followed by 81 mg   of aspirin daily for life.      3.  She will get Effient 60 mg p.o. x1 and will continue on 10 mg daily for a   minimum of 1 year.  We will get an echocardiogram to evaluate any damage to   the lateral wall, as well as serial troponins to peak.  She will need   aggressive _____ medical management, and further inpatient stay for her   coronary artery disease and STEMI. Per IM PN:   Acute on chronic diastolic CHF/pulmonary HTN with acute on chronic respiratory failure with hypoxia - s/p R heart cath with swan placement. Continue with IV lasix per cardiology, monitor serial BMP, daily weights, I/O.  Continue lisinopril, BB        Mediastinal adenopathy - suspicious for malignancy, eventually planning bronchoscopy with biopsy though would be higher risk procedure        COPD - no wheezing or s/s exacerbation or respiratory distress    - continue home inhalers        CAD s/p PCI - holding plavix, continue statin        Dm2 - controlled, continue basal bolus insulin with sliding scale        HTN - controlled, continue current management        Obesity - Body mass index is 36.86 kg/m². places patient at risk for multiple co-morbidities as well as early death and contributing to presentation.        Hypothyroidism - clinically euthyroid, continue synthroid       Per Pulmonary PN:   Assessment and plan:   Acute on chronic respiratory failure.  Titrate oxygen to SaO2 >90%.  Was recently discharged on oxygen at 2 LPM.   Right lung infiltrate/atelectasis.  Clinical picture does not suggest pneumonia, she is at high risk for neoplasm.  Chest imaging began to become abnormal around February this year, was definitely abnormal on May 13 and subsequently remained abnormal.  She does require bronchoscopy, but has very high right heart pressures.  She is at high risk for bleeding.  Discussed with Dr. Carissa Ambrose, he agrees the risk is high, presently the patient has a [place]-Indigo catheter.  Will wait for further diuresis and assess whether right heart pressures can be decreased before performing bronchoscopy. Bulky mediastinal adenopathy.  Could be reactive, but high suspicion for a neoplastic process.  Would prefer to perform bronchoscopic biopsies of the nodes utilizing EBUS.  I believe at this point given risk of anesthesia is high.  This was discussed with the patient. Right pleural effusion.  Small, IR did not feel there was enough fluid to tap. Severe pulmonary hypertension.  Likely multifactorial from COPD, JORDI, possible diastolic CHF.  Agree with diuresis, but is likely to need treatment of JORDI, possibly medical treatment for pulmonary arterial hypertension for residual elevated right heart pressures after LVEDP has decreased. COPD.  Bronchodilator therapy.    JORDI.  Should resume with treatment given the severe pulmonary hypertension.       Heart Failure - Care Day 10 (6/8/2020) by Henri Bateman

## 2020-06-10 NOTE — PROGRESS NOTES
at 6/10/2020 1154  Last data filed at 6/10/2020 0803  Gross per 24 hour   Intake 930 ml   Output 1140 ml   Net -210 ml       Physical Exam Performed:    BP 99/74   Pulse 76   Temp 99.4 °F (37.4 °C) (Core)   Resp 18   Ht 5' (1.524 m)   Wt 188 lb 11.4 oz (85.6 kg)   LMP  (LMP Unknown)   SpO2 93%   BMI 36.86 kg/m²     General appearance: obese, chronically ill appearing   HEENT: Pupils equal, round, and reactive to light. Neck:  [place] in place. Respiratory:  Decreased at bases,Clear to auscultation, bilaterally without Rales/Wheezes/Rhonchi. Cardiovascular: Regular rate and rhythm with normal S1/S2 without murmurs, rubs or gallops. Abdomen: Soft, non-tender, non-distended with normal bowel sounds. Musculoskeletal: No clubbing, cyanosis + 1 edema bilaterally. Skin: Skin color, texture, turgor normal.  No rashes or lesions. Neurologic:  Neurovascularly intact without any focal sensory/motor deficits. Cranial nerves: II-XII intact, grossly non-focal.  Psychiatric: Alert and oriented, thought content appropriate, normal insight  Peripheral Pulses: +2 palpable, equal bilaterally       Labs:   Recent Labs     06/10/20  0929   WBC 10.0   HGB 11.1*   HCT 33.9*        Recent Labs     06/08/20  0418 06/09/20  0451 06/10/20  0433    136 139   K 5.0 4.8 4.8   CL 98* 96* 99   CO2 26 27 28   BUN 26* 25* 28*   CREATININE 1.1 1.1 1.1   CALCIUM 9.5 9.2 9.4     No results for input(s): AST, ALT, BILIDIR, BILITOT, ALKPHOS in the last 72 hours. Recent Labs     06/10/20  0929   INR 1.14     No results for input(s): Thena Gourd in the last 72 hours.     Urinalysis:      Lab Results   Component Value Date    NITRU Negative 05/13/2020    WBCUA 20-50 06/03/2019    BACTERIA Rare 06/03/2019    RBCUA 0-2 06/03/2019    BLOODU Negative 05/13/2020    SPECGRAV 1.020 05/13/2020    GLUCOSEU Negative 05/13/2020    GLUCOSEU 100 05/26/2012       Radiology:  XR CHEST 1 VW   Final Result   Stable chest.         CT CHEST

## 2020-06-11 NOTE — PROGRESS NOTES
Pulmonary & Critical Care Medicine ICU Progress Note    Admitted 5/30/20 with generalized myalgias, chest pain, abdominal pain and shortness of breath. The patient has a history of DM 2, CAD, HLD, CHF, COPD, morbid obesity, with recent admission for CHF exacerbation and pneumonia, discharged on 5/18/2020 on oxygen at 2 LPM.  She was admitted for acute on chronic diastolic CHF, severe pulmonary hypertension, uncontrolled DM 2, right pleural effusion, acute chronic respiratory failure. She had restrictive lung disease, compression atelectasis, had chronic narcotic use and is a smoker. Right heart catheterization 6/5/2020 with severe pulmonary hypertension, /24 with mean 56, PCWP 30. It was decided to gently diurese the patient to determine whether reduced LVEDP could reduce right heart pressure. The patient says she smoked around 1.5 PPD for several decades, recently cut back to 0.5 PPD. She had been seen by Dr. Anne Powers at [facility] for COPD, was discharged from the practice as she missed several appointments. She was also treated in the sleep clinic with auto CPAP 8/12 cm H2O, was noncompliant, an attempt was made to give her a different interface. She did not follow-up with these issues. Events of Last 24 hours: The patient is afebrile and hemodynamically stable. Her congested cough appears better. She still has a [place]-Indigo catheter in place, has had a diuretic response with excellent urine output. I/O -69571 mL since admission. Right heart pressures marginally lower. She does endorse ongoing pleuritic chest pain, abdominal pain, making it difficult to ambulate from the chair to the bed. Her appetite is fair, she denies GI or  complaints. She mentions at baseline she is very short of breath just walking in the house. Continues to have a good appetite. No GI or  complaints.       Invasive Lines:   SG catheter RIJ    IV:   dextrose         Vitals:  BP (!) 117/53   Pulse 71   Temp 98.4 °F (36.9 °C) (Core)   Resp 24   Ht 5' (1.524 m)   Wt 185 lb 14.4 oz (84.3 kg)   LMP  (LMP Unknown)   SpO2 95%   BMI 36.31 kg/m²   CVP (Mean): 35 mmHg      Intake/Output Summary (Last 24 hours) at 6/11/2020 0653  Last data filed at 6/11/2020 0649  Gross per 24 hour   Intake 1100 ml   Output 3590 ml   Net -2490 ml       EXAM:  General: Pleasant, short, morbidly obese, in no respiratory distress. Awake and oriented, communicative. Eyes: PERRL. No sclera icterus. No conjunctival injection. ENT: No discharge. Pharynx clear. Class III airway, no oral lesions  Neck: RIJ S-G catheter, short and large neck. Resp: Diminished air entry right hemithorax, few right basal crackles. No accessory muscle use. No wheezing. No rhonchi. CV: Regular rate. Regular rhythm. Basal ejection systolic murmur no rub. No edema. GI: Deferred. Skin: Warm and dry. No nodule on exposed extremities. No rash on exposed extremities. Lymph: Deferred. M/S: No cyanosis. No joint deformity. No clubbing. Neuro: Awake. Follows commands. No obvious neurologic deficit, detailed exam not performed  Psych: Oriented to person, place, time. No anxiety or agitation.      Medications:  Scheduled Meds:   isosorbide dinitrate  10 mg Oral TID    insulin glargine  30 Units Subcutaneous BID    spironolactone  25 mg Oral Daily    mupirocin   Nasal BID    furosemide  40 mg Intravenous BID    docusate sodium  100 mg Oral Daily    insulin lispro  0-18 Units Subcutaneous TID     insulin lispro  0-9 Units Subcutaneous Nightly    enoxaparin  40 mg Subcutaneous Daily    aspirin  81 mg Oral Daily    atorvastatin  40 mg Oral Nightly    Vitamin D  1 tablet Oral Daily    ferrous sulfate  325 mg Oral Daily with breakfast    fluticasone  2 spray Nasal Daily    gabapentin  900 mg Oral TID    levothyroxine  50 mcg Oral Daily    metoprolol succinate  25 mg Oral Daily    nicotine  1 patch Transdermal Daily    oxybutynin  5 mg Oral BID   

## 2020-06-11 NOTE — PROGRESS NOTES
Hospitalist Progress Note      PCP: Alireza Bunch MD    Date of Admission: 5/30/2020    Chief Complaint: sob     Hospital Course:     61 yo F hx CAD, CHF, pulm HTN, HTN, hLD p/w sob, volume overload. Subjective:     S/p rt sided thoracentesis today. SOB with exertion unchanged. Has [place] in place. On 3L 02.        Medications:  Reviewed    Infusion Medications    dextrose       Scheduled Medications    gabapentin  300 mg Oral TID    isosorbide dinitrate  10 mg Oral TID    insulin glargine  30 Units Subcutaneous BID    spironolactone  25 mg Oral Daily    mupirocin   Nasal BID    furosemide  40 mg Intravenous BID    docusate sodium  100 mg Oral Daily    insulin lispro  0-18 Units Subcutaneous TID WC    insulin lispro  0-9 Units Subcutaneous Nightly    enoxaparin  40 mg Subcutaneous Daily    aspirin  81 mg Oral Daily    atorvastatin  40 mg Oral Nightly    Vitamin D  1 tablet Oral Daily    ferrous sulfate  325 mg Oral Daily with breakfast    fluticasone  2 spray Nasal Daily    levothyroxine  50 mcg Oral Daily    metoprolol succinate  25 mg Oral Daily    nicotine  1 patch Transdermal Daily    oxybutynin  5 mg Oral BID    sertraline  100 mg Oral Daily    tiotropium  2 puff Inhalation Daily    budesonide-formoterol  2 puff Inhalation BID    insulin lispro  10 Units Subcutaneous TID WC    sennosides-docusate sodium  2 tablet Oral BID    polyethylene glycol  17 g Oral Daily     PRN Meds: bisacodyl, albuterol sulfate HFA, perflutren lipid microspheres, simethicone, morphine **OR** morphine, sodium chloride flush, potassium chloride **OR** potassium alternative oral replacement **OR** potassium chloride, magnesium sulfate, acetaminophen **OR** acetaminophen, promethazine **OR** ondansetron, clonazePAM, nitroGLYCERIN, oxyCODONE-acetaminophen, glucose, dextrose, glucagon (rDNA), dextrose      Intake/Output Summary (Last 24 hours) at 6/11/2020 1407  Last data filed at 6/11/2020 1030  Gross per is desired, outpatient follow-up is recommended when the patient's   acute symptoms have resolved. 2. Complete opacification of the right middle and lower lobes with air   bronchograms and moderate pleural effusion. Pattern may represent a   centrally obstructing process or pneumonia. 3. Scattered ground-glass opacities in the left lung likely correspond to   above-described acute process. Underlying chronic interstitial change can   not be excluded. 4. Bulky reactive mediastinal and hilar lymphadenopathy. XR CHEST PORTABLE   Final Result   [place]igo placement as above. XR CHEST PORTABLE   Final Result   No significant change in large right pleural effusion. XR CHEST PORTABLE   Final Result   Unchanged large right pleural effusion with adjacent compressive atelectasis. Slightly decreased severity of interstitial pulmonary edema. XR ABDOMEN (KUB) (SINGLE AP VIEW)   Final Result   1. Indeterminate bowel gas pattern. XR CHEST PORTABLE   Final Result   Worsening opacity in the right mid and lower lung. This likely reflects a   moderate right pleural effusion and or airspace disease. Interstitial edema/pulmonary vascular congestion.                  Assessment/Plan:    Active Hospital Problems    Diagnosis    Mediastinal adenopathy [R59.0]    Hilar adenopathy [R59.0]    Lung collapse [J98.19]    Medication management [Z79.899]    Pulmonary hypertension (HCC) [I27.20]    SOB (shortness of breath) [R06.02]    Compression atelectasis [J98.11]    Observed sleep apnea [G47.30]    Elevated brain natriuretic peptide (BNP) level [R79.89]    Hyponatremia [E87.1]    Restrictive lung disease [J98.4]    Chronic narcotic use [F11.90]    Pleural effusion on right [J90]    Myalgia [M79.10]    Abdominal pain [R10.9]    Pleuritic chest pain [R07.81]    Chronic respiratory failure (HCC) [J96.10]    Acute on chronic diastolic congestive heart failure ([facility] [address] [I50.33]  Class 1 obesity due to excess calories with serious comorbidity and body mass index (BMI) of 34.0 to 34.9 in adult [E66.09, Z68.34]    Simple chronic bronchitis (HCC) [J41.0]    Tobacco use [Z72.0]    Coronary artery disease involving native coronary artery of native heart without angina pectoris [I25.10]    JORDI (obstructive sleep apnea) [G47.33]    HTN (hypertension) [I10]    Uncontrolled type 2 diabetes mellitus with diabetic polyneuropathy, with long-term current use of insulin (HCC) [E11.42, Z79.4, E11.65]       Acute on chronic diastolic CHF/pulmonary HTN with acute on chronic respiratory failure with hypoxia - s/p R heart cath with swan placement. Continue with IV lasix per cardiology, monitor serial BMP, daily weights, I/O. Continue lisinopril, BB, aldactone. Nitrates added on 6/10 for pulmonary HTN. Rt pleural effusion : s/p thoracentesis on 6/11. Pleural fluid studies pending      Mediastinal adenopathy - suspicious for malignancy. S/p bronch on 6/10. Mgt per pulm       COPD - no wheezing or s/s exacerbation or respiratory distress. Continue home inhalers     CAD s/p PCI - holding plavix, continue statin     Dm2 - controlled, continue basal bolus insulin with sliding scale     HTN - controlled, continue current management     Obesity - Body mass index is 36.31 kg/m². places patient at risk for multiple co-morbidities as well as early death and contributing to presentation. Hypothyroidism - clinically euthyroid, continue synthroid        DVT Prophylaxis: lovenox   Diet: DIET CARB CONTROL; Low Sodium (2 GM);  Daily Fluid Restriction: 1500 ml; No Caffeine  Code Status: Full Code    PT/OT Eval Status: not yet consulted     Dispo - continue in ICU     Kellie Goodman MD

## 2020-06-11 NOTE — PROGRESS NOTES
[facility] 82   Daily Progress Note    Admit Date:  5/30/2020  HPI:    Chief Complaint   Patient presents with    Generalized Body Aches     Here for complaints that her body has been aching for the past 4 days and has become more severe. Pt reports SOB because she usually wears oxygen but didn't bring oxygen with her. Presented with shortness of breath, cough and myalgia's. Hx of CAD/ prior PCI to LCX, coronary vasospasm, ICM, dCHF and RHF, PHTN, HTN, HLD as well as DM, JORDI (untreated), COPD, smoker. Diuresed with IV Lasix. RHC with severe pulmonary hypertension (mixed arterial and venous), PA catheter left in place to assist with diuresis. Subjective:  Ms. Shanelle Lawrence seen while undergoing right thoracentesis. Breathing about the same. Weight down. No edema. Objective:   BP (!) 124/58   Pulse 75   Temp 98.2 °F (36.8 °C) (Oral)   Resp 17   Ht 5' (1.524 m)   Wt 185 lb 14.4 oz (84.3 kg)   LMP  (LMP Unknown)   SpO2 99%   BMI 36.31 kg/m²       Intake/Output Summary (Last 24 hours) at 6/11/2020 1128  Last data filed at 6/11/2020 1030  Gross per 24 hour   Intake 800 ml   Output 3655 ml   Net -2855 ml     Wt Readings from Last 3 Encounters:   06/11/20 185 lb 14.4 oz (84.3 kg)   05/18/20 183 lb 14.4 oz (83.4 kg)   03/12/20 181 lb 9.6 oz (82.4 kg)     6/5/20:   RA 10,  /24 (56), WP 30  6/9/20:   CVP 7-8, PA 70-80/18-22(42-47)  6/10/20: CVP 3-7, PA 74-91/17-21(38-48), WP 23.  6/11/20: CVP 9-13, PA / 27-43 (47-68)    ASSESSMENT:   1. CHF, acute on chronic diastolic and RHF - net neg 14 liters, neg 2500 ml past 24 hrs, weight down, on Lasix 40 mg IV bid,   2. PHTN - mixed arterial and venous, secondary to CHF, COPD, untreated COPD  3. CAD - no angina, neg troponin X1, on ASA, statin, BB  4. Pleural effusion, right - s/p thoracentesis ~ 250 ml   5. Mediastinal lymphadenopathy - high risk bronch/ EBUS, await cytology from pleural fluid  6. JORDI - untreated  7.  COPD - per pulmonary  8. HTN - stable  9. HLD - on statin, LDL 46  10. DM  11. Anemia - iron studies in process, on po iron        PLAN:  1. Continue Lasix 40 mg IV bid though PA pressures not significantly changes despite good diuresis  2. Continue nitrate  3. Continue ASA, statin, spironolactone  4. Daily labs and weights    Critical care time spent reviewing labs/films, examining patient, collaborating with other physicians but excluding procedures for life threatening organ failure is 45 minutes.       John Whatley, MITCHELL - CNP, 6/11/2020, 11:28 AM  [facility]   [phone]       Telemetry: SR 70's  NYHA: IV    Physical Exam:  General:  Awake, alert, NAD  Skin:  Warm and dry  Neck:  JVP unable to assess (RIJ catheter)  Chest:  Clear to auscultation though diminished R >L  Cardiovascular:  RRR, normal S1S2, no appreciable m/g/r  Abdomen:  Soft, nontender, +bowel sounds  Extremities:  No edema       Medications:    gabapentin  300 mg Oral TID    isosorbide dinitrate  10 mg Oral TID    insulin glargine  30 Units Subcutaneous BID    spironolactone  25 mg Oral Daily    mupirocin   Nasal BID    furosemide  40 mg Intravenous BID    docusate sodium  100 mg Oral Daily    insulin lispro  0-18 Units Subcutaneous TID WC    insulin lispro  0-9 Units Subcutaneous Nightly    enoxaparin  40 mg Subcutaneous Daily    aspirin  81 mg Oral Daily    atorvastatin  40 mg Oral Nightly    Vitamin D  1 tablet Oral Daily    ferrous sulfate  325 mg Oral Daily with breakfast    fluticasone  2 spray Nasal Daily    levothyroxine  50 mcg Oral Daily    metoprolol succinate  25 mg Oral Daily    nicotine  1 patch Transdermal Daily    oxybutynin  5 mg Oral BID    sertraline  100 mg Oral Daily    tiotropium  2 puff Inhalation Daily    budesonide-formoterol  2 puff Inhalation BID    insulin lispro  10 Units Subcutaneous TID WC    sennosides-docusate sodium  2 tablet Oral BID    polyethylene glycol  17 g Oral Daily     

## 2020-06-11 NOTE — PLAN OF CARE
6/11/2020 0731  Last data filed at 6/11/2020 0649  Gross per 24 hour   Intake 1100 ml   Output 3590 ml   Net -2490 ml       Comorbidities Reviewed Yes  Patient has a past medical history of Acute osteomyelitis of left hallux ([facility] [address] Anemia, Asthma, Cellulitis of foot, CHF (congestive heart failure) ([facility] [address] Claudication of left upper extremity due to atherosclerosis [facility]), Diabetic ulcer of left hallux, with necrosis of bone ([facility] [address] DVT (deep venous thrombosis) ([facility] [address] Kidney stones, Morbid obesity with BMI of 45.0-49.9, adult ([facility] [address] Neuroma of hand, Open fracture of left hallux, Pneumonia, Pulmonary HTN ([facility] [address] Rotator cuff tendinitis, STEMI (ST elevation myocardial infarction) ([facility] [address] and Urinary incontinence.     >> For CHF and Comorbidity Education Time and Topics, please see Education Tab. Progressive Mobility Assessment:  What is this patient's Current Level of Mobility?: Ambulatory- with Assistance  How was this patient Mobilized today?: Edge of Bed, Up to Chair, Bedside Commode,  Up to Toilet/Shower, and Up in Room                 With Whom? Nurse                 Level of Difficulty/Assistance: 1x Assist     Pt is currently on 2 L O2. Pt with pitting lower extremity edema.  Patient's weights and intake/output reviewed:      Patient and/or Family's stated Goal of Care this Admission: reduce shortness of breath, increase activity tolerance, better understand heart failure and disease management, be more comfortable, and reduce lower extremity edema prior to discharge

## 2020-06-12 NOTE — ADT AUTH CERT
intervention with   a Xience Expedition 2.25 x 15 mm drug-eluting stent to the mid left   circumflex.  There was, due to poor outflow, reduced flow in a small obtuse   marginal 1 and the true distal left circumflex.  Following the case, the   residual lesion stenosis was 0%, and there was SABINO-3 flow into the large   obtuse marginal 2 branch.          PLAN:      1.  At this time, the patient will be admitted to ICU following her ST   elevation and her STEMI.  We will continue 18 hours of Integrilin and hope   that the outflow issues improve within the distal vascular bed for both the   OM 1 and the true distal circumflex.      2.  She will continue on aspirin 324 mg daily for 1 month, followed by 81 mg   of aspirin daily for life.      3.  She will get Effient 60 mg p.o. x1 and will continue on 10 mg daily for a   minimum of 1 year.  We will get an echocardiogram to evaluate any damage to   the lateral wall, as well as serial troponins to peak.  She will need   aggressive _____ medical management, and further inpatient stay for her   coronary artery disease and STEMI.             Heart Failure - Care Day 12 (6/10/2020) by Freeda Cowden, RN         Review Status Review Entered   Completed 6/12/2020 09:45       Criteria Review      Care Day: 12 Care Date: 6/10/2020 Level of Care:    Guideline Day 3    Level Of Care    (X) Floor to discharge    Clinical Status    (X) * Hemodynamic stability    6/12/2020 9:45 AM EDT by Sharon Jarquin      hr 74-80  bp: 128/65    (X) * Tachypnea absent    6/12/2020 9:45 AM EDT by Sharon Jarquin      rr: 19-24    (X) * Oxygenation at baseline or acceptable for next level of care    6/12/2020 9:45 AM EDT by Sharon Jarquin      97% 3L via nc    (X) * Dyspnea at baseline or acceptable for next level of care    ( ) * Cardiac rate and rhythm acceptable    ( ) * Pulmonary edema absent or acceptable for next level of care    ( ) * Peripheral or sacral edema absent, at baseline, or

## 2020-06-12 NOTE — ADT AUTH CERT
Utilization Reviews         Heart Failure - Care Day 14 (6/12/2020) by Shira Pickard RN         Review Status Review Entered   Completed 6/12/2020 13:14       Criteria Review      Care Day: 14 Care Date: 6/12/2020 Level of Care:    Guideline Day 3    Level Of Care    (X) Floor to discharge    Clinical Status    (X) * Hemodynamic stability    6/12/2020 1:14 PM EDT by Foster Libel      hr 70-77  bp: 108/55    (X) * Tachypnea absent    6/12/2020 1:14 PM EDT by Foster Libel      rr 15-18    (X) * Oxygenation at baseline or acceptable for next level of care    6/12/2020 1:14 PM EDT by [employer] Libel      92% 3L via nc    (X) * Dyspnea at baseline or acceptable for next level of care    ( ) * Cardiac rate and rhythm acceptable    ( ) * Pulmonary edema absent or acceptable for next level of care    ( ) * Peripheral or sacral edema absent, at baseline, or improved    (X) * Mental status at baseline    6/12/2020 1:14 PM EDT by Foster Libel      baseline    ( ) * Volume status acceptable on oral medication    (X) * Renal function at baseline or acceptable for next level of care    6/12/2020 1:14 PM EDT by [employer]      bun: 32, cr: 1.1    (X) * Electrolyte levels normal or acceptable for next level of care    6/12/2020 1:14 PM EDT by Foster Libel      wdl    ( ) * Immediate precipitating factors absent or controlled    ( ) * Discharge plans and education understood    Activity    (X) * Ambulatory    Routes    (X) * Oral hydration, medications, and diet    Interventions    ( ) * Oxygen absent    6/12/2020 1:14 PM EDT by Foster Libel      3L via nc    Medications    (X) Diuretics    6/12/2020 1:14 PM EDT by [employer]      lasix 40mg iv bid    (X) Beta-blocker    * Milestone   Additional Notes   6/12  Day 14      Pt remains on progressive care unit      Vitals: t: 36.8 p: 74 rr; 18 bp: 108/55 spo2: 97% ra      Abn labs:  hgb: 10.4, gluc: 133      Orders:    Accu checks ac/hs with ssi coverage   Lovenox 40mg sq daily   Lasix 40mg iv bid   Morphine iv prn   Bnp, bmp daily      Per Cardiology PN:  ASSESSMENT:  At this time, the patient presents with 100% occlusion of the   midleft circumflex artery.  She is status post percutaneous intervention with   a Xience Expedition 2.25 x 15 mm drug-eluting stent to the mid left   circumflex.  There was, due to poor outflow, reduced flow in a small obtuse   marginal 1 and the true distal left circumflex.  Following the case, the   residual lesion stenosis was 0%, and there was SABINO-3 flow into the large   obtuse marginal 2 branch.          PLAN:      1.  At this time, the patient will be admitted to ICU following her ST   elevation and her STEMI.  We will continue 18 hours of Integrilin and hope   that the outflow issues improve within the distal vascular bed for both the   OM 1 and the true distal circumflex.      2.  She will continue on aspirin 324 mg daily for 1 month, followed by 81 mg   of aspirin daily for life.      3.  She will get Effient 60 mg p.o. x1 and will continue on 10 mg daily for a   minimum of 1 year.  We will get an echocardiogram to evaluate any damage to   the lateral wall, as well as serial troponins to peak.  She will need   aggressive _____ medical management, and further inpatient stay for her   coronary artery disease and STEMI. Per Pulmonary PN:  Assessment and plan:   Acute on chronic respiratory failure.  Titrate oxygen to SaO2 >90%.  Was recently discharged on oxygen at 2 LPM.  Today on 3 LPM O2 with SaO2 98%.    Right lung infiltrate/atelectasis.  Clinical picture does not suggest pneumonia, she is at high risk for neoplasm.  Chest imaging began to become abnormal around February this year, was definitely abnormal on May 13 and subsequently remained abnormal.  She does require bronchoscopy, but has very high right heart pressures.  She is at high risk for bleeding.  Discussed with Dr. Dominga Treviño, there is also risk of performing bronchoscopy.  Discussed with Dr. Carissa Ambrose, still remains at high risk for any procedure    Bulky mediastinal adenopathy.  Could be reactive, but high suspicion for a neoplastic process.  Would prefer to perform bronchoscopic biopsies of the nodes utilizing EBUS.  I believe at this point given risk of anesthesia remains high.  This was discussed with the patient and her daughter bedside. Right pleural effusion.  Small, IR did not feel there was enough fluid to tap. Severe pulmonary hypertension.  Likely multifactorial from COPD, JORDI, possible diastolic CHF.  Agree with diuresis, but is likely to need treatment of JORDI, possibly medical treatment for pulmonary arterial hypertension for residual elevated right heart pressures after LVEDP has decreased. COPD.  Bronchodilator therapy. JORDI.  Should resume with treatment given the severe pulmonary hypertension. Diastolic CHF.  Per cardiology. Grace Stewart cautiously diuresed with assistance of [place] catheter. Diastolic CHF, CAD.  Per Dr. Carissa Ambrose. DM, HTN, HLD, hypothyroidism.  Per IM   DVT prophylaxis.  Lovenox      Per Cardiology PN:  ASSESSMENT:  At this time, the patient presents with 100% occlusion of the   midleft circumflex artery.  She is status post percutaneous intervention with   a Xience Expedition 2.25 x 15 mm drug-eluting stent to the mid left   circumflex.  There was, due to poor outflow, reduced flow in a small obtuse   marginal 1 and the true distal left circumflex.  Following the case, the   residual lesion stenosis was 0%, and there was SABINO-3 flow into the large   obtuse marginal 2 branch.          PLAN:      1.  At this time, the patient will be admitted to ICU following her ST   elevation and her STEMI.  We will continue 18 hours of Integrilin and hope   that the outflow issues improve within the distal vascular bed for both the   OM 1 and the true distal circumflex.      2.  She will continue on aspirin 324 mg daily for 1 month, followed by 81 mg   of aspirin daily for life.      3.  She will get Effient 60 mg p.o. x1 and will continue on 10 mg daily for a   minimum of 1 year.  We will get an echocardiogram to evaluate any damage to   the lateral wall, as well as serial troponins to peak.  She will need   aggressive _____ medical management, and further inpatient stay for her   coronary artery disease and STEMI.             Heart Failure - Care Day 12 (6/10/2020) by Salud Rivera RN         Review Status Review Entered   Completed 6/12/2020 09:45       Criteria Review      Care Day: 12 Care Date: 6/10/2020 Level of Care:    Guideline Day 3    Level Of Care    (X) Floor to discharge    Clinical Status    (X) * Hemodynamic stability    6/12/2020 9:45 AM EDT by Therese Wilson      hr 74-80  bp: 128/65    (X) * Tachypnea absent    6/12/2020 9:45 AM EDT by Therese Wilson      rr: 19-24    (X) * Oxygenation at baseline or acceptable for next level of care    6/12/2020 9:45 AM EDT by Therese Wilson      97% 3L via nc    (X) * Dyspnea at baseline or acceptable for next level of care    ( ) * Cardiac rate and rhythm acceptable    ( ) * Pulmonary edema absent or acceptable for next level of care    ( ) * Peripheral or sacral edema absent, at baseline, or improved    (X) * Mental status at baseline    6/12/2020 9:45 AM EDT by Therese Wilson      baseline    ( ) * Volume status acceptable on oral medication    (X) * Renal function at baseline or acceptable for next level of care    6/12/2020 9:45 AM EDT by Therese Wilson      bun: 33, cr: 1.4    (X) * Electrolyte levels normal or acceptable for next level of care    6/12/2020 9:45 AM EDT by Therese Wilson      na: 135    ( ) * Immediate precipitating factors absent or controlled    ( ) * Discharge plans and education understood    Activity    (X) * Ambulatory    Routes    (X) * Oral hydration, medications, and diet    Interventions    ( ) * Oxygen absent    Medications    (X) · Pleural effusion on right [J90]   · Myalgia [M79.10]   · Abdominal pain [R10.9]   · Pleuritic chest pain [R07.81]   · Chronic respiratory failure (HCC) [J96.10]   · Acute on chronic diastolic congestive heart failure (HCC) [I50.33]   · Class 1 obesity due to excess calories with serious comorbidity and body mass index (BMI) of 34.0 to 34.9 in adult [E66.09, Z68.34]   · Simple chronic bronchitis (HCC) [J41.0]   · Tobacco use [Z72.0]   · Coronary artery disease involving native coronary artery of native heart without angina pectoris [I25.10]   · JORDI (obstructive sleep apnea) [G47.33]   · HTN (hypertension) [I10]   · Uncontrolled type 2 diabetes mellitus with diabetic polyneuropathy, with long-term current use of insulin (HCC) [E11.42, Z79.4, E11.65]

## 2020-06-12 NOTE — PROGRESS NOTES
Patient has complaint of headache and nausea. RN administered Zofran per orders and then Percocet for pain. Patient's VSS and family updated on plan of care. Karissa Colin, daughter of patient, requested to speak to Dr. Angélica Sheldon about the bronchoscopy results from this AM and RN made Dr. Angélica Sheldon aware. Will continue to monitor patient.

## 2020-06-12 NOTE — PRE SEDATION
1 tablet by mouth daily. 5/21/20  Yes Kamille Leslie MD   clopidogrel (PLAVIX) 75 MG tablet Take 1 tablet by mouth daily. 5/18/20  Yes Kamille Leslie MD   insulin glargine (LANTUS) 100 UNIT/ML injection vial Inject 35 Units into the skin 2 times daily 5/18/20  Yes Juan Staton MD   insulin lispro (HUMALOG) 100 UNIT/ML injection vial Inject 0-18 Units into the skin 3 times daily (with meals) 5/18/20  Yes Juan Staton MD   metoprolol succinate (TOPROL XL) 25 MG extended release tablet Take 1 tablet by mouth daily 5/19/20  Yes Juan Staton MD   lisinopril (PRINIVIL;ZESTRIL) 2.5 MG tablet Take 1 tablet by mouth daily . Patient tolerates this medication. 5/18/20  Yes Juan Staton MD   nicotine (NICODERM CQ) 14 MG/24HR Place 1 patch onto the skin daily 5/19/20  Yes Juan Staton MD   ranolazine (RANEXA) 500 MG extended release tablet Take 1 tablet by mouth twice daily. 4/21/20  Yes MITCHELL Clark CNP   oxyCODONE-acetaminophen (PERCOCET) 7.5-325 MG per tablet Take 1 tablet by mouth every 6 hours as needed for Pain. Yes Historical Provider, MD   furosemide (LASIX) 20 MG tablet Take 1 tablet by mouth twice daily. 1/22/20  Yes Kamille Leslie MD   levothyroxine (SYNTHROID) 50 MCG tablet Take 1 tablet by mouth daily. 1/22/20  Yes Kamille Leslie MD   atorvastatin (LIPITOR) 40 MG tablet Take 1 tablet by mouth daily. 1/22/20  Yes Kamille Leslie MD   blood glucose test strips (FREESTYLE LITE) strip TEST THREE TIMES A DAY 7/30/19  Yes MITCHELL Acosta CNP   SYMBICORT 160-4.5 MCG/ACT AERO Inhale 2 puffs by mouth twice daily. 7/26/19  Yes MITCHELL Salazar CNP   aspirin 81 MG EC tablet Take 81 mg by mouth daily   Yes Historical Provider, MD   Cholecalciferol (VITAMIN D3) 1000 units TABS Take 1 tablet by mouth daily 8/30/18  Yes Kamille Leslie MD   metoprolol tartrate (LOPRESSOR) 25 MG tablet Take 1 tablet by mouth twice daily.  5/21/20   Kamille Leslie MD   clonazePAM (KLONOPIN) 0.5 MG tablet TAKE ONE TABLET BY MOUTH DAILY AS NEEDED FOR ANXIETY 4/29/20 5/29/20  MITCHELL Jj CNP   albuterol (PROVENTIL) (2.5 MG/3ML) 0.083% nebulizer solution Take 3 mLs by nebulization every 6 hours as needed for Wheezing 3/12/20   Aj Multani MD   nitroGLYCERIN (NITROSTAT) 0.4 MG SL tablet DISSOLVE ONE TABLET UNDER THE TONGUE AS NEEDED FOR CHEST PAIN EVERY 5 MINUTES UP TO 3 TIMES. IF NO RELIEF CALL 911. 2/18/20   MITCHELL Car CNP   ferrous sulfate 325 (65 Fe) MG EC tablet Take 1 tablet by mouth daily (with breakfast) 1/22/20   Aj Multani MD     Coumadin Use Last 7 Days:  no  Antiplatelet drug therapy use last 7 days: yes -Plavix  Other anticoagulant use last 7 days: yes -Lovenox  Additional Medication Information: Medications reviewed      Pre-Sedation Documentation and Exam:   I have personally completed a history, physical exam & review of systems for this patient (see notes). Mallampati Airway Assessment:  Mallampati Class III - (soft palate & base of uvula are visible)    Prior History of Anesthesia Complications:   none    ASA Classification:  Class 4 - A patient with an incapacitating systemic disease that is a constant threat to life    Sedation/ Anesthesia Plan:   intravenous sedation    Medications Planned:   midazolam (Versed) intravenously and fentanyl intravenously    Patient is an appropriate candidate for plan of sedation: yes. Patient is at moderately high risk for conscious sedation due to severe pulmonary hypertension. This has been discussed at length with the patient, her daughter and with Dr. Maryam Wilkins.     Electronically signed by Caitlin Bailey MD on 6/12/2020 at 9:28 AM

## 2020-06-12 NOTE — PROGRESS NOTES
Pulmonary & Critical Care Medicine ICU Progress Note    Admitted 5/30/20 with generalized myalgias, chest pain, abdominal pain and shortness of breath. The patient has a history of DM 2, CAD, HLD, CHF, COPD, morbid obesity, with recent admission for CHF exacerbation and pneumonia, discharged on 5/18/2020 on oxygen at 2 LPM.  She was admitted for acute on chronic diastolic CHF, severe pulmonary hypertension, uncontrolled DM 2, right pleural effusion, acute chronic respiratory failure. She had restrictive lung disease, compression atelectasis, had chronic narcotic use and is a smoker. Right thoracentesis performed 6/11/2020. Right heart catheterization 6/5/2020 with severe pulmonary hypertension, /24 with mean 56, PCWP 30. It was decided to gently diurese the patient to determine whether reduced LVEDP could reduce right heart pressure. The patient says she smoked around 1.5 PPD for several decades, recently cut back to 0.5 PPD. She had been seen by Dr. Pamela Arguello at [facility] for COPD, was discharged from the practice as she missed several appointments. She was also treated in the sleep clinic with auto CPAP 8/12 cm H2O, was noncompliant, an attempt was made to give her a different interface. She did not follow-up with these issues. Events of Last 24 hours: The patient is afebrile and hemodynamically stable. Her congested cough is worse, she is bringing up small amounts of mucoid to mucopurulent phlegm, possibly increased in quantity. Her cough sounds more congested. She is complaining of abdominal and back pain. She still has a [place] catheter in place, has had a diuretic response with excellent urine output. I/O -69605 mL since admission. Right heart pressures marginally lower. She does not complain of pleuritic chest pain. Her appetite is fair, she denies GI or  complaints. She mentions at baseline she is very short of breath just walking in the house. Continues to have a good appetite. fluticasone  2 spray Nasal Daily    levothyroxine  50 mcg Oral Daily    nicotine  1 patch Transdermal Daily    oxybutynin  5 mg Oral BID    sertraline  100 mg Oral Daily    tiotropium  2 puff Inhalation Daily    budesonide-formoterol  2 puff Inhalation BID    insulin lispro  10 Units Subcutaneous TID     sennosides-docusate sodium  2 tablet Oral BID    polyethylene glycol  17 g Oral Daily       PRN Meds:  bisacodyl, albuterol sulfate HFA, perflutren lipid microspheres, simethicone, morphine **OR** morphine, sodium chloride flush, potassium chloride **OR** potassium alternative oral replacement **OR** potassium chloride, magnesium sulfate, acetaminophen **OR** acetaminophen, promethazine **OR** ondansetron, clonazePAM, nitroGLYCERIN, oxyCODONE-acetaminophen, glucose, dextrose, glucagon (rDNA), dextrose    Results:  CBC:   Recent Labs     06/10/20  0929 06/12/20  0420   WBC 10.0 9.9   HGB 11.1* 10.4*   HCT 33.9* 31.0*   MCV 94.2 93.1    225     BMP:   Recent Labs     06/10/20  0433 06/11/20  0425 06/12/20  0420    135* 139   K 4.8 4.4 5.0   CL 99 95* 99   CO2 28 29 30   BUN 28* 33* 32*   CREATININE 1.1 1.4* 1.1       Cultures:  None    Films:  CXR and CT chestreviewed by me      Assessment and plan:  Acute on chronic respiratory failure. Titrate oxygen to SaO2 >90%. Was recently discharged on oxygen at 2 LPM.  Today on 3 LPM O2 with SaO2 98%. Right lung infiltrate/atelectasis. Clinical picture does not suggest pneumonia, she is at high risk for neoplasm. Chest imaging began to become abnormal around February this year, was definitely abnormal on May 13 and subsequently remained abnormal.  She does require bronchoscopy, but has very high right heart pressures. She is at high risk for bleeding. Discussed with Dr. Dustin Degroot, there is also risk of anesthesia. Paracentesis showed nucleated cells, I discussed with Dr. Miki Simon from pathology. He does not believe cytology is positive.   Due to her

## 2020-06-12 NOTE — PLAN OF CARE
Tab.    Progressive Mobility Assessment:  What is this patient's Current Level of Mobility?: Ambulatory- with Assistance  How was this patient Mobilized today?: Edge of bed, to chair                 Level of Difficulty/Assistance: 1x Assist     Pt is currently on 3 L O2. Pt with nonpitting lower extremity edema.  Patient's weights and intake/output reviewed:      Patient and/or Family's stated Goal of Care this Admission: reduce shortness of breath, increase activity tolerance, better understand heart failure and disease management, be more comfortable, and reduce lower extremity edema prior to discharge

## 2020-06-12 NOTE — PROGRESS NOTES
Prophylaxis: lovenox   Diet: Diet NPO Effective Now  Code Status: Full Code    PT/OT Eval Status: not yet consulted     Dispo - continue in ICU     Kimble Boas, MD

## 2020-06-13 NOTE — PLAN OF CARE
Problem: Pain:  Goal: Control of chronic pain  Description: Control of chronic pain  Outcome: Ongoing     Problem: Falls - Risk of:  Goal: Will remain free from falls  Description: Will remain free from falls  6/12/2020 2349 by Shakila Vanegas RN  Outcome: Ongoing  6/12/2020 1725 by Didier Pride RN  Outcome: Ongoing     Problem: Falls - Risk of:  Goal: Absence of physical injury  Description: Absence of physical injury  Outcome: Ongoing     Problem: OXYGENATION/RESPIRATORY FUNCTION  Goal: Patient will maintain patent airway  6/12/2020 2349 by Shakila Vanegas RN  Outcome: Ongoing  6/12/2020 1725 by Didier Pride RN  Outcome: Ongoing     Problem: OXYGENATION/RESPIRATORY FUNCTION  Goal: Patient will achieve/maintain normal respiratory rate/effort  Description: Respiratory rate and effort will be within normal limits for the patient  Outcome: Ongoing     Problem: HEMODYNAMIC STATUS  Goal: Patient has stable vital signs and fluid balance  6/12/2020 2349 by Shakila Vanegas RN  Outcome: Ongoing  6/12/2020 1725 by Didier Pride RN  Outcome: Ongoing     Problem: FLUID AND ELECTROLYTE IMBALANCE  Goal: Fluid and electrolyte balance are achieved/maintained  Outcome: Ongoing     Problem: ACTIVITY INTOLERANCE/IMPAIRED MOBILITY  Goal: Mobility/activity is maintained at optimum level for patient  Outcome: Ongoing     Problem: Nutrition  Goal: Optimal nutrition therapy  6/12/2020 2349 by Shakila Vanegas RN  Outcome: Ongoing  6/12/2020 1725 by Didier Pride RN  Outcome: Ongoing     Problem: Serum Glucose Level - Abnormal:  Goal: Ability to maintain appropriate glucose levels will improve  Description: Ability to maintain appropriate glucose levels will improve  Outcome: Ongoing

## 2020-06-13 NOTE — PROGRESS NOTES
RESOLUTION   Final Result   1. HRCT is limited due to acute process. If evaluation of interstitial lung   disease is desired, outpatient follow-up is recommended when the patient's   acute symptoms have resolved. 2. Complete opacification of the right middle and lower lobes with air   bronchograms and moderate pleural effusion. Pattern may represent a   centrally obstructing process or pneumonia. 3. Scattered ground-glass opacities in the left lung likely correspond to   above-described acute process. Underlying chronic interstitial change can   not be excluded. 4. Bulky reactive mediastinal and hilar lymphadenopathy. XR CHEST PORTABLE   Final Result   [place]igo placement as above. XR CHEST PORTABLE   Final Result   No significant change in large right pleural effusion. XR CHEST PORTABLE   Final Result   Unchanged large right pleural effusion with adjacent compressive atelectasis. Slightly decreased severity of interstitial pulmonary edema. XR ABDOMEN (KUB) (SINGLE AP VIEW)   Final Result   1. Indeterminate bowel gas pattern. XR CHEST PORTABLE   Final Result   Worsening opacity in the right mid and lower lung. This likely reflects a   moderate right pleural effusion and or airspace disease. Interstitial edema/pulmonary vascular congestion.                  Assessment/Plan:    Active Hospital Problems    Diagnosis    Mediastinal adenopathy [R59.0]    Hilar adenopathy [R59.0]    Lung collapse [J98.19]    Medication management [Z79.899]    Pulmonary hypertension (HCC) [I27.20]    SOB (shortness of breath) [R06.02]    Compression atelectasis [J98.11]    Observed sleep apnea [G47.30]    Elevated brain natriuretic peptide (BNP) level [R79.89]    Hyponatremia [E87.1]    Restrictive lung disease [J98.4]    Chronic narcotic use [F11.90]    Pleural effusion on right [J90]    Myalgia [M79.10]    Abdominal pain [R10.9]    Pleuritic chest pain [R07.81]  Chronic respiratory failure (HCC) [J96.10]    Acute on chronic diastolic congestive heart failure (HCC) [I50.33]    Class 1 obesity due to excess calories with serious comorbidity and body mass index (BMI) of 34.0 to 34.9 in adult [E66.09, Z68.34]    Simple chronic bronchitis (HCC) [J41.0]    Tobacco use [Z72.0]    Coronary artery disease involving native coronary artery of native heart without angina pectoris [I25.10]    JORDI (obstructive sleep apnea) [G47.33]    HTN (hypertension) [I10]    Uncontrolled type 2 diabetes mellitus with diabetic polyneuropathy, with long-term current use of insulin (HCC) [E11.42, Z79.4, E11.65]       Acute on chronic diastolic CHF/pulmonary HTN with acute on chronic respiratory failure with hypoxia - s/p R heart cath with swan placement. Was on IV lasix-now transitioned to lasix ggt on 6/13 per cardiology. Sidenafil added and nitrates d/madhu. Monitor serial BMP, daily weights, I/O. Continue lisinopril, BB, aldactone    Rt pleural effusion : s/p thoracentesis on 6/11. Pleural fluid studies noted- likely exudative. Fluid cytology neg for malignancy. Mediastinal adenopathy - suspicious for malignancy. S/p bronch on 6/10, s/p 6/12- mucus plugging noted. Mgt per pulm       COPD - no wheezing or s/s exacerbation or respiratory distress. Continue home inhalers     CAD s/p PCI - holding plavix, continue statin     Dm2 - lantus dose increased to 30 units BID. Continue SSI. HTN - controlled, continue current management     Obesity - Body mass index is 35.6 kg/m². places patient at risk for multiple co-morbidities as well as early death and contributing to presentation. Hypothyroidism - clinically euthyroid, continue synthroid        Chronic pain syndrome: on percocet chronically. Follows with pain specialist. Continue percocet and morphine PRN         DVT Prophylaxis: lovenox   Diet: DIET CARB CONTROL; Low Sodium (2 GM);  Daily Fluid Restriction: 1500 ml; No Caffeine  Code Status: Full Code    PT/OT Eval Status: not yet consulted     Dispo - continue in ICU     August Glasgow MD

## 2020-06-13 NOTE — PROGRESS NOTES
Patient's EF (Ejection Fraction) is greater than 40%    Heart Failure Medications:   Diuretics[de-identified] Furosemide and Other     (One of the following REQUIRED for EF <40%/SYSTOLIC FAILURE but MAY be used in EF% >40%/DIASTOLIC FAILURE)        ACE[de-identified] None        ARB[de-identified] None         ARNI[de-identified] None    (Beta Blockers)   NON- Evidenced Based Beta Blocker (for EF% >40%/DIASTOLIC FAILURE): None     Evidenced Based Beta Blocker::(REQUIRED for EF% <40%/SYSTOLIC FAILURE) None  . .................................................................................................................................................. Patient's Last Weight: 82.7 kg obtained by standing scale. Difference in weight is 3.3 kg less than last documented weight. Intake/Output Summary (Last 24 hours) at 6/13/2020 1032  Last data filed at 6/13/2020 0830  Gross per 24 hour   Intake 750 ml   Output 3050 ml   Net -2300 ml       Comorbidities Reviewed Yes  Patient has a past medical history of Acute osteomyelitis of left hallux ([address] Anemia, Asthma, Cellulitis of foot, CHF (congestive heart failure) ([address] Claudication of left upper extremity due to atherosclerosis [facility]), Diabetic ulcer of left hallux, with necrosis of bone ([address] DVT (deep venous thrombosis) ([address] Kidney stones, Morbid obesity with BMI of 45.0-49.9, adult ([address] Neuroma of hand, Open fracture of left hallux, Pneumonia, Pulmonary HTN ([address] Rotator cuff tendinitis, STEMI (ST elevation myocardial infarction) ([address] and Urinary incontinence.     >> For CHF and Comorbidity Education Time and Topics, please see Education Tab. Progressive Mobility Assessment:  What is this patient's Current Level of Mobility?: Ambulatory- with Assistance  How was this patient Mobilized today?: Edge of Bed, Up to Chair, Bedside Commode and Up in Room                 With Whom? Nurse and Self                 Level of Difficulty/Assistance: Standby assist      Pt is currently on 3 L O2.  Pt with pitting lower extremity edema.  Patient's weights and intake/output reviewed:      Patient and/or Family's stated Goal of Care this Admission: reduce shortness of breath, increase activity tolerance, better understand heart failure and disease management, be more comfortable and reduce lower extremity edema prior to discharge    Geno White RN

## 2020-06-13 NOTE — PLAN OF CARE
Problem: Pain:  Goal: Pain level will decrease  Description: Pain level will decrease  6/13/2020 1030 by Lisette Clayton RN  Outcome: Ongoing     Problem: Pain:  Goal: Control of chronic pain  Description: Control of chronic pain  6/13/2020 1030 by Lisette Clayton RN  Outcome: Ongoing       Problem: Falls - Risk of:  Goal: Absence of physical injury  Description: Absence of physical injury  6/13/2020 1030 by Lisette Clayton RN  Outcome: Ongoing     Problem: OXYGENATION/RESPIRATORY FUNCTION  Goal: Patient will maintain patent airway  6/13/2020 1030 by Lisette Clayton RN  Outcome: Ongoing     Problem: OXYGENATION/RESPIRATORY FUNCTION  Goal: Patient will achieve/maintain normal respiratory rate/effort  Description: Respiratory rate and effort will be within normal limits for the patient  6/13/2020 1030 by Lisette Clayton RN  Outcome: Ongoing     Problem: HEMODYNAMIC STATUS  Goal: Patient has stable vital signs and fluid balance  6/13/2020 1030 by Lisette Clayton RN  Outcome: Ongoing     Problem: FLUID AND ELECTROLYTE IMBALANCE  Goal: Fluid and electrolyte balance are achieved/maintained  6/13/2020 1030 by Lisette Clayton RN  Outcome: Ongoing     Problem: ACTIVITY INTOLERANCE/IMPAIRED MOBILITY  Goal: Mobility/activity is maintained at optimum level for patient  6/13/2020 1030 by Lisette Clayton RN  Outcome: Ongoing     Problem: Nutrition  Goal: Optimal nutrition therapy  6/13/2020 1030 by Lisette Clayton RN  Outcome: Ongoing     Problem: Serum Glucose Level - Abnormal:  Goal: Ability to maintain appropriate glucose levels will improve  Description: Ability to maintain appropriate glucose levels will improve  6/13/2020 1030 by Lisette Clayton RN  Outcome: Ongoing     Lisette Clayton RN

## 2020-06-13 NOTE — PROGRESS NOTES
[facility] [address]   Daily Progress Note    Admit Date:  5/30/2020  HPI:    Chief Complaint   Patient presents with    Generalized Body Aches     Here for complaints that her body has been aching for the past 4 days and has become more severe. Pt reports SOB because she usually wears oxygen but didn't bring oxygen with her. Presented with shortness of breath, cough and myalgia's. Hx of CAD/ prior PCI to LCX, coronary vasospasm, ICM, dCHF and RHF, PHTN, HTN, HLD as well as DM, JORDI (untreated), COPD, smoker. Diuresed with IV Lasix. RHC with severe pulmonary hypertension (mixed arterial and venous), PA catheter left in place to assist with diuresis. Subjective:  Ms. Shakila Chuls sitting up in chair, more alert and responsive today though angry/ irritable. Upset that gabapentin dose changed without her knowing. She admits breathing and cough improved. Cough more productive this am.       Objective:   BP (!) 105/48   Pulse 72   Temp 98.1 °F (36.7 °C) (Oral)   Resp 15   Ht 5' (1.524 m)   Wt 182 lb 4.8 oz (82.7 kg)   LMP  (LMP Unknown)   SpO2 94%   BMI 35.60 kg/m²       Intake/Output Summary (Last 24 hours) at 6/13/2020 0942  Last data filed at 6/13/2020 0759  Gross per 24 hour   Intake 750 ml   Output 2775 ml   Net -2025 ml     Wt Readings from Last 3 Encounters:   06/13/20 182 lb 4.8 oz (82.7 kg)   05/18/20 183 lb 14.4 oz (83.4 kg)   03/12/20 181 lb 9.6 oz (82.4 kg)     6/5/20:   RA 10,  /24 (56), WP 30  6/9/20:   CVP 7-8, PA 70-80/18-22(42-47)  6/10/20: CVP 3-7, PA 74-91/17-21(38-48), WP 23.  6/11/20: CVP 9-13, PA / 27-43 (47-68)  6/12/20: CVP 15-16, /35 (61), WP 30-31, CO 3.6, CI 1.98, SVR 1533,  (TPG 30)  6/13/20: CVP 3-4, PA 01/98(10), WP 21      ASSESSMENT:   1. CHF, acute on chronic diastolic and RHF - net neg 16 liters, neg 2500 ml past 24 hrs, weight down, WP 21 (while sitting up in chair), on Lasix 40 mg IV bid plus an additional 20 mg overnight  2.  PHTN - regurgitation. Systolic pulmonary artery pressure (SPAP) is elevated and estimated at 83   mmHg (right atrial pressure 8 mmHg) consistent with severe pulmonary   hypertension. The IVC is normal in size (<2.1 cm) but collapses <50% with respiration   consistent with elevated right atrial pressures (8 mmHg) . Echo Aug 2017:    Normal left ventricle size, wall thickness and systolic function with an   estimated ejection fraction of 55%. No regional wall motion abnormalities   are seen. Normal left ventricular diastolic filling pressure. Mild mitral regurgitation. No stenosis. ECHO: 2/26/2014  Limited 2-D echocardiogram due to suboptimal imaging and technical  Limitations. Overall left ventricular function appears grossly normal. Ejection   Fraction is visually estimated to be 55-60 %. Moderate concentric left ventricular hypertrophy is present. TDS secondary to habitus and pt supine due to cardiac cath. CARDIAC CATH: 06/04/15  LEFT HEART CATH  LM: normal, based on hemodynamics during FFR, + left main spasm  LAD: mild prox/mid disease <20%  Ramus: luminals  LCX: ostial 50% lesion with some ? Spasm. Mid LCx patent    RCA: luminals dominant   LVEDP:16  LVEF: 65%   FFR of ostial LCX-  1-->0.96 nonsignificant  + left main spasm on cathether and ?ostial LCx spasm   PLAN  1. Will start nitrates for spasm  2. Nonobstructive CAD with negative FFr of ostial LCx     02/28/2014  LEFT HEART ANGIOGRAPHY:  1. Left main coronary tapered distally to about 30% or 40%. The left main  trifurcated to an LAD, ramus, and a left circumflex. 2.  The LAD had minor luminal irregularities throughout its course up to  about 20%. The LAD ran in the interventricular groove to about the mid LAD  aspect, and then it became a very small vessel to the apex, but it did not  wrap. There were several small diagonal branches that were seen without  significant disease. 3.  The ramus ran down the anterior aspect of the heart.   It

## 2020-06-13 NOTE — PROGRESS NOTES
(Oral)   Resp 19   Ht 5' (1.524 m)   Wt 182 lb 4.8 oz (82.7 kg)   LMP  (LMP Unknown)   SpO2 92%   BMI 35.60 kg/m²    Physical Exam  Constitutional:       General: She is not in acute distress. Appearance: She is well-developed. She is not diaphoretic. HENT:      Head: Normocephalic and atraumatic. Mouth/Throat:      Pharynx: No oropharyngeal exudate. Eyes:      Pupils: Pupils are equal, round, and reactive to light. Neck:      Musculoskeletal: Neck supple. Vascular: No JVD. Cardiovascular:      Heart sounds: Normal heart sounds. No murmur. No friction rub. No gallop. Pulmonary:      Effort: Pulmonary effort is normal.      Breath sounds: No wheezing or rales. Abdominal:      General: Bowel sounds are normal. There is no distension. Palpations: Abdomen is soft. Tenderness: There is no abdominal tenderness. Musculoskeletal: Normal range of motion. Lymphadenopathy:      Cervical: No cervical adenopathy. Skin:     General: Skin is warm and dry. Findings: No rash. Neurological:      Mental Status: She is alert. Cranial Nerves: No cranial nerve deficit. Comments: CN 2-12 grossly intact            Data Reviewed:   LABS:  CBC:  Recent Labs     06/12/20  0420 06/13/20  0427   WBC 9.9 9.4   HGB 10.4* 11.3*   HCT 31.0* 34.2*   MCV 93.1 93.2    269     BMP:  Recent Labs     06/11/20  0425 06/12/20  0420 06/13/20  0427   * 139 134*   K 4.4 5.0 4.6   CL 95* 99 95*   CO2 29 30 27   BUN 33* 32* 30*   CREATININE 1.4* 1.1 1.2*     LIVER PROFILE: No results for input(s): AST, ALT, LIPASE, ALB, BILIDIR, BILITOT, ALKPHOS in the last 72 hours. Invalid input(s): AMYLASE  PT/INR:No results for input(s): PROTIME, INR in the last 72 hours. APTT: No results for input(s): APTT in the last 72 hours.   UA:No results for input(s): NITRITE, COLORU, PHUR, LABCAST, WBCUA, RBCUA, MUCUS, TRICHOMONAS, YEAST, BACTERIA, CLARITYU, SPECGRAV, LEUKOCYTESUR, UROBILINOGEN,

## 2020-06-14 NOTE — PROGRESS NOTES
Center at 6:18   am on 6/14/2020to be communicated to a licensed caregiver. XR CHEST PORTABLE   Final Result   No significant interval change in moderate right pleural effusion. Stable   [place]igo catheter position. XR CHEST PORTABLE   Final Result   Unchanged large right pleural effusion with adjacent compressive atelectasis. XR CHEST 1 VW   Final Result   Stable chest.         CT CHEST HIGH RESOLUTION   Final Result   1. HRCT is limited due to acute process. If evaluation of interstitial lung   disease is desired, outpatient follow-up is recommended when the patient's   acute symptoms have resolved. 2. Complete opacification of the right middle and lower lobes with air   bronchograms and moderate pleural effusion. Pattern may represent a   centrally obstructing process or pneumonia. 3. Scattered ground-glass opacities in the left lung likely correspond to   above-described acute process. Underlying chronic interstitial change can   not be excluded. 4. Bulky reactive mediastinal and hilar lymphadenopathy. XR CHEST PORTABLE   Final Result   [place]igo placement as above. XR CHEST PORTABLE   Final Result   No significant change in large right pleural effusion. XR CHEST PORTABLE   Final Result   Unchanged large right pleural effusion with adjacent compressive atelectasis. Slightly decreased severity of interstitial pulmonary edema. XR ABDOMEN (KUB) (SINGLE AP VIEW)   Final Result   1. Indeterminate bowel gas pattern. XR CHEST PORTABLE   Final Result   Worsening opacity in the right mid and lower lung. This likely reflects a   moderate right pleural effusion and or airspace disease. Interstitial edema/pulmonary vascular congestion.                  Assessment/Plan:    Active Hospital Problems    Diagnosis    Mediastinal adenopathy [R59.0]    Hilar adenopathy [R59.0]    Lung collapse [J98.19]    Medication management [Z79.899]   

## 2020-06-14 NOTE — PROGRESS NOTES
[facility]   Daily Cardiovascular Progress Note    Admit Date: 5/30/2020    CC:Generalized Body Aches (Here for complaints that her body has been aching for the past 4 days and has become more severe. Pt reports SOB because she usually wears oxygen but didn't bring oxygen with her. )      HPI: Michael Allen has no complaints today. Is resting comfortably in bed.       Medications/Labs all Reviewed:  Patient Active Problem List   Diagnosis    HLD (hyperlipidemia)    Uncontrolled type 2 diabetes mellitus with diabetic polyneuropathy, with long-term current use of insulin (HCC)    Type 2 diabetes mellitus with diabetic peripheral angiopathy without gangrene ([address]    HTN (hypertension)    Hypothyroid    Localized osteoarthrosis, lower leg    PFS (patellofemoral syndrome)    Disc displacement, lumbar    Pulmonary emphysema ([address]    JORDI (obstructive sleep apnea)    Chronic low back pain    Coronary artery disease involving native coronary artery of native heart without angina pectoris    Tobacco use    Astigmatism    Mild nonproliferative diabetic retinopathy (HCC)    Reflux esophagitis    Class 1 obesity due to excess calories with serious comorbidity and body mass index (BMI) of 34.0 to 34.9 in adult    Diabetic ulcer of left foot associated with type 2 diabetes mellitus, with bone involvement without evidence of necrosis (HCC)    OAB (overactive bladder)    Allergic rhinitis    Simple chronic bronchitis (HCC)    Anxiety and depression    Fibromyalgia    Phantom pain (HCC)    Osteomyelitis ([address]    PVD (peripheral vascular disease) ([address]    Chronic osteomyelitis of left foot ([address]    Acute osteomyelitis of foot ([address]    Acute on chronic diastolic congestive heart failure (HCC)    Acute respiratory failure with hypoxia (HCC)    Pleural effusion on right    Myalgia    Abdominal pain    Pleuritic chest pain    Chronic respiratory failure (HCC)    SOB (shortness of

## 2020-06-15 NOTE — PROGRESS NOTES
Pt remains in recliner chair. Alert and oriented x4. Vital signs stable. Shift assessment done. Pt denies any needs at this time. See flowsheet for vitals and assessments. See MAR for meds given. Nursing call light within pt's reach. Continue to monitor.

## 2020-06-15 NOTE — PROGRESS NOTES
Physical Exam Performed:    BP (!) 112/53   Pulse 76   Temp 98.2 °F (36.8 °C) (Oral)   Resp 17   Ht 5' (1.524 m)   Wt 179 lb 3.2 oz (81.3 kg)   LMP  (LMP Unknown)   SpO2 95%   BMI 35.00 kg/m²     General appearance: obese, sitting in chair  HEENT: Pupils equal, round, and reactive to light. Neck:  [place] in place. Respiratory:  Bibasilar rales   Cardiovascular: Regular rate and rhythm with normal S1/S2 without murmurs, rubs or gallops. Abdomen: Soft, non-tender, non-distended with normal bowel sounds. Musculoskeletal: No clubbing, cyanosis + 1 edema bilaterally. Skin: warm and dry . Neurologic:   Speech clear with no overt facial droop  Psychiatric: Alert and oriented, thought content appropriate, normal insight  Peripheral Pulses: +2 palpable, equal bilaterally       Labs:   Recent Labs     06/13/20  0427 06/14/20  0429 06/15/20  0551   WBC 9.4 11.1* 11.8*   HGB 11.3* 11.5* 12.2   HCT 34.2* 34.9* 37.4    261 288     Recent Labs     06/13/20  0427 06/14/20  0429 06/15/20  0552   * 137 136   K 4.6 4.5 4.2   CL 95* 94* 94*   CO2 27 31 27   BUN 30* 28* 27*   CREATININE 1.2* 1.0 1.0   CALCIUM 9.4 9.6 9.5     No results for input(s): AST, ALT, BILIDIR, BILITOT, ALKPHOS in the last 72 hours. No results for input(s): INR in the last 72 hours. No results for input(s): Launie David in the last 72 hours. Urinalysis:      Lab Results   Component Value Date    NITRU Negative 05/13/2020    WBCUA 20-50 06/03/2019    BACTERIA Rare 06/03/2019    RBCUA 0-2 06/03/2019    BLOODU Negative 05/13/2020    SPECGRAV 1.020 05/13/2020    GLUCOSEU Negative 05/13/2020    GLUCOSEU 100 05/26/2012       Radiology:  XR CHEST PORTABLE   Preliminary Result   Right internal jugular [place] catheter remains in place with distal tip   visualized in the expected region of the right interlobar pulmonary artery. XR CHEST PORTABLE   Final Result   Deep right IJ approach [place]igo catheter positioning.

## 2020-06-15 NOTE — PROGRESS NOTES
Dr. Alvarez Counts at bedside. Ok to remove pts ROBERTO. MD removed. Pt tolerated well. OK to move to C4.

## 2020-06-15 NOTE — PROGRESS NOTES
Physical Exam  Constitutional:       General: She is not in acute distress. Appearance: She is well-developed. She is not diaphoretic. HENT:      Head: Normocephalic and atraumatic. Mouth/Throat:      Pharynx: No oropharyngeal exudate. Eyes:      Pupils: Pupils are equal, round, and reactive to light. Neck:      Musculoskeletal: Neck supple. Vascular: No JVD. Cardiovascular:      Heart sounds: Normal heart sounds. No murmur. No friction rub. No gallop. Pulmonary:      Effort: Pulmonary effort is normal.      Breath sounds: No wheezing or rales. Abdominal:      General: Bowel sounds are normal. There is no distension. Palpations: Abdomen is soft. Tenderness: There is no abdominal tenderness. Musculoskeletal: Normal range of motion. Lymphadenopathy:      Cervical: No cervical adenopathy. Skin:     General: Skin is warm and dry. Findings: No rash. Neurological:      Mental Status: She is alert. Cranial Nerves: No cranial nerve deficit. Comments: CN 2-12 grossly intact            Data Reviewed:   LABS:  CBC:  Recent Labs     06/13/20  0427 06/14/20  0429 06/15/20  0551   WBC 9.4 11.1* 11.8*   HGB 11.3* 11.5* 12.2   HCT 34.2* 34.9* 37.4   MCV 93.2 93.9 94.6    261 288     BMP:  Recent Labs     06/13/20  0427 06/14/20  0429 06/15/20  0552   * 137 136   K 4.6 4.5 4.2   CL 95* 94* 94*   CO2 27 31 27   BUN 30* 28* 27*   CREATININE 1.2* 1.0 1.0     LIVER PROFILE: No results for input(s): AST, ALT, LIPASE, ALB, BILIDIR, BILITOT, ALKPHOS in the last 72 hours. Invalid input(s): AMYLASE  PT/INR:No results for input(s): PROTIME, INR in the last 72 hours. APTT: No results for input(s): APTT in the last 72 hours. UA:No results for input(s): NITRITE, COLORU, PHUR, LABCAST, WBCUA, RBCUA, MUCUS, TRICHOMONAS, YEAST, BACTERIA, CLARITYU, SPECGRAV, LEUKOCYTESUR, UROBILINOGEN, BILIRUBINUR, BLOODU, GLUCOSEU, AMORPHOUS in the last 72 hours.     Invalid input(s):

## 2020-06-15 NOTE — PROGRESS NOTES
SpO2 96%   BMI 35.60 kg/m²    Physical Exam  Constitutional:       General: She is not in acute distress. Appearance: She is well-developed. She is not diaphoretic. HENT:      Head: Normocephalic and atraumatic. Mouth/Throat:      Pharynx: No oropharyngeal exudate. Eyes:      Pupils: Pupils are equal, round, and reactive to light. Neck:      Musculoskeletal: Neck supple. Vascular: No JVD. Cardiovascular:      Heart sounds: Normal heart sounds. No murmur. No friction rub. No gallop. Pulmonary:      Effort: Pulmonary effort is normal.      Breath sounds: No wheezing or rales. Abdominal:      General: Bowel sounds are normal. There is no distension. Palpations: Abdomen is soft. Tenderness: There is no abdominal tenderness. Musculoskeletal: Normal range of motion. Lymphadenopathy:      Cervical: No cervical adenopathy. Skin:     General: Skin is warm and dry. Findings: No rash. Neurological:      Mental Status: She is alert. Cranial Nerves: No cranial nerve deficit. Comments: CN 2-12 grossly intact            Data Reviewed:   LABS:  CBC:  Recent Labs     06/12/20 0420 06/13/20 0427 06/14/20 0429   WBC 9.9 9.4 11.1*   HGB 10.4* 11.3* 11.5*   HCT 31.0* 34.2* 34.9*   MCV 93.1 93.2 93.9    269 261     BMP:  Recent Labs     06/12/20  0420 06/13/20 0427 06/14/20 0429    134* 137   K 5.0 4.6 4.5   CL 99 95* 94*   CO2 30 27 31   BUN 32* 30* 28*   CREATININE 1.1 1.2* 1.0     LIVER PROFILE: No results for input(s): AST, ALT, LIPASE, ALB, BILIDIR, BILITOT, ALKPHOS in the last 72 hours. Invalid input(s): AMYLASE  PT/INR:No results for input(s): PROTIME, INR in the last 72 hours. APTT: No results for input(s): APTT in the last 72 hours.   UA:No results for input(s): NITRITE, COLORU, PHUR, LABCAST, WBCUA, RBCUA, MUCUS, TRICHOMONAS, YEAST, BACTERIA, CLARITYU, SPECGRAV, LEUKOCYTESUR, UROBILINOGEN, BILIRUBINUR, BLOODU, GLUCOSEU, AMORPHOUS in the last 72

## 2020-06-15 NOTE — PROGRESS NOTES
continue on 10 mg daily for a  minimum of 1 year.  We will get an echocardiogram to evaluate any damage to  the lateral wall, as well as serial troponins to peak.  She will need  aggressive _____ medical management, and further inpatient stay for her coronary artery disease and STEMI.       6/5/20:   RA 10,  /24 (56), WP 30  6/9/20:   CVP 7-8, PA 70-80/18-22(42-47)  6/10/20: CVP 3-7, PA 74-91/17-21(38-48), WP 23.  6/11/20: CVP 9-13, PA / 27-43 (47-68)  6/12/20: CVP 15-16, /35 (61), WP 30-31, CO 3.6, CI 1.98, SVR 1533,  (TPG 30)  6/13/20: CVP 3-4, PA 22/53(16), WP 21  6/15/20: PA 65-70/20-25 (30) WP 12       06/15/20 1114  179 lb 3.2 oz (81.3 kg)  Standing scale     06/13/20 0606  182 lb 4.8 oz (82.7 kg)  Standing scale     06/12/20 0533  189 lb 9.5 oz (86 kg)  --     06/11/20 0520  185 lb 14.4 oz (84.3 kg)  Standing scale     06/09/20 0700  188 lb 11.4 oz (85.6 kg)  Standing scale     06/07/20 0644  179 lb 7.3 oz (81.4 kg)  Standing scale     06/06/20 0617  188 lb 7.9 oz (85.5 kg)  Standing scale; Actual     06/05/20 0528  192 lb 9.6 oz (87.4 kg)  Standing scale     06/04/20 0339  195 lb 8 oz (88.7 kg)  Standing scale     06/03/20 0630  178 lb (80.7 kg)  --     06/02/20 0702  181 lb (82.1 kg)  Standing scale     06/01/20 0537  176 lb 8 oz (80.1 kg)  Standing scale     05/31/20 0514  184 lb 6.4 oz (83.6 kg)  --     05/30/20 0700  182 lb 3.2 oz (82.6 kg)  Actual;Standing scale     05/30/20 0313  177 lb (80.3 kg)  --         Principal Problem:    Acute on chronic diastolic congestive heart failure ([facility])  Active Problems:    Uncontrolled type 2 diabetes mellitus with diabetic polyneuropathy, with long-term current use of insulin ([facility])    HTN (hypertension)    JORDI (obstructive sleep apnea)    Coronary artery disease involving native coronary artery of native heart without angina pectoris    Tobacco use    Class 1 obesity due to excess calories with serious comorbidity and body mass index (BMI) of 34.0 to 34.9 in adult    Simple chronic bronchitis (HCC)    Pleural effusion on right    Myalgia    Abdominal pain    Pleuritic chest pain    Chronic respiratory failure (HCC)    SOB (shortness of breath)    Compression atelectasis    Observed sleep apnea    Elevated brain natriuretic peptide (BNP) level    Hyponatremia    Restrictive lung disease    Chronic narcotic use    Medication management    Pulmonary hypertension (HCC)    Mediastinal adenopathy    Hilar adenopathy    Lung collapse  Resolved Problems:    * No resolved hospital problems.  *      Assessment/Plan:  ~ acute on chronic diastolic heart failure  ~ Pulmonary HTN  ~ CAD  ~right pleural effusion   ~JORDI- untreated  ~COPD  ~HTN  ~HLD  ~DM  ~Anemia    Plan:  Net negative 18L this admission and weight is 179lb today  Daily weights  Continue aspirin and statin  Spironolactone (aldactone)  Sildenafil 20mg TID   D/C lasix infusion wedge pressure 12 today per Dr. Kishor Velez   Start PO lasix tomorrow   Repeat limited echo now euvolemic   okay for transfer to   2L FLuid restriction and low salt diet     Outpatient cardiomems tanya    Called and discussed with her daughter Delma Pena per patient request    Rosey Whelan, RHETT, 6/15/2020, 12:46 PM

## 2020-06-15 NOTE — PROGRESS NOTES
Patient states that she takes gabapentin 900mg TID. She has orders for 300mg TID. Can this be changed to her home dose? Thanks!  Sent to Dr. Evan Grace

## 2020-06-15 NOTE — PROGRESS NOTES
Edge of Bed, Up to Chair, Bedside Commode,  Up to Toilet/Shower, Up in Room, Up in [place], Unable to Mobilize and Patient Refuses to [address]? Nurse, PCA, PT, OT and Self                 Level of Difficulty/Assistance: 1x Assist     Pt up in chair at this time on 2 L O2. Pt without shortness of breath at rest. Pt without lower extremity edema.      Patient and/or Family's stated Goal of Care this Admission: reduce shortness of breath, increase activity tolerance, better understand heart failure and disease management, be more comfortable and reduce lower extremity edema prior to discharge        :

## 2020-06-16 NOTE — PLAN OF CARE
Problem: Pain:  Goal: Control of chronic pain  Description: Control of chronic pain  Outcome: Ongoing     Problem: Falls - Risk of:  Goal: Will remain free from falls  Description: Will remain free from falls  6/16/2020 0757 by Bill Alvarez RN  Outcome: Ongoing     Problem: Falls - Risk of:  Goal: Absence of physical injury  Description: Absence of physical injury  6/16/2020 0757 by Bill Alvarez RN  Outcome: Ongoing  Patient instructed to use call light if assistance is needed. Call light within reach.

## 2020-06-16 NOTE — PROGRESS NOTES
Pulmonary & Critical Care Inpatient Progress Note   Liliana Gutiérrez MD     REASON FOR TODAY'S VISIT:  pulm htn, acute resp failure    SUBJECTIVE:   Decreased oxygen requirement  No longer requiring lasix gtt  Denies any significant congestion, dry cough    Scheduled Meds:   furosemide  40 mg Oral BID    sildenafil  20 mg Oral TID    insulin glargine  40 Units Subcutaneous BID    gabapentin  300 mg Oral TID    spironolactone  25 mg Oral Daily    docusate sodium  100 mg Oral Daily    insulin lispro  0-18 Units Subcutaneous TID WC    insulin lispro  0-9 Units Subcutaneous Nightly    enoxaparin  40 mg Subcutaneous Daily    aspirin  81 mg Oral Daily    atorvastatin  40 mg Oral Nightly    Vitamin D  1 tablet Oral Daily    ferrous sulfate  325 mg Oral Daily with breakfast    fluticasone  2 spray Nasal Daily    levothyroxine  50 mcg Oral Daily    nicotine  1 patch Transdermal Daily    oxybutynin  5 mg Oral BID    sertraline  100 mg Oral Daily    tiotropium  2 puff Inhalation Daily    budesonide-formoterol  2 puff Inhalation BID    insulin lispro  10 Units Subcutaneous TID WC    sennosides-docusate sodium  2 tablet Oral BID    polyethylene glycol  17 g Oral Daily       Continuous Infusions:   dextrose         PRN Meds:  perflutren lipid microspheres, bisacodyl, albuterol sulfate HFA, perflutren lipid microspheres, simethicone, morphine **OR** morphine, sodium chloride flush, potassium chloride **OR** potassium alternative oral replacement **OR** potassium chloride, magnesium sulfate, acetaminophen **OR** acetaminophen, promethazine **OR** ondansetron, clonazePAM, oxyCODONE-acetaminophen, glucose, dextrose, glucagon (rDNA), dextrose    ALLERGIES:  Patient is allergic to flexeril [cyclobenzaprine]; januvia [sitagliptin]; lisinopril; and iodine.     Objective:   PHYSICAL EXAM:  /71   Pulse 75   Temp 98.2 °F (36.8 °C) (Oral)   Resp 18   Ht 5' (1.524 m)   Wt 182 lb 7 oz (82.8 kg)   LMP  (LMP the last 72 hours. Invalid input(s): KETONESU  No results for input(s): PHART, JTA5CTZ, PO2ART in the last 72 hours. Vent Information  SpO2: 95 %    CXR personally reviewed, right pleural effusion          Assessment:     1. Acute on chronic resp failure, mainly hypoxic   -acute pulm edema/right pleural effusion  2. Pulm arterial hypertension with acute right heart failure  3. ELMA in the setting of diuresis  4. Mucous plugging/mediastinal adenopathy  5. COPD without acute exac  6. JORDI    Plan:      -Diuresis per cardiology  -Monitor renal function  -Wean FIO2 as tolerated, anticipate ongoing need for this post discharge   -Has a right effusion and adenopathy, will need eventual repeat CT chest to follow this when all cardiac issues are determined to be stable. Can pursue as outpatient   -Bronchodilators, no need for systemic steroids  -Needs further workup of COPD/JORDI as an outpatient after eventual discharge. Will coordinate. Nothing further to add from a pulm standpoint until cardiac issues/pulm htn is determined stable per cardiology. Please contact with questions.      Pavan Siddiqui MD

## 2020-06-16 NOTE — PROGRESS NOTES
Neuroma of hand, Open fracture of left hallux, Pneumonia, Pulmonary HTN ([address] Rotator cuff tendinitis, STEMI (ST elevation myocardial infarction) ([facility] and Urinary incontinence. has a past surgical history that includes Lithotripsy;  section; vascular surgery (Left, 2005); Dilation and curettage of uterus; Appendectomy; Coronary angioplasty with stent (14); Cataract removal with implant (Bilateral, 2015); Foot Amputation (Left, 6/3/2019); vascular surgery (Left, 10/2007); Foot Amputation (Left, 2019); bronchoscopy (N/A, 2020); and bronchoscopy (2020). Restrictions  Restrictions/Precautions  Restrictions/Precautions: General Precautions  Position Activity Restriction  Other position/activity restrictions: up in chair, 2.5L  Vision/Hearing  Vision: Impaired  Vision Exceptions: Wears glasses for reading  Hearing: Exceptions to [facility]     Subjective  General  Chart Reviewed: Yes  Patient assessed for rehabilitation services?: Yes  Response To Previous Treatment: Not applicable  Family / Caregiver Present: No  Referring Practitioner: Bill Francisco MD  Referral Date : 06/15/20  Follows Commands: Within Functional Limits  General Comment  Comments: RN cleared pt for therapy eval.  Subjective  Subjective: Patient resting supine in bed upon therapy arrival.  Patient agreeable to therapy eval with encouragement. Pain Screening  Patient Currently in Pain: Yes  Pain Assessment  Pain Assessment: 0-10  Pain Level: 10  Pain Type: Chronic pain  Pain Location: Back  Non-Pharmaceutical Pain Intervention(s): Ambulation/Increased Activity;Repositioned; Therapeutic presence(heat packs given to pt, not used)  Vital Signs  Patient Currently in Pain: Yes  Pre Treatment Pain Screening  Intervention List: Patient able to continue with treatment    Orientation  Orientation  Overall Orientation Status: Within Normal Limits  Social/Functional History  Social/Functional History  Lives With: Daughter(daughter B  Ankle Pumps: x 10 B     Plan   Plan  Times per week: 3-5x/wk  Times per day: Daily  Current Treatment Recommendations: Strengthening, Transfer Training, Endurance Training, Balance Training, Gait Training, Functional Mobility Training, Stair training, Safety Education & Training, Home Exercise Program  Safety Devices  Type of devices: All fall risk precautions in place, Call light within reach, Patient at risk for falls, Gait belt, Nurse notified, Left in chair, Chair alarm in place      AM-PAC Score  AM-PAC Inpatient Mobility Raw Score : 19 (06/16/20 1004)  AM-PAC Inpatient T-Scale Score : 45.44 (06/16/20 1004)  Mobility Inpatient CMS 0-100% Score: 41.77 (06/16/20 1004)  Mobility Inpatient CMS G-Code Modifier : CK (06/16/20 1004)          Goals  Short term goals  Time Frame for Short term goals: 6/20/20 unless specified  Short term goal 1: Patient will perform bed mobility with independence by 6/19/20. Short term goal 2: Patient will perform functional transfers with supervision and LRAD. Short term goal 3: Patient will ambulate 50 ft with supervision and LRAD. Short term goal 4: Patient will ascend/descend 2 stairs with SBA and LRAD. Short term goal 5: Patient will tolerate 10-20 reps of B LE exercises. Patient Goals   Patient goals : to return home       Therapy Time   Individual Concurrent Group Co-treatment   Time In 0900         Time Out 0933         Minutes 33         Timed Code Treatment Minutes: 23 Minutes    If pt is discharged prior to next therapy session, this note will serve as discharge summary.     Talha Land, PT

## 2020-06-16 NOTE — PROGRESS NOTES
Behavioral Health Progress Note    Admit Date: 10/5/2019  Hospital day 14    Vitals : No data found. Labs:  No results found for this or any previous visit (from the past 24 hour(s)).   Meds:   Current Facility-Administered Medications   Medication Dose Route Frequency    famotidine (PEPCID) tablet 20 mg  20 mg Oral BID    calcium carbonate (TUMS) chewable tablet 200 mg [elemental]  200 mg Oral TID WITH MEALS    acetaminophen (TYLENOL) tablet 975 mg  975 mg Oral Q6H PRN    promethazine (PHENERGAN) injection 25 mg  25 mg IntraMUSCular Q6H PRN    haloperidol (HALDOL) tablet 15 mg  15 mg Oral QHS    traZODone (DESYREL) tablet 200 mg  200 mg Oral QHS    QUEtiapine (SEROquel) tablet 800 mg  800 mg Oral QHS    sertraline (ZOLOFT) tablet 100 mg  100 mg Oral BID    haloperidol lactate (HALDOL) injection 5 mg  5 mg IntraMUSCular Q6H PRN    haloperidol (HALDOL) tablet 5 mg  5 mg Oral Q6H PRN    hydrOXYzine pamoate (VISTARIL) capsule 50 mg  50 mg Oral Q4H PRN    topiramate (TOPAMAX) tablet 50 mg  50 mg Oral BID WITH MEALS    influenza vaccine 2019-20 (6 mos+)(PF) (FLUARIX/FLULAVAL/FLUZONE QUAD) injection 0.5 mL  0.5 mL IntraMUSCular PRIOR TO DISCHARGE      Hospital Problems: Principal Problem:    Schizoaffective disorder, depressive type ([facility] (1/4/2017)    Active Problems:    Cocaine use disorder, severe, dependence ([facility] (1/4/2019)        Subjective:   Medication side effects: none  none    Mental Status Exam  Sensorium: alert  Orientation: only aware of  time, place and person  Relations: cooperative  Eye Contact: appropriate  Appearance: shows no evidence of impairment  Thought Process: normal rate of thoughts and fair abstract reasoning/computation   Thought Content: no evidence of impairment   Suicidal: denies   Homicidal: none   Mood: is anxious   Affect: stable  Memory: shows no evidence of impairment     Concentration: fair  Abstraction: concrete  Insight: The patient's insight shows no evidence of Occupational Therapy   Occupational Therapy Re-evaluation/treatment   Date: 2020   Patient Name: Jasson Gamble  MRN: [id number]     : 1960    Date of Service: 2020    Discharge Recommendations:  Home with Home health OT, 24 hour supervision or assist  OT Equipment Recommendations  Equipment Needed: Yes  Mobility Devices: ADL Assistive Devices  ADL Assistive Devices: Transfer Tub Bench  Other: Tub transfer bench for pt to safely complete bathing and tub transfers. Pt's tub height is too tall for her to safely step in/out of it. Pt with deficits in activity tolerance and standing balance. tub transfer bench would help promote functional independence and prevent falls. Assessment   Performance deficits / Impairments: Decreased functional mobility ; Decreased ADL status; Decreased safe awareness;Decreased cognition;Decreased balance;Decreased endurance;Decreased strength    Assessment: Pt seen for re-evaluation following stay in ICU. Pt is currently functioning below baseline due to deconditioning and general fatigue. Pt continues to be at a SBA-CGA level for functional mobility and transfers. Pt required increased time for functional mobility due to fatigue. Pt educated on the importance of mobility and OOB activity to build strength/endurance. Cont with POC. Prognosis: Fair  OT Education: OT Role;Transfer Training;Plan of Care;Equipment  Patient Education: home safety concerns, hand placement for transfers, safety with ambulation using RW, importance of OOB activity. REQUIRES OT FOLLOW UP: No  Activity Tolerance  Activity Tolerance: Patient Tolerated treatment well  Safety Devices  Safety Devices in place: Yes  Type of devices: Call light within reach; Chair alarm in place; Left in chair;Gait belt;Nurse notified           Patient Diagnosis(es): The primary encounter diagnosis was Pleural effusion on right. A diagnosis of Myalgia was also pertinent to this visit.      has a past medical history impairment    OR Fair  Judgement: is psychologically impaired OR  Fair    Assessment/Plan:   improved  HA has cleared w/ Tylenol and sleep. Says that was like his normal but routine HA's that he gets. He cannot say that they occur w/ changes in the humidity or weather, though we have had both over past days. We need info as to when and how he will get to [facility]. He denies SI or HI or halluc or delusions.    Continue close observation, meds as is of Acute osteomyelitis of left hallux (HCC), Anemia, Asthma, Cellulitis of foot, CHF (congestive heart failure) ([facility] Claudication of left upper extremity due to atherosclerosis [facility]), Diabetic ulcer of left hallux, with necrosis of bone ([facility] DVT (deep venous thrombosis) ([facility] Kidney stones, Morbid obesity with BMI of 45.0-49.9, adult ([facility] Neuroma of hand, Open fracture of left hallux, Pneumonia, Pulmonary HTN ([facility] Rotator cuff tendinitis, STEMI (ST elevation myocardial infarction) ([facility] and Urinary incontinence. has a past surgical history that includes Lithotripsy;  section; vascular surgery (Left, 2005); Dilation and curettage of uterus; Appendectomy; Coronary angioplasty with stent (14); Cataract removal with implant (Bilateral, 2015); Foot Amputation (Left, 6/3/2019); vascular surgery (Left, 10/2007); Foot Amputation (Left, 2019); bronchoscopy (N/A, 2020); and bronchoscopy (2020). Restrictions  Restrictions/Precautions  Restrictions/Precautions: General Precautions  Position Activity Restriction  Other position/activity restrictions: up in chair, 2.5L    Subjective   General  Chart Reviewed: Yes  Patient assessed for rehabilitation services?: Yes  Additional Pertinent Hx: Hx L partial ray amputation , DM, emphysema, fibromyalgia, CHF  Response to previous treatment: Patient with no complaints from previous session  Family / Caregiver Present: No  Referring Practitioner: Keri Mckeon MD  Diagnosis: Pt admitted with acute on chronic diastolic CHF, possible pneumonia, R pleural effusion, abdominal pain possibly due to constipation. , re-eval after ICU stay  Subjective  Subjective: Pt agreeable to therapy  General Comment  Comments: RN approved therapy  Patient Currently in Pain: Yes  Pain Assessment  Pain Assessment: 0-10  Pain Level: 10  Pain Type: Chronic pain  Pain Location: Back  Non-Pharmaceutical Pain Intervention(s): Ambulation/Increased Activity;Repositioned  Vital Signs  Level of Consciousness: Alert  Patient Currently in Pain: Yes     Social/Functional History  Social/Functional History  Lives With: Daughter(daughter works days; granddaughter helps during the day)  Type of Home: Trailer  Home Layout: One level  Home Access: Stairs to enter without rails  Entrance Stairs - Number of Steps: 2  Bathroom Shower/Tub: Tub/Shower unit  Bathroom Toilet: Standard  Home Equipment: Cane, 4 wheeled walker, Oxygen(2L 24 hrs)  ADL Assistance: Independent  Homemaking Responsibilities: No(daughter completes cooking/cleaning)  Ambulation Assistance: Independent(SPC)  Transfer Assistance: Independent  Active : No  Patient's  Info: Family drives the patient. Daughter is primary . Leisure & Hobbies: Bingo, play with grandchildren       Objective   Vision: Impaired  Vision Exceptions: Wears glasses for reading  Hearing: Exceptions to [facility]    Orientation  Overall Orientation Status: Within Functional Limits  Observation/Palpation  Posture: Good  Balance  Sitting Balance: Independent  Standing Balance: Contact guard assistance(cane)  Standing Balance  Time: 3 minutes  Activity: functional mobility  Comment: CGA with SPC  Functional Mobility  Functional - Mobility Device: Cane  Activity: Other  Assist Level: Contact guard assistance  ADL  Feeding: Independent  Toileting: Dependent/Total(love)  Tone RUE  RUE Tone: Normotonic  Tone LUE  LUE Tone: Normotonic  Coordination  Movements Are Fluid And Coordinated: Yes     Bed mobility  Supine to Sit: Stand by assistance  Sit to Supine: Unable to assess(up in chair at end of session)  Scooting: Stand by assistance  Transfers  Sit to stand: Stand by assistance  Stand to sit: Stand by assistance     Cognition  Overall Cognitive Status: Exceptions  Arousal/Alertness: Appropriate responses to stimuli  Following Commands:  Follows one step commands with repetition  Attention Span: Attends with

## 2020-06-16 NOTE — PROGRESS NOTES
running down into the large OM 2 branch that was SABINO-3 in nature.  There was  evidence of a small true left circumflex in the AV groove that was starting  to recannulate with Integrilin.     5.  The right coronary artery was noted to be large and dominant.  It had a  30% lesion noted in its proximal aspect.  There was mild diffuse disease in  the distal RCA.  There was evidence of the beginning of some right-to-left  collaterals that were seen as well.        ASSESSMENT:  At this time, the patient presents with 100% occlusion of the  midleft circumflex artery.  She is status post percutaneous intervention with  a Xience Expedition 2.25 x 15 mm drug-eluting stent to the mid left  circumflex.  There was, due to poor outflow, reduced flow in a small obtuse  marginal 1 and the true distal left circumflex.  Following the case, the  residual lesion stenosis was 0%, and there was SABINO-3 flow into the large  obtuse marginal 2 branch.        PLAN:     1.  At this time, the patient will be admitted to ICU following her ST  elevation and her STEMI.  We will continue 18 hours of Integrilin and hope  that the outflow issues improve within the distal vascular bed for both the  OM 1 and the true distal circumflex.     2.  She will continue on aspirin 324 mg daily for 1 month, followed by 81 mg  of aspirin daily for life.     3.  She will get Effient 60 mg p.o. x1 and will continue on 10 mg daily for a  minimum of 1 year.  We will get an echocardiogram to evaluate any damage to  the lateral wall, as well as serial troponins to peak.  She will need  aggressive _____ medical management, and further inpatient stay for her coronary artery disease and STEMI.     6/5/20:   RA 10,  /24 (56), WP 30  6/9/20:   CVP 7-8, PA 70-80/18-22(42-47)  6/10/20: CVP 3-7, PA 74-91/17-21(38-48), WP 23.  6/11/20: CVP 9-13, PA / 27-43 (47-68)  6/12/20: CVP 15-16, /35 (61), WP 30-31, CO 3.6, CI 1.98, SVR 1533,  (TPG 30)  6/13/20:

## 2020-06-16 NOTE — ADT AUTH CERT
effusion   2. Pulm arterial hypertension with acute right heart failure   3. ELMA in the setting of diuresis   4. Mucous plugging/mediastinal adenopathy   5. COPD without acute exac   6. JORDI       Plan:       -Diuresis per cardiology   -Monitor renal function   -Wean FIO2 as tolerated, anticipate ongoing need for this post discharge    -Has a right effusion and adenopathy, will need eventual repeat CT chest to follow this when all cardiac issues are determined to be stable.  Can pursue as outpatient    -Bronchodilators, no need for systemic steroids   -Needs further workup of COPD/JORDI as an outpatient after eventual discharge.        Per Cardiology PN:   Assessment/Plan:   ~ acute on chronic diastolic heart failure   ~ Pulmonary HTN   ~ CAD   ~right pleural effusion    ~JORDI- untreated   ~COPD   ~HTN   ~HLD   ~DM   ~Anemia       Plan:   Net negative 18L this admission and weight is 179lb today   Daily weights   Continue aspirin and statin   Spironolactone (aldactone)   Sildenafil 20mg TID    D/C lasix infusion wedge pressure 12 today per Dr. Frederick Jarvis    Start PO lasix tomorrow    Repeat limited echo now euvolemic    okay for transfer to    2L FLuid restriction and low salt diet            Heart Failure - Care Day 16 (6/14/2020) by Rommel Maradiaga RN         Review Status Review Entered   Completed 6/16/2020 12:52       Criteria Review      Care Day: 16 Care Date: 6/14/2020 Level of Care:    Guideline Day 3    Level Of Care    (X) Floor to discharge    6/16/2020 12:52 PM EDT by Jessica Kay      [facility]    Clinical Status    (X) * Hemodynamic stability    6/16/2020 12:52 PM EDT by Jessica Kay      hr 67-80  bp: 115/52    (X) * Tachypnea absent    6/16/2020 12:52 PM EDT by Jessica Kay      rr 16-22    (X) * Oxygenation at baseline or acceptable for next level of care    6/16/2020 12:52 PM EDT by Jessica Kay      95-96% 2L via nc    (X) * Dyspnea at baseline or acceptable for next level of care

## 2020-06-16 NOTE — PROGRESS NOTES
Hospitalist Progress Note      PCP: Nadira Aguilar MD    Date of Admission: 5/30/2020    Chief Complaint: sob     Hospital Course:     61 yo F hx CAD, CHF, pulm HTN, HTN, hLD p/w sob, volume overload. Subjective:     Pt feeling fine today. Slept well. Dara PO.      I/O -19.9 L    Medications:  Reviewed    Infusion Medications    dextrose       Scheduled Medications    furosemide  40 mg Oral BID    sildenafil  20 mg Oral TID    insulin glargine  40 Units Subcutaneous BID    gabapentin  300 mg Oral TID    spironolactone  25 mg Oral Daily    docusate sodium  100 mg Oral Daily    insulin lispro  0-18 Units Subcutaneous TID WC    insulin lispro  0-9 Units Subcutaneous Nightly    enoxaparin  40 mg Subcutaneous Daily    aspirin  81 mg Oral Daily    atorvastatin  40 mg Oral Nightly    Vitamin D  1 tablet Oral Daily    ferrous sulfate  325 mg Oral Daily with breakfast    fluticasone  2 spray Nasal Daily    levothyroxine  50 mcg Oral Daily    nicotine  1 patch Transdermal Daily    oxybutynin  5 mg Oral BID    sertraline  100 mg Oral Daily    tiotropium  2 puff Inhalation Daily    budesonide-formoterol  2 puff Inhalation BID    insulin lispro  10 Units Subcutaneous TID     sennosides-docusate sodium  2 tablet Oral BID    polyethylene glycol  17 g Oral Daily     PRN Meds: perflutren lipid microspheres, bisacodyl, albuterol sulfate HFA, perflutren lipid microspheres, simethicone, morphine **OR** morphine, sodium chloride flush, potassium chloride **OR** potassium alternative oral replacement **OR** potassium chloride, magnesium sulfate, acetaminophen **OR** acetaminophen, promethazine **OR** ondansetron, clonazePAM, oxyCODONE-acetaminophen, glucose, dextrose, glucagon (rDNA), dextrose      Intake/Output Summary (Last 24 hours) at 6/16/2020 1531  Last data filed at 6/16/2020 0428  Gross per 24 hour   Intake 460 ml   Output 2100 ml   Net -1640 ml       Physical Exam Performed:    /75 Pulse 76   Temp 98.1 °F (36.7 °C) (Oral)   Resp 18   Ht 5' (1.524 m)   Wt 182 lb 7 oz (82.8 kg)   LMP  (LMP Unknown)   SpO2 95%   BMI 35.63 kg/m²     General appearance: obese, sitting in bed  HEENT: Pupils equal, round, and reactive to light. Neck:  Supple   Respiratory:  Bibasilar rales   Cardiovascular: Regular rate and rhythm with normal S1/S2 without murmurs, rubs or gallops. Abdomen: Soft, non-tender, non-distended with normal bowel sounds. Musculoskeletal: No clubbing, cyanosis + 1 edema bilaterally. Skin: warm and dry . Neurologic:   Speech clear with no overt facial droop  Psychiatric: Alert and oriented, thought content appropriate, normal insight  Peripheral Pulses: +2 palpable, equal bilaterally       Labs:   Recent Labs     06/14/20  0429 06/15/20  0551 06/16/20  0505   WBC 11.1* 11.8* 13.0*   HGB 11.5* 12.2 11.9*   HCT 34.9* 37.4 36.6    288 279     Recent Labs     06/14/20  0429 06/15/20  0552 06/16/20  0505    136 141   K 4.5 4.2 4.6   CL 94* 94* 99   CO2 31 27 30   BUN 28* 27* 29*   CREATININE 1.0 1.0 1.0   CALCIUM 9.6 9.5 9.7     No results for input(s): AST, ALT, BILIDIR, BILITOT, ALKPHOS in the last 72 hours. No results for input(s): INR in the last 72 hours. No results for input(s): Jonah Dust in the last 72 hours. Urinalysis:      Lab Results   Component Value Date    NITRU Negative 05/13/2020    WBCUA 20-50 06/03/2019    BACTERIA Rare 06/03/2019    RBCUA 0-2 06/03/2019    BLOODU Negative 05/13/2020    SPECGRAV 1.020 05/13/2020    GLUCOSEU Negative 05/13/2020    GLUCOSEU 100 05/26/2012       Radiology:  XR CHEST PORTABLE   Preliminary Result   Right internal jugular [place] catheter remains in place with distal tip   visualized in the expected region of the right interlobar pulmonary artery. XR CHEST PORTABLE   Final Result   Deep right IJ approach [place] catheter positioning. Consider retracting   4-5 cm.       No substantial change in

## 2020-06-17 NOTE — PROGRESS NOTES
[facility] 82   Daily Progress Note    Admit Date:  5/30/2020  HPI:    Chief Complaint   Patient presents with    Generalized Body Aches     Here for complaints that her body has been aching for the past 4 days and has become more severe. Pt reports SOB because she usually wears oxygen but didn't bring oxygen with her. Interval history:   Presented with shortness of breath, cough and myalgia's. Hx of CAD/ prior PCI to LCX, coronary vasospasm, ICM, dCHF and RHF, PHTN, HTN, HLD as well as DM, JORDI (untreated), COPD, smoker.      Diuresed with IV Lasix. RHC with severe pulmonary hypertension (mixed arterial and venous), PA catheter left in place to assist with diuresis, PA cath removed 6/15/20  Transfer out of the ICU 6/15/20    Subjective:  Ms. Kaycee Robles up on side of bed. States breathing a bit better. Anxious for discharge but asking about pain medicine for breakthrough pain.      Objective:   /71   Pulse 79   Temp 98.2 °F (36.8 °C) (Oral)   Resp 18   Ht 5' (1.524 m)   Wt 186 lb 8 oz (84.6 kg)   LMP  (LMP Unknown)   SpO2 94%   BMI 36.42 kg/m²       Intake/Output Summary (Last 24 hours) at 6/17/2020 1628  Last data filed at 6/17/2020 1349  Gross per 24 hour   Intake 720 ml   Output 1250 ml   Net -530 ml       NYHA: IV  Telemetry: NSR no arrthymia's  Physical Exam:  General:  Awake, alert, NAD  Skin:  Warm and dry  Neck: + ecchymosis, unable to measure JVP  Chest:  Dim to auscultation on right, few scattered wheezes/ no rhonchi/rales  Cardiovascular:  RRR S1S2, no m/r/g   Abdomen:  Soft, nontender, +bowel sounds  Extremities:  Trace pedal  edema    Medications:    furosemide  40 mg Oral BID    sildenafil  20 mg Oral TID    insulin glargine  40 Units Subcutaneous BID    gabapentin  300 mg Oral TID    spironolactone  25 mg Oral Daily    docusate sodium  100 mg Oral Daily    insulin lispro  0-18 Units Subcutaneous TID WC    insulin lispro  0-9 Units Subcutaneous Nightly    2/26/2014  Limited 2-D echocardiogram due to suboptimal imaging and technical  Limitations. Overall left ventricular function appears grossly normal. Ejection   Fraction is visually estimated to be 55-60 %. Moderate concentric left ventricular hypertrophy is present. TDS secondary to habitus and pt supine due to cardiac cath.      CARDIAC CATH: 06/04/15  LEFT HEART CATH  LM: normal, based on hemodynamics during FFR, + left main spasm  LAD: mild prox/mid disease <20%  Ramus: luminals  LCX: ostial 50% lesion with some ? Spasm. Mid LCx patent    RCA: luminals dominant   LVEDP:16  LVEF: 65%   FFR of ostial LCX-  1-->0.96 nonsignificant  + left main spasm on cathether and ?ostial LCx spasm   PLAN  1. Will start nitrates for spasm  2.  Nonobstructive CAD with negative FFr of ostial LCx     02/28/2014  LEFT HEART ANGIOGRAPHY:  1.  Left main coronary tapered distally to about 30% or 40%.   The left main  trifurcated to an LAD, ramus, and a left circumflex. 2.  The LAD had minor luminal irregularities throughout its course up to  about 20%.   The LAD ran in the interventricular groove to about the mid LAD aspect, and then it became a very small vessel to the apex, but it did not  wrap.  There were several small diagonal branches that were seen without  significant disease.   3.  The ramus ran down the anterior aspect of the heart.  It had a proximal  30% focal lesion and mild-to-moderate diffuse disease in the wnh-dt-hwholc  ramus.  Distal to this area, the ramus split into 2 branches.  The disease in  the ramus did not appear to be visually stenotic.     4.  The left circumflex was small to moderate in nature.  There was 100%  occlusion in the midleft circumflex with SABINO-0 flow.  There was evidence of some flow into an OM 1 branch at the bifurcation at the site of the  occlusion.  Following intervention, there was a small OM 1 branch that was seen to start to recannulate with SABINO-1 flow.  The majority of the flow as running down into the large OM 2 branch that was SABINO-3 in nature.  There was  evidence of a small true left circumflex in the AV groove that was starting  to recannulate with Integrilin.     5.  The right coronary artery was noted to be large and dominant.  It had a  30% lesion noted in its proximal aspect.  There was mild diffuse disease in  the distal RCA.  There was evidence of the beginning of some right-to-left  collaterals that were seen as well.        ASSESSMENT:  At this time, the patient presents with 100% occlusion of the  midleft circumflex artery.  She is status post percutaneous intervention with  a Xience Expedition 2.25 x 15 mm drug-eluting stent to the mid left  circumflex.  There was, due to poor outflow, reduced flow in a small obtuse  marginal 1 and the true distal left circumflex.  Following the case, the  residual lesion stenosis was 0%, and there was SABINO-3 flow into the large  obtuse marginal 2 branch.        PLAN:     1.  At this time, the patient will be admitted to ICU following her ST  elevation and her STEMI.  We will continue 18 hours of Integrilin and hope  that the outflow issues improve within the distal vascular bed for both the  OM 1 and the true distal circumflex.     2.  She will continue on aspirin 324 mg daily for 1 month, followed by 81 mg  of aspirin daily for life.     3.  She will get Effient 60 mg p.o. x1 and will continue on 10 mg daily for a  minimum of 1 year.  We will get an echocardiogram to evaluate any damage to  the lateral wall, as well as serial troponins to peak.  She will need  aggressive _____ medical management, and further inpatient stay for her coronary artery disease and STEMI.     6/5/20:   RA 10,  /24 (56), WP 30  6/9/20:   CVP 7-8, PA 70-80/18-22(42-47)  6/10/20: CVP 3-7, PA 74-91/17-21(38-48), WP 23.  6/11/20: CVP 9-13, PA / 27-43 (47-68)  6/12/20: CVP 15-16, /35 (61), WP 30-31, CO 3.6, CI 1.98, SVR 1533,  (TPG 30)  6/13/20: CVP 3-4, PA 76/22(42), WP 21  6/15/20: PA 65-70/20-25 (30) WP 12    Weights(Current Encounter) (last 50 days)          Date/Time  Weight  Weight Method     06/17/20 1045  186 lb 8 oz (84.6 kg)  Standing scale     06/17/20 0416  189 lb 6 oz (85.9 kg)  --     06/16/20 0930  182 lb 7 oz (82.8 kg)  Standing scale     06/16/20 0428  188 lb 11.4 oz (85.6 kg)  Bed scale     06/15/20 1114  179 lb 3.2 oz (81.3 kg)  Standing scale     06/13/20 0606  182 lb 4.8 oz (82.7 kg)  Standing scale     06/12/20 0533  189 lb 9.5 oz (86 kg)  --     06/11/20 0520  185 lb 14.4 oz (84.3 kg)  Standing scale     06/09/20 0700  188 lb 11.4 oz (85.6 kg)  Standing scale     06/07/20 0644  179 lb 7.3 oz (81.4 kg)  Standing scale     06/06/20 0617  188 lb 7.9 oz (85.5 kg)  Standing scale; Actual     06/05/20 0528  192 lb 9.6 oz (87.4 kg)  Standing scale     06/04/20 0339  195 lb 8 oz (88.7 kg)  Standing scale     06/03/20 0630  178 lb (80.7 kg)  --     06/02/20 0702  181 lb (82.1 kg)  Standing scale     06/01/20 0537  176 lb 8 oz (80.1 kg)  Standing scale     05/31/20 0514  184 lb 6.4 oz (83.6 kg)  --     05/30/20 0700  182 lb 3.2 oz (82.6 kg)  Actual;Standing scale     05/30/20 0313  177 lb (80.3 kg)  --                  Principal Problem:    Acute on chronic diastolic congestive heart failure (HCC)  Active Problems:    Uncontrolled type 2 diabetes mellitus with diabetic polyneuropathy, with long-term current use of insulin (HCC)    HTN (hypertension)    JORDI (obstructive sleep apnea)    Coronary artery disease involving native coronary artery of native heart without angina pectoris    Tobacco use    Class 1 obesity due to excess calories with serious comorbidity and body mass index (BMI) of 34.0 to 34.9 in adult    Simple chronic bronchitis (HCC)    Pleural effusion on right    Myalgia    Abdominal pain    Pleuritic chest pain    Chronic respiratory failure (HCC)    SOB (shortness of breath)    Compression atelectasis    Observed sleep apnea Elevated brain natriuretic peptide (BNP) level    Hyponatremia    Restrictive lung disease    Chronic narcotic use    Medication management    Pulmonary hypertension (HCC)    Mediastinal adenopathy    Hilar adenopathy    Lung collapse  Resolved Problems:    * No resolved hospital problems. *    Assessment/Plan:  ~ acute on chronic diastolic heart failure  ~ Pulmonary arterial HTN, possible RA  ~ CAD  ~right pleural effusion   ~JORDI- untreated  ~COPD  ~HTN  ~HLD  ~DM  ~Anemia  ~Possible RA (rheumatoid factor 24)     Plan:  Net negative 18L this admission   Okay for discharge - home with Revatio 20 TID, Lasix 40 bid, dori 25  Continue aspirin, Plavix and statin  D/C lasix infusion 6/15/20 as wedge pressure 12 (weight 179 lbs at that time)  2L FLuid restriction and low salt diet     Outpatient cardiomems eval    Dispo: Okay for discharge from cardiac perspective. Reviewed cardiac medications on discharge medication reconciliation form. Patient has follow up appointment with Ramila Guevara CNP in 1 weeks.        Pedro Garcia CNP, 2020, 4:28 PM

## 2020-06-17 NOTE — DISCHARGE SUMMARY
Hospital Medicine Discharge Summary    Patient ID: [place] [facility]      Patient's PCP: Tammy Dolan MD    Admit Date: 5/30/2020     Discharge Date:   6/17/20    Admitting Physician: Sofi Noel MD     Discharge Physician: Eloise Queen MD     Discharge Diagnoses: Active Hospital Problems    Diagnosis    Mediastinal adenopathy [R59.0]    Hilar adenopathy [R59.0]    Lung collapse [J98.19]    Medication management [Z79.899]    Pulmonary hypertension (HCC) [I27.20]    SOB (shortness of breath) [R06.02]    Compression atelectasis [J98.11]    Observed sleep apnea [G47.30]    Elevated brain natriuretic peptide (BNP) level [R79.89]    Hyponatremia [E87.1]    Restrictive lung disease [J98.4]    Chronic narcotic use [F11.90]    Pleural effusion on right [J90]    Myalgia [M79.10]    Abdominal pain [R10.9]    Pleuritic chest pain [R07.81]    Chronic respiratory failure (HCC) [J96.10]    Acute on chronic diastolic congestive heart failure (HCC) [I50.33]    Class 1 obesity due to excess calories with serious comorbidity and body mass index (BMI) of 34.0 to 34.9 in adult [E66.09, Z68.34]    Simple chronic bronchitis (HCC) [J41.0]    Tobacco use [Z72.0]    Coronary artery disease involving native coronary artery of native heart without angina pectoris [I25.10]    JORDI (obstructive sleep apnea) [G47.33]    HTN (hypertension) [I10]    Uncontrolled type 2 diabetes mellitus with diabetic polyneuropathy, with long-term current use of insulin (HCC) [E11.42, Z79.4, E11.65]       The patient was seen and examined on day of discharge and this discharge summary is in conjunction with any daily progress note from day of discharge. Hospital Course:  61 yo F hx CAD, CHF, pulm HTN, HTN, hLD presented with SOB and volume overload. Acute on chronic diastolic CHF/pulmonary HTN with acute on chronic respiratory failure with hypoxia - s/p R heart cath with swan placement removed 6/15.  Was on equal bilaterally     Labs: For convenience and continuity at follow-up the following most recent labs are provided:      CBC:    Lab Results   Component Value Date    WBC 12.9 06/17/2020    HGB 11.4 06/17/2020    HCT 34.8 06/17/2020     06/17/2020       Renal:    Lab Results   Component Value Date     06/17/2020    K 4.7 06/17/2020     06/17/2020    CO2 28 06/17/2020    BUN 32 06/17/2020    CREATININE 1.1 06/17/2020    CALCIUM 9.7 06/17/2020    PHOS 4.3 06/06/2020         Significant Diagnostic Studies    Radiology:   XR CHEST PORTABLE   Final Result   Right internal jugular [place] catheter remains in place with distal tip   visualized in the expected region of the right interlobar pulmonary artery. XR CHEST PORTABLE   Final Result   Deep right IJ approach [place] catheter positioning. Consider retracting   4-5 cm. No substantial change in right pleural effusion, basilar atelectasis and   opacities. The findings were sent to the Radiology Results [address] at 6:18   am on 6/14/2020to be communicated to a licensed caregiver. XR CHEST PORTABLE   Final Result   No significant interval change in moderate right pleural effusion. Stable   [place] catheter position. XR CHEST PORTABLE   Final Result   Unchanged large right pleural effusion with adjacent compressive atelectasis. XR CHEST 1 VW   Final Result   Stable chest.         CT CHEST HIGH RESOLUTION   Final Result   1. HRCT is limited due to acute process. If evaluation of interstitial lung   disease is desired, outpatient follow-up is recommended when the patient's   acute symptoms have resolved. 2. Complete opacification of the right middle and lower lobes with air   bronchograms and moderate pleural effusion. Pattern may represent a   centrally obstructing process or pneumonia.    3. Scattered ground-glass opacities in the left lung likely correspond to   above-described acute mLs by nebulization every 6 hours as needed for Wheezing  Qty: 120 each, Refills: 5    Associated Diagnoses: Chronic obstructive pulmonary disease, unspecified COPD type (HCC)      ferrous sulfate 325 (65 Fe) MG EC tablet Take 1 tablet by mouth daily (with breakfast)  Qty: 30 tablet, Refills: 4             Time Spent on discharge is more than 30 minutes in the examination, evaluation, counseling and review of medications and discharge plan. Signed:    Jaqueline Langford MD   6/17/2020      Thank you Antoni Marina MD for the opportunity to be involved in this patient's care. If you have any questions or concerns please feel free to contact me at [phone].

## 2020-06-18 NOTE — TELEPHONE ENCOUNTER
This patient was dismissed from [facility] in 2017. We are unable to schedule patient. DISPLAY PLAN FREE TEXT DISPLAY PLAN FREE TEXT DISPLAY PLAN FREE TEXT DISPLAY PLAN FREE TEXT DISPLAY PLAN FREE TEXT DISPLAY PLAN FREE TEXT DISPLAY PLAN FREE TEXT DISPLAY PLAN FREE TEXT DISPLAY PLAN FREE TEXT

## 2020-06-18 NOTE — TELEPHONE ENCOUNTER
Per John Wynn this pt. Was dismissed from [facility] but could be scheduled here. Given HFU appoint. With Mando Goldsmith as requested by Dr. King Phipps.

## 2020-06-19 PROBLEM — J96.20 ACUTE ON CHRONIC RESPIRATORY FAILURE (HCC): Status: ACTIVE | Noted: 2020-01-01

## 2020-06-19 NOTE — ED NOTES
Bed: 16  Expected date:   Expected time:   Means of arrival:   Comments:  Closed D/T Staffing     Phu Sahni RN  06/19/20 [id number]

## 2020-06-19 NOTE — ED TRIAGE NOTES
Patient complains of SOB and getting and not being able to care for herself at home. Has been noncompliant with medications and weak with chest and abdominal pain. States she lives with daughter and granddaughter but they are not able to help her all of the time.

## 2020-06-19 NOTE — ED PROVIDER NOTES
[address] ED  EMERGENCY DEPARTMENT ENCOUNTER        Pt Name: Mayra Palomino  MRN: [id number]  Arm[place] 1960  Date of evaluation: 6/19/2020  Provider: Trey Birmingham PA-C  PCP: Dianne Pop MD  ED Attending: No att. providers found      This patient was seen and evaluated by the attending physician   I have not independently evaluated this patient. CHIEF COMPLAINT       Chief Complaint   Patient presents with    Shortness of Breath       HISTORY OF PRESENT ILLNESS   (Location/Symptom, Timing/Onset, Context/Setting, Quality, Duration, Modifying Factors, Severity)  Note limiting factors. 63 yo female with a hx CAD, CHF, pulm HTN, HTN, hLD, Acute on chronic diastolic CHF/pulmonary HTN with respiratory failure with hypoxia, typically on 3L NCO2, DM2, morbid obesity, osteomyelitis the left foot, anxiety, depression, JORDI, PVD, tobacco use, presents via EMS for evaluation of shortness of breath. Worsened since d/c from [facility] 2 days ago. EMS indicate that her oxygen saturation when their arrival was in the 60s, placed on a nonrebreather mask. She is now on 4 L nasal cannula O2 and 95%. Patient advised that she should not of been discharged from the hospital.  She indicates that she was discharged to her home and she had home health care but they are not adequate to take care of her. Patient advised that she has chronic pain everywhere. this Is not new. Patient denies any fevers or coughing. Denies any known sick contacts. +current everyday smoker    Nursing Notes were all reviewed and agreed with or any disagreements were addressed  in the HPI. REVIEW OF SYSTEMS  (2-9 systems for level 4, 10 or more for level 5)     Review of Systems   Constitutional: Negative for chills and fever. HENT: Negative. Negative for congestion, rhinorrhea, sinus pressure, sinus pain and sore throat. Eyes: Negative for discharge, redness and visual disturbance.    Respiratory: Positive for shortness of breath and wheezing. Negative for cough and chest tightness. Cardiovascular: Negative for chest pain and palpitations. Gastrointestinal: Negative for abdominal pain, constipation, diarrhea, nausea and vomiting. Genitourinary: Negative for difficulty urinating, dysuria and frequency. Musculoskeletal: Negative. Skin: Negative. Neurological: Negative. Negative for dizziness, weakness, numbness and headaches. Psychiatric/Behavioral: Negative. All other systems reviewed and are negative. Positivesand Pertinent negatives as per HPI. Except as noted above in the ROS, all other systems were reviewed and negative.        PAST MEDICAL HISTORY     Past Medical History:   Diagnosis Date    Acute osteomyelitis of left hallux ([address] 2019    Anemia 2015    Asthma     Cellulitis of foot 2019    [facility]cong[facility]ive [facility] failure) [facility]     Claudication of left upper extremity due to atherosclerosis ([address] 10/2007    Diabetic ulcer of left hallux, with necrosis of bone ([facility] 2019    DVT (deep venous thrombosis) ([address]     Kidney stones     Morbid obesity with BMI of 45.0-49.9, adult ([address] 2014    Neuroma of hand 2013    Open fracture of left hallux 2019    Pneumonia     Pulmonary HTN ([facility])     Rotator cuff tendinitis 2014    STEMI (ST elevation myocardial infarction) ([facility] 14    Dr. Elizabeth Rodrigues Urinary incontinence          SURGICAL HISTORY       Past Surgical History:   Procedure Laterality Date    APPENDECTOMY      BRONCHOSCOPY N/A 2020    BRONCHOSCOPY DIAGNOSTIC OR CELL [address] ONLY performed by Tiffany Hernandez MD at [facility]  2020    BRONCHOSCOPY BRUSHINGS performed by Tiffany Hernandez MD at [facility] 2015     SECTION      CORONARY ANGIOPLASTY WITH STENT PLACEMENT  14    DILATION AND CURETTAGE OF UTERUS      FOOT AMPUTATION Left 6/3/2019    LEFT HALLUX AMPUTATION WITH GRAFT APPLICATION performed by Karishma Andrea DPM at [facility] Left 8/20/2019    PARTIAL FIRST RAY  AMPUTATION LEFT FOOT performed by Hesham Treviño DPM at [place] [address] Left 06/2005    atherectomy of SFA and popliteal artery    VASCULAR SURGERY Left 10/2007    subclavian angiopalsty and stent         CURRENT MEDICATIONS       Previous Medications    ALBUTEROL (PROVENTIL) (2.5 MG/3ML) 0.083% NEBULIZER SOLUTION    Take 3 mLs by nebulization every 6 hours as needed for Wheezing    ASPIRIN 81 MG EC TABLET    Take 81 mg by mouth daily    ATORVASTATIN (LIPITOR) 40 MG TABLET    Take 1 tablet by mouth daily. BLOOD GLUCOSE TEST STRIPS (FREESTYLE LITE) STRIP    TEST THREE TIMES A DAY    CHOLECALCIFEROL (VITAMIN D3) 1000 UNITS TABS    Take 1 tablet by mouth daily    CLONAZEPAM (KLONOPIN) 0.5 MG TABLET    TAKE ONE TABLET BY MOUTH DAILY AS NEEDED FOR ANXIETY    CLOPIDOGREL (PLAVIX) 75 MG TABLET    Take 1 tablet by mouth daily. FERROUS SULFATE 325 (65 FE) MG EC TABLET    Take 1 tablet by mouth daily (with breakfast)    FLUTICASONE (FLONASE) 50 MCG/ACT NASAL SPRAY    2 sprays by Nasal route daily    FUROSEMIDE (LASIX) 40 MG TABLET    Take 1 tablet by mouth 2 times daily    GABAPENTIN (NEURONTIN) 300 MG CAPSULE    Take 3 capsules by mouth 3 times daily. INSULIN GLARGINE (LANTUS) 100 UNIT/ML INJECTION VIAL    Inject 35 Units into the skin 2 times daily    INSULIN LISPRO (HUMALOG) 100 UNIT/ML INJECTION VIAL    Inject 0-18 Units into the skin 3 times daily (with meals)    LEVOTHYROXINE (SYNTHROID) 50 MCG TABLET    Take 1 tablet by mouth daily. METFORMIN (GLUCOPHAGE) 1000 MG TABLET    Take 1 tablet by mouth twice daily with meals.     NICOTINE (NICODERM CQ) 14 MG/24HR    Place 1 patch onto the skin daily    OXYBUTYNIN (DITROPAN) 5 MG TABLET    1 po q am and 2 po qhs    OXYCODONE-ACETAMINOPHEN (PERCOCET) 7.5-325 MG PER TABLET Take 1 tablet by mouth every 6 hours as needed for Pain. SERTRALINE (ZOLOFT) 100 MG TABLET    Take 1 tablet by mouth daily    SILDENAFIL (REVATIO) 20 MG TABLET    Take 1 tablet by mouth 3 times daily    SPIRIVA RESPIMAT 2.5 MCG/ACT AERS INHALER    INHALE 2 PUFFS BY MOUTH DAILY    SPIRONOLACTONE (ALDACTONE) 25 MG TABLET    Take 1 tablet by mouth daily    SYMBICORT 160-4.5 MCG/ACT AERO    Inhale 2 puffs by mouth twice daily. ALLERGIES     Flexeril [cyclobenzaprine]; Januvia [sitagliptin];  Lisinopril; and Iodine    FAMILY HISTORY       Family History   Problem Relation Age of Onset    Other Mother          of unknown lung issue at 61    Diabetes Mother     Diabetes Brother     Heart Disease Maternal Grandfather     Cancer Maternal Grandmother     Asthma Grandchild          SOCIAL HISTORY       Social History     Socioeconomic History    Marital status:      Spouse name: None    Number of children: None    Years of education: None    Highest education level: None   Occupational History    None   Social Needs    Financial resource strain: Somewhat hard    Food insecurity     Worry: None     Inability: None    Transportation needs     Medical: Yes     Non-medical: Yes   Tobacco Use    Smoking status: Current Every Day Smoker     Packs/day: 0.50     Years: 40.00     Pack years: 20.00     Types: Cigarettes    Smokeless tobacco: Former User    Tobacco comment: .5 ppd   Substance and Sexual Activity    Alcohol use: No     Alcohol/week: 0.0 standard drinks    Drug use: No    Sexual activity: Not Currently   Lifestyle    Physical activity     Days per week: 2 days     Minutes per session: 10 min    Stress: None   Relationships    Social connections     Talks on phone: More than three times a week     Gets together: Once a week     Attends Holiness service: 1 to 4 times per year     Active member of club or organization: No     Attends meetings of clubs or organizations: Never Relationship status:     Intimate partner violence     Fear of current or ex partner: None     Emotionally abused: None     Physically abused: None     Forced sexual activity: None   Other Topics Concern    None   Social History Narrative    None       SCREENINGS     NIH Score       [facility]     Heart Score         PHYSICAL EXAM    (up to 7 for level 4, 8 ormore for level 5)     ED Triage Vitals   BP Temp Temp Source Pulse Resp SpO2 Height Weight   06/19/20 1911 06/19/20 1911 06/19/20 1911 06/19/20 1911 06/19/20 1911 06/19/20 1911 06/19/20 1937 06/19/20 1937   (!) 153/54 98 °F (36.7 °C) Oral 82 18 94 % 5' (1.524 m) 178 lb (80.7 kg)       Physical Exam  Vitals signs and nursing note reviewed. Constitutional:       Appearance: She is well-developed. She is obese. She is not diaphoretic. HENT:      Head: Normocephalic. Nose: Nose normal.      Mouth/Throat:      Pharynx: No oropharyngeal exudate. Eyes:      General:         Right eye: No discharge. Left eye: No discharge. Conjunctiva/sclera: Conjunctivae normal.      Pupils: Pupils are equal, round, and reactive to light. Neck:      Musculoskeletal: Normal range of motion. Cardiovascular:      Rate and Rhythm: Normal rate and regular rhythm. Heart sounds: Normal heart sounds. No murmur. No friction rub. No gallop. Pulmonary:      Effort: Pulmonary effort is normal. No respiratory distress. Breath sounds: Rhonchi present. No wheezing. Comments: Rhonchi throughout  Abdominal:      General: Bowel sounds are normal. There is no distension. Palpations: Abdomen is soft. Tenderness: There is no abdominal tenderness. Musculoskeletal: Normal range of motion. Skin:     General: Skin is warm and dry. Capillary Refill: Capillary refill takes less than 2 seconds. Neurological:      General: No focal deficit present. Mental Status: She is alert and oriented to person, place, and time. Psychiatric:         Behavior: Behavior normal.         DIAGNOSTIC RESULTS   LABS:    Labs Reviewed   CBC WITH AUTO DIFFERENTIAL - Abnormal; Notable for the following components:       Result Value    WBC 11.8 (*)     RBC 3.69 (*)     Hemoglobin 11.3 (*)     Hematocrit 35.1 (*)     Neutrophils Absolute 9.7 (*)     All other components within normal limits    Narrative:     Performed at:  [facility] [place],  [employer]   Phone [phone]   COMPREHENSIVE METABOLIC PANEL W/ REFLEX TO MG FOR LOW K - Abnormal; Notable for the following components:    Sodium 135 (*)     Chloride 95 (*)     Glucose 459 (*)     BUN 24 (*)     GFR Non-African American 57 (*)     Alkaline Phosphatase 138 (*)     All other components within normal limits    Narrative:     Performed at:  [facility] [place],  [employer]   Phone [phone]   BRAIN NATRIURETIC PEPTIDE - Abnormal; Notable for the following components:    Pro-BNP 3,392 (*)     All other components within normal limits    Narrative:     Performed at:  [facility] [place],  [employer]   Phone [phone]   URINALYSIS - Abnormal; Notable for the following components:    Glucose, Ur >=1000 (*)     Protein, UA TRACE (*)     All other components within normal limits    Narrative:     Performed at:  [facility],  [employer]   Phone [phone]   BLOOD GAS, VENOUS - Abnormal; Notable for the following components:    pCO2, Mono 37.3 (*)     pO2, Mono 58.5 (*)     All other components within normal limits    Narrative:     Performed at:  [facility]) [facility],  [employer]   Phone [phone]   MICROSCOPIC URINALYSIS - Abnormal; Notable for the following components:    Mucus, UA 1+ (*)     Bacteria, UA 2+ (*) Yeast, UA Present (*)     All other components within normal limits    Narrative:     Performed at:  [facility],  [place], [facility]   Phone [phone]   TROPONIN    Narrative:     Performed at:  [facility],  [place], [facility]   Phone [phone]   BETA-HYDROXYBUTYRATE    Narrative:     Performed at:  [facility]) [facility],  [place], [facility]   Phone [phone]       All other labs were within normal range or notreturned as of this dictation. EKG: All EKG's are interpreted by the Emergency Department Physician who either signs or Co-signs this chart in the absence of a cardiologist.  Please see their note for interpretation of EKG. RADIOLOGY:         Interpretation per the Radiologist below, if available at the time of this note:    XR CHEST STANDARD (2 VW)   Final Result   1. Worsening congestive heart failure. No results found.       PROCEDURES   Unless otherwise noted below, none     Procedures    CRITICAL CARE TIME   N/A    CONSULTS:  None      EMERGENCY DEPARTMENT COURSE and DIFFERENTIAL DIAGNOSIS/MDM:   Vitals:    Vitals:    06/19/20 1911 06/19/20 1932 06/19/20 1937 06/19/20 2040   BP: (!) 153/54 (!) 130/107  (!) 151/80   Pulse: 82 81  81   Resp: 18 20  20   Temp: 98 °F (36.7 °C)      TempSrc: Oral      SpO2: 94% 95%  97%   Weight:   178 lb (80.7 kg)    Height:   5' (1.524 m)        Patient was given the following medications:  Medications   lidocaine 4 % external patch 1 patch (1 patch Transdermal Not Given 6/19/20 1947)   ipratropium-albuterol (DUONEB) nebulizer solution 1 ampule (has no administration in time range)   acetaminophen (TYLENOL) tablet 1,000 mg (has no administration in time range)   ketorolac (TORADOL) injection 15 mg (has no administration in time range)   ipratropium-albuterol (DUONEB) nebulizer solution 1 ampule (1 ampule Inhalation Given 6/19/20 2039)   insulin regular (HUMULIN R;NOVOLIN R) injection 7 Units (7 Units Subcutaneous Given 6/19/20 2040)         Afebrile, stable, patient presents to the ED for evaluation. Seen in conjunction with attending ED provider who agrees with assessment and plan. SPO2 on 4L NC is 97%. Non toxic, NAD, provided with breathing treatments to help with SOB. Labs evaluated reveal hyperglycemia, and elevated BNP, provided with 7 units of IV insulin. Pt requesting IV pain medications for chronic pain  Reviewed OARRS, pt ordered lidoderm, toadol and tylenol. Pt is offered and refused lidocaine patch, refused ambulation testing. Advised she wants to be admitted to a nursing home. All questions are answered. The patient tolerated their visit well. They were seen and evaluated by the attendingphysician, No att. providers found who agreed with the assessment and plan. The patient and / or the family were informed of the results of any tests, a time was given to answer questions, a plan was proposed and they agreed Orlando Laird. Patient is admitted to the hospitalist in stable condition      FINAL IMPRESSION      1. Acute on chronic respiratory failure, unspecified whether with hypoxia or hypercapnia ([address]    2. Shortness of breath    3. Ambulatory dysfunction    4. Congestive heart failure, unspecified HF chronicity, unspecified heart failure type ([address]    5. Dyspnea and respiratory abnormalities    6.  Pulmonary hypertension ([address]          DISPOSITION/PLAN   DISPOSITION Admitted 06/19/2020 08:34:00 PM      PATIENT REFERRED TO:  Corrinne Grimes, MD  [address] [place] [address]  [phone]            DISCHARGE MEDICATIONS:  New Prescriptions    No medications on file       DISCONTINUED MEDICATIONS:  Discontinued Medications    No medications on file              (Please note that portions of this note were completed with a voice recognition program.  Efforts were made to edit the dictations but occasionally words are mis-transcribed.)    Sukumar Rudd PA-C (electronically signed)        Sukumar Rudd PA-C  06/19/20 [id number]

## 2020-06-19 NOTE — ED PROVIDER NOTES
MidLevel Attestation   I havepersonally performed and/or participated in the history, exam and medical decision making and agree with all pertinent clinical information. I have also reviewed and agree with the past medical, family and social historyunless otherwise noted. I have personally performed a face to face diagnostic evaluation onthis patient. I have reviewed the mid-levels findings and agree. In brief, Juliana West is a 62 y.o. female that presented to the emergency department with worsening shortness of breath. EKG by my preliminary interpretation shows sinus rhythm with rate of 89, normal axis, normal intervals, with no ST changes indicative of ischemia at this time. On exam nontoxic in no acute distress, in no respiratory distress area. Labs were obtained and showed worsening heart failure. She was admitted to the hospitalist service for heart failure management.       I have reviewed and interpreted all of the currently available lab results and diagnostics from this visit:  Results for orders placed or performed during the hospital encounter of 06/19/20   CBC auto differential   Result Value Ref Range    WBC 11.8 (H) 4.0 - 11.0 K/uL    RBC 3.69 (L) 4.00 - 5.20 M/uL    Hemoglobin 11.3 (L) 12.0 - 16.0 g/dL    Hematocrit 35.1 (L) 36.0 - 48.0 %    MCV 95.1 80.0 - 100.0 fL    MCH 30.6 26.0 - 34.0 pg    MCHC 32.2 31.0 - 36.0 g/dL    RDW 15.0 12.4 - 15.4 %    Platelets 253 056 - 423 K/uL    MPV 10.4 5.0 - 10.5 fL    Neutrophils % 81.9 %    Lymphocytes % 10.0 %    Monocytes % 6.7 %    Eosinophils % 0.4 %    Basophils % 1.0 %    Neutrophils Absolute 9.7 (H) 1.7 - 7.7 K/uL    Lymphocytes Absolute 1.2 1.0 - 5.1 K/uL    Monocytes Absolute 0.8 0.0 - 1.3 K/uL    Eosinophils Absolute 0.0 0.0 - 0.6 K/uL    Basophils Absolute 0.1 0.0 - 0.2 K/uL   Comprehensive Metabolic Panel w/ Reflex to MG   Result Value Ref Range    Sodium 135 (L) 136 - 145 mmol/L    Potassium reflex Magnesium 4.3 3.5 - 5.1 mmol/L Chloride 95 (L) 99 - 110 mmol/L    CO2 26 21 - 32 mmol/L    Anion Gap 14 3 - 16    Glucose 459 (H) 70 - 99 mg/dL    BUN 24 (H) 7 - 20 mg/dL    CREATININE 1.0 0.6 - 1.1 mg/dL    GFR Non- 57 (A) >60    GFR African American >60 >60    Calcium 9.3 8.3 - 10.6 mg/dL    Total Protein 7.2 6.4 - 8.2 g/dL    Alb 4.2 3.4 - 5.0 g/dL    Albumin/Globulin Ratio 1.4 1.1 - 2.2    Total Bilirubin 0.4 0.0 - 1.0 mg/dL    Alkaline Phosphatase 138 (H) 40 - 129 U/L    ALT 30 10 - 40 U/L    AST 27 15 - 37 U/L    Globulin 3.0 g/dL   Troponin   Result Value Ref Range    Troponin <0.01 <0.01 ng/mL   Brain Natriuretic Peptide   Result Value Ref Range    Pro-BNP 3,392 (H) 0 - 124 pg/mL   Urinalysis, reflex to microscopic   Result Value Ref Range    Color, UA Yellow Straw/Yellow    Clarity, UA Clear Clear    Glucose, Ur >=1000 (A) Negative mg/dL    Bilirubin Urine Negative Negative    Ketones, Urine Negative Negative mg/dL    Specific Gravity, UA 1.015 1.005 - 1.030    Blood, Urine Negative Negative    pH, UA 6.0 5.0 - 8.0    Protein, UA TRACE (A) Negative mg/dL    Urobilinogen, Urine 0.2 <2.0 E.U./dL    Nitrite, Urine Negative Negative    Leukocyte Esterase, Urine Negative Negative    Microscopic Examination YES     Urine Type NotGiven    Blood gas, venous   Result Value Ref Range    pH, Mono 7.409 7.350 - 7.450    pCO2, Mono 37.3 (L) 40.0 - 50.0 mmHg    pO2, Mono 58.5 (H) 25.0 - 40.0 mmHg    HCO3, Venous 23.1 23.0 - 29.0 mmol/L    Base Excess, Mono -1.3 -3.0 - 3.0 mmol/L    O2 Sat, Mono 91 Not Established %    Carboxyhemoglobin 1.4 0.0 - 1.5 %    MetHgb, Mono 0.3 <1.5 %    TC02 (Calc), Mono 24 Not Established mmol/L    O2 Content, Mono 15 Not Established VOL %    O2 Therapy Unknown    Microscopic Urinalysis   Result Value Ref Range    Mucus, UA 1+ (A) None Seen /LPF    WBC, UA 0-2 0 - 5 /HPF    RBC, UA None seen 0 - 4 /HPF    Epithelial Cells, UA 2-5 0 - 5 /HPF    Bacteria, UA 2+ (A) None Seen /HPF    Yeast, UA Present (A) None Seen 06/20/20 0800 06/19/20 2157  metFORMIN (GLUCOPHAGE) tablet 1,000 mg  2 TIMES DAILY WITH MEALS      Acknowledged [facility] E    06/20/20 0800 06/19/20 2157  ipratropium-albuterol (DUONEB) nebulizer solution 1 ampule  EVERY 4 HOURS WHILE AWAKE      Last MAR action:  Given - by Osmel Mclaughlin on 06/19/20 at [address] [facility]    06/20/20 0800 06/19/20 2157  insulin lispro (HUMALOG) injection vial 0-18 Units  3 TIMES DAILY WITH MEALS      Acknowledged KRISTA TINEO E    06/20/20 0700 06/19/20 2157  levothyroxine (SYNTHROID) tablet 100 mcg  DAILY      Acknowledged [facility] E    06/19/20 2215 06/19/20 2157  atorvastatin (LIPITOR) tablet 40 mg  NIGHTLY      Last MAR action:  Given - by Garret Montiel on 06/19/20 at [address] [facility] TINEO E    06/19/20 2215 06/19/20 2157  furosemide (LASIX) tablet 40 mg  2 TIMES DAILY      Last MAR action:  Given - by Garret Montiel on 06/19/20 at 2310 [facility]    06/19/20 2215 06/19/20 2157  insulin glargine (LANTUS) injection vial 35 Units  2 TIMES DAILY      Last MAR action:  Given - by Garret Montiel on 06/19/20 at [address] [facility] T[facility] E    06/19/20 2215 06/19/20 2157  gabapentin (NEURONTIN) capsule 300 mg  3 TIMES DAILY      Last MAR action:  Given - by Garret Montiel on 06/19/20 at 2310 [facility] E    06/19/20 2215 06/19/20 2157  sildenafil (REVATIO) tablet 20 mg  3 TIMES DAILY      Last MAR action:  Given - by Garret Montiel on 06/19/20 at [address] [facility] E    06/19/20 2215 06/19/20 2157  budesonide-formoterol (SYMBICORT) 160-4.5 MCG/ACT inhaler 2 puff  2 TIMES DAILY      Last MAR action:  Given - by Osmel Mclaughlin on 06/19/20 at [address] [facility]    06/19/20 2215 06/19/20 2157  sodium chloride flush 0.9 % injection 10 mL  2 times per day      Last MAR action:  Given - by Garret Montiel on 06/19/20 at [facility] [facility]    06/19/20 2215 06/19/20 2157  insulin lispro (HUMALOG) injection vial 0-9 Units  NIGHTLY      Last MAR action:  Given - by YOSHI, ALESSANDRA on 06/19/20 at [address] [facility]    06/19/20 2155 06/19/20 2157  acetaminophen (TYLENOL) tablet 650 mg  EVERY 6 HOURS PRN      Acknowledged [facility]    06/19/20 2155 06/19/20 2157  acetaminophen (TYLENOL) suppository 650 mg  EVERY 6 HOURS PRN      Acknowledged [facility]    06/19/20 2155 06/19/20 2157  promethazine (PHENERGAN) tablet 12.5 mg  EVERY 6 HOURS PRN      Acknowledged Ellouise Beans E    06/19/20 2155 06/19/20 2157  ondansetron (ZOFRAN) injection 4 mg  EVERY 6 HOURS PRN      Acknowledged Ellou[facility] Beans E    06/19/20 2155 06/19/20 2157  potassium chloride (KLOR-CON M) extended release tablet 40 mEq  PRN      Acknowledged Misaouise Beans E    06/19/20 2155 06/19/20 2157  potassium bicarb-citric acid (EFFER-K) effervescent tablet 40 mEq  PRN      Acknowledged Avisise Beans E    06/19/20 2155 06/19/20 2157  potassium chloride 10 mEq/100 mL IVPB (Peripheral Line)  PRN      Acknowledged Edda Chicas    06/19/20 2155 06/19/20 2157  sodium chloride flush 0.9 % injection 10 mL  PRN      Acknowledged RIVKA OBIORA E    06/19/20 2155 06/19/20 2157  oxyCODONE-acetaminophen (PERCOCET) 7.5-325 MG per tablet 1 tablet  EVERY 6 HOURS PRN      Acknowledged Ellouise Beans E    06/19/20 2155 06/19/20 2157  polyethylene glycol (GLYCOLAX) packet 17 g  DAILY PRN      Acknowledged Ellouise Beans E    06/19/20 2155 06/19/20 2157  glucose (GLUTOSE) 40 % oral gel 15 g  PRN      Acknowledged RIVKA, OBIORA E    06/19/20 2155 06/19/20 2157  dextrose 50 % IV solution  PRN      Acknowledged RIVKA, OBIORA E    06/19/20 2155 06/19/20 2157  glucagon (rDNA) injection 1 mg  PRN      Acknowledged RIVKA, OBIORA E    06/19/20 2155 06/19/20 2157  dextrose 5 % solution  PRN      Acknowledged RIVKA, OBIORA E    06/19/20 2130 06/19/20 2124  acetaminophen (TYLENOL) tablet 1,000 mg  ONCE      Last MAR action:  Given - by Maria Del Carmen Orourke on 06/19/20 at [address]    06/19/20 2130 06/19/20 2124  ketorolac (TORADOL) injection 15 mg  ONCE      Last MAR action:  Given - by Stanislav Strange on 06/19/20 at [address]    06/19/20 2015 06/19/20 2010  insulin regular (HUMULIN R;NOVOLIN R) injection 7 Units  ONCE      Last MAR action:  Given - by Lelo King on 06/19/20 at 2040 [facility]    06/19/20 2000 06/19/20 1947  ipratropium-albuterol (DUONEB) nebulizer solution 1 ampule  ONCE      Last MAR action:  Given - by Lelo King on 06/19/20 at [address]39 [facility]    06/19/20 1945 06/19/20 1940  lidocaine 4 % external patch 1 patch  ONCE      Last MAR action:  Not Given - by Lelo King on 06/19/20 at [address] [facility]          Final Impression      1. Acute on chronic respiratory failure, unspecified whether with hypoxia or hypercapnia ([address]    2. Shortness of breath    3. Ambulatory dysfunction    4. Congestive heart failure, unspecified HF chronicity, unspecified heart failure type ([address]    5. Dyspnea and respiratory abnormalities    6. Pulmonary hypertension ([address]        DISPOSITION Admitted 06/19/2020 08:34:00 PM       Amount and/or Complexity of Data Reviewed:  Clinical lab tests: ordered and reviewed   Tests in the radiology section of CPT®: ordered and reviewed   Tests in the medicine section of CPT®: ordered and reviewed   Decide to obtain previous medical records or to obtain history from someone other than the patient: no  Obtain history from someone other than the patient: no  Review and summarize past medical records:yes  I looked up the patient in our electronic medical record:yes  Discuss the patient with other providers:yes  Independent visualization of images, tracings, or specimens:yes  Risk of Complications, Morbidity, and/or Mortality:Moderate  Presenting problems: moderate  Management options: moderate     Critical Care Attestation   Total critical care time: 35 minutes minimum   Critical care time does not include separately billable procedures and treating other patients.    Critical care was necessary to treat or prevent imminent or life-threatening deterioration of the following conditions: Hypoxia secondary to heart failure. Patient provided with supplemental oxygen and treated urgently in the ED with diuretics. Case discussed with consultants. This record is transcribed utilizing voice recognition technology. There are inherent limitations in this technology. In addition, there may be limitations in editing of this report. If there are any discrepancies, please contact me directly.     Itz Trevino MD   6/19/2020         Nse[place] Eliecer Daniel MD  06/19/20 7979

## 2020-06-20 NOTE — PLAN OF CARE
Problem: Pain:  Goal: Pain level will decrease  Description: Pain level will decrease  Outcome: Ongoing     Problem: OXYGENATION/RESPIRATORY FUNCTION  Goal: Patient will maintain patent airway  6/20/2020 1031 by Concepción Smiley RN  Note: Encouraged cough and deep breathing qh2 and prn. Supplemental O2 at 4L= to home dose.     Problem: HEMODYNAMIC STATUS  Goal: Patient has stable vital signs and fluid balance  Outcome: Ongoing

## 2020-06-20 NOTE — H&P
Hospital Medicine History & Physical      PCP: Bud Vickers MD    Date of Admission: 2020    Date of Service: Pt seen/examined on 2020 and Admitted to Inpatient     Chief Complaint: SOB    History Of Present Illness: The patient is a 62 y.o. female who presents to [facility] with SOB. Pt was recently discharged from rehab 2 days ago and states she has been having worsening SOB with chest pain and hence presented to the ER. CXR with CHF, uses 2-3 L O2 at home and now needing 4L O2.       Past Medical History:        Diagnosis Date    Acute osteomyelitis of left hallux ([address] 2019    Anemia 2015    Asthma     Cellulitis of foot 2019    CHF (congestive heart failure) (HCC)     Claudication of left upper extremity due to atherosclerosis ([address] 10/2007    Diabetic ulcer of left hallux, with necrosis of bone ([address] 2019    DVT (deep venous thrombosis) ([address]     Kidney stones     Morbid obesity with BMI of 45.0-49.9, adult ([address] 2014    Neuroma of hand 2013    Open fracture of left hallux 2019    Pneumonia     Pulmonary HTN (HCC)     Rotator cuff tendinitis 2014    STEMI (ST elevation myocardial infarction) ([address] 14    Dr. Osmar Tinoco Urinary incontinence        Past Surgical History:        Procedure Laterality Date    APPENDECTOMY      BRONCHOSCOPY N/A 2020    BRONCHOSCOPY DIAGNOSTIC OR CELL [address] ONLY performed by Rosalba Levy MD at [address] [place] [address]  2020    BRONCHOSCOPY BRUSHINGS performed by Rosalba Levy MD at [address] [facility] Bilateral 2015     SECTION      CORONARY ANGIOPLASTY WITH STENT PLACEMENT  14    DILATION AND CURETTAGE OF UTERUS      FOOT AMPUTATION Left 6/3/2019    LEFT HALLUX AMPUTATION WITH GRAFT APPLICATION [facility] D[facility] Left 8/20/2019    PARTIAL FIRST RAY  AMPUTATION LEFT FOOT [facility] Left 06/2005    atherectomy of SFA and popliteal artery    VASCULAR SURGERY Left 10/2007    subclavian angiopalsty and stent       Medications Prior to Admission:    Prior to Admission medications    Medication Sig Start Date End Date Taking? Authorizing Provider   oxyCODONE-acetaminophen (PERCOCET) 7.5-325 MG per tablet Take 1 tablet by mouth every 6 hours as needed for Pain. Yes Historical Provider, MD   aspirin 81 MG EC tablet Take 81 mg by mouth daily   Yes Historical Provider, MD   sildenafil (REVATIO) 20 MG tablet Take 1 tablet by mouth 3 times daily 6/19/20   MITCHELL Be CNP   furosemide (LASIX) 40 MG tablet Take 1 tablet by mouth 2 times daily 6/17/20   MITCHELL Harris CNP   spironolactone (ALDACTONE) 25 MG tablet Take 1 tablet by mouth daily 6/18/20   MITCHELL Harris CNP   gabapentin (NEURONTIN) 300 MG capsule Take 3 capsules by mouth 3 times daily. 5/28/20 6/26/20  Ledy Kwan MD   metFORMIN (GLUCOPHAGE) 1000 MG tablet Take 1 tablet by mouth twice daily with meals. 5/28/20   Ledy Kwan MD   sertraline (ZOLOFT) 100 MG tablet Take 1 tablet by mouth daily 5/21/20   Ledy Kwan MD   fluticasone (FLONASE) 50 MCG/ACT nasal spray 2 sprays by Nasal route daily 5/21/20 5/21/21  Ledy Kwan MD   oxybutynin (DITROPAN) 5 MG tablet 1 po q am and 2 po qhs 5/21/20   Ledy Kwan MD   SPIRIVA RESPIMAT 2.5 MCG/ACT AERS inhaler INHALE 2 PUFFS BY MOUTH DAILY 5/21/20   Ledy Kwan MD   clopidogrel (PLAVIX) 75 MG tablet Take 1 tablet by mouth daily.  5/18/20   Ledy Kwan MD   insulin glargine (LANTUS) 100 UNIT/ML injection vial Inject 35 Units into the skin 2 times daily 5/18/20   Sumit Mazariegos MD   insulin lispro (HUMALOG) 100 UNIT/ML injection Pulse 73   Temp 97.1 °F (36.2 °C) (Oral)   Resp 16   Ht 5' (1.524 m)   Wt 181 lb 9.6 oz (82.4 kg)   LMP  (LMP Unknown)   SpO2 96%   BMI 35.47 kg/m²     General appearance: No apparent distress appears stated age and cooperative. HEENT Normal cephalic, atraumatic without obvious deformity. Pupils equal, round, and reactive to light. Extra ocular muscles intact. Conjunctivae/corneas clear. Neck: Supple, No jugular venous distention/bruits. Trachea midline without thyromegaly or adenopathy with full range of motion. Lungs: diminished b/l  Heart: Regular rate and rhythm with Normal S1/S2   Abdomen: Soft, non-tender or non-distended without rigidity or guarding and positive bowel sounds  Extremities: No clubbing, cyanosis, or edema bilaterally. Skin: Skin color, texture, turgor normal.  No rashes or lesions. Neurologic: Awake, neurovascularly intact with sensory/motor intact upper extremities/lower extremities, bilaterally. Cranial nerves: II-XII intact, grossly non-focal.  Mental status: Awake  Capillary Refill: Acceptable  < 3 seconds  Peripheral Pulses: +3 Easily felt, not easily obliterated with pressure      CXR:  I have reviewed the CXR with the following interpretation: pulm edema    CBC   Recent Labs     06/19/20 1930 06/20/20  0451   WBC 11.8* 14.1*   HGB 11.3* 10.8*   HCT 35.1* 33.2*    237      RENAL  Recent Labs     06/19/20 1930 06/20/20  0451   * 136   K 4.3 4.3   CL 95* 97*   CO2 26 24   BUN 24* 23*   CREATININE 1.0 0.9     LFT'S  Recent Labs     06/19/20 1930   AST 27   ALT 30   BILITOT 0.4   ALKPHOS 138*     COAG  No results for input(s): INR in the last 72 hours.   CARDIAC ENZYMES  Recent Labs     06/19/20 1930   TROPONINI <0.01       U/A:    Lab Results   Component Value Date    NITRITE neg 02/23/2017    COLORU Yellow 06/19/2020    WBCUA 0-2 06/19/2020    RBCUA None seen 06/19/2020    MUCUS 1+ 06/19/2020    BACTERIA 2+ 06/19/2020    CLARITYU Clear 06/19/2020

## 2020-06-20 NOTE — PROGRESS NOTES
Spoke with Pat Neri, (daughter and primary decision maker) [phone]. She verbalizes that the patient has just gotten out of the hospital and needs rehabilitating. Pat Neri has spoken with [facility] and was beginning a process to get her there.

## 2020-06-20 NOTE — CARE COORDINATION
Case Management Assessment  Initial Evaluation    Date/Time of Evaluation: 6/20/2020 5:28 PM  Assessment Completed by: Osito Mayen    Patient Name: Sinai Clark  YOB: 1960  Diagnosis: Acute on chronic respiratory failure ([address] [J96.20]  Date / Time: 6/19/2020  6:57 PM  Admission status/Date:  06/20/20  Chart Reviewed: Yes      Patient Interviewed: Yes   Family Interviewed:  Yes - daughter, Jose Riojas      Hospitalization in the last 30 days:  Yes    Contacts  :     Relationship to Patient:   Phone Number:    Alternate Contact:     Relationship to Patient:     Phone Number:    Met with:    Current PCP: Tyrel Seo MD    Financial  [employer]  Precert required for SNF : Y, N        3 night stay required - Y, N    ADLS  Support Systems: Family Members, Children  Transportation: family    Meal Preparation: family    Housing  Home Environment: lives w/daughter  Steps: NA  Plans to Return to Present Housing: Yes  Other Identified Issues: no 24/7 [facility]  Currently active with [address] : Yes - [facility]  Type of Home Care Services: Nursing Services  Passport/Waiver : No  :                      Phone Number:    Passport/Waiver Services: NA          [facility] Provider: [facility]  Equipment: Walker_X__Cane_X__RTS___ BSC___Shower Chair___  02_X_ at __2__Liter(s)---Uses_cont_______HHN___ CPAP___ BiPap___ Hospital Bed___W/C____Other________      Has Home O2 in place on admit:  Yes    If above answer is No ---is pt presently on O2 during hospitalization:  Yes  if yes CM to follow for potential DC O2 need  Informed of need to bring portable home O2 tank on day of discharge for nursing to connect prior to leaving:   Yes  Verbalized agreement/Understanding:   Yes    Community Service Affiliation  Dialysis:  No    · Name:  · Location  · Dialysis Schedule:  · Phone:   · Fax:     Outpatient PT/OT: No    Cancer Center: No     CHF Clinic: No     Pulmonary Rehab: No  Pain Clinic: No  Community Mental Health: No    Wound Clinic: No     COVID SCREENING: Yes - will need screening for SNF placement       Other: NA    The Plan for Transition of Care is related to the following treatment goals: STR. Pt/family chooses [facility]. Referral sent for review. Will need pre-cert and B81 screending. The Patient and/or patient representative (daughter) was provided with a choice of provider and agrees   with the discharge plan. [x] Yes [] No    Freedom of choice list was provided with basic dialogue that supports the patient's individualized plan of care/goals, treatment preferences and shares the quality data associated with the providers. [x] Yes [] No    DISCHARGE PLAN: see note above  Explained Case Management role/services.

## 2020-06-20 NOTE — PROGRESS NOTES
Pt ambulated to bedside commode. Tolerated well. C/o 10/10 pain, PRN pain meds given, see MAR. Will continue to monitor.

## 2020-06-20 NOTE — PROGRESS NOTES
Admission assessment complete. Orders reviewed, meds given. Pt oriented to room with call light and bedside table with belongings within reach. Pt refuses to ambulate. Pt refuses bed alarm but calls out appropriately. Pt requesting door closed at this time. C/o 10/10 pain, scheduled tylenol and Toradol given. Will continue to monitor.

## 2020-06-20 NOTE — PROGRESS NOTES
RESPIRATORY THERAPY ASSESSMENT    Name:  Keyon Benjamin Record Number:  [id number]  Age: 62 y.o. Gender: female  : 1960  Today's Date:  2020  Room:  /0313-01    Assessment     Is the patient being admitted for a COPD or Asthma exacerbation? No   (If yes the patient will be seen every 4 hours for the first 24 hours and then reassessed)    Patient Admission Diagnosis      Allergies  Allergies   Allergen Reactions    Flexeril [Cyclobenzaprine] Itching    Januvia [Sitagliptin] Other (See Comments)     Causes severe back pain and cramps    Lisinopril      cough    Iodine Rash       Minimum Predicted Vital Capacity:               Actual Vital Capacity:                    Pulmonary History:asthma/chf  Home Oxygen Therapy:  3L  Home Respiratory Therapy:albuterol prn/spiriva/symbicort   Current Respiratory Therapy:  duoneb q4wa/symbicort bid  Treatment Type: MDI  Medications: Budesonide/Formoterol    Respiratory Severity Index(RSI)   Patients with orders for inhalation medications, oxygen, or any therapeutic treatment modality will be placed on Respiratory Protocol. They will be assessed with the first treatment and at least every 72 hours thereafter. The following severity scale will be used to determine frequency of treatment intervention.     Smoking History: Pulmonary Disease or Smoking History, Greater than 15 pack year = 2    Social History  Social History     Tobacco Use    Smoking status: Current Every Day Smoker     Packs/day: 0.50     Years: 40.00     Pack years: 20.00     Types: Cigarettes    Smokeless tobacco: Former User    Tobacco comment: .5 ppd   Substance Use Topics    Alcohol use: No     Alcohol/week: 0.0 standard drinks    Drug use: No       Recent Surgical History: None = 0  Past Surgical History  Past Surgical History:   Procedure Laterality Date    APPENDECTOMY      BRONCHOSCOPY N/A 2020    BRONCHOSCOPY DIAGNOSTIC OR CELL [address] ONLY performed by Jessica Kidd Chichi Munoz MD at [address]  2020    BRONCHOSCOPY BRUSHINGS performed by Gwen Schofield MD at [address] [facility] Bilateral 2015     SECTION      CORONARY ANGIOPLASTY WITH STENT PLACEMENT  14    DILATION AND CURETTAGE OF UTERUS      FOOT AMPUTATION Left 6/3/2019    LEFT HALLUX AMPUTATION WITH GRAFT APPLICATION performed by María Maxwell DPM at [address] [facility] [address] Left 2019    PARTIAL FIRST RAY  AMPUTATION LEFT FOOT performed by German Obregon DPM at [place] [address] Left 2005    atherectomy of SFA and popliteal artery    VASCULAR SURGERY Left 10/2007    subclavian angiopalsty and stent       Level of Consciousness: Alert, Oriented, and Cooperative = 0    Level of Activity: Mostly sedentary, minimal walking = 2    Respiratory Pattern: Dyspnea with exertion;Irregular pattern;or RR less than 6 = 2    Breath Sounds: Absent bilaterally and/or with wheezes = 3    Sputum  Sputum Color: None,  ,    Cough: Strong, spontaneous, non-productive = 0    Vital Signs   BP (!) 118/59   Pulse 83   Temp 97.9 °F (36.6 °C) (Oral)   Resp 18   Ht 5' (1.524 m)   Wt 185 lb 6.4 oz (84.1 kg)   LMP  (LMP Unknown)   SpO2 93%   BMI 36.21 kg/m²   SPO2 (COPD values may differ): 86-87% on room air or greater than 92% on FiO2 35- 50% = 3    Peak Flow (asthma only): not applicable = 0    RSI: 96-59 = Q6H or QID and Q4HPRN for dyspnea        Plan       Goals: medication delivery and improve oxygenation    Patient/caregiver was educated on the proper method of use for Respiratory Care Devices:  Yes      Level of patient/caregiver understanding able to:   ? Verbalize understanding   ? Demonstrate understanding       ? Teach back        ? Needs reinforcement       ? No available caregiver               ? Other:     Response to education:  Good     Is patient being placed on Home Treatment Regimen?   No     Does the patient have everything they need prior to discharge? NA     Comments: chart reviewed and patient assessed    Plan of Care: keep duoneb q4wa for wheezes    Electronically signed by Harleen Hancock RCP on 6/20/2020 at 1:14 AM    Respiratory Protocol Guidelines     1. Assessment and treatment by Respiratory Therapy will be initiated for medication and therapeutic interventions upon initiation of aerosolized medication. 2. Physician will be contacted for respiratory rate (RR) greater than 35 breaths per minute. Therapy will be held for heart rate (HR) greater than 140 beats per minute, pending direction from physician. 3. Bronchodilators will be administered via Metered Dose Inhaler (MDI) with spacer when the following criteria are met:  a. Alert and cooperative     b. HR < 140 bpm  c. RR < 30 bpm                d. Can demonstrate a 2-3 second inspiratory hold  4. Bronchodilators will be administered via Hand Held Nebulizer [facility]) to patients when ANY of the following criteria are met  a. Incognizant or uncooperative          b. Patients treated with HHN at Home        c. Unable to demonstrate proper use of MDI with spacer     d. RR > 30 bpm   5. Bronchodilators will be delivered via Metered Dose Inhaler (MDI), HHN, Aerogen to intubated patients on mechanical ventilation. 6. Inhalation medication orders will be delivered and/or substituted as outlined below. Aerosolized Medications Ordering and Administration Guidelines:    1. All Medications will be ordered by a physician, and their frequency and/or modality will be adjusted as defined by the patients Respiratory Severity Index (RSI) score. 2. If the patient does not have documented COPD, consider discontinuing anticholinergics when RSI is less than 9.  3. If the bronchospasm worsens (increased RSI), then the bronchodilator frequency can be increased to a maximum of every 4 hours. If greater than every 4 hours is required, the physician will be contacted.   4. If

## 2020-06-20 NOTE — PLAN OF CARE
Problem: OXYGENATION/RESPIRATORY FUNCTION  Goal: Patient will maintain patent airway  Outcome: Ongoing  Goal: Patient will achieve/maintain normal respiratory rate/effort  Description: Respiratory rate and effort will be within normal limits for the patient  Outcome: Ongoing     Problem: FLUID AND ELECTROLYTE IMBALANCE  Goal: Fluid and electrolyte balance are achieved/maintained  Outcome: Ongoing

## 2020-06-20 NOTE — PROGRESS NOTES
Patient's EF (Ejection Fraction) is greater than 40%    Patient's weights and intake/output reviewed:    Patient's Last Weight: 181 lbs obtained by standing scale. Difference of 4 lbs less than last documented weight. Intake/Output Summary (Last 24 hours) at 6/20/2020 1028  Last data filed at 6/20/2020 0820  Gross per 24 hour   Intake 300 ml   Output 200 ml   Net 100 ml         Pt is currently on 4 L O2. Pt with complaints of shortness of breath with exertion. Pt with nonpitting lower extremity edema. Patient and/or Family's stated Goal of Care this Admission: better understand heart failure and disease management and be more comfortable prior to discharge. Comorbidities Reviewed Yes  Patient has a past medical history of Acute osteomyelitis of left hallux ([address] Anemia, Asthma, Cellulitis of foot, CHF (congestive heart failure) ([address] Claudication of left upper extremity due to atherosclerosis [facility]), Diabetic ulcer of left hallux, with necrosis of bone ([address] DVT (deep venous thrombosis) ([address] Kidney stones, Morbid obesity with BMI of 45.0-49.9, adult ([address] Neuroma of hand, Open fracture of left hallux, Pneumonia, Pulmonary HTN ([address] Rotator cuff tendinitis, STEMI (ST elevation myocardial infarction) ([address] and Urinary incontinence.          >>For CHF and Comorbidity documentation on Education Time and Topics, please see Education Tab

## 2020-06-20 NOTE — PLAN OF CARE
Pt did not want to be examined without her pain being addressed with IV narcotics. Got really agitated. Apparently was hypoxic on 3 liters of oxygen per EMS. Elevated blood sugars.  Complains of 10/10 pain, was just discharged from [place] [address] home meds and home percocet therapy  - Insulin therapy plus high dose SSI

## 2020-06-20 NOTE — FLOWSHEET NOTE
06/20/20 1158   Encounter Summary   Services provided to: Patient not available   Referral/Consult From: [facility]   (6/20/Phone support. No answer.)

## 2020-06-20 NOTE — PROGRESS NOTES
Patient admitted to room 313 from ED. Patient oriented to room, call light, bed rails, phone, lights and bathroom. Patient instructed about the schedule of the day including: vital sign frequency, lab draws, possible tests, frequency of MD and staff rounds, daily weights, I &O's and prescribed diet. no bed alarm in place, patient aware of placement and reason. Telemetry box in place, patient aware of placement and reason. Bed locked, in lowest position, side rails up 2/4, call light within reach. Recliner Assessment  Patient is able to demonstrated the ability to move from a reclining position to an upright position within the recliner. 4 Eyes Skin Assessment     The patient is being assess for   Admission    I agree that 2 RN's have performed a thorough Head to Toe Skin Assessment on the patient. ALL assessment sites listed below have been assessed. Areas assessed by both nurses:   [x]   Head, Face, and Ears   [x]   Shoulders, Back, and Chest, Abdomen  [x]   Arms, Elbows, and Hands   [x]   Coccyx, Sacrum, and Ischium  [x]   Legs, Feet, and Heels        Left, great toe amputation, blanchable redness to bottom, scattered bruising. **SHARE this note so that the co-signing nurse is able to place an eSignature**    Co-signer eSignature: Electronically signed by Rosanna Napoles RN on 6/19/20 at 11:57 PM EDT    Does the Patient have Skin Breakdown?   No          Srinivas Prevention initiated:  No   Wound Care Orders initiated:  No      [facility] nurse consulted for Pressure Injury (Stage 3,4, Unstageable, DTI, NWPT, Complex wounds)and New or Established Ostomies:  No      Primary Nurse eSignature: Electronically signed by Laura Heredia RN on 6/19/20 at 11:47 PM EDT

## 2020-06-21 NOTE — PROGRESS NOTES
Inpatient Physical Therapy Evaluation and Treatment    Unit: PCU  Date:  6/21/2020  Patient Name:    Richa Fofana  Admitting diagnosis:  Acute on chronic respiratory failure [facility]) [J96.20]  Admit Date:  6/19/2020  Precautions/Restrictions/WB Status/ Lines/ Wounds/ Oxygen: Fall risk, Bed/chair alarm, Lines -IV and Supplemental O2 (4) and Telemetry    Treatment Time: 1030 - 1106  Treatment Number:  1   Timed Code Treatment Minutes: 20 minutes + Eval  Total Treatment Minutes: 36 minutes    Patient Goals for Therapy: \" To get better \"          Discharge Recommendations: SNF  DME needs for discharge: defer to facility       Therapy recommendation for EMS Transport: can transport by wheelchair    Therapy recommendations for staff:   Assist of 1 with use of rolling walker (RW) for all transfers and ambulation to/from bathroom    History of Present Illness: From ED Note, Suzi WATSON: \"61 yo female with a hx CAD, CHF, pulm HTN, HTN, hLD, Acute on chronic diastolic CHF/pulmonary HTN with respiratory failure with hypoxia, typically on 3L NCO2, DM2, morbid obesity, osteomyelitis the left foot, anxiety, depression, JORDI, PVD, tobacco use, presents via EMS for evaluation of shortness of breath. Worsened since d/c from [facility] 2 days ago.   EMS indicate that her oxygen saturation when their arrival was in the 60s, placed on a nonrebreather mask. She is now on 4 L nasal cannula O2 and 95%.  Patient advised that she should not of been discharged from the hospital.  She indicates that she was discharged to her home and she had home health care but they are not adequate to take care of her. Patient advised that she has chronic pain everywhere. this Is not new.  Patient denies any fevers or coughing. Denies any known sick contacts. +current everyday smoker\"     Home Health S4 Level Recommendation:  NA  AM-PAC Mobility Score    AM-PAC Inpatient Mobility Raw Score : 17     Preadmission Environment    Pt.  Lives with family (daughter, who works weekdays and granddaughter who helps during the day)  Home environment:  mobile home/trailer  Steps to enter first floor: 2 steps to enter and no handrail  Steps to second floor: N/A  Bathroom: tub/shower unit and standard height commode  Equipment owned: rollator, SPC, home O2 (3L) continous and mobile scooter    Preadmission Status:  Pt. Able to drive: No  Pt Fully independent with ADLs: Yes  Pt. Required assistance from family for: Independent PTA, daughter does cooking and cleaning, grand daughter assists in ADLs intermittently. Pt. independent for transfers and gait and walked with [employer]  History of falls No    Pain   Yes  Location: torso  Rating: 10 /10  Pain Medicine Status: Received pain med prior to tx    Cognition    A&O x4   Able to follow 2 step commands    Subjective  Patient lying supine in bed with no family present. Pt agreeable to this PT eval & tx. Upper Extremity ROM/Strength  Please see OT evaluation.       Lower Extremity ROM / Strength   AROM WFL: Yes  ROM limitations: N/A    Strength Assessment (measured on a 0-5 scale):  R LE   Quad   3+   Ant Tib  3+   Hamstring 3+   Iliopsoas 3+  L LE  Quad   3+   Ant Tib  3+   Hamstring 3+   Iliopsoas 3+    Lower Extremity Sensation    WFL    Lower Extremity Proprioception:   WFL    Coordination and Tone  WFL    Balance  Sitting:  Good ; Supervision  Comments:     Standing: Fair -; CGA  Comments: 3 min    Bed Mobility   Supine to Sit:    Min A   Sit to Supine:   Not Tested  Rolling:   Not Tested  Scooting in sitting: Min A   Scooting in supine:  Not Tested    Transfer Training     Sit to stand:   CGA  Stand to sit:   CGA  Bed to Chair:   CGA with use of rolling walker (RW)    Gait gait completed as indicated below  Distance:      15 ft +  15 ft (with one sitting break on the commode)  Deviations (firm surface/linoleum):  decreased marlene, increased MYA, forward flexed posture, decreased step length bilaterally and decreased stance time bilaterally  Assistive Device Used:    rolling walker (RW)  Level of Assist:    CGA  Comment: Patient had mild to moderate SOB with ambulation. Patient also refused to wear anti skid socks and did not have any footwear in the room. RN was notified. Stair Training deferred, pt unsafe/ not appropriate to complete stairs at this time    Activity Tolerance   Pt completed therapy session with Pain noted with standing and ambulation  SOB noted with standing and ambulation    BP HR SpO2   Supine, at rest NT 81bpm 96% on 4L   With activity NT 84-92bpm 85-86%   Seated in chair (following walk to and from bathroom) NT 103bpm 80%   End of session (seated in recliner)  NT 87bpm 91%   (2 min rest break)       Positioning Needs   Pt reclined in chair, alarm set, positioned in proper neutral alignment and pressure relief provided. Call light provided and all needs within reach    Exercises Initiated  all completed bilaterally unless indicated  Ankle Pumps x 10 reps    Other  Patient needed Max assistance in leslee care and hygiene during toilet activities. Patient/Family Education   Pt educated on role of inpatient PT, POC, importance of continued activity, DC recommendations, safety awareness, transfer techniques, pursed lip breathing, energy conservation, pacing activity and calling for assist with mobility. Assessment  Pt seen for Physical Therapy evaluation in acute care setting. Patient had moderate SOB with activity with supplementary oxygen on her. Patient was less compliant with postural and safety suggestions given by the therapist during functional transfers and ambulation. Patient was set in her ways and also refused to wear antiskid socks. RN was notified. Pt demonstrated decreased Activity tolerance, Balance, Safety and Strength as well as decreased independence with Ambulation, Bed Mobility  and Transfers.      Recommending SNF upon discharge as patient functioning well below baseline, demonstrates good rehab potential and unable to return home due to inability to negotiate stairs to enter home/bedroom/bathroom, home environment not conducive to patient recovery and limited safety awareness. Goals : To be met in 3 visits:  1). Independent with LE Ex x 10 reps    To be met in 6 visits:  1). Supine to/from sit: SBA  2). Sit to/from stand: SBA  3). Bed to chair: SBA  4). Gait: Ambulate 150 ft.  with  SBA and use of LRAD (least restrictive assistive device)  5). Tolerate B LE exercises 3 sets of 10-15 reps  6). Ascend/descend 2 steps with Min A  with use of no handrail and LRAD (least restrictive assistive device)    Rehabilitation Potential: Fair  Strengths for achieving goals include:   PLOF and Family Support   Barriers to achieving goals include:    Complexity of condition and Patient's intermittent no-compliance. Plan    To be seen 3-5 x / week  while in acute care setting for therapeutic exercises, bed mobility, transfers, progressive gait training, balance training, and family/patient education. Signature: Nargis Morris MS PT, # [id number]      If patient discharges from this facility prior to next visit, this note will serve as the Discharge Summary.

## 2020-06-21 NOTE — PROGRESS NOTES
I have recommended that this patient have a Lovenox injection but she declines at this time. I have discussed the risks and benefits of this medication with her. The patient verbalizes understanding.

## 2020-06-21 NOTE — PROGRESS NOTES
Patient's EF (Ejection Fraction) is greater than 40%    Patient's weights and intake/output reviewed:    Patient's Last Weight: 182 lbs obtained by bed scale. Difference of 1 lbs more than last documented weight. Intake/Output Summary (Last 24 hours) at 6/21/2020 1637  Last data filed at 6/21/2020 1410  Gross per 24 hour   Intake 1512 ml   Output 1350 ml   Net 162 ml         Pt is currently on 4 L O2. Pt with complaints of shortness of breath with exertion. Pt without lower extremity edema. Patient and/or Family's stated Goal of Care this Admission: reduce shortness of breath, increase activity tolerance, better understand heart failure and disease management, be more comfortable, reduce lower extremity edema and unable to assess, will attempt again prior to discharge      Comorbidities Reviewed Yes  Patient has a past medical history of Acute osteomyelitis of left hallux ([address] Anemia, Asthma, Cellulitis of foot, CHF (congestive heart failure) ([address] Claudication of left upper extremity due to atherosclerosis [facility]), Diabetic ulcer of left hallux, with necrosis of bone ([address] DVT (deep venous thrombosis) ([address] Kidney stones, Morbid obesity with BMI of 45.0-49.9, adult ([address] Neuroma of hand, Open fracture of left hallux, Pneumonia, Pulmonary HTN ([address] Rotator cuff tendinitis, STEMI (ST elevation myocardial infarction) ([address] and Urinary incontinence.          >>For CHF and Comorbidity documentation on Education Time and Topics, please see Education Tab

## 2020-06-21 NOTE — PLAN OF CARE
Problem: Falls - Risk of:  Goal: Will remain free from falls  Description: Will remain free from falls  Outcome: Ongoing     Problem: Pain:  Goal: Pain level will decrease  Description: Pain level will decrease  6/20/2020 2239 by Ara Garnett RN  Outcome: Ongoing  6/20/2020 1031 by Asiya Blackwell RN  Outcome: Ongoing     Problem: Pain:  Goal: Control of chronic pain  Description: Control of chronic pain  Outcome: Ongoing

## 2020-06-21 NOTE — PROGRESS NOTES
Inpatient Occupational Therapy  Evaluation and Treatment    Unit: PCU  Date:  6/21/2020  Patient Name:    Inga Brittle  Admitting diagnosis:  Acute on chronic respiratory failure [facility]) [J96.20]  Admit Date:  6/19/2020  Precautions/Restrictions/WB Status/ Lines/ Wounds/ Oxygen: fall risk, IV, bed/chair alarm, supplemental O2 (4L) and telemetry    Treatment Time:  6124-5129  Treatment Number: 1     Billable Treatment Time: 25 minutes   Total Treatment Time:   35   minutes    Patient Goals for Therapy:  \" to go home \"      Discharge Recommendations: SNF  DME needs for discharge: Needs Met       Therapy recommendations for staff:   Assist of 1 with use of rolling walker (RW) for all ambulation within room    History of Present Illness: From ED Note, Suzi WATSON: \"63 yo female with a hx CAD, CHF, pulm HTN, HTN, hLD, Acute on chronic diastolic CHF/pulmonary HTN with respiratory failure with hypoxia, typically on 3L NCO2, DM2, morbid obesity, osteomyelitis the left foot, anxiety, depression, JORDI, PVD, tobacco use, presents via EMS for evaluation of shortness of breath. Worsened since d/c from [facility] 2 days ago. EMS indicate that her oxygen saturation when their arrival was in the 60s, placed on a nonrebreather mask. She is now on 4 L nasal cannula O2 and 95%. Patient advised that she should not of been discharged from the hospital.  She indicates that she was discharged to her home and she had home health care but they are not adequate to take care of her. Patient advised that she has chronic pain everywhere. this Is not new. Patient denies any fevers or coughing. Denies any known sick contacts. +current everyday smoker\"     Home Health S4 Level Recommendation:  Level 1 Standard  AM-PAC Score: AM-PAC Inpatient Daily Activity Raw Score: 16    Preadmission Environment    Pt.  Lives with family (daughter, who works weekdays and granddaughter who helps during the day))  Home environment:    mobile home/trailer  Steps to Tested  LE:  Max A    Eating:   Not Tested    Toileting: Max A for leslee care and depends   Pt is unable to reach her feet at this time related to SOB and fatigue. Activity Tolerance   Pt completed therapy session with SOB noted with all activity   BP HR SpO2   Supine, at rest NT 81bpm 96% on 4L   With activity NT 84-92bpm 85-86%   Seated in chair (following walk to and from bathroom) NT 103bpm 80%   End of session (seated in recliner)  NT 87bpm 91%   (2 min rest break)     Positioning Needs:   Reclined in chair with call light and needs in reach. Medical reason for recliner - Discussed with RN, patient approved to sit in recliner chair. Due to patient's medical condition: positioning for breathing, recliner is positional in nature and is not restrictive. Exercise / Activities Initiated:   N/A    Patient/Family Education:   Role of OT  Recommendations for DC  Energy conservation techniques  Safe RW use/hand placement    Assessment of Deficits: Pt seen for Occupational therapy evaluation in acute care setting. Pt demonstrated decreased Activity tolerance, ADLs, IADLs, Balance , Bed mobility and Transfers. Pt functioning below baseline and will likely benefit from skilled occupational therapy services to maximize safety and independence. Goal(s) : To be met in 3 Visits:  1). Bed to toilet: Supervision    To be met in 5 Visits:  1). Supine to/from Sit:  Supervision  2). Upper Body Bathing:   Supervision  3). Lower Body Bathing:   Min A  4). Upper Body Dressing:  Supervision  5). Lower Body Dressing:  Min A  6). Pt to demonstrate UE exs x 15 reps with minimal cues  7). instruct in energy conservation/ relaxation positions  8). instruct in the use of adaptive equipment  9). instruct in the use of inhalers     Rehabilitation Potential:  Good for goals listed above. Strengths for achieving goals include: Family Support  Barriers to achieving goals include:  Complexity of condition     Plan:   To be seen 3-5 x/wk while in acute care setting for therapeutic exercises, bed mobility, transfers, dressing, bathing, family/patient education, ADL/IADL retraining, energy conservation training.      LOIS Foss, OTR/L   [id number]           If patient discharges from this facility prior to next visit, this note will serve as the Discharge Summary

## 2020-06-22 NOTE — PROGRESS NOTES
Shift assessment completed, see flowsheets. Patient A/O x 4. AM medications given per orders. Patient c/o pain 10/10, PRN medications provided, see MAR. Patient denies further needs at this time. Patient currently awake in bed, call light and personal belongings within reach. Patient educated on use of call light and to call out with needs, verbalized understanding.

## 2020-06-22 NOTE — PROGRESS NOTES
Pt resting quietly in bed, assist x1 with walker to bathroom. SpO2 dropped down to 88%, recovered quickly to 93%. Resps e/e, lungs diminished. VSS. A/O x4. PRN percocet and phenergan requested- see MAR. Shift assessment complete, see flowsheet. Call light in reach, will monitor.

## 2020-06-22 NOTE — PROGRESS NOTES
Patient currently awake in bed with call light and personal belongings within reach. Patient voices no needs or concerns at this time.

## 2020-06-22 NOTE — CARE COORDINATION
INTERDISCIPLINARY PLAN OF CARE CONFERENCE    Date/Time: 6/22/2020 11:51 AM  Completed by: Sourav Connor, Case Management      Patient Name:  Juliana West  YOB: 1960  Admitting Diagnosis: Acute on chronic respiratory failure ([facility] [address] [J96.20]     Admit Date/Time:  6/19/2020  6:57 PM    Chart reviewed. Interdisciplinary team met to discuss patient progress and discharge plans. Disciplines included Case Management, Nursing, and Dietitian. Current Status:stable    PT/OT recommendation:SNF    Anticipated Discharge Date: tbd  Expected D/C Disposition:  Skilled nursing facility  Confirmed plan with patient and/or family Yes-pt  Discharge Plan Comments: Chart reviewed, met with pt at bedside. Pt states she wants to go to [facility] not [facility]. TC to Lobo Juan Alberto @ [facility] and she states they are out of network. Pt is requesting that writer call her dtr José Miguel Garcia for choice of facility. TC to dtr José Miguel Garcia and she would like [facility]. TC to [facility] and she can accept the pt and will start precert now. COVID Rule out ordered 6/22  Charge RN, Clinical RN, bedside RN and ID RN made aware. Per José Miguel Mckeon @ [facility] pt does not need precert and can come under her Medicaid with a negative COVID. The Plan for Transition of Care is related to the following treatment goals: STR @ [facility]    The Patient and/or patient representative --pt and dtr was provided with a choice of provider and agrees   with the discharge plan. [x] Yes [] No    Freedom of choice list was provided with basic dialogue that supports the patient's individualized plan of care/goals, treatment preferences and shares the quality data associated with the providers.  [x] Yes [] No    Home O2 in place on admit: ecf  Pt informed of need to bring portable home O2 tank on day of discharge for nursing to connect prior to leaving:  Not Indicated  Verbalized agreement/Understanding:  Not Indicated

## 2020-06-22 NOTE — PLAN OF CARE
Patient's EF (Ejection Fraction) is greater than 40%    Patient's weights and intake/output reviewed:    Patient's Last Weight: 180 lbs obtained by standing scale. Difference of 2 lbs less than last documented weight. Intake/Output Summary (Last 24 hours) at 6/22/2020 1036  Last data filed at 6/22/2020 0925  Gross per 24 hour   Intake 737 ml   Output 1700 ml   Net -963 ml         Pt is currently on 4 L O2. Pt with complaints of shortness of breath. Pt without lower extremity edema. Patient and/or Family's stated Goal of Care this Admission: reduce shortness of breath, increase activity tolerance, better understand heart failure and disease management, and be more comfortable prior to discharge      Comorbidities Reviewed Yes  Patient has a past medical history of Acute osteomyelitis of left hallux ([facility] [address] Anemia, Asthma, Cellulitis of foot, CHF (congestive heart failure) ([facility] [address] Claudication of left upper extremity due to atherosclerosis [facility]), Diabetic ulcer of left hallux, with necrosis of bone ([facility] [address] DVT (deep venous thrombosis) ([facility] [address] Kidney stones, Morbid obesity with BMI of 45.0-49.9, adult ([facility] [address] Neuroma of hand, Open fracture of left hallux, Pneumonia, Pulmonary HTN ([facility] [address] Rotator cuff tendinitis, STEMI (ST elevation myocardial infarction) ([facility] [address] and Urinary incontinence.          >>For CHF and Comorbidity documentation on Education Time and Topics, please see Education Tab       Problem: Falls - Risk of:  Goal: Will remain free from falls  Description: Will remain free from falls  Outcome: Ongoing    Patient hasnot suffered from falls this shift     Problem: Falls - Risk of:  Goal: Absence of physical injury  Description: Absence of physical injury  Outcome: Ongoing    Patient has not suffered from physical injury this shift     Problem: Pain:  Goal: Pain level will decrease  Description: Pain level will decrease  Outcome: Ongoing    Patient provided with PRN medications as requested     Problem: Pain:  Goal: Control of chronic pain  Description: Control of chronic pain  Outcome: Ongoing     Problem: Pain:  Goal: Patient's pain/discomfort is manageable  Description: Patient's pain/discomfort is manageable  Outcome: Ongoing     Problem: Safety:  Goal: Free from accidental physical injury  Description: Free from accidental physical injury  Outcome: Ongoing     Problem: Daily Care:  Goal: Daily care needs are met  Description: Daily care needs are met  Outcome: Ongoing    Patient provided with daily care items as requested

## 2020-06-22 NOTE — PROGRESS NOTES
Hospitalist Progress Note      PCP: KAILEY Wallace MD    Date of Admission: 6/19/2020    Subjective: cont to state pain not well controlled    C/o pain all over- in her ribs,hips,back     Medications:  Reviewed    Infusion Medications    dextrose       Scheduled Medications    insulin glargine  28 Units Subcutaneous BID    furosemide  40 mg Intravenous BID    lidocaine  1 patch Transdermal Once    aspirin  81 mg Oral Daily    atorvastatin  40 mg Oral Nightly    clopidogrel  75 mg Oral Daily    gabapentin  300 mg Oral TID    levothyroxine  100 mcg Oral Daily    metFORMIN  1,000 mg Oral BID WC    sertraline  100 mg Oral Daily    sildenafil  20 mg Oral TID    spironolactone  25 mg Oral Daily    budesonide-formoterol  2 puff Inhalation BID    sodium chloride flush  10 mL Intravenous 2 times per day    enoxaparin  40 mg Subcutaneous Daily    ipratropium-albuterol  1 ampule Inhalation Q4H WA    insulin lispro  0-18 Units Subcutaneous TID WC    insulin lispro  0-9 Units Subcutaneous Nightly     PRN Meds: ketorolac, traMADol, oxyCODONE-acetaminophen, sodium chloride flush, acetaminophen **OR** acetaminophen, polyethylene glycol, promethazine **OR** ondansetron, glucose, dextrose, glucagon (rDNA), dextrose, potassium chloride **OR** potassium alternative oral replacement **OR** potassium chloride      Intake/Output Summary (Last 24 hours) at 6/22/2020 1230  Last data filed at 6/22/2020 1120  Gross per 24 hour   Intake 790 ml   Output 1700 ml   Net -910 ml       Physical Exam Performed:    BP (!) 125/54   Pulse 91   Temp 98.1 °F (36.7 °C) (Oral)   Resp 16   Ht 5' (1.524 m)   Wt 180 lb 3.2 oz (81.7 kg)   LMP  (LMP Unknown)   SpO2 94%   BMI 35.19 kg/m²     General appearance: No apparent distress appears stated age and cooperative. HEENT Normal cephalic, atraumatic without obvious deformity. Pupils equal, round, and reactive to light. Extra ocular muscles intact.   Conjunctivae/corneas and daily weights, low salt diet with fluid restriction, recent echo from last admission with normal EF, repeat CXR     Chest wall pain - on prn tramadol/prn percocet     DM II - fairly controlled, cont lantus/SSI/metformin     Hypothyroidism - cont synthroid     H/o CAD s/p PCI - cont ASA/plavix/statin        DVT Prophylaxis: lovenox  Diet: DIET CARB CONTROL; Daily Fluid Restriction: Dry Tray  Code Status: Full Code    PT/OT Eval Status: ordered    Eddi Mahmood MD

## 2020-06-22 NOTE — PROGRESS NOTES
distention/bruits. Trachea midline without thyromegaly or adenopathy with full range of motion. Lungs: diminished b/l  Heart: Regular rate and rhythm with Normal S1/S2   Abdomen: Soft, non-tender or non-distended without rigidity or guarding and positive bowel sounds  Extremities: No clubbing, cyanosis, or edema bilaterally. Skin: Skin color, texture, turgor normal.  No rashes or lesions. Neurologic: Awake, neurovascularly intact with sensory/motor intact upper extremities/lower extremities, bilaterally. Cranial nerves: II-XII intact, grossly non-focal.  Mental status: Awake  Capillary Refill: Acceptable  < 3 seconds  Peripheral Pulses: +3 Easily felt, not easily obliterated with pressure    Labs:   Recent Labs     06/19/20 1930 06/20/20 0451 06/21/20  1144   WBC 11.8* 14.1* 13.7*   HGB 11.3* 10.8* 11.0*   HCT 35.1* 33.2* 33.8*    237 222     Recent Labs     06/19/20 1930 06/20/20 0451 06/21/20  1144   * 136 136   K 4.3 4.3 4.1   CL 95* 97* 96*   CO2 26 24 24   BUN 24* 23* 30*   CREATININE 1.0 0.9 1.0   CALCIUM 9.3 9.5 9.3     Recent Labs     06/19/20 1930   AST 27   ALT 30   BILITOT 0.4   ALKPHOS 138*     No results for input(s): INR in the last 72 hours. Recent Labs     06/19/20 1930   TROPONINI <0.01       Urinalysis:      Lab Results   Component Value Date    NITRU Negative 06/19/2020    WBCUA 0-2 06/19/2020    BACTERIA 2+ 06/19/2020    RBCUA None seen 06/19/2020    BLOODU Negative 06/19/2020    SPECGRAV 1.015 06/19/2020    GLUCOSEU >=1000 06/19/2020    GLUCOSEU 100 05/26/2012       Radiology:  XR CHEST STANDARD (2 VW)   Final Result   1. Worsening congestive heart failure.                  Assessment/Plan:    Active Hospital Problems    Diagnosis    Acute on chronic respiratory failure (HCC) [J96.20]       Acute on chr hypoxic resp failure - uses 2-3L O2 at home, now on 4L O2, added IS, wean as tolerated     Acute on chr diastolic CHF - CXR with pulm edema, started on IV lasix, monitor strict I/O and daily weights, low salt diet with fluid restriction, recent echo from last admission with normal EF, increase lasix to TID    Chest wall pain - on prn tramadol/prn percocet     DM II - fairly controlled, cont lantus/SSI/metformin     Hypothyroidism - cont synthroid     H/o CAD s/p PCI - cont ASA/plavix/statin        DVT Prophylaxis: lovenox  Diet: DIET CARB CONTROL; Daily Fluid Restriction: Dry Tray  Code Status: Full Code    PT/OT Eval Status: ordered    Nyla Jeans care Darrow Seta, MD

## 2020-06-23 NOTE — PROGRESS NOTES
Physical Therapy Attempt  Chart reviewed and pt cleared for therapy by RN. Upon therapist arrival to room pt is leaving floor for procedure. Will follow-up tomorrow as pt status and schedule allow. No charge.   Ann-Marie Posey, PT, DPT #[id number]

## 2020-06-23 NOTE — CONSULTS
ECHO/EF %: 55%    Evidenced Based Heart Failure Medications: Education given and reviewed with pt the following:   Diuretics[de-identified] Furosemide and Spironolactone     Sildenafil also reviewed with pt- action and side effects    Comorbidities Reviewed Yes     Patient has a past medical history of Acute osteomyelitis of left hallux (HCC), Anemia, Asthma, Cellulitis of foot, CHF (congestive heart failure) ([facility] [address] Claudication of left upper extremity due to atherosclerosis [facility]), Diabetic ulcer of left hallux, with necrosis of bone ([facility] [address] DVT (deep venous thrombosis) ([facility] [address] Kidney stones, Morbid obesity with BMI of 45.0-49.9, adult ([facility] [address] Neuroma of hand, Open fracture of left hallux, Pneumonia, Pulmonary HTN ([facility] [address] Rotator cuff tendinitis, STEMI (ST elevation myocardial infarction) ([facility] [address] and Urinary incontinence. Discharge plans: pt plans STR stay for rehab then home     Family Present: no    Advanced Directives/Goals of Medical Care Discussion: patient has been given CP documents from recent prior admission at [facility]. However, she has not yet completed them. Encouraged to review her goals of medical care with her daughter - her listed health care proxy. Patient's stated goal of care: \"have my pain in good control without constipation\" prior to discharge. Long term goal: not discussed    Lifestyle goal discussed: choose healthier foods to help her constipation issues    Progressive Mobility Assessment:   Pt has refused to work with PT/OT due to back pain. Pt is very sedentary all day when at home.      Last three weights hospital weights reviewed along with I/O:     Patient Vitals for the past 96 hrs (Last 3 readings):   Weight   06/23/20 0232 180 lb 9.6 oz (81.9 kg)   06/22/20 0158 180 lb 3.2 oz (81.7 kg)   06/21/20 0357 182 lb 6.4 oz (82.7 kg)         Intake/Output Summary (Last 24 hours) at 6/23/2020 1022  Last data filed at 6/23/2020 0827  Gross per 24 hour   Intake 795 ml   Output 900 ml   Net -105 ml       CHF education provided:  Pt is able to teach back CHF self care skills of daily weights, when to report a 3 lb overnight wt gain, FR of 64 oz and low sodium diet. Reviewed healthy eating when at SNF and transitioning back to her home. She is more focused on discussing her back pain and her need for more \"break through pain medications\" than she is on CHF. Written information on HF left at her bedside and encouraged her to get a PCP appointment near the end of July now so that she will be well cared for once she is home from the SNF stay for rehab. Patient provided with both written and verbal education on CHF signs/symptoms, causes, discharge medications, daily weights, low sodium diet, activity, and follow-up. HF zone self-management written instructions provided/reviewed and advised to call MD when in \"yellow\" zone. Instructed them to call the doctor post discharge if they experience increasing worsening shortness of breath, worsening chest pains, increased swelling from their baseline, worsening cough, or weight gain of >2- 3 lbs in a day/ 5 lb gain in a week. Also advised to call the doctor if they feel dizzy, increased fatigue, decreased or difficulty urinating. Instructed on and emphasized importance of scheduled hospital follow-up appointment with PCP near end of July after her SNF stay for rehab. Advised pt she will receive provider care at SNF also. PATIENT/CAREGIVER TEACHING:    Level of patient/caregiver understanding able to:   [ X ] Verbalize understanding [ ] Demonstrate understanding [ ] Teach back [ X ] Needs reinforcement [ ] Other:      Education Time: 20 minutes     Recommendations:   1. Encourage follow-up appointment compliance. Next appointment: TBD after she returns home from SNF  2. Educate further on fluid restriction of <64oz during inpatient stay so they can understand how to measure intake at home. 3. Review sodium restrictions.  Encouraged to not add table salt to their foods and avoid foods that are high in sodium. 4. Continue to educate on S/S. Stress the importance of calling the MD with the earliest signs of an acute exacerbation. 5. Emphasize daily weights - instructed to call the MD if the patient gains 3 lb in a day or 5 lb in a week. 6. Provided patient with CHF Resource Line for questions and concerns.    7. Advised pt to discuss her pain mgmt with PCP and pain MD.      Alka Amin RN, BSN, CDE, [facility]  Heart Failure Nurse [address] [place] [address]  Kale Postin  [phone]  6/23/2020

## 2020-06-23 NOTE — PROGRESS NOTES
Patient off unit with transport to ultrasound, will await return. H Plasty Text: Given the location of the defect, shape of the defect and the proximity to free margins a H-plasty was deemed most appropriate for repair.  Using a sterile surgical marker, the appropriate advancement arms of the H-plasty were drawn incorporating the defect and placing the expected incisions within the relaxed skin tension lines where possible. The area thus outlined was incised deep to adipose tissue with a #15 scalpel blade. The skin margins were undermined to an appropriate distance in all directions utilizing iris scissors.  The opposing advancement arms were then advanced into place in opposite direction and anchored with interrupted buried subcutaneous sutures.

## 2020-06-23 NOTE — PROGRESS NOTES
Consent for thoracentesis signed and placed in patient's chart. Call placed to patient's daughter per her request to update.

## 2020-06-23 NOTE — PROGRESS NOTES
[facility] 6-23-20 @ [phone].  Bry De La Cruz

## 2020-06-23 NOTE — PLAN OF CARE
Problem: Falls - Risk of:  Goal: Will remain free from falls  Description: Will remain free from falls  Outcome: Ongoing  Goal: Absence of physical injury  Description: Absence of physical injury  Outcome: Ongoing     Problem: Pain:  Description: Pain management should include both nonpharmacologic and pharmacologic interventions.   Goal: Pain level will decrease  Description: Pain level will decrease  Outcome: Ongoing  Goal: Control of acute pain  Description: Control of acute pain  Outcome: Ongoing  Goal: Control of chronic pain  Description: Control of chronic pain  Outcome: Ongoing  Goal: Patient's pain/discomfort is manageable  Description: Patient's pain/discomfort is manageable  Outcome: Ongoing     Problem: OXYGENATION/RESPIRATORY FUNCTION  Goal: Patient will maintain patent airway  Outcome: Ongoing  Goal: Patient will achieve/maintain normal respiratory rate/effort  Description: Respiratory rate and effort will be within normal limits for the patient  Outcome: Ongoing     Problem: HEMODYNAMIC STATUS  Goal: Patient has stable vital signs and fluid balance  Outcome: Ongoing     Problem: FLUID AND ELECTROLYTE IMBALANCE  Goal: Fluid and electrolyte balance are achieved/maintained  Outcome: Ongoing     Problem: ACTIVITY INTOLERANCE/IMPAIRED MOBILITY  Goal: Mobility/activity is maintained at optimum level for patient  Outcome: Ongoing

## 2020-06-23 NOTE — CONSULTS
LEFT FOOT performed by Dalton Quispe DPM at [facility] [address] Left 06/2005    atherectomy of SFA and popliteal artery    VASCULAR SURGERY Left 10/2007    subclavian angiopalsty and stent       FAMILY HISTORY:  family history includes Asthma in her grandchild; Cancer in her maternal grandmother; Diabetes in her brother and mother; Heart Disease in her maternal grandfather; Other in her mother. SOCIAL HISTORY:   reports that she has been smoking cigarettes. She has a 20.00 pack-year smoking history. She has quit using smokeless tobacco.    Scheduled Meds:   insulin glargine  28 Units Subcutaneous BID    lidocaine  1 patch Transdermal Once    aspirin  81 mg Oral Daily    atorvastatin  40 mg Oral Nightly    clopidogrel  75 mg Oral Daily    gabapentin  300 mg Oral TID    levothyroxine  100 mcg Oral Daily    metFORMIN  1,000 mg Oral BID WC    sertraline  100 mg Oral Daily    sildenafil  20 mg Oral TID    spironolactone  25 mg Oral Daily    budesonide-formoterol  2 puff Inhalation BID    sodium chloride flush  10 mL Intravenous 2 times per day    enoxaparin  40 mg Subcutaneous Daily    ipratropium-albuterol  1 ampule Inhalation Q4H WA    insulin lispro  0-18 Units Subcutaneous TID WC    insulin lispro  0-9 Units Subcutaneous Nightly     Continuous Infusions:   dextrose       PRN Meds:  traMADol, albuterol sulfate HFA, oxyCODONE-acetaminophen, sodium chloride flush, acetaminophen **OR** acetaminophen, polyethylene glycol, promethazine **OR** ondansetron, glucose, dextrose, glucagon (rDNA), dextrose, potassium chloride **OR** potassium alternative oral replacement **OR** potassium chloride    ALLERGIES:  Patient is allergic to flexeril [cyclobenzaprine]; januvia [sitagliptin]; lisinopril; and iodine.     REVIEW OF SYSTEMS:  Constitutional: Negative for fever  HENT: Negative for sore throat  Eyes: Negative for redness   Respiratory: Negative for dyspnea, cough  Cardiovascular: Negative for chest pain  Gastrointestinal: Negative for vomiting, diarrhea   Genitourinary: Negative for hematuria   Musculoskeletal: Negative for arthralgias   Skin: Negative for rash  Neurological: Negative for syncope  Hematological: Negative for adenopathy  Psychiatric/Behavorial: Negative for anxiety    PHYSICAL EXAM:  Blood pressure (!) 106/54, pulse 88, temperature 98.2 °F (36.8 °C), temperature source Oral, resp. rate 18, height 5' (1.524 m), weight 180 lb 9.6 oz (81.9 kg), SpO2 93 %, not currently breastfeeding.' on 4 L  Gen: No distress. Eyes: PERRL. No sclera icterus. No conjunctival injection. ENT: No discharge. Pharynx clear. Neck: Trachea midline. No obvious mass. Resp: No accessory muscle use. No crackles. No wheezes. No rhonchi. No dullness on percussion. Diminished Juan Carlos. CV: Regular rate. Regular rhythm. No murmur or rub. + edema. Peripheral pulses are 2+. Capillary refill is less than 3 seconds. GI: Non-tender. Non-distended. No hernia. Skin: Warm and dry. No nodule on exposed extremities. Lymph: No cervical LAD. No supraclavicular LAD. M/S: No cyanosis. No joint deformity. No clubbing. Neuro: Awake. Alert. Moves all four extremities. Psych: Oriented x 3. No anxiety. LABS:  CBC:   Recent Labs     06/21/20  1144 06/22/20  0430 06/23/20  0413   WBC 13.7* 13.4* 14.1*   HGB 11.0* 11.3* 10.8*   HCT 33.8* 35.4* 33.2*   MCV 94.9 95.7 95.6    224 205     BMP:   Recent Labs     06/21/20  1144 06/22/20  0430 06/23/20  0413    140 139   K 4.1 4.5 4.7   CL 96* 99 95*   CO2 24 25 26   BUN 30* 32* 36*   CREATININE 1.0 1.1 1.2*     LIVER PROFILE: No results for input(s): AST, ALT, LIPASE, BILIDIR, BILITOT, ALKPHOS in the last 72 hours. Invalid input(s): AMYLASE,  ALB  PT/INR:   Recent Labs     06/23/20  1135   PROTIME 12.2   INR 1.05     APTT: No results for input(s): APTT in the last 72 hours.   UA:No results for input(s): NITRITE, COLORU, PHUR, LABCAST, WBCUA, RBCUA, MUCUS, TRICHOMONAS, YEAST, BACTERIA, CLARITYU, SPECGRAV, LEUKOCYTESUR, UROBILINOGEN, BILIRUBINUR, BLOODU, GLUCOSEU, AMORPHOUS in the last 72 hours. Invalid input(s): KETONESU  No results for input(s): PHART, FMW5SWO, PO2ART in the last 72 hours. Chest imaging was reviewed by me and showed right effusion    ASSESSMENT:  · Exudative lymphocytic right pleural effusion, recurrent  · Atypical cells on cytology from right pleural fluid  · Mediastinal lymphadenopathy & hilar lymphadenopathy   · Mucus plugging of airways, minimal, on 6/12/20 fiberoptic bronchoscopy   · CHF  · Pulmonary hypertension, favor group 2    PLAN:  · Agree with repeat thoracentesis. I will follow with you.

## 2020-06-23 NOTE — PROGRESS NOTES
Hospitalist Progress Note      PCP: KAILEY Zhong MD    Date of Admission: 6/19/2020    Subjective:   Continues to be on 4 L of O2    Medications:  Reviewed    Infusion Medications    dextrose       Scheduled Medications    insulin glargine  28 Units Subcutaneous BID    lidocaine  1 patch Transdermal Once    aspirin  81 mg Oral Daily    atorvastatin  40 mg Oral Nightly    clopidogrel  75 mg Oral Daily    gabapentin  300 mg Oral TID    levothyroxine  100 mcg Oral Daily    metFORMIN  1,000 mg Oral BID WC    sertraline  100 mg Oral Daily    sildenafil  20 mg Oral TID    spironolactone  25 mg Oral Daily    budesonide-formoterol  2 puff Inhalation BID    sodium chloride flush  10 mL Intravenous 2 times per day    enoxaparin  40 mg Subcutaneous Daily    ipratropium-albuterol  1 ampule Inhalation Q4H WA    insulin lispro  0-18 Units Subcutaneous TID WC    insulin lispro  0-9 Units Subcutaneous Nightly     PRN Meds: albuterol sulfate HFA, traMADol, oxyCODONE-acetaminophen, sodium chloride flush, acetaminophen **OR** acetaminophen, polyethylene glycol, promethazine **OR** ondansetron, glucose, dextrose, glucagon (rDNA), dextrose, potassium chloride **OR** potassium alternative oral replacement **OR** potassium chloride      Intake/Output Summary (Last 24 hours) at 6/23/2020 1109  Last data filed at 6/23/2020 0827  Gross per 24 hour   Intake 705 ml   Output 900 ml   Net -195 ml       Physical Exam Performed:    BP (!) 112/49   Pulse 91   Temp 98 °F (36.7 °C) (Oral)   Resp 20   Ht 5' (1.524 m)   Wt 180 lb 9.6 oz (81.9 kg)   LMP  (LMP Unknown)   SpO2 92%   BMI 35.27 kg/m²     General appearance: No apparent distress appears stated age and cooperative. HEENT Normal cephalic, atraumatic without obvious deformity. Pupils equal, round, and reactive to light. Extra ocular muscles intact. Conjunctivae/corneas clear. Neck: Supple, No jugular venous distention/bruits.   Trachea midline without thyromegaly or adenopathy with full range of motion. Lungs: scattered crackles, decreased airentry both bases   Heart: Regular rate and rhythm with Normal S1/S2   Abdomen: Soft, non-tender or non-distended without rigidity or guarding and positive bowel sounds  Extremities: No clubbing, cyanosis, or edema bilaterally. Skin: Skin color, texture, turgor normal.  No rashes or lesions. Neurologic: Awake, neurovascularly intact with sensory/motor intact upper extremities/lower extremities, bilaterally. Cranial nerves: II-XII intact, grossly non-focal.  Mental status: Awake,alert,oriented       Labs:   Recent Labs     06/21/20  1144 06/22/20  0430 06/23/20  0413   WBC 13.7* 13.4* 14.1*   HGB 11.0* 11.3* 10.8*   HCT 33.8* 35.4* 33.2*    224 205     Recent Labs     06/21/20  1144 06/22/20  0430 06/23/20  0413    140 139   K 4.1 4.5 4.7   CL 96* 99 95*   CO2 24 25 26   BUN 30* 32* 36*   CREATININE 1.0 1.1 1.2*   CALCIUM 9.3 9.2 9.1     No results for input(s): AST, ALT, BILIDIR, BILITOT, ALKPHOS in the last 72 hours. No results for input(s): INR in the last 72 hours. No results for input(s): Serene Appling in the last 72 hours. Urinalysis:      Lab Results   Component Value Date    NITRU Negative 06/19/2020    WBCUA 0-2 06/19/2020    BACTERIA 2+ 06/19/2020    RBCUA None seen 06/19/2020    BLOODU Negative 06/19/2020    SPECGRAV 1.015 06/19/2020    GLUCOSEU >=1000 06/19/2020    GLUCOSEU 100 05/26/2012       Radiology:  XR CHEST PORTABLE   Final Result   Vascular congestion. Moderate-sized right pleural effusion. XR CHEST STANDARD (2 VW)   Final Result   1. Worsening congestive heart failure.            Assessment/Plan:    Active Hospital Problems    Diagnosis    Acute on chronic respiratory failure ([facility] [address] [J96.20]    Chest wall pain [R07.89]    Type 2 diabetes mellitus without complication, with long-term current use of insulin (HCC) [E11.9, Z79.4]     Acute on chr hypoxic resp failure - uses 2-3L O2 at home--> now on 4L O2, - added IS, wean as tolerated     Acute on Chronic diastolic CHF  - CXR with pulm edema  - started on IV lasix  - Monitor strict I/O and daily weights, low salt diet with fluid restriction  - Recent echo from last admission with normal EF  - repeat CXR: vascular congestion with moderate R sided pleural effusion-->thoracentesis ordered with panel and serum LDH  - Weight 185#-->180#  - Net fluid deficit 0.9L   Had a thoracentesis, when patient was at [facility] on [facility] which was exudative. CT chest no contrast   pulm consult     Chest wall pain  - on prn tramadol-->stopped 2/2 to elevated creatinine   - PRN percocet     DM II   - fairly controlled, cont lantus/SSI/metformin     Hypothyroidism  - cont synthroid     H/o CAD s/p PCI  - cont ASA/plavix/statin    Mild ELMA   . LAsix held. toradol d/c ed yesterday     DVT Prophylaxis: lovenox  Diet: DIET CARB CONTROL; Daily Fluid Restriction: Dry Tray  Code Status: Full Code    Creatinine trending up slightly, Lasix stopped, Toradol stopped, mod pleural effusion, thoracentesis and panel today. PT/OT Eval Status: ordered    Dispo - cont care    MISSY Amaya.

## 2020-06-23 NOTE — PLAN OF CARE
pain  Description: Control of chronic pain  6/23/2020 1005 by Maria Esther Tirado RN  Outcome: Ongoing     Problem: Pain:  Goal: Patient's pain/discomfort is manageable  Description: Patient's pain/discomfort is manageable  6/23/2020 1005 by Kash Gallardo RN  Outcome: Ongoing     Problem: Skin Integrity:  Goal: Skin integrity will stabilize  Description: Skin integrity will stabilize  Outcome: Ongoing

## 2020-06-23 NOTE — PROGRESS NOTES
RESPIRATORY THERAPY ASSESSMENT    Name:  Keyon Benjamin Record Number:  [id number]  Age: 62 y.o. Gender: female  : 1960  Today's Date:  2020  Room:  /0313-01    Assessment     Is the patient being admitted for a COPD or Asthma exacerbation? No   (If yes the patient will be seen every 4 hours for the first 24 hours and then reassessed)    Patient Admission Diagnosis      Allergies  Allergies   Allergen Reactions    Flexeril [Cyclobenzaprine] Itching    Januvia [Sitagliptin] Other (See Comments)     Causes severe back pain and cramps    Lisinopril      cough    Iodine Rash       Minimum Predicted Vital Capacity:               Actual Vital Capacity:                    Pulmonary History:Asthma and CHF/Pulmonary Edema  Home Oxygen Therapy:  3 liters/min via nasal cannula  Home Respiratory Therapy:Albuterol, Budesonide/Formoterol  and Tiotropium Bromide   Current Respiratory Therapy:  Symbicort,  Duoneb, Albuterol MDI. Treatment Type: MDI  Medications: Budesonide/Formoterol    Respiratory Severity Index(RSI)   Patients with orders for inhalation medications, oxygen, or any therapeutic treatment modality will be placed on Respiratory Protocol. They will be assessed with the first treatment and at least every 72 hours thereafter. The following severity scale will be used to determine frequency of treatment intervention.     Smoking History: Pulmonary Disease or Smoking History, Greater than 15 pack year = 2    Social History  Social History     Tobacco Use    Smoking status: Current Every Day Smoker     Packs/day: 0.50     Years: 40.00     Pack years: 20.00     Types: Cigarettes    Smokeless tobacco: Former User    Tobacco comment: .5 ppd   Substance Use Topics    Alcohol use: No     Alcohol/week: 0.0 standard drinks    Drug use: No       Recent Surgical History: None = 0  Past Surgical History  Past Surgical History:   Procedure Laterality Date    APPENDECTOMY      BRONCHOSCOPY N/A 2020    BRONCHOSCOPY DIAGNOSTIC OR CELL [address] ONLY performed by Scott Dinero MD at [address]  2020    BRONCHOSCOPY BRUSHINGS performed by Scott Dinero MD at [facility] Bilateral 2015     SECTION      CORONARY ANGIOPLASTY WITH STENT PLACEMENT  14    DILATION AND CURETTAGE OF UTERUS      FOOT AMPUTATION Left 6/3/2019    LEFT HALLUX AMPUTATION WITH GRAFT APPLICATION performed by Maricruz Paredes DPM at [facility] Left 2019    PARTIAL FIRST RAY  AMPUTATION LEFT FOOT performed by Faby Medel DPM at [place] [address] Left 2005    atherectomy of SFA and popliteal artery    VASCULAR SURGERY Left 10/2007    subclavian angiopalsty and stent       Level of Consciousness: Alert, Oriented, and Cooperative = 0    Level of Activity: Mostly sedentary, minimal walking = 2    Respiratory Pattern: Dyspnea with exertion;Irregular pattern;or RR less than 6 = 2    Breath Sounds: Diminshed bilaterally and/or crackles = 2    Sputum  Sputum Color: None,  , Sputum How Obtained: Spontaneous cough  Cough: Strong, spontaneous, non-productive = 0    Vital Signs   /72   Pulse 85   Temp 97.6 °F (36.4 °C) (Oral)   Resp 18   Ht 5' (1.524 m)   Wt 180 lb 9.6 oz (81.9 kg)   LMP  (LMP Unknown)   SpO2 95%   BMI 35.27 kg/m²   SPO2 (COPD values may differ): 86-87% on room air or greater than 92% on FiO2 35- 50% = 3    Peak Flow (asthma only): not applicable = 0    RSI: 47-85 = Q6H or QID and Q4HPRN for dyspnea        Plan       Goals: medication delivery    Patient/caregiver was educated on the proper method of use for Respiratory Care Devices:  Yes      Level of patient/caregiver understanding able to:   ? Verbalize understanding   ? Demonstrate understanding       ? Teach back        ? Needs reinforcement       ? No available caregiver               ?   Other:     Response to education:  Good     Is patient being placed on Home Treatment Regimen? No     Does the patient have everything they need prior to discharge? NA     Comments: Chart reviewed and patient assessed. Plan of Care: Symbicort BID,  Duoneb Q4W/A,  Albuterol 2puffs Q4PRN. Electronically signed by Lia Harrison RCP on 6/23/2020 at 3:51 AM    Respiratory Protocol Guidelines     1. Assessment and treatment by Respiratory Therapy will be initiated for medication and therapeutic interventions upon initiation of aerosolized medication. 2. Physician will be contacted for respiratory rate (RR) greater than 35 breaths per minute. Therapy will be held for heart rate (HR) greater than 140 beats per minute, pending direction from physician. 3. Bronchodilators will be administered via Metered Dose Inhaler (MDI) with spacer when the following criteria are met:  a. Alert and cooperative     b. HR < 140 bpm  c. RR < 30 bpm                d. Can demonstrate a 2-3 second inspiratory hold  4. Bronchodilators will be administered via Hand Held Nebulizer SHUN [facility]) to patients when ANY of the following criteria are met  a. Incognizant or uncooperative          b. Patients treated with HHN at Home        c. Unable to demonstrate proper use of MDI with spacer     d. RR > 30 bpm   5. Bronchodilators will be delivered via Metered Dose Inhaler (MDI), HHN, Aerogen to intubated patients on mechanical ventilation. 6. Inhalation medication orders will be delivered and/or substituted as outlined below. Aerosolized Medications Ordering and Administration Guidelines:    1. All Medications will be ordered by a physician, and their frequency and/or modality will be adjusted as defined by the patients Respiratory Severity Index (RSI) score.   2. If the patient does not have documented COPD, consider discontinuing anticholinergics when RSI is less than 9.  3. If the bronchospasm worsens (increased RSI), then the bronchodilator frequency can be increased to a maximum of every 4 hours. If greater than every 4 hours is required, the physician will be contacted. 4. If the bronchospasm improves, the frequency of the bronchodilator can be decreased, based on the patient's RSI, but not less than home treatment regimen frequency. 5. Bronchodilator(s) will be discontinued if patient has a RSI less than 9 and has received no scheduled or as needed treatment for 72  Hrs. Patients Ordered on a Mucolytic Agent:    1. Must always be administered with a bronchodilator. 2. Discontinue if patient experiences worsened bronchospasm, or secretions have lessened to the point that the patient is able to clear them with a cough. Anti-inflammatory and Combination Medications:    1. If the patient lacks prior history of lung disease, is not using inhaled anti-inflammatory medication at home, and lacks wheezing by examination or by history for at least 24 hours, contact physician for possible discontinuation.

## 2020-06-23 NOTE — PROGRESS NOTES
Pt resting in bed, c/o pain- PRNs not available at this time. Pt upset stating we do not control her pain well here and that she has been miserable constantly. Explained to patient that pain medications are not scheduled and are as needed and that we cannot give them to her before they are do. Pt also c/o \"bowel issues\", states when she was previously at [facility] she was constipated for 2 weeks and believe it messed up her bowels. Pt understands pain medication can cause issues with BM's, stool softener has been being given. Denies further needs. Resps e/e , lungs diminished. VSS. A/O x4. Warm blanket provided for pain. Shift assessment complete, see flowsheet. Call light in reach, will monitor.

## 2020-06-23 NOTE — PROGRESS NOTES
Occupational Therapy  Attempted treatment this morning. Patient sitting on side of bed eating breakfast and complained of back pain. Therapist suggested transferring to chair to provide support to back but patient refused. Patient declined participation in therapy at this time. OT will follow up later this date as schedule allows.        Mario Torres, OTR/L #[id number]

## 2020-06-24 NOTE — PROGRESS NOTES
PRN tramadol offered to pt for c/o of pain, pt declined, stating that she does not want it if it is not IV.

## 2020-06-24 NOTE — PROGRESS NOTES
Pulmonary Progress Note  CC: Pleural effusion, shortness of breath    Subjective: Shortness of breath    IV line peripheral    EXAM:   /69   Pulse 89   Temp 98.5 °F (36.9 °C) (Oral)   Resp 18   Ht 5' (1.524 m)   Wt 184 lb 8 oz (83.7 kg)   LMP  (LMP Unknown)   SpO2 91%   BMI 36.03 kg/m²  on 4 L  Constitutional:  No acute distress   HEENT: no scleral icterus  Neck: No tracheal deviation present. Cardiovascular: Normal heart sounds. Pulmonary/Chest: No wheezes. No rhonchi. No rales. No decreased breath sounds. No accessory muscle usage or stridor. Abdominal: Soft. Musculoskeletal: No cyanosis. No clubbing. Skin: Skin is warm and dry.      Scheduled Meds:   albuterol sulfate HFA  2 puff Inhalation Q4H WA    insulin glargine  28 Units Subcutaneous BID    lidocaine  1 patch Transdermal Once    aspirin  81 mg Oral Daily    atorvastatin  40 mg Oral Nightly    gabapentin  300 mg Oral TID    levothyroxine  100 mcg Oral Daily    metFORMIN  1,000 mg Oral BID WC    sertraline  100 mg Oral Daily    sildenafil  20 mg Oral TID    spironolactone  25 mg Oral Daily    budesonide-formoterol  2 puff Inhalation BID    sodium chloride flush  10 mL Intravenous 2 times per day    enoxaparin  40 mg Subcutaneous Daily    insulin lispro  0-18 Units Subcutaneous TID     insulin lispro  0-9 Units Subcutaneous Nightly     Continuous Infusions:   dextrose       PRN Meds:  morphine, oxyCODONE, ipratropium-albuterol, sodium chloride flush, acetaminophen **OR** acetaminophen, polyethylene glycol, promethazine **OR** ondansetron, glucose, dextrose, glucagon (rDNA), dextrose, potassium chloride **OR** potassium alternative oral replacement **OR** potassium chloride    Labs:  CBC:   Recent Labs     06/22/20 0430 06/23/20 0413 06/24/20 0436   WBC 13.4* 14.1* 14.1*   HGB 11.3* 10.8* 10.1*   HCT 35.4* 33.2* 30.9*   MCV 95.7 95.6 94.6    205 205     BMP:   Recent Labs     06/22/20 0430 06/23/20 0413 06/24/20  0436    139 135*   K 4.5 4.7 4.8   CL 99 95* 93*   CO2 25 26 26   BUN 32* 36* 36*   CREATININE 1.1 1.2* 1.0     Chest imaging was reviewed by me  CT chest 6/23/2020 right hilar mass, mediastinal and right hilar adenopathy, probable metastatic disease in liver, probable metastatic destruction of scapula    ASSESSMENT:  · Exudative lymphocytic right pleural effusion, recurrent, favor malignant  · Atypical cells on cytology from right pleural fluid, favor malignant  · Mediastinal lymphadenopathy & hilar lymphadenopathy, favor malignant  · Liver masses, favor malignant  · Mucus plugging of airways, minimal, on 6/12/20 fiberoptic bronchoscopy   · CHF  · Pulmonary hypertension, favor group 2    PLAN:  · Follow-up thoracentesis cytology  · Hold Plavix  · If pleural fluid nondiagnostic, consider liver biopsy v EBUS  · D/W patient and her daughter today

## 2020-06-24 NOTE — PROGRESS NOTES
Shift assessment complete. Pt c/o of back and abdominal pain, pt c/o not having stronger pain meds and states she wants to wait a little longer to take PRN pain meds. Pt resting in bed with call light and bedside table with belongings, in reach. Will continue to monitor.

## 2020-06-24 NOTE — PLAN OF CARE
Pain:  Goal: Patient's pain/discomfort is manageable  Description: Patient's pain/discomfort is manageable  Outcome: Ongoing     Problem: Skin Integrity:  Goal: Skin integrity will stabilize  Description: Skin integrity will stabilize  Outcome: Ongoing

## 2020-06-24 NOTE — PROGRESS NOTES
Occupational/Physical Therapy  Attempted to see patient this am, RN requests hold for therapy today due to patient dealing with lots of upsetting medical information from MD.  Will continue to follow. Dustin Nunn OTR/L [address], PT, DPT #[id number]    Physical Therapy Attempt (16:20)  RN cleared pt for therapy this PM. Pt declining at this time stating she is still distressed from discussion with MD this morning stating \"I've just had a bad day and bad news\". Will follow-up with patient tomorrow afternoon if possible (per pt request). No charge.    Selena Zavala, PT, DPT #[id number]

## 2020-06-24 NOTE — CARE COORDINATION
INTERDISCIPLINARY PLAN OF CARE CONFERENCE    Date/Time: 6/24/2020 11:22 AM  Completed by: Billy Huggins, Case Management      Patient Name:  Nroi Langston  YOB: 1960  Admitting Diagnosis: Acute on chronic respiratory failure ([facility] [address] [J96.20]     Admit Date/Time:  6/19/2020  6:57 PM    Chart reviewed. Interdisciplinary team met to discuss patient progress and discharge plans. Disciplines included Case Management, Nursing, and Dietitian. Current Status:stable    PT/OT recommendation:SNF    Anticipated Discharge Date: tbd  Expected D/C Disposition:  Skilled nursing facility  Confirmed plan with patient and/or family Yes  Discharge Plan Comments: Reviewed chart. Plan continues [facility] for STR, pre-cert is back per [facility] with [facility] and good for 48 hours,+bed. Pt will need to be off of IV pain medications prior to d/c per [facility]. Following. The Plan for Transition of Care is related to the following treatment goals: to STR    The Patient and/or patient representative  was provided with a choice of provider and agrees   with the discharge plan.  [x] Yes [] No      Home O2 in place on admit: to STR  Pt informed of need to bring portable home O2 tank on day of discharge for nursing to connect prior to leaving:  Not Indicated  Verbalized agreement/Understanding:  Not Indicated

## 2020-06-24 NOTE — PROGRESS NOTES
Shift assessment completed, see flowsheets. Patient A/O x 4. AM medications given per orders. Patient with c/o pain 10/10, PRN medication provided, see MAR. Patient denies further needs at this time. Patient currently awake in bed, call light and personal belongings within reach. Patient educated on use of call light and to call out with needs, verbalized understanding.

## 2020-06-24 NOTE — PROGRESS NOTES
Hospitalist Progress Note      PCP: KAILEY Nunez MD    Date of Admission: 6/19/2020    Subjective:   Continues to be on 4 L of O2  Continues to have pain in the ribs,back    Medications:  Reviewed    Infusion Medications    dextrose       Scheduled Medications    albuterol sulfate HFA  2 puff Inhalation Q4H WA    insulin glargine  28 Units Subcutaneous BID    lidocaine  1 patch Transdermal Once    aspirin  81 mg Oral Daily    atorvastatin  40 mg Oral Nightly    gabapentin  300 mg Oral TID    levothyroxine  100 mcg Oral Daily    metFORMIN  1,000 mg Oral BID WC    sertraline  100 mg Oral Daily    sildenafil  20 mg Oral TID    spironolactone  25 mg Oral Daily    budesonide-formoterol  2 puff Inhalation BID    sodium chloride flush  10 mL Intravenous 2 times per day    enoxaparin  40 mg Subcutaneous Daily    insulin lispro  0-18 Units Subcutaneous TID     insulin lispro  0-9 Units Subcutaneous Nightly     PRN Meds: morphine, ipratropium-albuterol, oxyCODONE-acetaminophen, sodium chloride flush, acetaminophen **OR** acetaminophen, polyethylene glycol, promethazine **OR** ondansetron, glucose, dextrose, glucagon (rDNA), dextrose, potassium chloride **OR** potassium alternative oral replacement **OR** potassium chloride      Intake/Output Summary (Last 24 hours) at 6/24/2020 1113  Last data filed at 6/23/2020 1726  Gross per 24 hour   Intake 402 ml   Output 250 ml   Net 152 ml       Physical Exam Performed:    /69   Pulse 89   Temp 98.5 °F (36.9 °C) (Oral)   Resp 18   Ht 5' (1.524 m)   Wt 184 lb 8 oz (83.7 kg)   LMP  (LMP Unknown)   SpO2 91%   BMI 36.03 kg/m²     General appearance: No apparent distress appears stated age and cooperative. HEENT Normal cephalic, atraumatic without obvious deformity. Pupils equal, round, and reactive to light. Extra ocular muscles intact. Conjunctivae/corneas clear. Neck: Supple, No jugular venous distention/bruits.   Trachea midline without thyromegaly or adenopathy with full range of motion. Lungs: scattered crackles, decreased airentry both bases   Tenderness over right lower ant ribs  Heart: Regular rate and rhythm with Normal S1/S2   Abdomen: Soft, non-tender or non-distended without rigidity or guarding and positive bowel sounds  Extremities: No clubbing, cyanosis, or edema bilaterally. Skin: Skin color, texture, turgor normal.  No rashes or lesions. Neurologic: Awake, neurovascularly intact with sensory/motor intact upper extremities/lower extremities, bilaterally. Cranial nerves: II-XII intact, grossly non-focal.  Mental status: Awake,alert,oriented       Labs:   Recent Labs     06/22/20  0430 06/23/20 0413 06/24/20  0436   WBC 13.4* 14.1* 14.1*   HGB 11.3* 10.8* 10.1*   HCT 35.4* 33.2* 30.9*    205 205     Recent Labs     06/22/20 0430 06/23/20 0413 06/24/20 0436    139 135*   K 4.5 4.7 4.8   CL 99 95* 93*   CO2 25 26 26   BUN 32* 36* 36*   CREATININE 1.1 1.2* 1.0   CALCIUM 9.2 9.1 9.5     No results for input(s): AST, ALT, BILIDIR, BILITOT, ALKPHOS in the last 72 hours. Recent Labs     06/23/20  1135   INR 1.05     No results for input(s): Norvel Ki in the last 72 hours. Urinalysis:      Lab Results   Component Value Date    NITRU Negative 06/19/2020    WBCUA 0-2 06/19/2020    BACTERIA 2+ 06/19/2020    RBCUA None seen 06/19/2020    BLOODU Negative 06/19/2020    SPECGRAV 1.015 06/19/2020    GLUCOSEU >=1000 06/19/2020    GLUCOSEU 100 05/26/2012       Radiology:  US THORACENTESIS   Final Result   Successful ultrasound guided thoracentesis. XR CHEST PORTABLE   Final Result   Status post right thoracentesis. No pneumothorax. Stable chest findings. CT CHEST WO CONTRAST   Final Result   Probable malignant mass of the right lower lobe or right middle lobe near the   hilum, causing obstruction and collapse of the middle lobe and lower lobe.    Adjacent metastatic adenopathy of the right hilum and consult  Repeat thoracentesis 6/23- 50 cc removed  CT chest no contrast -suspicious for metastatic malignancy. Await fluid cytology. If Cytology is negative consider percutaneous liver biopsy or bronch with the best.  Hold Plavix for biopsy. Continues to have pain. Increase oxycodone to 10 mg every 4 hours. Continue morphine as needed. Chest wall pain  - on prn tramadol-->stopped 2/2 to elevated creatinine   - PRN percocet     DM II   - fairly controlled, cont lantus/SSI/metformin     Hypothyroidism  - cont synthroid     H/o CAD s/p PCI  - cont ASA/statin  Hold plavix for possible biopsy    Mild ELMA   . LAsix held. toradol d/c ed yesterday  Resolved      DVT Prophylaxis: lovenox  Diet: DIET CARDIAC; Daily Fluid Restriction: Dry Tray  Code Status: Full Code        PT/OT Eval Status: ordered    Dispo - cont care    MISSY Amaya.

## 2020-06-25 NOTE — ADT AUTH CERT
Patient Demographics     Name Patient ID SSN Gender Identity Birth Date   Tiffany Sames" [id number]  Female 62 (60 yrs)   Address Phone Email Employer    [address] Nick Small [phone] (v) [phone] [facility]  [address] [facility] Occupation [address] - Disabled    Reg Status PCP Date Last Verified Next Review Date    Verified Juliana Bryant[place], [place]  [phone] 20    Admission Date Discharge Date Admitting Provider     20  Ruth Vargas MD     Marital Status Christian       [facility]      Emergency Contact [facility] (C)  [address] [facility]  [phone] (H)   [facility] Account [de-identified]   [address] [facility] Name/Sex/Relation Subscriber  Subscriber Address/Phone Subscriber Emp/Emp Phone   1.  PADDY  [id number] Mort Pepin - Female  (Self) 1960 [address]  [address]  [place], [place]  [address] [phone](O) DISABLED   Utilization Reviews         Heart Failure - Care Day 5 (2020) by Sisi Jha RN         Review Status Review Entered   Completed 2020 15:28       Criteria Review      Care Day: 5 Care Date: 2020 Level of Care:    Guideline Day 3    Level Of Care    (X) Floor to discharge    Clinical Status    (X) * Hemodynamic stability    2020 3:28 PM EDT by Catrina Sandhu      hr 86-91  bp: 106/54    (X) * Tachypnea absent    2020 3:28 PM EDT by Catrina Sandhu      rr 16-18    ( ) * Oxygenation at baseline or acceptable for next level of care    2020 3:28 PM EDT by Catrina Sandhu      92% 4L via nc    (X) * Dyspnea at baseline or acceptable for next level of care    ( ) * Cardiac rate and rhythm acceptable    ( ) * Pulmonary edema absent or acceptable for next level of care    ( ) * Peripheral or sacral edema absent, at baseline, or improved    (X) * Mental status at baseline    2020 3:28 PM controlled, cont lantus/SSI/metformin       Hypothyroidism   - cont synthroid       H/o CAD s/p PCI   - cont ASA/plavix/statin       Mild ELMA           Heart Failure - Care Day 4 (6/22/2020) by Susanne Francis, RN         Review Status Review Entered   Completed 6/24/2020 15:28       Criteria Review      Care Day: 4 Care Date: 6/22/2020 Level of Care:    Guideline Day 3    Level Of Care    (X) Floor to discharge    6/24/2020 3:28 PM EDT by Marlen Metz      progressive    Clinical Status    (X) * Hemodynamic stability    6/24/2020 3:28 PM EDT by Marlen Metz      HR 86-91  bp: 112/49    (X) * Tachypnea absent    6/24/2020 3:28 PM EDT by Marlen Metz      18-20    ( ) * Oxygenation at baseline or acceptable for next level of care    6/24/2020 3:28 PM EDT by Marlen Metz      94% 4L via nc    (X) * Dyspnea at baseline or acceptable for next level of care    6/24/2020 3:28 PM EDT by Marlen Metz      baseline    ( ) * Cardiac rate and rhythm acceptable    ( ) * Pulmonary edema absent or acceptable for next level of care    ( ) * Peripheral or sacral edema absent, at baseline, or improved    (X) * Mental status at baseline    6/24/2020 3:28 PM EDT by Marlen Metz      baseline    ( ) * Volume status acceptable on oral medication    (X) * Renal function at baseline or acceptable for next level of care    6/24/2020 3:28 PM EDT by Marlen Metz      bun: 32, cr: 1.1    (X) * Electrolyte levels normal or acceptable for next level of care    6/24/2020 3:28 PM EDT by Aletha Jacob [facility]    ( ) * Immediate precipitating factors absent or controlled    ( ) * Discharge plans and education understood    Activity    ( ) * Ambulatory    Routes    ( ) * Oral hydration, medications, and diet    Interventions    ( ) * Oxygen absent    6/24/2020 3:28 PM EDT by Marlen Metz      4L via nc    Medications    (X) Diuretics    6/24/2020 3:28 PM EDT by Marlen Metz      lasix 40mg iv x 1 today    * Milestone   Additional Notes   6/22  Day 4      Pt remains on progressive care unit      Vitals: t: 37 p: 77 rr: 18 bp: 108/59 spo2: 94% 4L via nc      Abn labs:  wbc: 13.4, hgb: 11.3, bun: 32, gluc: 220      Orders:    Accu checks ac/hs with ssi coverage   Lovenox 40mg sq daily   Duonebs q 4 hrs   Cbc, bmp daily   Fluid restriction       Per IM PN:  Assessment/Plan:       Active Hospital Problems     Diagnosis   · Acute on chronic respiratory failure (HCC) [J96.20]           Acute on chr hypoxic resp failure - uses 2-3L O2 at home, now on 4L O2, added IS, wean as tolerated       Acute on chr diastolic CHF - CXR with pulm edema, started on IV lasix, monitor strict I/O and daily weights, low salt diet with fluid restriction, recent echo from last admission with normal EF, increase lasix to TID       Chest wall pain - on prn tramadol/prn percocet       DM II - fairly controlled, cont lantus/SSI/metformin       Hypothyroidism - cont synthroid       H/o CAD s/p PCI - cont ASA/plavix/statin      Per IM PN:  Assessment/Plan:       Active Hospital Problems     Diagnosis   · Acute on chronic respiratory failure (HCC) [J96.20]           Acute on chr hypoxic resp failure - uses 2-3L O2 at home, now on 4L O2, added IS, wean as tolerated       Acute on chr diastolic CHF - CXR with pulm edema, started on IV lasix, monitor strict I/O and daily weights, low salt diet with fluid restriction, recent echo from last admission with normal EF, repeat CXR        Chest wall pain - on prn tramadol/prn percocet       DM II - fairly controlled, cont lantus/SSI/metformin       Hypothyroidism - cont synthroid       H/o CAD s/p PCI - cont ASA/plavix/statin

## 2020-06-25 NOTE — PROGRESS NOTES
Shift assessment complete. Pt c/o 10/10 pain, PRN morphine given, see MAR. VSS. Will continue to monitor.

## 2020-06-25 NOTE — PROGRESS NOTES
06/25/20  0442    135* 137   K 4.7 4.8 4.9   CL 95* 93* 97*   CO2 26 26 23   BUN 36* 36* 38*   CREATININE 1.2* 1.0 1.0     Chest imaging was reviewed by me  CT chest 6/23/2020 right hilar mass, mediastinal and right hilar adenopathy, probable metastatic disease in liver, probable metastatic destruction of scapula    ASSESSMENT:  · Exudative lymphocytic right pleural effusion, recurrent, favor malignant  · Atypical cells on cytology from right pleural fluid, favor malignant  · Mediastinal lymphadenopathy & hilar lymphadenopathy, favor malignant  · Liver masses, favor malignant  · Mucus plugging of airways, minimal, on 6/12/20 fiberoptic bronchoscopy   · CHF  · Pulmonary hypertension, favor group 2    PLAN:  · Follow-up thoracentesis cytology - flow is negative, initial review by pathologist was negative. Plan for liver biopsy on Monday. Can be scheduled as outpatient if patient discharged, otherwise plan on performing as inpatient.     · Hold Plavix, last dose was 6/23/20, need to be off 5 days  · D/W patient and her daughter yesterday

## 2020-06-25 NOTE — PROGRESS NOTES
Occupational Therapy Daily Treatment Note    Unit: PCU  Date:  6/25/2020  Patient Name:    Samuel Clark  Admitting diagnosis:  Acute on chronic respiratory failure [facility]) [J96.20]  Admit Date:  6/19/2020  Precautions/Restrictions:  fall risk, IV, bed/chair alarm, supplemental O2 (4L) and telemetry        Discharge Recommendations: SNF  DME needs for discharge: defer to facility       Therapy recommendations for staff:   Assist of 1 (minimal assist) with use of rolling walker (RW) for all transfers to/from BSC/chair    AM-PAC Score: AM-PAC Inpatient Daily Activity Raw Score: 16  Home Health S4 Level: NA       Treatment Time:  13:20-14:04  Treatment number:  2    Total Treatment Time:   44 minutes    History of Present Illness: From ED Note, Suzi WATSON: \"61 yo female with a hx CAD, CHF, pulm HTN, HTN, hLD, Acute on chronic diastolic CHF/pulmonary HTN with respiratory failure with hypoxia, typically on 3L NCO2, DM2, morbid obesity, osteomyelitis the left foot, anxiety, depression, JORDI, PVD, tobacco use, presents via EMS for evaluation of shortness of breath. Worsened since d/c from [facility] 2 days ago.   EMS indicate that her oxygen saturation when their arrival was in the 60s, placed on a nonrebreather mask. She is now on 4 L nasal cannula O2 and 95%.  Patient advised that she should not of been discharged from the hospital.  She indicates that she was discharged to her home and she had home health care but they are not adequate to take care of her. Patient advised that she has chronic pain everywhere. this Is not new.  Patient denies any fevers or coughing. Denies any known sick contacts. +current everyday smoker\"     Subjective:  Patient supine in bed and agreeable to treatment with encouragement. Pain   Yes  Rating: severe  Location: \"all over\"   Pain Medicine Status: Pain med requested. RN notified and provided medication at end of treatment.     Bed Mobility:   Supine to Sit:  Min A  Sit to Supine: CGA  Rolling:           Min A  Scooting:        CGA    Transfer Training:   Sit to stand:   Min A  Stand to sit:  Min A  Bed to Chair:  Not Tested  Bed to [facility]:   Min A  Standard toilet:   Not Tested    Activity Tolerance   Pt completed therapy session with Pain noted with position changes and  SOB noted with all activity. SpO2: >90%  HR:  BP:     ADL Training: (Patient requested for therapist to assist with all ADLs. Therapist encouraged patient to participate as much as she can but she refused). Upper body dressing: Mod A  Upper body bathing:  Max A  Lower body dressing:  Max A  Lower body bathing:  Max A  Toileting: Max A  Grooming/Hygiene:  Not Tested    Therapeutic Exercise:   Shoulder horizontal abd/add:  x10  Scapular retraction:  x10    Patient Education:   Role of OT  Recommendations for DC  Energy conservation techniques    Positioning Needs: In bed, call light and needs in reach. Alarm Set    Family Present:  No    Assessment: Patient limited by pain and shortness of breath. Decreased motivation and interest in attempting participation in ADLs. Patient will need to go to SNF at DC to improve independence and activity tolerance. GOALS  To be met in 3 Visits:  1). Bed to toilet: Supervision     To be met in 5 Visits:  1). Supine to/from Sit:             Supervision  2). Upper Body Bathing:         Supervision  3). Lower Body Bathing:         Min A  4). Upper Body Dressing:       Supervision  5). Lower Body Dressing:       Min A  6). Pt to demonstrate UE exs x 15 reps with minimal cues  7). instruct in energy conservation/ relaxation positions  8). instruct in the use of adaptive equipment  9).   instruct in the use of inhalers     Plan: cont with [facility] 157, OTR/L #[id number]    If patient discharges from this facility prior to next visit, this note will serve as the Discharge Summary

## 2020-06-25 NOTE — PLAN OF CARE
Problem: Falls - Risk of:  Goal: Will remain free from falls  Description: Will remain free from falls  6/24/2020 2007 by Jarad León RN  Outcome: Ongoing  6/24/2020 1014 by Carmina Hamlin RN  Outcome: Ongoing     Problem: Falls - Risk of:  Goal: Absence of physical injury  Description: Absence of physical injury  6/24/2020 2007 by Jarad León RN  Outcome: Ongoing  6/24/2020 1014 by Carmina Hamlin RN  Outcome: Ongoing     Problem: Pain:  Goal: Pain level will decrease  Description: Pain level will decrease  6/24/2020 2007 by Jarad León RN  Outcome: Ongoing  6/24/2020 1014 by Carmina Hamlin RN  Outcome: Ongoing

## 2020-06-25 NOTE — PROGRESS NOTES
Hospitalist Progress Note      PCP: Bud Vickers MD    Date of Admission: 6/19/2020    Subjective:   Continues to be on 4 L of O2  Pain is better with the higher dose of oxycodone,but still there    Medications:  Reviewed    Infusion Medications    dextrose       Scheduled Medications    albuterol sulfate HFA  2 puff Inhalation Q4H WA    insulin glargine  28 Units Subcutaneous BID    lidocaine  1 patch Transdermal Once    aspirin  81 mg Oral Daily    atorvastatin  40 mg Oral Nightly    gabapentin  300 mg Oral TID    levothyroxine  100 mcg Oral Daily    metFORMIN  1,000 mg Oral BID WC    sertraline  100 mg Oral Daily    sildenafil  20 mg Oral TID    spironolactone  25 mg Oral Daily    budesonide-formoterol  2 puff Inhalation BID    sodium chloride flush  10 mL Intravenous 2 times per day    enoxaparin  40 mg Subcutaneous Daily    insulin lispro  0-18 Units Subcutaneous TID     insulin lispro  0-9 Units Subcutaneous Nightly     PRN Meds: morphine, oxyCODONE, ipratropium-albuterol, sodium chloride flush, acetaminophen **OR** acetaminophen, polyethylene glycol, promethazine **OR** ondansetron, glucose, dextrose, glucagon (rDNA), dextrose, potassium chloride **OR** potassium alternative oral replacement **OR** potassium chloride      Intake/Output Summary (Last 24 hours) at 6/25/2020 0806  Last data filed at 6/25/2020 0504  Gross per 24 hour   Intake 360 ml   Output 700 ml   Net -340 ml       Physical Exam Performed:    /74   Pulse 77   Temp 98.3 °F (36.8 °C) (Oral)   Resp 18   Ht 5' (1.524 m)   Wt 183 lb 3.2 oz (83.1 kg)   LMP  (LMP Unknown)   SpO2 90%   BMI 35.78 kg/m²     General appearance: No apparent distress appears stated age and cooperative. HEENT Normal cephalic, atraumatic without obvious deformity. Pupils equal, round, and reactive to light. Extra ocular muscles intact. Conjunctivae/corneas clear. Neck: Supple, No jugular venous distention/bruits.   Trachea midline without thyromegaly or adenopathy with full range of motion. Lungs: scattered crackles, decreased airentry both bases   Tenderness over right lower ant ribs  Heart: Regular rate and rhythm with Normal S1/S2   Abdomen: Soft, non-tender or non-distended without rigidity or guarding and positive bowel sounds  Extremities: No clubbing, cyanosis, or edema bilaterally. Skin: Skin color, texture, turgor normal.  No rashes or lesions. Neurologic: Awake, neurovascularly intact with sensory/motor intact upper extremities/lower extremities, bilaterally. Cranial nerves: II-XII intact, grossly non-focal.  Mental status: Awake,alert,oriented       Labs:   Recent Labs     06/23/20 0413 06/24/20  0436 06/25/20  0442   WBC 14.1* 14.1* 15.8*   HGB 10.8* 10.1* 11.3*   HCT 33.2* 30.9* 35.7*    205 237     Recent Labs     06/23/20 0413 06/24/20 0436 06/25/20  0442    135* 137   K 4.7 4.8 4.9   CL 95* 93* 97*   CO2 26 26 23   BUN 36* 36* 38*   CREATININE 1.2* 1.0 1.0   CALCIUM 9.1 9.5 9.8     No results for input(s): AST, ALT, BILIDIR, BILITOT, ALKPHOS in the last 72 hours. Recent Labs     06/23/20  1135   INR 1.05     No results for input(s): Sarita Grumet in the last 72 hours. Urinalysis:      Lab Results   Component Value Date    NITRU Negative 06/19/2020    WBCUA 0-2 06/19/2020    BACTERIA 2+ 06/19/2020    RBCUA None seen 06/19/2020    BLOODU Negative 06/19/2020    SPECGRAV 1.015 06/19/2020    GLUCOSEU >=1000 06/19/2020    GLUCOSEU 100 05/26/2012       Radiology:  US THORACENTESIS   Final Result   Successful ultrasound guided thoracentesis. XR CHEST PORTABLE   Final Result   Status post right thoracentesis. No pneumothorax. Stable chest findings. CT CHEST WO CONTRAST   Final Result   Probable malignant mass of the right lower lobe or right middle lobe near the   hilum, causing obstruction and collapse of the middle lobe and lower lobe.    Adjacent metastatic adenopathy of the right hilum and mediastinum as well as   diffuse metastatic disease of the liver. Suspected metastatic destructive   osseous lesion of the right scapula, only partially imaged. Small right pleural effusion. Multifocal ground-glass opacity of both lungs. This may be due to edema,   atypical infection including viral pneumonia, or a combination. XR CHEST PORTABLE   Final Result   Vascular congestion. Moderate-sized right pleural effusion. XR CHEST STANDARD (2 VW)   Final Result   1. Worsening congestive heart failure. CT chest no contrast 6 /23  Probable malignant mass of the right lower lobe or right middle lobe near the  hilum, causing obstruction and collapse of the middle lobe and lower lobe. Adjacent metastatic adenopathy of the right hilum and mediastinum as well as  diffuse metastatic disease of the liver.  Suspected metastatic destructive  osseous lesion of the right scapula, only partially imaged. Small right pleural effusion. Multifocal ground-glass opacity of both lungs.  This may be due to edema,  atypical infection including viral pneumonia, or a combination. Assessment/Plan:    Active Hospital Problems    Diagnosis    Pleural effusion [J90]    Acute on chronic respiratory failure (HCC) [J96.20]    Chest wall pain [R07.89]    Type 2 diabetes mellitus without complication, with long-term current use of insulin (HCC) [E11.9, Z79.4]     Acute on chr hypoxic resp failure   - uses 2-3L O2 at home--> now on 4L O2, - added IS, wean as tolerated     Acute on Chronic diastolic CHF  pulm HTN - on Revatio  ?  Metastatic cancer   - CXR with pulm edema  - started on IV lasix  - Monitor strict I/O and daily weights, low salt diet with fluid restriction  - Recent echo from last admission with normal EF  - repeat CXR: vascular congestion with moderate R sided pleural effusion-->thoracentesis ordered with panel and serum LDH  - Weight 185#-->180#  - Net fluid deficit 0.9L   Had a thoracentesis, when patient was at [facility] on [facility]/11 which was exudative,lymphocytic.  pulm consult  Repeat thoracentesis 6/23- 50 cc removed  CT chest no contrast -suspicious for metastatic malignancy. Await fluid cytology. If Cytology is negative consider percutaneous liver biopsy or bronch with the EBUS. Hold Plavix for biopsy. Continues to have pain. Increase oxycodone to 10 mg every 4 hours. Continue iv morphine as needed. Add oramorph 15mg  q 12      DM II   - fairly controlled, cont lantus/SSI/metformin     Hypothyroidism  - cont synthroid     H/o CAD s/p PCI  - cont ASA/statin  Hold plavix for possible biopsy    Mild ELMA   . LAsix held. toradol d/c ed   Resolved      DVT Prophylaxis: lovenox  Diet: DIET CARDIAC; Daily Fluid Restriction: Dry Tray  Code Status: Full Code      PT/OT Eval Status: ordered    Dispo - cont care    MISSY Amaya.

## 2020-06-25 NOTE — PROGRESS NOTES
Assessment completed as charted. Patient medicated with new medication MS contin. VSS. Resp. E/e patient remains on 4 LNC. Call light in reach. Bed alarm on. Call light in reach will monitor.

## 2020-06-26 NOTE — PROGRESS NOTES
RESPIRATORY THERAPY ASSESSMENT    Name:  Keyon Benjamin Record Number:  [id number]  Age: 62 y.o. Gender: female  : 1960  Today's Date:  2020  Room:  /0313-01    Assessment     Is the patient being admitted for a COPD or Asthma exacerbation? No   (If yes the patient will be seen every 4 hours for the first 24 hours and then reassessed)    Patient Admission Diagnosis      Allergies  Allergies   Allergen Reactions    Flexeril [Cyclobenzaprine] Itching    Januvia [Sitagliptin] Other (See Comments)     Causes severe back pain and cramps    Lisinopril      cough    Iodine Rash       Minimum Predicted Vital Capacity:               Actual Vital Capacity:                    Pulmonary History:Asthma and CHF/Pulmonary Edema  Home Oxygen Therapy:  3 liters/min via nasal cannula  Home Respiratory Therapy:Albuterol, Budesonide/Formoterol  and Tiotropium Bromide   Current Respiratory Therapy:  symbicort 160 BID, albuterol Q4WA  Treatment Type: MDI  Medications: Albuterol    Respiratory Severity Index(RSI)   Patients with orders for inhalation medications, oxygen, or any therapeutic treatment modality will be placed on Respiratory Protocol. They will be assessed with the first treatment and at least every 72 hours thereafter. The following severity scale will be used to determine frequency of treatment intervention.     Smoking History: Pulmonary Disease or Smoking History, Greater than 15 pack year = 2    Social History  Social History     Tobacco Use    Smoking status: Current Every Day Smoker     Packs/day: 0.50     Years: 40.00     Pack years: 20.00     Types: Cigarettes    Smokeless tobacco: Former User    Tobacco comment: .5 ppd   Substance Use Topics    Alcohol use: No     Alcohol/week: 0.0 standard drinks    Drug use: No       Recent Surgical History: None = 0  Past Surgical History  Past Surgical History:   Procedure Laterality Date    APPENDECTOMY      BRONCHOSCOPY N/A 2020 BRONCHOSCOPY DIAGNOSTIC OR CELL [address] ONLY performed by Rosalba Levy MD at [address] [facility]  2020    BRONCHOSCOPY BRUSHINGS performed by Rosalba Levy MD at [address] [facility] Bilateral 2015     SECTION      CORONARY ANGIOPLASTY WITH STENT PLACEMENT  14    DILATION AND CURETTAGE OF UTERUS      FOOT AMPUTATION Left 6/3/2019    LEFT HALLUX AMPUTATION WITH GRAFT APPLICATION performed by Geo Banegas DPM at [facility] 2019    PARTIAL FIRST RAY  AMPUTATION LEFT FOOT performed by Josef Webber DPM at [place] [address] Left 2005    atherectomy of SFA and popliteal artery    VASCULAR SURGERY Left 10/2007    subclavian angiopalsty and stent       Level of Consciousness: Alert, Oriented, and Cooperative = 0    Level of Activity: Mostly sedentary, minimal walking = 2    Respiratory Pattern: Regular Pattern; RR 8-20 = 0    Breath Sounds: Absent bilaterally and/or with wheezes = 3    Sputum  Sputum Color: None,  , Sputum How Obtained: Spontaneous cough  Cough: Strong, spontaneous, non-productive = 0    Vital Signs   /64   Pulse 86   Temp 98.1 °F (36.7 °C) (Oral)   Resp 16   Ht 5' (1.524 m)   Wt 183 lb 3.2 oz (83.1 kg)   LMP  (LMP Unknown)   SpO2 95%   BMI 35.78 kg/m²   SPO2 (COPD values may differ): 86-87% on room air or greater than 92% on FiO2 35- 50% = 3    Peak Flow (asthma only): not applicable = 0    RSI: 0-73 = TID (three times daily) and Q4hr PRN for dyspnea        Plan       Goals: medication delivery and improve oxygenation    Patient/caregiver was educated on the proper method of use for Respiratory Care Devices:  Yes      Level of patient/caregiver understanding able to:   ? Verbalize understanding   ? Demonstrate understanding       ? Teach back        ? Needs reinforcement       ? No available caregiver               ?   Other:     Response to education:  Excellent     Is patient being placed on Home Treatment Regimen? No     Does the patient have everything they need prior to discharge? NA     Comments: Patient chart reviewed, patient assessed. Plan of Care: change patient to albuterol TID    Electronically signed by Magda Melendez RCP on 6/26/2020 at 12:44 AM    Respiratory Protocol Guidelines     1. Assessment and treatment by Respiratory Therapy will be initiated for medication and therapeutic interventions upon initiation of aerosolized medication. 2. Physician will be contacted for respiratory rate (RR) greater than 35 breaths per minute. Therapy will be held for heart rate (HR) greater than 140 beats per minute, pending direction from physician. 3. Bronchodilators will be administered via Metered Dose Inhaler (MDI) with spacer when the following criteria are met:  a. Alert and cooperative     b. HR < 140 bpm  c. RR < 30 bpm                d. Can demonstrate a 2-3 second inspiratory hold  4. Bronchodilators will be administered via Hand Held Nebulizer [facility]) to patients when ANY of the following criteria are met  a. Incognizant or uncooperative          b. Patients treated with HHN at Home        c. Unable to demonstrate proper use of MDI with spacer     d. RR > 30 bpm   5. Bronchodilators will be delivered via Metered Dose Inhaler (MDI), HHN, Aerogen to intubated patients on mechanical ventilation. 6. Inhalation medication orders will be delivered and/or substituted as outlined below. Aerosolized Medications Ordering and Administration Guidelines:    1. All Medications will be ordered by a physician, and their frequency and/or modality will be adjusted as defined by the patients Respiratory Severity Index (RSI) score.   2. If the patient does not have documented COPD, consider discontinuing anticholinergics when RSI is less than 9.  3. If the bronchospasm worsens (increased RSI), then the bronchodilator frequency can be increased to a maximum of every 4 hours. If greater than every 4 hours is required, the physician will be contacted. 4. If the bronchospasm improves, the frequency of the bronchodilator can be decreased, based on the patient's RSI, but not less than home treatment regimen frequency. 5. Bronchodilator(s) will be discontinued if patient has a RSI less than 9 and has received no scheduled or as needed treatment for 72  Hrs. Patients Ordered on a Mucolytic Agent:    1. Must always be administered with a bronchodilator. 2. Discontinue if patient experiences worsened bronchospasm, or secretions have lessened to the point that the patient is able to clear them with a cough. Anti-inflammatory and Combination Medications:    1. If the patient lacks prior history of lung disease, is not using inhaled anti-inflammatory medication at home, and lacks wheezing by examination or by history for at least 24 hours, contact physician for possible discontinuation.

## 2020-06-26 NOTE — PLAN OF CARE
Problem: Falls - Risk of:  Goal: Will remain free from falls  Description: Will remain free from falls  Outcome: Ongoing     Problem: Pain:  Goal: Pain level will decrease  Description: Pain level will decrease  Outcome: Ongoing  Goal: Control of acute pain  Description: Control of acute pain  Outcome: Ongoing  Goal: Control of chronic pain  Description: Control of chronic pain  Outcome: Ongoing  Goal: Patient's pain/discomfort is manageable  Description: Patient's pain/discomfort is manageable  Outcome: Ongoing

## 2020-06-26 NOTE — PROGRESS NOTES
Nutrition Assessment    Type and Reason for Visit: Initial(LOS)    Nutrition Recommendations:   1. Continue diet as ordered     Nutrition Assessment:   Pt. nutritionally compromised AEB reduced PO intake r/t Acute on Chronic diastolic CHFpulm HTN and ? Metastatic cancer. At risk for further nutrition compromise r/t c/o from patient  that eating makes her feel bloated . Will continue diet and monitor . Malnutrition Assessment:  · Malnutrition Status: At risk for malnutrition  · Context: Acute illness or injury  · Findings of the 6 clinical characteristics of malnutrition (Minimum of 2 out of 6 clinical characteristics is required to make the diagnosis of moderate or severe Protein Calorie Malnutrition based on AND/ASPEN Guidelines):  1. Energy Intake-Less than or equal to 50% of estimated energy requirement, Greater than or equal to 7 days    2. Weight Loss-No significant weight loss, in 1 week  3. Fat Loss-No significant subcutaneous fat loss, Orbital  4. Muscle Loss-No significant muscle mass loss, Temples (temporalis muscle), Clavicles (pectoralis and deltoids)  5. Fluid Accumulation-Unable to assess,    6.  Strength-Not measured    Nutrition Risk Level:  Moderate    Nutrient Needs:  · Estimated Daily Total Kcal: 1250 -1500 based ~ 15-18 kcal/kg cbw  · Estimated Daily Protein (g): 68-82 gr/kg ibw  · Estimated Daily Total Fluid (ml/day): 3313-1281     Nutrition Diagnosis:   · Problem: Inadequate oral intake  · Etiology: related to Catabolic illness, Impaired respiratory function-inability to consume food, Cardiac dysfunction     Signs and symptoms:  as evidenced by Intake 50-75%, Patient report of(food makes her feel bloated )    Objective Information:  · Nutrition-Focused Physical Findings: obese female lying bed with O2/nc; c/o food makes her feel bloated; she had thorocentesis 6/23; ; she is uncomfortable at time of visit  · Wound Type:    · Current Nutrition Therapies:  · Oral Diet Orders: Fluid Restriction, Cardiac   · Oral Diet intake: 51-75%, %  · Oral Nutrition Supplement (ONS) Orders: None  · Anthropometric Measures:  · Ht: 5' (152.4 cm)   · Current Body Wt: 183 lb 4 oz (83.1 kg)  · Admission Body Wt: 181 lb (82.1 kg)  · Usual Body Wt: 186 lb (84.4 kg)  · % Weight Change:  ,  3# loss in 1 month   · Ideal Body Wt: 100 lb (45.4 kg), % Ideal Body 183  · BMI Classification: BMI 35.0 - 39.9 Obese Class II    Nutrition Interventions:   Continue current diet  Continued Inpatient Monitoring, Coordination of Community Care, Coordination of Care    Nutrition Evaluation:   · Evaluation: Goals set   · Goals: pt will consume > 50% of most meals and weight will remain stable.      · Monitoring: Meal Intake, Weight, Diet Tolerance, Monitor Bowel Function      Electronically signed by Dorann Eisenmenger, RD, LD on 6/26/20 at 5:39 PM EDT    Contact Number: [phone]

## 2020-06-26 NOTE — PROGRESS NOTES
06/24/20  0436 06/25/20  0442 06/26/20  0413   * 137 137   K 4.8 4.9 4.8   CL 93* 97* 97*   CO2 26 23 23   BUN 36* 38* 41*   CREATININE 1.0 1.0 1.0     Chest imaging was reviewed by me  CT chest 6/23/2020 right hilar mass, mediastinal and right hilar adenopathy, probable metastatic disease in liver, probable metastatic destruction of scapula    Pleural fluid cytology 6/11/2020 and 6/23/2020 show atypical cells that are nondiagnostic for malignancy  Bronchoscopy 6/12/2020 cytology is negative    ASSESSMENT:  · Exudative lymphocytic right pleural effusion, recurrent, favor malignant  · Atypical cells on cytology from right pleural fluid, favor malignant  · Mediastinal lymphadenopathy & hilar lymphadenopathy, favor malignant -small cell cancer is certainly in the differential  · Liver masses, favor malignant  · Mucus plugging of airways, minimal, on 6/12/20 fiberoptic bronchoscopy   · CHF  · Pulmonary hypertension, favor group 2    PLAN:  · Thoracentesis is nondiagnostic. I discussed with interventional radiology Dr. Panda Beck who approved plan for liver biopsy on Monday as an inpatient  · Holding Plavix, last dose was 6/23/20, need to be off 5 days. I am also holding aspirin beginning 6/28/2020 and Lovenox beginning 6/29/2020. All of these should be resumed after biopsy.   · D/W Dr. Sushil Mayer

## 2020-06-26 NOTE — PROGRESS NOTES
Inpatient Physical Therapy Daily Treatment Note    Unit: PCU  Date:  6/26/2020  Patient Name:    Samuel Clark  Admitting diagnosis:  Acute on chronic respiratory failure [facility]) [J96.20]  Admit Date:  6/19/2020  Precautions/Restrictions:  Fall risk, Bed/chair alarm, Lines -Supplemental O2 (5 L) and Telemetry      Discharge Recommendations: SNF  DME needs for discharge: defer to facility       Therapy recommendation for EMS Transport: can transport by wheelchair    Therapy recommendations for staff:   Assist of 1 with use of rolling walker (RW) for all transfers and ambulation to/from bathroom    History of Present Illness: From ED Note, Suzi WATSON: \"59 yo female with a hx CAD, CHF, pulm HTN, HTN, hLD, Acute on chronic diastolic CHF/pulmonary HTN with respiratory failure with hypoxia, typically on 3L NCO2, DM2, morbid obesity, osteomyelitis the left foot, anxiety, depression, JORDI, PVD, tobacco use, presents via EMS for evaluation of shortness of breath. Worsened since d/c from [address] [place] [address] Road 2 days ago.   EMS indicate that her oxygen saturation when their arrival was in the 60s, placed on a nonrebreather mask. She is now on 4 L nasal cannula O2 and 95%.  Patient advised that she should not of been discharged from the hospital.  She indicates that she was discharged to her home and she had home health care but they are not adequate to take care of her. Patient advised that she has chronic pain everywhere. this Is not new.  Patient denies any fevers or coughing. Denies any known sick contacts. +current everyday smoker\"     Home Health S4 Level Recommendation: NA  AM-PAC Mobility Score   AM-PAC Inpatient Mobility Raw Score : 17     Treatment Time: 910-950  Treatment number: 2  Timed Code Treatment Minutes: 40 minutes  Total Treatment Minutes: 40 minutes    Cognition    A&O x4   Able to follow 2 step commands    Subjective  Patient lying supine in bed with no family present   Pt agreeable to this PT tx.      Pain Yes  Location: generalized  Ratin/10  Pain Medicine Status: Received pain med prior to tx     Bed Mobility   Supine to Sit:    Min A   Sit to Supine:   Not Tested  Rolling:   Not Tested  Scooting:   Not Tested    Transfer Training     Sit to stand:   CGA  Stand to sit:   [facility]  Bed to Chair:   CGA with use of gait belt and rolling walker (RW)    Gait Training gait completed as indicated below  Distance:      5 ft x2 bouts  Deviations (firm surface/linoleum):  decreased marlene, increased MYA, forward flexed posture, shuffles and decreased step length bilaterally  Assistive Device Used:    gait belt and rolling walker (RW)  Level of Assist:    CGA    Stair Training deferred, pt unsafe/not appropriate to complete stairs at this time    Therapeutic Exercise Carmelina deferred secondary to pt declined    Balance  Sitting:  Good ; Supervision    Standing: Good - ; SBA  Comments: Patient completed static standing balance with SBA and hands on bedrail for approx 5 min. She completed dynamic standing balance, stepping into pull ups on one leg at a time with both hands on RW with SBA. Patient Education      Role of PT, POC, Discharge recommendations, safety awareness, transfer techniques, pursed lip breathing, energy conservation, importance of mobility, and calling for assist with mobility. Positioning Needs       Patient sitting EOB with PCA assisting at end of session. Activity Tolerance   Pt completed therapy session with Spo2 maintained in 90s throughout session on 5L. Assessment :  Patient ambulated 2 bouts of 5 ft with SBA and RW today. She also demonstrated static and dynamic standing balance with UE support and SBA. Patient required lots of motivation to do anything with therapist and was unpleasant toward therapist throughout entire session.   Patient would benefit from continued skilled therapy to address deficits in the areas of strength, balance, endurance, functional mobility, and safety. Recommending SNF upon discharge as patient functioning well below baseline, demonstrates good rehab potential and unable to return home due to burden of care beyond caregiver ability, home environment not conducive to patient recovery and limited safety awareness. Goals (all goals ongoing unless otherwise indicated)  To be met in 3 visits:  1). Independent with LE Ex x 10 reps     To be met in 6 visits:  1). Supine to/from sit: SBA  2). Sit to/from stand: SBA  3). Bed to chair: SBA  4). Gait: Ambulate 150 ft.  with  SBA and use of LRAD (least restrictive assistive device)  5). Tolerate B LE exercises 3 sets of 10-15 reps  6). Ascend/descend 2 steps with Min A  with use of no handrail and LRAD (least restrictive assistive device)    Plan   Continue with plan of care. Gauri Thomas, PT DPT #[id number]    If patient discharges from this facility prior to next visit, this note will serve as the Discharge Summary.

## 2020-06-26 NOTE — PROGRESS NOTES
Occupational Therapy Attempt    Unit: PCU   Date:  6/26/2020  Patient Name:    Varsha Finneagn  Admitting diagnosis:  Acute on chronic respiratory failure [facility]) [J96.20]  Admit Date:  6/19/2020    Attempt @ 7842: Decline. Pt reports she is too tired to work with therapy at this time. Pt requests follow up tomorrow. Educated on importance of therapy. Pt reports understanding. Will re-attempt as time allows and pt is agreeable/appropriate. Thank you.      Nika Maher, MOT, OTR/L   [id number]      No Charge

## 2020-06-26 NOTE — PLAN OF CARE
Nutrition Problem: Inadequate oral intake  Intervention: Food and/or Nutrient Delivery: Continue current diet  Nutritional Goals: pt will consume > 50% of most meals and weight will remain stable.

## 2020-06-26 NOTE — PROGRESS NOTES
Hospitalist Progress Note      PCP: Karthik Grimes MD    Date of Admission: 6/19/2020    Subjective:   Continues to be on 4 L of O2  Pain is better with long acting morphine  C/o constipation    Medications:  Reviewed    Infusion Medications    dextrose       Scheduled Medications    morphine  15 mg Oral 2 times per day    albuterol sulfate HFA  2 puff Inhalation Q4H WA    insulin glargine  28 Units Subcutaneous BID    lidocaine  1 patch Transdermal Once    aspirin  81 mg Oral Daily    atorvastatin  40 mg Oral Nightly    gabapentin  300 mg Oral TID    levothyroxine  100 mcg Oral Daily    metFORMIN  1,000 mg Oral BID WC    sertraline  100 mg Oral Daily    sildenafil  20 mg Oral TID    spironolactone  25 mg Oral Daily    budesonide-formoterol  2 puff Inhalation BID    sodium chloride flush  10 mL Intravenous 2 times per day    enoxaparin  40 mg Subcutaneous Daily    insulin lispro  0-18 Units Subcutaneous TID     insulin lispro  0-9 Units Subcutaneous Nightly     PRN Meds: oxyCODONE, ipratropium-albuterol, sodium chloride flush, acetaminophen **OR** acetaminophen, polyethylene glycol, promethazine **OR** ondansetron, glucose, dextrose, glucagon (rDNA), dextrose, potassium chloride **OR** potassium alternative oral replacement **OR** potassium chloride      Intake/Output Summary (Last 24 hours) at 6/26/2020 1355  Last data filed at 6/26/2020 1317  Gross per 24 hour   Intake 536 ml   Output --   Net 536 ml       Physical Exam Performed:    /61   Pulse 84   Temp 98.4 °F (36.9 °C) (Oral)   Resp 18   Ht 5' (1.524 m)   Wt 183 lb 4.8 oz (83.1 kg)   LMP  (LMP Unknown)   SpO2 92%   BMI 35.80 kg/m²     General appearance: No apparent distress appears stated age and cooperative. HEENT Normal cephalic, atraumatic without obvious deformity. Pupils equal, round, and reactive to light. Extra ocular muscles intact. Conjunctivae/corneas clear.   Neck: Supple, No jugular venous Adjacent metastatic adenopathy of the right hilum and mediastinum as well as   diffuse metastatic disease of the liver. Suspected metastatic destructive   osseous lesion of the right scapula, only partially imaged. Small right pleural effusion. Multifocal ground-glass opacity of both lungs. This may be due to edema,   atypical infection including viral pneumonia, or a combination. XR CHEST PORTABLE   Final Result   Vascular congestion. Moderate-sized right pleural effusion. XR CHEST STANDARD (2 VW)   Final Result   1. Worsening congestive heart failure. CT chest no contrast 6 /23  Probable malignant mass of the right lower lobe or right middle lobe near the  hilum, causing obstruction and collapse of the middle lobe and lower lobe. Adjacent metastatic adenopathy of the right hilum and mediastinum as well as  diffuse metastatic disease of the liver.  Suspected metastatic destructive  osseous lesion of the right scapula, only partially imaged. Small right pleural effusion. Multifocal ground-glass opacity of both lungs.  This may be due to edema,  atypical infection including viral pneumonia, or a combination. Assessment/Plan:    Active Hospital Problems    Diagnosis    Pleural effusion [J90]    Acute on chronic respiratory failure (HCC) [J96.20]    Chest wall pain [R07.89]    Type 2 diabetes mellitus without complication, with long-term current use of insulin (HCC) [E11.9, Z79.4]     Acute on chr hypoxic resp failure   - uses 2-3L O2 at home--> now on 4L O2, - added IS, wean as tolerated     Acute on Chronic diastolic CHF  pulm HTN - on Revatio  ?  Metastatic cancer   - CXR with pulm edema  - started on IV lasix  - Monitor strict I/O and daily weights, low salt diet with fluid restriction  - Recent echo from last admission with normal EF  - repeat CXR: vascular congestion with moderate R sided pleural effusion-->thoracentesis ordered with panel and serum LDH  -

## 2020-06-26 NOTE — PROGRESS NOTES
AM assessment completed. Scheduled medications given per MAR. VSS on 5 lpm. A/O . Denies any needs, call light in reach. Will monitor. Lantus and sliding scale held for glucose 65. Patient eating breakfast. Purewick in place.

## 2020-06-26 NOTE — SIGNIFICANT EVENT
Up from 5 lpm this afternoon to 10 lpm at ~ 1815. Pt is on PCU currently. Stat CXR unchanged from previous. Will transfer to ICU. Chk ABG (obtained while pt was on Vapotherm). Will place on Vapotherm in the ICU. Increased WOB, mild resp distress. Very diminished. D/w Dr. Dana Virgen. IV Lasix x1. XR CHEST PORTABLE [[id number]]    Collected: 06/26/20 1910    Updated: 06/26/20 1914    Narrative:     EXAMINATION:  ONE XRAY VIEW OF THE CHEST    6/26/2020 6:40 pm    COMPARISON:  Chest x-ray dated 06/23/2020. HISTORY:  ORDERING SYSTEM PROVIDED HISTORY: increased O2 demands  TECHNOLOGIST PROVIDED HISTORY:  Reason for exam:->increased O2 demands  Reason for Exam: increased O2 demands  Acuity: Acute  Type of Exam: Initial    FINDINGS:  HEART/MEDIASTINUM: The cardiomediastinal silhouette is unchanged. PLEURA/LUNGS: Unchanged moderate to large right pleural effusion. Marilee Rizwan is  pulmonary vascular congestion.  There is no appreciable pneumothorax. BONES/SOFT TISSUE: The visualized osseous structures are unchanged. Impression:     No significant interval change in the moderate to large right pleural  effusion. Pulmonary vascular congestion.      Blood gas, arterial [[id number]] (Abnormal)    Collected: 06/26/20 2030    Updated: 06/26/20 2037    Specimen Source: Blood gases     pH, Arterial 7.405    pCO2, Arterial 34.3Low  mmHg    pO2, Arterial 76.9 mmHg    HCO3, Arterial 21.0 mmol/L    Base Excess, Arterial -3.1Low  mmol/L    Hemoglobin, Art, Extended 11.1Low  g/dL    O2 Sat, Arterial 95.6 %    Carboxyhgb, Arterial 0.3 %    Comment:      0.0-1.5   (Smokers 1.5-5.0)        Methemoglobin, Arterial 0.3 %    TCO2, Arterial 22.1 mmol/L    O2 Content, Arterial 15 mL/dL    O2 Therapy Unknown   Narrative:     Performed at:  [facility]) - [place] [address], [facility]   Phone [phone]     Total critical care time caring for this patient with life threatening, unstable organ failure, including direct patient contact, management of life support systems, review of data including imaging and labs, discussions with other team members and physicians is 33 minutes so far today, excluding procedures.

## 2020-06-26 NOTE — PROGRESS NOTES
Pt calling saying she \"can't breathe\". SpO2 found to be 74% on 4 liters O2. Increased to 10 liters and RT called. Stat CXR ordered. RT and charge nurse at bedside.

## 2020-06-26 NOTE — PROGRESS NOTES
Shift assessment complete, pt resting quietly in bed.  Will continue to monitor within normal limits

## 2020-06-27 NOTE — PLAN OF CARE
is maintained at optimum level for patient  Outcome: Ongoing     Problem: Nutrition  Goal: Optimal nutrition therapy  6/27/2020 0120 by Angy Zamorano RN  Outcome: Ongoing  6/26/2020 1745 by Bonnie Espinal RD, LD  Outcome: Ongoing

## 2020-06-27 NOTE — FLOWSHEET NOTE
06/26/20 2100   Vital Signs   Temp 98 °F (36.7 °C)   Temp Source Tympanic   Pulse 109   Heart Rate Source Monitor   Resp 26   BP (!) 146/68   BP Location Right upper arm   BP Upper/Lower Lower   Patient Position Semi fowlers   Level of Consciousness 0   MEWS Score 3   Patient Currently in Pain Yes   Height and Weight   Height 5' (1.524 m)   Weight 183 lb 4.8 oz (83.1 kg)   BSA (Calculated - sq m) 1.88 sq meters   BMI (Calculated) 35.9   Pain Assessment   Pain Assessment 0-10   Pain Level 10   Patient's Stated Pain Goal No pain   Pain Type Chronic pain   Pain Location Chest;Abdomen   Pain Orientation Anterior   Pain Descriptors Constant   Pain Frequency Continuous   Pain Onset Progressive   Clinical Progression Gradually worsening   Functional Pain Assessment Activities are not prevented   Non-Pharmaceutical Pain Intervention(s) Repositioned   Oxygen Therapy   SpO2 91 %   O2 Device High flow nasal cannula   O2 Flow Rate (L/min) 10 L/min   Pt resting in bed, VSS. Patient being admitted from PCU to ICU for oxygen requirements, expressing SOB and pain. Patient has visible dyspnea with rest and exertion. Assessment complete see doc flow. on tele, SPO2 91% on 10L O2 via vspotherm with CSPO2 monitoring. Pt A&O x 4. Pt educated not to get oob without assistance, pt verbalized understanding. Pt repositioned for comfort. Call light within reach will continue to monitor.

## 2020-06-27 NOTE — FLOWSHEET NOTE
06/27/20 0000   Vital Signs   Temp 97.6 °F (36.4 °C)   Temp Source Oral   Pulse 81   Heart Rate Source Monitor   Resp 16   BP (!) 115/56   BP Location Right upper arm   BP Upper/Lower Upper   MAP (mmHg) 72   Level of Consciousness 0   MEWS Score 1   Oxygen Therapy   SpO2 97 %   O2 Device Heated high flow cannula   FiO2  45 %   O2 Flow Rate (L/min) 20 L/min   Pt resting in bed, VSS. Denies SOB or pain. Assessment complete see doc flow. On tele, SPO2 97% on 20 L O2 via NC vapotherm  with CSPO2 monitoring. Pt A&O x 4. Pt educated not to get oob without assistance, pt verbalized understanding. Pt repositioned for comfort. Call light within reach will continue to monitor.

## 2020-06-27 NOTE — PROGRESS NOTES
Patient report given to MALGORZATA Poe. Patient resting comfortably at present. On Vapotherm 45%, 20L. Care transferred.

## 2020-06-27 NOTE — PROGRESS NOTES
Occupational Therapy and Physical Therapy  Attempted treatment this morning. Patient requesting to hold therapy today. OT/PT will follow up tomorrow as appropriate.        Pramod Amaya, OTR/L #[id number]

## 2020-06-27 NOTE — PLAN OF CARE
Patient's EF (Ejection Fraction) is greater than 40%    Heart Failure Medications:   Diuretics[de-identified] Furosemide, Aldactone     (One of the following REQUIRED for EF <40%/SYSTOLIC FAILURE but MAY be used in EF% >40%/DIASTOLIC FAILURE)        ACE[de-identified] None        ARB[de-identified] None         ARNI[de-identified] None    (Beta Blockers)   NON- Evidenced Based Beta Blocker (for EF% >40%/DIASTOLIC FAILURE): None     Evidenced Based Beta Blocker::(REQUIRED for EF% <40%/SYSTOLIC FAILURE) None  . .................................................................................................................................................. Patient's Last Weight: 183 lb. 1.8 oz obtained by bed scale. Difference in weight is 1.8 pounds less than last documented weight. Intake/Output Summary (Last 24 hours) at 6/27/2020 0601  Last data filed at 6/27/2020 0025  Gross per 24 hour   Intake 660 ml   Output 400 ml   Net 260 ml       Comorbidities Reviewed Yes  Patient has a past medical history of Acute osteomyelitis of left hallux ([facility] [address] Anemia, Asthma, Cellulitis of foot, CHF (congestive heart failure) ([facility] [address] Claudication of left upper extremity due to atherosclerosis [facility]), Diabetic ulcer of left hallux, with necrosis of bone ([address] DVT (deep venous thrombosis) ([facility] [address] Kidney stones, Morbid obesity with BMI of 45.0-49.9, adult ([address] Neuroma of hand, Open fracture of left hallux, Pneumonia, Pulmonary HTN ([address] Rotator cuff tendinitis, STEMI (ST elevation myocardial infarction) ([facility] [address] and Urinary incontinence.     >> For CHF and Comorbidity Education Time and Topics, please see Education Tab. Progressive Mobility Assessment:  What is this patient's Current Level of Mobility?: Requires Bed Rest  How was this patient Mobilized today?: Patient Refuses to Mobilizes                 With Whom? Patient refuses to mobilize, Nurse, PCA, PT, OT and Self                 Level of Difficulty/Assistance: 1x Assist     Pt is currently on 20 L O2.  Pt without lower extremity edema.  Patient's weights and intake/output reviewed:      Patient and/or Family's stated Goal of Care this Admission: be more comfortable prior to discharge

## 2020-06-27 NOTE — PROGRESS NOTES
Pt c/o generalized pain- mainly in chest & back area. PRN dilaudid given. Pt allowed RN to reposition at this time. PIVs WNL. Ruiz in place. Assessment unchanged.   Jhon Wagoner RN, BSN

## 2020-06-27 NOTE — PROGRESS NOTES
Patient is not able to demonstrate the ability to move from a reclining position to an upright position within the recliner due to increased SOB.

## 2020-06-27 NOTE — PROGRESS NOTES
Occupational Therapy/Physical Therapy Attempt    Unit: ICU   Date:  6/27/2020  Patient Name:    Les Waite  Admitting diagnosis:  Acute on chronic respiratory failure [facility]) [J96.20]  Admit Date:  6/19/2020    Pt transferred to ICU overnight. Will need NEW ORDERS in order to resume pt care, as appropriate. Thank you.      Nolvia Meng, LOIS, OTR/L   [id number]      No Charge

## 2020-06-27 NOTE — FLOWSHEET NOTE
Met with Pt per referral for Advance Directives. Healthcare Power of  completed and copy in chart. Pt was in a lot of pain and did not feel up to going over Living Will at this time. No other needs as well. Assured Pt of our continued availability for support and to help complete Living Will when ready. [id number]       06/27/20 7900   Encounter Summary   Services provided to: Patient   Referral/Consult From: Patient;Nurse   Support System Children   Continue Visiting   (6/27 initial, HCPOA completed)   Complexity of Encounter Moderate   Length of Encounter 30 minutes   Advance Care Planning Yes   Routine   Type Initial   Assessment Calm; Approachable;Passive   Intervention Active listening   Outcome Expressed gratitude   Advance Directives (For Healthcare)   Healthcare Directive Yes, patient has an advance directive for healthcare treatment   Type of Healthcare Directive Durable power of  for health care   Copy in Chart Yes, copy in chart   Chart Copy Status : Active;Current   Date Reviewed and Current: 06/27/20   Advance Directives Healthcare power of attornery completed   Healthcare Agent Appointed Adult Gail Bentley Agent's Name [address] [place] [address] Agent's Phone Number [phone]

## 2020-06-27 NOTE — PROGRESS NOTES
Pulmonary & Critical Care Medicine ICU Progress Note    CC: Shortness of breath and pleural effusion    Events of Last 24 hours:   Transferred to ICU for worsening respiratory failure  Constipation  Abdominal pain    Invasive Lines: IV: Peripheral    MV: Vapotherm     / / /FiO2 : 45 %  Recent Labs     20   PHART 7.405   VWP1HTF 34.3*   PO2ART 76.9       IV:   dextrose         Vitals:  Blood pressure (!) 146/54, pulse 107, temperature 98.4 °F (36.9 °C), temperature source Oral, resp. rate 21, height 5' (1.524 m), weight 183 lb 4.8 oz (83.1 kg), SpO2 91 %, not currently breastfeeding. on Vapotherm HFNO 45%, 20 L  Temp  Av °F (36.7 °C)  Min: 97.4 °F (36.3 °C)  Max: 98.4 °F (36.9 °C)    Intake/Output Summary (Last 24 hours) at 2020 0700  Last data filed at 2020 0609  Gross per 24 hour   Intake 780 ml   Output 700 ml   Net 80 ml     EXAM:  General: ill appearing    Eyes: PERRL. No sclera icterus. No conjunctival injection. ENT: No discharge. Pharynx clear. Neck: Trachea midline. Normal thyroid. Resp: No accessory muscle use. No crackles. No wheezing. No rhonchi. No dullness on percussion. CV: Regular rate. Regular rhythm. No mumur or rub. No edema. Peripheral pulses are 2+. Capillary refill is less than 3 seconds. GI: Non-tender. Non-distended. No masses. No organomegaly. Normal bowel sounds. No hernia. Skin: Warm and dry. No nodule on exposed extremities. No rash on exposed extremities. Lymph: No cervical LAD. No supraclavicular LAD. M/S: No cyanosis. No joint deformity. No clubbing. Neuro: A&OX3.  Patellar reflexes are symmetric  Psych: No agitation, no anxiety, affect is full     Scheduled Meds:   sennosides-docusate sodium  2 tablet Oral Daily    morphine  30 mg Oral 2 times per day    albuterol sulfate HFA  2 puff Inhalation Q4H WA    insulin glargine  28 Units Subcutaneous BID    lidocaine  1 patch Transdermal Once    aspirin  81 mg Oral Daily    atorvastatin  40 mg Oral Nightly    gabapentin  300 mg Oral TID    levothyroxine  100 mcg Oral Daily    metFORMIN  1,000 mg Oral BID WC    sertraline  100 mg Oral Daily    sildenafil  20 mg Oral TID    spironolactone  25 mg Oral Daily    budesonide-formoterol  2 puff Inhalation BID    sodium chloride flush  10 mL Intravenous 2 times per day    enoxaparin  40 mg Subcutaneous Daily    insulin lispro  0-18 Units Subcutaneous TID WC    insulin lispro  0-9 Units Subcutaneous Nightly     PRN Meds:  bisacodyl, oxyCODONE, ipratropium-albuterol, sodium chloride flush, acetaminophen **OR** acetaminophen, polyethylene glycol, promethazine **OR** ondansetron, glucose, dextrose, glucagon (rDNA), dextrose, potassium chloride **OR** potassium alternative oral replacement **OR** potassium chloride    Results:  CBC:   Recent Labs     06/25/20 0442 06/26/20 0413   WBC 15.8* 14.3*   HGB 11.3* 9.9*   HCT 35.7* 30.5*   MCV 96.4 95.8    214     BMP:   Recent Labs     06/25/20 0442 06/26/20 0413 06/27/20  0452    137 137   K 4.9 4.8 4.9   CL 97* 97* 101   CO2 23 23 21   BUN 38* 41* 36*   CREATININE 1.0 1.0 0.8     LIVER PROFILE: No results for input(s): AST, ALT, LIPASE, BILIDIR, BILITOT, ALKPHOS in the last 72 hours. Invalid input(s):   AMYLASE,  ALB    Cultures:  6/23/2020 SARS-CoV-2 negative  6/23/2020 pleural fluid culture negative    Chest imaging    CT chest 6/23/2020 right hilar mass, mediastinal and right hilar adenopathy, probable metastatic disease in liver, probable metastatic destruction of scapula    6/26/2020 CXR redemonstration of large right hilar mass with lung collapse and small effusion    Pleural fluid cytology 6/11/2020 and 6/23/2020 show atypical cells that are nondiagnostic for malignancy  Bronchoscopy 6/12/2020 cytology is negative    ASSESSMENT:  · Acute on chronic hypoxemic respiratory failure   · Exudative lymphocytic right pleural effusion, recurrent, favor malignant but cytology is indeterminate  · Mediastinal lymphadenopathy & hilar lymphadenopathy, favor malignant -small cell cancer is certainly in the differential  · Liver masses, favor malignant  · Mucus plugging of airways, minimal, on 6/12/20 fiberoptic bronchoscopy   · CHF, prolonged admission to [facility] earlier this month with diuresis  · Pulmonary hypertension, favor group 2    PLAN:  · High flow nasal oxygen for life threatening respiratory failure   · Bedside ultrasound shows tiny right effusion  · Conservative fluid strategy: Keep even to negative currently 40 mg IV lasix daily  · Thoracentesis was nondiagnostic. I discussed with interventional radiology Dr. Blaze Torres who approved plan for liver biopsy on Monday as an inpatient  · Holding Plavix, last dose was 6/23/20, need to be off 5 days. I am also holding aspirin beginning 6/28/2020 and Lovenox beginning 6/29/2020. All of these should be resumed after biopsy. · Code status discussed with patient and daughter. POA paperwork today. Currently full code    Total critical care time caring for this patient with life threatening, unstable organ failure, including direct patient contact, management of life support systems, review of data including imaging and labs, discussions with other team members and physicians is 32 minutes so far today, excluding procedures.

## 2020-06-27 NOTE — PROGRESS NOTES
AM assessment completed. VSS. Pt on vapotherm- o2 sat 92%. Pt refusing repositioning d/t pain. PIVs WNL. Labs reviewed.    Dario Roman RN, BSN

## 2020-06-27 NOTE — PROGRESS NOTES
Pt removed from vapotherm and placed on HFNC at 10lpm     06/27/20 1054   Oxygen Therapy/Pulse Ox   O2 Therapy Oxygen humidified   O2 Device High flow nasal cannula   O2 Flow Rate (L/min) 10 L/min   Resp 20   SpO2 95 %

## 2020-06-27 NOTE — FLOWSHEET NOTE
06/27/20 0400   Vital Signs   Temp 98.4 °F (36.9 °C)   Temp Source Oral   Pulse 85   Resp 17   /60   BP Location Right upper arm   BP Upper/Lower Upper   MAP (mmHg) 84   Level of Consciousness 0   MEWS Score 1   Oxygen Therapy   SpO2 93 %   O2 Device Heated high flow cannula   FiO2  45 %   O2 Flow Rate (L/min) 20 L/min   Pt resting in bed, VSS. Denies SOB or pain. Assessment complete see doc flow. On tele, SPO2 93% on 20 L O2 via vapotherm  NC with CSPO2 monitoring. Pt A&O x 4. Pt educated not to get oob without assistance, pt verbalized understanding. Pt repositioned for comfort. Call light within reach will continue to monitor.

## 2020-06-28 NOTE — PROGRESS NOTES
Pt repositioned in bed. Complete bath and linen change. Pt tolerated repositioning well- o2 sat remains above 87% on 7 LNC.   Jhon Wagoner RN, BSN

## 2020-06-28 NOTE — PROGRESS NOTES
Hospitalist Progress Note      PCP: Toney Rae MD    Date of Admission: 6/19/2020    Subjective:     Little clinical change. Ongoing cough and o2 requirement. Pt prefers her IV pain medication. Asking for her family to be at bedside all at once and gets very upset and emotional when she is advised that 1 visitor at a time is allowed in the ICU, though I have not seen a single family member at bedside in the past 2 days.           Medications:  Reviewed    Infusion Medications    dextrose       Scheduled Medications    polyethylene glycol  17 g Oral BID    furosemide  40 mg Intravenous Daily    sennosides-docusate sodium  2 tablet Oral Daily    morphine  30 mg Oral 2 times per day    albuterol sulfate HFA  2 puff Inhalation Q4H WA    insulin glargine  28 Units Subcutaneous BID    lidocaine  1 patch Transdermal Once    atorvastatin  40 mg Oral Nightly    gabapentin  300 mg Oral TID    levothyroxine  100 mcg Oral Daily    metFORMIN  1,000 mg Oral BID WC    sertraline  100 mg Oral Daily    sildenafil  20 mg Oral TID    spironolactone  25 mg Oral Daily    budesonide-formoterol  2 puff Inhalation BID    sodium chloride flush  10 mL Intravenous 2 times per day    enoxaparin  40 mg Subcutaneous Daily    insulin lispro  0-18 Units Subcutaneous TID WC    insulin lispro  0-9 Units Subcutaneous Nightly     PRN Meds: HYDROmorphone, bisacodyl, oxyCODONE, ipratropium-albuterol, sodium chloride flush, acetaminophen **OR** acetaminophen, polyethylene glycol, promethazine **OR** ondansetron, glucose, dextrose, glucagon (rDNA), dextrose, potassium chloride **OR** potassium alternative oral replacement **OR** potassium chloride      Intake/Output Summary (Last 24 hours) at 6/28/2020 1245  Last data filed at 6/28/2020 0839  Gross per 24 hour   Intake 240 ml   Output 1106 ml   Net -866 ml       Physical Exam Performed:    /63   Pulse 92   Temp 98.3 °F (36.8 °C) (Oral)   Resp 11   Ht 5' (1.524 current use of insulin (HCC) [E11.9, Z79.4]         Acute on chr hypoxic resp failure   - uses 2-3L O2 at home--> now on 4L O2, - added IS, wean as tolerated       Acute on Chronic diastolic CHF  pulm HTN - on Revatio  Probable Metastatic Lung Cancer       Moderate R sided pleural effusion-->thoracentesis ordered with panel and serum LDH  Had a thoracentesis, when patient was at [facility] on 6/11 which was exudative, lymphocytic. Repeat thoracentesis 6/23 - 50 cc removed  CT chest no contrast - suspicious for primary lung Ca with metastatic process with liver and bone involvement. Fluid cytology - atypical cells   Plan for perc liver biopsy Monday  Plavix Has been held. Constipation  Senna  dulcolax       DM II   - fairly controlled, cont lantus/SSI/metformin       Hypothyroidism  - cont synthroid       H/o CAD s/p PCI  - cont ASA/statin  Holding plavix for possible biopsy      Mild ELMA   . LAsix held.  toradol d/c ed   Resolved          DVT Prophylaxis: lovenox  Diet: DIET CARB CONTROL; Daily Fluid Restriction: Dry Tray  Diet NPO, After Midnight  Code Status: Full Code      PT/OT Eval Status: ordered    Dispo - cont care      Jeferson Floyd  6/28/2020  12:45 PM

## 2020-06-28 NOTE — PROGRESS NOTES
Patient remains in pain medicated with oxycodone 10 po pain . Patient also incontinent of small amount of stool. Mary and love care given. Patient did not want a bath at this time. Patient became SOB with turning In bed and her SpO2 dropped into the mid 60's, patient was able to recover after resting and slowing breathing.

## 2020-06-28 NOTE — PLAN OF CARE
Problem: Falls - Risk of:  Goal: Will remain free from falls  Description: Will remain free from falls  Outcome: Ongoing  Goal: Absence of physical injury  Description: Absence of physical injury  Outcome: Ongoing     Problem: Pain:  Goal: Pain level will decrease  Description: Pain level will decrease  Outcome: Ongoing  Goal: Control of acute pain  Description: Control of acute pain  Outcome: Ongoing  Goal: Control of chronic pain  Description: Control of chronic pain  Outcome: Ongoing  Goal: Patient's pain/discomfort is manageable  Description: Patient's pain/discomfort is manageable  Outcome: Ongoing     Problem: Infection:  Goal: Will remain free from infection  Description: Will remain free from infection  Outcome: Ongoing     Problem: Safety:  Goal: Free from accidental physical injury  Description: Free from accidental physical injury  Outcome: Ongoing  Goal: Free from intentional harm  Description: Free from intentional harm  Outcome: Ongoing     Problem: Daily Care:  Goal: Daily care needs are met  Description: Daily care needs are met  Outcome: Ongoing     Problem: Skin Integrity:  Goal: Skin integrity will stabilize  Description: Skin integrity will stabilize  Outcome: Ongoing     Problem: Discharge Planning:  Goal: Patients continuum of care needs are met  Description: Patients continuum of care needs are met  Outcome: Ongoing     Problem: OXYGENATION/RESPIRATORY FUNCTION  Goal: Patient will maintain patent airway  Outcome: Ongoing  Goal: Patient will achieve/maintain normal respiratory rate/effort  Description: Respiratory rate and effort will be within normal limits for the patient  Outcome: Ongoing     Problem: HEMODYNAMIC STATUS  Goal: Patient has stable vital signs and fluid balance  Outcome: Ongoing     Problem: FLUID AND ELECTROLYTE IMBALANCE  Goal: Fluid and electrolyte balance are achieved/maintained  Outcome: Ongoing     Problem: ACTIVITY INTOLERANCE/IMPAIRED MOBILITY  Goal: Mobility/activity is maintained at optimum level for patient  Outcome: Ongoing     Problem: Nutrition  Goal: Optimal nutrition therapy  Outcome: Ongoing

## 2020-06-28 NOTE — PROGRESS NOTES
insulin glargine  28 Units Subcutaneous BID    lidocaine  1 patch Transdermal Once    atorvastatin  40 mg Oral Nightly    gabapentin  300 mg Oral TID    levothyroxine  100 mcg Oral Daily    metFORMIN  1,000 mg Oral BID WC    sertraline  100 mg Oral Daily    sildenafil  20 mg Oral TID    spironolactone  25 mg Oral Daily    budesonide-formoterol  2 puff Inhalation BID    sodium chloride flush  10 mL Intravenous 2 times per day    enoxaparin  40 mg Subcutaneous Daily    insulin lispro  0-18 Units Subcutaneous TID WC    insulin lispro  0-9 Units Subcutaneous Nightly     PRN Meds:  HYDROmorphone, bisacodyl, oxyCODONE, ipratropium-albuterol, sodium chloride flush, acetaminophen **OR** acetaminophen, polyethylene glycol, promethazine **OR** ondansetron, glucose, dextrose, glucagon (rDNA), dextrose, potassium chloride **OR** potassium alternative oral replacement **OR** potassium chloride    Results:  CBC:   Recent Labs     06/26/20  0413   WBC 14.3*   HGB 9.9*   HCT 30.5*   MCV 95.8        BMP:   Recent Labs     06/26/20  0413 06/27/20  0452 06/28/20  0457    137 138   K 4.8 4.9 4.6   CL 97* 101 97*   CO2 23 21 25   BUN 41* 36* 40*   CREATININE 1.0 0.8 0.9     LIVER PROFILE: No results for input(s): AST, ALT, LIPASE, BILIDIR, BILITOT, ALKPHOS in the last 72 hours. Invalid input(s):   AMYLASE,  ALB    Cultures:  6/23/2020 SARS-CoV-2 negative  6/23/2020 pleural fluid culture negative    Chest imaging    CT chest 6/23/2020 right hilar mass, mediastinal and right hilar adenopathy, probable metastatic disease in liver, probable metastatic destruction of scapula    6/26/2020 CXR redemonstration of large right hilar mass with lung collapse and small effusion    Pleural fluid cytology 6/11/2020 and 6/23/2020 show atypical cells that are nondiagnostic for malignancy  Bronchoscopy 6/12/2020 cytology is negative    ASSESSMENT:  · Lung mass  · Acute on chronic hypoxemic respiratory failure   · Exudative lymphocytic right pleural effusion, recurrent, favor malignant but cytology is indeterminate  · Mediastinal lymphadenopathy & hilar lymphadenopathy, favor malignant -small cell cancer is certainly in the differential  · Liver masses, favor malignant  · Mucus plugging of airways, minimal, on 6/12/20 fiberoptic bronchoscopy   · CHF, prolonged admission to [facility] earlier this month with diuresis  · Pulmonary hypertension, favor group 2    PLAN:  · High flow nasal oxygen for life threatening respiratory failure   · Bedside ultrasound showed tiny right effusion 6/27/2020  · Conservative fluid strategy: Keep even to negative currently 40 mg IV lasix daily  · Thoracentesis was nondiagnostic. I discussed with interventional radiology Dr. Devorah Fuller who approved plan for liver biopsy on Monday as an inpatient  · Holding Plavix, last dose was 6/23/20, needs to be off minimum 5 days. I am also holding aspirin with last dose 6/28/2020 and will hold lovenox on 6/29/2020. All of these should be resumed after biopsy. · Full code, extensively discussed with patient and daughter.  I spoke with daughter again today

## 2020-06-28 NOTE — PROGRESS NOTES
Patient awake with c/o pain after coughing and rib pain medicated with dilaudid 1 mg ivp prn per patient's request. No changes noted in assessment. Vitals and SpO2 are stable. Will continue to monitor.

## 2020-06-29 NOTE — PROGRESS NOTES
Reassessment completed (see Flowsheet). All ICU lines remain intact, ICU monitoring continued- pt remains NPO- pending liver Biopsy. pt's blood pressures remain WDL. Daughter at bedside. Call to Pastoral care to complete living will paperwork.  Continuing to monitor.

## 2020-06-29 NOTE — PROGRESS NOTES
Shift assessment was completed (see flow sheet). Pt is A/O, pt complains of pain to Chest/ Rib/ abdominal pain- Scheduled medication to follow. Pt denies repositioning, or other needs at this time. Respirations are even, unlabored, with rhonchi/ diminished sounds on 5L. Scheduled medications to follow- held PO medications d/t pending procedure- held insulin, BG 73- gave 1/2 amp of D50. Call light within reach. Bed in lowest position. Bed alarm on. Will continue to monitor. Patient is not able to demonstrated the ability to move from a reclining position to an upright position within the recliner.  however patient is alert, oriented and able to provide informed consent

## 2020-06-29 NOTE — PROGRESS NOTES
Liver biopsy complete, 3 core samples sent to lab. Patient tolerated well, VS stable throughout. No sedation used. Patient transported back to floor with RN present. Report given to [employer] Obed, RN. Care transferred.   Summer KEISHA VO RN

## 2020-06-29 NOTE — PRE SEDATION
 sennosides-docusate sodium  2 tablet Oral Daily    morphine  30 mg Oral 2 times per day    albuterol sulfate HFA  2 puff Inhalation Q4H WA    insulin glargine  28 Units Subcutaneous BID    lidocaine  1 patch Transdermal Once    atorvastatin  40 mg Oral Nightly    gabapentin  300 mg Oral TID    levothyroxine  100 mcg Oral Daily    metFORMIN  1,000 mg Oral BID     sertraline  100 mg Oral Daily    sildenafil  20 mg Oral TID    spironolactone  25 mg Oral Daily    budesonide-formoterol  2 puff Inhalation BID    sodium chloride flush  10 mL Intravenous 2 times per day    insulin lispro  0-18 Units Subcutaneous TID     insulin lispro  0-9 Units Subcutaneous Nightly     Continuous Infusions:    dextrose       PRN Meds: HYDROmorphone, bisacodyl, ipratropium-albuterol, sodium chloride flush, acetaminophen **OR** acetaminophen, polyethylene glycol, promethazine **OR** ondansetron, glucose, dextrose, glucagon (rDNA), dextrose, potassium chloride **OR** potassium alternative oral replacement **OR** potassium chloride  Home Meds:   Prior to Admission medications    Medication Sig Start Date End Date Taking? Authorizing Provider   oxyCODONE-acetaminophen (PERCOCET) 7.5-325 MG per tablet Take 1 tablet by mouth every 6 hours as needed for Pain. Yes Historical Provider, MD   aspirin 81 MG EC tablet Take 81 mg by mouth daily   Yes Historical Provider, MD   levothyroxine (SYNTHROID) 50 MCG tablet Take 1 tablet by mouth daily. 6/22/20   Rick Vegas MD   ferrous sulfate (FE TABS 325) 325 (65 Fe) MG EC tablet Take 1 tablet by mouth daily (with breakfast) 6/22/20   Rick Vegas MD   furosemide (LASIX) 20 MG tablet Take 1 tablet by mouth twice daily. 6/22/20   Rick Vegas MD   atorvastatin (LIPITOR) 40 MG tablet Take 1 tablet by mouth daily.  6/22/20   Rick Vegas MD   sildenafil (REVATIO) 20 MG tablet Take 1 tablet by mouth 3 times daily 6/19/20   Jonel Brunner, APRN - CNP   spironolactone (ALDACTONE) 25 MG tablet Take 1 tablet by mouth daily 6/18/20   MITCHELL Bello CNP   gabapentin (NEURONTIN) 300 MG capsule Take 3 capsules by mouth 3 times daily. 5/28/20 6/26/20  Ronna Aggarwal MD   metFORMIN (GLUCOPHAGE) 1000 MG tablet Take 1 tablet by mouth twice daily with meals. 5/28/20   Ronna Aggarwal MD   sertraline (ZOLOFT) 100 MG tablet Take 1 tablet by mouth daily 5/21/20   Ronna Aggarwal MD   fluticasone (FLONASE) 50 MCG/ACT nasal spray 2 sprays by Nasal route daily 5/21/20 5/21/21  Ronna Aggarwal MD   oxybutynin (DITROPAN) 5 MG tablet 1 po q am and 2 po qhs 5/21/20   Ronna Aggarwal MD   SPIRIVA RESPIMAT 2.5 MCG/ACT AERS inhaler INHALE 2 PUFFS BY MOUTH DAILY 5/21/20   Ronna Aggarwal MD   clopidogrel (PLAVIX) 75 MG tablet Take 1 tablet by mouth daily. 5/18/20   Ronna Aggarwal MD   insulin glargine (LANTUS) 100 UNIT/ML injection vial Inject 35 Units into the skin 2 times daily 5/18/20   Yo Buckley MD   insulin lispro (HUMALOG) 100 UNIT/ML injection vial Inject 0-18 Units into the skin 3 times daily (with meals) 5/18/20   Yo Buckley MD   nicotine (NICODERM CQ) 14 MG/24HR Place 1 patch onto the skin daily 5/19/20   Yo Buckley MD   clonazePAM (KLONOPIN) 0.5 MG tablet TAKE ONE TABLET BY MOUTH DAILY AS NEEDED FOR ANXIETY 4/29/20 5/29/20  MITCHELL Rueda CNP   albuterol (PROVENTIL) (2.5 MG/3ML) 0.083% nebulizer solution Take 3 mLs by nebulization every 6 hours as needed for Wheezing 3/12/20   Brenda Landrum MD   blood glucose test strips (FREESTYLE LITE) strip TEST THREE TIMES A DAY 7/30/19   MITCHELL Becerra CNP   SYMBICORT 160-4.5 MCG/ACT AERO Inhale 2 puffs by mouth twice daily.  7/26/19   MITCHELL Rueda - CNP   Cholecalciferol (VITAMIN D3) 1000 units TABS Take 1 tablet by mouth daily 8/30/18   Ronna Aggarwal MD     Coumadin Use Last 7 Days:  no  Antiplatelet drug therapy use last 7 days: yes - asa held  Other anticoagulant use last 7 days: yes - plavix and lovenox held  Additional Medication Information:        Pre-Sedation Documentation and Exam:   I have reviewed the patient's history and review of systems.     Mallampati Airway Assessment:  normal neck range of motion    Prior History of Anesthesia Complications:   none    ASA Classification:  Class 3 - A patient with severe systemic disease that limits activity but is not incapacitating    Sedation/ Anesthesia Plan:   intravenous sedation    Medications Planned:   midazolam (Versed) intravenously and fentanyl intravenously    Patient is an appropriate candidate for plan of sedation: yes    Electronically signed by Julio Seaman MD on 6/29/2020 at 1:34 PM

## 2020-06-29 NOTE — PROGRESS NOTES
Hospitalist Progress Note      PCP: Toney Rae MD    Date of Admission: 6/19/2020    Subjective:       Little clinical change. Ongoing cough and o2 requirement. Pt prefers her IV pain medication. Asking for her family to be at bedside all at once and gets very upset and emotional when she is advised that 1 visitor at a time is allowed in the ICU, though I have not seen a single family member at bedside in the past 2 days. 6/29-    This is my first encounter with the patient. Chart reviewed briefly. -Oxygen requirements have gone down. She is on 5.5 L of oxygen. Liver biopsy is scheduled for today. She continues to have ongoing issues with some rib and abdominal pain.       Medications:  Reviewed    Infusion Medications    dextrose       Scheduled Medications    mupirocin   Nasal BID    polyethylene glycol  17 g Oral BID    furosemide  40 mg Intravenous Daily    sennosides-docusate sodium  2 tablet Oral Daily    morphine  30 mg Oral 2 times per day    albuterol sulfate HFA  2 puff Inhalation Q4H WA    insulin glargine  28 Units Subcutaneous BID    lidocaine  1 patch Transdermal Once    atorvastatin  40 mg Oral Nightly    gabapentin  300 mg Oral TID    levothyroxine  100 mcg Oral Daily    metFORMIN  1,000 mg Oral BID WC    sertraline  100 mg Oral Daily    sildenafil  20 mg Oral TID    spironolactone  25 mg Oral Daily    budesonide-formoterol  2 puff Inhalation BID    sodium chloride flush  10 mL Intravenous 2 times per day    insulin lispro  0-18 Units Subcutaneous TID WC    insulin lispro  0-9 Units Subcutaneous Nightly     PRN Meds: HYDROmorphone, bisacodyl, oxyCODONE, ipratropium-albuterol, sodium chloride flush, acetaminophen **OR** acetaminophen, polyethylene glycol, promethazine **OR** ondansetron, glucose, dextrose, glucagon (rDNA), dextrose, potassium chloride **OR** potassium alternative oral replacement **OR** potassium chloride      Intake/Output Summary (Last 24 hours) at 6/29/2020 0858  Last data filed at 6/29/2020 0827  Gross per 24 hour   Intake 360 ml   Output 1750 ml   Net -1390 ml       Physical Exam Performed:    BP (!) 116/47   Pulse 79   Temp 98.1 °F (36.7 °C) (Oral)   Resp 16   Ht 5' (1.524 m)   Wt 181 lb (82.1 kg)   LMP  (LMP Unknown)   SpO2 96%   BMI 35.35 kg/m²     General appearance: No apparent distress appears stated age and cooperative. HEENT Normal cephalic, atraumatic without obvious deformity. Pupils equal, round, and reactive to light. Extra ocular muscles intact. Conjunctivae/corneas clear. Neck: Supple, No jugular venous distention/bruits. Trachea midline without thyromegaly or adenopathy with full range of motion. Lungs: scattered crackles, decreased airentry both bases   Tenderness over right lower ant ribs  Heart: Regular rate and rhythm with Normal S1/S2   Abdomen: Soft, non-tender or non-distended without rigidity or guarding and positive bowel sounds  Extremities: No clubbing, cyanosis, or edema bilaterally. Skin: Skin color, texture, turgor normal.  No rashes or lesions. Neurologic: Awake, neurovascularly intact with sensory/motor intact upper extremities/lower extremities, bilaterally. Cranial nerves: II-XII intact, grossly non-focal.  Mental status: Awake,alert,oriented x 3      Labs:   No results for input(s): WBC, HGB, HCT, PLT in the last 72 hours. Recent Labs     06/27/20  0452 06/28/20  0457 06/29/20  0445    138 136   K 4.9 4.6 4.5    97* 94*   CO2 21 25 26   BUN 36* 40* 38*   CREATININE 0.8 0.9 0.9   CALCIUM 9.2 9.8 9.8     No results for input(s): AST, ALT, BILIDIR, BILITOT, ALKPHOS in the last 72 hours. No results for input(s): INR in the last 72 hours. No results for input(s): Dariana Formosa in the last 72 hours.     Urinalysis:      Lab Results   Component Value Date    NITRU Negative 06/19/2020    WBCUA 0-2 06/19/2020    BACTERIA 2+ 06/19/2020    RBCUA None seen 06/19/2020    BLOODU Negative 06/19/2020    SPECGRAV 1.015 06/19/2020    GLUCOSEU >=1000 06/19/2020    GLUCOSEU 100 05/26/2012       Radiology:  XR CHEST PORTABLE   Final Result   No significant interval change in the moderate to large right pleural   effusion. Pulmonary vascular congestion. US THORACENTESIS   Final Result   Successful ultrasound guided thoracentesis. XR CHEST PORTABLE   Final Result   Status post right thoracentesis. No pneumothorax. Stable chest findings. CT CHEST WO CONTRAST   Final Result   Probable malignant mass of the right lower lobe or right middle lobe near the   hilum, causing obstruction and collapse of the middle lobe and lower lobe. Adjacent metastatic adenopathy of the right hilum and mediastinum as well as   diffuse metastatic disease of the liver. Suspected metastatic destructive   osseous lesion of the right scapula, only partially imaged. Small right pleural effusion. Multifocal ground-glass opacity of both lungs. This may be due to edema,   atypical infection including viral pneumonia, or a combination. XR CHEST PORTABLE   Final Result   Vascular congestion. Moderate-sized right pleural effusion. XR CHEST STANDARD (2 VW)   Final Result   1. Worsening congestive heart failure. CT NEEDLE BIOPSY LIVER PERCUTANEOUS    (Results Pending)     CT chest no contrast 6 /23  Probable malignant mass of the right lower lobe or right middle lobe near the  hilum, causing obstruction and collapse of the middle lobe and lower lobe. Adjacent metastatic adenopathy of the right hilum and mediastinum as well as  diffuse metastatic disease of the liver.  Suspected metastatic destructive  osseous lesion of the right scapula, only partially imaged. Small right pleural effusion. Multifocal ground-glass opacity of both lungs.  This may be due to edema,  atypical infection including viral pneumonia, or a combination.       Assessment/Plan:    Principal Problem:    Acute on chronic respiratory failure (HCC)  Active Problems:    Type 2 diabetes mellitus without complication, with long-term current use of insulin (HCC)    Chest wall pain    Class 1 obesity due to excess calories with serious comorbidity and body mass index (BMI) of 34.0 to 34.9 in adult    Acute on chronic diastolic congestive heart failure (HCC)    Chronic respiratory failure (HCC)    Pleural effusion    Lung mass  Resolved Problems:    * No resolved hospital problems. *          Acute on chr hypoxic resp failure   - uses 2-3L O2 at home--> now on 5.5 L O2, - added IS, wean as tolerated       Acute on Chronic diastolic CHF  pulm HTN - on Revatio  Probable Metastatic Lung Cancer       Moderate R sided pleural effusion-->thoracentesis ordered with panel and serum LDH  Had a thoracentesis, when patient was at [facility] on 6/11 which was exudative, lymphocytic. Repeat thoracentesis 6/23 - 50 cc removed  CT chest no contrast - suspicious for primary lung Ca with metastatic process with liver and bone involvement. Fluid cytology - atypical cells   Plan for perc liver biopsy today. Aspirin, Plavix and Lovenox are on hold      Constipation  Senna  dulcolax       DM II   - fairly controlled, cont lantus/SSI/metformin       Hypothyroidism  - cont synthroid       H/o CAD s/p PCI  - cont ASA/statin  Holding plavix for possible biopsy      Mild ELMA   - LAsix held.  toradol d/c ed   Resolved          DVT Prophylaxis: lovenox  Diet: Diet NPO, After Midnight  Code Status: Full Code      PT/OT Eval Status: ordered    Dispo - cont isaac        Noelkaris Page 6/29/2020 9:02 AM

## 2020-06-29 NOTE — PROGRESS NOTES
Care rounds completed with Dr. Maura Falk and multidisciplinary team. Reviewed labs, meds, VS, assessment, & plan of care for today. Discussed only one family member at a time at bedside- unless patients status changes. Clinical and MD at bedside verifying this. Can move out of ICU once Liver biopsy completed. Informed of Hold of insulin/ Diabetic medications d/t BG 73.  See dictated note and new orders for details.

## 2020-06-29 NOTE — PLAN OF CARE
Problem: Falls - Risk of:  Goal: Will remain free from falls  Description: Fall risk precautions in place. Call light within reach. Frequent visual monitoring in place q1h. Bed alarm activated as applicable. Outcome: Ongoing     Problem: Pain:  Goal: Control of acute pain  Description: Continuing to monitor pain and discomfort. Monitoring pain level on scale of 0-10. Non- pharmacological measures encouraged to reduce discomfort/pain. PRN pain meds administeration continues as ordered by physician.     Outcome: Ongoing

## 2020-06-29 NOTE — PROGRESS NOTES
RESPIRATORY THERAPY ASSESSMENT    Name:  Keyon Benjamin Record Number:  [id number]  Age: 62 y.o. Gender: female  : 1960  Today's Date:  2020  Room:  3011/3011-01    Assessment     Is the patient being admitted for a COPD or Asthma exacerbation? No   (If yes the patient will be seen every 4 hours for the first 24 hours and then reassessed)    Patient Admission Diagnosis      Allergies  Allergies   Allergen Reactions    Flexeril [Cyclobenzaprine] Itching    Lisinopril      cough    Sitagliptin Other (See Comments)     Causes severe back pain and cramps  Causes severe back pain and cramps      Iodides Rash    Iodinated Diagnostic Agents Rash    Iodine Rash       Minimum Predicted Vital Capacity:               Actual Vital Capacity:                    Pulmonary History:Asthma and CHF/Pulmonary Edema  Home Oxygen Therapy:  3 liters/min via nasal cannula  Home Respiratory Therapy:Albuterol and Budesonide/Formoterol    Current Respiratory Therapy:  Albuterol 2 puffs q4wa, symbicort 160 bid  Treatment Type: MDI  Medications: Albuterol    Respiratory Severity Index(RSI)   Patients with orders for inhalation medications, oxygen, or any therapeutic treatment modality will be placed on Respiratory Protocol. They will be assessed with the first treatment and at least every 72 hours thereafter. The following severity scale will be used to determine frequency of treatment intervention.     Smoking History: Pulmonary Disease or Smoking History, Greater than 15 pack year = 2    Social History  Social History     Tobacco Use    Smoking status: Current Every Day Smoker     Packs/day: 0.50     Years: 40.00     Pack years: 20.00     Types: Cigarettes    Smokeless tobacco: Former User    Tobacco comment: .5 ppd   Substance Use Topics    Alcohol use: No     Alcohol/week: 0.0 standard drinks    Drug use: No       Recent Surgical History: None = 0  Past Surgical History  Past Surgical History: Procedure Laterality Date    APPENDECTOMY      BRONCHOSCOPY N/A 2020    BRONCHOSCOPY DIAGNOSTIC OR CELL [address] ONLY performed by Maciel Alejandro MD at [facility]  2020    BRONCHOSCOPY BRUSHINGS performed by Maciel Alejandro MD at [address] [facility] Bilateral 2015     SECTION      CORONARY ANGIOPLASTY WITH STENT PLACEMENT  14    DILATION AND CURETTAGE OF UTERUS      FOOT AMPUTATION Left 6/3/2019    LEFT HALLUX AMPUTATION WITH GRAFT APPLICATION performed by Juancarlos Kendall DPM at [facility] 2019    PARTIAL FIRST RAY  AMPUTATION LEFT FOOT performed by Soledad Smith DPM at [place] [address] Left 2005    atherectomy of SFA and popliteal artery    VASCULAR SURGERY Left 10/2007    subclavian angiopalsty and stent       Level of Consciousness: Alert, Oriented, and Cooperative = 0    Level of Activity: Non weight bearing- transfers bed to chair only = 3    Respiratory Pattern: Dyspnea with exertion;Irregular pattern;or RR less than 6 = 2    Breath Sounds: Absent bilaterally and/or with wheezes = 3    Sputum  Sputum Color: None,  , Sputum How Obtained: Spontaneous cough  Cough: Strong, spontaneous, non-productive = 0    Vital Signs   /61   Pulse 80   Temp 98.1 °F (36.7 °C) (Oral)   Resp 13   Ht 5' (1.524 m)   Wt 181 lb (82.1 kg)   LMP  (LMP Unknown)   SpO2 93%   BMI 35.35 kg/m²   SPO2 (COPD values may differ): 86-87% on room air or greater than 92% on FiO2 35- 50% = 3    Peak Flow (asthma only): not applicable = 0    RSI: 82-23 = Q6H or QID and Q4HPRN for dyspnea        Plan       Goals: mobilize retained secretions, volume expansion and improve oxygenation    Patient/caregiver was educated on the proper method of use for Respiratory Care Devices:  Yes      Level of patient/caregiver understanding able to:   ? Verbalize understanding   ?  Demonstrate understanding       ? Teach back        ? Needs reinforcement       ? No available caregiver               ? Other:     Response to education:  Good     Is patient being placed on Home Treatment Regimen? No     Does the patient have everything they need prior to discharge? NA     Comments: pt assessed and chart reviewed    Plan of Care albuterol q4wa, symbicort 160 bid    Electronically signed by Courtney Andujar RCP on 6/29/2020 at 5:42 AM    Respiratory Protocol Guidelines     1. Assessment and treatment by Respiratory Therapy will be initiated for medication and therapeutic interventions upon initiation of aerosolized medication. 2. Physician will be contacted for respiratory rate (RR) greater than 35 breaths per minute. Therapy will be held for heart rate (HR) greater than 140 beats per minute, pending direction from physician. 3. Bronchodilators will be administered via Metered Dose Inhaler (MDI) with spacer when the following criteria are met:  a. Alert and cooperative     b. HR < 140 bpm  c. RR < 30 bpm                d. Can demonstrate a 2-3 second inspiratory hold  4. Bronchodilators will be administered via Hand Held Nebulizer [facility]3]) to patients when ANY of the following criteria are met  a. Incognizant or uncooperative          b. Patients treated with HHN at Home        c. Unable to demonstrate proper use of MDI with spacer     d. RR > 30 bpm   5. Bronchodilators will be delivered via Metered Dose Inhaler (MDI), HHN, Aerogen to intubated patients on mechanical ventilation. 6. Inhalation medication orders will be delivered and/or substituted as outlined below. Aerosolized Medications Ordering and Administration Guidelines:    1. All Medications will be ordered by a physician, and their frequency and/or modality will be adjusted as defined by the patients Respiratory Severity Index (RSI) score.   2. If the patient does not have documented COPD, consider discontinuing anticholinergics when RSI is less than 9.  3. If the bronchospasm worsens (increased RSI), then the bronchodilator frequency can be increased to a maximum of every 4 hours. If greater than every 4 hours is required, the physician will be contacted. 4. If the bronchospasm improves, the frequency of the bronchodilator can be decreased, based on the patient's RSI, but not less than home treatment regimen frequency. 5. Bronchodilator(s) will be discontinued if patient has a RSI less than 9 and has received no scheduled or as needed treatment for 72  Hrs. Patients Ordered on a Mucolytic Agent:    1. Must always be administered with a bronchodilator. 2. Discontinue if patient experiences worsened bronchospasm, or secretions have lessened to the point that the patient is able to clear them with a cough. Anti-inflammatory and Combination Medications:    1. If the patient lacks prior history of lung disease, is not using inhaled anti-inflammatory medication at home, and lacks wheezing by examination or by history for at least 24 hours, contact physician for possible discontinuation.

## 2020-06-29 NOTE — PROGRESS NOTES
Shift handoff report given to Donny Samaniego at bedside. Pt denies any needs at this time, on 5L. Call light within reach, bed in lowest position, end of shift checks completed.

## 2020-06-29 NOTE — PROGRESS NOTES
Patient arrived back to floor in stable condition. Family at bedside had food waiting for patient to arrive- fries/ soft pretzels, non-diet soda. Pt educated on importance of following ordered diet and fluid restriction. Pt verbalized understanding while continuing to eat fries/ drink non-diet soda. Perfect serve to Dr Jennie Hensley to inform patient is tolerating current diet, and questioning order for transfer.

## 2020-06-29 NOTE — PROGRESS NOTES
06/27/20  0452 06/28/20  0457 06/29/20  0445    138 136   K 4.9 4.6 4.5    97* 94*   CO2 21 25 26   BUN 36* 40* 38*   CREATININE 0.8 0.9 0.9       Cultures:  6/23/2020 SARS-CoV-2 negative  6/23/2020 pleural fluid culture negative     Chest imaging    CT chest 6/23/2020 right hilar mass, mediastinal and right hilar adenopathy, probable metastatic disease in liver, probable metastatic destruction of scapula     6/26/2020 CXR redemonstration of large right hilar mass with lung collapse and small effusion     Pleural fluid cytology 6/11/2020 and 6/23/2020 show atypical cells that are nondiagnostic for malignancy  Bronchoscopy 6/12/2020 cytology is negative     ASSESSMENT:  · Lung mass  · Acute on chronic hypoxemic respiratory failure   · Exudative lymphocytic right pleural effusion, recurrent, favor malignant but cytology is indeterminate  · Mediastinal lymphadenopathy & hilar lymphadenopathy, favor malignant -small cell cancer is certainly in the differential  · Liver masses, favor malignant  · Mucus plugging of airways, minimal, on 6/12/20 fiberoptic bronchoscopy   · CHF, prolonged admission to [facility] earlier this month with diuresis  · Pulmonary hypertension, favor group 2     PLAN:  · Liver biopsy planned today  ·  Supplemental oxygen to maintain SaO2 >92%; wean as tolerated   ·  40 mg IV lasix daily  · Holding Plavix, last dose was 6/23/20; Held aspirin with last dose 6/28/2020 and will hold lovenox on 6/29/2020. All of these should be resumed after biopsy.   · She is on Revatio at home  · Full code, extensively discussed by Dr Aggarwal Speaks with patient and daughter  · MS Contin and PRN Dilaudid  · Ok to transfer

## 2020-06-29 NOTE — PROGRESS NOTES
Perfect serve to Dr Fabby Holder- pt is complaining of numbness/ tingling that is intermittent to mouth and chin. Continuing to monitor patient. Does not impede swallowing or speech.

## 2020-06-29 NOTE — PROGRESS NOTES
Reassessment completed (see Flowsheet). All ICU lines remain intact, ICU monitoring continued- pt remains on 5L HF NC- Lung sounds diminished with rhonchi. pt's blood pressures remain WDL.  transfer pending to 2W. Continuing to monitor.

## 2020-06-29 NOTE — CARE COORDINATION
INTERDISCIPLINARY PLAN OF CARE CONFERENCE    Date/Time: 6/29/2020 10:54 AM  Completed by: Colin Pena, Case Management      Patient Name:  Les Waite  YOB: 1960  Admitting Diagnosis: Acute on chronic respiratory failure [facility]) [J96.20]     Admit Date/Time:  6/19/2020  6:57 PM    Chart reviewed. Interdisciplinary team met to discuss patient progress and discharge plans. Disciplines included Case Management, Nursing, and Dietitian. Current Status: IP 06/19/2020    PT/OT recommendation: PENDING needs new orders    Anticipated Discharge Date: TBD  Expected D/C Disposition:  Skilled nursing facility  Confirmed plan: Yes  Discharge Plan Comments: Transferred to ICU over night due to worsening hypoxia now stabilized and transferring back to floor. Liver BX today    The Plan for Transition of Care is related to the following treatment goals: to go to rehab at [facility] for strengthening then home. Pre-cert can be started once PT/OT resumes. Lung BX today. CM will update [facility] admissions in am to determine if pre-cert can be started. +CM following    Home O2 in place on admit: Yes- baseline 2 liters - now in ICU using 5.5 liters per min.

## 2020-06-29 NOTE — PROGRESS NOTES
Physical/Occupational Therapy Note  Pt transferred to ICU on 6/26/20 for worsening respiratory failure. Per protocol will continue to hold therapy until new orders received. Please re-order therapy once pt is medically appropriate. No charge.   Iraida Urrutia, PT, DPT #[id number]  Melly Burnette, OTR/L [id number]

## 2020-06-30 NOTE — PROGRESS NOTES
Shift assessment complete; see flow sheet. Scheduled medications administered; See MAR. IV flushed without difficulty. O2 5 LPM nc in place. Pt c/o back pain 9/10 Scheduled MS. Cotin given at this time. Pt denies any needs at this time. Pt educated on use of call light and to call out with needs, verbalized understanding, bed in low locked position for pt safety.

## 2020-06-30 NOTE — PROGRESS NOTES
Occupational Therapy  Patient evaluated by OT on 6/21. Patient transferred to ICU on 6/26 for respiratory distress. Returned to [facility] on 6/29. Per RN, patient is appropriate to resume OT/PT. Attempted treatment this afternoon. Patient asleep and unable to arouse. OT will follow up tomorrow as appropriate.        Micky Jarrett, OTR/L #[id number]

## 2020-06-30 NOTE — PROGRESS NOTES
Handoff report and transfer of care given at bedside to [facility]. Patient in stable condition, denies needs/concerns at this time. Call light within reach.

## 2020-06-30 NOTE — PROGRESS NOTES
Report given to [facility] and patient to be transferred to [facility]

## 2020-06-30 NOTE — PROGRESS NOTES
clear.  Neck: Trachea midline   Lungs: scattered crackles, decreased airentry both bases   Tenderness over right lower ant ribs, on 6L NC O2  Heart: Regular rate and rhythm with Normal S1/S2   Abdomen: Soft, + tender or mildly distended without rigidity or guarding and positive bowel sounds  Extremities: No clubbing, cyanosis, or edema bilaterally. Skin: +oozing of blood from liver bx   Neurologic: Awake, neurovascularly intact with sensory/motor intact upper extremities/lower extremities, bilaterally. Cranial nerves: II-XII intact, grossly non-focal.  Mental status: Awake,alert,oriented         I Shawna Alvarez have reviewed the chart on [address] [place] [address] and personally interviewed and examined patient, reviewed the data (labs and imaging) and after discussion with my PA formulated the plan. Agree with note with the following edits. HPI:     I reviewed the patient's Past Medical History, Past Surgical History, Medications, and Allergies. Continued pain in hips , pelvis per pt  S/p liver mass bx yesterday, pending results      General: middle aged female, obese, older appearing   Awake, alert and oriented. Appears to be not in any distress  Mucous Membranes:  Pink , anicteric  Neck: No JVD, no carotid bruit, no thyromegaly  Chest:  Clear to auscultation bilaterally, diminished in bases  bx of liver site oozing  Cardiovascular:  RRR S1S2 heard, no murmurs or gallops  Abdomen:  Soft, undistended, non tender, no organomegaly, BS present  Extremities: No edema or cyanosis.  Distal pulses well felt  Neurological : grossly normal                Labs:   Recent Labs     06/30/20  0544   WBC 13.7*   HGB 10.4*   HCT 32.6*        Recent Labs     06/28/20  0457 06/29/20  0445 06/30/20  0544    136 138   K 4.6 4.5 4.8   CL 97* 94* 94*   CO2 25 26 28   BUN 40* 38* 46*   CREATININE 0.9 0.9 1.1   CALCIUM 9.8 9.8 9.5     Urinalysis:      Lab Results   Component Value Date    NITRU Negative 06/19/2020 WBCUA 0-2 06/19/2020    BACTERIA 2+ 06/19/2020    RBCUA None seen 06/19/2020    BLOODU Negative 06/19/2020    SPECGRAV 1.015 06/19/2020    GLUCOSEU >=1000 06/19/2020    GLUCOSEU 100 05/26/2012       Radiology:  CT GUIDED NEEDLE PLACEMENT   Final Result   Successful CT guided core biopsy of the liver. CT NEEDLE BIOPSY LIVER PERCUTANEOUS   Final Result   Successful CT guided core biopsy of the liver. XR CHEST PORTABLE   Final Result   No significant interval change in the moderate to large right pleural   effusion. Pulmonary vascular congestion. US THORACENTESIS   Final Result   Successful ultrasound guided thoracentesis. XR CHEST PORTABLE   Final Result   Status post right thoracentesis. No pneumothorax. Stable chest findings. CT CHEST WO CONTRAST   Final Result   Probable malignant mass of the right lower lobe or right middle lobe near the   hilum, causing obstruction and collapse of the middle lobe and lower lobe. Adjacent metastatic adenopathy of the right hilum and mediastinum as well as   diffuse metastatic disease of the liver. Suspected metastatic destructive   osseous lesion of the right scapula, only partially imaged. Small right pleural effusion. Multifocal ground-glass opacity of both lungs. This may be due to edema,   atypical infection including viral pneumonia, or a combination. XR CHEST PORTABLE   Final Result   Vascular congestion. Moderate-sized right pleural effusion. XR CHEST STANDARD (2 VW)   Final Result   1. Worsening congestive heart failure. CT chest no contrast 6 /23  Probable malignant mass of the right lower lobe or right middle lobe near the  hilum, causing obstruction and collapse of the middle lobe and lower lobe.   Adjacent metastatic adenopathy of the right hilum and mediastinum as well as  diffuse metastatic disease of the liver.  Suspected metastatic destructive  osseous lesion of the right scapula, only partially imaged. Small right pleural effusion. Multifocal ground-glass opacity of both lungs.  This may be due to edema,  atypical infection including viral pneumonia, or a combination. Assessment/Plan:    Acute on chr hypoxic resp failure   - uses 2-3L O2 at home--> now on 6 L O2  - added IS, wean as tolerated  - see below     Acute on Chronic diastolic CHF  pulm HTN - on Revatio  ? Metastatic cancer   - CXR with pulm edema, started on IV lasix  - Recent echo from last admission with normal EF  - repeat CXR: vascular congestion with moderate R sided pleural effusion-->thoracentesis ordered with panel and serum LDH  - Weight 185#-->180-->182 lbs  - Net fluid deficit 4.9 L   - Had a thoracentesis, when patient was at [facility] on 6/11 which was exudative,lymphocytic. - Pulm consult  - Repeat thoracentesis 6/23- 50 cc removed  - CT chest no contrast -suspicious for metastatic malignancy  - Await fluid cytology- atypical cells,s/p percutaneous liver biopsy 6/29, surgical pathology pending   - Plavix  Has been held-_>mild oozing from liver bx, hold Plavix, ASA and Lovenox another day   - Continues to have pain. Increase oxycodone to 10 mg every 4 hours. Continue iv morphine as needed. - Add oramorph 15mg  q 12   - Oncology c/s     Constipation  - Senna  - dulcolax     DM II   - fairly controlled, cont lantus/SSI/metformin     Hypothyroidism  - cont synthroid     H/o CAD s/p PCI  - cont ASA/statin  - Hold plavix for possible biopsy    Mild ELMA--resolved  - LAsix held.  toradol d/c ed   - Resolved   - Okay to resume diuretics and monitor creatinine closely      DVT Prophylaxis: lovenox  Diet: DIET CARB CONTROL; Carb Control: 5 carb choices (75 gms)/meal; Daily Fluid Restriction: Dry Tray  Code Status: Full Code    Awaiting pathology reports, continue to wean supplemental O2 as tolerated, continue IV diuretics, Hold Plavix, ASA and Lovenox x 1 more day for oozing from liver bx site, oncology consult

## 2020-06-30 NOTE — PROGRESS NOTES
Patient resting in bed with eyes closed. No distress noted. Call light in reach. Will continue to monitor.

## 2020-06-30 NOTE — CONSULTS
History Narrative    Not on file       FAMILY HISTORY:     Family History   Problem Relation Age of Onset    Other Mother          of unknown lung issue at 61    Diabetes Mother     Diabetes Brother     Heart Disease Maternal Grandfather     Cancer Maternal Grandmother     Asthma Grandchild        ALLERGIES:     Allergies as of 2020 - Review Complete 2020   Allergen Reaction Noted    Flexeril [cyclobenzaprine] Itching 2019    Januvia [sitagliptin] Other (See Comments) 2015    Lisinopril  2020    Iodine Rash        MEDICATIONS:     No current facility-administered medications on file prior to encounter. Current Outpatient Medications on File Prior to Encounter   Medication Sig Dispense Refill    oxyCODONE-acetaminophen (PERCOCET) 7.5-325 MG per tablet Take 1 tablet by mouth every 6 hours as needed for Pain.  aspirin 81 MG EC tablet Take 81 mg by mouth daily      sildenafil (REVATIO) 20 MG tablet Take 1 tablet by mouth 3 times daily 90 tablet 3    spironolactone (ALDACTONE) 25 MG tablet Take 1 tablet by mouth daily 30 tablet 3    gabapentin (NEURONTIN) 300 MG capsule Take 3 capsules by mouth 3 times daily. 270 capsule 1    metFORMIN (GLUCOPHAGE) 1000 MG tablet Take 1 tablet by mouth twice daily with meals. 60 tablet 5    sertraline (ZOLOFT) 100 MG tablet Take 1 tablet by mouth daily 30 tablet 5    fluticasone (FLONASE) 50 MCG/ACT nasal spray 2 sprays by Nasal route daily 1 Bottle 5    oxybutynin (DITROPAN) 5 MG tablet 1 po q am and 2 po qhs 90 tablet 5    SPIRIVA RESPIMAT 2.5 MCG/ACT AERS inhaler INHALE 2 PUFFS BY MOUTH DAILY 4 g 5    clopidogrel (PLAVIX) 75 MG tablet Take 1 tablet by mouth daily.  30 tablet 5    insulin glargine (LANTUS) 100 UNIT/ML injection vial Inject 35 Units into the skin 2 times daily 2 vial 3    insulin lispro (HUMALOG) 100 UNIT/ML injection vial Inject 0-18 Units into the skin 3 times daily (with meals) 1 vial 3    nicotine (NICODERM CQ) 14 MG/24HR Place 1 patch onto the skin daily 30 patch 0    clonazePAM (KLONOPIN) 0.5 MG tablet TAKE ONE TABLET BY MOUTH DAILY AS NEEDED FOR ANXIETY 30 tablet 0    albuterol (PROVENTIL) (2.5 MG/3ML) 0.083% nebulizer solution Take 3 mLs by nebulization every 6 hours as needed for Wheezing 120 each 5    blood glucose test strips (FREESTYLE LITE) strip TEST THREE TIMES A  strip 2    SYMBICORT 160-4.5 MCG/ACT AERO Inhale 2 puffs by mouth twice daily. 1 Inhaler 3    Cholecalciferol (VITAMIN D3) 1000 units TABS Take 1 tablet by mouth daily 30 tablet 5     REVIEW OF SYSTEMS:       10 point ROS completed. Pertinent positives in HPI, otherwise negative. PHYSICAL EXAM:       Vitals:    07/01/20 0549   BP:    Pulse:    Resp:    Temp:    SpO2: 92%       General appearance: alert and cooperative  Head: Normocephalic, without obvious abnormality, atraumatic  Neck: No palpable lymphadenopathy in supraclavicular or cervical chains  Lungs: Clear to auscultation bilaterally, no audible rales, wheezes or crackles  Heart: Regular rate and rhythm, S1, S2 normal  Abdomen: Soft, non-tender; bowel sounds normal; no masses,  no organomegaly  Extremities: without cyanosis, clubbing, edema or asymmetry  Skin: No jaundice, purpura or petechiae      LABS:     Lab Results   Component Value Date    WBC 13.7 (H) 06/30/2020    HGB 10.4 (L) 06/30/2020    HCT 32.6 (L) 06/30/2020    MCV 94.2 06/30/2020     06/30/2020       Lab Results   Component Value Date    GLUCOSE 219 (H) 07/01/2020    BUN 47 (H) 07/01/2020    CREATININE 1.1 07/01/2020    K 5.2 (H) 07/01/2020    PHOS 4.3 06/06/2020       Lab Results   Component Value Date    ALKPHOS 138 (H) 06/19/2020    ALT 30 06/19/2020    AST 27 06/19/2020    BILITOT 0.4 06/19/2020    PROT 7.2 06/19/2020       IMAGING:     Xr Chest Standard (2 Vw)  Result Date: 6/19/2020  1. Worsening congestive heart failure.      Ct Chest Wo Contrast  Result Date: 6/23/2020  Probable

## 2020-06-30 NOTE — PROGRESS NOTES
Patient resting in bed, AM assessment completed. Lungs decreased. BS hypoactive. INT Intact. O2 at 6L per NC, 90%. No acute distress noted. Call light in reach. Will continue to monitor.

## 2020-06-30 NOTE — PROGRESS NOTES
Pulmonary Progress Note  CC: Hypoxia    Subjective:  Pain better controlled. EXAM: /81   Pulse 100   Temp 98.1 °F (36.7 °C) (Oral)   Resp 18   Ht 5' (1.524 m)   Wt 182 lb 4 oz (82.7 kg)   LMP  (LMP Unknown)   SpO2 91%   BMI 35.59 kg/m²  on 5lpm  Constitutional:  No acute distress. Eyes: PERRL. Conjunctivae anicteric. ENT: Normal nose. Normal tongue. Neck:  Trachea is midline. No thyroid tenderness. Respiratory: No accessory muscle usage. Decreased breath sounds. No wheezes. No rales. No Rhonchi. Cardiovascular: Normal S1S2. No digit clubbing. No digit cyanosis. No LE edema. Psychiatric: No anxiety or Agitation. Alert and Oriented to person, place and time.     Scheduled Meds:   polyethylene glycol  17 g Oral BID    sennosides-docusate sodium  2 tablet Oral Daily    morphine  30 mg Oral 2 times per day    albuterol sulfate HFA  2 puff Inhalation Q4H WA    insulin glargine  28 Units Subcutaneous BID    lidocaine  1 patch Transdermal Once    atorvastatin  40 mg Oral Nightly    gabapentin  300 mg Oral TID    levothyroxine  100 mcg Oral Daily    sertraline  100 mg Oral Daily    sildenafil  20 mg Oral TID    spironolactone  25 mg Oral Daily    budesonide-formoterol  2 puff Inhalation BID    sodium chloride flush  10 mL Intravenous 2 times per day    insulin lispro  0-18 Units Subcutaneous TID WC    insulin lispro  0-9 Units Subcutaneous Nightly     Continuous Infusions:   dextrose       PRN Meds:  HYDROmorphone, bisacodyl, ipratropium-albuterol, sodium chloride flush, acetaminophen **OR** acetaminophen, polyethylene glycol, promethazine **OR** ondansetron, glucose, dextrose, glucagon (rDNA), dextrose, potassium chloride **OR** potassium alternative oral replacement **OR** potassium chloride    Labs:  CBC:   Recent Labs     06/30/20  0544   WBC 13.7*   HGB 10.4*   HCT 32.6*   MCV 94.2        BMP:   Recent Labs     06/28/20  0457 06/29/20  0445 06/30/20  0544    136 138   K 4.6 4.5 4.8   CL 97* 94* 94*   CO2 25 26 28   BUN 40* 38* 46*   CREATININE 0.9 0.9 1.1       Cultures:  6/23/2020 SARS-CoV-2 negative  6/23/2020 pleural fluid culture negative     Chest imaging    CT chest 6/23/2020 right hilar mass, mediastinal and right hilar adenopathy, probable metastatic disease in liver, probable metastatic destruction of scapula     6/26/2020 CXR redemonstration of large right hilar mass with lung collapse and small effusion     Pleural fluid cytology 6/11/2020 and 6/23/2020 show atypical cells that are nondiagnostic for malignancy  Bronchoscopy 6/12/2020 cytology is negative     ASSESSMENT:  · Lung mass  · Acute on chronic hypoxemic respiratory failure   · Exudative lymphocytic right pleural effusion, recurrent, favor malignant but cytology is indeterminate  · Mediastinal lymphadenopathy & hilar lymphadenopathy, favor malignant -small cell cancer is certainly in the differential  · Liver masses, favor malignant  · Mucus plugging of airways, minimal, on 6/12/20 fiberoptic bronchoscopy   · CHF, prolonged admission to [facility] earlier this month with diuresis  · Pulmonary hypertension, favor group 2     PLAN:  · F/u Liver biopsy  ·  Supplemental oxygen to maintain SaO2 >92%; wean as tolerated   · Ok to restart Plavix, aspirin and hold lovenox

## 2020-06-30 NOTE — PROGRESS NOTES
Patient transferred to [facility] with transport on 5 Liters of oxygen

## 2020-07-01 NOTE — ADT AUTH CERT
Heart Failure - Care Day 4 (6/22/2020) by Sindhu Zhu RN         Review Status Review Entered   Completed 6/24/2020 15:28       Criteria Review      Care Day: 4 Care Date: 6/22/2020 Level of Care:    Guideline Day 3    Level Of Care    (X) Floor to discharge    6/24/2020 3:28 PM EDT by Bertin Alex      progressive    Clinical Status    (X) * Hemodynamic stability    6/24/2020 3:28 PM EDT by Bertin Alex      HR 86-91  bp: 112/49    (X) * Tachypnea absent    6/24/2020 3:28 PM EDT by Bertin Alex      18-20    ( ) * Oxygenation at baseline or acceptable for next level of care    6/24/2020 3:28 PM EDT by Bertin Alex      94% 4L via nc    (X) * Dyspnea at baseline or acceptable for next level of care    6/24/2020 3:28 PM EDT by Bertin Alex      baseline    ( ) * Cardiac rate and rhythm acceptable    ( ) * Pulmonary edema absent or acceptable for next level of care    ( ) * Peripheral or sacral edema absent, at baseline, or improved    (X) * Mental status at baseline    6/24/2020 3:28 PM EDT by Bertin Alex      baseline    ( ) * Volume status acceptable on oral medication    (X) * Renal function at baseline or acceptable for next level of care    6/24/2020 3:28 PM EDT by Bertin Alex      bun: 32, cr: 1.1    (X) * Electrolyte levels normal or acceptable for next level of care    6/24/2020 3:28 PM EDT by Melvern Skiff wdl    ( ) * Immediate precipitating factors absent or controlled    ( ) * Discharge plans and education understood    Activity    ( ) * Ambulatory    Routes    ( ) * Oral hydration, medications, and diet    Interventions    ( ) * Oxygen absent    6/24/2020 3:28 PM EDT by Bertin Alex      4L via nc    Medications    (X) Diuretics    6/24/2020 3:28 PM EDT by Bertin Alex      lasix 40mg iv x 1 today    * Milestone   Additional Notes   6/22  Day 4      Pt remains on progressive care unit      Vitals: t: 37 p: 77 rr: 18 bp: 108/59 spo2: 94% 4L via nc      Abn labs:  wbc: 13.4, hgb: 11.3, bun: 32, gluc: 220      Orders:    Accu checks ac/hs with ssi coverage   Lovenox 40mg sq daily   Duonebs q 4 hrs   Cbc, bmp daily   Fluid restriction       Per IM PN:  Assessment/Plan:       Active Hospital Problems     Diagnosis   · Acute on chronic respiratory failure (HCC) [J96.20]           Acute on chr hypoxic resp failure - uses 2-3L O2 at home, now on 4L O2, added IS, wean as tolerated       Acute on chr diastolic CHF - CXR with pulm edema, started on IV lasix, monitor strict I/O and daily weights, low salt diet with fluid restriction, recent echo from last admission with normal EF, increase lasix to TID       Chest wall pain - on prn tramadol/prn percocet       DM II - fairly controlled, cont lantus/SSI/metformin       Hypothyroidism - cont synthroid       H/o CAD s/p PCI - cont ASA/plavix/statin      Per IM PN:  Assessment/Plan:       Active Hospital Problems     Diagnosis   · Acute on chronic respiratory failure (HCC) [J96.20]           Acute on chr hypoxic resp failure - uses 2-3L O2 at home, now on 4L O2, added IS, wean as tolerated       Acute on chr diastolic CHF - CXR with pulm edema, started on IV lasix, monitor strict I/O and daily weights, low salt diet with fluid restriction, recent echo from last admission with normal EF, repeat CXR        Chest wall pain - on prn tramadol/prn percocet       DM II - fairly controlled, cont lantus/SSI/metformin       Hypothyroidism - cont synthroid       H/o CAD s/p PCI - cont ASA/plavix/statin

## 2020-07-01 NOTE — PLAN OF CARE
achieve/maintain normal respiratory rate/effort  Description: Respiratory rate and effort will be within normal limits for the patient  Outcome: Ongoing     Problem: HEMODYNAMIC STATUS  Goal: Patient has stable vital signs and fluid balance  Outcome: Ongoing     Problem: FLUID AND ELECTROLYTE IMBALANCE  Goal: Fluid and electrolyte balance are achieved/maintained  Outcome: Ongoing     Problem: ACTIVITY INTOLERANCE/IMPAIRED MOBILITY  Goal: Mobility/activity is maintained at optimum level for patient  Outcome: Ongoing     Problem: Nutrition  Goal: Optimal nutrition therapy  Outcome: Ongoing     Problem: Skin Integrity:  Goal: Will show no infection signs and symptoms  Description: Will show no infection signs and symptoms  Outcome: Ongoing  Goal: Absence of new skin breakdown  Description: Absence of new skin breakdown  Outcome: Ongoing

## 2020-07-01 NOTE — PROGRESS NOTES
Nutrition Assessment    Type and Reason for Visit: Reassess    Nutrition Recommendations:   1. Adjusted Diet to CCC-3       Nutrition Assessment:   Pt improving from a nutritional standpoint AEB she has maintined a PO intake > 50% which includes food brought in form outside. Remains at risk for further nutritional compromise r/t severity of her metastatic disease which continuies to be investigated with biopsies and MRI her BS also remain elevated  . Will change diet to reduce carb load    Malnutrition Assessment:  · Malnutrition Status: At risk for malnutrition  · Context: Acute illness or injury  · Findings of the 6 clinical characteristics of malnutrition (Minimum of 2 out of 6 clinical characteristics is required to make the diagnosis of moderate or severe Protein Calorie Malnutrition based on AND/ASPEN Guidelines):  1. Energy Intake-Less than or equal to 50% of estimated energy requirement, Greater than or equal to 7 days    2. Weight Loss-No significant weight loss, in 1 week  3. Fat Loss-No significant subcutaneous fat loss, Orbital  4. Muscle Loss-No significant muscle mass loss, Temples (temporalis muscle), Clavicles (pectoralis and deltoids)  5. Fluid Accumulation-Unable to assess,    6.  Strength-Not measured    Nutrition Risk Level:  Moderate    Nutrient Needs:  · Estimated Daily Total Kcal: 1250 -1500 based ~ 15-18 kcal/kg cbw  · Estimated Daily Protein (g): 68-82 gr/kg ibw  · Estimated Daily Total Fluid (ml/day): 7109-5036     Nutrition Diagnosis:   · Problem: Inadequate oral intake  · Etiology: related to Catabolic illness, Impaired respiratory function-inability to consume food, Cardiac dysfunction     Signs and symptoms:  as evidenced by Intake 50-75%, Patient report of(food makes her feel bloated )    Objective Information:  · Nutrition-Focused Physical Findings: obese female lying bed with food at bed side brought in by family; O2/NC ; ; +BM today ;   · Wound Type: None  · Current Nutrition Therapies:  · Oral Diet Orders: Carb Control 5 Carbs/Meal, Fluid Restriction   · Oral Diet intake: 51-75%, %  · Oral Nutrition Supplement (ONS) Orders: None  · Anthropometric Measures:  · Ht: 5' (152.4 cm)   · Current Body Wt: 183 lb 14 oz (83.4 kg)  · Admission Body Wt: 181 lb (82.1 kg)  · Usual Body Wt: 186 lb (84.4 kg)  · % Weight Change:  ,  3# loss in 1 month   · Ideal Body Wt: 100 lb (45.4 kg), % Ideal Body 183  · BMI Classification: BMI 35.0 - 39.9 Obese Class II    Nutrition Interventions:   Modify current diet  Continued Inpatient Monitoring, Coordination of Care, Coordination of Community Care    Nutrition Evaluation:   · Evaluation: Progressing toward goals   · Goals: pt will consume > 50% of most meals and weight will remain stable.      · Monitoring: Meal Intake, Weight, Diet Tolerance, Monitor Bowel Function      Electronically signed by Ioana Chau RD, LD on 7/1/20 at 5:43 PM EDT    Contact Number: [id number]

## 2020-07-01 NOTE — PLAN OF CARE
Nutrition Problem: Inadequate oral intake  Intervention: Food and/or Nutrient Delivery: Modify current diet  Nutritional Goals: pt will consume > 50% of most meals and weight will remain stable.

## 2020-07-01 NOTE — PROGRESS NOTES
Occupational Therapy/ Physical Therapy  Attempted OT/PT re eval and the pt is off the floor for bone scan at this time. Will attempt again as time allows.   Denice Wong OTR/L [id number]  Jeri Scott, MS PT, # [id number]

## 2020-07-01 NOTE — PROGRESS NOTES
MRI checklist completed with pt. Pt requested that daughter, Sridhar Marion, to sign the questionnaire.

## 2020-07-01 NOTE — PROGRESS NOTES
Hospitalist Progress Note      PCP: Sage Lebron MD    Date of Admission: 6/19/2020    Subjective:   Still having a lot of pain. Liver bx results still pending. Plans for bone scan today and MRI brain. Medications:  Reviewed    Infusion Medications    dextrose       Scheduled Medications    enoxaparin  40 mg Subcutaneous Daily    polyethylene glycol  17 g Oral BID    sennosides-docusate sodium  2 tablet Oral Daily    morphine  30 mg Oral 2 times per day    albuterol sulfate HFA  2 puff Inhalation Q4H WA    insulin glargine  28 Units Subcutaneous BID    lidocaine  1 patch Transdermal Once    atorvastatin  40 mg Oral Nightly    gabapentin  300 mg Oral TID    levothyroxine  100 mcg Oral Daily    sertraline  100 mg Oral Daily    sildenafil  20 mg Oral TID    spironolactone  25 mg Oral Daily    budesonide-formoterol  2 puff Inhalation BID    sodium chloride flush  10 mL Intravenous 2 times per day    insulin lispro  0-18 Units Subcutaneous TID WC    insulin lispro  0-9 Units Subcutaneous Nightly     PRN Meds: oxyCODONE, bisacodyl, ipratropium-albuterol, sodium chloride flush, acetaminophen **OR** acetaminophen, polyethylene glycol, promethazine **OR** ondansetron, glucose, dextrose, glucagon (rDNA), dextrose, potassium chloride **OR** potassium alternative oral replacement **OR** potassium chloride      Intake/Output Summary (Last 24 hours) at 7/1/2020 1033  Last data filed at 7/1/2020 0921  Gross per 24 hour   Intake 680 ml   Output 875 ml   Net -195 ml       Physical Exam Performed:    /70   Pulse 92   Temp 99 °F (37.2 °C) (Oral)   Resp 20   Ht 5' (1.524 m)   Wt 183 lb 14.4 oz (83.4 kg)   LMP  (LMP Unknown)   SpO2 92%   BMI 35.92 kg/m²     General appearance: No apparent distress appears stated age and cooperative. HEENT  Conjunctivae/corneas clear.   Neck: Trachea midline   Lungs: scattered crackles, decreased airentry both bases   Tenderness over right lower ant ribs, on 6L NC O2  Heart: Regular rate and rhythm with Normal S1/S2   Abdomen: Soft, + tender, mildly distended without rigidity or guarding and positive bowel sounds  Extremities: No clubbing, cyanosis, or edema bilaterally. Skin: +oozing of blood from liver bx   Neurologic: Awake, neurovascularly intact with sensory/motor intact upper extremities/lower extremities, bilaterally. Cranial nerves: II-XII intact, grossly non-focal.  Mental status: Awake,alert,oriented         I Chantal Cary have reviewed the chart on [address] and personally interviewed and examined patient, reviewed the data (labs and imaging) and after discussion with my PA formulated the plan. Agree with note with the following edits. HPI:     I reviewed the patient's Past Medical History, Past Surgical History, Medications, and Allergies. Continued diffuse pains all over   S/p liver mass bx , for brain imaging today    General: middle aged female, obese, older appearing   Awake, alert and oriented. Appears to be not in any distress  Mucous Membranes:  Pink , anicteric  Neck: No JVD, no carotid bruit, no thyromegaly  Chest:  Clear to auscultation bilaterally, diminished in bases  Cardiovascular:  RRR S1S2 heard, no murmurs or gallops  Abdomen:  Soft, undistended, non tender, no organomegaly, BS present  Extremities: No edema or cyanosis.  Distal pulses well felt  Neurological : grossly normal    Labs:   Recent Labs     06/30/20  0544   WBC 13.7*   HGB 10.4*   HCT 32.6*        Recent Labs     06/29/20  0445 06/30/20  0544 07/01/20  0556    138 136   K 4.5 4.8 5.2*   CL 94* 94* 95*   CO2 26 28 26   BUN 38* 46* 47*   CREATININE 0.9 1.1 1.1   CALCIUM 9.8 9.5 9.2     Urinalysis:      Lab Results   Component Value Date    NITRU Negative 06/19/2020    WBCUA 0-2 06/19/2020    BACTERIA 2+ 06/19/2020    RBCUA None seen 06/19/2020    BLOODU Negative 06/19/2020    SPECGRAV 1.015 06/19/2020    GLUCOSEU >=1000 06/19/2020    GLUCOSEU 100 05/26/2012       Radiology:  CT GUIDED NEEDLE PLACEMENT   Final Result   Successful CT guided core biopsy of the liver. CT NEEDLE BIOPSY LIVER PERCUTANEOUS   Final Result   Successful CT guided core biopsy of the liver. XR CHEST PORTABLE   Final Result   No significant interval change in the moderate to large right pleural   effusion. Pulmonary vascular congestion. US THORACENTESIS   Final Result   Successful ultrasound guided thoracentesis. XR CHEST PORTABLE   Final Result   Status post right thoracentesis. No pneumothorax. Stable chest findings. CT CHEST WO CONTRAST   Final Result   Probable malignant mass of the right lower lobe or right middle lobe near the   hilum, causing obstruction and collapse of the middle lobe and lower lobe. Adjacent metastatic adenopathy of the right hilum and mediastinum as well as   diffuse metastatic disease of the liver. Suspected metastatic destructive   osseous lesion of the right scapula, only partially imaged. Small right pleural effusion. Multifocal ground-glass opacity of both lungs. This may be due to edema,   atypical infection including viral pneumonia, or a combination. XR CHEST PORTABLE   Final Result   Vascular congestion. Moderate-sized right pleural effusion. XR CHEST STANDARD (2 VW)   Final Result   1. Worsening congestive heart failure. CT ABDOMEN PELVIS WO CONTRAST Additional Contrast? None    (Results Pending)   MRI BRAIN WO CONTRAST    (Results Pending)   NM BONE SCAN WHOLE BODY    (Results Pending)     CT chest no contrast 6 /23  Probable malignant mass of the right lower lobe or right middle lobe near the  hilum, causing obstruction and collapse of the middle lobe and lower lobe.   Adjacent metastatic adenopathy of the right hilum and mediastinum as well as  diffuse metastatic disease of the liver.  Suspected metastatic destructive  osseous lesion of the right scapula, only partially imaged. Small right pleural effusion. Multifocal ground-glass opacity of both lungs.  This may be due to edema,  atypical infection including viral pneumonia, or a combination. Assessment/Plan:    Acute on chr hypoxic resp failure   - uses 2-3L O2 at home--> now on 6 L O2  - added IS, wean as tolerated  - see below     Acute on Chronic diastolic CHF  pulm HTN - on Revatio  ? Metastatic cancer   - CXR with pulm edema, started on IV lasix  - Recent echo from last admission with normal EF  - repeat CXR: vascular congestion with moderate R sided pleural effusion-->thoracentesis ordered with panel and serum LDH  - Weight 185#-->180-->182-->183 lbs  - Net fluid deficit 5.1 L   - Had a thoracentesis, when patient was at [facility] on 6/11 which was exudative,lymphocytic. - Pulm consult  - Repeat thoracentesis 6/23- 50 cc removed  - CT chest no contrast -suspicious for metastatic malignancy  - Await fluid cytology- atypical cells,s/p percutaneous liver biopsy 6/29, surgical pathology pending   - Plavix  Has been held-->mild oozing from liver bx, hold Plavix, ASA and Lovenox another day   - Continues to have pain. Increase oxycodone to 10 mg every 4 hours. Continue iv morphine as needed. - Add oramorph 15mg  q 12 ,   - Oncology c/s     Constipation  - Senna  - dulcolax     DM II   - fairly controlled  - cont lantus/SSI/metformin     Hypothyroidism  - cont synthroid     H/o CAD s/p PCI  - cont ASA/statin  - Hold plavix for biopsy, continue to hold 2/2 to mild oozing from bx site     Mild ELMA--resolved  - LAsix held. toradol d/c ed   - Resolved   - Okay to resume diuretics and monitor creatinine closely      Hyperkalemia  - Mild after stopping Lasix and continuing Spironolactone  - Monitor     DVT Prophylaxis: SCDs  Diet: DIET CARB CONTROL; Carb Control: 5 carb choices (75 gms)/meal; Daily Fluid Restriction: Dry Tray  Code Status: Full Code    MRI brain today, Bone scan today.  Awaiting liver bx results, still some blood from bx site, will change dressing and monitor, resume ASA,Lovenox and Plavix when bleeding is controlled.      Tawnya Green PA-C  7/1/2020 10:33 AM     Agree with above  Changes made to note    Efrain Brock MD 7/1/2020 6:27 PM

## 2020-07-01 NOTE — PROGRESS NOTES
Pulmonary Progress Note  CC: Hypoxia    Subjective:  Pain remains better controlled. EXAM: /70   Pulse 92   Temp 99 °F (37.2 °C) (Oral)   Resp 20   Ht 5' (1.524 m)   Wt 183 lb 14.4 oz (83.4 kg)   LMP  (LMP Unknown)   SpO2 96%   BMI 35.92 kg/m²  on 5lpm  Constitutional:  No acute distress. Eyes: PERRL. Conjunctivae anicteric. ENT: Normal nose. Normal tongue. Neck:  Trachea is midline. No thyroid tenderness. Respiratory: No accessory muscle usage. Decreased breath sounds. No wheezes. No rales. No Rhonchi. Cardiovascular: Normal S1S2. No digit clubbing. No digit cyanosis. No LE edema. Psychiatric: No anxiety or Agitation. Alert and Oriented to person, place and time.     Scheduled Meds:   furosemide  20 mg Oral Daily    enoxaparin  40 mg Subcutaneous Daily    polyethylene glycol  17 g Oral BID    sennosides-docusate sodium  2 tablet Oral Daily    morphine  30 mg Oral 2 times per day    albuterol sulfate HFA  2 puff Inhalation Q4H WA    insulin glargine  28 Units Subcutaneous BID    lidocaine  1 patch Transdermal Once    atorvastatin  40 mg Oral Nightly    gabapentin  300 mg Oral TID    levothyroxine  100 mcg Oral Daily    sertraline  100 mg Oral Daily    sildenafil  20 mg Oral TID    spironolactone  25 mg Oral Daily    budesonide-formoterol  2 puff Inhalation BID    sodium chloride flush  10 mL Intravenous 2 times per day    insulin lispro  0-18 Units Subcutaneous TID WC    insulin lispro  0-9 Units Subcutaneous Nightly     Continuous Infusions:   dextrose       PRN Meds:  oxyCODONE, bisacodyl, ipratropium-albuterol, sodium chloride flush, acetaminophen **OR** acetaminophen, polyethylene glycol, promethazine **OR** ondansetron, glucose, dextrose, glucagon (rDNA), dextrose, potassium chloride **OR** potassium alternative oral replacement **OR** potassium chloride    Labs:  CBC:   Recent Labs     06/30/20  0544   WBC 13.7*   HGB 10.4*   HCT 32.6*   MCV 94.2    BMP:   Recent Labs     06/29/20  0445 06/30/20  0544 07/01/20  0556    138 136   K 4.5 4.8 5.2*   CL 94* 94* 95*   CO2 26 28 26   BUN 38* 46* 47*   CREATININE 0.9 1.1 1.1       Cultures:  6/23/2020 SARS-CoV-2 negative  6/23/2020 pleural fluid culture negative     Chest imaging    CT chest 6/23/2020 right hilar mass, mediastinal and right hilar adenopathy, probable metastatic disease in liver, probable metastatic destruction of scapula     6/26/2020 CXR redemonstration of large right hilar mass with lung collapse and small effusion     Pleural fluid cytology 6/11/2020 and 6/23/2020 show atypical cells that are nondiagnostic for malignancy  Bronchoscopy 6/12/2020 cytology is negative     ASSESSMENT:  · Lung mass  · Acute on chronic hypoxemic respiratory failure   · Exudative lymphocytic right pleural effusion, recurrent, favor malignant but cytology is indeterminate  · Mediastinal lymphadenopathy & hilar lymphadenopathy, favor malignant -small cell cancer is certainly in the differential  · Liver masses, favor malignant  · Mucus plugging of airways, minimal, on 6/12/20 fiberoptic bronchoscopy   · CHF, prolonged admission to [facility] earlier this month with diuresis  · Pulmonary hypertension, favor group 2     PLAN:  · F/u Liver biopsy  · Oncology consulted and bone scan pending  ·  Supplemental oxygen to maintain SaO2 >92%; wean as tolerated

## 2020-07-01 NOTE — PROGRESS NOTES
Pt taken via stretcher on 8L/HFNC and telemetry with transport and RN.  RN to remain with pt for procedurel

## 2020-07-01 NOTE — CARE COORDINATION
INTERDISCIPLINARY PLAN OF CARE CONFERENCE    Date/Time: 7/1/2020 12:14 PM  Completed by: Te Guillen Case Management      Patient Name:  Inga Brittle  YOB: 1960  Admitting Diagnosis: Acute on chronic respiratory failure ([facility] [address] [J96.20]     Admit Date/Time:  6/19/2020  6:57 PM    Chart reviewed. Interdisciplinary team met to discuss patient progress and discharge plans. Disciplines included Case Management, Nursing, and Dietitian. Current Status:stable    PT/OT recommendation:tbd    Anticipated Discharge Date: tbd  Expected D/C Disposition:  Skilled nursing facility  Confirmed plan with patient and/or family Yes  Discharge Plan Comments: Reviewed chart. Plan continues STR at [facility]. C-19 negative 06/22. Awaiting new notes for PT/OT and will require pre-cert. Liver bx results still pending. Follow.       The Plan for Transition of Care is related to the following treatment goals: STR    The Patient and/or patient representative was provided with a choice of provider and agrees   with the discharge plan. [] Yes [x] No        Home O2 in place on admit: to STR  Pt informed of need to bring portable home O2 tank on day of discharge for nursing to connect prior to leaving:  Not Indicated  Verbalized agreement/Understanding:  Not Indicated

## 2020-07-01 NOTE — PROGRESS NOTES
Shift assessment as per flow sheet. Unlabored breathing at rest on 6L HF NC. -Pt wears 2L @ baseline prior to admit. SpO2 in low-mid 90s. She is A/Ox4. C/o severe back pain, comfort measures provided as pain medication as per MAR. Ruiz cath in place, patent and secured. Meds as per MAR. Safety measures in place and will continue to closely monitor.

## 2020-07-01 NOTE — PROGRESS NOTES
Physical Therapy/Occupational therapy    Patient was approached by OT/PT for evaluation this PM. Patient just returned from bone scan was feeling tired with rib cage pain. Patient wanted to rest at the moment and wanted to left alone. Patient was offered ice packs for pain relief but she declined. Patient will be followed up later as schedule permits. No charge.     Keith Mo MS PT, # [id number]

## 2020-07-02 NOTE — PROGRESS NOTES
Pt  Spo2 dropping to 85%. Vt increased to 500 cc's and Spo2 now 92%. Pt still on 100% FiO2 and PEEP +10.

## 2020-07-02 NOTE — PROGRESS NOTES
Physical Therapy/ Occupational Therapy    Patient was approached by OT/PT for evaluation this AM. Patient refused to participate in evaluation since she was tired and could not get sleep last night. Also reported pain all over the front body. RN notified. Patient is scheduled to receive Chemo Tx later. Patient will be followed up when aggreable for evaluation. No charge.     Raffaele Wilkinson, MS PT, # [id number]  [id number], OTR/L [id number]

## 2020-07-02 NOTE — PLAN OF CARE
Problem: Falls - Risk of:  Goal: Will remain free from falls  Description: Will remain free from falls  Outcome: Ongoing  Goal: Absence of physical injury  Description: Absence of physical injury  Outcome: Ongoing     Problem: Pain:  Goal: Pain level will decrease  Description: Pain level will decrease  Outcome: Ongoing  Goal: Control of acute pain  Description: Control of acute pain  Outcome: Ongoing  Goal: Control of chronic pain  Description: Control of chronic pain  Outcome: Ongoing  Goal: Patient's pain/discomfort is manageable  Description: Patient's pain/discomfort is manageable  Outcome: Ongoing     Problem: Infection:  Goal: Will remain free from infection  Description: Will remain free from infection  Outcome: Ongoing     Problem: Safety:  Goal: Free from accidental physical injury  Description: Free from accidental physical injury  Outcome: Ongoing  Goal: Free from intentional harm  Description: Free from intentional harm  Outcome: Ongoing     Problem: Daily Care:  Goal: Daily care needs are met  Description: Daily care needs are met  Outcome: Ongoing     Problem: Skin Integrity:  Goal: Skin integrity will stabilize  Description: Skin integrity will stabilize  Outcome: Ongoing     Problem: Discharge Planning:  Goal: Patients continuum of care needs are met  Description: Patients continuum of care needs are met  Outcome: Ongoing     Problem: OXYGENATION/RESPIRATORY FUNCTION  Goal: Patient will maintain patent airway  Outcome: Ongoing  Goal: Patient will achieve/maintain normal respiratory rate/effort  Description: Respiratory rate and effort will be within normal limits for the patient  Outcome: Ongoing     Problem: HEMODYNAMIC STATUS  Goal: Patient has stable vital signs and fluid balance  Outcome: Ongoing     Problem: FLUID AND ELECTROLYTE IMBALANCE  Goal: Fluid and electrolyte balance are achieved/maintained  Outcome: Ongoing     Problem: ACTIVITY INTOLERANCE/IMPAIRED MOBILITY  Goal: Mobility/activity is maintained at optimum level for patient  Outcome: Ongoing     Problem: Nutrition  Goal: Optimal nutrition therapy  Outcome: Ongoing     Problem: Skin Integrity:  Goal: Will show no infection signs and symptoms  Description: Will show no infection signs and symptoms  Outcome: Ongoing  Goal: Absence of new skin breakdown  Description: Absence of new skin breakdown  Outcome: Ongoing     Problem: Restraint Use - Nonviolent/Non-Self-Destructive Behavior:  Goal: Absence of restraint indications  Description: Absence of restraint indications  Outcome: Ongoing  Goal: Absence of restraint-related injury  Description: Absence of restraint-related injury  Outcome: Ongoing

## 2020-07-02 NOTE — PROCEDURES
See History and Physical or progress note for additional findings. Pertinent changes recorded below if present. Pre/post procedure diagnosis: Hemoptysis/ no hemoptysis seen on bronch    Allergies and medications have been reviewed    HENT: Airway patent and reviewed. ETT in place. Cardiovascular: Normal rate, regular rhythm, normal heart sounds. Pulmonary/Chest: No wheezes. No rhonchi. No rales. Abdominal: Soft. Bowel sounds are normal. No distension. ASA: Class 4 - A patient with an incapacitating systemic disease that is a constant threat to life  Level of Sedation Plan: Continue ICU sedation    Post Procedure Plan   Continue ICU care.   ______________________     The risks and benefits as well as alternatives to the procedure have been discussed with the POA. The  POA understands and agrees to proceed. Signed Consent in chart. PROCEDURE:  BRONCHOSCOPY      The risks and benefits as well as alternatives to the procedure have been discussed with the patient or POA. The patient or POA understands and agrees to proceed. Consent signed. DESCRIPTION OF PROCEDURE: A time out was taken. ICU sedation continued. 0 ml 1% lidocaine through bronchoscope. The scope was passed with ease via the ETT. A complete airway inspection was performed. Normal anatomy. The right middle lobe airway was completely occluded and the right lower lobe airway was 75% occluded. This could be from extrinsic compression. There was not a definite endobronchial lesion seen. The mucosa in this area was edematous and erythematous and mildly irregular. I did not have a biopsy forceps available for this procedure to rule out endobronchial invasion of cancer. Mucosa appeared edmatous. There were thick white secretions in the RLL. Therapeutic aspiration completed. Overall not as many secretions as expected based on her sudden resp failure. No blood in her airways. Washings were obtained throughout the airways.   A Bronchoalveolar lavage was obtained from the RLL with good return. The patient tolerated the procedure well. EBL none.      FOLLOW UP:  Cultures and cytology

## 2020-07-02 NOTE — CONSULTS
ONCOLOGY FOLLOW-UP:         PROBLEM LIST:       Patient Active Problem List   Diagnosis Code    HLD (hyperlipidemia) E78.5    Type 2 diabetes mellitus without complication, with long-term current use of insulin ([facility]) E11.9, Z79.4    Chest wall pain R07.89    Type 2 diabetes mellitus with diabetic peripheral angiopathy without gangrene ([facility]) E11.51    HTN (hypertension) I10    Hypothyroid E03.9    Localized osteoarthrosis, lower leg M17.10    PFS (patellofemoral syndrome) M22.2X9    Disc displacement, lumbar M51.26    Pulmonary emphysema ([facility]) J43.9    JORDI (obstructive sleep apnea) G47.33    Chronic low back pain M54.5, G89.29    Coronary artery disease involving native coronary artery of native heart without angina pectoris I25.10    Tobacco use Z72.0    Astigmatism H52.209    Mild nonproliferative diabetic retinopathy ([facility] [address] E11.3299    Reflux esophagitis K21.0    Class 1 obesity due to excess calories with serious comorbidity and body mass index (BMI) of 34.0 to 34.9 in adult E66.09, Z68.34    Diabetic ulcer of left foot associated with type 2 diabetes mellitus, with bone involvement without evidence of necrosis ([facility]) E11.621, L97.526    OAB (overactive bladder) N32.81    Allergic rhinitis J30.9    Simple chronic bronchitis ([facility]) J41.0    Anxiety and depression F41.9, F32.9    Fibromyalgia M79.7    Phantom pain ([facility]) G54.6    Osteomyelitis ([facility]) M86.9    PVD (peripheral vascular disease) ([facility]) I73.9    Chronic osteomyelitis of left foot ([facility]) Q53.874    Acute osteomyelitis of foot ([facility]) M86.179    Acute on chronic diastolic congestive heart failure ([facility]) I50.33    Acute respiratory failure with hypoxia ([facility]) J96.01    Pleural effusion on right J90    Myalgia M79.10    Abdominal pain R10.9    Pleuritic chest pain R07.81    Chronic respiratory failure ([facility]) J96.10    Shortness of breath R06.02    Compression atelectasis J98.11    Observed sleep apnea G47.30    Elevated brain natriuretic peptide (BNP) level R79.89    Hyponatremia E87.1    Restrictive lung disease J98.4    Chronic narcotic use F11.90    Medication management Z79.899    Pulmonary hypertension (HCC) I27.20    Mediastinal adenopathy R59.0    Hilar adenopathy R59.0    Lung collapse J98.19    Acute on chronic respiratory failure (HCC) J96.20    Pleural effusion J90    Acute on chronic respiratory failure with hypoxemia (HCC) J96.21    Lung mass R91.8       INTERVAL HISTORY:       Remains admitted. Liver biopsy now back showing small cell. Had bone scan and brain MRI yesterday. Has a lot of pain this AM.    REVIEW OF SYSTEMS:       10 point ROS completed. Pertinent positives in HPI, otherwise negative. PHYSICAL EXAM:       Vitals:    07/02/20 0615   BP: (!) 99/50   Pulse: 80   Resp: 20   Temp: 98.4 °F (36.9 °C)   SpO2: 92%       General appearance: alert and cooperative  Head: Normocephalic, without obvious abnormality, atraumatic  Neck: No palpable lymphadenopathy in supraclavicular or cervical chains  Lungs: Clear to auscultation bilaterally, no audible rales, wheezes or crackles  Heart: Regular rate and rhythm, S1, S2 normal  Abdomen: Soft, non-tender; bowel sounds normal; no masses,  no organomegaly  Extremities: without cyanosis, clubbing, edema or asymmetry  Skin: No jaundice, purpura or petechiae      LABS:     Lab Results   Component Value Date    WBC 13.7 (H) 06/30/2020    HGB 10.4 (L) 06/30/2020    HCT 32.6 (L) 06/30/2020    MCV 94.2 06/30/2020     06/30/2020       Lab Results   Component Value Date    GLUCOSE 219 (H) 07/01/2020    BUN 47 (H) 07/01/2020    CREATININE 1.1 07/01/2020    K 5.2 (H) 07/01/2020    PHOS 4.3 06/06/2020       Lab Results   Component Value Date    ALKPHOS 138 (H) 06/19/2020    ALT 30 06/19/2020    AST 27 06/19/2020    BILITOT 0.4 06/19/2020    PROT 7.2 06/19/2020       IMAGING:     Xr Chest Standard (2 Vw)  Result Date: 6/19/2020  1. Worsening congestive heart failure. Ct Chest Wo Contrast  Result Date: 6/23/2020  Probable malignant mass of the right lower lobe or right middle lobe near the hilum, causing obstruction and collapse of the middle lobe and lower lobe. Adjacent metastatic adenopathy of the right hilum and mediastinum as well as diffuse metastatic disease of the liver. Suspected metastatic destructive osseous lesion of the right scapula, only partially imaged. Small right pleural effusion. Multifocal ground-glass opacity of both lungs. This may be due to edema, atypical infection including viral pneumonia, or a combination. Ct Guided Needle Placement  Result Date: 6/29/2020  Successful CT guided core biopsy of the liver. Ct Chest High Resolution  Result Date: 6/7/2020  1. HRCT is limited due to acute process. If evaluation of interstitial lung disease is desired, outpatient follow-up is recommended when the patient's acute symptoms have resolved. 2. Complete opacification of the right middle and lower lobes with air bronchograms and moderate pleural effusion. Pattern may represent a centrally obstructing process or pneumonia. 3. Scattered ground-glass opacities in the left lung likely correspond to above-described acute process. Underlying chronic interstitial change can not be excluded. 4. Bulky reactive mediastinal and hilar lymphadenopathy. Ct Needle Biopsy Liver Percutaneous  Result Date: 6/29/2020  Successful CT guided core biopsy of the liver. Us Thoracentesis  Result Date: 6/23/2020  Successful ultrasound guided thoracentesis. CT A/P 7/1/2020:   Widespread hepatic metastatic disease       Right-sided pleural effusion and diffuse consolidative change in the right   lung.  Please see recent chest CT report       Lytic lesion is seen in the right iliac wing       Scattered compression deformities within the spine, 2 of which appear new   compared to prior study       Large stool load in colon     Bone Scan 7/1/2020:  Findings are concerning for multifocal osseous metastatic disease. Brain MRI 7/1/2020:  1. Diffuse osseous metastatic disease. 2. No intracranial metastatic disease. 3. Mild chronic white matter microvascular ischemic changes. 4. Trace right mastoid effusion. PATHOLOGY:     Pleural Fluid Cytology:  6/23/2020:    FINAL DIAGNOSIS:    Right Pleural Fluid:  - Rare atypical cells present. COMMENT:   The specimen appears to show a mixture of reactive mesothelial  cells and inflammatory cells. Due to a clinical suspicion of malignancy,  immunohistochemical stains were performed and MOC31 and TTF1 both show  rare weak-moderate positive staining cells. Although the definitive  morphologic features of malignancy are not identified, the presence of  malignant cells cannot be entirely excluded. The findings are similar to  those from the patient's previous pleural fluid cytology specimen from  6/11/20 ([facility]-) and clinical/radiologic correlation is recommended.        BUCCA/BUCCA      Liver Biopsy 6/29/2020:     FINAL DIAGNOSIS:    Liver, right lobe, needle core biopsy:     - Small cell neuroendocrine carcinoma.  See comment.   COMMENT:  Immunohistochemical stains are performed to better characterize  the lesion.  CK7 (mapping keratin), TTF-1 (lung), synaptophysin and  chromogranin (neuroendocrine) are positive.  CK20 (mapping keratin) is  negative. [id number] demonstrates a proliferation rate of approximately 90%  within the cells of interest.  The morphologic features and  immunohistochemical staining profile are consistent with the diagnosis of  small cell neuroendocrine carcinoma and most suggestive of a metastasis  of pulmonary origin.  Clinical and radiographic correlation is essential.       ROSALBAJA/ROSALBAJA    ASSESSMENT:     Problem List Items Addressed This Visit     Shortness of breath    Pulmonary hypertension (HCC)    * (Principal) Acute on chronic respiratory failure ([address] - Primary      Other Visit Diagnoses Ambulatory dysfunction        Congestive heart failure, unspecified HF chronicity, unspecified heart failure type (HCC)        Dyspnea and respiratory abnormalities                    PLAN:     1. R Lung Mass - Small cell carcinoma, extensive stage  2. Hilar/Mediastinal LAD  3. Liver Metastasis   4. Bone Metastasis  5. Tobacco Abuse  6. Pleural Effusion  7. CHF  8. Pulmonary HTN    - appears to have advanced/metastatic maligancy  - suspected lung malignancy, especially concerned about small cell given expanse of disease  - pleural fluid cytology with \"atypical cells\" x 2  - liver biopsy 6/29/2020 - small cell carcinoma  - brain MRI shows no metastatic disease  - CT A/P shows liver mets  - bone scan shows diffuse osseous metastasis  - reviewed diagnosis, staging workup, prognosis with patient  - will plan to start chemotherapy with carboplatin/-16 today  - discussed side effects and toxicity - N/V, hair loss, myelosuppression, tumor lysis, neuropathy  - request Picc line  - start IVF and allopurinol for TLS prophylaxis  - antiemetics  - will remain admitted 3 days for cycle 1 chemotherapy  - will plan to add atezolizumab to regimen with cycle 2 - not available inpatient    9. Pain    - diffuse osseous metastasis  - on MS contin 30 mg bid  - increase oxycodone to 10 mg    Vasu Pike MD    Addendum:    Pt asked that I call her daughter to update her. I have made 3 attempts and left my cell phone number with the patient. Vasu Pike MD    Addendum:    D/W Dr. Vani Cueva. Pt transferred to ICU and emergently intubated. Chemo not started yet. He feels unstable for infusion tonight. Will reassess in AM and proceed when able.     Vasu Pike MD

## 2020-07-02 NOTE — PROGRESS NOTES
Timeout per policy. Procedure performed by Dr. Davidson Marshall. Bronchoscopy assist performed on 100% FiO2.    0 ml of 1% lidocaine instilled and 60 ml of 0.9% normal saline instilled. Pt tolerated bronchoscopy without difficulty, airway support per RTD. Patient SpO2 within acceptable range throughout bronchoscopy procedure. Sedation provided/monitored per nurse. No s/s distress, no complications noted. See physician notes. Continue plan of care.

## 2020-07-02 NOTE — PROGRESS NOTES
Pt intubated with 7.5 ETT without complication. ETT secured at 23 lip with Joselin ETT strange. Placement confirmed with good color change on ETCO2 detector and bilateral breath sounds. Pt placed on ventilator with settings of A/C 18/350/100%+10. Spo2 91%. Will continue to monitor.

## 2020-07-02 NOTE — PROCEDURES
ENDOTRACHEAL INTUBATION    INDICATION: Life threatening respiratory failure    TIME OUT: taken    SEDATION: etomidate and versed and fentanyl    PROCEDURE: Using video laryngoscopy, the vocal cords were well visualized and a 7.5 mm endotracheal tube was place directly through the cords. Good breath sounds auscultated bilaterally without sounds over abdomen. Good return of CO2 on the monitor. CXR is pending.

## 2020-07-02 NOTE — PROGRESS NOTES
This RN speaking with pt and pt daughter at this time via phone call to daughter. This RN reviewing Dr Tyler Barger note and discussion with pt Dr Tyler Barger had regarding pt treatment and diagnosis. Reviewed. Pt daughter states she is at another hospital with her daughter at this time and will call back when surgery is completed. Call to pharmacy and chemo will start this evening. Pt and daughter aware.

## 2020-07-02 NOTE — PROGRESS NOTES
4 Eyes Skin Assessment     The patient is being assess for   Shift Handoff    I agree that 2 RN's have performed a thorough Head to Toe Skin Assessment on the patient. ALL assessment sites listed below have been assessed. Areas assessed by both nurses:   [x]   Head, Face, and Ears   [x]   Shoulders, Back, and Chest, Abdomen  [x]   Arms, Elbows, and Hands   [x]   Coccyx, Sacrum, and Ischium  [x]   Legs, Feet, and Heels        Scatter abrasions and bruising and stage II sacrum    **SHARE this note so that the co-signing nurse is able to place an eSignature**    Co-signer eSignature: Electronically signed by Debby Callahan RN on 7/2/20 at 8:03 PM EDT    Does the Patient have Skin Breakdown?   Yes LDA WOUND CARE was Initiated documentation include the Mary-wound, Wound Assessment, Measurements, Dressing Treatment, Drainage, and Color\",          Srinivas Prevention initiated:  Yes   Wound Care Orders initiated:  Yes      [id number]  nurse consulted for Pressure Injury (Stage 3,4, Unstageable, DTI, NWPT, Complex wounds)and New or Established Ostomies:  No      Primary Nurse eSignature: Electronically signed by Kt Verdugo RN on 7/2/20 at 7:26 PM EDT

## 2020-07-02 NOTE — PROGRESS NOTES
RESPIRATORY THERAPY ASSESSMENT    Name:  Keyon Benjamin Record Number:  [id number]  Age: 62 y.o. Gender: female  : 1960  Today's Date:  2020  Room:  0223/0223-02    Assessment     Is the patient being admitted for a COPD or Asthma exacerbation? No   (If yes the patient will be seen every 4 hours for the first 24 hours and then reassessed)    Patient Admission Diagnosis      Allergies  Allergies   Allergen Reactions    Flexeril [Cyclobenzaprine] Itching    Lisinopril      cough    Sitagliptin Other (See Comments)     Causes severe back pain and cramps  Causes severe back pain and cramps      Iodides Rash    Iodinated Diagnostic Agents Rash    Iodine Rash       Minimum Predicted Vital Capacity:               Actual Vital Capacity:                    Pulmonary History:Asthma and CHF/Pulmonary Edema  Home Oxygen Therapy:  3 liters/min via nasal cannula  Home Respiratory Therapy:, Spiriva, Symbicort, Albuterol   Current Respiratory Therapy:  Albuterol , Symbicort 160  Treatment Type: MDI  Medications: Albuterol    Respiratory Severity Index(RSI)   Patients with orders for inhalation medications, oxygen, or any therapeutic treatment modality will be placed on Respiratory Protocol. They will be assessed with the first treatment and at least every 72 hours thereafter. The following severity scale will be used to determine frequency of treatment intervention.     Smoking History: Pulmonary Disease or Smoking History, Greater than 15 pack year = 2    Social History  Social History     Tobacco Use    Smoking status: Current Every Day Smoker     Packs/day: 0.50     Years: 40.00     Pack years: 20.00     Types: Cigarettes    Smokeless tobacco: Former User    Tobacco comment: .5 ppd   Substance Use Topics    Alcohol use: No     Alcohol/week: 0.0 standard drinks    Drug use: No       Recent Surgical History: None = 0  Past Surgical History  Past Surgical History:   Procedure Laterality Date    Demonstrate understanding       ? Teach back        ? Needs reinforcement       ? No available caregiver               ? Other:     Response to education:  Very Good     Is patient being placed on Home Treatment Regimen? No     Does the patient have everything they need prior to discharge? NA     Comments: pt assessed & chart reviewed    Plan of Care: Symbicort 160 BID, Albuterol 2 puff Q4 WA, Duoneb Q4 PRN    Electronically signed by Ben Romero RCP on 7/2/2020 at 12:13 AM    Respiratory Protocol Guidelines     1. Assessment and treatment by Respiratory Therapy will be initiated for medication and therapeutic interventions upon initiation of aerosolized medication. 2. Physician will be contacted for respiratory rate (RR) greater than 35 breaths per minute. Therapy will be held for heart rate (HR) greater than 140 beats per minute, pending direction from physician. 3. Bronchodilators will be administered via Metered Dose Inhaler (MDI) with spacer when the following criteria are met:  a. Alert and cooperative     b. HR < 140 bpm  c. RR < 30 bpm                d. Can demonstrate a 2-3 second inspiratory hold  4. Bronchodilators will be administered via Hand Held Nebulizer [facility]) to patients when ANY of the following criteria are met  a. Incognizant or uncooperative          b. Patients treated with HHN at Home        c. Unable to demonstrate proper use of MDI with spacer     d. RR > 30 bpm   5. Bronchodilators will be delivered via Metered Dose Inhaler (MDI), HHN, Aerogen to intubated patients on mechanical ventilation. 6. Inhalation medication orders will be delivered and/or substituted as outlined below. Aerosolized Medications Ordering and Administration Guidelines:    1. All Medications will be ordered by a physician, and their frequency and/or modality will be adjusted as defined by the patients Respiratory Severity Index (RSI) score.   2. If the patient does not have documented COPD, consider discontinuing anticholinergics when RSI is less than 9.  3. If the bronchospasm worsens (increased RSI), then the bronchodilator frequency can be increased to a maximum of every 4 hours. If greater than every 4 hours is required, the physician will be contacted. 4. If the bronchospasm improves, the frequency of the bronchodilator can be decreased, based on the patient's RSI, but not less than home treatment regimen frequency. 5. Bronchodilator(s) will be discontinued if patient has a RSI less than 9 and has received no scheduled or as needed treatment for 72  Hrs. Patients Ordered on a Mucolytic Agent:    1. Must always be administered with a bronchodilator. 2. Discontinue if patient experiences worsened bronchospasm, or secretions have lessened to the point that the patient is able to clear them with a cough. Anti-inflammatory and Combination Medications:    1. If the patient lacks prior history of lung disease, is not using inhaled anti-inflammatory medication at home, and lacks wheezing by examination or by history for at least 24 hours, contact physician for possible discontinuation.

## 2020-07-02 NOTE — PROGRESS NOTES
Vt increased to 450 cc's per Dr. Emily Miller verbal order to increase Vt to maintain Spo2 90% or greater. Spo2 now 92% on A/C 18/450/100%/+10.

## 2020-07-02 NOTE — PROGRESS NOTES
07/02/20 1932   Vent Information   $Ventilation $Initial Day   Vent Type 840   Vent Mode AC/VC   Vt Ordered 500 mL   Rate Set 18 bmp   Peak Flow 75 L/min   FiO2  100 %   SpO2 95 %   SpO2/FiO2 ratio 95   Sensitivity 3   PEEP/CPAP 10   Humidification Source Heated wire   Humidification Temp 37   Humidification Temp Measured 37   Circuit Condensation Drained   Vent Patient Data   Peak Inspiratory Pressure 44 cmH2O   Mean Airway Pressure 20 cmH20   Rate Measured 34 br/min   Vt Exhaled 558 mL   Minute Volume 19 Liters   I:E Ratio 1:1.6   Plateau Pressure 28 OXH59   Static Compliance 31 mL/cmH2O   Dynamic Compliance 16 mL/cmH2O   Cough/Sputum   Sputum How Obtained Suctioned;Endotracheal   Cough Productive   Sputum Amount Small   Sputum Color Red;Clear   Tenacity Thick   Spontaneous Breathing Trial (SBT) RT Doc   Pulse 125   Breath Sounds   Right Upper Lobe Rhonchi   Right Middle Lobe Rhonchi   Right Lower Lobe Diminished   Left Upper Lobe Rhonchi   Left Lower Lobe Diminished   Additional Respiratory  Assessments   Resp (!) 32   Position Semi-Gonzales's   Alarm Settings   High Pressure Alarm 50 cmH2O   Low Minute Volume Alarm 4 L/min   Apnea (secs) 20 secs   High Respiratory Rate 50 br/min   Low Exhaled Vt  250 mL   ETT (adult)   Placement Date/Time: 07/02/20 1527   Type: Cuffed  Tube Size: 7.5 mm  Location: Oral   Secured at 23 cm   Measured From Lips   ET Placement Right   Secured By Commercial tube strange   Site Condition Dry

## 2020-07-02 NOTE — PROGRESS NOTES
Suspected metastatic destructive  osseous lesion of the right scapula, only partially imaged. Small right pleural effusion. Multifocal ground-glass opacity of both lungs.  This may be due to edema,  atypical infection including viral pneumonia, or a combination. Assessment/Plan:    Acute on chr hypoxic resp failure   - uses 2-3L O2 at home--> now on 6 L O2  - added IS, wean as tolerated  - see below     Acute on Chronic diastolic CHF  pulm HTN - on Revatio  ? Metastatic cancer   - CXR with pulm edema, started on IV lasix  - Recent echo from last admission with normal EF  - repeat CXR: vascular congestion with moderate R sided pleural effusion-->thoracentesis  Done   - Weight 185#-->180-->182-->183 lbs  - Net fluid deficit 5.1 L   - Had a thoracentesis, when patient was at [facility] on 6/11 which was exudative,lymphocytic. - Pulm consulted  - Repeat thoracentesis 6/23- 50 cc removed  - CT chest no contrast -suspicious for metastatic malignancy  - had liver bx done showing small cell lung cancer - see below    - Plavix  Has been held-->mild oozing from liver bx, hold Plavix, ASA and Lovenox another day   - Continues to have pain. Increased to oxycodone to 10 mg every 4 hours.     - Add oramorph 15mg  q 12 ,   - Oncology c/s       Small cell lung cancer with bone mets  - for chemo today     Constipation  - Senna  - dulcolax     DM II   - fairly controlled  - cont lantus/SSI/metformin     Hypothyroidism  - cont synthroid     H/o CAD s/p PCI  - cont ASA/statin  - Hold plavix for biopsy, continue to hold 2/2 to mild oozing from bx site     Mild ELMA--  - Resolved   - resumed diuretics and monitor creatinine closely      Hyperkalemia  - Mild after stopping Lasix and continuing Spironolactone  - Monitor     DVT Prophylaxis: SCDs  Diet: DIET CARB CONTROL; Carb Control: 3 carb choices (45 gms)/meal; Daily Fluid Restriction: Dry Tray  Code Status: Full Code    Fred Reagan MD 7/2/2020 1:05 PM

## 2020-07-02 NOTE — PROGRESS NOTES
Patient transferred at [phone] with labored breathing, RR 38, satting 82% on 15L NRB and coughing up bloody thick sputum. The patient's heart rate is 126 bpm sinus tachycardia. Reported from off going nurse that the patient rapidly decompensated after starting IV fluids so the nurse stopped the fluids and gave lasix. I paged Dr. Maya Pham related to the patient's respiratory status. Dr. Maya Pham ordered vapotherm. At 1500 the patient was started on vapotherm. The patient did not improve saturation and was still at 82-84%. The patient vomitted bile green (50mls). I paged Dr. Griselda Smith again at this time and he reported to bedside and intubated the patient at [phone] after the patient received 5mg of versed, 100mcg of fentanyl, and 20 mg of etomidate. The patient tolerated the intubation well with a saturation of 97%, RR 30,  Sinus tachycardia, /64. Related to the patient's presentation an emergent bronchoscopy was performed. Time out performed.

## 2020-07-02 NOTE — PROGRESS NOTES
Pt having increased oxygen demand following a coughing spell. Pt placed on nonrebreather and highflow cannula by RT who is giving duoneb at this time. Concern with added fluids. Pt was 76% prior to increasing to 15L NC high flow. Now at 96-99% sat.     Message to MD.

## 2020-07-02 NOTE — PROGRESS NOTES
Pulmonary & Critical Care Medicine ICU Progress Note    CC: respiratory failure    Events of Last 24 hours: The patient had a coughing fit noted to have copious frothy sputum with some blood present. Blood evident in her nares. She became hypoxic and was transferred to the ICU where she required emergent intubation. Bronchoscopy did not show any blood in the trachea or lower airways. There was pooled secretions in her oropharynx but only minimal secretions in the airways. The right middle lobe was occluded and the right lower lobe was partially occluded. Possibly extrinsic compression there was not definite airway invasion. The mucosa was edematous and mildly abnormal but biopsy was not available during this procedure to fully rule out endobronchial invasion. Invasive Lines: IV:piv    MV:  7/2/20  Vent Mode: AC/VC Rate Set: 18 bmp/Vt Ordered: 450 mL/ /FiO2 : 100 %  Recent Labs     07/02/20  1447   PHART 7.350   BBL8DCA 50.3*   PO2ART 70.4*       IV:   propofol 10 mcg/kg/min (07/02/20 1537)    dextrose         Vitals:  BP (!) 205/145   Pulse 99   Temp 98 °F (36.7 °C)   Resp 28   Ht 5' (1.524 m)   Wt 176 lb 12.8 oz (80.2 kg)   LMP  (LMP Unknown)   SpO2 92%   BMI 34.53 kg/m²   on venet    Intake/Output Summary (Last 24 hours) at 7/2/2020 1613  Last data filed at 7/2/2020 1358  Gross per 24 hour   Intake 480 ml   Output 925 ml   Net -445 ml     EXAM:  Constitutional: ill appearing. In distress. Eyes: PERRL. No sclera icterus. ENT: Normal Nose. Normal Tongue. ETT in place  Neck:  Trachea is midline. No thyroid tenderness. Respiratory: + accessory muscle usage. decreased breath sounds. No wheezes. No rales. No Rhonchi. Cardiovascular: Normal S1S2. Ioana Rachna No murmur. No digit cyanosis. No digit clubbing. No LE edema. GI: Non-tender. Non-distended. Normal bowel sounds. No masses. No umbilical hernia. Skin: No rash on the exposed extremities. No Nodules on exposed extremities. Neuro: Sedated.  Moves all extremities. Psych:  No agitation, no anxiety.     Scheduled Meds:   lidocaine 1 % injection  5 mL Intradermal Once    sodium chloride flush  10 mL Intravenous 2 times per day    allopurinol  300 mg Oral Daily    ondansetron with dexamethasone IVPB   Intravenous Daily    etoposide (VEPESID) chemo IVPB  100 mg/m2 Intravenous Daily    CARBOplatin (PARAPLATIN) chemo IVPB (by mg/m2)  480 mg Intravenous Once    furosemide        chlorhexidine  15 mL Mouth/Throat BID    furosemide  20 mg Oral Daily    enoxaparin  40 mg Subcutaneous Daily    polyethylene glycol  17 g Oral BID    sennosides-docusate sodium  2 tablet Oral Daily    morphine  30 mg Oral 2 times per day    albuterol sulfate HFA  2 puff Inhalation Q4H WA    insulin glargine  28 Units Subcutaneous BID    lidocaine  1 patch Transdermal Once    atorvastatin  40 mg Oral Nightly    gabapentin  300 mg Oral TID    levothyroxine  100 mcg Oral Daily    sertraline  100 mg Oral Daily    sildenafil  20 mg Oral TID    spironolactone  25 mg Oral Daily    budesonide-formoterol  2 puff Inhalation BID    sodium chloride flush  10 mL Intravenous 2 times per day    insulin lispro  0-18 Units Subcutaneous TID WC    insulin lispro  0-9 Units Subcutaneous Nightly     PRN Meds:  sodium chloride flush, prochlorperazine, oxyCODONE, fentanNYL, midazolam, bisacodyl, ipratropium-albuterol, sodium chloride flush, acetaminophen **OR** acetaminophen, polyethylene glycol, promethazine **OR** ondansetron, glucose, dextrose, glucagon (rDNA), dextrose, potassium chloride **OR** potassium alternative oral replacement **OR** potassium chloride    Results:  CBC:   Recent Labs     06/30/20  0544 07/02/20  0550   WBC 13.7* 14.0*   HGB 10.4* 9.7*   HCT 32.6* 30.6*   MCV 94.2 97.9    203     BMP:   Recent Labs     06/30/20  0544 07/01/20  0556 07/02/20  0553    136 139   K 4.8 5.2* 5.0   CL 94* 95* 101   CO2 28 26 19*   BUN 46* 47* 37*   CREATININE 1.1 1.1 0.8 LIVER PROFILE: No results for input(s): AST, ALT, LIPASE, BILIDIR, BILITOT, ALKPHOS in the last 72 hours. Invalid input(s): AMYLASE,  ALB    Liver bx: metastatic SCLC    Cultures:  6/23/2020 SARS-CoV-2 negative  6/23/2020 pleural fluid culture negative     Chest imaging    CT chest 6/23/2020 right hilar mass, mediastinal and right hilar adenopathy, probable metastatic disease in liver, probable metastatic destruction of scapula     6/26/2020 CXR redemonstration of large right hilar mass with lung collapse and small effusion     Pleural fluid cytology 6/11/2020 and 6/23/2020 show atypical cells that are nondiagnostic for malignancy  Bronchoscopy 6/12/2020 cytology is negative     ASSESSMENT:  · Extensive Small cell lung cancer with Right lung mass and multiple liver and bony mets  · Acute on chronic hypoxemic respiratory failure   · Exudative lymphocytic right pleural effusion, recurrent, favor malignant but cytology is indeterminate  · Acute on chronic CHF  · Mucus plugging of airways, minimal, on 6/12/20 and 7/2 bronchoscopy   · Pulmonary hypertension, favor group 2  · DM2  · CAD s/p PC I on plavix     PLAN:  Mechanical ventilation as per my orders. The ventilator was adjusted by me at the bedside for unstable, life threatening respiratory failure. IV Propofol for sedation, target RASS -2, with daily spontaneous awakening trial.  Fentanyl PRN pain, gtt as needed  Head of bed 30 degrees or higher at all times  Daily spontaneous breathing trial once PEEP less than 8, FiO2 less than 55%.   Pau  PICC  · Plan to delay start of chemotherapy until tomorrow - d/w Dr. Sarai Campos  · Lasix 40mg IV daily and spironolactone  · She was on Morphine ER 30mg bid  · She is on chronic Revatio  · Hold plavix today  · SSI/Lantus  · Lovenox DVT prophylaxis  · Total critical care time caring for this patient with life threatening, unstable organ failure, including direct patient contact, management of life support systems, review of data including imaging and labs, discussions with other team members and physicians at least 40 minutes so far today, excluding procedures.

## 2020-07-03 NOTE — PROGRESS NOTES
fentanNYL, midazolam, bisacodyl, ipratropium-albuterol, sodium chloride flush, acetaminophen **OR** acetaminophen, polyethylene glycol, promethazine **OR** ondansetron, glucose, dextrose, glucagon (rDNA), dextrose, potassium chloride **OR** potassium alternative oral replacement **OR** potassium chloride      Intake/Output Summary (Last 24 hours) at 7/2/2020 2145  Last data filed at 7/2/2020 1800  Gross per 24 hour   Intake 491.44 ml   Output 1475 ml   Net -983.56 ml       Physical Exam Performed:    /88   Pulse 129   Temp 103 °F (39.4 °C) (Oral)   Resp (!) 38   Ht 5' (1.524 m)   Wt 176 lb 12.8 oz (80.2 kg)   LMP  (LMP Unknown)   SpO2 91%   BMI 34.53 kg/m²     General appearance: intubated, sedated  HEENT: Pupils equal, round,  Conjunctivae/corneas clear. Neck: Supple, with full range of motion. No jugular venous distention. Trachea midline. Respiratory:  No wheeze, (+) bilat rhonchi, equal breath sounds bilaterally  Cardiovascular: tachy rate, regular rhythm. Abdomen: Soft, non-tender, non-distended with normal bowel sounds. Musculoskeletal: No clubbing, cyanosis or edema bilaterally. Skin: Skin color, texture, turgor normal.  No rashes or lesions. Labs:   Recent Labs     06/30/20  0544 07/02/20  0550   WBC 13.7* 14.0*   HGB 10.4* 9.7*   HCT 32.6* 30.6*    203     Recent Labs     06/30/20  0544 07/01/20  0556 07/02/20  0553    136 139   K 4.8 5.2* 5.0   CL 94* 95* 101   CO2 28 26 19*   BUN 46* 47* 37*   CREATININE 1.1 1.1 0.8   CALCIUM 9.5 9.2 9.3     No results for input(s): AST, ALT, BILIDIR, BILITOT, ALKPHOS in the last 72 hours. Recent Labs     07/02/20  1737   INR 1.20*     No results for input(s): [employer] in the last 72 hours.     Urinalysis:      Lab Results   Component Value Date    NITRU Negative 06/19/2020    WBCUA 0-2 06/19/2020    BACTERIA 2+ 06/19/2020    RBCUA None seen 06/19/2020    BLOODU Negative 06/19/2020    SPECGRAV 1.015 06/19/2020 GLUCOSEU >=1000 06/19/2020    GLUCOSEU 100 05/26/2012       Radiology:  XR ABDOMEN FOR NG/OG/NE TUBE PLACEMENT   Final Result   Support apparatus as above. Diffuse bilateral airspace disease as well as consolidative opacity and   effusion at the right base. XR CHEST PORTABLE   Final Result   Relatively stable appearance of the chest with a large right pleural effusion   and underlying vascular congestion centrally. MRI BRAIN WO CONTRAST   Final Result   1. Diffuse osseous metastatic disease. 2. No intracranial metastatic disease. 3. Mild chronic white matter microvascular ischemic changes. 4. Trace right mastoid effusion. CT ABDOMEN PELVIS WO CONTRAST Additional Contrast? None   Final Result   Widespread hepatic metastatic disease      Right-sided pleural effusion and diffuse consolidative change in the right   lung. Please see recent chest CT report      Lytic lesion is seen in the right iliac wing      Scattered compression deformities within the spine, 2 of which appear new   compared to prior study      Large stool load in colon         NM BONE SCAN WHOLE BODY   Final Result   Findings are concerning for multifocal osseous metastatic disease. CT GUIDED NEEDLE PLACEMENT   Final Result   Successful CT guided core biopsy of the liver. CT NEEDLE BIOPSY LIVER PERCUTANEOUS   Final Result   Successful CT guided core biopsy of the liver. XR CHEST PORTABLE   Final Result   No significant interval change in the moderate to large right pleural   effusion. Pulmonary vascular congestion. US THORACENTESIS   Final Result   Successful ultrasound guided thoracentesis. XR CHEST PORTABLE   Final Result   Status post right thoracentesis. No pneumothorax. Stable chest findings.          CT CHEST WO CONTRAST   Final Result   Probable malignant mass of the right lower lobe or right middle lobe near the   hilum, causing obstruction and collapse of the

## 2020-07-03 NOTE — PROGRESS NOTES
Hospitalist Progress Note      PCP: Pernell Vázquez MD    Date of Admission: 6/19/2020    Chief Complaint: SOB    Hospital Course: 27O recently admitted for worsening hypoxemia with recurrent CHF, ELMA. Normally on 3 lpm O2 at home, CT Chest on 6/23 with R hilar mass, liver metastasis. Desaturated 6/26, requiring vapotherm. Underwent liver biopsy 6/29 with small cell CA, found to have extensive metastatic disease, was started on chemotherapy. Found to be increasingly hypoxemic today, required urgent intubation today. Subjective:  Rapid response called for worsening hypoxemia, hypotension. BP improved with bicarb, levo. Hypoxemia persisted though improved initially with increased PEEP from 10 to 12, then 14. Recurrent hypoxemia improved with intermittent bagging.       Medications:  Reviewed    Infusion Medications    sodium bicarbonate infusion 50 mL/hr at 07/03/20 0159    norepinephrine 2 mcg/min (07/03/20 0309)    propofol 25 mcg/kg/min (07/03/20 0139)    fentaNYL (SUBLIMAZE) infusion Stopped (07/03/20 0056)    sodium chloride      dextrose       Scheduled Medications    lidocaine 1 % injection  5 mL Intradermal Once    sodium chloride flush  10 mL Intravenous 2 times per day    allopurinol  300 mg Oral Daily    chlorhexidine  15 mL Mouth/Throat BID    CARBOplatin (PARAPLATIN) chemo IVPB (by mg/m2)  480 mg Intravenous Once    etoposide (VEPESID) chemo IVPB  100 mg/m2 Intravenous Daily    ondansetron with dexamethasone IVPB   Intravenous Daily    ipratropium-albuterol  1 ampule Inhalation Q4H    aspirin  81 mg Oral Daily    meropenem  1 g Intravenous Q8H    vancomycin  1,000 mg Intravenous Q24H    enoxaparin  40 mg Subcutaneous Daily    polyethylene glycol  17 g Oral BID    sennosides-docusate sodium  2 tablet Oral Daily    lidocaine  1 patch Transdermal Once    atorvastatin  40 mg Oral Nightly    gabapentin  300 mg Oral TID    levothyroxine  100 mcg Oral Daily    sertraline  100 mg Oral Daily    sildenafil  20 mg Oral TID    sodium chloride flush  10 mL Intravenous 2 times per day     PRN Meds: sodium chloride flush, prochlorperazine, oxyCODONE, fentanNYL, midazolam, bisacodyl, ipratropium-albuterol, sodium chloride flush, acetaminophen **OR** acetaminophen, polyethylene glycol, promethazine **OR** ondansetron, glucose, dextrose, glucagon (rDNA), dextrose, potassium chloride **OR** potassium alternative oral replacement **OR** potassium chloride      Intake/Output Summary (Last 24 hours) at 7/3/2020 0313  Last data filed at 7/3/2020 0253  Gross per 24 hour   Intake 1675.44 ml   Output 1175 ml   Net 500.44 ml       Physical Exam Performed:    BP (!) 153/88   Pulse 125   Temp 102.4 °F (39.1 °C) (Oral)   Resp 27   Ht 5' (1.524 m)   Wt 176 lb 12.8 oz (80.2 kg)   LMP  (LMP Unknown)   SpO2 (!) 87%   BMI 34.53 kg/m²     General appearance: intubated, sedated  HEENT: Pupils equal, round,  Conjunctivae/corneas clear. Neck: Supple, with full range of motion. No jugular venous distention. Trachea midline. Respiratory:  No wheeze, (+) bilat rhonchi, equal breath sounds bilaterally  Cardiovascular: tachy rate, regular rhythm. Abdomen: Soft, non-tender, non-distended with normal bowel sounds. Musculoskeletal: No clubbing, cyanosis or edema bilaterally. Skin: Skin color, texture, turgor normal.  No rashes or lesions. Labs:   Recent Labs     06/30/20  0544 07/02/20  0550 07/02/20  2150   WBC 13.7* 14.0* 3.3*   HGB 10.4* 9.7* 11.5*   HCT 32.6* 30.6* 35.1*    203 242     Recent Labs     07/01/20  0556 07/02/20  0553 07/02/20  2149    139 137   K 5.2* 5.0 4.3   CL 95* 101 97*   CO2 26 19* 23   BUN 47* 37* 37*   CREATININE 1.1 0.8 1.1   CALCIUM 9.2 9.3 9.3   PHOS  --   --  2.5     No results for input(s): AST, ALT, BILIDIR, BILITOT, ALKPHOS in the last 72 hours.   Recent Labs     07/02/20  5257   INR 1.20*     No results for input(s): Laverda Dancer in the last 72 hours.    Urinalysis:      Lab Results   Component Value Date    NITRU Negative 06/19/2020    WBCUA 0-2 06/19/2020    BACTERIA 2+ 06/19/2020    RBCUA None seen 06/19/2020    BLOODU Negative 06/19/2020    SPECGRAV 1.015 06/19/2020    GLUCOSEU >=1000 06/19/2020    GLUCOSEU 100 05/26/2012       Radiology:  XR CHEST PORTABLE   Final Result   Appropriate endotracheal tube positioning. Ongoing large right pleural effusion with partial right lung atelectasis. Background of severe interstitial and alveolar pulmonary edema. XR ABDOMEN FOR NG/OG/NE TUBE PLACEMENT   Final Result   Support apparatus as above. Diffuse bilateral airspace disease as well as consolidative opacity and   effusion at the right base. XR CHEST PORTABLE   Final Result   Relatively stable appearance of the chest with a large right pleural effusion   and underlying vascular congestion centrally. MRI BRAIN WO CONTRAST   Final Result   1. Diffuse osseous metastatic disease. 2. No intracranial metastatic disease. 3. Mild chronic white matter microvascular ischemic changes. 4. Trace right mastoid effusion. CT ABDOMEN PELVIS WO CONTRAST Additional Contrast? None   Final Result   Widespread hepatic metastatic disease      Right-sided pleural effusion and diffuse consolidative change in the right   lung. Please see recent chest CT report      Lytic lesion is seen in the right iliac wing      Scattered compression deformities within the spine, 2 of which appear new   compared to prior study      Large stool load in colon         NM BONE SCAN WHOLE BODY   Final Result   Findings are concerning for multifocal osseous metastatic disease. CT GUIDED NEEDLE PLACEMENT   Final Result   Successful CT guided core biopsy of the liver. CT NEEDLE BIOPSY LIVER PERCUTANEOUS   Final Result   Successful CT guided core biopsy of the liver.          XR CHEST PORTABLE   Final Result   No significant interval change in the moderate to large right pleural   effusion. Pulmonary vascular congestion. US THORACENTESIS   Final Result   Successful ultrasound guided thoracentesis. XR CHEST PORTABLE   Final Result   Status post right thoracentesis. No pneumothorax. Stable chest findings. CT CHEST WO CONTRAST   Final Result   Probable malignant mass of the right lower lobe or right middle lobe near the   hilum, causing obstruction and collapse of the middle lobe and lower lobe. Adjacent metastatic adenopathy of the right hilum and mediastinum as well as   diffuse metastatic disease of the liver. Suspected metastatic destructive   osseous lesion of the right scapula, only partially imaged. Small right pleural effusion. Multifocal ground-glass opacity of both lungs. This may be due to edema,   atypical infection including viral pneumonia, or a combination. XR CHEST PORTABLE   Final Result   Vascular congestion. Moderate-sized right pleural effusion. XR CHEST STANDARD (2 VW)   Final Result   1. Worsening congestive heart failure.          IR PICC WO SQ PORT/PUMP > 5 YEARS    (Results Pending)   XR CHEST 1 VW    (Results Pending)   XR CHEST 1 VW    (Results Pending)           Assessment/Plan:    Active Hospital Problems    Diagnosis Date Noted    Small cell lung carcinoma, right (HCC) [C34.91]     Lung mass [R91.8]     Pleural effusion [J90]     Acute on chronic respiratory failure (HCC) [J96.20] 06/19/2020    Shortness of breath [R06.02] 06/01/2020    Chronic respiratory failure (HCC) [J96.10] 05/30/2020    Acute on chronic diastolic congestive heart failure (HCC) [I50.33] 02/12/2020    Class 1 obesity due to excess calories with serious comorbidity and body mass index (BMI) of 34.0 to 34.9 in adult [E66.09, Z68.34] 07/09/2019    Chest wall pain [R07.89] 02/26/2014    Type 2 diabetes mellitus without complication, with long-term current use of insulin ([facility] [address] [E11.9,

## 2020-07-03 NOTE — PLAN OF CARE
Problem: Falls - Risk of:  Goal: Will remain free from falls  Description: Will remain free from falls  7/3/2020 0249 by Laurel Kirk RN  Outcome: Ongoing  7/2/2020 1904 by Josh Love RN  Outcome: Ongoing  Goal: Absence of physical injury  Description: Absence of physical injury  7/3/2020 0249 by Laurel Kirk RN  Outcome: Ongoing  7/2/2020 1904 by Josh Love RN  Outcome: Ongoing     Problem: Pain:  Goal: Pain level will decrease  Description: Pain level will decrease  7/3/2020 0249 by Laurel Kirk RN  Outcome: Ongoing  7/2/2020 1904 by Josh Love RN  Outcome: Ongoing  Goal: Control of acute pain  Description: Control of acute pain  7/3/2020 0249 by Laurel Kirk RN  Outcome: Ongoing  7/2/2020 1904 by Josh Love RN  Outcome: Ongoing  Goal: Control of chronic pain  Description: Control of chronic pain  7/3/2020 0249 by Laurel Kirk RN  Outcome: Ongoing  7/2/2020 1904 by Josh Love RN  Outcome: Ongoing  Goal: Patient's pain/discomfort is manageable  Description: Patient's pain/discomfort is manageable  7/3/2020 0249 by Laurel Kirk RN  Outcome: Ongoing  7/2/2020 1904 by Josh Love RN  Outcome: Ongoing     Problem: Infection:  Goal: Will remain free from infection  Description: Will remain free from infection  7/3/2020 0249 by Laurel Kirk RN  Outcome: Ongoing  7/2/2020 1904 by Josh Love RN  Outcome: Ongoing     Problem: Safety:  Goal: Free from accidental physical injury  Description: Free from accidental physical injury  7/3/2020 0249 by Laurel Krik RN  Outcome: Ongoing  7/2/2020 1904 by Josh Love RN  Outcome: Ongoing  Goal: Free from intentional harm  Description: Free from intentional harm  7/3/2020 0249 by Laurel Kirk RN  Outcome: Ongoing  7/2/2020 1904 by Josh Love RN  Outcome: Ongoing     Problem: Daily Care:  Goal: Daily care needs are met  Description: Daily care needs are met  7/3/2020 0249 by Laurel Kirk RN  Outcome: Ongoing  7/2/2020 1904 by Martín Hernandez RN  Outcome: Ongoing     Problem: Skin Integrity:  Goal: Skin integrity will stabilize  Description: Skin integrity will stabilize  7/3/2020 0249 by Dequan Ayoub RN  Outcome: Ongoing  7/2/2020 1904 by Martín Hernandez RN  Outcome: Ongoing     Problem: Discharge Planning:  Goal: Patients continuum of care needs are met  Description: Patients continuum of care needs are met  7/3/2020 0249 by Dequan Ayoub RN  Outcome: Ongoing  7/2/2020 1904 by Martín Hernandez RN  Outcome: Ongoing     Problem: OXYGENATION/RESPIRATORY FUNCTION  Goal: Patient will maintain patent airway  7/3/2020 0249 by Dequan Ayoub RN  Outcome: Ongoing  7/2/2020 1904 by Martín Hernandez RN  Outcome: Ongoing  Goal: Patient will achieve/maintain normal respiratory rate/effort  Description: Respiratory rate and effort will be within normal limits for the patient  7/3/2020 0249 by Dequan Ayoub RN  Outcome: Ongoing  7/2/2020 1904 by Martín Hernandez RN  Outcome: Ongoing     Problem: HEMODYNAMIC STATUS  Goal: Patient has stable vital signs and fluid balance  7/3/2020 0249 by Dequan Ayoub RN  Outcome: Ongoing  7/2/2020 1904 by Martín Hernandez RN  Outcome: Ongoing     Problem: FLUID AND ELECTROLYTE IMBALANCE  Goal: Fluid and electrolyte balance are achieved/maintained  7/3/2020 0249 by Dequan Ayoub RN  Outcome: Ongoing  7/2/2020 1904 by Martín Hernandez RN  Outcome: Ongoing     Problem: ACTIVITY INTOLERANCE/IMPAIRED MOBILITY  Goal: Mobility/activity is maintained at optimum level for patient  7/3/2020 0249 by Dequan Ayoub RN  Outcome: Ongoing  7/2/2020 1904 by Martín Hernandez RN  Outcome: Ongoing     Problem: Nutrition  Goal: Optimal nutrition therapy  7/3/2020 0249 by Dequan Ayoub RN  Outcome: Ongoing  7/2/2020 1904 by Martín Hernandez RN  Outcome: Ongoing     Problem: Skin Integrity:  Goal: Will show no infection signs and symptoms  Description: Will show no infection signs and symptoms  7/3/2020 0249 by Leslee Barber RN  Outcome: Ongoing  7/2/2020 1904 by Ino Dobson RN  Outcome: Ongoing  Goal: Absence of new skin breakdown  Description: Absence of new skin breakdown  7/3/2020 0249 by Leslee Barber RN  Outcome: Ongoing  7/2/2020 1904 by Ino Dobson RN  Outcome: Ongoing     Problem: Restraint Use - Nonviolent/Non-Self-Destructive Behavior:  Goal: Absence of restraint indications  Description: Absence of restraint indications  7/3/2020 0249 by Leslee Barber RN  Outcome: Ongoing  7/2/2020 1904 by Ino Dobson RN  Outcome: Ongoing  Goal: Absence of restraint-related injury  Description: Absence of restraint-related injury  7/3/2020 0249 by Leslee Barber RN  Outcome: Ongoing  7/2/2020 1904 by Ino Dobson RN  Outcome: Ongoing

## 2020-07-03 NOTE — PROGRESS NOTES
Per verbal levo fed taking up to 15 mcg. Daughter and MD @ bedside. Daughter informed of poor prognosis.

## 2020-07-03 NOTE — SIGNIFICANT EVENT
Date of Death:7/3/2020  Time of Death:06:29    Immediate Cause of Death:Cardiopulmonary arrest  Underlying Conditions: Sepsis  Other Contributing Conditions:Small Cell Lung CA, COPD

## 2020-07-03 NOTE — PROGRESS NOTES
Patient 2nd blood culture drawn. Merrem hung, fentanyl drip going at 50 mcg/hr. Rechecked patients BS (52) 25 ml of dextrose given. Tylenol administered via suppository. Pressures continue to be low, Dr. Mckinley Salinas called and requested to come to bedside. Respiratory at bedside for oxygenation requirement.

## 2020-07-03 NOTE — PROGRESS NOTES
1072: patient lucia down into 40 without palpable pulse. CPR initiated. 4181: Epinephrine given   0612: 1 ampBicarb given   0612: Pulse check: no pulse, compressions resumed  0613: Atropine given  0614: 1 amp Bicarb given and Pulse check: v-fib  0615: Shock given at 120J  0615: compressions resumed  0615: Epinephrine given   0617: Sock given at 150J  And compressions resumed  0617: Epinephrine given. 8935: Atropine given  0618: 1amp bicarb given   0619: Pulse check: no pulse: CPR continued  0619: Epinephrine given  0621: Pulse check: No pulse. CPR continued   0622: 1 amp Bicarb given   0622: Epinephrine given. Pulse check: no pulse. Compressions resumed   0623: Atropine given  0624: Pulse check: no pulse: compressions resumed   0625: 2g Magnesium given   0625: Pulse check: no pulse: compressions resumed   0626: Epinephrine given  0627: Pulse check: no pulse. Compressions resumed   0628: Pulse checked: no pulse: asystole on monitor. Compressions stopped. Dr. Ramonita Owen assessing pt.   5483: Dr. Ramonita Owen declared patient    Family in department and made aware at this time.

## 2020-07-03 NOTE — PROGRESS NOTES
Patient continues to have hypotensive pressures. Fentanyl has been stopped. Another perfect serve sent to Dr. Iain Bedolla in regards to patients' non-improving condition.

## 2020-07-03 NOTE — PROGRESS NOTES
07/02/20 2310   Vent Information   Vent Type 840   Vent Mode AC/VC   Vt Ordered 500 mL   Rate Set 18 bmp   Peak Flow 75 L/min   FiO2  100 %   SpO2 (!) 85 %   SpO2/FiO2 ratio 85   Sensitivity 3   PEEP/CPAP 10   Humidification Source Heated wire   Humidification Temp 37   Humidification Temp Measured 37   Circuit Condensation Drained   Vent Patient Data   Peak Inspiratory Pressure 34 cmH2O   Mean Airway Pressure 18 cmH20   Rate Measured 32 br/min   Vt Exhaled 550 mL   Minute Volume 18 Liters   I:E Ratio 1:2.2   Plateau Pressure 22 [id number]   Cough/Sputum   Sputum How Obtained Endotracheal;Suctioned   Cough Productive   Sputum Amount Moderate   Sputum Color Creamy; Red   Tenacity Thick   Spontaneous Breathing Trial (SBT) RT Doc   Pulse 133   Breath Sounds   Right Upper Lobe Rhonchi   Right Middle Lobe Rhonchi   Right Lower Lobe Diminished   Left Upper Lobe Rhonchi   Left Lower Lobe Diminished   Additional Respiratory  Assessments   Resp (!) 31   Position Semi-Gonzales's   Alarm Settings   High Pressure Alarm 50 cmH2O   Low Minute Volume Alarm 4 L/min   Apnea (secs) 20 secs   High Respiratory Rate 50 br/min   Low Exhaled Vt  250 mL   ETT (adult)   Placement Date/Time: 07/02/20 1527   Type: Cuffed  Tube Size: 7.5 mm  Location: Oral   Secured at 23 cm   Measured From Lips   ET Placement Right   Secured By Commercial tube strange   Site Condition Dry

## 2020-07-03 NOTE — PROGRESS NOTES
Spoke to Dr. Bhavana Beck via telephone, informed him of patients' status, sts he will come evaluate. Applied Ice packs and fan to patient; will continue to monitor.

## 2020-07-03 NOTE — FLOWSHEET NOTE
Spiritual Care at time of death    Offered supportive presence and care with family at time of death. Family continue to gather and mourn the loss of the patient. Informed on coming  of situation and introduced him to family. Spiritual Care will continue the support of the family in this time. [facility]     07/03/20 0703   Encounter Summary   Services provided to: Patient and family together   Referral/Consult From: Nurse   Support System Children   Complexity of Encounter High   Length of Encounter 1 hour   Grief and Life Adjustment   Type End of life;Death   Assessment Approachable;Tearful;Grieving; Anxious   Intervention Active listening;Explored feelings, thoughts, concerns;Nurtured hope   Outcome Connection/belonging;Comfort;Expressed gratitude

## 2020-07-03 NOTE — FLOWSHEET NOTE
07/03/20 0405   Vitals   Temp 101.7 °F (38.7 °C)   Temp Source Oral   Pulse 135   Resp (!) 32   BP (!) 143/40   MAP (mmHg) (!) 62   Level of Consciousness 3   MEWS Score 11   Oxygen Therapy   SpO2 (!) 87 %   Patient is steadily declining. Rapid called; family informed via phone. Pt is febrile with a  temp of 101.7 Assessment complete see doc flow. On ICU bedside monitor. Respiratory @ bedside overseeing ventilator settings RASS -2. PERRL. PICC PIV x 2 WDL. OG placement checked via gastric contents. Minimal residual noted. Medications administered via OG, flushed easily. Clamped. Ruiz secured draining clear yellow urine. Pt repositioned for comfort. Will continue to closely monitor.

## 2020-07-03 NOTE — CONSULTS
Pharmacy Note  Vancomycin Consult    Shila Wilson is a 62 y.o. female started on Vancomycin for pneumonia; consult received from Dr. Emanuel Mckinnon to manage therapy. Also receiving the following antibiotics: merrem.     Patient Active Problem List   Diagnosis    HLD (hyperlipidemia)    Type 2 diabetes mellitus without complication, with long-term current use of insulin ([address]    Chest wall pain    Type 2 diabetes mellitus with diabetic peripheral angiopathy without gangrene ([address]    HTN (hypertension)    Hypothyroid    Localized osteoarthrosis, lower leg    PFS (patellofemoral syndrome)    Disc displacement, lumbar    Pulmonary emphysema ([address]    JORDI (obstructive sleep apnea)    Chronic low back pain    Coronary artery disease involving native coronary artery of native heart without angina pectoris    Tobacco use    Astigmatism    Mild nonproliferative diabetic retinopathy (HCC)    Reflux esophagitis    Class 1 obesity due to excess calories with serious comorbidity and body mass index (BMI) of 34.0 to 34.9 in adult    Diabetic ulcer of left foot associated with type 2 diabetes mellitus, with bone involvement without evidence of necrosis (HCC)    OAB (overactive bladder)    Allergic rhinitis    Simple chronic bronchitis (HCC)    Anxiety and depression    Fibromyalgia    Phantom pain (HCC)    Osteomyelitis ([address]    PVD (peripheral vascular disease) ([address]    Chronic osteomyelitis of left foot ([address]    Acute osteomyelitis of foot ([address]    Acute on chronic diastolic congestive heart failure (HCC)    Acute respiratory failure with hypoxia (HCC)    Pleural effusion on right    Myalgia    Abdominal pain    Pleuritic chest pain    Chronic respiratory failure (HCC)    Shortness of breath    Compression atelectasis    Observed sleep apnea    Elevated brain natriuretic peptide (BNP) level    Hyponatremia    Restrictive lung disease    Chronic narcotic use    Medication management   

## 2020-07-03 NOTE — FLOWSHEET NOTE
07/03/20 0003   Vitals   Temp 101.9 °F (38.8 °C)   Temp Source Oral   Pulse 127   Resp 25   BP (!) 75/61   MAP (mmHg) 68   Level of Consciousness 2   MEWS Score 10   Oxygen Therapy   SpO2 90 %   Dr. Denia Rivas at bedside. New orders given. Assessment complete see doc flow. On ICU bedside monitor. #7.5 ETT @ 23 LL. Vent: AC 18 RR:36  Vt: 500 FIO2: 100% PEEP 10 SPO2 90% with CSPO2 monitoring. RASS -1. PERRL. PICC PIV x 2 WDL. OG placement checked via gastric contents. Minimal residual noted. flushed easily. Clamped. Ruiz secured draining clear yellow urine. Patient given a bath with CHG, preventative changed, complete linen change given. Pt repositioned for comfort. Will continue to closely monitor.

## 2020-07-03 NOTE — CARE COORDINATION
Pt had been transferred to ICU with worsening hypoxia. Intubated. Cardiopulmonary arrest. Pt . Family at bedside. Spiritual care involved.

## 2020-07-03 NOTE — FLOWSHEET NOTE
07/02/20 2100   Vitals   Temp 99.6 °F (37.6 °C)   Temp Source Oral   Pulse 129   Heart Rate Source Monitor   Resp (!) 38   /88   MAP (mmHg) 96   BP Location Left upper arm   BP Upper/Lower Upper   Level of Consciousness 2   MEWS Score 7   Oxygen Therapy   SpO2 91 %   O2 Device Ventilator   FiO2  100 %      at bedside drawing STAT orders. Patients' temperature has decreased to 99.6 orally. Still remains tachycardic, will continue to monitor.

## 2020-07-03 NOTE — PROGRESS NOTES
Daughter called and informed of patients' status. States she will be coming in. Okay given by charge nurse.

## 2020-07-03 NOTE — PROGRESS NOTES
Reported glucose of 74. ETT at clavicles, PICC at [facility], and NG in stomach. Dr. Dorthey Ahumada ordered to discontinue her insulin orders and to do glucose checks every 4 hours. He stated that he visualized the placement of the ETT upon bronchoscopy and did not want the tube advanced. He ordered it okay to use OG tube, and PICC line. Orders were read back and verified.

## 2020-07-03 NOTE — PROGRESS NOTES
ETT tube pulled as well as OG and all remaining medical equipment. Family at bedside with , [facility] center notified.  REF # [id number]

## 2020-07-03 NOTE — PROGRESS NOTES
07/03/20 0430   Vent Information   Vent Type 840   Vent Mode AC/PC   Pressure Ordered 30   Rate Set 18 bmp   FiO2  100 %   SpO2 (!) 86 %   SpO2/FiO2 ratio 86   Sensitivity 3   PEEP/CPAP 10   I Time/ I Time % 0.7 s   Vent Patient Data   Peak Inspiratory Pressure 42 cmH2O   Mean Airway Pressure 25 cmH20   Rate Measured 30 br/min   Vt Exhaled 656 mL   Minute Volume 22 Liters   I:E Ratio 1:2.1   Spontaneous Breathing Trial (SBT) RT Doc   Pulse 128   Additional Respiratory  Assessments   Resp (!) 36

## 2020-07-03 NOTE — PROGRESS NOTES
Spoke with hematologist/oncologist (Dr. Jami Tello) related to the patient's condition deteroriated and had to be intubated, bronchoscopy, and received a picc line. I called to confirm the dose of chemotherapy for tomorrow at 1100.

## 2020-07-03 NOTE — PROGRESS NOTES
ABG drawn from L radial artery x 3 attempt. Sight prepped per policy and procedure. Modified Adonis's test positive. Pressure held to sight after procedure for five minutes. No hematoma present. Pulse present after procedure.  Await results

## 2020-07-03 NOTE — PROGRESS NOTES
Spoke with [facility], stated body was cleared at 0803 this am. Called [facility] regarding pt expiring. Stated they will  the body as soon as they body is in the morgue.

## 2020-07-03 NOTE — FLOWSHEET NOTE
20 0751   Encounter Summary   Services provided to: Patient and family together   Referral/Consult From: Other    Support System Children;Family members   Continue Visiting   (7/3/ support. Prayed with family.)   Complexity of Encounter High   Length of Encounter 30 minutes   Advance Care Planning Yes   Routine   Type Follow up   Assessment Tearful;Grieving   Intervention Active listening;Nurtured hope;Prayer;Scripture;Sustaining presence/ Ministry of presence   Outcome Comfort;Expressed gratitude;Engaged in conversation   Grief and Life Adjustment   Type End of life;Death   Assessment Tearful;Grieving; Anxious   Intervention Active listening;Prayer;Scripture;Sustaining presence/ Ministry of presence   Outcome Expressed gratitude;Engaged in conversation

## 2020-07-04 LAB
ADENOVIRUS, DFA: NEGATIVE
ADENOVIRUS, DFA: NEGATIVE
INFLUENZA A,DFA: NEGATIVE
INFLUENZA A,DFA: NEGATIVE
INFLUENZA B,DFA: NEGATIVE
INFLUENZA B,DFA: NEGATIVE
METAPNEUMOVIRUS, DFA: NEGATIVE
METAPNEUMOVIRUS, DFA: NEGATIVE
PARAINFLUENZA 1 DFA STAIN: NEGATIVE
PARAINFLUENZA 1 DFA STAIN: NEGATIVE
PARAINFLUENZA 2 DFA STAIN: NEGATIVE
PARAINFLUENZA 2 DFA STAIN: NEGATIVE
PARAINFLUENZA 3: NEGATIVE
PARAINFLUENZA 3: NEGATIVE
RESPIRATORY SYNCYTIAL VIRUS  (RSV) DFA: NEGATIVE
RESPIRATORY SYNCYTIAL VIRUS  (RSV) DFA: NEGATIVE
RSPFA SOURCE: NORMAL
RSPFA SOURCE: NORMAL

## 2020-07-06 LAB
FINAL REPORT: NORMAL
FINAL REPORT: NORMAL
PATH CONSULT FLUID: NORMAL
PRELIMINARY: NORMAL
PRELIMINARY: NORMAL

## 2020-07-07 LAB
BLOOD CULTURE, ROUTINE: ABNORMAL
BLOOD CULTURE, ROUTINE: ABNORMAL
CULTURE, BLOOD 2: ABNORMAL
CULTURE, RESPIRATORY: ABNORMAL
GRAM STAIN RESULT: ABNORMAL
GRAM STAIN RESULT: ABNORMAL
ORGANISM: ABNORMAL

## 2020-07-07 NOTE — ADT AUTH CERT
Patient Demographics     Name Patient ID SSN Gender Identity Birth Date   Tina Azevedo" [id number]  Female 62 [facility])   Address Phone Email Employer    [address] [place] [address] [phone] ()  [phone] [facility] [employer]  [address] [facility] Occupation [address] - Disabled    Reg Status PCP Date Last Verified Next Review Date    Verified Rick [place], [place]  [phone] 20    Admission Date Discharge Date Admitting Provider     20 Elina Avitia MD     Marital Status Restorationism       [facility]      Emergency Contact [facility] (C)  [address] [place] [address] Radha Harris  [phone] (H)   [facility] Account [de-identified]   CV[employer] Subscriber Name/Sex/Relation Subscriber  Subscriber Address/Phone Subscriber Emp/Emp Phone   1Evan Fonseca  [id number] Princess March - Female  (Self) 1960 [address]  [address]  [place] [address]  [phone](E) DISABLED   Utilization Reviews         Heart Failure - Care Day 14 (2020) by Ricci De RN         Review Status Review Entered   Completed 2020 10:12       Criteria Review      Care Day: 14 Care Date: 2020 Level of Care:    Guideline Day 3    Level Of Care    (X) Floor to discharge    Clinical Status    ( ) * Hemodynamic stability    2020 10:12 AM EDT by Clifford Acharya      68/48 128 33 86% O2 6l/m/HFNC    ( ) * Tachypnea absent    ( ) * Oxygenation at baseline or acceptable for next level of care    (X) * Dyspnea at baseline or acceptable for next level of care    ( ) * Cardiac rate and rhythm acceptable    ( ) * Pulmonary edema absent or acceptable for next level of care    ( ) * Peripheral or sacral edema absent, at baseline, or improved    ( ) * Mental status at baseline    ( ) * Volume status acceptable on oral medication    (X) * Renal function at baseline or acceptable for next level of care    (X) * Electrolyte levels normal or acceptable for next level of care    ( ) * Immediate precipitating factors absent or controlled    ( ) * Discharge plans and education understood    Activity    ( ) * Ambulatory    Routes    ( ) * Oral hydration, medications, and diet    Interventions    ( ) * Oxygen absent    * Milestone   Additional Notes   7/2/20      Pulmonology Note:      Events of Last 24 hours: The patient had a coughing fit noted to have copious frothy sputum with some blood present.  Blood evident in her nares. She became hypoxic and was transferred to the ICU where she required emergent intubation.  Bronchoscopy did not show any blood in the trachea or lower airways.  There was pooled secretions in her oropharynx but only minimal secretions in the airways.  The right middle lobe was occluded and the right lower lobe was partially occluded.  Possibly extrinsic compression there was not definite airway invasion.  The mucosa was edematous and mildly abnormal but biopsy was not available during this procedure to fully rule out endobronchial invasion.          EXAM:   Constitutional: ill appearing. In distress. Eyes: PERRL. No sclera icterus. ENT: Normal Nose. Normal Tongue. ETT in place   Neck:  Trachea is midline. No thyroid tenderness. Respiratory: + accessory muscle usage.   decreased breath sounds. No wheezes. No rales. No Rhonchi. Cardiovascular: Normal S1S2. Chacorta Yasmany No murmur. No digit cyanosis. No digit clubbing. No LE edema. GI: Non-tender. Non-distended. Normal bowel sounds. No masses. No umbilical hernia. Skin: No rash on the exposed extremities. No Nodules on exposed extremities. Neuro: Sedated. Moves all extremities. Psych:  No agitation, no anxiety.       ASSESSMENT:   · Extensive Small cell lung cancer with Right lung mass and multiple liver and bony mets   · Acute on chronic hypoxemic respiratory failure    · Exudative lymphocytic right pleural effusion, recurrent, favor malignant but cytology is indeterminate   · Acute on chronic CHF   · Mucus plugging of airways, minimal, on 6/12/20 and 7/2 bronchoscopy    · Pulmonary hypertension, favor group 2   · DM2   · CAD s/p PC I on plavix       PLAN:   · Mechanical ventilation as per my orders.  The ventilator was adjusted by me at the bedside for unstable, life threatening respiratory failure. · IV Propofol for sedation, target RASS -2, with daily spontaneous awakening trial.  Fentanyl PRN pain, gtt as needed   · Head of bed 30 degrees or higher at all times   · Daily spontaneous breathing trial once PEEP less than 8, FiO2 less than 55%. · Duonebs   · PICC   · Plan to delay start of chemotherapy until tomorrow - d/w Dr. Nando Patrick   · Lasix 40mg IV daily and spironolactone   · She was on Morphine ER 30mg bid   · She is on chronic Revatio   · Hold plavix today   · SSI/Lantus   · Lovenox DVT prophylaxis   · Total critical care time caring for this patient with life threatening, unstable organ failure, including direct patient contact, management of life support systems, review of data including imaging and labs, discussions with other team members and physicians at least 40 minutes so far today, excluding procedures. ENDOTRACHEAL INTUBATION       INDICATION: Life threatening respiratory failure       TIME OUT: taken       SEDATION: etomidate and versed and fentanyl       PROCEDURE: Using video laryngoscopy, the vocal cords were well visualized and a 7.5 mm endotracheal tube was place directly through the cords.  Good breath sounds auscultated bilaterally without sounds over abdomen.  Good return of CO2 on the monitor. Ana Wisdom is pending.       IM Note:      Assessment/Plan:       Active Hospital Problems     Diagnosis Date Noted   · Small cell lung carcinoma, right (HCC) [C34.91]     · Lung mass [R91.8]     · Pleural effusion [J90]     · Acute on chronic respiratory failure (HCC) [J96.20] 06/19/2020   · Shortness of breath [R06.02] 06/01/2020   · Chronic respiratory failure (HCC) [J96.10] 05/30/2020   · Acute on chronic diastolic congestive heart failure (HCC) [I50.33] 02/12/2020   · Class 1 obesity due to excess calories with serious comorbidity and body mass index (BMI) of 34.0 to 34.9 in adult [E66.09, Z68.34] 07/09/2019   · Chest wall pain [R07.89] 02/26/2014   · Type 2 diabetes mellitus without complication, with long-term current use of insulin (HCC) [E11.9, Z79.4] 02/20/2014       1) Sepsis   - covering for aspiration pna/hcap   - blood cultures   - trend lactic acid   - pct up       2) Hypotension   - IVF bolus   - h/o diastolic CHF, currently without peripheral edema though with BNP elevated   - check abg               Dispo - icu         Results for Jodie Leach \"KEVON\" (MRN [id number]) as of 7/7/2020 10:15      7/2/2020 05:50   WBC: 14.0 (H)   RBC: 3.12 (L)   Hemoglobin Quant: 9.7 (L)   Hematocrit: 30.6 (L)   MCV: 97.9   MCH: 31.0   MCHC: 31.7   MPV: 10.6 (H)   RDW: 16.5 (H)   Platelet Count: 376   Neutrophils %: 85.0   Lymphocyte %: 7.0   Monocytes %: 2.0   Eosinophils %: 1.0   Basophils %: 0.0   Neutrophils Absolute: 12.3 (H)   Lymphocytes Absolute: 1.3   Monocytes Absolute: 0.3   Eosinophils Absolute: 0.1   Basophils Absolute: 0.0   Bands Relative: 2   Myelocyte Percent: 1 (A)   Atypical Lymphocytes Relative: 2   Poikilocytes: Occasional (A)   Anisocytosis: Occasional (A)   SLIDE REVIEW: see below   PLATELET SLIDE REVIEW: Adequate      7/2/2020 05:53   Sodium: 139   Potassium: 5.0   Chloride: 101   CO2: 19 (L)   BUN: 37 (H)   Creatinine: 0.8   Anion Gap: 19 (H)   GFR Non-: >60   GFR African American: >60   Glucose: 93   Calcium: 9.3      7/2/2020 07:02   POC Glucose: 103 (H)      7/2/2020 10:59   POC Glucose: 91         Results for Jodie Leach \"KEVON\" (MRN [id number]) as of 7/7/2020 10:15      7/2/2020 14:47   O2 Therapy: See comment   Hemoglobin, Art, Extended: 12.1   pH, Arterial: 7.350   pCO2, Arterial: 50.3 (H)   pO2, Arterial: 70.4 (L)   HCO3, Arterial: 27.1   TCO2 (calc), Art: 28.7   Base Excess, Arterial: 0.9   O2 Sat, Arterial: 93.2   O2 Content, Arterial: 16   Methemoglobin, Arterial: 0.3   Carboxyhgb, Arterial: 0.4      7/2/2020 16:17   POC Glucose: 74      7/2/2020 16:49      7/2/2020 16:52      7/2/2020 17:37   Prothrombin Time: 14.0 (H)   INR: 1.20 (H)      7/2/2020 17:58   O2 Therapy: Unknown   Hemoglobin, Art, Extended: 11.9 (L)   pH, Arterial: 7.419   pCO2, Arterial: 44.4   pO2, Arterial: 70.1 (L)   HCO3, Arterial: 28.1   TCO2 (calc), Art: 29.5   Base Excess, Arterial: 3.1 (H)   O2 Sat, Arterial: 94.3   O2 Content, Arterial: 16   Methemoglobin, Arterial: 0.3   Carboxyhgb, Arterial: 0.4      7/2/2020 18:00   Mononuclear unidentified cells, fluid bal: 5   CULTURE WITH SMEAR, ACID FAST BACILLIUS: Rpt   CULTURE, FUNGUS: Rpt   CULTURE, LEGIONELLA: Rpt   CULTURE, RESPIRATORY: Rpt (A)   Gram Stain Result: Cytospin performe. .. Organism: Acinetobacter baumannii (A)   CULTURE, RESPIRATORY: >100,000 CFU/mL. .. AFB Smear: No AFB observed by Fluorescent stain   Fungus Stain: No Fungal elements seen   Adenovirus, DFA: Negative   Influenza A,DFA: Negative   Influenza B,DFA: Negative   Metapneumovirus, DFA: Negative   Parafluenza Type 1 DFA: Negative   Parafluenza Type 2 DFA: Negative   RESP VIRUSES DFA PANEL: Rpt   Respiratory Syncytial Virus  (RSV) DFA: Negative      7/2/2020 18:00   CULTURE WITH SMEAR, ACID FAST BACILLIUS: Rpt   CULTURE, FUNGUS: Rpt   CULTURE, LEGIONELLA: Rpt   CULTURE, RESPIRATORY: Rpt (A)   Gram Stain Result: 4+ WBC's. .. Organism: Acinetobacter baumannii (A)   CULTURE, RESPIRATORY: Heavy growth. ..    AFB Smear: No AFB observed by Fluorescent stain   Fungus Stain: No Fungal elements seen   Adenovirus, DFA: Negative   Influenza A,DFA: Negative   Influenza B,DFA: Negative   Metapneumovirus, DFA: Negative   Parafluenza Type 1 DFA: Negative   Parafluenza Type 2 DFA: Negative   RESP VIRUSES DFA PANEL: Rpt Respiratory Syncytial Virus  (RSV) DFA: Negative      7/2/2020 18:00   CULTURE, VIRUS, RESPIRATORY: Rpt   CULTURE, RESPIRATORY: Light growth normal respiratory sandra with (A)      7/2/2020 18:00   CULTURE, VIRUS, RESPIRATORY: Rpt      7/2/2020 18:00   CULTURE, CMV: Rpt      7/2/2020 18:00   CULTURE, CMV: Rpt      7/2/2020 19:59   POC Glucose: 67 (L)      7/2/2020 21:46   CULTURE, BLOOD 1: Rpt (A)   CULTURE, BLOOD, PCR ID PANEL RESULTS REPORT: Rpt   Organism: Acinetobacter baumannii (A)      albuterol sulfate  (90 Base) MCG/ACT inhaler 2 puff    Dose: 2 puff   Freq: EVERY 4 HOURS WHILE AWAKE Route: IN      allopurinol (ZYLOPRIM) tablet 300 mg    Dose: 300 mg   Freq: DAILY Route: PO      budesonide-formoterol (SYMBICORT) 160-4.5 MCG/ACT inhaler 2 puff    Dose: 2 puff   Freq: 2 TIMES DAILY Route: IN      enoxaparin (LOVENOX) injection 40 mg    Dose: 40 mg   Freq: DAILY Route: SC      furosemide (LASIX) injection 40 mg    Dose: 40 mg   Freq: ONCE Route: IV      furosemide (LASIX) tablet 20 mg    Dose: 20 mg   Freq: DAILY Route: PO      insulin glargine (LANTUS) injection vial 28 Units    Dose: 28 Units   Freq: 2 TIMES DAILY Route: SC      ipratropium-albuterol (DUONEB) nebulizer solution 1 ampule    Dose: 1 ampule   Freq: EVERY 4 HOURS Route: IN      levothyroxine (SYNTHROID) tablet 100 mcg    Dose: 100 mcg   Freq: DAILY Route: PO      meropenem (MERREM) 1 g in sodium chloride 0.9 % 100 mL IVPB (mini-bag)    Dose: 1 g   Freq: EVERY 8 HOURS Route: IV      morphine (MS CONTIN) extended release tablet 30 mg    Dose: 30 mg   Freq: 2 times per day Route: PO      sertraline (ZOLOFT) tablet 100 mg    Dose: 100 mg   Freq: DAILY Route: PO      sildenafil (REVATIO) tablet 20 mg    Dose: 20 mg   Freq: 3 TIMES DAILY Route: PO      spironolactone (ALDACTONE) tablet 25 mg    Dose: 25 mg   Freq: DAILY Route: PO      0.9 % sodium chloride infusion    Rate: 75 mL/hr Freq: CONTINUOUS Route: IV      fentaNYL (SUBLIMAZE) 1,000 mcg in sodium chloride 0.9 % 100 mL infusion    Rate: 5 mL/hr Dose: 50 mcg/hr   Freq: CONTINUOUS Route: IV         propofol injection    Rate: 4.8 mL/hr Dose: 10 mcg/kg/min   Weight Dosing Info: 80.2 kg   Freq: TITRATED Route: IV         midazolam (VERSED) injection 2 mg    Dose: 2 mg   Freq: EVERY 1 HOUR PRN Route: IV x 4       oxyCODONE (ROXICODONE) immediate release tablet 5 mg    Dose: 5 mg   Freq: EVERY 4 HOURS PRN Route: PO x 1        Heart Failure - Care Day 13 (7/1/2020) by Gurwinder Price RN         Review Status Review Entered   Completed 7/7/2020 10:10       Criteria Review      Care Day: 13 Care Date: 7/1/2020 Level of Care:    Guideline Day 3    Level Of Care    (X) Floor to discharge    Clinical Status    (X) * Hemodynamic stability    7/7/2020 10:10 AM EDT by Karla Henriquez      106/56 80 20 98.3 92% O2 6l/m/HFNC    (X) * Tachypnea absent    ( ) * Oxygenation at baseline or acceptable for next level of care    (X) * Dyspnea at baseline or acceptable for next level of care    ( ) * Cardiac rate and rhythm acceptable    ( ) * Pulmonary edema absent or acceptable for next level of care    ( ) * Peripheral or sacral edema absent, at baseline, or improved    (X) * Mental status at baseline    ( ) * Volume status acceptable on oral medication    (X) * Renal function at baseline or acceptable for next level of care    (X) * Electrolyte levels normal or acceptable for next level of care    ( ) * Immediate precipitating factors absent or controlled    ( ) * Discharge plans and education understood    Activity    ( ) * Ambulatory    Routes    ( ) * Oral hydration, medications, and diet    Interventions    ( ) * Oxygen absent    * Milestone   Additional Notes   7/1/20      IM Note:      Continued diffuse pains all over    S/p liver mass bx , for brain imaging today       General: middle aged female, obese, older appearing    Awake, alert and oriented.  Appears to be not in any distress   Mucous Membranes:  Pink , anicteric   Neck: No JVD, no carotid bruit, no thyromegaly   Chest:  Clear to auscultation bilaterally, diminished in bases   Cardiovascular:  RRR S1S2 heard, no murmurs or gallops   Abdomen:  Soft, undistended, non tender, no organomegaly, BS present   Extremities: No edema or cyanosis. Distal pulses well felt   Neurological : grossly normal         Assessment/Plan:       Acute on chr hypoxic resp failure    - uses 2-3L O2 at home--> now on 6 L O2   - added IS, wean as tolerated   - see below       Acute on Chronic diastolic CHF   pulm HTN - on Revatio   ? Metastatic cancer    - CXR with pulm edema, started on IV lasix   - Recent echo from last admission with normal EF   - repeat CXR: vascular congestion with moderate R sided pleural effusion-->thoracentesis ordered with panel and serum LDH   - Weight 185#-->180-->182-->183 lbs   - Net fluid deficit 5.1 L    - Had a thoracentesis, when patient was at [facility] on 6/11 which was exudative,lymphocytic. - Pulm consult   - Repeat thoracentesis 6/23- 50 cc removed   - CT chest no contrast -suspicious for metastatic malignancy   - Await fluid cytology- atypical cells,s/p percutaneous liver biopsy 6/29, surgical pathology pending    - Plavix  Has been held-->mild oozing from liver bx, hold Plavix, ASA and Lovenox another day    - Continues to have pain.  Increase oxycodone to 10 mg every 4 hours.  Continue iv morphine as needed. - Add oramorph 15mg  q 12 ,    - Oncology c/s        Constipation   - Senna   - dulcolax       DM II    - fairly controlled   - cont lantus/SSI/metformin       Hypothyroidism   - cont synthroid       H/o CAD s/p PCI   - cont ASA/statin   - Hold plavix for biopsy, continue to hold 2/2 to mild oozing from bx site        Mild ELMA--resolved   - LAsix held.  toradol d/c ed    - Resolved    - Okay to resume diuretics and monitor creatinine closely        Hyperkalemia   - Mild after stopping Lasix and continuing Spironolactone   - Monitor        DVT · Supplemental oxygen to maintain SaO2 >92%; wean as tolerated    Verbalized agreement/Understanding:  Not Indicated      Results for Kerry Figueredo \"KEVON\" (MRN [id number]) as of 7/7/2020 10:15      7/1/2020 02:03   POC Glucose: 173 (H)      7/1/2020 05:56   Sodium: 136   Potassium: 5.2 (H)   Chloride: 95 (L)   CO2: 26   BUN: 47 (H)   Creatinine: 1.1   Anion Gap: 15   GFR Non-: 51 (A)   GFR : >60   Glucose: 219 (H)   Calcium: 9.2         albuterol sulfate  (90 Base) MCG/ACT inhaler 2 puff    Dose: 2 puff   Freq: EVERY 4 HOURS WHILE AWAKE Route: IN      atorvastatin (LIPITOR) tablet 40 mg    Dose: 40 mg   Freq: NIGHTLY Route: PO      budesonide-formoterol (SYMBICORT) 160-4.5 MCG/ACT inhaler 2 puff    Dose: 2 puff   Freq: 2 TIMES DAILY Route: IN      enoxaparin (LOVENOX) injection 40 mg    Dose: 40 mg   Freq: DAILY Route: SC      furosemide (LASIX) tablet 20 mg    Dose: 20 mg   Freq: DAILY Route: PO      insulin glargine (LANTUS) injection vial 28 Units    Dose: 28 Units   Freq: 2 TIMES DAILY Route: SC      insulin lispro (HUMALOG) injection vial 0-18 Units    Dose: 0-18 Units   Freq: 3 TIMES DAILY WITH MEALS Route: SC      insulin lispro (HUMALOG) injection vial 0-9 Units    Dose: 0-9 Units   Freq: NIGHTLY Route: SC      morphine (MS CONTIN) extended release tablet 30 mg    Dose: 30 mg   Freq: 2 times per day Route: PO x 2       sertraline (ZOLOFT) tablet 100 mg    Dose: 100 mg   Freq: DAILY Route: PO      sildenafil (REVATIO) tablet 20 mg    Dose: 20 mg   Freq: 3 TIMES DAILY Route: PO      spironolactone (ALDACTONE) tablet 25 mg    Dose: 25 mg   Freq: DAILY Route: PO      HYDROmorphone (DILAUDID) injection 1 mg    Dose: 1 mg   Freq: EVERY 2 HOURS PRN Route: IV x 1       oxyCODONE (ROXICODONE) immediate release tablet 5 mg    Dose: 5 mg   Freq: EVERY 4 HOURS PRN Route: PO x 4

## 2020-07-11 LAB
FINAL REPORT: NORMAL
FINAL REPORT: NORMAL
PRELIMINARY: NORMAL
PRELIMINARY: NORMAL

## 2020-07-13 LAB
FUNGUS (MYCOLOGY) CULTURE: ABNORMAL
FUNGUS (MYCOLOGY) CULTURE: ABNORMAL
FUNGUS STAIN: ABNORMAL
ORGANISM: ABNORMAL
ORGANISM: ABNORMAL

## 2020-07-15 LAB
FINAL REPORT: NORMAL
FINAL REPORT: NORMAL
PRELIMINARY: NORMAL
PRELIMINARY: NORMAL

## 2020-07-28 LAB
AFB CULTURE (MYCOBACTERIA): NORMAL
AFB SMEAR: NORMAL

## 2020-08-03 LAB
FUNGUS (MYCOLOGY) CULTURE: NORMAL
FUNGUS (MYCOLOGY) CULTURE: NORMAL
FUNGUS STAIN: NORMAL
FUNGUS STAIN: NORMAL

## 2020-08-11 LAB
AFB CULTURE (MYCOBACTERIA): NORMAL
AFB SMEAR: NORMAL

## 2020-08-18 LAB
AFB CULTURE (MYCOBACTERIA): NORMAL
AFB CULTURE (MYCOBACTERIA): NORMAL
AFB SMEAR: NORMAL
AFB SMEAR: NORMAL

## 2021-03-04 NOTE — ED PROVIDER NOTES
Action 3/4/2021 10:19AM ARPIT   Action Taken Received Bx ([facility])   Received Bx ([facility])           wheezing. HEART:  Regular rate and regular rhythm. No murmurs. Strong and equal pulses in the upper and lower extremities. ABDOMEN: Soft,  nondistended, positive bowel sounds. abdomen is nontender. No rebound. no guarding. MUSCULOSKELETAL: The calves are nontender to palpation. Active range of motion of the upper and lower extremities. No edema. Tenderness to low lumbar spine and paralumbar muscles just over buttocks. Pain with ROM. No erythema  NEUROLOGICAL: Awake, alert and oriented x 3. Power intact in the upper and lower extremities including dorsiflexion, plantarflexion of foot, knee flexion and extension, and hip flexion. . Sensation is intact to light touch in the upper and lower extremities. Cranial Nerves 2-12 are intac. Patellar DTRs 2plus  DERMATOLOGIC: No petechiae, rashes, or ecchymoses. No erythema. PSYCH: normal mood and affect. Normal thought content. ED COURSE AND MEDICAL DECISION MAKING:  I have reviewed Andrea Benson old records which reveal the following pertinent information:     MRI OF THE LUMBAR SPINE WITHOUT CONTRAST, 3/7/2018 12:02 pm       TECHNIQUE:   Multiplanar multisequence MRI of the lumbar spine was performed without the   administration of intravenous contrast.       COMPARISON:   None       HISTORY:   ORDERING PHYSICIAN PROVIDED HISTORY: Displacement of lumbar intervertebral   disc without myelopathy   TECHNOLOGIST PROVIDED HISTORY:   Technologist Provided Reason for Exam: DDD   Acuity: Chronic   Type of Encounter: Subsequent/Follow-up       Initial evaluation.       FINDINGS:   BONES/ALIGNMENT: There is normal alignment of the spine. The vertebral body   heights are maintained.  The bone marrow signal appears unremarkable.  There   is minimal degenerative disc disease with disc space narrowing and   osteophytes at L3-L4, L4-L5, and L2-L3.   The bony spinal canal overall is   congenitally small.       SPINAL CORD: The conus terminates normally.       SOFT TISSUES: No

## 2022-04-22 NOTE — PROGRESS NOTES
BS retaken (56) administering another 25 mls of dextrose at this time. Will recheck sugar in 15 minutes. Patient

## 2022-12-08 NOTE — PROGRESS NOTES
4 Eyes Skin Assessment     The patient is being assess for   Admission    I agree that 2 RN's have performed a thorough Head to Toe Skin Assessment on the patient. ALL assessment sites listed below have been assessed. Areas assessed by both nurses:   [x]   Head, Face, and Ears   [x]   Shoulders, Back, and Chest, Abdomen  [x]   Arms, Elbows, and Hands   [x]   Coccyx, Sacrum, and Ischium  [x]   Legs, Feet, and Heels        Left great toe amputation 8/19 now wound top of foot scattered bruising    **SHARE this note so that the co-signing nurse is able to place an eSignature**    Co-signer eSignature: Electronically signed by Mara Aviles RN on 2/13/20 at 6:38 AM    Does the Patient have Skin Breakdown?   No          Srinivas Prevention initiated:  No   Wound Care Orders initiated:  No      WOC nurse consulted for Pressure Injury (Stage 3,4, Unstageable, DTI, NWPT, Complex wounds)and New or Established Ostomies:  No      Primary Nurse eSignature: Karin Moise daughter, son, son-in-law

## 2023-02-28 NOTE — PROGRESS NOTES
Pharmacy note:  Vancomycin Day #  3  BUN/SCr       CrCl               Tmax  10/0.7           83 ml/min        97.6    Continue Vancomycin 1250  mg IVPB q24h. Trough due 6/6/19. Will adjust if necessary. Michelle Chauhan Pharm. D. 6/5/2019 9:42 AM Elidel Counseling: Patient may experience a mild burning sensation during topical application. Elidel is not approved in children less than 2 years of age. There have been case reports of hematologic and skin malignancies in patients using topical calcineurin inhibitors although causality is questionable.

## 2024-03-31 NOTE — TELEPHONE ENCOUNTER
Lay Hernandez with [address] [facility] called and states that the patient received a prescription for Doxycycline by her podiatrist to take for 7 days. Lay Hernandez states that when she put this into her system it showed that there is a level 2 drug interaction with the Iron that Dr. Serafin Pablo has her on. Please advise. No assistance needed

## 2025-01-26 NOTE — PROGRESS NOTES
History of Present Illness  Gabrielle Simmons is a 36 year old female, who presented today to urgent care with a chief complaint of urinary urgency and frequency.  Symptoms started yesterday.  No fevers or chills.  No back pain, side pain, flank pain or pelvic pain.  History of urinary tract infection with similar symptoms before in the past.    Patient denies pregnancy or current breastfeeding.    Review of Systems  Review of Systems   Constitutional: Negative.    HENT: Negative.     Respiratory: Negative.     Cardiovascular: Negative.    Gastrointestinal: Negative.    Genitourinary:  Positive for frequency and urgency.   Musculoskeletal: Negative.         Medications and Allergies  Medications:  Current Outpatient Medications   Medication Sig Dispense Refill    albuterol 108 (90 Base) MCG/ACT inhaler Inhale 2 puffs into the lungs 4 times daily as needed.      fluconazole (DIFLUCAN) 150 MG tablet Take 1 tablet by mouth as directed. If symptoms persist after 72 hours, take 2nd dose. (Patient not taking: Reported on 9/3/2024) 2 tablet 0    Probiotic Product (PROBIOTIC ADVANCED PO)  (Patient not taking: Reported on 9/3/2024)      calcium (OYST-RAJIV) 500 MG tablet Take 500 mg by mouth daily. (Patient not taking: Reported on 9/3/2024)      cetirizine (ZyrTEC) 10 MG tablet Take 10 mg by mouth daily.      fluticasone (FLONASE) 50 MCG/ACT nasal spray Spray 2 sprays in each nostril daily. SHAKE LIQUID (Patient not taking: Reported on 9/3/2024)      folic acid (FOLATE) 1 MG tablet       hydroCORTisone (CORTIZONE) 2.5 % cream APPLY SPARINGLY TO INFLAMED SKIN ON FACE 2-3 TIMES WEEKLY AS NEEDED (Patient not taking: Reported on 9/3/2024)      loratadine (CLARITIN) 10 MG tablet Take 10 mg by mouth.      triamcinolone (ARISTOCORT) 0.1 % cream APPLY THIN LAYER TO ITCHY SKIN RASH TWICE DAILY AS NEEDED (Patient not taking: Reported on 9/3/2024)      escitalopram (LEXAPRO) 20 MG tablet       ALPRAZolam (XANAX) 1 MG tablet Take 1 tablet every  6-8 hours as needed for anxiety 60 tablet 0     No current facility-administered medications for this visit.       Allergies:  ALLERGIES:   Allergen Reactions    Biaxin Xl [Clarithromycin] RASH    Clarithromycin Other (See Comments)    Gabapentin Other (See Comments)     Nerve overstimulation  Other reaction(s): OTHER (explain in comments), Other (See Comments), Unknown  Nerve overstimulation  Nerve overstimulation  Nerve overstimulation  Nerve overstimulation  Nerve overstimulation  Nerve overstimulation      Latex HIVES, RASH and PRURITUS    Seasonal Other (See Comments)       Past Medical History  Past Medical History:   Diagnosis Date    Chronic rhinitis 10/2/2009    Depressive disorder, not elsewhere classified     Dr. Lyn    ECZEMA     Mixed hyperlipidemia 2007    PONV (postoperative nausea and vomiting)     Unspecified asthma(493.90) 10/2/2009       Past Surgical History  Past Surgical History:   Procedure Laterality Date     section, low transverse      Cholecystectomy      Esophagogastroduodenoscopy transoral flex w/bx single or mult  2015    gastritis,eos & SB BX>normal path,neg hpylori,Pain,Dr. Bhakta.     Knee scope,diagnostic      Knee Arthroscopy (left)    Nasal septum surgery      Past surgical history      wisdom teeth       Social and Family History  Social History:  Social History     Socioeconomic History    Marital status: /Civil Union     Spouse name: Not on file    Number of children: 0    Years of education: Not on file    Highest education level: Not on file   Occupational History    Occupation: Student finance and marketing     Comment: Health system   Tobacco Use    Smoking status: Former     Current packs/day: 0.00     Types: Cigarettes     Quit date: 2000     Years since quittin.0    Smokeless tobacco: Not on file    Tobacco comments:     Was smoking approx. 1-2 cigarettes daily   Substance and Sexual Activity    Alcohol use: Yes     Comment: social  Pt up in chair at this time on 3 L O2. Pt denies shortness of breath. Pt with nonpitting lower extremity edema. Patient and/or Family's stated Goal of Care this Admission: reduce shortness of breath, increase activity tolerance, better understand heart failure and disease management, be more comfortable and reduce lower extremity edema prior to discharge    Diabetes education provided today:    Diabetic Neuropathy: signs and therapy. Foot care: advised to wash feet daily, pat dry and apply lotion at night, avoiding between toes. Need to look at feet daily and report to a physician any signs of inflammation or skin damage. Discussed diabetes shoes and socks. Managing high and low sugar readings. Effects of smoking and diabetes. Rotation of sites for subcutaneous medication injection.       Jazmín Tariq    Drug use: No    Sexual activity: Yes     Partners: Male     Birth control/protection: Condom, Pill   Other Topics Concern    Not on file   Social History Narrative    Not on file     Social Determinants of Health     Financial Resource Strain: Low Risk  (7/30/2019)    [facility]    Overall Financial Resource Strain (CARDIA)     Difficulty of Paying Living Expenses: Not very hard   Food Insecurity: No Food Insecurity (7/30/2019)    Received from [facility], [facility]    Hunger Vital Sign     Worried About Running Out of Food in the Last Year: Never true     Ran Out of Food in the Last Year: Never true   Transportation Needs: No Transportation Needs (7/30/2019)    Received from [facility], [facility]    PRAPARE - Transportation     Lack of Transportation (Medical): No     Lack of Transportation (Non-Medical): No   Physical Activity: Insufficiently Active (7/30/2019)    Received from [facility], [facility]    Exercise Vital Sign     Days of Exercise per Week: 4 days     Minutes of Exercise per Session: 30 min   Stress: Stress Concern Present (7/30/2019)    Received from [facility], [facility]    [employer] [place] [employer] - Occupational Stress Questionnaire     Feeling of Stress : Very much   Social Connections: Unknown (8/19/2024)    Received from [facility]    Social Connections     Social Connections: Last Flowsheet Data: Not on file     Social Connections: Recent Result: Not on file   Interpersonal Safety: Not on file (10/15/2022)       Family History:  Family History   Problem Relation Age of Onset    NEGATIVE FAMILY HX OF Mother     NEGATIVE  FAMILY HX OF Father        Objective Findings  Vital Signs:  Vitals:    01/26/25 0840   BP: 118/79   Pulse: 77   Resp: 18   Temp: 98.1 °F (36.7 °C)   TempSrc: Temporal   SpO2: 97%   Weight: 90.7 kg (200 lb)   LMP: 01/18/2025       Physical Exam:  Physical Exam  Vitals and nursing note reviewed.   Constitutional:       Appearance: Normal appearance.   HENT:      Head: Normocephalic and atraumatic.      Right Ear: Tympanic membrane normal.      Left Ear: Tympanic membrane normal.      Nose: Nose normal.   Abdominal:      General: Abdomen is flat. Bowel sounds are normal.      Palpations: Abdomen is soft.      Tenderness: There is no right CVA tenderness or left CVA tenderness.   Musculoskeletal:         General: Normal range of motion.      Cervical back: Normal range of motion and neck supple.   Skin:     General: Skin is warm.      Capillary Refill: Capillary refill takes less than 2 seconds.   Neurological:      General: No focal deficit present.      Mental Status: She is alert and oriented to person, place, and time.   Psychiatric:         Mood and Affect: Mood normal.         Behavior: Behavior normal.        Labs:  Results for orders placed or performed in visit on 01/26/25   POCT Urine Dip Auto   Result Value    POCT Color Yellow    POCT Appearance Clear    POCT Glucose Urine Negative    POCT Bilirubin Negative    POCT Ketones Negative    POCT Specific Gravity 1.025    POCT Occult Blood Moderate (A)    POCT pH 5.5    POCT Protein Negative    POCT Urobilinogen 0.2    Urine Nitrite Negative    WBC (Leukocyte) Esterase POC Small (A)       Medical Decision Making  Gabrielle Simmons is a 36 year old female, who presented today to urgent care with a chief complaint of urinary urgency and frequency.     Multiple differential diagnoses included in the care of this patient.  The patient appears well.  She is afebrile nontoxic-appearing.  No suprapubic tenderness on exam.  No CVA tenderness.    Urine dip with moderate amount  of blood and a small amount of leukocytes.  Urine culture is currently pending.  Given the patient's symptoms we will treat with Macrobid.  She was encouraged to drink plenty of fluids.     Patient also requesting a COVID test.  She is currently asymptomatic.  I politely explained to the patient that we do not do asymptomatic COVID testing here.  I discussed purchasing at home COVID test, at one of the nearby pharmacies.  The patient understood and felt comfortable with that plan all questions were answered.     During my evaluation and prior to leaving urgent care, patient was stable, and non-toxic in appearance.    Recommended return to urgent care for other new or different symptoms and return to a hospital based emergency department if they begin to develop acute worsening in symptoms.     Appropriate follow up was recommended.     Impression/Diagnoses  1. Urinary frequency    - POCT Urine Dip Auto  - Urine, Bacterial Culture  - nitrofurantoin, macrocrystal-monohydrate, (MACROBID) 100 MG capsule; Take 1 capsule by mouth in the morning and 1 capsule in the evening. Do all this for 5 days.  Dispense: 10 capsule; Refill: 0    2. Urinary urgency    - POCT Urine Dip Auto  - Urine, Bacterial Culture  - nitrofurantoin, macrocrystal-monohydrate, (MACROBID) 100 MG capsule; Take 1 capsule by mouth in the morning and 1 capsule in the evening. Do all this for 5 days.  Dispense: 10 capsule; Refill: 0     The patient and I engaged in shared decision making in the development of their care plan, and the patient or patient surrogate was given an opportunity to ask questions. All were answered to the patient’s apparent satisfaction and understanding.    Thank you for involving me in the care of this pleasant patient.  Nimisha Campa PA-C  8:46 AM  01/26/25

## (undated) DEVICE — TRAP,MUCUS SPECIMEN, 80CC: Brand: MEDLINE

## (undated) DEVICE — LINER,SOFT,SUCTION CANISTER,1500CC: Brand: MEDLINE

## (undated) DEVICE — Z INACTIVE USE 2660664 SOLUTION IRRIG 3000ML 0.9% SOD CHL USP UROMATIC PLAS CONT

## (undated) DEVICE — SINGLE USE BIOPSY VALVE MAJ-210: Brand: SINGLE USE BIOPSY VALVE (STERILE)

## (undated) DEVICE — PRECISION THIN (9.0 X 0.38 X 25.0MM)

## (undated) DEVICE — Z DISCONTINUED USE 2276105 GOWN PROTCT UNIV CHST W28IN L49IN SL 24IN BLU SPUNBOND FLM

## (undated) DEVICE — ADAPTER,CATHETER/SYRINGE/LUER,STERILE: Brand: MEDLINE

## (undated) DEVICE — SYRINGE MED 10ML LUERLOCK TIP W/O SFTY DISP

## (undated) DEVICE — TRAY PREP DRY W/ PREM GLV 2 APPL 6 SPNG 2 UNDPD 1 OVERWRAP

## (undated) DEVICE — SINGLE USE SUCTION VALVE MAJ-209: Brand: SINGLE USE SUCTION VALVE (STERILE)

## (undated) DEVICE — GAUZE,SPONGE,4"X4",8PLY,STRL,LF,10/TRAY: Brand: MEDLINE

## (undated) DEVICE — HANDPIECE SET WITH HIGH FLOW TIP AND SUCTION TUBE: Brand: INTERPULSE

## (undated) DEVICE — GOWN SIRUS NONREIN XL W/TWL: Brand: MEDLINE INDUSTRIES, INC.

## (undated) DEVICE — TIP SUCT DIA12FR W STYL CTRL VENT DISPOSABLE FRAZ

## (undated) DEVICE — PAD,ABDOMINAL,8"X10",ST,LF: Brand: MEDLINE

## (undated) DEVICE — FRAME EYEWR PROTCT ASST CLR DISP SAFEVIEW

## (undated) DEVICE — OCCLUSIVE GAUZE STRIP,3% BISMUTH TRIBROMOPHENATE IN PETROLATUM BLEND: Brand: XEROFORM

## (undated) DEVICE — SOLUTION IV IRRIG 500ML 0.9% SODIUM CHL 2F7123

## (undated) DEVICE — BANDAGE GZ W45INXL4 1 10YD FLUF RL 6 PLY DERMACEA

## (undated) DEVICE — GLOVE,SURG,TRIUMPH MICRO,LTX,PF,7.5: Brand: MEDLINE

## (undated) DEVICE — SYRINGE MED 50ML LUERSLIP TIP

## (undated) DEVICE — YANKAUER,BULB TIP,W/O VENT,RIGID,STERILE: Brand: MEDLINE

## (undated) DEVICE — PACK ORTH LO EXT VI

## (undated) DEVICE — 3M™ COBAN™ SELF-ADHERENT WRAP, 1583S, STERILE, 3 IN X 5 YD (7,5 CM X 4,5 M), 24 ROLLS/CASE: Brand: 3M™ COBAN™

## (undated) DEVICE — X-RAY CASSETTE COVER: Brand: CONVERTORS

## (undated) DEVICE — TUBING, SUCTION, 3/16" X 12', STRAIGHT: Brand: MEDLINE

## (undated) DEVICE — SHOE, POST-OPERATIVE: Brand: DEROYAL

## (undated) DEVICE — SUTURE ETHLN SZ 3-0 L30IN NONABSORBABLE BLK FSL L30MM 3/8 1671H

## (undated) DEVICE — ENDO CARRY-ON PROCEDURE KIT INCLUDES SUCTION TUBING, LUBRICANT, GAUZE, BIOHAZARD STICKER, TRANSPORT PAD AND INTERCEPT BEDSIDE KIT.: Brand: ENDO CARRY-ON PROCEDURE KIT

## (undated) DEVICE — MAJOR SET UP PK

## (undated) DEVICE — PACK PROCEDURE SURG EXTREMITY SORGER CDS

## (undated) DEVICE — NEEDLE HYPO 25GA L1.5IN BLU POLYPR HUB S STL REG BVL STR

## (undated) DEVICE — SUTURE VCRL + SZ 3-0 L18IN ABSRB UD SH 1/2 CIR TAPERCUT NDL VCP864D

## (undated) DEVICE — SYRINGE MED 10ML SLIP TIP BLNT FILL AND LUERLOCK DISP

## (undated) DEVICE — SUTURE VCRL + SZ 3-0 L27IN ABSRB UD L26MM SH 1/2 CIR VCP416H

## (undated) DEVICE — TUBING, SUCTION, 3/16" X 6', STRAIGHT: Brand: MEDLINE

## (undated) DEVICE — CURITY ABDOMINAL PAD: Brand: CURITY

## (undated) DEVICE — ELECTRODE PT RET AD L9FT HI MOIST COND ADH HYDRGEL CORDED

## (undated) DEVICE — BANDAGE WND 3INX5YD CO FLX FOAM

## (undated) DEVICE — GLOVE ORANGE PI 8   MSG9080

## (undated) DEVICE — SMARTGOWN SURGICAL GOWN, XL: Brand: CONVERTORS

## (undated) DEVICE — BRONCHOSCOPE CYTOLOGY BRUSH: Brand: COOK

## (undated) DEVICE — BOWL MED M 16OZ PLAS CAP GRAD

## (undated) DEVICE — COTTON UNDERCAST PADDING,CRIMPED FINISH: Brand: WEBRIL

## (undated) DEVICE — GLOVE ORANGE PI 7 1/2   MSG9075

## (undated) DEVICE — SUTURE VCRL + SZ 4-0 L18IN ABSRB UD L19MM PS-2 3/8 CIR PRIM VCP496H

## (undated) DEVICE — CONMED SCOPE SAVER BITE BLOCK, 20X27 MM: Brand: SCOPE SAVER

## (undated) DEVICE — ESMARK: Brand: DEROYAL

## (undated) DEVICE — APPLICATOR PREP 26ML 0.7% IOD POVACRYLEX 74% ISO ALC ST

## (undated) DEVICE — SMARTGOWN BREATHABLE SURGICAL GOWN: Brand: CONVERTORS

## (undated) DEVICE — 3M™ COBAN™ NL STERILE NON-LATEX SELF-ADHERENT WRAP, 2084S, 4 IN X 5 YD (10 CM X 4,5 M), 18 ROLLS/CASE: Brand: 3M™ COBAN™

## (undated) DEVICE — BANDAGE ROLL,100% COTTON, 8 PLY, LARGE: Brand: KERLIX